# Patient Record
Sex: FEMALE | Race: WHITE | NOT HISPANIC OR LATINO | URBAN - METROPOLITAN AREA
[De-identification: names, ages, dates, MRNs, and addresses within clinical notes are randomized per-mention and may not be internally consistent; named-entity substitution may affect disease eponyms.]

---

## 2018-02-05 ENCOUNTER — EMERGENCY (EMERGENCY)
Facility: HOSPITAL | Age: 73
LOS: 1 days | Discharge: LEFT BEFORE DISCHARGE | End: 2018-02-05
Attending: EMERGENCY MEDICINE
Payer: MEDICARE

## 2018-02-05 VITALS
TEMPERATURE: 98 F | SYSTOLIC BLOOD PRESSURE: 195 MMHG | WEIGHT: 154.98 LBS | RESPIRATION RATE: 16 BRPM | HEART RATE: 100 BPM | OXYGEN SATURATION: 99 % | DIASTOLIC BLOOD PRESSURE: 92 MMHG | HEIGHT: 65 IN

## 2018-02-05 DIAGNOSIS — Z95.1 PRESENCE OF AORTOCORONARY BYPASS GRAFT: Chronic | ICD-10-CM

## 2018-02-05 LAB
ALBUMIN SERPL ELPH-MCNC: 3.3 G/DL — LOW (ref 3.5–5)
ALP SERPL-CCNC: 96 U/L — SIGNIFICANT CHANGE UP (ref 40–120)
ALT FLD-CCNC: 26 U/L DA — SIGNIFICANT CHANGE UP (ref 10–60)
ANION GAP SERPL CALC-SCNC: 14 MMOL/L — SIGNIFICANT CHANGE UP (ref 5–17)
APPEARANCE UR: CLEAR — SIGNIFICANT CHANGE UP
APTT BLD: 32.7 SEC — SIGNIFICANT CHANGE UP (ref 27.5–37.4)
AST SERPL-CCNC: 42 U/L — HIGH (ref 10–40)
BASOPHILS # BLD AUTO: 0.2 K/UL — SIGNIFICANT CHANGE UP (ref 0–0.2)
BASOPHILS NFR BLD AUTO: 1.8 % — SIGNIFICANT CHANGE UP (ref 0–2)
BILIRUB SERPL-MCNC: 0.3 MG/DL — SIGNIFICANT CHANGE UP (ref 0.2–1.2)
BILIRUB UR-MCNC: NEGATIVE — SIGNIFICANT CHANGE UP
BUN SERPL-MCNC: 18 MG/DL — SIGNIFICANT CHANGE UP (ref 7–18)
CALCIUM SERPL-MCNC: 8.2 MG/DL — LOW (ref 8.4–10.5)
CHLORIDE SERPL-SCNC: 102 MMOL/L — SIGNIFICANT CHANGE UP (ref 96–108)
CK MB BLD-MCNC: 1.2 % — SIGNIFICANT CHANGE UP (ref 0–3.5)
CK MB CFR SERPL CALC: 1.6 NG/ML — SIGNIFICANT CHANGE UP (ref 0–3.6)
CK SERPL-CCNC: 133 U/L — SIGNIFICANT CHANGE UP (ref 21–215)
CO2 SERPL-SCNC: 22 MMOL/L — SIGNIFICANT CHANGE UP (ref 22–31)
COLOR SPEC: YELLOW — SIGNIFICANT CHANGE UP
CREAT SERPL-MCNC: 0.99 MG/DL — SIGNIFICANT CHANGE UP (ref 0.5–1.3)
DIFF PNL FLD: NEGATIVE — SIGNIFICANT CHANGE UP
EOSINOPHIL # BLD AUTO: 0 K/UL — SIGNIFICANT CHANGE UP (ref 0–0.5)
EOSINOPHIL NFR BLD AUTO: 0.3 % — SIGNIFICANT CHANGE UP (ref 0–6)
EPI CELLS # UR: SIGNIFICANT CHANGE UP /HPF
GLUCOSE SERPL-MCNC: 154 MG/DL — HIGH (ref 70–99)
GLUCOSE UR QL: NEGATIVE — SIGNIFICANT CHANGE UP
HCT VFR BLD CALC: 52.4 % — HIGH (ref 34.5–45)
HGB BLD-MCNC: 16.7 G/DL — HIGH (ref 11.5–15.5)
INR BLD: 1 RATIO — SIGNIFICANT CHANGE UP (ref 0.88–1.16)
KETONES UR-MCNC: NEGATIVE — SIGNIFICANT CHANGE UP
LEUKOCYTE ESTERASE UR-ACNC: NEGATIVE — SIGNIFICANT CHANGE UP
LYMPHOCYTES # BLD AUTO: 2.1 K/UL — SIGNIFICANT CHANGE UP (ref 1–3.3)
LYMPHOCYTES # BLD AUTO: 21.4 % — SIGNIFICANT CHANGE UP (ref 13–44)
MCHC RBC-ENTMCNC: 31.8 GM/DL — LOW (ref 32–36)
MCHC RBC-ENTMCNC: 32.2 PG — SIGNIFICANT CHANGE UP (ref 27–34)
MCV RBC AUTO: 101.2 FL — HIGH (ref 80–100)
MONOCYTES # BLD AUTO: 0.5 K/UL — SIGNIFICANT CHANGE UP (ref 0–0.9)
MONOCYTES NFR BLD AUTO: 5.2 % — SIGNIFICANT CHANGE UP (ref 2–14)
NEUTROPHILS # BLD AUTO: 7 K/UL — SIGNIFICANT CHANGE UP (ref 1.8–7.4)
NEUTROPHILS NFR BLD AUTO: 71.3 % — SIGNIFICANT CHANGE UP (ref 43–77)
NITRITE UR-MCNC: NEGATIVE — SIGNIFICANT CHANGE UP
NT-PROBNP SERPL-SCNC: 43 PG/ML — SIGNIFICANT CHANGE UP (ref 0–125)
PH UR: 5 — SIGNIFICANT CHANGE UP (ref 5–8)
PLATELET # BLD AUTO: 175 K/UL — SIGNIFICANT CHANGE UP (ref 150–400)
POTASSIUM SERPL-MCNC: 3.8 MMOL/L — SIGNIFICANT CHANGE UP (ref 3.5–5.3)
POTASSIUM SERPL-SCNC: 3.8 MMOL/L — SIGNIFICANT CHANGE UP (ref 3.5–5.3)
PROT SERPL-MCNC: 7 G/DL — SIGNIFICANT CHANGE UP (ref 6–8.3)
PROT UR-MCNC: 15
PROTHROM AB SERPL-ACNC: 10.9 SEC — SIGNIFICANT CHANGE UP (ref 9.8–12.7)
RBC # BLD: 5.18 M/UL — SIGNIFICANT CHANGE UP (ref 3.8–5.2)
RBC # FLD: 11.8 % — SIGNIFICANT CHANGE UP (ref 10.3–14.5)
RBC CASTS # UR COMP ASSIST: SIGNIFICANT CHANGE UP /HPF (ref 0–2)
SODIUM SERPL-SCNC: 138 MMOL/L — SIGNIFICANT CHANGE UP (ref 135–145)
SP GR SPEC: 1.01 — SIGNIFICANT CHANGE UP (ref 1.01–1.02)
T4 AB SER-ACNC: 9.2 UG/DL — SIGNIFICANT CHANGE UP (ref 4.6–12)
TROPONIN I SERPL-MCNC: <0.015 NG/ML — SIGNIFICANT CHANGE UP (ref 0–0.04)
TSH SERPL-MCNC: 0.48 UU/ML — SIGNIFICANT CHANGE UP (ref 0.34–4.82)
UROBILINOGEN FLD QL: NEGATIVE — SIGNIFICANT CHANGE UP
WBC # BLD: 9.9 K/UL — SIGNIFICANT CHANGE UP (ref 3.8–10.5)
WBC # FLD AUTO: 9.9 K/UL — SIGNIFICANT CHANGE UP (ref 3.8–10.5)
WBC UR QL: SIGNIFICANT CHANGE UP /HPF (ref 0–5)

## 2018-02-05 PROCEDURE — 99285 EMERGENCY DEPT VISIT HI MDM: CPT

## 2018-02-05 PROCEDURE — 71045 X-RAY EXAM CHEST 1 VIEW: CPT | Mod: 26

## 2018-02-05 PROCEDURE — 70450 CT HEAD/BRAIN W/O DYE: CPT | Mod: 26

## 2018-02-05 RX ORDER — SODIUM CHLORIDE 9 MG/ML
1000 INJECTION INTRAMUSCULAR; INTRAVENOUS; SUBCUTANEOUS ONCE
Qty: 0 | Refills: 0 | Status: COMPLETED | OUTPATIENT
Start: 2018-02-05 | End: 2018-02-05

## 2018-02-05 NOTE — ED PROVIDER NOTE - MEDICAL DECISION MAKING DETAILS
74 y/o F pt presents s/p fall; unknown ideology. Possible syncope. Will get labs, CT head and admit.

## 2018-02-05 NOTE — ED ADULT TRIAGE NOTE - CHIEF COMPLAINT QUOTE
s/p fall, patient stated" I walk waking to bathroom and fell" denies any injury, patient doesn't remember when she fell

## 2018-02-05 NOTE — ED PROVIDER NOTE - OBJECTIVE STATEMENT
74 y/o F pt, active heavy smoker (x50 years), with a significant PMHx of MI (), HLD, COPD and a significant PSHx of CABG x1 presents to the ED s/p fall with positive LOC. Pt was apparently found unconscious and brought to the ED. Pt does not remember the mechanism of her fall and is unsure who called the ambulance. Pt states she has problems walking 2/2 losing her balance. Pt notes she does not take any medications because she does not have a pmd; pt lives alone with her cat and states all of her doctors . Pt relates she last saw a doctor in . Pt currently with no complaints. Pt denies neck pain, back pain or any other complaints. Allergies: Penicillin, Statins and sulfa drugs.

## 2018-02-05 NOTE — ED ADULT NURSE NOTE - OBJECTIVE STATEMENT
Pt. c/o fall Pt. c/o fall.  patient stated" I walk waking to bathroom and fell". Pt. denies any injury but doesn't remember when she fell. Pt. is confused at times

## 2018-02-05 NOTE — ED PROVIDER NOTE - PMH
Hypercholesterolemia    Myocardial infarct, old COPD (chronic obstructive pulmonary disease)    Hypercholesterolemia    Myocardial infarct, old

## 2018-02-06 VITALS
HEART RATE: 102 BPM | OXYGEN SATURATION: 94 % | TEMPERATURE: 99 F | DIASTOLIC BLOOD PRESSURE: 68 MMHG | SYSTOLIC BLOOD PRESSURE: 164 MMHG | RESPIRATION RATE: 16 BRPM

## 2018-02-06 LAB
CK MB BLD-MCNC: 1.2 % — SIGNIFICANT CHANGE UP (ref 0–3.5)
CK MB CFR SERPL CALC: 2 NG/ML — SIGNIFICANT CHANGE UP (ref 0–3.6)
CK SERPL-CCNC: 160 U/L — SIGNIFICANT CHANGE UP (ref 21–215)
T3 SERPL-MCNC: 80 NG/DL — SIGNIFICANT CHANGE UP (ref 80–200)
TROPONIN I SERPL-MCNC: <0.015 NG/ML — SIGNIFICANT CHANGE UP (ref 0–0.04)

## 2018-02-06 PROCEDURE — 71045 X-RAY EXAM CHEST 1 VIEW: CPT

## 2018-02-06 PROCEDURE — 82553 CREATINE MB FRACTION: CPT

## 2018-02-06 PROCEDURE — 84484 ASSAY OF TROPONIN QUANT: CPT

## 2018-02-06 PROCEDURE — 99284 EMERGENCY DEPT VISIT MOD MDM: CPT | Mod: 25

## 2018-02-06 PROCEDURE — 80053 COMPREHEN METABOLIC PANEL: CPT

## 2018-02-06 PROCEDURE — 85610 PROTHROMBIN TIME: CPT

## 2018-02-06 PROCEDURE — 85027 COMPLETE CBC AUTOMATED: CPT

## 2018-02-06 PROCEDURE — 81001 URINALYSIS AUTO W/SCOPE: CPT

## 2018-02-06 PROCEDURE — 84436 ASSAY OF TOTAL THYROXINE: CPT

## 2018-02-06 PROCEDURE — 85730 THROMBOPLASTIN TIME PARTIAL: CPT

## 2018-02-06 PROCEDURE — 93005 ELECTROCARDIOGRAM TRACING: CPT

## 2018-02-06 PROCEDURE — 84443 ASSAY THYROID STIM HORMONE: CPT

## 2018-02-06 PROCEDURE — 70450 CT HEAD/BRAIN W/O DYE: CPT

## 2018-02-06 PROCEDURE — 82550 ASSAY OF CK (CPK): CPT

## 2018-02-06 PROCEDURE — 83880 ASSAY OF NATRIURETIC PEPTIDE: CPT

## 2018-02-06 RX ADMIN — SODIUM CHLORIDE 1000 MILLILITER(S): 9 INJECTION INTRAMUSCULAR; INTRAVENOUS; SUBCUTANEOUS at 01:36

## 2018-02-06 NOTE — ED ADULT NURSE REASSESSMENT NOTE - NS ED NURSE REASSESS COMMENT FT1
Blood work done and fluids given. Pt. is eager to leave. Pt. education provided on the importance of being monitored. Pt. signed AMA forms.

## 2018-02-07 LAB
CULTURE RESULTS: SIGNIFICANT CHANGE UP
SPECIMEN SOURCE: SIGNIFICANT CHANGE UP

## 2018-08-23 NOTE — ED ADULT TRIAGE NOTE - NS ED NURSE AMBULANCES
[facility] Duration Of Freeze Thaw-Cycle (Seconds): 0 Render Post-Care Instructions In Note?: no Detail Level: Detailed Consent: The patient's consent was obtained including but not limited to risks of crusting, scabbing, blistering, scarring, darker or lighter pigmentary change, recurrence, incomplete removal and infection. Post-Care Instructions: I reviewed with the patient in detail post-care instructions. Patient is to wear sunprotection, and avoid picking at any of the treated lesions. Pt may apply ointment and a bandage to crusted or scabbing areas. Written wound care instructions were given to the patient.

## 2020-01-01 ENCOUNTER — APPOINTMENT (OUTPATIENT)
Dept: VASCULAR SURGERY | Facility: CLINIC | Age: 75
End: 2020-01-01
Payer: MEDICARE

## 2020-01-01 ENCOUNTER — OUTPATIENT (OUTPATIENT)
Dept: OUTPATIENT SERVICES | Facility: HOSPITAL | Age: 75
LOS: 1 days | End: 2020-01-01
Payer: MEDICARE

## 2020-01-01 ENCOUNTER — APPOINTMENT (OUTPATIENT)
Dept: CT IMAGING | Facility: IMAGING CENTER | Age: 75
End: 2020-01-01
Payer: MEDICARE

## 2020-01-01 ENCOUNTER — RESULT REVIEW (OUTPATIENT)
Age: 75
End: 2020-01-01

## 2020-01-01 VITALS
WEIGHT: 125 LBS | HEIGHT: 67 IN | HEART RATE: 71 BPM | DIASTOLIC BLOOD PRESSURE: 83 MMHG | BODY MASS INDEX: 19.62 KG/M2 | SYSTOLIC BLOOD PRESSURE: 193 MMHG | TEMPERATURE: 98 F

## 2020-01-01 VITALS — DIASTOLIC BLOOD PRESSURE: 85 MMHG | HEART RATE: 65 BPM | SYSTOLIC BLOOD PRESSURE: 176 MMHG

## 2020-01-01 DIAGNOSIS — Z00.8 ENCOUNTER FOR OTHER GENERAL EXAMINATION: ICD-10-CM

## 2020-01-01 DIAGNOSIS — Z95.1 PRESENCE OF AORTOCORONARY BYPASS GRAFT: Chronic | ICD-10-CM

## 2020-01-01 PROCEDURE — 93922 UPR/L XTREMITY ART 2 LEVELS: CPT

## 2020-01-01 PROCEDURE — 93926 LOWER EXTREMITY STUDY: CPT

## 2020-01-01 PROCEDURE — 73706 CT ANGIO LWR EXTR W/O&W/DYE: CPT | Mod: 26,LT

## 2020-01-01 PROCEDURE — 99212 OFFICE O/P EST SF 10 MIN: CPT

## 2020-01-01 PROCEDURE — 73706 CT ANGIO LWR EXTR W/O&W/DYE: CPT

## 2020-01-10 ENCOUNTER — INPATIENT (INPATIENT)
Facility: HOSPITAL | Age: 75
LOS: 31 days | Discharge: NURSING, REHAB OR LONG-TERM CARE | DRG: 270 | End: 2020-02-11
Attending: SURGERY | Admitting: SURGERY
Payer: MEDICARE

## 2020-01-10 VITALS
HEART RATE: 71 BPM | OXYGEN SATURATION: 99 % | RESPIRATION RATE: 16 BRPM | DIASTOLIC BLOOD PRESSURE: 78 MMHG | WEIGHT: 139.99 LBS | HEIGHT: 62 IN | TEMPERATURE: 99 F | SYSTOLIC BLOOD PRESSURE: 132 MMHG

## 2020-01-10 DIAGNOSIS — Z29.9 ENCOUNTER FOR PROPHYLACTIC MEASURES, UNSPECIFIED: ICD-10-CM

## 2020-01-10 DIAGNOSIS — I25.10 ATHEROSCLEROTIC HEART DISEASE OF NATIVE CORONARY ARTERY WITHOUT ANGINA PECTORIS: ICD-10-CM

## 2020-01-10 DIAGNOSIS — I96 GANGRENE, NOT ELSEWHERE CLASSIFIED: ICD-10-CM

## 2020-01-10 DIAGNOSIS — J44.9 CHRONIC OBSTRUCTIVE PULMONARY DISEASE, UNSPECIFIED: ICD-10-CM

## 2020-01-10 DIAGNOSIS — Z95.1 PRESENCE OF AORTOCORONARY BYPASS GRAFT: Chronic | ICD-10-CM

## 2020-01-10 PROBLEM — E78.00 PURE HYPERCHOLESTEROLEMIA, UNSPECIFIED: Chronic | Status: ACTIVE | Noted: 2018-02-05

## 2020-01-10 PROBLEM — I25.2 OLD MYOCARDIAL INFARCTION: Chronic | Status: ACTIVE | Noted: 2018-02-05

## 2020-01-10 LAB
ALBUMIN SERPL ELPH-MCNC: 4 G/DL — SIGNIFICANT CHANGE UP (ref 3.5–5)
ALP SERPL-CCNC: 94 U/L — SIGNIFICANT CHANGE UP (ref 40–120)
ALT FLD-CCNC: 14 U/L DA — SIGNIFICANT CHANGE UP (ref 10–60)
ANION GAP SERPL CALC-SCNC: 7 MMOL/L — SIGNIFICANT CHANGE UP (ref 5–17)
APTT BLD: 34.4 SEC — SIGNIFICANT CHANGE UP (ref 27.5–36.3)
AST SERPL-CCNC: 16 U/L — SIGNIFICANT CHANGE UP (ref 10–40)
BASOPHILS # BLD AUTO: 0.09 K/UL — SIGNIFICANT CHANGE UP (ref 0–0.2)
BASOPHILS NFR BLD AUTO: 0.8 % — SIGNIFICANT CHANGE UP (ref 0–2)
BILIRUB SERPL-MCNC: 0.5 MG/DL — SIGNIFICANT CHANGE UP (ref 0.2–1.2)
BUN SERPL-MCNC: 17 MG/DL — SIGNIFICANT CHANGE UP (ref 7–18)
CALCIUM SERPL-MCNC: 9.9 MG/DL — SIGNIFICANT CHANGE UP (ref 8.4–10.5)
CHLORIDE SERPL-SCNC: 104 MMOL/L — SIGNIFICANT CHANGE UP (ref 96–108)
CO2 SERPL-SCNC: 28 MMOL/L — SIGNIFICANT CHANGE UP (ref 22–31)
CREAT SERPL-MCNC: 0.96 MG/DL — SIGNIFICANT CHANGE UP (ref 0.5–1.3)
EOSINOPHIL # BLD AUTO: 0.11 K/UL — SIGNIFICANT CHANGE UP (ref 0–0.5)
EOSINOPHIL NFR BLD AUTO: 0.9 % — SIGNIFICANT CHANGE UP (ref 0–6)
GLUCOSE SERPL-MCNC: 80 MG/DL — SIGNIFICANT CHANGE UP (ref 70–99)
HCT VFR BLD CALC: 46.3 % — HIGH (ref 34.5–45)
HGB BLD-MCNC: 15.1 G/DL — SIGNIFICANT CHANGE UP (ref 11.5–15.5)
IMM GRANULOCYTES NFR BLD AUTO: 0.3 % — SIGNIFICANT CHANGE UP (ref 0–1.5)
INR BLD: 1.01 RATIO — SIGNIFICANT CHANGE UP (ref 0.88–1.16)
LACTATE SERPL-SCNC: 1.6 MMOL/L — SIGNIFICANT CHANGE UP (ref 0.7–2)
LYMPHOCYTES # BLD AUTO: 1.75 K/UL — SIGNIFICANT CHANGE UP (ref 1–3.3)
LYMPHOCYTES # BLD AUTO: 14.8 % — SIGNIFICANT CHANGE UP (ref 13–44)
MCHC RBC-ENTMCNC: 30.4 PG — SIGNIFICANT CHANGE UP (ref 27–34)
MCHC RBC-ENTMCNC: 32.6 GM/DL — SIGNIFICANT CHANGE UP (ref 32–36)
MCV RBC AUTO: 93.3 FL — SIGNIFICANT CHANGE UP (ref 80–100)
MONOCYTES # BLD AUTO: 0.97 K/UL — HIGH (ref 0–0.9)
MONOCYTES NFR BLD AUTO: 8.2 % — SIGNIFICANT CHANGE UP (ref 2–14)
NEUTROPHILS # BLD AUTO: 8.86 K/UL — HIGH (ref 1.8–7.4)
NEUTROPHILS NFR BLD AUTO: 75 % — SIGNIFICANT CHANGE UP (ref 43–77)
NRBC # BLD: 0 /100 WBCS — SIGNIFICANT CHANGE UP (ref 0–0)
PLATELET # BLD AUTO: 253 K/UL — SIGNIFICANT CHANGE UP (ref 150–400)
POTASSIUM SERPL-MCNC: 3.4 MMOL/L — LOW (ref 3.5–5.3)
POTASSIUM SERPL-SCNC: 3.4 MMOL/L — LOW (ref 3.5–5.3)
PROT SERPL-MCNC: 8.5 G/DL — HIGH (ref 6–8.3)
PROTHROM AB SERPL-ACNC: 11.2 SEC — SIGNIFICANT CHANGE UP (ref 10–12.9)
RBC # BLD: 4.96 M/UL — SIGNIFICANT CHANGE UP (ref 3.8–5.2)
RBC # FLD: 12.6 % — SIGNIFICANT CHANGE UP (ref 10.3–14.5)
SODIUM SERPL-SCNC: 139 MMOL/L — SIGNIFICANT CHANGE UP (ref 135–145)
WBC # BLD: 11.81 K/UL — HIGH (ref 3.8–10.5)
WBC # FLD AUTO: 11.81 K/UL — HIGH (ref 3.8–10.5)

## 2020-01-10 PROCEDURE — 73630 X-RAY EXAM OF FOOT: CPT | Mod: 26,RT

## 2020-01-10 PROCEDURE — 99223 1ST HOSP IP/OBS HIGH 75: CPT

## 2020-01-10 PROCEDURE — 99285 EMERGENCY DEPT VISIT HI MDM: CPT

## 2020-01-10 RX ORDER — CEFEPIME 1 G/1
INJECTION, POWDER, FOR SOLUTION INTRAMUSCULAR; INTRAVENOUS
Refills: 0 | Status: DISCONTINUED | OUTPATIENT
Start: 2020-01-11 | End: 2020-01-17

## 2020-01-10 RX ORDER — CEFEPIME 1 G/1
2000 INJECTION, POWDER, FOR SOLUTION INTRAMUSCULAR; INTRAVENOUS EVERY 12 HOURS
Refills: 0 | Status: DISCONTINUED | OUTPATIENT
Start: 2020-01-11 | End: 2020-01-17

## 2020-01-10 RX ORDER — ENOXAPARIN SODIUM 100 MG/ML
40 INJECTION SUBCUTANEOUS DAILY
Refills: 0 | Status: DISCONTINUED | OUTPATIENT
Start: 2020-01-10 | End: 2020-01-17

## 2020-01-10 RX ORDER — RISPERIDONE 4 MG/1
0.5 TABLET ORAL
Refills: 0 | Status: DISCONTINUED | OUTPATIENT
Start: 2020-01-10 | End: 2020-01-17

## 2020-01-10 RX ORDER — ASPIRIN/CALCIUM CARB/MAGNESIUM 324 MG
81 TABLET ORAL DAILY
Refills: 0 | Status: DISCONTINUED | OUTPATIENT
Start: 2020-01-10 | End: 2020-01-17

## 2020-01-10 RX ORDER — CEFEPIME 1 G/1
2000 INJECTION, POWDER, FOR SOLUTION INTRAMUSCULAR; INTRAVENOUS ONCE
Refills: 0 | Status: COMPLETED | OUTPATIENT
Start: 2020-01-10 | End: 2020-01-11

## 2020-01-10 RX ORDER — LISINOPRIL 2.5 MG/1
10 TABLET ORAL DAILY
Refills: 0 | Status: DISCONTINUED | OUTPATIENT
Start: 2020-01-10 | End: 2020-01-17

## 2020-01-10 RX ORDER — ACETAMINOPHEN 500 MG
650 TABLET ORAL EVERY 6 HOURS
Refills: 0 | Status: DISCONTINUED | OUTPATIENT
Start: 2020-01-10 | End: 2020-01-17

## 2020-01-10 RX ORDER — SODIUM CHLORIDE 9 MG/ML
1000 INJECTION INTRAMUSCULAR; INTRAVENOUS; SUBCUTANEOUS ONCE
Refills: 0 | Status: COMPLETED | OUTPATIENT
Start: 2020-01-10 | End: 2020-01-10

## 2020-01-10 RX ADMIN — Medication 650 MILLIGRAM(S): at 22:15

## 2020-01-10 RX ADMIN — Medication 650 MILLIGRAM(S): at 22:27

## 2020-01-10 RX ADMIN — SODIUM CHLORIDE 1000 MILLILITER(S): 9 INJECTION INTRAMUSCULAR; INTRAVENOUS; SUBCUTANEOUS at 14:05

## 2020-01-10 RX ADMIN — Medication 100 MILLIGRAM(S): at 14:04

## 2020-01-10 NOTE — H&P ADULT - ASSESSMENT
Patient is 74 year old female, live by herself, ambulates with cane  has medical hx significant for dementia, cad, pvd, hld, htn presents to the ED with complaint of discoloration for a toe on the right foot.

## 2020-01-10 NOTE — H&P ADULT - HISTORY OF PRESENT ILLNESS
Patient is 74 year old female, live by herself, ambulates with cane  has medical hx significant for cad, pvd, hld, htn presents to the ED with complaint of discoloration for a toe on the right foot.  Per pt she has black colored rt 2nd toe. pt states she notice discoloration gradually over last 3 weeks associated with swelling and erythema, pt states she bumped in to something and hurt her right big toe but dose not remember when. Today she was seen by her primary care physician a few days ago and told her to go to the emergency room. Patient states she can only ambulate 10 feet and gets a cramp in her right calf for which she needs to sit down.  She states she is very minimally ambulatory.  She denies pain at rest with her feet elevated.  Patient lives alone with a cat. Patient massaging leg throughout the visit.  Patient denies fever, cp, sob, cough, n/v/d. Pt has urinary incontinence for many years. Patient states her blood pressure is not usually low. Patient states she smokes at least a pack a day of cigarettes since she was 22 years old.    Allergy: Sulfa and penicillin    Code status: Full code Patient is 74 year old female, live by herself, ambulates with cane  has medical hx significant for dementia, cad, pvd, hld, htn presents to the ED with complaint of discoloration for a toe on the right foot.  Per pt she has black colored rt 2nd toe. pt states she notice discoloration gradually over last 3 weeks associated with swelling and erythema, pt states she bumped in to something and hurt her right big toe but dose not remember when. Today she was seen by her primary care physician a few days ago and told her to go to the emergency room. Patient states she can only ambulate 10 feet and gets a cramp in her right calf for which she needs to sit down.  She states she is very minimally ambulatory.  She denies pain at rest with her feet elevated.  Patient lives alone with a cat. Patient massaging leg throughout the visit.  Patient denies fever, cp, sob, cough, n/v/d. Pt has urinary incontinence for many years. Patient states her blood pressure is not usually low. Patient states she smokes at least a pack a day of cigarettes since she was 22 years old.    Allergy: Sulfa and penicillin    Code status: Full code

## 2020-01-10 NOTE — ED PROVIDER NOTE - PROGRESS NOTE DETAILS
Pan: s/o from Dr Winn to f/u vascular eval. spoke with vascular and will follow the NICKI but no acute surgical intervention.  rec to admit to med

## 2020-01-10 NOTE — ED PROVIDER NOTE - OBJECTIVE STATEMENT
Pt is a 74y F with significant PMHx of hld, copd and significant PSHx of CABG, was sent to the ED by pmd for Rt foot cellulitis/necrotic 2nd toe. As per pt, symptoms have progressed over the last week. But as per nephew at bedside, pt sleeps in a rocking chair with slippers on and he can't recall last time he saw the pt's foot thus has no idea how long symptoms have been developing. Pt denies any fever, chest pain, nausea or vomiting. Pt has multiple drug allergies and currently denies any additional complaints at this time.

## 2020-01-10 NOTE — ED ADULT NURSE NOTE - NSIMPLEMENTINTERV_GEN_ALL_ED
Implemented All Universal Safety Interventions:  [place] to call system. Call bell, personal items and telephone within reach. Instruct patient to call for assistance. Room bathroom lighting operational. Non-slip footwear when patient is off stretcher. Physically safe environment: no spills, clutter or unnecessary equipment. Stretcher in lowest position, wheels locked, appropriate side rails in place.

## 2020-01-10 NOTE — H&P ADULT - NSICDXPASTMEDICALHX_GEN_ALL_CORE_FT
PAST MEDICAL HISTORY:  COPD (chronic obstructive pulmonary disease)     Hypercholesterolemia     Myocardial infarct, old

## 2020-01-10 NOTE — H&P ADULT - NSHPPHYSICALEXAM_GEN_ALL_CORE
Vital Signs Last 24 Hrs  T(C): 36.9 (10 Angel 2020 21:44), Max: 36.9 (10 Angel 2020 21:44)  T(F): 98.4 (10 Angel 2020 21:44), Max: 98.4 (10 Angel 2020 21:44)  HR: 107 (10 Angel 2020 21:44) (100 - 107)  BP: 142/57 (10 Angel 2020 21:44) (80/51 - 142/57)  BP(mean): --  RR: 18 (10 Angel 2020 21:44) (18 - 20)  SpO2: 99% (10 Angel 2020 21:44) (98% - 99%)    PHYSICAL EXAM:  GENERAL: NAD, well-developed, cachetic   HEAD:  Atraumatic, Normocephalic  EYES: EOMI, PERRLA, conjunctiva and sclera clear  NECK: Supple, No JVD  CHEST/LUNG: Clear to auscultation bilaterally; No wheeze  HEART: Regular rate and rhythm; s1+ s2+  ABDOMEN: Soft, Nontender, Nondistended; Bowel sounds present  EXTREMITIES: Right 2nd toe: black necrotic to the level of the metatarsal head with exposed bone.  Distal aspect of the right 2nd toe is hard and dry, slight drainage to proximal aspect of toe. Right foot erythema to level of ankle joint  NEUROLOGY:AAOx3 non-focal  SKIN: No rashes or lesions

## 2020-01-10 NOTE — H&P ADULT - PROBLEM SELECTOR PLAN 1
Dry gangrene with surrounding cellulitis  s/p clinda in the ED  will start on cefexime   f/u blood culture   vascular and podiatry consult appreciated  f/u timothy and ct abdomen

## 2020-01-10 NOTE — CHART NOTE - NSCHARTNOTEFT_GEN_A_CORE
Spoke to Power of  Ms Ascencio [phone], about pt's medical condition and plan discussed, all questions answered. Ms Ascencio states her aunt Ms Mcgovern has dementia, Ms Ascencio would like to be involved in all medical decision making for Ms Sesay.   Code status discussed pt remains full code.

## 2020-01-10 NOTE — CONSULT NOTE ADULT - SUBJECTIVE AND OBJECTIVE BOX
Patient is a 74y old  Female who presents with a chief complaint of discoloration of toe of right foot.     HPI: This 74 year old non-diabetic female presents to the ED with complaint of discoloration for a toe on the right foot. She states that she was seen by her primary care physician a few days ago and told her to go to the emergency room.  Patient states she smokes at least a pack a day of cigarettes since she was 22 years old.  Patient states she can only ambulate 10 feet and gets a cramp in her right calf for which she needs to sit down.  She states she is very minimally ambulatory.   She denies pain at rest with her feet elevated.  Patient lives alone with a cat.  Patient denies n/v/d/sob/cp/f.  Patient states her blood pressure is not usually low.       PMH:COPD (chronic obstructive pulmonary disease)  Hypercholesterolemia  Myocardial infarct, old    Allergies: PC Pen VK (Rash)  penicillin (Other; Rash)  statins (Other)  sulfa drugs (Other)  sulfamethizole (Other)    Medications:   FH:  PSX: S/P CABG x 1    SH:     Vital Signs Last 24 Hrs  T(C): 36.2 (10 Angel 2020 12:41), Max: 36.2 (10 Angel 2020 12:41)  T(F): 97.2 (10 Angel 2020 12:41), Max: 97.2 (10 Angel 2020 12:41)  HR: 105 (10 Angel 2020 12:41) (105 - 105)  BP: 80/51 (10 Angel 2020 12:41) (80/51 - 80/51)  BP(mean): --  RR: 20 (10 Angel 2020 12:41) (20 - 20)  SpO2: 98% (10 Angel 2020 12:41) (98% - 98%)    LABS                        15.1   11.81 )-----------( 253      ( 10 Angel 2020 14:05 )             46.3               01-10    139  |  104  |  17  ----------------------------<  80  3.4<L>   |  28  |  x     Ca    9.9      10 Angel 2020 14:05    TPro  x   /  Alb  4.0  /  TBili  x   /  DBili  x   /  AST  x   /  ALT  x   /  AlkPhos  x   01-10      ROS  REVIEW OF SYSTEMS:    CONSTITUTIONAL: No fever, weight loss, or fatigue  EYES: No eye pain, visual disturbances, or discharge  ENMT:  No difficulty hearing, tinnitus, vertigo; No sinus or throat pain  NECK: No pain or stiffness  RESPIRATORY: No cough, wheezing, chills or hemoptysis; No shortness of breath  CARDIOVASCULAR: Bilateral leg edema  GASTROINTESTINAL: No abdominal or epigastric pain. No nausea, vomiting, or hematemesis; No diarrhea or constipation.  GENITOURINARY: No dysuria, frequency, hematuria, or incontinence  NEUROLOGICAL: Burnign sensation in lower extremities periodically  SKIN: Right 2nd toe gangrene, right 3rd and 4th digits dusky  MUSCULOSKELETAL: Right lower extremity pain        PHYSICAL EXAM  GEN: MERARY MEYER is a pleasant well-nourished, well developed 74y Female in no acute distress, alert awake, and oriented to person, place and time.   LE Focused:    Vasc:  DP and PT pulses non-palpable b/l.  Left DP faintly dopplerable.  Unable to doppler left PT, Unable to find right DP or PT with doppler.  No CFT to right 2nd toe,  Derm:   Right 2nd toe: black necrotic to the level of the metatarsal head.  Distal aspect of the right 2nd toe is hard and dry, slight drainage to proximal aspect of toe.  Right foot erythema to level of ankle joint  Neuro: Gross sensation intact ot toes  MSK: Tenderness to palpation distal foot.      Imaging:   x-ray pending    Cultures:     A: Dry Gangrene right 2nd toe  PVD  Right 3rd and 4th toes dusky    P:  Patient evaluated, chart reviewed  Xrays- pending  Will f/u vasc consult  Pending NICKI/PVR  Betadine DSD Patient is a 74y old  Female who presents with a chief complaint of discoloration of toe of right foot.     HPI: This 74 year old non-diabetic female presents to the ED with complaint of discoloration for a toe on the right foot. She states that she was seen by her primary care physician a few days ago and told her to go to the emergency room.  Patient states she smokes at least a pack a day of cigarettes since she was 22 years old.  Patient states she can only ambulate 10 feet and gets a cramp in her right calf for which she needs to sit down.  She states she is very minimally ambulatory.   She denies pain at rest with her feet elevated.  Patient lives alone with a cat.  Patient denies n/v/d/sob/cp/f.  Patient states her blood pressure is not usually low.       PMH:COPD (chronic obstructive pulmonary disease)  Hypercholesterolemia  Myocardial infarct, old    Allergies: PC Pen VK (Rash)  penicillin (Other; Rash)  statins (Other)  sulfa drugs (Other)  sulfamethizole (Other)    Medications:   FH:  PSX: S/P CABG x 1    SH:     Vital Signs Last 24 Hrs  T(C): 36.2 (10 Angel 2020 12:41), Max: 36.2 (10 Angel 2020 12:41)  T(F): 97.2 (10 Angel 2020 12:41), Max: 97.2 (10 Angel 2020 12:41)  HR: 105 (10 Angel 2020 12:41) (105 - 105)  BP: 80/51 (10 Angel 2020 12:41) (80/51 - 80/51)  BP(mean): --  RR: 20 (10 Angel 2020 12:41) (20 - 20)  SpO2: 98% (10 Angel 2020 12:41) (98% - 98%)    LABS                        15.1   11.81 )-----------( 253      ( 10 Angel 2020 14:05 )             46.3               01-10    139  |  104  |  17  ----------------------------<  80  3.4<L>   |  28  |  x     Ca    9.9      10 Angel 2020 14:05    TPro  x   /  Alb  4.0  /  TBili  x   /  DBili  x   /  AST  x   /  ALT  x   /  AlkPhos  x   01-10      ROS  REVIEW OF SYSTEMS:    CONSTITUTIONAL: No fever, weight loss, or fatigue  EYES: No eye pain, visual disturbances, or discharge  ENMT:  No difficulty hearing, tinnitus, vertigo; No sinus or throat pain  NECK: No pain or stiffness  RESPIRATORY: No cough, wheezing, chills or hemoptysis; No shortness of breath  CARDIOVASCULAR: Bilateral leg edema  GASTROINTESTINAL: No abdominal or epigastric pain. No nausea, vomiting, or hematemesis; No diarrhea or constipation.  GENITOURINARY: No dysuria, frequency, hematuria, or incontinence  NEUROLOGICAL: Burnign sensation in lower extremities periodically  SKIN: Right 2nd toe gangrene, right 3rd and 4th digits dusky  MUSCULOSKELETAL: Right lower extremity pain        PHYSICAL EXAM  GEN: MERARY MEYER is a pleasant well-nourished, well developed 74y Female in no acute distress, alert awake, and oriented to person, place and time.   LE Focused:    Vasc:  DP and PT pulses non-palpable b/l.  Left DP faintly dopplerable.  Unable to doppler left PT, Unable to find right DP or PT with doppler.  No CFT to right 2nd toe,  Derm:   Right 2nd toe: black necrotic to the level of the metatarsal head with exposed bone.  Distal aspect of the right 2nd toe is hard and dry, slight drainage to proximal aspect of toe.  Right foot erythema to level of ankle joint  Neuro: Sensation diminished to digits b/l.  MSK: Tenderness to palpation distal foot.      Imaging:   x-ray pending    Cultures:     A:  Gangrene right 2nd toe  PVD  Right 3rd and 4th toes dusky    P:  Patient evaluated, chart reviewed  Xrays- pending  Will f/u vasc consult  Pending NICKI/PVR  Betadine DSD to be applied after vascular sees patient  Recommend IV antibiotics  Plan for amputation Wednesday following vascular examination  Discussed the importance of daily foot examinations  Examined with Dr. Rdz Patient is a 74y old  Female who presents with a chief complaint of discoloration of toe of right foot.     HPI: This 74 year old non-diabetic female presents to the ED with complaint of discoloration for a toe on the right foot. She states that she was seen by her primary care physician a few days ago and told her to go to the emergency room.  Patient states she smokes at least a pack a day of cigarettes since she was 22 years old.  Patient states she can only ambulate 10 feet and gets a cramp in her right calf for which she needs to sit down.  She states she is very minimally ambulatory.   She denies pain at rest with her feet elevated.  Patient lives alone with a cat. Patient massaging leg throughout the visit.  Patient denies n/v/d/sob/cp/f.  Patient states her blood pressure is not usually low.       PMH:COPD (chronic obstructive pulmonary disease)  Hypercholesterolemia  Myocardial infarct, old    Allergies: PC Pen VK (Rash)  penicillin (Other; Rash)  statins (Other)  sulfa drugs (Other)  sulfamethizole (Other)    Medications:   FH:  PSX: S/P CABG x 1    SH:     Vital Signs Last 24 Hrs  T(C): 36.2 (10 Angel 2020 12:41), Max: 36.2 (10 Angel 2020 12:41)  T(F): 97.2 (10 Angel 2020 12:41), Max: 97.2 (10 Angel 2020 12:41)  HR: 105 (10 Angel 2020 12:41) (105 - 105)  BP: 80/51 (10 Angel 2020 12:41) (80/51 - 80/51)  BP(mean): --  RR: 20 (10 Angel 2020 12:41) (20 - 20)  SpO2: 98% (10 Angel 2020 12:41) (98% - 98%)    LABS                        15.1   11.81 )-----------( 253      ( 10 Angel 2020 14:05 )             46.3               01-10    139  |  104  |  17  ----------------------------<  80  3.4<L>   |  28  |  x     Ca    9.9      10 Angel 2020 14:05    TPro  x   /  Alb  4.0  /  TBili  x   /  DBili  x   /  AST  x   /  ALT  x   /  AlkPhos  x   01-10      ROS  REVIEW OF SYSTEMS:    CONSTITUTIONAL: No fever, weight loss, or fatigue  EYES: No eye pain, visual disturbances, or discharge  ENMT:  No difficulty hearing, tinnitus, vertigo; No sinus or throat pain  NECK: No pain or stiffness  RESPIRATORY: No cough, wheezing, chills or hemoptysis; No shortness of breath  CARDIOVASCULAR: Bilateral leg edema  GASTROINTESTINAL: No abdominal or epigastric pain. No nausea, vomiting, or hematemesis; No diarrhea or constipation.  GENITOURINARY: No dysuria, frequency, hematuria, or incontinence  NEUROLOGICAL: Burnign sensation in lower extremities periodically  SKIN: Right 2nd toe gangrene, right 3rd and 4th digits dusky  MUSCULOSKELETAL: Right lower extremity pain        PHYSICAL EXAM  GEN: MERARY MEYER is a pleasant well-nourished, well developed 74y Female in no acute distress, alert awake, and oriented to person, place and time.   LE Focused:    Vasc:  DP and PT pulses non-palpable b/l.  Left DP faintly dopplerable.  Unable to doppler left PT, Unable to find right DP or PT with doppler.  No CFT to right 2nd toe,  Derm:   Right 2nd toe: black necrotic to the level of the metatarsal head with exposed bone.  Distal aspect of the right 2nd toe is hard and dry, slight drainage to proximal aspect of toe.  Right foot erythema to level of ankle joint  Neuro: Sensation diminished to digits b/l.  MSK: Tenderness to palpation distal foot.      Imaging:   < from: Xray Foot AP + Lateral + Oblique, Right (01.10.20 @ 15:05) >    EXAM:  FOOT RIGHT (MINIMUM 3 VIEWS)                        PROCEDURE DATE:  01/10/2020    INTERPRETATION:  Right foot. 3 views. Patient has local pain.    The foot shows mild degeneration at the first MTP joint.    There is an old fracture deformity of the distal fibula.    No bone destruction or acute fracture is evident.    IMPRESSION: No acute finding.    JASKARAN MOSLEY M.D., ATTENDING RADIOLOGIST  This document has been electronically signed. Angel 10 2020  3:06PM  < end of copied text >      A:   Critical Limb Ischemia, right  Gangrene right 2nd toe  PVD  Right 3rd and 4th toes dusky    P:  Patient evaluated, chart reviewed  Xrays- pending  Will f/u vasc consult  Pending NICKI/PVR  Betadine DSD applied  Recommend IV antibiotics  Plan for amputation, likely Transmetatarsal amputation, Wednesday following vascular examination  Discussed the importance of daily foot examinations  Podiatry will follow  Examined with Dr. Rdz

## 2020-01-10 NOTE — ED ADULT NURSE NOTE - OBJECTIVE STATEMENT
Patient came to the ED a/o x 3 ambulates c/o keegan foot/ ankle swelling. + redness + swelling. Pt has necrosis in the right 2nd toe. Sent for abx therapy and admission. Patient came to the ED a/o x 3 ambulates c/o keegan foot/ ankle swelling. + redness + swelling. Pt has necrosis in the right 2nd toe. Sent for abx therapy and admission. .pt has small pimples and redness seen below sacrum area and rt buttocks

## 2020-01-10 NOTE — CONSULT NOTE ADULT - SUBJECTIVE AND OBJECTIVE BOX
Patient is a 74y old  Female who presents with a chief complaint of 2nd toe gangrene     HPI  Called to see and evaluate a 73 y/o female smoker with pmhx of MI, HLD, COPD, presents to ED sent from her PCP for gangrene of 2nd right toe with wet skin changes to 3rd and 4th toes with drainage. Pt states she walks with her cane and c/o of claudication pain in her calves; denies rest pain. Pt cannot remember when the discoloration started, but admits to some sharp stabbing pain to the right heel with numbness for the past few weeks; cannot remember when it started.     PAST MEDICAL & SURGICAL HISTORY:  COPD (chronic obstructive pulmonary disease)  Hypercholesterolemia  Myocardial infarct, old  S/P CABG x 1    Allergies:  PC Pen VK (Rash)  penicillin (Other; Rash)  statins (Other)  sulfa drugs (Other)  sulfamethizole (Other)    Vital Signs Last 24 Hrs  T(C): 36.4 (10 Angel 2020 15:45), Max: 36.4 (10 Angel 2020 15:45)  T(F): 97.6 (10 Angel 2020 15:45), Max: 97.6 (10 Angel 2020 15:45)  HR: 100 (10 Angel 2020 15:45) (100 - 105)  BP: 112/51 (10 Angel 2020 15:45) (80/51 - 112/51)  RR: 18 (10 Angel 2020 15:45) (18 - 20)  SpO2: 99% (10 Angel 2020 15:45) (98% - 99%)    Physical:  Gen: A&Ox3. NAD  Abd: Soft ND, NT  Extremities: right foot 1st - 5th toes with early gangrenous skin changes, purulent drainage noted between 3rd and 4th toes; 2nd toe dry necrosis up to base; nonpalpable, non-dopplerable DP or TP pulses b/l, 2+ femoral pulses      LABS:                        15.1   11.81 )-----------( 253      ( 10 Angel 2020 14:05 )             46.3              01-10    139  |  104  |  17  ----------------------------<  80  3.4<L>   |  28  |  0.96    Ca    9.9      10 Angel 2020 14:05    TPro  8.5<H>  /  Alb  4.0  /  TBili  0.5  /  DBili  x   /  AST  16  /  ALT  14  /  AlkPhos  94  01-10  PT/INR - ( 10 Angel 2020 14:05 )   PT: 11.2 sec;   INR: 1.01 ratio    PTT - ( 10 Angel 2020 14:05 )  PTT:34.4 sec    RADIOLOGY & ADDITIONAL STUDIES:  X-Rays pending

## 2020-01-10 NOTE — CONSULT NOTE ADULT - ASSESSMENT
75 y/o female with PAD, with necrotic 2nd toe    - follow-up NICKI/PVRs to eval arterial flow in LE  - follow-up XR findings  - wound care as per podiatry; planning amputation for 1/15  -case and plan discussed with Dr. Izaguirre, agrees 75 y/o female with PAD, with necrotic 2nd toe    - rec NICKI/PVRs to eval arterial flow in LE  - rec CTA aorta with runoff  - follow-up XR findings  - wound care as per podiatry; planning amputation for 1/15  -case and plan discussed with Dr. Izaguirre, agrees

## 2020-01-11 ENCOUNTER — TRANSCRIPTION ENCOUNTER (OUTPATIENT)
Age: 75
End: 2020-01-11

## 2020-01-11 LAB
24R-OH-CALCIDIOL SERPL-MCNC: 63.3 NG/ML — SIGNIFICANT CHANGE UP (ref 30–80)
ALBUMIN SERPL ELPH-MCNC: 3.5 G/DL — SIGNIFICANT CHANGE UP (ref 3.5–5)
ALP SERPL-CCNC: 84 U/L — SIGNIFICANT CHANGE UP (ref 40–120)
ALT FLD-CCNC: 13 U/L DA — SIGNIFICANT CHANGE UP (ref 10–60)
ANION GAP SERPL CALC-SCNC: 5 MMOL/L — SIGNIFICANT CHANGE UP (ref 5–17)
AST SERPL-CCNC: 20 U/L — SIGNIFICANT CHANGE UP (ref 10–40)
BASOPHILS # BLD AUTO: 0.08 K/UL — SIGNIFICANT CHANGE UP (ref 0–0.2)
BASOPHILS NFR BLD AUTO: 0.9 % — SIGNIFICANT CHANGE UP (ref 0–2)
BILIRUB SERPL-MCNC: 0.5 MG/DL — SIGNIFICANT CHANGE UP (ref 0.2–1.2)
BUN SERPL-MCNC: 15 MG/DL — SIGNIFICANT CHANGE UP (ref 7–18)
CALCIUM SERPL-MCNC: 9 MG/DL — SIGNIFICANT CHANGE UP (ref 8.4–10.5)
CHLORIDE SERPL-SCNC: 107 MMOL/L — SIGNIFICANT CHANGE UP (ref 96–108)
CO2 SERPL-SCNC: 29 MMOL/L — SIGNIFICANT CHANGE UP (ref 22–31)
CREAT SERPL-MCNC: 0.91 MG/DL — SIGNIFICANT CHANGE UP (ref 0.5–1.3)
EOSINOPHIL # BLD AUTO: 0.16 K/UL — SIGNIFICANT CHANGE UP (ref 0–0.5)
EOSINOPHIL NFR BLD AUTO: 1.8 % — SIGNIFICANT CHANGE UP (ref 0–6)
FERRITIN SERPL-MCNC: 562 NG/ML — HIGH (ref 15–150)
FOLATE SERPL-MCNC: >20 NG/ML — SIGNIFICANT CHANGE UP
GLUCOSE SERPL-MCNC: 91 MG/DL — SIGNIFICANT CHANGE UP (ref 70–99)
HBA1C BLD-MCNC: 5.6 % — SIGNIFICANT CHANGE UP (ref 4–5.6)
HCT VFR BLD CALC: 46.6 % — HIGH (ref 34.5–45)
HCV AB S/CO SERPL IA: 0.14 S/CO — SIGNIFICANT CHANGE UP (ref 0–0.99)
HCV AB SERPL-IMP: SIGNIFICANT CHANGE UP
HGB BLD-MCNC: 15.1 G/DL — SIGNIFICANT CHANGE UP (ref 11.5–15.5)
IMM GRANULOCYTES NFR BLD AUTO: 0.4 % — SIGNIFICANT CHANGE UP (ref 0–1.5)
IRON SATN MFR SERPL: 27 % — SIGNIFICANT CHANGE UP (ref 15–50)
IRON SATN MFR SERPL: 50 UG/DL — SIGNIFICANT CHANGE UP (ref 40–150)
LYMPHOCYTES # BLD AUTO: 1.6 K/UL — SIGNIFICANT CHANGE UP (ref 1–3.3)
LYMPHOCYTES # BLD AUTO: 17.6 % — SIGNIFICANT CHANGE UP (ref 13–44)
MAGNESIUM SERPL-MCNC: 1.9 MG/DL — SIGNIFICANT CHANGE UP (ref 1.6–2.6)
MCHC RBC-ENTMCNC: 30.3 PG — SIGNIFICANT CHANGE UP (ref 27–34)
MCHC RBC-ENTMCNC: 32.4 GM/DL — SIGNIFICANT CHANGE UP (ref 32–36)
MCV RBC AUTO: 93.6 FL — SIGNIFICANT CHANGE UP (ref 80–100)
MONOCYTES # BLD AUTO: 0.92 K/UL — HIGH (ref 0–0.9)
MONOCYTES NFR BLD AUTO: 10.1 % — SIGNIFICANT CHANGE UP (ref 2–14)
NEUTROPHILS # BLD AUTO: 6.28 K/UL — SIGNIFICANT CHANGE UP (ref 1.8–7.4)
NEUTROPHILS NFR BLD AUTO: 69.2 % — SIGNIFICANT CHANGE UP (ref 43–77)
NRBC # BLD: 0 /100 WBCS — SIGNIFICANT CHANGE UP (ref 0–0)
PHOSPHATE SERPL-MCNC: 2.7 MG/DL — SIGNIFICANT CHANGE UP (ref 2.5–4.5)
PLATELET # BLD AUTO: 240 K/UL — SIGNIFICANT CHANGE UP (ref 150–400)
POTASSIUM SERPL-MCNC: 4.4 MMOL/L — SIGNIFICANT CHANGE UP (ref 3.5–5.3)
POTASSIUM SERPL-SCNC: 4.4 MMOL/L — SIGNIFICANT CHANGE UP (ref 3.5–5.3)
PROT SERPL-MCNC: 7.7 G/DL — SIGNIFICANT CHANGE UP (ref 6–8.3)
RBC # BLD: 4.98 M/UL — SIGNIFICANT CHANGE UP (ref 3.8–5.2)
RBC # FLD: 12.6 % — SIGNIFICANT CHANGE UP (ref 10.3–14.5)
SODIUM SERPL-SCNC: 141 MMOL/L — SIGNIFICANT CHANGE UP (ref 135–145)
TIBC SERPL-MCNC: 186 UG/DL — LOW (ref 250–450)
TSH SERPL-MCNC: 1 UU/ML — SIGNIFICANT CHANGE UP (ref 0.34–4.82)
UIBC SERPL-MCNC: 136 UG/DL — SIGNIFICANT CHANGE UP (ref 110–370)
VIT B12 SERPL-MCNC: 775 PG/ML — SIGNIFICANT CHANGE UP (ref 232–1245)
WBC # BLD: 9.08 K/UL — SIGNIFICANT CHANGE UP (ref 3.8–10.5)
WBC # FLD AUTO: 9.08 K/UL — SIGNIFICANT CHANGE UP (ref 3.8–10.5)

## 2020-01-11 RX ORDER — TRAMADOL HYDROCHLORIDE 50 MG/1
25 TABLET ORAL EVERY 6 HOURS
Refills: 0 | Status: DISCONTINUED | OUTPATIENT
Start: 2020-01-11 | End: 2020-01-17

## 2020-01-11 RX ORDER — ACETAMINOPHEN 500 MG
1000 TABLET ORAL ONCE
Refills: 0 | Status: COMPLETED | OUTPATIENT
Start: 2020-01-11 | End: 2020-01-11

## 2020-01-11 RX ADMIN — RISPERIDONE 0.5 MILLIGRAM(S): 4 TABLET ORAL at 06:14

## 2020-01-11 RX ADMIN — Medication 650 MILLIGRAM(S): at 09:53

## 2020-01-11 RX ADMIN — Medication 81 MILLIGRAM(S): at 12:19

## 2020-01-11 RX ADMIN — CEFEPIME 50 MILLIGRAM(S): 1 INJECTION, POWDER, FOR SOLUTION INTRAMUSCULAR; INTRAVENOUS at 12:19

## 2020-01-11 RX ADMIN — Medication 650 MILLIGRAM(S): at 17:46

## 2020-01-11 RX ADMIN — Medication 650 MILLIGRAM(S): at 09:23

## 2020-01-11 RX ADMIN — TRAMADOL HYDROCHLORIDE 25 MILLIGRAM(S): 50 TABLET ORAL at 01:34

## 2020-01-11 RX ADMIN — TRAMADOL HYDROCHLORIDE 25 MILLIGRAM(S): 50 TABLET ORAL at 15:18

## 2020-01-11 RX ADMIN — Medication 400 MILLIGRAM(S): at 03:20

## 2020-01-11 RX ADMIN — TRAMADOL HYDROCHLORIDE 25 MILLIGRAM(S): 50 TABLET ORAL at 02:04

## 2020-01-11 RX ADMIN — Medication 1000 MILLIGRAM(S): at 03:50

## 2020-01-11 RX ADMIN — TRAMADOL HYDROCHLORIDE 25 MILLIGRAM(S): 50 TABLET ORAL at 15:50

## 2020-01-11 RX ADMIN — CEFEPIME 50 MILLIGRAM(S): 1 INJECTION, POWDER, FOR SOLUTION INTRAMUSCULAR; INTRAVENOUS at 00:14

## 2020-01-11 RX ADMIN — Medication 650 MILLIGRAM(S): at 18:24

## 2020-01-11 RX ADMIN — ENOXAPARIN SODIUM 40 MILLIGRAM(S): 100 INJECTION SUBCUTANEOUS at 12:19

## 2020-01-11 RX ADMIN — RISPERIDONE 0.5 MILLIGRAM(S): 4 TABLET ORAL at 17:45

## 2020-01-11 NOTE — PROGRESS NOTE ADULT - SUBJECTIVE AND OBJECTIVE BOX
Pt was seen by podiatry this AM, Pt stated that her foor is very sensitive to pain.  Patient is a 74y old  Female who presents with a chief complaint of discoloration of toe of right foot.     HPI: This 74 year old non-diabetic female presents to the ED with complaint of discoloration for a toe on the right foot. She states that she was seen by her primary care physician a few days ago and told her to go to the emergency room.  Patient states she smokes at least a pack a day of cigarettes since she was 22 years old.  Patient states she can only ambulate 10 feet and gets a cramp in her right calf for which she needs to sit down.  She states she is very minimally ambulatory.   She denies pain at rest with her feet elevated.  Patient lives alone with a cat. Patient massaging leg throughout the visit.  Patient denies n/v/d/sob/cp/f.  Patient states her blood pressure is not usually low.       PMH:COPD (chronic obstructive pulmonary disease)  Hypercholesterolemia  Myocardial infarct, old    Allergies: PC Pen VK (Rash)  penicillin (Other; Rash)  statins (Other)  sulfa drugs (Other)  sulfamethizole (Other)    Medications:   FH:  PSX: S/P CABG x 1    SH:                           15.1   9.08  )-----------( 240      ( 11 Jan 2020 07:14 )             46.6   01-11    141  |  107  |  15  ----------------------------<  91  4.4   |  29  |  0.91    Ca    9.0      11 Jan 2020 07:14  Phos  2.7     01-11  Mg     1.9     01-11    TPro  7.7  /  Alb  3.5  /  TBili  0.5  /  DBili  x   /  AST  20  /  ALT  13  /  AlkPhos  84  01-11  Vital Signs Last 24 Hrs  T(C): 36.5 (11 Jan 2020 06:00), Max: 37 (10 Angel 2020 23:19)  T(F): 97.7 (11 Jan 2020 06:00), Max: 98.6 (10 Angel 2020 23:19)  HR: 81 (11 Jan 2020 06:00) (81 - 107)  BP: 101/76 (11 Jan 2020 06:00) (80/51 - 148/79)  BP(mean): --  RR: 17 (11 Jan 2020 06:00) (17 - 20)  SpO2: 100% (11 Jan 2020 06:00) (98% - 100%)  ROS  REVIEW OF SYSTEMS:    CONSTITUTIONAL: No fever, weight loss, or fatigue  EYES: No eye pain, visual disturbances, or discharge  ENMT:  No difficulty hearing, tinnitus, vertigo; No sinus or throat pain  NECK: No pain or stiffness  RESPIRATORY: No cough, wheezing, chills or hemoptysis; No shortness of breath  CARDIOVASCULAR: Bilateral leg edema  GASTROINTESTINAL: No abdominal or epigastric pain. No nausea, vomiting, or hematemesis; No diarrhea or constipation.  GENITOURINARY: No dysuria, frequency, hematuria, or incontinence  NEUROLOGICAL: Burnign sensation in lower extremities periodically  SKIN: Right 2nd toe gangrene, right 3rd and 4th digits dusky  MUSCULOSKELETAL: Right lower extremity pain        PHYSICAL EXAM  GEN: MERARY MEYER is a pleasant well-nourished, well developed 74y Female in no acute distress, alert awake, and oriented to person, place and time.   LE Focused:    Vasc:  DP and PT pulses non-palpable b/l.  Left DP faintly dopplerable.  Unable to doppler left PT, Unable to find right DP or PT with doppler.  No CFT to right 2nd toe,  Derm:   Right 2nd toe: black necrotic to the level of the metatarsal head with exposed bone.  Distal aspect of the right 2nd toe is hard and dry, slight drainage to proximal aspect of toe.  Right foot erythema to level of ankle joint  Neuro: Sensation diminished to digits b/l.  MSK: Tenderness to palpation distal foot.      Imaging:   < from: Xray Foot AP + Lateral + Oblique, Right (01.10.20 @ 15:05) >    EXAM:  FOOT RIGHT (MINIMUM 3 VIEWS)                        PROCEDURE DATE:  01/10/2020    INTERPRETATION:  Right foot. 3 views. Patient has local pain.    The foot shows mild degeneration at the first MTP joint.    There is an old fracture deformity of the distal fibula.    No bone destruction or acute fracture is evident.    IMPRESSION: No acute finding.    JASKARAN MOSLEY M.D., ATTENDING RADIOLOGIST  This document has been electronically signed. Angel 10 2020  3:06PM  < end of copied text >      A:   Critical Limb Ischemia, right  Gangrene right 2nd toe  PVD  Right 3rd and 4th toes dusky    P:  Patient evaluated, chart reviewed  Xrays- reviewed  Will f/u vasc consult  Pending NICKI/PVR  Adaptic Betadine DSD applied  Recommend IV antibiotics  Plan for amputation, likely Transmetatarsal amputation, Wednesday following vascular examination  Discussed the importance of daily foot examinations  Podiatry will follow  Discussed with Dr. Rdz and Dr Welsh

## 2020-01-11 NOTE — CONSULT NOTE ADULT - ASSESSMENT
Gangrene of Right 2nd toe - and possibly other toes and possible underlying Osteomyelitis (but not seen on x-ray)    Plan - Cont Maxipime 2gms iv q12 hrs for now  add flagyl 500mgs po TID  will need amputation post vascular intervention.

## 2020-01-11 NOTE — PROGRESS NOTE ADULT - SUBJECTIVE AND OBJECTIVE BOX
SUBJECTIVE / OVERNIGHT EVENTS: pt c/o lower ext burning  sensation       MEDICATIONS  (STANDING):  aspirin enteric coated 81 milliGRAM(s) Oral daily  cefepime   IVPB 2000 milliGRAM(s) IV Intermittent every 12 hours  cefepime   IVPB      enoxaparin Injectable 40 milliGRAM(s) SubCutaneous daily  lisinopril 10 milliGRAM(s) Oral daily  risperiDONE   Tablet 0.5 milliGRAM(s) Oral two times a day    MEDICATIONS  (PRN):  acetaminophen   Tablet .. 650 milliGRAM(s) Oral every 6 hours PRN Mild Pain (1 - 3)  traMADol 25 milliGRAM(s) Oral every 6 hours PRN Moderate Pain (4 - 6)    Vital Signs Last 24 Hrs  T(C): 36.8 (11 Jan 2020 20:52), Max: 36.8 (11 Jan 2020 20:52)  T(F): 98.3 (11 Jan 2020 20:52), Max: 98.3 (11 Jan 2020 20:52)  HR: 101 (11 Jan 2020 20:52) (81 - 104)  BP: 121/59 (11 Jan 2020 20:52) (101/76 - 148/79)  BP(mean): --  RR: 17 (11 Jan 2020 20:52) (17 - 18)  SpO2: 100% (11 Jan 2020 20:52) (97% - 100%)    CAPILLARY BLOOD GLUCOSE        I&O's Summary      Constitutional: No fever, fatigue  Skin: No rash.  Eyes: No recent vision problems or eye pain.  ENT: No congestion, ear pain, or sore throat.  Cardiovascular: No chest pain or palpation.  Respiratory: No cough, shortness of breath, congestion, or wheezing.  Gastrointestinal: No abdominal pain, nausea, vomiting, or diarrhea.  Genitourinary: No dysuria.  Musculoskeletal: No joint swelling.lower ext burning  sensation   Neurologic: No headache.    PHYSICAL EXAM:  GENERAL: NAD  EYES: EOMI, PERRLA  NECK: Supple, No JVD  CHEST/LUNG: dec breath sounds at bases   HEART:  S1 , S2 +  ABDOMEN: soft  bs+  EXTREMITIES:  rt foot dressing +  NEUROLOGY:alert awake follows comamnds      LABS:                        15.1   9.08  )-----------( 240      ( 11 Jan 2020 07:14 )             46.6     01-11    141  |  107  |  15  ----------------------------<  91  4.4   |  29  |  0.91    Ca    9.0      11 Jan 2020 07:14  Phos  2.7     01-11  Mg     1.9     01-11    TPro  7.7  /  Alb  3.5  /  TBili  0.5  /  DBili  x   /  AST  20  /  ALT  13  /  AlkPhos  84  01-11    PT/INR - ( 10 Angel 2020 14:05 )   PT: 11.2 sec;   INR: 1.01 ratio         PTT - ( 10 Angel 2020 14:05 )  PTT:34.4 sec          RADIOLOGY & ADDITIONAL TESTS:    Imaging Personally Reviewed:    Consultant(s) Notes Reviewed:      Care Discussed with Consultants/Other Providers:

## 2020-01-11 NOTE — CONSULT NOTE ADULT - SKIN COMMENTS
right foot is bandaged and the patient does not allow examination of any of her feet , even for me to touch her left foot to check pulses as she states she has severe pain to touch

## 2020-01-11 NOTE — CONSULT NOTE ADULT - SUBJECTIVE AND OBJECTIVE BOX
HPI:  Patient is 74 year old female, live by herself, ambulates with cane  has medical hx significant for dementia, cad, pvd, hld, htn presents to the ED with complaint of discoloration for a toe on the right foot.  Per pt she has black colored rt 2nd toe. pt states she notice discoloration gradually over last 3 weeks associated with swelling and erythema, pt states she bumped in to something and hurt her right big toe but dose not remember when. Today she was seen by her primary care physician a few days ago and told her to go to the emergency room. Patient states she can only ambulate 10 feet and gets a cramp in her right calf for which she needs to sit down.  She states she is very minimally ambulatory.  She denies pain at rest with her feet elevated.  Patient lives alone with a cat. Patient massaging leg throughout the visit.  Patient denies fever, cp, sob, cough, n/v/d. Pt has urinary incontinence for many years. Patient states her blood pressure is not usually low. Patient states she smokes at least a pack a day of cigarettes since she was 22 years old.    Allergy: Sulfa and penicillin    Code status: Full code (10 Angel 2020 21:52)      PAST MEDICAL & SURGICAL HISTORY:  COPD (chronic obstructive pulmonary disease)  Hypercholesterolemia  Myocardial infarct, old  S/P CABG x 1      influenza virus vaccine, live, trivalent (Unknown)  PC Pen VK (Rash)  penicillin (Other; Rash)  statins (Other)  sulfa drugs (Other)  sulfamethizole (Other)      Meds:  acetaminophen   Tablet .. 650 milliGRAM(s) Oral every 6 hours PRN  aspirin enteric coated 81 milliGRAM(s) Oral daily  cefepime   IVPB 2000 milliGRAM(s) IV Intermittent every 12 hours  cefepime   IVPB      enoxaparin Injectable 40 milliGRAM(s) SubCutaneous daily  lisinopril 10 milliGRAM(s) Oral daily  risperiDONE   Tablet 0.5 milliGRAM(s) Oral two times a day  traMADol 25 milliGRAM(s) Oral every 6 hours PRN      SOCIAL HISTORY:  Smoker:  YES , 1 PPD x 50 years  ETOH use:      FAMILY HISTORY:      VITALS:  Vital Signs Last 24 Hrs  T(C): 36.6 (11 Jan 2020 14:19), Max: 37 (10 Angel 2020 23:19)  T(F): 97.9 (11 Jan 2020 14:19), Max: 98.6 (10 Angel 2020 23:19)  HR: 104 (11 Jan 2020 14:19) (81 - 107)  BP: 130/64 (11 Jan 2020 14:19) (101/76 - 148/79)  BP(mean): --  RR: 18 (11 Jan 2020 14:19) (17 - 18)  SpO2: 97% (11 Jan 2020 14:19) (97% - 100%)    LABS/DIAGNOSTIC TESTS:                          15.1   9.08  )-----------( 240      ( 11 Jan 2020 07:14 )             46.6     WBC Count: 9.08 K/uL (01-11 @ 07:14)  WBC Count: 11.81 K/uL (01-10 @ 14:05)      01-11    141  |  107  |  15  ----------------------------<  91  4.4   |  29  |  0.91    Ca    9.0      11 Jan 2020 07:14  Phos  2.7     01-11  Mg     1.9     01-11    TPro  7.7  /  Alb  3.5  /  TBili  0.5  /  DBili  x   /  AST  20  /  ALT  13  /  AlkPhos  84  01-11          LIVER FUNCTIONS - ( 11 Jan 2020 07:14 )  Alb: 3.5 g/dL / Pro: 7.7 g/dL / ALK PHOS: 84 U/L / ALT: 13 U/L DA / AST: 20 U/L / GGT: x             PT/INR - ( 10 Angel 2020 14:05 )   PT: 11.2 sec;   INR: 1.01 ratio         PTT - ( 10 Angel 2020 14:05 )  PTT:34.4 sec    LACTATE:    ABG -     CULTURES:       RADIOLOGY:< from: Xray Foot AP + Lateral + Oblique, Right (01.10.20 @ 15:05) >  EXAM:  FOOT RIGHT (MINIMUM 3 VIEWS)                            PROCEDURE DATE:  01/10/2020          INTERPRETATION:  Right foot. 3 views. Patient has local pain.    The foot shows mild degeneration at the first MTP joint.    There is an old fracture deformity of the distal fibula.    No bone destruction or acute fracture is evident.    IMPRESSION: No acute finding.                JASKARAN MOSLEY M.D., ATTENDING RADIOLOGIST  This document has been electronically signed. Angel 10 2020  3:06PM          < end of copied text >        ROS  [  ] UNABLE TO ELICIT HPI:  Patient is 74 year old female, live by herself, ambulates with cane  has medical hx significant for dementia, cad, pvd, hld, htn presents to the ED with complaint of discoloration for a toe on the right foot.  Per pt she has black colored rt 2nd toe. pt states she notice discoloration gradually over last 3 weeks associated with swelling and erythema, pt states she bumped in to something and hurt her right big toe but dose not remember when. Today she was seen by her primary care physician a few days ago and told her to go to the emergency room. Patient states she can only ambulate 10 feet and gets a cramp in her right calf for which she needs to sit down.  She states she is very minimally ambulatory.  She denies pain at rest with her feet elevated.  Patient lives alone with a cat. Patient massaging leg throughout the visit.  Patient denies fever, cp, sob, cough, n/v/d. Pt has urinary incontinence for many years. Patient states her blood pressure is not usually low. Patient states she smokes at least a pack a day of cigarettes since she was 22 years old.    History as above, pt who is an active smoker(heavy smoker) , has COPD and presented with gangrene of her right 2nd toe and discoloration of 2 of her other toes along with severe pain of both her feet R>L. She has no fevers or chills or other complaints, denies trauma to her feet. Sh has been seen by Vascular and Podiatry and is scheduled for an Angiogram on Wednesday and podiatry is planning for a right TMA after whatever vascular intervention she needs to have.  The pt has been explained all of this multiple times and finally understands the plan.            PAST MEDICAL & SURGICAL HISTORY:  COPD (chronic obstructive pulmonary disease)  Hypercholesterolemia  Myocardial infarct, old  S/P CABG x 1      influenza virus vaccine, live, trivalent (Unknown)  PC Pen VK (Rash)  penicillin (Other; Rash)  statins (Other)  sulfa drugs (Other)  sulfamethizole (Other)      Meds:  acetaminophen   Tablet .. 650 milliGRAM(s) Oral every 6 hours PRN  aspirin enteric coated 81 milliGRAM(s) Oral daily  cefepime   IVPB 2000 milliGRAM(s) IV Intermittent every 12 hours  cefepime   IVPB      enoxaparin Injectable 40 milliGRAM(s) SubCutaneous daily  lisinopril 10 milliGRAM(s) Oral daily  risperiDONE   Tablet 0.5 milliGRAM(s) Oral two times a day  traMADol 25 milliGRAM(s) Oral every 6 hours PRN      SOCIAL HISTORY:  Smoker:  YES , 1 PPD x 50 years  ETOH use:      FAMILY HISTORY: unknown      VITALS:  Vital Signs Last 24 Hrs  T(C): 36.6 (11 Jan 2020 14:19), Max: 37 (10 Angel 2020 23:19)  T(F): 97.9 (11 Jan 2020 14:19), Max: 98.6 (10 Angel 2020 23:19)  HR: 104 (11 Jan 2020 14:19) (81 - 107)  BP: 130/64 (11 Jan 2020 14:19) (101/76 - 148/79)  BP(mean): --  RR: 18 (11 Jan 2020 14:19) (17 - 18)  SpO2: 97% (11 Jan 2020 14:19) (97% - 100%)    LABS/DIAGNOSTIC TESTS:                          15.1   9.08  )-----------( 240      ( 11 Jan 2020 07:14 )             46.6     WBC Count: 9.08 K/uL (01-11 @ 07:14)  WBC Count: 11.81 K/uL (01-10 @ 14:05)      01-11    141  |  107  |  15  ----------------------------<  91  4.4   |  29  |  0.91    Ca    9.0      11 Jan 2020 07:14  Phos  2.7     01-11  Mg     1.9     01-11    TPro  7.7  /  Alb  3.5  /  TBili  0.5  /  DBili  x   /  AST  20  /  ALT  13  /  AlkPhos  84  01-11          LIVER FUNCTIONS - ( 11 Jan 2020 07:14 )  Alb: 3.5 g/dL / Pro: 7.7 g/dL / ALK PHOS: 84 U/L / ALT: 13 U/L DA / AST: 20 U/L / GGT: x             PT/INR - ( 10 Angel 2020 14:05 )   PT: 11.2 sec;   INR: 1.01 ratio         PTT - ( 10 Angel 2020 14:05 )  PTT:34.4 sec    LACTATE:    ABG -     CULTURES:       RADIOLOGY:< from: Xray Foot AP + Lateral + Oblique, Right (01.10.20 @ 15:05) >  EXAM:  FOOT RIGHT (MINIMUM 3 VIEWS)                            PROCEDURE DATE:  01/10/2020          INTERPRETATION:  Right foot. 3 views. Patient has local pain.    The foot shows mild degeneration at the first MTP joint.    There is an old fracture deformity of the distal fibula.    No bone destruction or acute fracture is evident.    IMPRESSION: No acute finding.                JASKARAN MOSLEY M.D., ATTENDING RADIOLOGIST  This document has been electronically signed. Angel 10 2020  3:06PM          < end of copied text >        ROS  [  ] UNABLE TO ELICIT

## 2020-01-12 LAB
ALBUMIN SERPL ELPH-MCNC: 3.2 G/DL — LOW (ref 3.5–5)
ALP SERPL-CCNC: 75 U/L — SIGNIFICANT CHANGE UP (ref 40–120)
ALT FLD-CCNC: 13 U/L DA — SIGNIFICANT CHANGE UP (ref 10–60)
ANION GAP SERPL CALC-SCNC: 4 MMOL/L — LOW (ref 5–17)
AST SERPL-CCNC: 30 U/L — SIGNIFICANT CHANGE UP (ref 10–40)
BASOPHILS # BLD AUTO: 0.08 K/UL — SIGNIFICANT CHANGE UP (ref 0–0.2)
BASOPHILS NFR BLD AUTO: 0.9 % — SIGNIFICANT CHANGE UP (ref 0–2)
BILIRUB SERPL-MCNC: 0.4 MG/DL — SIGNIFICANT CHANGE UP (ref 0.2–1.2)
BUN SERPL-MCNC: 20 MG/DL — HIGH (ref 7–18)
CALCIUM SERPL-MCNC: 9.4 MG/DL — SIGNIFICANT CHANGE UP (ref 8.4–10.5)
CHLORIDE SERPL-SCNC: 105 MMOL/L — SIGNIFICANT CHANGE UP (ref 96–108)
CO2 SERPL-SCNC: 31 MMOL/L — SIGNIFICANT CHANGE UP (ref 22–31)
CREAT SERPL-MCNC: 1.08 MG/DL — SIGNIFICANT CHANGE UP (ref 0.5–1.3)
EOSINOPHIL # BLD AUTO: 0.13 K/UL — SIGNIFICANT CHANGE UP (ref 0–0.5)
EOSINOPHIL NFR BLD AUTO: 1.5 % — SIGNIFICANT CHANGE UP (ref 0–6)
GLUCOSE SERPL-MCNC: 104 MG/DL — HIGH (ref 70–99)
HCT VFR BLD CALC: 44.9 % — SIGNIFICANT CHANGE UP (ref 34.5–45)
HGB BLD-MCNC: 14.6 G/DL — SIGNIFICANT CHANGE UP (ref 11.5–15.5)
IMM GRANULOCYTES NFR BLD AUTO: 0.2 % — SIGNIFICANT CHANGE UP (ref 0–1.5)
LYMPHOCYTES # BLD AUTO: 1.73 K/UL — SIGNIFICANT CHANGE UP (ref 1–3.3)
LYMPHOCYTES # BLD AUTO: 20.2 % — SIGNIFICANT CHANGE UP (ref 13–44)
MCHC RBC-ENTMCNC: 30.7 PG — SIGNIFICANT CHANGE UP (ref 27–34)
MCHC RBC-ENTMCNC: 32.5 GM/DL — SIGNIFICANT CHANGE UP (ref 32–36)
MCV RBC AUTO: 94.3 FL — SIGNIFICANT CHANGE UP (ref 80–100)
MONOCYTES # BLD AUTO: 0.91 K/UL — HIGH (ref 0–0.9)
MONOCYTES NFR BLD AUTO: 10.6 % — SIGNIFICANT CHANGE UP (ref 2–14)
NEUTROPHILS # BLD AUTO: 5.71 K/UL — SIGNIFICANT CHANGE UP (ref 1.8–7.4)
NEUTROPHILS NFR BLD AUTO: 66.6 % — SIGNIFICANT CHANGE UP (ref 43–77)
NRBC # BLD: 0 /100 WBCS — SIGNIFICANT CHANGE UP (ref 0–0)
PLATELET # BLD AUTO: 218 K/UL — SIGNIFICANT CHANGE UP (ref 150–400)
POTASSIUM SERPL-MCNC: 4.4 MMOL/L — SIGNIFICANT CHANGE UP (ref 3.5–5.3)
POTASSIUM SERPL-SCNC: 4.4 MMOL/L — SIGNIFICANT CHANGE UP (ref 3.5–5.3)
PROT SERPL-MCNC: 7.5 G/DL — SIGNIFICANT CHANGE UP (ref 6–8.3)
RBC # BLD: 4.76 M/UL — SIGNIFICANT CHANGE UP (ref 3.8–5.2)
RBC # FLD: 12.6 % — SIGNIFICANT CHANGE UP (ref 10.3–14.5)
SODIUM SERPL-SCNC: 140 MMOL/L — SIGNIFICANT CHANGE UP (ref 135–145)
WBC # BLD: 8.58 K/UL — SIGNIFICANT CHANGE UP (ref 3.8–10.5)
WBC # FLD AUTO: 8.58 K/UL — SIGNIFICANT CHANGE UP (ref 3.8–10.5)

## 2020-01-12 PROCEDURE — 75635 CT ANGIO ABDOMINAL ARTERIES: CPT | Mod: 26

## 2020-01-12 RX ORDER — SENNA PLUS 8.6 MG/1
2 TABLET ORAL AT BEDTIME
Refills: 0 | Status: DISCONTINUED | OUTPATIENT
Start: 2020-01-12 | End: 2020-01-17

## 2020-01-12 RX ORDER — ACETAMINOPHEN 500 MG
1000 TABLET ORAL ONCE
Refills: 0 | Status: COMPLETED | OUTPATIENT
Start: 2020-01-12 | End: 2020-01-12

## 2020-01-12 RX ORDER — METRONIDAZOLE 500 MG
TABLET ORAL
Refills: 0 | Status: DISCONTINUED | OUTPATIENT
Start: 2020-01-12 | End: 2020-01-17

## 2020-01-12 RX ORDER — METRONIDAZOLE 500 MG
500 TABLET ORAL EVERY 8 HOURS
Refills: 0 | Status: DISCONTINUED | OUTPATIENT
Start: 2020-01-12 | End: 2020-01-17

## 2020-01-12 RX ORDER — METRONIDAZOLE 500 MG
500 TABLET ORAL ONCE
Refills: 0 | Status: COMPLETED | OUTPATIENT
Start: 2020-01-12 | End: 2020-01-12

## 2020-01-12 RX ORDER — GABAPENTIN 400 MG/1
100 CAPSULE ORAL EVERY 12 HOURS
Refills: 0 | Status: DISCONTINUED | OUTPATIENT
Start: 2020-01-12 | End: 2020-01-17

## 2020-01-12 RX ADMIN — Medication 100 MILLIGRAM(S): at 15:50

## 2020-01-12 RX ADMIN — Medication 650 MILLIGRAM(S): at 01:03

## 2020-01-12 RX ADMIN — TRAMADOL HYDROCHLORIDE 25 MILLIGRAM(S): 50 TABLET ORAL at 14:00

## 2020-01-12 RX ADMIN — CEFEPIME 50 MILLIGRAM(S): 1 INJECTION, POWDER, FOR SOLUTION INTRAMUSCULAR; INTRAVENOUS at 00:36

## 2020-01-12 RX ADMIN — Medication 650 MILLIGRAM(S): at 09:47

## 2020-01-12 RX ADMIN — TRAMADOL HYDROCHLORIDE 25 MILLIGRAM(S): 50 TABLET ORAL at 02:22

## 2020-01-12 RX ADMIN — TRAMADOL HYDROCHLORIDE 25 MILLIGRAM(S): 50 TABLET ORAL at 03:14

## 2020-01-12 RX ADMIN — GABAPENTIN 100 MILLIGRAM(S): 400 CAPSULE ORAL at 17:41

## 2020-01-12 RX ADMIN — CEFEPIME 50 MILLIGRAM(S): 1 INJECTION, POWDER, FOR SOLUTION INTRAMUSCULAR; INTRAVENOUS at 12:02

## 2020-01-12 RX ADMIN — TRAMADOL HYDROCHLORIDE 25 MILLIGRAM(S): 50 TABLET ORAL at 13:58

## 2020-01-12 RX ADMIN — RISPERIDONE 0.5 MILLIGRAM(S): 4 TABLET ORAL at 05:43

## 2020-01-12 RX ADMIN — Medication 81 MILLIGRAM(S): at 12:01

## 2020-01-12 RX ADMIN — ENOXAPARIN SODIUM 40 MILLIGRAM(S): 100 INJECTION SUBCUTANEOUS at 12:02

## 2020-01-12 RX ADMIN — Medication 100 MILLIGRAM(S): at 21:47

## 2020-01-12 RX ADMIN — CEFEPIME 50 MILLIGRAM(S): 1 INJECTION, POWDER, FOR SOLUTION INTRAMUSCULAR; INTRAVENOUS at 23:28

## 2020-01-12 RX ADMIN — Medication 650 MILLIGRAM(S): at 00:35

## 2020-01-12 RX ADMIN — RISPERIDONE 0.5 MILLIGRAM(S): 4 TABLET ORAL at 17:41

## 2020-01-12 RX ADMIN — TRAMADOL HYDROCHLORIDE 25 MILLIGRAM(S): 50 TABLET ORAL at 21:47

## 2020-01-12 RX ADMIN — LISINOPRIL 10 MILLIGRAM(S): 2.5 TABLET ORAL at 05:43

## 2020-01-12 RX ADMIN — Medication 650 MILLIGRAM(S): at 23:19

## 2020-01-12 RX ADMIN — TRAMADOL HYDROCHLORIDE 25 MILLIGRAM(S): 50 TABLET ORAL at 22:17

## 2020-01-12 RX ADMIN — Medication 400 MILLIGRAM(S): at 16:26

## 2020-01-12 RX ADMIN — Medication 650 MILLIGRAM(S): at 09:15

## 2020-01-12 RX ADMIN — Medication 650 MILLIGRAM(S): at 22:49

## 2020-01-12 RX ADMIN — Medication 1000 MILLIGRAM(S): at 17:00

## 2020-01-12 NOTE — DISCHARGE NOTE PROVIDER - NSDCCPTREATMENT_GEN_ALL_CORE_FT
PRINCIPAL PROCEDURE  Procedure: Right above knee amputation  Findings and Treatment:       SECONDARY PROCEDURE  Procedure: Transmetatarsal amputation, right foot  Findings and Treatment:

## 2020-01-12 NOTE — DISCHARGE NOTE PROVIDER - NSDCCPCAREPLAN_GEN_ALL_CORE_FT
PRINCIPAL DISCHARGE DIAGNOSIS  Diagnosis: Dry gangrene  Assessment and Plan of Treatment:         SECONDARY DISCHARGE DIAGNOSES  Diagnosis: COPD (chronic obstructive pulmonary disease)  Assessment and Plan of Treatment: Call your Health Care provider upon arrival home to make a follow up appointment within one week.  Take all inhalers as prescribed by your Health Care Provider.  Take steroids as prescribed by your Health Care Provider.  If your cough increases infrequency and severity and/or you have shortness of breath or increased shortness of breath call your Health Care Provider.  If you develop fever, chills, night sweats, malaise, and/or change in mental status call your Health care Provider.  Nutrition is very important.  Eat small frequent meals.  Increase your activity as tolerated.  Do not stay in bed all day    Diagnosis: Occlusion of artery  Assessment and Plan of Treatment: Occlusion of artery    Diagnosis: Nicotine dependence  Assessment and Plan of Treatment: this is addiction to tobacco product  Nicotine produces physical and mood - altering effects in your brains that are temporarily pleasing.  Continue to use nicotine patch as prescribed   continue to see your primary physician to get medicaiton to help you to quit smoking  If you need more support you can have couseling, join to support groups  or  smoking cessaion program PRINCIPAL DISCHARGE DIAGNOSIS  Diagnosis: Dry gangrene  Assessment and Plan of Treatment: s/p angiogram 1/17  s/p open bypass iliac-pop RLE 1/23 with dr. 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    Continue to offload the left heel in heel offloading boot.  Please follow up with Dr. Izaguirre within 1 week   please continue lovenox bridge to coumadin   maintain theraputic INR 2-3      SECONDARY DISCHARGE DIAGNOSES  Diagnosis: Occlusion of artery  Assessment and Plan of Treatment: Occlusion of artery    Diagnosis: COPD (chronic obstructive pulmonary disease)  Assessment and Plan of Treatment: Call your Health Care provider upon arrival home to make a follow up appointment within one week.  Take all inhalers as prescribed by your Health Care Provider.  Take steroids as prescribed by your Health Care Provider.  If your cough increases infrequency and severity and/or you have shortness of breath or increased shortness of breath call your Health Care Provider.  If you develop fever, chills, night sweats, malaise, and/or change in mental status call your Health care Provider.  Nutrition is very important.  Eat small frequent meals.  Increase your activity as tolerated.  Do not stay in bed all day    Diagnosis: Nicotine dependence  Assessment and Plan of Treatment: this is addiction to tobacco product  Nicotine produces physical and mood - altering effects in your brains that are temporarily pleasing.  Continue to use nicotine patch as prescribed   continue to see your primary physician to get medicaiton to help you to quit smoking  If you need more support you can have couseling, join to support groups  or  smoking cessaion program PRINCIPAL DISCHARGE DIAGNOSIS  Diagnosis: Dry gangrene  Assessment and Plan of Treatment: s/p angiogram 1/17  s/p open bypass iliac-pop RLE 1/23 with dr. 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    Continue to offload the left heel in heel offloading boot.  Please follow up with Dr. Izaguirre within 1 week   please continue lovenox bridge to coumadin   maintain theraputic INR 2-3      SECONDARY DISCHARGE DIAGNOSES  Diagnosis: Occlusion of artery  Assessment and Plan of Treatment: Occlusion of artery    Diagnosis: COPD (chronic obstructive pulmonary disease)  Assessment and Plan of Treatment: Call your Health Care provider upon arrival home to make a follow up appointment within one week.  Take all inhalers as prescribed by your Health Care Provider.  Take steroids as prescribed by your Health Care Provider.  If your cough increases infrequency and severity and/or you have shortness of breath or increased shortness of breath call your Health Care Provider.  If you develop fever, chills, night sweats, malaise, and/or change in mental status call your Health care Provider.  Nutrition is very important.  Eat small frequent meals.  Increase your activity as tolerated.  Do not stay in bed all day    Diagnosis: Severe protein-calorie malnutrition  Assessment and Plan of Treatment: patient was given nutritional support with ensure and regular diet    Diagnosis: Nicotine dependence  Assessment and Plan of Treatment: this is addiction to tobacco product  Nicotine produces physical and mood - altering effects in your brains that are temporarily pleasing.  Continue to use nicotine patch as prescribed   continue to see your primary physician to get medicaiton to help you to quit smoking  If you need more support you can have couseling, join to support groups  or  smoking cessaion program

## 2020-01-12 NOTE — DISCHARGE NOTE PROVIDER - HOSPITAL COURSE
Patient is 74 year old female, live by herself, ambulates with cane  has medical hx significant for dementia, CAD, PVD, HLD, HTN.  Presented to the ED with complaint of discoloration for a toe on the right foot.    As per patient, she had black colored rt 2nd toe. She stated she noticed discoloration gradually over last 3 weeks associated with swelling and erythema. Patient stated she bumped into something and hurt her right big toe, but did not remember when. Patient was seen by her primary care physician, and she was told to go to the emergency room. Patient states she could only ambulate 10 feet and got a cramp in her right calf for which she needed to sit down.  She stated she was very minimally ambulatory.  She denied pain at rest with her feet elevated.  Patient lives alone with a cat. Patient was massaging her leg throughout the visit.  She denied fever, cp, sob, cough, n/v/d. Patient has been urinary incontinent for many years. She stated her blood pressure was not usually low. Patient stated she smoked at least a pack a day of cigarettes since she was 22 years old.    (incomplete). Patient is 74 year old female, live by herself, ambulates with cane  has medical hx significant for dementia, CAD, PVD, HLD, HTN.  Presented to the ED with complaint of discoloration for a toe on the right foot.    As per patient, she had black colored rt 2nd toe. She stated she noticed discoloration gradually over last 3 weeks associated with swelling and erythema. Patient stated she bumped into something and hurt her right big toe, but did not remember when. Patient was seen by her primary care physician, and she was told to go to the emergency room. Patient states she could only ambulate 10 feet and got a cramp in her right calf for which she needed to sit down.  She stated she was very minimally ambulatory.  She denied pain at rest with her feet elevated.  Patient lives alone with a cat. Patient was massaging her leg throughout the visit.  She denied fever, cp, sob, cough, n/v/d. Patient has been urinary incontinent for many years. She stated her blood pressure was not usually low. Patient stated she smoked at least a pack a day of cigarettes since she was 22 years old.        Admitted to medicine due to Rt foot dry gangrene.     Vascular and podiatry have been following on this case. CTA of aorta with runoff performed and Dr. Izaguirre reviewed and re-eval pt and plan is angiogram on Wed, 1/15. also, Podiatry offers amputation of Rt Transmetatarsal amputation after vascular intervention, likely will be on Friday, 1/17          incomplete Patient is 74 year old female, live by herself, ambulates with cane  has medical hx significant for dementia, CAD, PVD, HLD, HTN.  Presented to the ED with complaint of discoloration for a toe on the right foot.    As per patient, she had black colored rt 2nd toe. She stated she noticed discoloration gradually over last 3 weeks associated with swelling and erythema. Patient stated she bumped into something and hurt her right big toe, but did not remember when. Patient was seen by her primary care physician, and she was told to go to the emergency room. Patient states she could only ambulate 10 feet and got a cramp in her right calf for which she needed to sit down.  She stated she was very minimally ambulatory.  She denied pain at rest with her feet elevated.  Patient lives alone with a cat. Patient was massaging her leg throughout the visit.  She denied fever, cp, sob, cough, n/v/d. Patient has been urinary incontinent for many years. She stated her blood pressure was not usually low. Patient stated she smoked at least a pack a day of cigarettes since she was 22 years old.        Admitted to medicine due to Rt foot dry gangrene.     Vascular and podiatry have been following on this case. CTA of aorta with runoff performed and Dr. Izaguirre reviewed and re-eval pt and plan is angiogram on  Friday, 1/17. also, Podiatry offers amputation of Rt Transmetatarsal amputation after vascular intervention, likely will be on Wednesday , 1/22.           incomplete Patient is 74 year old female, live by herself, ambulates with cane  has medical hx significant for dementia, CAD, PVD, HLD, HTN.  Presented to the ED with complaint of discoloration for a toe on the right foot.    As per patient, she had black colored rt 2nd toe. She stated she noticed discoloration gradually over last 3 weeks associated with swelling and erythema. Patient stated she bumped into something and hurt her right big toe, but did not remember when. Patient was seen by her primary care physician, and she was told to go to the emergency room. Patient states she could only ambulate 10 feet and got a cramp in her right calf for which she needed to sit down.  She stated she was very minimally ambulatory.  She denied pain at rest with her feet elevated.  Patient lives alone with a cat. Patient was massaging her leg throughout the visit.  She denied fever, cp, sob, cough, n/v/d. Patient has been urinary incontinent for many years. She stated her blood pressure was not usually low. Patient stated she smoked at least a pack a day of cigarettes since she was 22 years old.        ICU Course (01/10 - 01/25)        Patient was taken to OR for Intraoperative Angiogram bilateral Lower extremity for peripheral arterial disease, intermittent claudication and right 1st and 2nd toe gangrene. She is found to have diffuse atherosclerotic disease with multilevel arterial occlusion. Marginal/poor candidate for endovascular intervention. Patient is transferred to ICU for frequent monitoring after the angiogram. Pt is POD#2 for femoral-popliteal artery bypass using expanded polytetrafluoroethylene (ePTFE) graft.        Inpatient Medical Floors Downgrade        On 01/23/2020 patient underwent a femoral-popliteal artery bypass graft on the right lower extremity. Following this procedure, on 01/29/2020 patient underwent a transmetatarsal amputation of the right lower extremity and was maintained on the heparin drip. Patient was provided antibiotics consisting of Cefepime and Flagyl for gangrene of the right toe. Patient was scheduled to undergo a left femoral-popliteal bypass graft on 02/04 with Vascular surgery and XXXXXXX        Patient was also noted to have a left wrist drop that improved significantly with  strength throughout the course of the admission. 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 No IV tPA was provided because a stroke was ruled out. Patient is 74 year old female, live by herself, ambulates with cane  has medical hx significant for dementia, CAD, PVD, HLD, HTN.  Presented to the ED with complaint of discoloration for a toe on the right foot.    As per patient, she had black colored rt 2nd toe. She stated she noticed discoloration gradually over last 3 weeks associated with swelling and erythema. Patient stated she bumped into something and hurt her right big toe, but did not remember when. Patient was seen by her primary care physician, and she was told to go to the emergency room. Patient states she could only ambulate 10 feet and got a cramp in her right calf for which she needed to sit down.  She stated she was very minimally ambulatory.  She denied pain at rest with her feet elevated.  Patient lives alone with a cat. Patient was massaging her leg throughout the visit.  She denied fever, cp, sob, cough, n/v/d. Patient has been urinary incontinent for many years. She stated her blood pressure was not usually low. Patient stated she smoked at least a pack a day of cigarettes since she was 22 years old.        ICU Course (01/10 - 01/25)        Patient was taken to OR for Intraoperative Angiogram bilateral Lower extremity for peripheral arterial disease, intermittent claudication and right 1st and 2nd toe gangrene. She is found to have diffuse atherosclerotic disease with multilevel arterial occlusion. Marginal/poor candidate for endovascular intervention. Patient is transferred to ICU for frequent monitoring after the angiogram. Pt is POD#2 for femoral-popliteal artery bypass using expanded polytetrafluoroethylene (ePTFE) graft.        Inpatient Medical Floors Downgrade        On 01/23/2020 patient underwent a femoral-popliteal artery bypass graft on the right lower extremity. Following this procedure, on 01/29/2020 patient underwent a transmetatarsal amputation of the right lower extremity and was maintained on the heparin drip. Patient was provided antibiotics consisting of Cefepime and Flagyl for gangrene of the right toe. Patient underwent a left femoral-popliteal bypass graft on 02/04 with Vascular surgery. Patients right foot did not heal well and patient underwent R AKA on 2/7. Patient tolerated procedure well for d/c to [facility]     Patient to be bridged with Lovenox to coumadin

## 2020-01-12 NOTE — PROGRESS NOTE ADULT - SUBJECTIVE AND OBJECTIVE BOX
SUBJECTIVE / OVERNIGHT EVENTS: pt c/o lower ext burning  sensation     MEDICATIONS  (STANDING):  aspirin enteric coated 81 milliGRAM(s) Oral daily  cefepime   IVPB 2000 milliGRAM(s) IV Intermittent every 12 hours  cefepime   IVPB      enoxaparin Injectable 40 milliGRAM(s) SubCutaneous daily  gabapentin 100 milliGRAM(s) Oral every 12 hours  lisinopril 10 milliGRAM(s) Oral daily  metroNIDAZOLE  IVPB 500 milliGRAM(s) IV Intermittent every 8 hours  metroNIDAZOLE  IVPB      risperiDONE   Tablet 0.5 milliGRAM(s) Oral two times a day  senna 2 Tablet(s) Oral at bedtime    MEDICATIONS  (PRN):  acetaminophen   Tablet .. 650 milliGRAM(s) Oral every 6 hours PRN Mild Pain (1 - 3)  traMADol 25 milliGRAM(s) Oral every 6 hours PRN Moderate Pain (4 - 6)    Vital Signs Last 24 Hrs  T(C): 36.7 (12 Jan 2020 21:38), Max: 36.8 (12 Jan 2020 05:35)  T(F): 98 (12 Jan 2020 21:38), Max: 98.2 (12 Jan 2020 05:35)  HR: 95 (12 Jan 2020 21:38) (90 - 95)  BP: 154/63 (12 Jan 2020 21:38) (104/57 - 154/63)  BP(mean): --  RR: 17 (12 Jan 2020 21:38) (17 - 18)  SpO2: 100% (12 Jan 2020 21:38) (99% - 100%)    Constitutional: No fever, fatigue  Skin: No rash.  Eyes: No recent vision problems or eye pain.  ENT: No congestion, ear pain, or sore throat.  Cardiovascular: No chest pain or palpation.  Respiratory: No cough, shortness of breath, congestion, or wheezing.  Gastrointestinal: No abdominal pain, nausea, vomiting, or diarrhea.  Genitourinary: No dysuria.  Musculoskeletal: No joint swelling.lower ext burning  sensation   Neurologic: No headache.    PHYSICAL EXAM:  GENERAL: NAD  EYES: EOMI, PERRLA  NECK: Supple, No JVD  CHEST/LUNG: dec breath sounds at bases   HEART:  S1 , S2 +  ABDOMEN: soft  bs+  EXTREMITIES:  rt foot dressing +  NEUROLOGY:alert awake follows comamnds    LABS:  01-12    140  |  105  |  20<H>  ----------------------------<  104<H>  4.4   |  31  |  1.08    Ca    9.4      12 Jan 2020 07:42  Phos  2.7     01-11  Mg     1.9     01-11    TPro  7.5  /  Alb  3.2<L>  /  TBili  0.4  /  DBili      /  AST  30  /  ALT  13  /  AlkPhos  75  01-12    Creatinine Trend: 1.08 <--, 0.91 <--, 0.96 <--                        14.6   8.58  )-----------( 218      ( 12 Jan 2020 07:42 )             44.9     Urine Studies:            LIVER FUNCTIONS - ( 12 Jan 2020 07:42 )  Alb: 3.2 g/dL / Pro: 7.5 g/dL / ALK PHOS: 75 U/L / ALT: 13 U/L DA / AST: 30 U/L / GGT: x               Consultant(s) Notes Reviewed:      Care Discussed with Consultants/Other Providers:

## 2020-01-12 NOTE — DISCHARGE NOTE PROVIDER - CARE PROVIDER_API CALL
Brayden Hurt (DPM)  Foot Surgery; Recon RearfootAnkle Surgery  [address] [place] [address]  Phone: [phone]  Fax: [phone]  Follow Up Time: 1 week    Augusto Izaguirre)  Vascular Surgery  [address] [facility], [address] [place] [address]  Phone: [phone]  Fax: [phone]  Follow Up Time: 1 week

## 2020-01-12 NOTE — CHART NOTE - NSCHARTNOTEFT_GEN_A_CORE
NP Note discussed with  Primary Attending    CC:  right foot pain    Patient is a 74y old  Female who presents with a chief complaint of dry gangrene (12 Jan 2020 02:00).  Pt with dry gangrene of toes, right.  + right foot tenderness.  + left foot tenderness, - no edema Dressing cdi.  Pt complaining of right foot spasms that occurs occasionally.  No nausea or vomiting.       INTERVAL HPI/OVERNIGHT EVENTS: no new complaints    MEDICATIONS  (STANDING):  aspirin enteric coated 81 milliGRAM(s) Oral daily  cefepime   IVPB 2000 milliGRAM(s) IV Intermittent every 12 hours  cefepime   IVPB      enoxaparin Injectable 40 milliGRAM(s) SubCutaneous daily  gabapentin 100 milliGRAM(s) Oral every 12 hours  lisinopril 10 milliGRAM(s) Oral daily  metroNIDAZOLE  IVPB 500 milliGRAM(s) IV Intermittent once  metroNIDAZOLE  IVPB 500 milliGRAM(s) IV Intermittent every 8 hours  metroNIDAZOLE  IVPB      risperiDONE   Tablet 0.5 milliGRAM(s) Oral two times a day  senna 2 Tablet(s) Oral at bedtime    MEDICATIONS  (PRN):  acetaminophen   Tablet .. 650 milliGRAM(s) Oral every 6 hours PRN Mild Pain (1 - 3)  traMADol 25 milliGRAM(s) Oral every 6 hours PRN Moderate Pain (4 - 6)      __________________________________________________  REVIEW OF SYSTEMS:    CONSTITUTIONAL: No fever , + history of dementia  RESPIRATORY: No cough; No shortness of breath  CARDIOVASCULAR: No chest pain, no palpitations  GASTROINTESTINAL: No pain. No nausea or vomiting; No diarrhea   NEUROLOGICAL: No headache or numbness, no tremors  MUSCULOSKELETAL: + right foot pain + left foot pain  GENITOURINARY: no dysuria, no frequency, no hesitancy        Vital Signs Last 24 Hrs  T(C): 36.8 (12 Jan 2020 05:35), Max: 36.8 (11 Jan 2020 20:52)  T(F): 98.2 (12 Jan 2020 05:35), Max: 98.3 (11 Jan 2020 20:52)  HR: 92 (12 Jan 2020 05:35) (92 - 104)  BP: 134/68 (12 Jan 2020 05:35) (121/59 - 134/68)  BP(mean): --  RR: 17 (12 Jan 2020 05:35) (17 - 18)  SpO2: 99% (12 Jan 2020 05:35) (97% - 100%)    ________________________________________________  PHYSICAL EXAM:  GENERAL: NAD  CHEST/LUNG: Clear to auscultation bilaterally with good air entry   HEART: S1 S2  regular; no murmurs, gallops or rubs  ABDOMEN: Soft, Nontender, Nondistended; Bowel sounds present  EXTREMITIES: no cyanosis; no edema; no calf tenderness  NERVOUS SYSTEM:  Awake and alert; Oriented  to place, person and time ; no new deficits  MUSCULOSKELETAL: + bilateral foot tenderness, + right foot cramps   _________________________________________________  LABS:                        14.6   8.58  )-----------( 218      ( 12 Jan 2020 07:42 )             44.9     01-12    140  |  105  |  20<H>  ----------------------------<  104<H>  4.4   |  31  |  1.08    Ca    9.4      12 Jan 2020 07:42  Phos  2.7     01-11  Mg     1.9     01-11    TPro  7.5  /  Alb  3.2<L>  /  TBili  0.4  /  DBili  x   /  AST  30  /  ALT  13  /  AlkPhos  75  01-12    PT/INR - ( 10 Angel 2020 14:05 )   PT: 11.2 sec;   INR: 1.01 ratio         PTT - ( 10 Angel 2020 14:05 )  PTT:34.4 sec    CAPILLARY BLOOD GLUCOSE            RADIOLOGY & ADDITIONAL TESTS:  < from: Xray Foot AP + Lateral + Oblique, Right (01.10.20 @ 15:05) >      EXAM:  FOOT RIGHT (MINIMUM 3 VIEWS)                            PROCEDURE DATE:  01/10/2020          INTERPRETATION:  Right foot. 3 views. Patient has local pain.    The foot shows mild degeneration at the first MTP joint.    There is an old fracture deformity of the distal fibula.    No bone destruction or acute fracture is evident.    IMPRESSION: No acute finding.    < end of copied text >        a/p  1.  right foot gangrene     - will start gabapentin 100mg po q 12 hours    - tramadol po prn    - vascular consult - pending ct abd angio, NICKI    - stool softeners    - for surgery wed  - possible metatarsal amputation

## 2020-01-12 NOTE — DISCHARGE NOTE PROVIDER - NSDCMRMEDTOKEN_GEN_ALL_CORE_FT
aspirin 81 mg oral tablet: 1 tab(s) orally once a day  lisinopril 10 mg oral tablet: 1 tab(s) orally once a day  Norvasc 10 mg oral tablet: 1 tab(s) orally once a day  risperiDONE 0.5 mg oral tablet: 1 tab(s) orally 2 times a day acetaminophen 325 mg oral tablet: 3 tab(s) orally every 6 hours, As needed, Moderate Pain (4 - 6)  aspirin 81 mg oral tablet: 1 tab(s) orally once a day  Ceftin 500 mg oral tablet: 1 tab(s) orally every 12 hours  enoxaparin: 60 milligram(s) subcutaneous 2 times a day  gabapentin 300 mg oral capsule: 1 cap(s) orally 2 times a day  metoprolol tartrate 25 mg oral tablet: 1 tab(s) orally 2 times a day  nicotine 7 mg/24 hr transdermal film, extended release: 1 patch transdermal once a day  oxyCODONE:   polyethylene glycol 3350 oral powder for reconstitution: 17 gram(s) orally once a day  risperiDONE 0.5 mg oral tablet: 1 tab(s) orally 2 times a day  senna oral tablet: 2 tab(s) orally once a day (at bedtime)  warfarin 5 mg oral tablet: 1 tab(s) orally once

## 2020-01-13 DIAGNOSIS — Z02.9 ENCOUNTER FOR ADMINISTRATIVE EXAMINATIONS, UNSPECIFIED: ICD-10-CM

## 2020-01-13 DIAGNOSIS — F17.200 NICOTINE DEPENDENCE, UNSPECIFIED, UNCOMPLICATED: ICD-10-CM

## 2020-01-13 DIAGNOSIS — I70.90 UNSPECIFIED ATHEROSCLEROSIS: ICD-10-CM

## 2020-01-13 DIAGNOSIS — K59.00 CONSTIPATION, UNSPECIFIED: ICD-10-CM

## 2020-01-13 LAB
ALBUMIN SERPL ELPH-MCNC: 3 G/DL — LOW (ref 3.5–5)
ALP SERPL-CCNC: 72 U/L — SIGNIFICANT CHANGE UP (ref 40–120)
ALT FLD-CCNC: 13 U/L DA — SIGNIFICANT CHANGE UP (ref 10–60)
ANION GAP SERPL CALC-SCNC: 7 MMOL/L — SIGNIFICANT CHANGE UP (ref 5–17)
AST SERPL-CCNC: 27 U/L — SIGNIFICANT CHANGE UP (ref 10–40)
BASOPHILS # BLD AUTO: 0.05 K/UL — SIGNIFICANT CHANGE UP (ref 0–0.2)
BASOPHILS NFR BLD AUTO: 0.6 % — SIGNIFICANT CHANGE UP (ref 0–2)
BILIRUB SERPL-MCNC: 0.4 MG/DL — SIGNIFICANT CHANGE UP (ref 0.2–1.2)
BUN SERPL-MCNC: 23 MG/DL — HIGH (ref 7–18)
CALCIUM SERPL-MCNC: 9.4 MG/DL — SIGNIFICANT CHANGE UP (ref 8.4–10.5)
CHLORIDE SERPL-SCNC: 106 MMOL/L — SIGNIFICANT CHANGE UP (ref 96–108)
CO2 SERPL-SCNC: 25 MMOL/L — SIGNIFICANT CHANGE UP (ref 22–31)
CREAT SERPL-MCNC: 0.91 MG/DL — SIGNIFICANT CHANGE UP (ref 0.5–1.3)
EOSINOPHIL # BLD AUTO: 0.17 K/UL — SIGNIFICANT CHANGE UP (ref 0–0.5)
EOSINOPHIL NFR BLD AUTO: 2 % — SIGNIFICANT CHANGE UP (ref 0–6)
GLUCOSE SERPL-MCNC: 102 MG/DL — HIGH (ref 70–99)
HCT VFR BLD CALC: 43.3 % — SIGNIFICANT CHANGE UP (ref 34.5–45)
HGB BLD-MCNC: 14.2 G/DL — SIGNIFICANT CHANGE UP (ref 11.5–15.5)
IMM GRANULOCYTES NFR BLD AUTO: 0.3 % — SIGNIFICANT CHANGE UP (ref 0–1.5)
LYMPHOCYTES # BLD AUTO: 1.12 K/UL — SIGNIFICANT CHANGE UP (ref 1–3.3)
LYMPHOCYTES # BLD AUTO: 13 % — SIGNIFICANT CHANGE UP (ref 13–44)
MCHC RBC-ENTMCNC: 30.5 PG — SIGNIFICANT CHANGE UP (ref 27–34)
MCHC RBC-ENTMCNC: 32.8 GM/DL — SIGNIFICANT CHANGE UP (ref 32–36)
MCV RBC AUTO: 93.1 FL — SIGNIFICANT CHANGE UP (ref 80–100)
MONOCYTES # BLD AUTO: 1.06 K/UL — HIGH (ref 0–0.9)
MONOCYTES NFR BLD AUTO: 12.3 % — SIGNIFICANT CHANGE UP (ref 2–14)
NEUTROPHILS # BLD AUTO: 6.18 K/UL — SIGNIFICANT CHANGE UP (ref 1.8–7.4)
NEUTROPHILS NFR BLD AUTO: 71.8 % — SIGNIFICANT CHANGE UP (ref 43–77)
NRBC # BLD: 0 /100 WBCS — SIGNIFICANT CHANGE UP (ref 0–0)
PLATELET # BLD AUTO: 218 K/UL — SIGNIFICANT CHANGE UP (ref 150–400)
POTASSIUM SERPL-MCNC: 4 MMOL/L — SIGNIFICANT CHANGE UP (ref 3.5–5.3)
POTASSIUM SERPL-SCNC: 4 MMOL/L — SIGNIFICANT CHANGE UP (ref 3.5–5.3)
PROT SERPL-MCNC: 7.3 G/DL — SIGNIFICANT CHANGE UP (ref 6–8.3)
RBC # BLD: 4.65 M/UL — SIGNIFICANT CHANGE UP (ref 3.8–5.2)
RBC # FLD: 12.7 % — SIGNIFICANT CHANGE UP (ref 10.3–14.5)
SODIUM SERPL-SCNC: 138 MMOL/L — SIGNIFICANT CHANGE UP (ref 135–145)
WBC # BLD: 8.61 K/UL — SIGNIFICANT CHANGE UP (ref 3.8–10.5)
WBC # FLD AUTO: 8.61 K/UL — SIGNIFICANT CHANGE UP (ref 3.8–10.5)

## 2020-01-13 PROCEDURE — 93923 UPR/LXTR ART STDY 3+ LVLS: CPT | Mod: 26

## 2020-01-13 RX ORDER — POLYETHYLENE GLYCOL 3350 17 G/17G
17 POWDER, FOR SOLUTION ORAL DAILY
Refills: 0 | Status: DISCONTINUED | OUTPATIENT
Start: 2020-01-13 | End: 2020-01-15

## 2020-01-13 RX ORDER — OXYCODONE HYDROCHLORIDE 5 MG/1
10 TABLET ORAL ONCE
Refills: 0 | Status: DISCONTINUED | OUTPATIENT
Start: 2020-01-13 | End: 2020-01-13

## 2020-01-13 RX ORDER — TRAMADOL HYDROCHLORIDE 50 MG/1
50 TABLET ORAL ONCE
Refills: 0 | Status: DISCONTINUED | OUTPATIENT
Start: 2020-01-13 | End: 2020-01-13

## 2020-01-13 RX ORDER — LACTULOSE 10 G/15ML
10 SOLUTION ORAL DAILY
Refills: 0 | Status: DISCONTINUED | OUTPATIENT
Start: 2020-01-13 | End: 2020-01-15

## 2020-01-13 RX ORDER — ACETAMINOPHEN 500 MG
1000 TABLET ORAL ONCE
Refills: 0 | Status: DISCONTINUED | OUTPATIENT
Start: 2020-01-13 | End: 2020-01-13

## 2020-01-13 RX ADMIN — Medication 100 MILLIGRAM(S): at 14:37

## 2020-01-13 RX ADMIN — Medication 100 MILLIGRAM(S): at 22:11

## 2020-01-13 RX ADMIN — POLYETHYLENE GLYCOL 3350 17 GRAM(S): 17 POWDER, FOR SOLUTION ORAL at 11:36

## 2020-01-13 RX ADMIN — Medication 650 MILLIGRAM(S): at 22:51

## 2020-01-13 RX ADMIN — RISPERIDONE 0.5 MILLIGRAM(S): 4 TABLET ORAL at 05:06

## 2020-01-13 RX ADMIN — TRAMADOL HYDROCHLORIDE 50 MILLIGRAM(S): 50 TABLET ORAL at 03:58

## 2020-01-13 RX ADMIN — RISPERIDONE 0.5 MILLIGRAM(S): 4 TABLET ORAL at 18:07

## 2020-01-13 RX ADMIN — CEFEPIME 50 MILLIGRAM(S): 1 INJECTION, POWDER, FOR SOLUTION INTRAMUSCULAR; INTRAVENOUS at 23:42

## 2020-01-13 RX ADMIN — Medication 650 MILLIGRAM(S): at 22:16

## 2020-01-13 RX ADMIN — LISINOPRIL 10 MILLIGRAM(S): 2.5 TABLET ORAL at 05:05

## 2020-01-13 RX ADMIN — CEFEPIME 50 MILLIGRAM(S): 1 INJECTION, POWDER, FOR SOLUTION INTRAMUSCULAR; INTRAVENOUS at 11:35

## 2020-01-13 RX ADMIN — TRAMADOL HYDROCHLORIDE 25 MILLIGRAM(S): 50 TABLET ORAL at 11:36

## 2020-01-13 RX ADMIN — TRAMADOL HYDROCHLORIDE 50 MILLIGRAM(S): 50 TABLET ORAL at 03:28

## 2020-01-13 RX ADMIN — Medication 100 MILLIGRAM(S): at 05:06

## 2020-01-13 RX ADMIN — GABAPENTIN 100 MILLIGRAM(S): 400 CAPSULE ORAL at 05:06

## 2020-01-13 RX ADMIN — GABAPENTIN 100 MILLIGRAM(S): 400 CAPSULE ORAL at 18:07

## 2020-01-13 RX ADMIN — TRAMADOL HYDROCHLORIDE 25 MILLIGRAM(S): 50 TABLET ORAL at 12:45

## 2020-01-13 RX ADMIN — ENOXAPARIN SODIUM 40 MILLIGRAM(S): 100 INJECTION SUBCUTANEOUS at 11:36

## 2020-01-13 RX ADMIN — Medication 81 MILLIGRAM(S): at 11:36

## 2020-01-13 RX ADMIN — LACTULOSE 10 GRAM(S): 10 SOLUTION ORAL at 11:36

## 2020-01-13 NOTE — PROGRESS NOTE ADULT - ASSESSMENT
Gangrene of Right toe. CTA results reviewed  1. Will d/w Dr. Izaguirre management RE: angiogram  2. Will follow

## 2020-01-13 NOTE — PROGRESS NOTE ADULT - SUBJECTIVE AND OBJECTIVE BOX
NP Note discussed with  Primary Attending    Patient is a 74y old  Female who presents with a chief complaint of dry gangrene (13 Jan 2020 09:05)    Patient is 74 year old female, live by herself, ambulates with cane  has medical hx significant for dementia, cad, pvd, hld, htn presents to the ED with complaint of discoloration for a toe on the right foot.  Per pt she has black colored rt 2nd toe. pt states she notice discoloration gradually over last 3 weeks associated with swelling and erythema, pt states she bumped in to something and hurt her right big toe but dose not remember when. Today she was seen by her primary care physician a few days ago and told her to go to the emergency room. Patient states she can only ambulate 10 feet and gets a cramp in her right calf for which she needs to sit down.  She states she is very minimally ambulatory.  She denies pain at rest with her feet elevated.  Patient lives alone with a cat. Patient massaging leg throughout the visit.  Patient denies fever, cp, sob, cough, n/v/d. Pt has urinary incontinence for many years. Patient states her blood pressure is not usually low. Patient states she smokes at least a pack a day of cigarettes since she was 22 years old. Admitted to medicine due to Rt foot dry gangrene.   Vascular and podiatry have been following on this case. CTA of aorta with runoff performed and Dr. Izaguirre reviewed and re-eval pt and plan is angiogram on Wed. also, Podiatry offers amputation of Rt Transmetatarsal amputation after vascular intervention. VSs, in NAD. NICKI/PVRs pending.     GOC : full code   INTERVAL HPI/OVERNIGHT EVENTS: no new complaints    MEDICATIONS  (STANDING):  aspirin enteric coated 81 milliGRAM(s) Oral daily  cefepime   IVPB 2000 milliGRAM(s) IV Intermittent every 12 hours  cefepime   IVPB      enoxaparin Injectable 40 milliGRAM(s) SubCutaneous daily  gabapentin 100 milliGRAM(s) Oral every 12 hours  lactulose Syrup 10 Gram(s) Oral daily  lisinopril 10 milliGRAM(s) Oral daily  metroNIDAZOLE  IVPB 500 milliGRAM(s) IV Intermittent every 8 hours  metroNIDAZOLE  IVPB      polyethylene glycol 3350 17 Gram(s) Oral daily  risperiDONE   Tablet 0.5 milliGRAM(s) Oral two times a day  senna 2 Tablet(s) Oral at bedtime    MEDICATIONS  (PRN):  acetaminophen   Tablet .. 650 milliGRAM(s) Oral every 6 hours PRN Mild Pain (1 - 3)  traMADol 25 milliGRAM(s) Oral every 6 hours PRN Moderate Pain (4 - 6)      __________________________________________________  REVIEW OF SYSTEMS:    CONSTITUTIONAL: No fever,   EYES: no acute visual disturbances  NECK: No pain or stiffness  RESPIRATORY: No cough; No shortness of breath  CARDIOVASCULAR: No chest pain, no palpitations  GASTROINTESTINAL: No pain. No nausea or vomiting; No diarrhea   NEUROLOGICAL: No headache or numbness, no tremors  MUSCULOSKELETAL: No joint pain, no muscle pain  GENITOURINARY: no dysuria, no frequency, no hesitancy  PSYCHIATRY: no depression , no anxiety  ALL OTHER  ROS negative        Vital Signs Last 24 Hrs  T(C): 36.4 (13 Jan 2020 05:24), Max: 36.7 (12 Jan 2020 21:38)  T(F): 97.6 (13 Jan 2020 05:24), Max: 98 (12 Jan 2020 21:38)  HR: 98 (13 Jan 2020 11:38) (90 - 98)  BP: 114/70 (13 Jan 2020 11:38) (104/57 - 154/63)  BP(mean): --  RR: 17 (13 Jan 2020 05:24) (17 - 18)  SpO2: 97% (13 Jan 2020 05:24) (97% - 100%)    ________________________________________________  PHYSICAL EXAM:  GENERAL: NAD  HEENT: Normocephalic;  conjunctivae and sclerae clear; moist mucous membranes;   NECK : supple  CHEST/LUNG: Clear to auscultation bilaterally with good air entry   HEART: S1 S2  regular; no murmurs, gallops or rubs  ABDOMEN: Soft, Nontender, Nondistended; Bowel sounds present  EXTREMITIES: no cyanosis; no edema; no calf tenderness Rt foot covers with DPD   SKIN: warm and dry; no rash  NERVOUS SYSTEM:  Awake and alert; Oriented  to place, person and time ; no new deficits    _________________________________________________  LABS:                        14.2   8.61  )-----------( 218      ( 13 Jan 2020 06:13 )             43.3     01-13    138  |  106  |  23<H>  ----------------------------<  102<H>  4.0   |  25  |  0.91    Ca    9.4      13 Jan 2020 06:13    TPro  7.3  /  Alb  3.0<L>  /  TBili  0.4  /  DBili  x   /  AST  27  /  ALT  13  /  AlkPhos  72  01-13        CAPILLARY BLOOD GLUCOSE            RADIOLOGY & ADDITIONAL TESTS:  ******PRELIMINARY REPORT******    ******PRELIMINARY REPORT******          EXAM:  CT ANGIO ABD AOR W RUN(W)AW IC                            PROCEDURE DATE:  01/12/2020    ******PRELIMINARY REPORT******    ******PRELIMINARY REPORT******              INTERPRETATION:  [facility] RADIOLOGIST PRELIMINARY REPORT    PROCEDURE INFORMATION:   Exam: CTA Angiogram of the Abdominal Aorta and Bilateral Lower Extremities   (Run-off) With IV Contrast   Exam date and time: 1/12/2020 6:55 PM   Age: 74 years old   Clinical indication: Other: Pad, right 2nd toe gangrene, 3rd and 4th toes wet   early gang     TECHNIQUE:   Imaging protocol: CT angiogram of the abdominal aorta, pelvis and bilateral   lower extremities with IV iodinated contrast.   3D rendering: MIP and/or 3D reconstructed images were created by the   technologist.     COMPARISON:   CR XR FOOT 3 VIEWS RIGHT 1/10/2020 2:46 PM     FINDINGS:   Aorta: Moderate calcified and noncalcified atherosclerotic plaque. Aorta is   otherwise fully patent.   Celiac trunk and mesenteric arteries: No occlusion or significant stenosis.    Renal arteries: No occlusion or significant stenosis.    Right iliac arteries: Marked stenosis of the right external iliac artery.   Right femoral/popliteal arteries: Occlusion of the mid right superficial   femoral artery with reconstitution of the stenotic distal superficial femoral   artery. Stenotic right popliteal artery.   Right infrapopliteal arteries: Single-vessel runoff via the right peroneal   artery which feeds the plantar artery. The right anterior and posterior tibial   arteries are occluded. Reconstitution of the right pedis dorsalis artery at the   level of the foot.   Left iliac arteries: Moderate calcification and stenosis of the left iliac   arteries.   Left femoral/popliteal arteries: Occlusion of the left superficial femoral   artery Reconstitution of markedly stenotic left popliteal artery   Left infrapopliteal arteries: Occlusion of the left anterior posterior tibial   arteries at the level of the ankle. Single-vessel left peroneal artery which   feeds the plantar artery. Left pedis dorsalis is occluded.    Liver: No mass.   Gallbladder and bile ducts: Unremarkable. No calcified stones. No ductal   dilation.    Pancreas: Unremarkable. No mass. No ductal dilation.    Spleen: Normal. No splenomegaly.    Adrenals: Normal. No mass.    Kidneys and ureters: Normal. No mass.    Stomach and bowel: . No obstruction. No mucosal thickening. Moderate stool   throughout the colon.    Appendix: No evidence of appendicitis.    Bladder: Unremarkable. No mass.    Reproductive: Unremarkable as visualized.    Intraperitoneal space: Unremarkable. No free air. No significant fluid   collection.    Lymph nodes: No lymphadenopathy.   Bones/joints: No acute fracture. No dislocation.    Soft tissues: Soft tissue irregularity of the second right toe presumably   related to underlying gangrene.       IMPRESSION:   1. Right lower extremity demonstrates marked diffuse stenosis of the lower   extremity with occlusion of the mid right SFA which reconstitutes distally and   with single-vessel runoff to the right foot via the right peroneal artery which   feeds the plantar artery. The right pedis dorsalis artery reconstitutes at the   level of the ankle.   2. The left lower extremity demonstrates diffuse marked stenosis with occlusion   of the left SFA with reconstitution of stenotic left popliteal artery. There is   single-vessel runoff via the left peroneal artery is the which then feeds the   left plantar artery. The pedis dorsalis artery appears occluded.   3. Moderate stool throughout the colon which can be seen with constipation.  4. Slight irregularity of the soft tissues of the right second toe which may be   related to underlying gangrene.          ******PRELIMINARY REPORT******    ******PRELIMINARY REPORT******              CONSTANCE GUZMAN M.D.;[facility] RADIOLOGIST    EXAM:  FOOT RIGHT (MINIMUM 3 VIEWS)                            PROCEDURE DATE:  01/10/2020          INTERPRETATION:  Right foot. 3 views. Patient has local pain.    The foot shows mild degeneration at the first MTP joint.    There is an old fracture deformity of the distal fibula.    No bone destruction or acute fracture is evident.    IMPRESSION: No acute finding.          JASKARAN MOSLEY M.D., ATTENDING RADIOLOGIST  This document has been electronically signed. Angel 10 2020  3:06PM              Imaging Personally Reviewed:  YES    Consultant(s) Notes Reviewed:   YES    Care Discussed with Consultants : vascular/ podiatry/ ID     Plan of care was discussed with patient and /or primary care giver; all questions and concerns were addressed and care was aligned with patient's wishes.

## 2020-01-13 NOTE — PROGRESS NOTE ADULT - PROBLEM SELECTOR PLAN 2
CTA of aorta as above  vascular Dr. shepard - angiogram on wed CTA of aorta as above  vascular Dr. shepard - angiogram on wed -- EKG/ Echo ordered for medical clearance

## 2020-01-13 NOTE — CHART NOTE - NSCHARTNOTEFT_GEN_A_CORE
EVENT:   - Brief HPI: Patient is a 74y old  Female who presents with a chief complaint of dry gangrene (12 Jan 2020 02:00).  Pt with dry gangrene of right  &  + right foot tenderness & pain.    Pt is c/o of R foot pain which she rated as a 9/10.       OBJECTIVE:  Vital Signs Last 24 Hrs  T(C): 36.7 (12 Jan 2020 21:38), Max: 36.8 (12 Jan 2020 05:35)  T(F): 98 (12 Jan 2020 21:38), Max: 98.2 (12 Jan 2020 05:35)  HR: 95 (12 Jan 2020 21:38) (90 - 95)  BP: 154/63 (12 Jan 2020 21:38) (104/57 - 154/63)  BP(mean): --  RR: 17 (12 Jan 2020 21:38) (17 - 18)  SpO2: 100% (12 Jan 2020 21:38) (99% - 100%)    FOCUSED PHYSICAL EXAM:  Neuro: awake, alert, oriented x 3. No neuro deficit  Cardiovascular: Pulses +2 B/L in lower and upper extremities, HR regular, BP stable, No edema.  Respiratory: Respirations regular, unlabored, breath sounds clear B/L.   GI: Abdomen soft, non-tender, positive bowel sounds.  : no bladder distention noted. No complaints at this time.  Skin: Dry, intact, no bruising, no diaphoresis.    LABS:                        14.6   8.58  )-----------( 218      ( 12 Jan 2020 07:42 )             44.9     01-12    140  |  105  |  20<H>  ----------------------------<  104<H>  4.4   |  31  |  1.08    Ca    9.4      12 Jan 2020 07:42  Phos  2.7     01-11  Mg     1.9     01-11    TPro  7.5  /  Alb  3.2<L>  /  TBili  0.4  /  DBili  x   /  AST  30  /  ALT  13  /  AlkPhos  75  01-12      Assessment/problem: Right Foot pain    PLAN:   1. Tylenol IV 1000mg x 1 dose ordered  2. Cont Tramadol, Tylenol & Gabapentin  3. F/u with response to IV Tylenol EVENT:   - Brief HPI: Patient is a 74y old  Female who presents with a chief complaint of dry gangrene (12 Jan 2020 02:00).  Pt with dry gangrene of right  &  + right foot tenderness & pain.    Pt is c/o of R foot pain which she rated as a 9/10.       OBJECTIVE:  Vital Signs Last 24 Hrs  T(C): 36.7 (12 Jan 2020 21:38), Max: 36.8 (12 Jan 2020 05:35)  T(F): 98 (12 Jan 2020 21:38), Max: 98.2 (12 Jan 2020 05:35)  HR: 95 (12 Jan 2020 21:38) (90 - 95)  BP: 154/63 (12 Jan 2020 21:38) (104/57 - 154/63)  BP(mean): --  RR: 17 (12 Jan 2020 21:38) (17 - 18)  SpO2: 100% (12 Jan 2020 21:38) (99% - 100%)    FOCUSED PHYSICAL EXAM:  Neuro: awake, alert, oriented x 3. No neuro deficit  Cardiovascular: Pulses +2 B/L in lower and upper extremities, HR regular, BP stable, No edema.  Respiratory: Respirations regular, unlabored, breath sounds clear B/L.   GI: Abdomen soft, non-tender, positive bowel sounds.  : no bladder distention noted. No complaints at this time.  Skin: Dry, intact, no bruising, no diaphoresis.    LABS:                        14.6   8.58  )-----------( 218      ( 12 Jan 2020 07:42 )             44.9     01-12    140  |  105  |  20<H>  ----------------------------<  104<H>  4.4   |  31  |  1.08    Ca    9.4      12 Jan 2020 07:42  Phos  2.7     01-11  Mg     1.9     01-11    TPro  7.5  /  Alb  3.2<L>  /  TBili  0.4  /  DBili  x   /  AST  30  /  ALT  13  /  AlkPhos  75  01-12      Assessment/problem: Right Foot pain    PLAN:   1. Tramadol 50mg, PO x 1 dose ordered  2. Cont Tramadol, Tylenol & Gabapentin  3. F/u with response to tramadol 50mg

## 2020-01-13 NOTE — PROGRESS NOTE ADULT - SUBJECTIVE AND OBJECTIVE BOX
Pt seen at bedside  Patient is a 74y old  Female who presents with a chief complaint of dry gangrene (12 Jan 2020 11:23)      INTERVAL HPI/OVERNIGHT EVENTS:  Pt states foot pain unchanged.  No acute complaints    Vital Signs Last 24 Hrs  T(C): 36.4 (13 Jan 2020 05:24), Max: 36.7 (12 Jan 2020 21:38)  T(F): 97.6 (13 Jan 2020 05:24), Max: 98 (12 Jan 2020 21:38)  HR: 95 (13 Jan 2020 05:24) (90 - 95)  BP: 133/65 (13 Jan 2020 05:24) (104/57 - 154/63)  BP(mean): --  RR: 17 (13 Jan 2020 05:24) (17 - 18)  SpO2: 97% (13 Jan 2020 05:24) (97% - 100%)    Physical Exam:    Gen: awake, alert oriented NAD  HEENT: anicteric  Abd: soft NT ND  Ext:  Right foot wound dressed    MEDICATIONS  (STANDING):  aspirin enteric coated 81 milliGRAM(s) Oral daily  cefepime   IVPB 2000 milliGRAM(s) IV Intermittent every 12 hours  cefepime   IVPB      enoxaparin Injectable 40 milliGRAM(s) SubCutaneous daily  gabapentin 100 milliGRAM(s) Oral every 12 hours  lactulose Syrup 10 Gram(s) Oral daily  lisinopril 10 milliGRAM(s) Oral daily  metroNIDAZOLE  IVPB 500 milliGRAM(s) IV Intermittent every 8 hours  metroNIDAZOLE  IVPB      polyethylene glycol 3350 17 Gram(s) Oral daily  risperiDONE   Tablet 0.5 milliGRAM(s) Oral two times a day  senna 2 Tablet(s) Oral at bedtime    MEDICATIONS  (PRN):  acetaminophen   Tablet .. 650 milliGRAM(s) Oral every 6 hours PRN Mild Pain (1 - 3)  traMADol 25 milliGRAM(s) Oral every 6 hours PRN Moderate Pain (4 - 6)                            14.2   8.61  )-----------( 218      ( 13 Jan 2020 06:13 )             43.3     01-13    138  |  106  |  23<H>  ----------------------------<  102<H>  4.0   |  25  |  0.91    Ca    9.4      13 Jan 2020 06:13    TPro  7.3  /  Alb  3.0<L>  /  TBili  0.4  /  DBili  x   /  AST  27  /  ALT  13  /  AlkPhos  72  01-13      Radiology:  < from: CT Angio Abd Aorta w/run-off w/ IV Cont (01.12.20 @ 19:40) >  ***PRELIMINARY REPORT*****  IMPRESSION:   1. Right lower extremity demonstrates marked diffuse stenosis of the lower   extremity with occlusion of the mid right SFA which reconstitutes distally and   with single-vessel runoff to the right foot via the right peroneal artery which   feeds the plantar artery. The right pedis dorsalis artery reconstitutes at the   level of the ankle.   2. The left lower extremity demonstrates diffuse marked stenosis with occlusion   of the left SFA with reconstitution of stenotic left popliteal artery. There is   single-vessel runoff via the left peroneal artery is the which then feeds the   left plantar artery. The pedis dorsalis artery appears occluded.   3. Moderate stool throughout the colon which can be seen with constipation.  4. Slight irregularity of the soft tissues of the right second toe which may be   related to underlying gangrene.    < end of copied text >

## 2020-01-13 NOTE — PROGRESS NOTE ADULT - PROBLEM SELECTOR PLAN 1
c/w jenny as ID Dr. Jameson recommended  Vascular and podiatry are following  c/w pain mgmt c/w jenny as ID Dr. Jameson recommended  Vascular and podiatry are following -- poss amputation on Friday   c/w pain mgmt

## 2020-01-13 NOTE — PROGRESS NOTE ADULT - SUBJECTIVE AND OBJECTIVE BOX
Patient is a 74y old  Female who presents with a chief complaint of discoloration of toe of right foot.     HPI: This 74 year old non-diabetic female presents to the ED with complaint of discoloration for a toe on the right foot. She states that she was seen by her primary care physician a few days ago and told her to go to the emergency room.  Patient states she smokes at least a pack a day of cigarettes since she was 22 years old.  Patient states she can only ambulate 10 feet and gets a cramp in her right calf for which she needs to sit down.  She states she is very minimally ambulatory.   She denies pain at rest with her feet elevated.  Patient lives alone with a cat. Patient massaging leg throughout the visit.  Patient denies n/v/d/sob/cp/f.  Patient states her blood pressure is not usually low.     Podiatry Interval HPI: Patient seen bedside this AM.  Patient states that she is having less pain today.  She states she has been evaluated by vascular surgery and podiatry and understands that there is a need for multiple procedures.  Discussed with patient that we are waiting for vascular surgery intervention to try to improved blood flow to the foot prior to performed.  Patient denies n/v/d/sob/cp.      PMH:COPD (chronic obstructive pulmonary disease)  Hypercholesterolemia  Myocardial infarct, old    Allergies: PC Pen VK (Rash)  penicillin (Other; Rash)  statins (Other)  sulfa drugs (Other)  sulfamethizole (Other)    Medications:   FH:  PSX: S/P CABG x 1    SH:     Labs:                        14.2   8.61  )-----------( 218      ( 13 Jan 2020 06:13 )             43.3       01-13    138  |  106  |  23<H>  ----------------------------<  102<H>  4.0   |  25  |  0.91    Ca    9.4      13 Jan 2020 06:13    TPro  7.3  /  Alb  3.0<L>  /  TBili  0.4  /  DBili  x   /  AST  27  /  ALT  13  /  AlkPhos  72  01-13      Vital Signs Last 24 Hrs  T(C): 36.4 (13 Jan 2020 05:24), Max: 36.7 (12 Jan 2020 21:38)  T(F): 97.6 (13 Jan 2020 05:24), Max: 98 (12 Jan 2020 21:38)  HR: 98 (13 Jan 2020 11:38) (90 - 98)  BP: 114/70 (13 Jan 2020 11:38) (104/57 - 154/63)  BP(mean): --  RR: 17 (13 Jan 2020 05:24) (17 - 18)  SpO2: 97% (13 Jan 2020 05:24) (97% - 100%)    Hemoglobin A1C, Whole Blood: 5.6 % (01-11-20 @ 09:30)    WBC Count: 8.61 K/uL (01-13 @ 06:13)  WBC Count: 8.58 K/uL (01-12 @ 07:42)  WBC Count: 9.08 K/uL (01-11 @ 07:14)  WBC Count: 11.81 K/uL (01-10 @ 14:05)      PHYSICAL EXAM  GEN: MERARY MEYER is a pleasant well-nourished, well developed 74y Female in no acute distress, alert awake, and oriented to person, place and time.   LE Focused:    Vasc:  DP and PT pulses non-palpable b/l.  Left DP faintly dopplerable.  Unable to doppler left PT, Unable to find right DP or PT with doppler.  No CFT to right 2nd toe and right 1st toe.  Derm:   Right 2nd toe: black necrotic to the level of the metatarsal head with exposed bone.  Distal aspect of the right 2nd toe is hard and dry, slight drainage to proximal aspect of toe.  Right foot erythema to level of ankle joint. Right 1st, 3rd, and 4th toes are dusky.   Neuro: Sensation diminished to digits b/l.  MSK: Tenderness to palpation distal foot.      Imaging:   < from: Xray Foot AP + Lateral + Oblique, Right (01.10.20 @ 15:05) >    EXAM:  FOOT RIGHT (MINIMUM 3 VIEWS)                        PROCEDURE DATE:  01/10/2020    INTERPRETATION:  Right foot. 3 views. Patient has local pain.    The foot shows mild degeneration at the first MTP joint.    There is an old fracture deformity of the distal fibula.    No bone destruction or acute fracture is evident.    IMPRESSION: No acute finding.    JASKARAN MOSLEY M.D., ATTENDING RADIOLOGIST  This document has been electronically signed. Angel 10 2020  3:06PM  < end of copied text >      A:   Critical Limb Ischemia, right  Gangrene right 2nd toe  PVD  Right 1st, 3rd and 4th toes dusky    P:  Patient evaluated, chart reviewed  Xrays- reviewed  Will f/u vasc consult  Pending NICKI/PVR  4x4 gaprince overton applied to right foot  Recommend IV antibiotics  Plan for amputation, likely Transmetatarsal amputation on Friday at 1:30PM pending vascular surgery findings on wednesday  Please consider for medical clearance  Discussed the importance of daily foot examinations  Podiatry will continue to monitor  Discussed with attending Dr Welsh

## 2020-01-14 DIAGNOSIS — Z71.89 OTHER SPECIFIED COUNSELING: ICD-10-CM

## 2020-01-14 LAB
ALBUMIN SERPL ELPH-MCNC: 3.5 G/DL — SIGNIFICANT CHANGE UP (ref 3.5–5)
ALP SERPL-CCNC: 79 U/L — SIGNIFICANT CHANGE UP (ref 40–120)
ALT FLD-CCNC: 16 U/L DA — SIGNIFICANT CHANGE UP (ref 10–60)
ANION GAP SERPL CALC-SCNC: 4 MMOL/L — LOW (ref 5–17)
AST SERPL-CCNC: 28 U/L — SIGNIFICANT CHANGE UP (ref 10–40)
BASOPHILS # BLD AUTO: 0.06 K/UL — SIGNIFICANT CHANGE UP (ref 0–0.2)
BASOPHILS NFR BLD AUTO: 0.6 % — SIGNIFICANT CHANGE UP (ref 0–2)
BILIRUB SERPL-MCNC: 0.4 MG/DL — SIGNIFICANT CHANGE UP (ref 0.2–1.2)
BUN SERPL-MCNC: 29 MG/DL — HIGH (ref 7–18)
CALCIUM SERPL-MCNC: 9.9 MG/DL — SIGNIFICANT CHANGE UP (ref 8.4–10.5)
CHLORIDE SERPL-SCNC: 105 MMOL/L — SIGNIFICANT CHANGE UP (ref 96–108)
CO2 SERPL-SCNC: 30 MMOL/L — SIGNIFICANT CHANGE UP (ref 22–31)
CREAT SERPL-MCNC: 1.13 MG/DL — SIGNIFICANT CHANGE UP (ref 0.5–1.3)
EOSINOPHIL # BLD AUTO: 0.06 K/UL — SIGNIFICANT CHANGE UP (ref 0–0.5)
EOSINOPHIL NFR BLD AUTO: 0.6 % — SIGNIFICANT CHANGE UP (ref 0–6)
GLUCOSE SERPL-MCNC: 121 MG/DL — HIGH (ref 70–99)
HCT VFR BLD CALC: 46.8 % — HIGH (ref 34.5–45)
HGB BLD-MCNC: 15.5 G/DL — SIGNIFICANT CHANGE UP (ref 11.5–15.5)
IMM GRANULOCYTES NFR BLD AUTO: 0.4 % — SIGNIFICANT CHANGE UP (ref 0–1.5)
LYMPHOCYTES # BLD AUTO: 1.32 K/UL — SIGNIFICANT CHANGE UP (ref 1–3.3)
LYMPHOCYTES # BLD AUTO: 12.4 % — LOW (ref 13–44)
MCHC RBC-ENTMCNC: 31.1 PG — SIGNIFICANT CHANGE UP (ref 27–34)
MCHC RBC-ENTMCNC: 33.1 GM/DL — SIGNIFICANT CHANGE UP (ref 32–36)
MCV RBC AUTO: 93.8 FL — SIGNIFICANT CHANGE UP (ref 80–100)
MONOCYTES # BLD AUTO: 1.16 K/UL — HIGH (ref 0–0.9)
MONOCYTES NFR BLD AUTO: 10.9 % — SIGNIFICANT CHANGE UP (ref 2–14)
NEUTROPHILS # BLD AUTO: 7.97 K/UL — HIGH (ref 1.8–7.4)
NEUTROPHILS NFR BLD AUTO: 75.1 % — SIGNIFICANT CHANGE UP (ref 43–77)
NRBC # BLD: 0 /100 WBCS — SIGNIFICANT CHANGE UP (ref 0–0)
PLATELET # BLD AUTO: 249 K/UL — SIGNIFICANT CHANGE UP (ref 150–400)
POTASSIUM SERPL-MCNC: 4.6 MMOL/L — SIGNIFICANT CHANGE UP (ref 3.5–5.3)
POTASSIUM SERPL-SCNC: 4.6 MMOL/L — SIGNIFICANT CHANGE UP (ref 3.5–5.3)
PROT SERPL-MCNC: 8.3 G/DL — SIGNIFICANT CHANGE UP (ref 6–8.3)
RBC # BLD: 4.99 M/UL — SIGNIFICANT CHANGE UP (ref 3.8–5.2)
RBC # FLD: 12.6 % — SIGNIFICANT CHANGE UP (ref 10.3–14.5)
SODIUM SERPL-SCNC: 139 MMOL/L — SIGNIFICANT CHANGE UP (ref 135–145)
WBC # BLD: 10.61 K/UL — HIGH (ref 3.8–10.5)
WBC # FLD AUTO: 10.61 K/UL — HIGH (ref 3.8–10.5)

## 2020-01-14 PROCEDURE — 93010 ELECTROCARDIOGRAM REPORT: CPT

## 2020-01-14 PROCEDURE — 99231 SBSQ HOSP IP/OBS SF/LOW 25: CPT

## 2020-01-14 RX ADMIN — Medication 100 MILLIGRAM(S): at 13:16

## 2020-01-14 RX ADMIN — Medication 650 MILLIGRAM(S): at 23:35

## 2020-01-14 RX ADMIN — ENOXAPARIN SODIUM 40 MILLIGRAM(S): 100 INJECTION SUBCUTANEOUS at 13:16

## 2020-01-14 RX ADMIN — SENNA PLUS 2 TABLET(S): 8.6 TABLET ORAL at 21:29

## 2020-01-14 RX ADMIN — Medication 81 MILLIGRAM(S): at 13:16

## 2020-01-14 RX ADMIN — TRAMADOL HYDROCHLORIDE 25 MILLIGRAM(S): 50 TABLET ORAL at 05:58

## 2020-01-14 RX ADMIN — TRAMADOL HYDROCHLORIDE 25 MILLIGRAM(S): 50 TABLET ORAL at 21:28

## 2020-01-14 RX ADMIN — LISINOPRIL 10 MILLIGRAM(S): 2.5 TABLET ORAL at 05:27

## 2020-01-14 RX ADMIN — CEFEPIME 50 MILLIGRAM(S): 1 INJECTION, POWDER, FOR SOLUTION INTRAMUSCULAR; INTRAVENOUS at 13:15

## 2020-01-14 RX ADMIN — Medication 100 MILLIGRAM(S): at 05:28

## 2020-01-14 RX ADMIN — GABAPENTIN 100 MILLIGRAM(S): 400 CAPSULE ORAL at 05:27

## 2020-01-14 RX ADMIN — CEFEPIME 50 MILLIGRAM(S): 1 INJECTION, POWDER, FOR SOLUTION INTRAMUSCULAR; INTRAVENOUS at 23:30

## 2020-01-14 RX ADMIN — TRAMADOL HYDROCHLORIDE 25 MILLIGRAM(S): 50 TABLET ORAL at 22:00

## 2020-01-14 RX ADMIN — RISPERIDONE 0.5 MILLIGRAM(S): 4 TABLET ORAL at 05:27

## 2020-01-14 RX ADMIN — POLYETHYLENE GLYCOL 3350 17 GRAM(S): 17 POWDER, FOR SOLUTION ORAL at 13:16

## 2020-01-14 RX ADMIN — LACTULOSE 10 GRAM(S): 10 SOLUTION ORAL at 13:16

## 2020-01-14 RX ADMIN — Medication 100 MILLIGRAM(S): at 21:28

## 2020-01-14 RX ADMIN — TRAMADOL HYDROCHLORIDE 25 MILLIGRAM(S): 50 TABLET ORAL at 05:28

## 2020-01-14 RX ADMIN — GABAPENTIN 100 MILLIGRAM(S): 400 CAPSULE ORAL at 17:16

## 2020-01-14 NOTE — PROGRESS NOTE ADULT - SUBJECTIVE AND OBJECTIVE BOX
Patient is a 74y old  Female who presents with a chief complaint of discoloration of toe of right foot.     HPI: This 74 year old non-diabetic female presents to the ED with complaint of discoloration for a toe on the right foot. She states that she was seen by her primary care physician a few days ago and told her to go to the emergency room.  Patient states she smokes at least a pack a day of cigarettes since she was 22 years old.  Patient states she can only ambulate 10 feet and gets a cramp in her right calf for which she needs to sit down.  She states she is very minimally ambulatory.   She denies pain at rest with her feet elevated.  Patient lives alone with a cat. Patient massaging leg throughout the visit.  Patient denies n/v/d/sob/cp/f.  Patient states her blood pressure is not usually low.     Podiatry Interval HPI: Patient seen bedside this AM.  Patient states she does have continued pain in her foot. She understands she is having a vascular intervention wednesday and likely amputation friday.  Patient denies n/v/d/sob/cp.      PMH:COPD (chronic obstructive pulmonary disease)  Hypercholesterolemia  Myocardial infarct, old    Allergies: PC Pen VK (Rash)  penicillin (Other; Rash)  statins (Other)  sulfa drugs (Other)  sulfamethizole (Other)    Medications:   FH:  PSX: S/P CABG x 1    SH:     Labs:  Vital Signs Last 24 Hrs  T(C): 36.7 (14 Jan 2020 05:08), Max: 37 (13 Jan 2020 20:38)  T(F): 98 (14 Jan 2020 05:08), Max: 98.6 (13 Jan 2020 20:38)  HR: 111 (14 Jan 2020 08:22) (96 - 117)  BP: 142/68 (14 Jan 2020 06:45) (104/68 - 156/72)  BP(mean): --  RR: 18 (14 Jan 2020 08:22) (16 - 18)  SpO2: 100% (14 Jan 2020 08:22) (96% - 100%)                          15.5   10.61 )-----------( 249      ( 14 Jan 2020 05:56 )             46.8       01-14    139  |  105  |  29<H>  ----------------------------<  121<H>  4.6   |  30  |  1.13    Ca    9.9      14 Jan 2020 05:56    TPro  8.3  /  Alb  3.5  /  TBili  0.4  /  DBili  x   /  AST  28  /  ALT  16  /  AlkPhos  79  01-14    Hemoglobin A1C, Whole Blood: 5.6 % (01-11-20 @ 09:30)            PHYSICAL EXAM  GEN: MERARY MEYER is a pleasant well-nourished, well developed 74y Female in no acute distress, alert awake, and oriented to person, place and time.   LE Focused:    Vasc:  DP and PT pulses non-palpable b/l.  Left DP faintly dopplerable.  Unable to doppler left PT, Unable to find right DP or PT with doppler.  No CFT to right 2nd toe and right 1st toe.  Derm:   Right 2nd toe: black necrotic to the level of the metatarsal head with exposed bone.  Distal aspect of the right 2nd toe is hard and dry, slight drainage to proximal aspect of toe.  Right foot erythema to level of ankle joint. Right 1st, 3rd, and 4th toes are dusky.   Neuro: Sensation diminished to digits b/l.  MSK: Tenderness to palpation distal foot.      Imaging:   < from: Xray Foot AP + Lateral + Oblique, Right (01.10.20 @ 15:05) >    EXAM:  FOOT RIGHT (MINIMUM 3 VIEWS)                        PROCEDURE DATE:  01/10/2020    INTERPRETATION:  Right foot. 3 views. Patient has local pain.    The foot shows mild degeneration at the first MTP joint.    There is an old fracture deformity of the distal fibula.    No bone destruction or acute fracture is evident.    IMPRESSION: No acute finding.    JASKARAN MOSLEY M.D., ATTENDING RADIOLOGIST  This document has been electronically signed. Angel 10 2020  3:06PM  < end of copied text >      A:   Critical Limb Ischemia, right  Gangrene right 2nd toe  PVD  Right 1st, 3rd and 4th toes dusky    P:  Patient evaluated, chart reviewed  Xrays- reviewed  Will f/u vasc consult  Pending NICKI/PVR  4x4 gauze, prince applied to right foot  Recommend IV antibiotics  Plan for amputation, likely Transmetatarsal amputation on Friday at 1:30PM pending vascular surgery findings on wednesday  Please consider for medical clearance  Discussed the importance of daily foot examinations  Podiatry will continue to monitor  Examined with Dr. Rdz

## 2020-01-14 NOTE — PROGRESS NOTE ADULT - ATTENDING COMMENTS
Seen ex'ed dw'ed PA  PAD/R toes/foot gangrene  Also noted tender/purple discolored skin patches on L foot dorsum without breakdown    Multi level PAD  R LE threatened  L LE ?onset of isch changes    Plan Angio +/- intervention tomorrow  Cond, options, procedure risks/benefits/implications dw;ed pt.  Pt requests to sign consent in AM  DW'ed Rn and NP- pts contact/?HCP [facility] has not returned calls yet.

## 2020-01-14 NOTE — PROGRESS NOTE ADULT - PROBLEM SELECTOR PLAN 1
c/w gamal and joann as ID Dr. Jameson recommended  Vascular and podiatry are following -- poss amputation on Friday   c/w pain mgmt.

## 2020-01-14 NOTE — PROGRESS NOTE ADULT - SUBJECTIVE AND OBJECTIVE BOX
NP Note discussed with  Primary Attending    Patient is a 74y old  Female who presents with a chief complaint of dry gangrene (14 Jan 2020 11:19)    HPI - Patient is 74 year old female, live by herself, ambulates with cane  has medical hx significant for dementia, cad, pvd, hld, htn presents to the ED with complaint of discoloration for a toe on the right foot.  Per pt she has black colored rt 2nd toe. pt states she notice discoloration gradually over last 3 weeks associated with swelling and erythema, pt states she bumped in to something and hurt her right big toe but dose not remember when. Today she was seen by her primary care physician a few days ago and told her to go to the emergency room. Patient states she can only ambulate 10 feet and gets a cramp in her right calf for which she needs to sit down.  She states she is very minimally ambulatory.  She denies pain at rest with her feet elevated.  Patient lives alone with a cat. Patient massaging leg throughout the visit.  Patient denies fever, cp, sob, cough, n/v/d. Pt has urinary incontinence for many years. Patient states her blood pressure is not usually low. Patient states she smokes at least a pack a day of cigarettes since she was 22 years old. Admitted to medicine due to Rt foot dry gangrene.   Vascular and podiatry have been following on this case. CTA of aorta with runoff performed and Dr. Izaguirre reviewed and re-eval pt and plan is angiogram on Wed. also, Podiatry offers amputation of Rt Transmetatarsal amputation after vascular intervention, likely will be on Friday.  VSs, in NAD. GOC : full code       INTERVAL HPI/OVERNIGHT EVENTS: no new complaints    MEDICATIONS  (STANDING):  aspirin enteric coated 81 milliGRAM(s) Oral daily  cefepime   IVPB 2000 milliGRAM(s) IV Intermittent every 12 hours  cefepime   IVPB      enoxaparin Injectable 40 milliGRAM(s) SubCutaneous daily  gabapentin 100 milliGRAM(s) Oral every 12 hours  lactulose Syrup 10 Gram(s) Oral daily  lisinopril 10 milliGRAM(s) Oral daily  metroNIDAZOLE  IVPB 500 milliGRAM(s) IV Intermittent every 8 hours  metroNIDAZOLE  IVPB      polyethylene glycol 3350 17 Gram(s) Oral daily  risperiDONE   Tablet 0.5 milliGRAM(s) Oral two times a day  senna 2 Tablet(s) Oral at bedtime    MEDICATIONS  (PRN):  acetaminophen   Tablet .. 650 milliGRAM(s) Oral every 6 hours PRN Mild Pain (1 - 3)  traMADol 25 milliGRAM(s) Oral every 6 hours PRN Moderate Pain (4 - 6)      __________________________________________________  REVIEW OF SYSTEMS:    CONSTITUTIONAL: No fever,   EYES: no acute visual disturbances  NECK: No pain or stiffness  RESPIRATORY: No cough; No shortness of breath  CARDIOVASCULAR: No chest pain, no palpitations  GASTROINTESTINAL: No pain. No nausea or vomiting; No diarrhea   NEUROLOGICAL: No headache or numbness, no tremors  MUSCULOSKELETAL: No joint pain, no muscle pain  GENITOURINARY: no dysuria, no frequency, no hesitancy  PSYCHIATRY: no depression , no anxiety  ALL OTHER  ROS negative        Vital Signs Last 24 Hrs  T(C): 36.7 (14 Jan 2020 05:08), Max: 37 (13 Jan 2020 20:38)  T(F): 98 (14 Jan 2020 05:08), Max: 98.6 (13 Jan 2020 20:38)  HR: 111 (14 Jan 2020 08:22) (96 - 117)  BP: 142/68 (14 Jan 2020 06:45) (104/68 - 156/72)  BP(mean): --  RR: 18 (14 Jan 2020 08:22) (16 - 18)  SpO2: 100% (14 Jan 2020 08:22) (96% - 100%)    ________________________________________________  PHYSICAL EXAM:  GENERAL: NAD  HEENT: Normocephalic;  conjunctivae and sclerae clear; moist mucous membranes;   NECK : supple  CHEST/LUNG: Clear to auscultation bilaterally with good air entry   HEART: S1 S2  regular; no murmurs, gallops or rubs  ABDOMEN: Soft, Nontender, Nondistended; Bowel sounds present  EXTREMITIES: no cyanosis; no edema; no calf tenderness Rt foot - cover with DPD   SKIN: warm and dry; no rash  NERVOUS SYSTEM:  Awake and alert; Oriented  to place, person and time ; no new deficits    _________________________________________________  LABS:                        15.5   10.61 )-----------( 249      ( 14 Jan 2020 05:56 )             46.8     01-14    139  |  105  |  29<H>  ----------------------------<  121<H>  4.6   |  30  |  1.13    Ca    9.9      14 Jan 2020 05:56    TPro  8.3  /  Alb  3.5  /  TBili  0.4  /  DBili  x   /  AST  28  /  ALT  16  /  AlkPhos  79  01-14        CAPILLARY BLOOD GLUCOSE            RADIOLOGY & ADDITIONAL TESTS:    EXAM:  US PHYSIOL LWR EXT 3+ LEV BI                            PROCEDURE DATE:  01/13/2020          INTERPRETATION:  Clinical indication: Right toe gangrene    Comparison: None    FINDINGS AND   IMPRESSION: Segmental pressures could not be obtained due to patient discomfort. Therefore, ABIs could not be calculated.    There are biphasic waveforms in the lower left thigh. Abnormally, monophasic flow within the remainder of the lower extremities.        RACQUEL CHA M.D., ATTENDING RADIOLOGIST  This document has been electronically signed. Jan 13 2020  2:20PM      EXAM:  CT ANGIO ABD AOR W RUN(W)AW IC                            PROCEDURE DATE:  01/12/2020          INTERPRETATION:  CT ANGIO ABDOMINAL AORTA RUNOFF WITHOUT AND OR WITH IV CONTRAST    CLINICAL INFORMATION: Atherosclerosis of the native arteries of the right and left lower extremity with right foot gangrene    PROCEDURE INFORMATION:   Exam: CTA Angiogram of the Abdominal Aorta and Bilateral Lower Extremities   (Run-off) With IV Contrast   Exam date and time: 1/12/2020 6:55 PM   Age: 74 years old   Clinical indication: Other: Pad, right 2nd toe gangrene, 3rd and 4th toes wet   early gang     TECHNIQUE:   Imaging protocol: CT angiogram of the abdominal aorta, pelvis and bilateral   lower extremities with IV iodinated contrast.   Intravenous contrast: 90 cc Omnipaque 350  3D rendering: MIP and/or 3D reconstructed images were created by the   technologist.     COMPARISON:   CR XR FOOT 3 VIEWS RIGHT 1/10/2020 2:46 PM     FINDINGS:   Aorta: Severe calcified and noncalcified atherosclerotic plaque. Aorta is   otherwise fully patent.   Celiac trunk and mesenteric arteries: No occlusion or significant stenosis.    Renal arteries: No occlusion or significant stenosis.      Right iliac arteries: Marked stenosis of the right external iliac artery distally.   Right femoral/popliteal arteries: Distal CFA stenosis. Severely and diffusely attenuated SFA. Occlusion of the mid-distal right superficial   femoral artery with reconstitution of the suprageniculate popliteal  artery. Stenotic right popliteal artery.   Right infrapopliteal arteries: Single-vessel runoff via the right peroneal   artery, diffusely attenuated, which feeds the plantar artery. The right anterior and posterior tibial   arteries are occluded. Reconstitution of the right pedis dorsalis artery at the   level of the foot, however severely attenuated.     Left iliac arteries: Moderate calcification and stenosis of the left iliac arteries. Occluded left internal iliac artery.  Left femoral/popliteal arteries: Long segment Occlusion of the entire left superficial femoral   artery and Reconstitution of left popliteal artery, which shows multifocal disease without occlusion.  Left infrapopliteal arteries: Occlusion of the left anterior posterior tibial   arteries at the level of the ankle. Single-vessel left peroneal artery which   feeds the plantar artery. Left pedis dorsalis is attenuated but patent.    Liver: No mass.   Gallbladder and bile ducts: Unremarkable. No calcified stones. No ductal   dilation.    Pancreas: Unremarkable. No mass. No ductal dilation.    Spleen: Normal. No splenomegaly.    Adrenals: Normal. No mass.    Kidneys and ureters: Normal. No mass.      Stomach and bowel: . No obstruction. No mucosal thickening. Moderate stool   throughout the colon.    Appendix: No evidence of appendicitis.      Bladder: Unremarkable. No mass.    Reproductive: Unremarkable as visualized.      Intraperitoneal space: Unremarkable. No free air. No significant fluid   collection.    Lymph nodes: No lymphadenopathy.     Bones/joints: No acute fracture. No dislocation.    Soft tissues: Soft tissue irregularity of the second right toe presumably   related to underlying gangrene.       IMPRESSION:   1. Right lower extremity demonstrates marked diffuse stenosis of the lower   extremity with occlusion of the mid-distal right SFA which reconstitutes distally and   with single-vessel runoff to the right foot via the right peroneal artery which   feeds the plantar artery. The right pedis dorsalis artery reconstitutes at the   level of the ankle.   2. The left lower extremity demonstrates diffuse marked stenosis with total occlusion   of the left SFA with reconstitution of attenuated left popliteal artery. There is   single-vessel runoff via the left peroneal artery is the which then feeds the   left plantar artery. The pedis dorsalis artery is patent.   3. evidence of inflow disease at the level of the right external iliac artery.  4. Slight irregularity of the soft tissues of the right second toe which may be   related to underlying gangrene.          GALILEA KING M.D., ATTENDING RADIOLOGIST  This document has been electronically signed. Jan 13 2020  4:12PM              Imaging Personally Reviewed:  YES    Consultant(s) Notes Reviewed:   YES  Care Discussed with Consultants : podiatry/ vascular     Plan of care was discussed with patient and /or primary care giver; all questions and concerns were addressed and care was aligned with patient's wishes.

## 2020-01-14 NOTE — PROGRESS NOTE ADULT - ASSESSMENT
75 yo F with severe PAD and R toe ulcers, non healing  1. Plan for OR in AM  2. NPO after midnight  3. IV fluids while NPO  4. AM labs  5. Awaiting medical clearance - EKG/echo pending

## 2020-01-14 NOTE — PROGRESS NOTE ADULT - SUBJECTIVE AND OBJECTIVE BOX
Surgery Pre-op Note    Preoperative Diagnosis: R LE toe necrosis, PAD    Planned Procedure: Angiogram +/- plasty                          15.5   10.61 )-----------( 249      ( 14 Jan 2020 05:56 )             46.8       01-14    139  |  105  |  29<H>  ----------------------------<  121<H>  4.6   |  30  |  1.13    Ca    9.9      14 Jan 2020 05:56    TPro  8.3  /  Alb  3.5  /  TBili  0.4  /  DBili  x   /  AST  28  /  ALT  16  /  AlkPhos  79  01-14      LIVER FUNCTIONS - ( 14 Jan 2020 05:56 )  Alb: 3.5 g/dL / Pro: 8.3 g/dL / ALK PHOS: 79 U/L / ALT: 16 U/L DA / AST: 28 U/L / GGT: x                 Type & Screen: pending    EKG: pendinig    Echo: pending    Nuclear stress Test:

## 2020-01-14 NOTE — PROGRESS NOTE ADULT - PROBLEM SELECTOR PLAN 2
CTA of aorta as above  vascular Dr. shepard - angiogram on wed -- EKG/ Echo ordered for medical clearance.

## 2020-01-14 NOTE — CHART NOTE - NSCHARTNOTEFT_GEN_A_CORE
EVENT: NGT inadvertently out in CT scan pt back on unit now and refusing to have NGT placed    OBJECTIVE:  Vital Signs Last 24 Hrs  T(C): 36.6 (14 Jan 2020 14:29), Max: 37 (13 Jan 2020 20:38)  T(F): 97.9 (14 Jan 2020 14:29), Max: 98.6 (13 Jan 2020 20:38)  HR: 102 (14 Jan 2020 14:29) (96 - 117)  BP: 110/62 (14 Jan 2020 14:29) (110/62 - 156/72)  BP(mean): --  RR: 18 (14 Jan 2020 14:29) (16 - 18)  SpO2: 98% (14 Jan 2020 14:29) (96% - 100%)    FOCUSED PHYSICAL EXAM:  GENERAL: NAD  HEAD:  Atraumatic, Normocephalic  EYES: EOMI, PERRLA, conjunctiva and sclera clear  NECK: Supple, No JVD  CHEST/LUNG: Clear to auscultation bilaterally; No wheezes or rales  HEART: Regular rate and rhythm; No murmurs, rubs, or gallops  ABDOMEN: Softly enlarged, Nontender, Distended, Normal bowel sounds  EXTREMITIES:  2+ Peripheral Pulses, No clubbing, cyanosis, or edema  Neurological: AOx3  Skin: Warm and dry  Psychiatric: Normal affect  LABS:                        15.5   10.61 )-----------( 249      ( 14 Jan 2020 05:56 )             46.8     01-14    139  |  105  |  29<H>  ----------------------------<  121<H>  4.6   |  30  |  1.13    Ca    9.9      14 Jan 2020 05:56    TPro  8.3  /  Alb  3.5  /  TBili  0.4  /  DBili  x   /  AST  28  /  ALT  16  /  AlkPhos  79  01-14    EKG:     IMAGING:  PROCEDURE DATE:  01/12/2020    INTERPRETATION:  CT ANGIO ABDOMINAL AORTA RUNOFF WITHOUT AND OR WITH IV CONTRAST  CLINICAL INFORMATION: Atherosclerosis of the native arteries of the right and left lower extremity with right foot gangrene  PROCEDURE INFORMATION:   Exam: CTA Angiogram of the Abdominal Aorta and Bilateral Lower Extremities   (Run-off) With IV Contrast   Exam date and time: 1/12/2020 6:55 PM   Age: 74 years old   Clinical indication: Other: Pad, right 2nd toe gangrene, 3rd and 4th toes wet   early gang   TECHNIQUE:   Imaging protocol: CT angiogram of the abdominal aorta, pelvis and bilateral   lower extremities with IV iodinated contrast.   Intravenous contrast: 90 cc Omnipaque 350  3D rendering: MIP and/or 3D reconstructed images were created by the   technologist.   COMPARISON:   CR XR FOOT 3 VIEWS RIGHT 1/10/2020 2:46 PM   FINDINGS:   Aorta: Severe calcified and noncalcified atherosclerotic plaque. Aorta is   otherwise fully patent.   Celiac trunk and mesenteric arteries: No occlusion or significant stenosis.    Renal arteries: No occlusion or significant stenosis.    Right iliac arteries: Marked stenosis of the right external iliac artery distally.   Right femoral/popliteal arteries: Distal CFA stenosis. Severely and diffusely attenuated SFA. Occlusion of the mid-distal right superficial   femoral artery with reconstitution of the suprageniculate popliteal  artery. Stenotic right popliteal artery.   Right infrapopliteal arteries: Single-vessel runoff via the right peroneal   artery, diffusely attenuated, which feeds the plantar artery. The right anterior and posterior tibial   arteries are occluded. Reconstitution of the right pedis dorsalis artery at the   level of the foot, however severely attenuated.   Left iliac arteries: Moderate calcification and stenosis of the left iliac arteries. Occluded left internal iliac artery.  Left femoral/popliteal arteries: Long segmentOcclusion of the entire left superficial femoral   artery and Reconstitution of left popliteal artery, which shows multifocal disease without occlusion.  Left infrapopliteal arteries: Occlusion of the left anterior posterior tibial   arteries at the level of the ankle. Single-vessel left peroneal artery which   feeds the plantar artery. Left pedis dorsalis is attenuated but patent  ASSESSMENT:  HPI:  Patient is 74 year old female, live by herself, ambulates with cane  has medical hx significant for dementia, cad, pvd, hld, htn presents to the ED with complaint of discoloration for a toe on the right foot.  Per pt she has black colored rt 2nd toe. pt states she notice discoloration gradually over last 3 weeks associated with swelling and erythema, pt states she bumped in to something and hurt her right big toe but dose not remember when. Today she was seen by her primary care physician a few days ago and told her to go to the emergency room. Patient states she can only ambulate 10 feet and gets a cramp in her right calf for which she needs to sit down.  She states she is very minimally ambulatory.  She denies pain at rest with her feet elevated.  Patient lives alone with a cat. Patient massaging leg throughout the visit.  Patient denies fever, cp, sob, cough, n/v/d. Pt has urinary incontinence for many years. Patient states her blood pressure is not usually low. Patient states she smokes at least a pack a day of cigarettes since she was 22 years old.  Liver: No mass.   Gallbladder and bile ducts: Unremarkable. No calcified stones. No ductal   dilation.    Pancreas: Unremarkable. No mass. No ductal dilation.    Spleen: Normal. No splenomegaly.    Adrenals: Normal. No mass.    Kidneys and ureters: Normal. No mass.    Stomach and bowel: . No obstruction. No mucosal thickening. Moderate stool   throughout the colon.    Appendix: No evidence of appendicitis.    Bladder: Unremarkable. No mass.    Reproductive: Unremarkable as visualized.    Intraperitoneal space: Unremarkable. No free air. No significant fluid   collection.    Lymph nodes: No lymphadenopathy.   Bones/joints: No acute fracture. No dislocation.    Soft tissues: Soft tissue irregularity of the second right toe presumably   related to underlying gangrene.   IMPRESSION:   1. Right lower extremity demonstrates marked diffuse stenosis of the lower   extremity with occlusion of the mid-distal right SFA which reconstitutes distally and   with single-vessel runoff to the right foot via the right peroneal artery which   feeds the plantar artery. The right pedis dorsalis artery reconstitutes at the   level of the ankle.   2. The left lower extremity demonstrates diffuse marked stenosis with total occlusion   of the left SFA with reconstitution of attenuated left popliteal artery. There is   single-vessel runoff via the left peronealartery is the which then feeds the   left plantar artery. The pedis dorsalis artery is patent.   3. evidence of inflow disease at the level of the right external iliac artery.  Allergy: Sulfa and penicillin  4. Slight irregularity of the soft tissues of the right second toe which may be   related to underlying gangrene.      PLAN:     FOLLOW UP / RESULT:

## 2020-01-14 NOTE — PROGRESS NOTE ADULT - PROBLEM SELECTOR PLAN 8
full code  pt stated that Ms. parisi is her POA, butn o legal paper is available  I called Ms. Parisi yesterday and today at [phone] and left VM

## 2020-01-14 NOTE — CHART NOTE - NSCHARTNOTEFT_GEN_A_CORE
EVENT: Niece Asya Marquez ( ) contacted as requested by nurse Bates, verbally upset that  is also listed as healthcare proxy. States she is the trusty and makes all decisions about patient. Further states she would be in the hospital in the morning to sign all consents needed and to update healthcare proxy information.

## 2020-01-15 LAB
ANION GAP SERPL CALC-SCNC: 6 MMOL/L — SIGNIFICANT CHANGE UP (ref 5–17)
APTT BLD: 35.4 SEC — SIGNIFICANT CHANGE UP (ref 27.5–36.3)
BLD GP AB SCN SERPL QL: SIGNIFICANT CHANGE UP
BUN SERPL-MCNC: 30 MG/DL — HIGH (ref 7–18)
CALCIUM SERPL-MCNC: 9.4 MG/DL — SIGNIFICANT CHANGE UP (ref 8.4–10.5)
CHLORIDE SERPL-SCNC: 107 MMOL/L — SIGNIFICANT CHANGE UP (ref 96–108)
CO2 SERPL-SCNC: 28 MMOL/L — SIGNIFICANT CHANGE UP (ref 22–31)
CREAT SERPL-MCNC: 1.01 MG/DL — SIGNIFICANT CHANGE UP (ref 0.5–1.3)
GLUCOSE SERPL-MCNC: 113 MG/DL — HIGH (ref 70–99)
HCT VFR BLD CALC: 44.2 % — SIGNIFICANT CHANGE UP (ref 34.5–45)
HGB BLD-MCNC: 14.3 G/DL — SIGNIFICANT CHANGE UP (ref 11.5–15.5)
INR BLD: 1.1 RATIO — SIGNIFICANT CHANGE UP (ref 0.88–1.16)
MCHC RBC-ENTMCNC: 30.4 PG — SIGNIFICANT CHANGE UP (ref 27–34)
MCHC RBC-ENTMCNC: 32.4 GM/DL — SIGNIFICANT CHANGE UP (ref 32–36)
MCV RBC AUTO: 94 FL — SIGNIFICANT CHANGE UP (ref 80–100)
NRBC # BLD: 0 /100 WBCS — SIGNIFICANT CHANGE UP (ref 0–0)
PLATELET # BLD AUTO: 235 K/UL — SIGNIFICANT CHANGE UP (ref 150–400)
POTASSIUM SERPL-MCNC: 4.5 MMOL/L — SIGNIFICANT CHANGE UP (ref 3.5–5.3)
POTASSIUM SERPL-SCNC: 4.5 MMOL/L — SIGNIFICANT CHANGE UP (ref 3.5–5.3)
PROTHROM AB SERPL-ACNC: 12.2 SEC — SIGNIFICANT CHANGE UP (ref 10–12.9)
RBC # BLD: 4.7 M/UL — SIGNIFICANT CHANGE UP (ref 3.8–5.2)
RBC # FLD: 12.7 % — SIGNIFICANT CHANGE UP (ref 10.3–14.5)
SODIUM SERPL-SCNC: 141 MMOL/L — SIGNIFICANT CHANGE UP (ref 135–145)
WBC # BLD: 10.83 K/UL — HIGH (ref 3.8–10.5)
WBC # FLD AUTO: 10.83 K/UL — HIGH (ref 3.8–10.5)

## 2020-01-15 PROCEDURE — 93010 ELECTROCARDIOGRAM REPORT: CPT

## 2020-01-15 PROCEDURE — 99232 SBSQ HOSP IP/OBS MODERATE 35: CPT

## 2020-01-15 PROCEDURE — 99231 SBSQ HOSP IP/OBS SF/LOW 25: CPT

## 2020-01-15 RX ORDER — CHLORHEXIDINE GLUCONATE 213 G/1000ML
1 SOLUTION TOPICAL EVERY 12 HOURS
Refills: 0 | Status: COMPLETED | OUTPATIENT
Start: 2020-01-15 | End: 2020-01-16

## 2020-01-15 RX ORDER — POLYETHYLENE GLYCOL 3350 17 G/17G
17 POWDER, FOR SOLUTION ORAL DAILY
Refills: 0 | Status: DISCONTINUED | OUTPATIENT
Start: 2020-01-15 | End: 2020-01-17

## 2020-01-15 RX ORDER — METOPROLOL TARTRATE 50 MG
25 TABLET ORAL
Refills: 0 | Status: DISCONTINUED | OUTPATIENT
Start: 2020-01-15 | End: 2020-01-17

## 2020-01-15 RX ADMIN — LISINOPRIL 10 MILLIGRAM(S): 2.5 TABLET ORAL at 05:22

## 2020-01-15 RX ADMIN — Medication 100 MILLIGRAM(S): at 22:27

## 2020-01-15 RX ADMIN — GABAPENTIN 100 MILLIGRAM(S): 400 CAPSULE ORAL at 05:22

## 2020-01-15 RX ADMIN — Medication 650 MILLIGRAM(S): at 00:07

## 2020-01-15 RX ADMIN — Medication 25 MILLIGRAM(S): at 08:54

## 2020-01-15 RX ADMIN — TRAMADOL HYDROCHLORIDE 25 MILLIGRAM(S): 50 TABLET ORAL at 19:11

## 2020-01-15 RX ADMIN — TRAMADOL HYDROCHLORIDE 25 MILLIGRAM(S): 50 TABLET ORAL at 05:22

## 2020-01-15 RX ADMIN — RISPERIDONE 0.5 MILLIGRAM(S): 4 TABLET ORAL at 05:22

## 2020-01-15 RX ADMIN — Medication 100 MILLIGRAM(S): at 13:40

## 2020-01-15 RX ADMIN — RISPERIDONE 0.5 MILLIGRAM(S): 4 TABLET ORAL at 18:11

## 2020-01-15 RX ADMIN — CEFEPIME 50 MILLIGRAM(S): 1 INJECTION, POWDER, FOR SOLUTION INTRAMUSCULAR; INTRAVENOUS at 13:39

## 2020-01-15 RX ADMIN — GABAPENTIN 100 MILLIGRAM(S): 400 CAPSULE ORAL at 18:11

## 2020-01-15 RX ADMIN — TRAMADOL HYDROCHLORIDE 25 MILLIGRAM(S): 50 TABLET ORAL at 18:11

## 2020-01-15 RX ADMIN — TRAMADOL HYDROCHLORIDE 25 MILLIGRAM(S): 50 TABLET ORAL at 06:07

## 2020-01-15 RX ADMIN — Medication 100 MILLIGRAM(S): at 05:20

## 2020-01-15 NOTE — PROGRESS NOTE ADULT - SUBJECTIVE AND OBJECTIVE BOX
Patient is a 74y old  Female who presents with a chief complaint of discoloration of toe of right foot.     HPI: This 74 year old non-diabetic female presents to the ED with complaint of discoloration for a toe on the right foot. She states that she was seen by her primary care physician a few days ago and told her to go to the emergency room.  Patient states she smokes at least a pack a day of cigarettes since she was 22 years old.  Patient states she can only ambulate 10 feet and gets a cramp in her right calf for which she needs to sit down.  She states she is very minimally ambulatory.   She denies pain at rest with her feet elevated.  Patient lives alone with a cat. Patient massaging leg throughout the visit.  Patient denies n/v/d/sob/cp/f.  Patient states her blood pressure is not usually low.     Podiatry Interval HPI:  Patient seen bedside this AM. Patient understands she is scheduled for a vascular intervention. Discussed with patient that she is still planned for amputation on Friday pending vascular intervention.  Patient denies n/v/d/sob/cp.      PMH:COPD (chronic obstructive pulmonary disease)  Hypercholesterolemia  Myocardial infarct, old    Allergies: PC Pen VK (Rash)  penicillin (Other; Rash)  statins (Other)  sulfa drugs (Other)  sulfamethizole (Other)    Medications:   FH:  PSX: S/P CABG x 1    SH:       Labs:                      14.3   10.83 )-----------( 235      ( 15 Angel 2020 07:21 )             44.2       01-15    141  |  107  |  30<H>  ----------------------------<  113<H>  4.5   |  28  |  1.01    Ca    9.4      15 Agnel 2020 07:21    TPro  8.3  /  Alb  3.5  /  TBili  0.4  /  DBili  x   /  AST  28  /  ALT  16  /  AlkPhos  79  01-14      Vital Signs Last 24 Hrs  T(C): 37 (15 Angel 2020 05:02), Max: 37 (15 Angel 2020 05:02)  T(F): 98.6 (15 Angel 2020 05:02), Max: 98.6 (15 Angel 2020 05:02)  HR: 102 (15 Angel 2020 05:02) (89 - 105)  BP: 152/66 (15 Angel 2020 05:02) (110/62 - 161/75)  BP(mean): --  RR: 20 (15 Angel 2020 10:39) (17 - 20)  SpO2: 81% (15 Angel 2020 10:39) (81% - 99%)    Hemoglobin A1C, Whole Blood: 5.6 % (01-11-20 @ 09:30)    WBC Count: 10.83 K/uL (01-15 @ 07:21)  WBC Count: 10.61 K/uL (01-14 @ 05:56)  WBC Count: 8.61 K/uL (01-13 @ 06:13)  WBC Count: 8.58 K/uL (01-12 @ 07:42)  WBC Count: 9.08 K/uL (01-11 @ 07:14)      PHYSICAL EXAM  GEN: MERARY MEYER is a pleasant well-nourished, well developed 74y Female in no acute distress, alert awake, and oriented to person, place and time.   LE Focused:    Vasc:  DP and PT pulses non-palpable b/l.  Left DP faintly dopplerable.  Unable to doppler left PT, Unable to find right DP or PT with doppler.  No CFT to right 2nd toe and right 1st toe.  Derm:   Right 2nd toe: black necrotic to the level of the metatarsal head with exposed bone.  Distal aspect of the right 2nd toe is hard and dry, slight drainage to proximal aspect of toe.  Right foot erythema to level of ankle joint. Right 1st, 3rd, and 4th toes are dusky.   Neuro: Sensation diminished to digits b/l.  MSK: Tenderness to palpation distal foot.      Imaging:   < from: Xray Foot AP + Lateral + Oblique, Right (01.10.20 @ 15:05) >    EXAM:  FOOT RIGHT (MINIMUM 3 VIEWS)                        PROCEDURE DATE:  01/10/2020    INTERPRETATION:  Right foot. 3 views. Patient has local pain.    The foot shows mild degeneration at the first MTP joint.    There is an old fracture deformity of the distal fibula.    No bone destruction or acute fracture is evident.    IMPRESSION: No acute finding.    JASKARAN MOSLEY M.D., ATTENDING RADIOLOGIST  This document has been electronically signed. Angel 10 2020  3:06PM  < end of copied text >    < from: [facility] Physiol Extremity Lower 3+ Level, BI (01.13.20 @ 13:49) >    EXAM:  US PHYSIOL LWR EXT 3+ LEV BI                        PROCEDURE DATE:  01/13/2020    INTERPRETATION:  Clinical indication: Right toe gangrene    Comparison: None    FINDINGS AND   IMPRESSION: Segmental pressures could not be obtaineddue to patient discomfort. Therefore, ABIs could not be calculated.    There are biphasic waveforms in the lower left thigh. Abnormally, monophasic flow within the remainder of the lower extremities.    RACQUEL CHA M.D., ATTENDING RADIOLOGIST  This document has been electronically signed. Jan 13 2020  2:20PM  < end of copied text >      A:   Critical Limb Ischemia, right  Gangrene right 2nd toe  PVD  Right 1st, 3rd and 4th toes dusky    P:  Patient evaluated, chart reviewed  Xrays- reviewed  Will follow vascular interventions  NICKI/PVR-reviewed  4x4 gauze, prince applied to right foot  Recommend IV antibiotics  Plan for amputation, likely Transmetatarsal amputation on Friday at 1:30PM pending vascular surgery findings on wednesday  Please consider for medical clearance  Discussed the importance of daily foot examinations  Podiatry will continue to monitor  Examined with Dr. Welsh

## 2020-01-15 NOTE — PROGRESS NOTE ADULT - ASSESSMENT
1.	Gangrene of Right 2nd toe possible underlying Osteomyelitis  ·	going for angio today  ·	cont Maxipime 2gms iv q12  D5  ·	cont flagyl 500mgs po TID  D4

## 2020-01-15 NOTE — DIETITIAN INITIAL EVALUATION ADULT. - FACTORS AFF FOOD INTAKE
other (specify)/pain/persistent lack of appetite/weakness, dry gangrene, COPD, Hypercholesterolemia, Myocardial infarct/old, nicotine dependence, discharged planning issues, s/p CABG x1/persistent constipation

## 2020-01-15 NOTE — PROGRESS NOTE ADULT - SUBJECTIVE AND OBJECTIVE BOX
Informed consent obtained from [facility] ~11:00,  Have been waiting for OR time for angio.  Notified ~16:00 that pt ate- OR cancelled today.  Will try to reschedule for Fri is time available,

## 2020-01-15 NOTE — PROGRESS NOTE ADULT - SUBJECTIVE AND OBJECTIVE BOX
HPI:  74 year old female, live by herself, ambulates with cane  has medical hx significant for dementia, cad, pvd, hld, htn presents to the ED with complaint of discoloration for a toe on the right foot.  Per pt she has black colored rt 2nd toe. pt states she notice discoloration gradually over last 3 weeks associated with swelling and erythema, pt states she bumped in to something and hurt her right big toe but dose not remember when. Today she was seen by her primary care physician a few days ago and told her to go to the emergency room. Patient states she can only ambulate 10 feet and gets a cramp in her right calf for which she needs to sit down.  Patient cleared by medicine this morning  Pt awaiting OR time secondary to unable to reach HCP for consent this AM  Patient examined at bedside, resting comfortably, denies pain or SOB, afebrile, VSS, NAD    OVERNIGHT EVENTS:  No new overnight events     REVIEW OF SYSTEMS:    CONSTITUTIONAL: No fever,   EYES: no acute visual disturbances  NECK: No pain or stiffness  RESPIRATORY: No cough; No shortness of breath  CARDIOVASCULAR: No chest pain, no palpitations  GASTROINTESTINAL: No pain. No nausea, vomiting or diarrhea   NEUROLOGICAL: No headache or numbness, no tremors  MUSCULOSKELETAL: No joint pain, no muscle pain  GENITOURINARY: no dysuria, no frequency, no hesitancy  PSYCHIATRY: no depression , no anxiety  ALL OTHER  ROS negative        Vital Signs Last 24 Hrs  T(C): 37 (15 Angel 2020 05:02), Max: 37 (15 Angel 2020 05:02)  T(F): 98.6 (15 Angel 2020 05:02), Max: 98.6 (15 Angel 2020 05:02)  HR: 102 (15 Angel 2020 05:02) (89 - 105)  BP: 152/66 (15 Angel 2020 05:02) (110/62 - 161/75)  BP(mean): --  RR: 20 (15 Angel 2020 10:39) (17 - 20)  SpO2: 81% (15 Angel 2020 10:39) (81% - 99%)    ________________________________________________  PHYSICAL EXAM:    GENERAL: NAD  HEENT: Normocephalic; conjunctivae and sclerae clear; moist mucous membranes;   NECK : supple, no JVD  CHEST/LUNG: Clear to auscultation bilaterally   HEART: S1 S2  regular  ABDOMEN: Soft, Nontender, Nondistended; Bowel sounds present  EXTREMITIES: no cyanosis; no LE edema; no calf tenderness  SKIN: warm and dry; no rash  NERVOUS SYSTEM:  Alert & Oriented x3; no new deficits    _________________________________________________  CURRENT MEDICATIONS:    MEDICATIONS  (STANDING):  aspirin enteric coated 81 milliGRAM(s) Oral daily  cefepime   IVPB 2000 milliGRAM(s) IV Intermittent every 12 hours  cefepime   IVPB      chlorhexidine 4% Liquid 1 Application(s) Topical every 12 hours  enoxaparin Injectable 40 milliGRAM(s) SubCutaneous daily  gabapentin 100 milliGRAM(s) Oral every 12 hours  lisinopril 10 milliGRAM(s) Oral daily  metoprolol tartrate 25 milliGRAM(s) Oral two times a day  metroNIDAZOLE  IVPB 500 milliGRAM(s) IV Intermittent every 8 hours  metroNIDAZOLE  IVPB      risperiDONE   Tablet 0.5 milliGRAM(s) Oral two times a day  senna 2 Tablet(s) Oral at bedtime    MEDICATIONS  (PRN):  acetaminophen   Tablet .. 650 milliGRAM(s) Oral every 6 hours PRN Mild Pain (1 - 3)  polyethylene glycol 3350 17 Gram(s) Oral daily PRN Constipation  traMADol 25 milliGRAM(s) Oral every 6 hours PRN Moderate Pain (4 - 6)      __________________________________________________  LABS:                          14.3   10.83 )-----------( 235      ( 15 Angel 2020 07:21 )             44.2     01-15    141  |  107  |  30<H>  ----------------------------<  113<H>  4.5   |  28  |  1.01    Ca    9.4      15 Angel 2020 07:21    TPro  8.3  /  Alb  3.5  /  TBili  0.4  /  DBili  x   /  AST  28  /  ALT  16  /  AlkPhos  79  01-14    PT/INR - ( 15 Angel 2020 07:21 )   PT: 12.2 sec;   INR: 1.10 ratio         PTT - ( 15 Angel 2020 07:21 )  PTT:35.4 sec    CAPILLARY BLOOD GLUCOSE          __________________________________________________  RADIOLOGY & ADDITIONAL TESTS:    Imaging Personally Reviewed:  YES    < from: CT Angio Abd Aorta w/run-off w/ IV Cont (01.12.20 @ 19:40) >  1. Right lower extremity demonstrates marked diffuse stenosis of the lower   extremity with occlusion of the mid-distal right SFA which reconstitutes distally and   with single-vessel runoff to the right foot via the right peroneal artery which   feeds the plantar artery. The right pedis dorsalis artery reconstitutes at the   level of the ankle.   2. The left lower extremity demonstrates diffuse marked stenosis with total occlusion   of the left SFA with reconstitution of attenuated left popliteal artery. There is   single-vessel runoff via the left peronealartery is the which then feeds the   left plantar artery. The pedis dorsalis artery is patent.   3. evidence of inflow disease at the level of the right external iliac artery.  4. Slight irregularity of the soft tissues of the right second toe which may be   related to underlying gangrene.    < end of copied text >    Consultant(s) Notes Reviewed:   YES     Plan of care was discussed with patient and /or primary care giver; all questions and concerns were addressed and care was aligned with patient's wishes.

## 2020-01-15 NOTE — PROGRESS NOTE ADULT - SUBJECTIVE AND OBJECTIVE BOX
74y Female is under our care for gangrene of right 2nd toe with possible underlying osteomyelitis. Patient is stable with no overnight events. Patient has no new complaints, but still refusing exam of her legs. Awaiting to go for bilateral lower leg angiogram today with possible intervention.     REVIEW OF SYSTEMS:  [  ] Not able to illicit  General: no fevers no malaise  Chest: no cough no sob  GI: no nvd  : no urinary sxs   Skin: no rashes  Musculoskeletal: no trauma no LBP +leg pain  Neuro: no ha's no dizziness     MEDS:  cefepime   IVPB 2000 milliGRAM(s) IV Intermittent every 12 hours  metroNIDAZOLE  IVPB 500 milliGRAM(s) IV Intermittent every 8 hours    ALLERGIES: Allergies    influenza virus vaccine, live, trivalent (Unknown)  PC Pen VK (Rash)  penicillin (Other; Rash)  statins (Other)  sulfa drugs (Other)  sulfamethizole (Other)      VITALS:  Vital Signs Last 24 Hrs  T(C): 37 (15 Angel 2020 05:02), Max: 37 (15 Angel 2020 05:02)  T(F): 98.6 (15 Angel 2020 05:02), Max: 98.6 (15 Angel 2020 05:02)  HR: 102 (15 Angel 2020 05:02) (89 - 105)  BP: 152/66 (15 Angel 2020 05:02) (110/62 - 161/75)  BP(mean): --  RR: 20 (15 Angel 2020 10:39) (17 - 20)  SpO2: 81% (15 Angel 2020 10:39) (81% - 99%)      PHYSICAL EXAM:  HEENT: n/a  Neck: supple no LN's   Respiratory: lungs clear no rales  Cardiovascular: S1 S2 reg no murmurs  Gastrointestinal: +BS with soft, nondistended abdomen; nontender  Extremities: refusing examination of legs  Skin: not able to assess since patient not allow exam of legs  Ortho: n/a  Neuro: awake and alert      LABS/DIAGNOSTIC TESTS:                        14.3   10.83 )-----------( 235      ( 15 Angel 2020 07:21 )             44.2     WBC Count: 10.83 K/uL (01-15 @ 07:21)  WBC Count: 10.61 K/uL (01-14 @ 05:56)  WBC Count: 8.61 K/uL (01-13 @ 06:13)  WBC Count: 8.58 K/uL (01-12 @ 07:42)  WBC Count: 9.08 K/uL (01-11 @ 07:14)    01-15    141  |  107  |  30<H>  ----------------------------<  113<H>  4.5   |  28  |  1.01    Ca    9.4      15 Angel 2020 07:21    TPro  8.3  /  Alb  3.5  /  TBili  0.4  /  DBili  x   /  AST  28  /  ALT  16  /  AlkPhos  79  01-14      CULTURES:   .Blood  01-11 @ 01:37   No growth to date.  --  --        RADIOLOGY:  no new studies

## 2020-01-15 NOTE — DIETITIAN INITIAL EVALUATION ADULT. - PROBLEM SELECTOR PLAN 2
pt states she takes asa  Power of  is going to call me the medication   if Admission medrec is not complete primary team to call Ms Ascencio to confirm home medication

## 2020-01-15 NOTE — PROGRESS NOTE ADULT - SUBJECTIVE AND OBJECTIVE BOX
Pt clinically stable, comfortable  AAOx3  PAD/R gangrene/L foot preischemic skin patches  Planned for Angio +/- intervention: OR getting ready  As per pt's request last pm consent to be signed today  Cond, options, procedure risks/benefits/implications again explained.  Pt signed consent.  HCP Silva: tried to call again x 3 (messages left)  Pt AAO x 3 now but reportedly has baseline dementia and gets forgetful.    DW'ed PMD: Pt nont competent to sign consent at this time.  Must await HCP consent.  OR cancelled pending HCP call back/consent. Pt clinically stable, comfortable  AAOx3  PAD/R gangrene/L foot preischemic skin patches  Planned for Angio +/- intervention: OR getting ready  As per pt's request last pm consent to be signed today  Cond, options, procedure risks/benefits/implications again explained.  Pt signed consent.  HCP Silva: tried to call again x 3 (messages left at [phone]).  Message also left for Santo (also HCP) at [phone].  Pt AAO x 3 now but reportedly has baseline dementia and gets forgetful.    DW'ed PMD: Pt nont competent to sign consent at this time.  Must await HCP consent.  OR cancelled pending HCP call back/consent.

## 2020-01-15 NOTE — DIETITIAN INITIAL EVALUATION ADULT. - OTHER INFO
Patient from home lives alone. Visited pt. alert but agitated, reports fair to poor appetite for >6 months depending if meals are available for her on certain days? consuming 2 meals & occasional light lunch meal? denies nausea/vomiting or diarrhea PTA, stated  Lbs & gradually losing weight with current wt. 112 Lbs possible >2 years? Presently pt. consuming 40-50% of meals & tolerating, NPO today for pending vascular surgery finding & planned transmetatarsal amputation on 01/17/20 noted, right foot wound care & followed by podiatry, discussed with RN>

## 2020-01-15 NOTE — DIETITIAN INITIAL EVALUATION ADULT. - PERTINENT MEDS FT
MEDICATIONS  (STANDING):  aspirin enteric coated 81 milliGRAM(s) Oral daily  cefepime   IVPB 2000 milliGRAM(s) IV Intermittent every 12 hours  cefepime   IVPB      chlorhexidine 4% Liquid 1 Application(s) Topical every 12 hours  enoxaparin Injectable 40 milliGRAM(s) SubCutaneous daily  gabapentin 100 milliGRAM(s) Oral every 12 hours  lisinopril 10 milliGRAM(s) Oral daily  metoprolol tartrate 25 milliGRAM(s) Oral two times a day  metroNIDAZOLE  IVPB 500 milliGRAM(s) IV Intermittent every 8 hours  metroNIDAZOLE  IVPB      risperiDONE   Tablet 0.5 milliGRAM(s) Oral two times a day  senna 2 Tablet(s) Oral at bedtime    MEDICATIONS  (PRN):  acetaminophen   Tablet .. 650 milliGRAM(s) Oral every 6 hours PRN Mild Pain (1 - 3)  polyethylene glycol 3350 17 Gram(s) Oral daily PRN Constipation  traMADol 25 milliGRAM(s) Oral every 6 hours PRN Moderate Pain (4 - 6)

## 2020-01-15 NOTE — DIETITIAN INITIAL EVALUATION ADULT. - ADD RECOMMEND
Add Ensure Enlive 1 can TID (1050 Kcal, Protein 60 grams) & MVI/minerals/Vit C. 500 mg BID for wound healing & ZnSO 220mg once daily as medically feasible

## 2020-01-15 NOTE — PHARMACOTHERAPY INTERVENTION NOTE - COMMENTS
Recommended to review need for multiple laxatives/stool softeners.  Pt was on Lactulose, Miralax and Senna.

## 2020-01-15 NOTE — PROGRESS NOTE ADULT - PROBLEM SELECTOR PLAN 1
Continue with Maxipime (day 5) and Flagyl (Day 4)   Angiogram today  Possible amputation on Friday  Continue with pain mgmt  Podiatry following  Dr. Izaguirre (Vascular) following  Dr. Jameson (ID) following

## 2020-01-15 NOTE — DIETITIAN INITIAL EVALUATION ADULT. - PERTINENT LABORATORY DATA
01-15 Na141 mmol/L Glu 113 mg/dL<H> K+ 4.5 mmol/L Cr  1.01 mg/dL BUN 30 mg/dL<H> 01-11 Phos 2.7 mg/dL 01-14 Alb 3.5 g/dL 01-11 [id number] 5.6 %

## 2020-01-15 NOTE — CONSULT NOTE ADULT - SUBJECTIVE AND OBJECTIVE BOX
CHIEF COMPLAINT:RLE ulcer    HPI:-Patient is 74 year old female, live by herself, ambulates with cane  has medical hx significant for dementia, CAD s/p CABG,HTN,HLD,PAD presents to the ED with complaint of discoloration for a toe on the right foot.  Per pt she has black colored Right 2nd toe. pt states she notice discoloration gradually over last 3 weeks associated with swelling and erythema, pt states she bumped in to something and hurt her right big toe but dose not remember when.  Patient states she can only ambulate 10 feet and gets a cramp in her right calf for which she needs to sit down.  She states she is very minimally ambulatory.  She denies pain at rest with her feet elevated.  Patient lives alone with a cat.  Patient denies fever, cp, sob, cough, n/v/d. Pt has urinary incontinence for many years.  Patient states she smokes at least a pack a day of cigarettes since she was 22 years old.    Had CTA Aorta/LE-Right PAD scheduled for angiogram possible intervention.No recent cardiac events reported    Allergy: Sulfa and penicillin    Code status: Full code (10 Angel 2020 21:52)      PAST MEDICAL & SURGICAL HISTORY:  COPD (chronic obstructive pulmonary disease)  Hypercholesterolemia  Myocardial infarct, old  S/P CABG x 1      MEDICATIONS  (STANDING):  aspirin enteric coated 81 milliGRAM(s) Oral daily  cefepime   IVPB 2000 milliGRAM(s) IV Intermittent every 12 hours  cefepime   IVPB      enoxaparin Injectable 40 milliGRAM(s) SubCutaneous daily  gabapentin 100 milliGRAM(s) Oral every 12 hours  lactulose Syrup 10 Gram(s) Oral daily  lisinopril 10 milliGRAM(s) Oral daily  metoprolol tartrate 25 milliGRAM(s) Oral two times a day  metroNIDAZOLE  IVPB 500 milliGRAM(s) IV Intermittent every 8 hours  metroNIDAZOLE  IVPB      polyethylene glycol 3350 17 Gram(s) Oral daily  risperiDONE   Tablet 0.5 milliGRAM(s) Oral two times a day  senna 2 Tablet(s) Oral at bedtime    MEDICATIONS  (PRN):  acetaminophen   Tablet .. 650 milliGRAM(s) Oral every 6 hours PRN Mild Pain (1 - 3)  traMADol 25 milliGRAM(s) Oral every 6 hours PRN Moderate Pain (4 - 6)      FAMILY HISTORY:    No family history of premature coronary artery disease or sudden cardiac death    SOCIAL HISTORY:  Smoking-Active Smoker  Alcohol-Denies  Ilicit Drug use-Denies    REVIEW OF SYSTEMS:  Constitutional: [ ] fever, [ ]weight loss, [x ]fatigue Activity [ ] Bedbound,[x ] Ambulates [ ] Unassisted[ ] Cane/Walker [x ] Assistence.  Eyes: [ ] visual changes  Respiratory: [ ]shortness of breath;  [ ] cough, [ ]wheezing, [ ]chills, [ ]hemoptysis  Cardiovascular: [ ] chest pain, [ ]palpitations, [ ]dizziness,  [x ]leg swelling[ ]orthopnea [ ]PND  Gastrointestinal: [ ] abdominal pain, [ ]nausea, [ ]vomiting,  [ ]diarrhea,[ ]constipation  Genitourinary: [ ] dysuria, [ ] hematuria  Neurologic: [ ] headaches [ ] tremors[ ] weakness  Skin: [ ] itching, [ ]burning, [ ] rashes  Endocrine: [ ] heat or cold intolerance  Musculoskeletal: [x ] joint pain or swelling; [ ] muscle, back, or extremity pain  Psychiatric: [ ] depression, [ ]anxiety, [ ]mood swings, or [ ]difficulty sleeping  Hematologic: [ ] easy bruising, [ ] bleeding gums       [ x] All others negative	  [ ] Unable to obtain    Vital Signs Last 24 Hrs  T(C): 37 (15 Angel 2020 05:02), Max: 37 (15 Angel 2020 05:02)  T(F): 98.6 (15 Angel 2020 05:02), Max: 98.6 (15 Angel 2020 05:02)  HR: 102 (15 Angel 2020 05:02) (89 - 105)  BP: 152/66 (15 Angel 2020 05:02) (110/62 - 161/75)  RR: 17 (15 Angel 2020 05:02) (17 - 18)  SpO2: 98% (15 Angel 2020 05:02) (98% - 99%)  I&O's Summary      PHYSICAL EXAM:  General: No acute distress BMI-17.5  HEENT: EOMI, PERRL[ ] Icteric  Neck: Supple, No JVD  Lungs: Equal air entry bilaterally; [ ] Rales [ ] Rhonchi [ ] Wheezing  Heart: Regular rate and rhythm;[x ] Murmurs-  2 /6 [x ] Systolic [ ] Diastolic [ ] Radiation,No rubs, or gallops  Abdomen: Nontender, bowel sounds present  Extremities: No clubbing, cyanosis, or edema[ ] Calf tenderness Right toe  gangene  Nervous system:  Alert & Oriented X3, no focal deficits  Psychiatric: Normal affect  Skin: No rashes or lesions      LABS:  01-15    141  |  107  |  30<H>  ----------------------------<  113<H>  4.5   |  28  |  1.01    Ca    9.4      15 Angel 2020 07:21    TPro  8.3  /  Alb  3.5  /  TBili  0.4  /  DBili  x   /  AST  28  /  ALT  16  /  AlkPhos  79  01-14    Creatinine Trend: 1.01<--, 1.13<--, 0.91<--, 1.08<--, 0.91<--, 0.96<--                        14.3   10.83 )-----------( 235      ( 15 Angel 2020 07:21 )             44.2     PT/INR - ( 15 Angel 2020 07:21 )   PT: 12.2 sec;   INR: 1.10 ratio    PTT - ( 15 Angel 2020 07:21 )  PTT:35.4 sec    01-11 [id number] 5.6      RADIOLOGY:  XR CHEST AP OR PA 1V     IMPRESSION:  Nonspecific wire overlies chest.   Biapical pleural thickening.   No  consolidation or pleural effusion.  Heart size within normal limits.      ECG [my interpretation]:Sinis rhythm at 100BPM RBBB no acute ST T wave abnormalities    ECHO: CHIEF COMPLAINT:RLE ulcer    HPI:-Patient is 74 year old female, live by herself, ambulates with cane  has medical hx significant for dementia, CAD s/p CABG,HTN,HLD,PAD presents to the ED with complaint of discoloration for a toe on the right foot.  Per pt she has black colored Right 2nd toe. pt states she notice discoloration gradually over last 3 weeks associated with swelling and erythema, pt states she bumped in to something and hurt her right big toe but dose not remember when.  Patient states she can only ambulate 10 feet and gets a cramp in her right calf for which she needs to sit down.  She states she is very minimally ambulatory.  She denies pain at rest with her feet elevated.  Patient lives alone with a cat.  Patient denies fever, cp, sob, cough, n/v/d. Pt has urinary incontinence for many years.  Patient states she smokes at least a pack a day of cigarettes since she was 22 years old.    Had CTA Aorta/LE-Right PAD scheduled for angiogram possible intervention.No recent cardiac events reported    Allergy: Sulfa and penicillin    Code status: Full code (10 Angel 2020 21:52)      PAST MEDICAL & SURGICAL HISTORY:  COPD (chronic obstructive pulmonary disease)  Hypercholesterolemia  Myocardial infarct, old  S/P CABG x 1      MEDICATIONS  (STANDING):  aspirin enteric coated 81 milliGRAM(s) Oral daily  cefepime   IVPB 2000 milliGRAM(s) IV Intermittent every 12 hours  cefepime   IVPB      enoxaparin Injectable 40 milliGRAM(s) SubCutaneous daily  gabapentin 100 milliGRAM(s) Oral every 12 hours  lactulose Syrup 10 Gram(s) Oral daily  lisinopril 10 milliGRAM(s) Oral daily  metoprolol tartrate 25 milliGRAM(s) Oral two times a day  metroNIDAZOLE  IVPB 500 milliGRAM(s) IV Intermittent every 8 hours  metroNIDAZOLE  IVPB      polyethylene glycol 3350 17 Gram(s) Oral daily  risperiDONE   Tablet 0.5 milliGRAM(s) Oral two times a day  senna 2 Tablet(s) Oral at bedtime    MEDICATIONS  (PRN):  acetaminophen   Tablet .. 650 milliGRAM(s) Oral every 6 hours PRN Mild Pain (1 - 3)  traMADol 25 milliGRAM(s) Oral every 6 hours PRN Moderate Pain (4 - 6)      FAMILY HISTORY:    No family history of premature coronary artery disease or sudden cardiac death    SOCIAL HISTORY:  Smoking-Active Smoker  Alcohol-Denies  Ilicit Drug use-Denies    REVIEW OF SYSTEMS:  Constitutional: [ ] fever, [ ]weight loss, [x ]fatigue Activity [ ] Bedbound,[x ] Ambulates [ ] Unassisted[ ] Cane/Walker [x ] Assistence.  Eyes: [ ] visual changes  Respiratory: [ ]shortness of breath;  [ ] cough, [ ]wheezing, [ ]chills, [ ]hemoptysis  Cardiovascular: [ ] chest pain, [ ]palpitations, [ ]dizziness,  [x ]leg swelling[ ]orthopnea [ ]PND  Gastrointestinal: [ ] abdominal pain, [ ]nausea, [ ]vomiting,  [ ]diarrhea,[ ]constipation  Genitourinary: [ ] dysuria, [ ] hematuria  Neurologic: [ ] headaches [ ] tremors[ ] weakness  Skin: [ ] itching, [ ]burning, [ ] rashes  Endocrine: [ ] heat or cold intolerance  Musculoskeletal: [x ] joint pain or swelling; [ ] muscle, back, or extremity pain  Psychiatric: [ ] depression, [ ]anxiety, [ ]mood swings, or [ ]difficulty sleeping  Hematologic: [ ] easy bruising, [ ] bleeding gums       [ x] All others negative	  [ ] Unable to obtain    Vital Signs Last 24 Hrs  T(C): 37 (15 Angel 2020 05:02), Max: 37 (15 Angel 2020 05:02)  T(F): 98.6 (15 Angel 2020 05:02), Max: 98.6 (15 Angel 2020 05:02)  HR: 102 (15 Angel 2020 05:02) (89 - 105)  BP: 152/66 (15 Angel 2020 05:02) (110/62 - 161/75)  RR: 17 (15 Angel 2020 05:02) (17 - 18)  SpO2: 98% (15 Angel 2020 05:02) (98% - 99%)  I&O's Summary      PHYSICAL EXAM:  General: No acute distress BMI-17.5  HEENT: EOMI, PERRL[ ] Icteric  Neck: Supple, No JVD  Lungs: Equal air entry bilaterally; [ ] Rales [ ] Rhonchi [ ] Wheezing  Heart: Regular rate and rhythm;[x ] Murmurs-  2 /6 [x ] Systolic [ ] Diastolic [ ] Radiation,No rubs, or gallops  Abdomen: Nontender, bowel sounds present  Extremities: No clubbing, cyanosis, or edema[ ] Calf tenderness Right toe  gangene  Nervous system:  Alert & Oriented X3, no focal deficits  Psychiatric: Normal affect  Skin: No rashes or lesions      LABS:  01-15    141  |  107  |  30<H>  ----------------------------<  113<H>  4.5   |  28  |  1.01    Ca    9.4      15 Angel 2020 07:21    TPro  8.3  /  Alb  3.5  /  TBili  0.4  /  DBili  x   /  AST  28  /  ALT  16  /  AlkPhos  79  01-14    Creatinine Trend: 1.01<--, 1.13<--, 0.91<--, 1.08<--, 0.91<--, 0.96<--                        14.3   10.83 )-----------( 235      ( 15 Angel 2020 07:21 )             44.2     PT/INR - ( 15 Angel 2020 07:21 )   PT: 12.2 sec;   INR: 1.10 ratio    PTT - ( 15 Angel 2020 07:21 )  PTT:35.4 sec    01-11 AtefnonxymO7F 5.6      RADIOLOGY:  XR CHEST AP OR PA 1V     IMPRESSION:  Nonspecific wire overlies chest.   Biapical pleural thickening.   No  consolidation or pleural effusion.  Heart size within normal limits.      ECG [my interpretation]:Sinis rhythm at 100BPM RBBB no acute ST T wave abnormalities    ECHO:  Study Date: 1/14/2020Grossly normal left ventricular systolic function EF >55 %  Normal left ventricular internal dimensions and wall thicknesses.  RV systolic pressure is 35 mm Hg.  Normal left atrium.

## 2020-01-15 NOTE — CHART NOTE - NSCHARTNOTEFT_GEN_A_CORE
Upon Nutritional Assessment by the Registered Dietitian your patient was determined to meet criteria / has evidence of the following diagnosis/diagnoses:          [ ]  Mild Protein Calorie Malnutrition        [ ]  Moderate Protein Calorie Malnutrition        [x ] Severe Protein Calorie Malnutrition        [ ] Unspecified Protein Calorie Malnutrition        [x ] Underweight / BMI <19        [ ] Morbid Obesity / BMI > 40      Findings as based on:  •  Comprehensive nutrition assessment and consultation  •  Calorie counts (nutrient intake analysis)  •  Food acceptance and intake status from observations by staff  •  Follow up  •  Patient education  •  Intervention secondary to interdisciplinary rounds  •   concerns      Treatment:    The following diet has been recommended:      PROVIDER Section:     By signing this assessment you are acknowledging and agree with the diagnosis/diagnoses assigned by the Registered Dietitian    Comments: Add Ensure Enlive 1 can TID (1050 Kcal, Protein 60 grams) & MVI/minerals/Vit C 500mg BID wound care & ZnSO 220mg once daily as medically feasible

## 2020-01-15 NOTE — DIETITIAN INITIAL EVALUATION ADULT. - PHYSICAL APPEARANCE
debilitated/emaciated/BMI 17.5/other (specify) Nutrition focused physical exam conducted:  Subcutaneous fat loss: [Severe] Orbital fat pads region, [ ]Buccal fat region, [Moderate ]Triceps region,  [Severe ]Ribs region.  Muscle wasting: [Severe ]Temples region, [Moderate] Clavicle region, [ ]Shoulder region, [ ]Scapula region, [Severe ]Interosseous region,  [ ]thigh region, [Severe] Calf region

## 2020-01-15 NOTE — CONSULT NOTE ADULT - ASSESSMENT
Problem/Plan - 1:  ·  Problem: Dry gangrene.  Plan: Dry gangrene with surrounding cellulitis    Problem/Plan - 2:  ·  Problem: CAD (coronary artery disease).  Plan:     Problem/Plan - 3:  ·  Problem: COPD (chronic obstructive pulmonary disease).  Plan: currently pt in not in exacerbation.     Problem/Plan - 4:  ·  Problem: Prophylactic measure.  Plan: VTE score 1   started lovenox for dvt ppx. Problem/Plan - 1:  ·  Problem: Dry gangrene.  Plan: Dry gangrene with surrounding cellulitis  Scheduled for Angiogram possible intervention.  She is in optimal medical condition at Intermediate cardiac risk with no cardiac contraindications for procedure,    Problem/Plan - 2:  ·  Problem: CAD (coronary artery disease).  Plan: Chronic Stable Ischemic heart disease  Continue Aspirin/Metoprolol    Problem/Plan - 3:  ·  Problem: COPD (chronic obstructive pulmonary disease).  Plan: currently pt in not in exacerbation.   Smoking cessation    Problem/Plan - 4:  ·  Problem: Prophylactic measure.  Plan: VTE score 1   started lovenox for dvt ppx.

## 2020-01-16 ENCOUNTER — TRANSCRIPTION ENCOUNTER (OUTPATIENT)
Age: 75
End: 2020-01-16

## 2020-01-16 LAB
ALBUMIN SERPL ELPH-MCNC: 3.3 G/DL — LOW (ref 3.5–5)
ALP SERPL-CCNC: 69 U/L — SIGNIFICANT CHANGE UP (ref 40–120)
ALT FLD-CCNC: 24 U/L DA — SIGNIFICANT CHANGE UP (ref 10–60)
ANION GAP SERPL CALC-SCNC: 7 MMOL/L — SIGNIFICANT CHANGE UP (ref 5–17)
AST SERPL-CCNC: 34 U/L — SIGNIFICANT CHANGE UP (ref 10–40)
BILIRUB SERPL-MCNC: 0.4 MG/DL — SIGNIFICANT CHANGE UP (ref 0.2–1.2)
BUN SERPL-MCNC: 37 MG/DL — HIGH (ref 7–18)
CALCIUM SERPL-MCNC: 9.8 MG/DL — SIGNIFICANT CHANGE UP (ref 8.4–10.5)
CHLORIDE SERPL-SCNC: 105 MMOL/L — SIGNIFICANT CHANGE UP (ref 96–108)
CO2 SERPL-SCNC: 26 MMOL/L — SIGNIFICANT CHANGE UP (ref 22–31)
CREAT SERPL-MCNC: 1.06 MG/DL — SIGNIFICANT CHANGE UP (ref 0.5–1.3)
CULTURE RESULTS: SIGNIFICANT CHANGE UP
CULTURE RESULTS: SIGNIFICANT CHANGE UP
GLUCOSE BLDC GLUCOMTR-MCNC: 117 MG/DL — HIGH (ref 70–99)
GLUCOSE SERPL-MCNC: 117 MG/DL — HIGH (ref 70–99)
HCT VFR BLD CALC: 44 % — SIGNIFICANT CHANGE UP (ref 34.5–45)
HGB BLD-MCNC: 14.4 G/DL — SIGNIFICANT CHANGE UP (ref 11.5–15.5)
MCHC RBC-ENTMCNC: 31.3 PG — SIGNIFICANT CHANGE UP (ref 27–34)
MCHC RBC-ENTMCNC: 32.7 GM/DL — SIGNIFICANT CHANGE UP (ref 32–36)
MCV RBC AUTO: 95.7 FL — SIGNIFICANT CHANGE UP (ref 80–100)
NRBC # BLD: 0 /100 WBCS — SIGNIFICANT CHANGE UP (ref 0–0)
PLATELET # BLD AUTO: 198 K/UL — SIGNIFICANT CHANGE UP (ref 150–400)
POTASSIUM SERPL-MCNC: 4.9 MMOL/L — SIGNIFICANT CHANGE UP (ref 3.5–5.3)
POTASSIUM SERPL-SCNC: 4.9 MMOL/L — SIGNIFICANT CHANGE UP (ref 3.5–5.3)
PROT SERPL-MCNC: 7.7 G/DL — SIGNIFICANT CHANGE UP (ref 6–8.3)
RBC # BLD: 4.6 M/UL — SIGNIFICANT CHANGE UP (ref 3.8–5.2)
RBC # FLD: 12.7 % — SIGNIFICANT CHANGE UP (ref 10.3–14.5)
SODIUM SERPL-SCNC: 138 MMOL/L — SIGNIFICANT CHANGE UP (ref 135–145)
SPECIMEN SOURCE: SIGNIFICANT CHANGE UP
SPECIMEN SOURCE: SIGNIFICANT CHANGE UP
WBC # BLD: 9.72 K/UL — SIGNIFICANT CHANGE UP (ref 3.8–10.5)
WBC # FLD AUTO: 9.72 K/UL — SIGNIFICANT CHANGE UP (ref 3.8–10.5)

## 2020-01-16 RX ADMIN — Medication 650 MILLIGRAM(S): at 18:00

## 2020-01-16 RX ADMIN — TRAMADOL HYDROCHLORIDE 25 MILLIGRAM(S): 50 TABLET ORAL at 13:10

## 2020-01-16 RX ADMIN — Medication 650 MILLIGRAM(S): at 17:20

## 2020-01-16 RX ADMIN — ENOXAPARIN SODIUM 40 MILLIGRAM(S): 100 INJECTION SUBCUTANEOUS at 13:09

## 2020-01-16 RX ADMIN — CHLORHEXIDINE GLUCONATE 1 APPLICATION(S): 213 SOLUTION TOPICAL at 05:07

## 2020-01-16 RX ADMIN — Medication 100 MILLIGRAM(S): at 22:00

## 2020-01-16 RX ADMIN — Medication 100 MILLIGRAM(S): at 13:10

## 2020-01-16 RX ADMIN — TRAMADOL HYDROCHLORIDE 25 MILLIGRAM(S): 50 TABLET ORAL at 05:06

## 2020-01-16 RX ADMIN — CEFEPIME 50 MILLIGRAM(S): 1 INJECTION, POWDER, FOR SOLUTION INTRAMUSCULAR; INTRAVENOUS at 13:27

## 2020-01-16 RX ADMIN — Medication 100 MILLIGRAM(S): at 05:07

## 2020-01-16 RX ADMIN — TRAMADOL HYDROCHLORIDE 25 MILLIGRAM(S): 50 TABLET ORAL at 06:38

## 2020-01-16 RX ADMIN — GABAPENTIN 100 MILLIGRAM(S): 400 CAPSULE ORAL at 17:20

## 2020-01-16 RX ADMIN — TRAMADOL HYDROCHLORIDE 25 MILLIGRAM(S): 50 TABLET ORAL at 21:00

## 2020-01-16 RX ADMIN — Medication 25 MILLIGRAM(S): at 17:20

## 2020-01-16 RX ADMIN — RISPERIDONE 0.5 MILLIGRAM(S): 4 TABLET ORAL at 05:07

## 2020-01-16 RX ADMIN — CEFEPIME 50 MILLIGRAM(S): 1 INJECTION, POWDER, FOR SOLUTION INTRAMUSCULAR; INTRAVENOUS at 00:38

## 2020-01-16 RX ADMIN — RISPERIDONE 0.5 MILLIGRAM(S): 4 TABLET ORAL at 17:20

## 2020-01-16 RX ADMIN — TRAMADOL HYDROCHLORIDE 25 MILLIGRAM(S): 50 TABLET ORAL at 14:10

## 2020-01-16 RX ADMIN — TRAMADOL HYDROCHLORIDE 25 MILLIGRAM(S): 50 TABLET ORAL at 20:22

## 2020-01-16 RX ADMIN — GABAPENTIN 100 MILLIGRAM(S): 400 CAPSULE ORAL at 05:06

## 2020-01-16 RX ADMIN — Medication 81 MILLIGRAM(S): at 13:09

## 2020-01-16 NOTE — PROGRESS NOTE ADULT - SUBJECTIVE AND OBJECTIVE BOX
Patient is a 74y old  Female who presents with a chief complaint of discoloration of toe of right foot.     HPI: This 74 year old non-diabetic female presents to the ED with complaint of discoloration for a toe on the right foot. She states that she was seen by her primary care physician a few days ago and told her to go to the emergency room.  Patient states she smokes at least a pack a day of cigarettes since she was 22 years old.  Patient states she can only ambulate 10 feet and gets a cramp in her right calf for which she needs to sit down.  She states she is very minimally ambulatory.   She denies pain at rest with her feet elevated.  Patient lives alone with a cat. Patient massaging leg throughout the visit.  Patient denies n/v/d/sob/cp/f.  Patient states her blood pressure is not usually low.     Podiatry Interval HPI:  Patient seen bedside this AM.  Patient was asleep and started when awoken.  Patient sitting with right knee flexed. Vascular surgery was cancelled yesterday due to patient eating and rescheduled for friday.  Podiatric intervention will be rescheduled. Patient denies n/v/d/sob/cp.      PMH:COPD (chronic obstructive pulmonary disease)  Hypercholesterolemia  Myocardial infarct, old    Allergies: PC Pen VK (Rash)  penicillin (Other; Rash)  statins (Other)  sulfa drugs (Other)  sulfamethizole (Other)    Medications:   FH:  PSX: S/P CABG x 1    SH:       Labs:                                    14.4   9.72  )-----------( 198      ( 16 Jan 2020 07:36 )             44.0       01-15    141  |  107  |  30<H>  ----------------------------<  113<H>  4.5   |  28  |  1.01    Ca    9.4      15 Angel 2020 07:21        Vital Signs Last 24 Hrs  T(C): 36.4 (16 Jan 2020 05:08), Max: 36.5 (15 Angel 2020 14:45)  T(F): 97.6 (16 Jan 2020 05:08), Max: 97.7 (15 Angel 2020 14:45)  HR: 97 (16 Jan 2020 06:09) (73 - 113)  BP: 102/65 (16 Jan 2020 06:09) (94/63 - 129/58)  BP(mean): --  RR: 16 (16 Jan 2020 06:09) (16 - 20)  SpO2: 97% (16 Jan 2020 06:09) (81% - 100%)    Hemoglobin A1C, Whole Blood: 5.6 % (01-11-20 @ 09:30)      PHYSICAL EXAM  GEN: MERARY MEYER is a pleasant well-nourished, well developed 74y Female in no acute distress.  LE Focused:    Vasc:  DP and PT pulses non-palpable b/l.  Left DP faintly dopplerable.  Unable to doppler left PT, Unable to find right DP or PT with doppler.  No CFT to right 2nd toe and right 1st toe. CFT >3sec to plantar aspect of right 3rd and 4th toe.   Derm:   Right 2nd toe: black necrotic to the level of the proximal with exposed bone.  Distal aspect of the right 2nd toe is hard and dry, no drainage to the right 2nd toe.   Right foot erythema to level just distal to ankle joint. Right 1st, 3rd, and 4th toes are dusky. Hyperkeratotic tissue to dorsal 3rd and 4th toes on the right.      Neuro: Sensation diminished to digits b/l.  MSK: Tenderness to palpation distal foot.      Imaging:   < from: Xray Foot AP + Lateral + Oblique, Right (01.10.20 @ 15:05) >    EXAM:  FOOT RIGHT (MINIMUM 3 VIEWS)                        PROCEDURE DATE:  01/10/2020    INTERPRETATION:  Right foot. 3 views. Patient has local pain.    The foot shows mild degeneration at the first MTP joint.    There is an old fracture deformity of the distal fibula.    No bone destruction or acute fracture is evident.    IMPRESSION: No acute finding.    JASKARAN MOSLEY M.D., ATTENDING RADIOLOGIST  This document has been electronically signed. Angel 10 2020  3:06PM  < end of copied text >    < from: [facility] Physiol Extremity Lower 3+ Level, BI (01.13.20 @ 13:49) >    EXAM:  US PHYSIOL LWR EXT 3+ LEV BI                        PROCEDURE DATE:  01/13/2020    INTERPRETATION:  Clinical indication: Right toe gangrene    Comparison: None    FINDINGS AND   IMPRESSION: Segmental pressures could not be obtaineddue to patient discomfort. Therefore, ABIs could not be calculated.    There are biphasic waveforms in the lower left thigh. Abnormally, monophasic flow within the remainder of the lower extremities.    RACQUEL CHA M.D., ATTENDING RADIOLOGIST  This document has been electronically signed. Jan 13 2020  2:20PM  < end of copied text >    < from: CT Angio Abd Aorta w/run-off w/ IV Cont (01.12.20 @ 19:40) >    EXAM:  CT ANGIO ABD AOR W RUN(W)AW IC                        PROCEDURE DATE:  01/12/2020    INTERPRETATION:  CT ANGIO ABDOMINAL AORTA RUNOFF WITHOUT AND OR WITH IV CONTRAST    CLINICAL INFORMATION: Atherosclerosis of the native arteries of the right and left lower extremity with right foot gangrene    PROCEDURE INFORMATION:   Exam: CTA Angiogram of the Abdominal Aorta and Bilateral Lower Extremities   (Run-off) With IV Contrast   Exam date and time: 1/12/2020 6:55 PM   Age: 74 years old   Clinical indication: Other: Pad, right 2nd toe gangrene, 3rd and 4th toes wet   early gang     TECHNIQUE:   Imaging protocol: CT angiogram of the abdominal aorta, pelvis and bilateral   lower extremities with IV iodinated contrast.   Intravenous contrast: 90 cc Omnipaque 350  3D rendering: MIP and/or 3D reconstructed images were created by the   technologist.     COMPARISON:   CR XR FOOT 3 VIEWS RIGHT 1/10/2020 2:46 PM     FINDINGS:   Aorta: Severe calcified and noncalcified atherosclerotic plaque. Aorta is   otherwise fully patent.   Celiac trunk and mesenteric arteries: No occlusion or significant stenosis.    Renal arteries: No occlusion or significant stenosis.      Right iliac arteries: Marked stenosis of the right external iliac artery distally.   Right femoral/popliteal arteries: Distal CFA stenosis. Severely and diffusely attenuated SFA. Occlusion of the mid-distal right superficial   femoral artery with reconstitution of the suprageniculate popliteal  artery. Stenotic right popliteal artery.   Right infrapopliteal arteries: Single-vessel runoff via the right peroneal   artery, diffusely attenuated, which feeds the plantar artery. The right anterior and posterior tibial   arteries are occluded. Reconstitution of the right pedis dorsalis artery at the   level of the foot, however severely attenuated.     Left iliac arteries: Moderate calcification and stenosis of the left iliac arteries. Occluded left internal iliac artery.  Left femoral/popliteal arteries: Long segmentOcclusion of the entire left superficial femoral   artery and Reconstitution of left popliteal artery, which shows multifocal disease without occlusion.  Left infrapopliteal arteries: Occlusion of the left anterior posterior tibial   arteries at the level of the ankle. Single-vessel left peroneal artery which   feeds the plantar artery. Left pedis dorsalis is attenuated but patent.    Liver: No mass.   Gallbladder and bile ducts: Unremarkable. No calcified stones. No ductal   dilation.    Pancreas: Unremarkable. No mass. No ductal dilation.    Spleen: Normal. No splenomegaly.    Adrenals: Normal. No mass.    Kidneys and ureters: Normal. No mass.      Stomach and bowel: . No obstruction. No mucosal thickening. Moderate stool   throughout the colon.    Appendix: No evidence of appendicitis.      Bladder: Unremarkable. No mass.    Reproductive: Unremarkable as visualized.      Intraperitoneal space: Unremarkable. No free air. No significant fluid   collection.    Lymph nodes: No lymphadenopathy.     Bones/joints: No acute fracture. No dislocation.    Soft tissues: Soft tissue irregularity of the second right toe presumably   related to underlying gangrene.       IMPRESSION:   1. Right lower extremity demonstrates marked diffuse stenosis of the lower   extremity with occlusion of the mid-distal right SFA which reconstitutes distally and   with single-vessel runoff to the right foot via the right peroneal artery which   feeds the plantar artery. The right pedis dorsalis artery reconstitutes at the   level of the ankle.   2. The left lower extremity demonstrates diffuse marked stenosis with total occlusion   of the left SFA with reconstitution of attenuated left popliteal artery. There is   single-vessel runoff via the left peronealartery is the which then feeds the   left plantar artery. The pedis dorsalis artery is patent.   3. evidence of inflow disease at the level of the right external iliac artery.  4. Slight irregularity of the soft tissues of the right second toe which may be   related to underlying gangrene.    GALILEA KING M.D., ATTENDING RADIOLOGIST  This document has been electronically signed. Jan 13 2020  4:12PM  < end of copied text >      A:   Critical Limb Ischemia, right  Gangrene right 2nd toe  PVD  Right 1st, 3rd and 4th toes dusky    P:  Patient evaluated, chart reviewed  Xrays- reviewed  CTA-reviewed- single vessel runoff to right foot.  Vascular notes appreciated- Will follow vascular interventions  NICKI/PVR-reviewed  Betadine, 4x4 gauze, prince applied to right foot  Plan for amputation pending vascular intervention.  Will follow vascular notes  Podiatry will continue to monitor  Examined with Dr. Yuen

## 2020-01-16 NOTE — PROGRESS NOTE ADULT - ASSESSMENT
1.	Gangrene of Right 2nd toe possible underlying osteomyelitis (other toes probably now involved but patient not allowing exam)  ·	BLE angiogram now rescheduled for Friday  ·	cont maxipime 2gms iv q12  D6  ·	cont flagyl 500mgs po TID  D5

## 2020-01-16 NOTE — PROGRESS NOTE ADULT - SUBJECTIVE AND OBJECTIVE BOX
Pre Op Note    Diagnosis: R LE toe necrosis, PAD  Procedure: Angiogram +/- intervention   Surgeon:  Dr Izaguirre                           14.4   9.72  )-----------( 198      ( 16 Jan 2020 07:36 )             44.0     01-16    138  |  105  |  37<H>  ----------------------------<  117<H>  4.9   |  26  |  1.06    Ca    9.8      16 Jan 2020 07:36    TPro  7.7  /  Alb  3.3<L>  /  TBili  0.4  /  DBili  x   /  AST  34  /  ALT  24  /  AlkPhos  69  01-16    PT/INR - ( 15 Angel 2020 07:21 )   PT: 12.2 sec;   INR: 1.10 ratio         PTT - ( 15 Angel 2020 07:21 )  PTT:35.4 sec    RADIOLOGY & ADDITIONAL STUDIES:    < from: Transthoracic Echocardiogram (01.14.20 @ 07:53) >  CONCLUSIONS:  1. Dense mitral annular calcification. Probably moderate,  eccentric, anteriorly directed mitral regurgitation.  Consider SHEY for better evaluation if clinically  appropriate.  2. Normal left ventricular internal dimensions and wall  thicknesses.  3. Endocardium not well visualized; grossly normal left  ventricular systolic function.  4. Normal right ventricular size and function.    < end of copied text >      < from: CT Angio Abd Aorta w/run-off w/ IV Cont (01.12.20 @ 19:40) >  IMPRESSION:   1. Right lower extremity demonstrates marked diffuse stenosis of the lower   extremity with occlusion of the mid-distal right SFA which reconstitutes distally and   with single-vessel runoff to the right foot via the right peroneal artery which   feeds the plantar artery. The right pedis dorsalis artery reconstitutes at the   level of the ankle.   2. The left lower extremity demonstrates diffuse marked stenosis with total occlusion   of the left SFA with reconstitution of attenuated left popliteal artery. There is   single-vessel runoff via the left peronealartery is the which then feeds the   left plantar artery. The pedis dorsalis artery is patent.   3. evidence of inflow disease at the level of the right external iliac artery.  4. Slight irregularity of the soft tissues of the right second toe which may be   related to underlying gangrene.    < end of copied text >    < from: [facility] Physiol Extremity Lower 3+ Level, BI (01.13.20 @ 13:49) >  FINDINGS AND   IMPRESSION: Segmental pressures could not be obtaineddue to patient discomfort. Therefore, ABIs could not be calculated.    There are biphasic waveforms in the lower left thigh. Abnormally, monophasic flow within the remainder of the lower extremities.    < end of copied text >

## 2020-01-16 NOTE — PROGRESS NOTE ADULT - ATTENDING COMMENTS
Patient was seen and examined ,interim events noted,Laboratory and Imaging studies were reviewed.  Thank you for the courtesy of the consultation,I would be available for any further discussion if needed.  Tamir Flanagan MD,FACC.  [address] [place] [address] [phone]

## 2020-01-16 NOTE — PROGRESS NOTE ADULT - PROBLEM SELECTOR PLAN 1
This office note has been dictated.     Continue with Maxipime (day 6) and Flagyl (Day 5)   Rt foot Angiogram tomorrow -- MNNPO   Possible amputation after vascular intervention, no date yet   Continue with pain mgmt  Podiatry following  Dr. Izaguirre (Vascular) following  Dr. Jameson (ID) following.

## 2020-01-16 NOTE — PROGRESS NOTE ADULT - SUBJECTIVE AND OBJECTIVE BOX
PRESENTING CC:    SUBJ:  74 year old female, live by herself, ambulates with cane  has medical hx significant for dementia, CAD s/p CABG,HTN,HLD,PAD presents to the ED with complaint of discoloration for a toe on the right foot. Scheduled for Rt foot angiogram tomorrow, 1/17,no dyspnea chest pain remains afebrile      PMH -reviewed admission note, no change since admission  Heart failure: acute [ ] chronic [ ] acute or chronic [ ] diastolic [ ] systolic [ ] combined systolic and diastolic[ ]  VANESSA: ATN[ ] renal medullary necrosis [ ] CKD I [ ]CKDII [ ]CKD III [ ]CKD IV [ ]CKD V [ ]Other pathological lesions [ ]    MEDICATIONS  (STANDING):  aspirin enteric coated 81 milliGRAM(s) Oral daily  cefepime   IVPB 2000 milliGRAM(s) IV Intermittent every 12 hours  cefepime   IVPB      enoxaparin Injectable 40 milliGRAM(s) SubCutaneous daily  gabapentin 100 milliGRAM(s) Oral every 12 hours  lisinopril 10 milliGRAM(s) Oral daily  metoprolol tartrate 25 milliGRAM(s) Oral two times a day  metroNIDAZOLE  IVPB      metroNIDAZOLE  IVPB 500 milliGRAM(s) IV Intermittent every 8 hours  risperiDONE   Tablet 0.5 milliGRAM(s) Oral two times a day  senna 2 Tablet(s) Oral at bedtime    MEDICATIONS  (PRN):  acetaminophen   Tablet .. 650 milliGRAM(s) Oral every 6 hours PRN Mild Pain (1 - 3)  polyethylene glycol 3350 17 Gram(s) Oral daily PRN Constipation  traMADol 25 milliGRAM(s) Oral every 6 hours PRN Moderate Pain (4 - 6)              REVIEW OF SYSTEMS:  Constitutional: [ ] fever, [ ]weight loss,  [x ]fatigue  Eyes: [ ] visual changes  Respiratory: [ ]shortness of breath;  [ ] cough, [ ]wheezing, [ ]chills, [ ]hemoptysis  Cardiovascular: [ ] chest pain, [ ]palpitations, [ ]dizziness,  [ ]leg swelling[ ]orthopnea[ ]PND  Gastrointestinal: [ ] abdominal pain, [ ]nausea, [ ]vomiting,  [ ]diarrhea   Genitourinary: [ ] dysuria, [ ] hematuria  Neurologic: [ ] headaches [ ] tremors[ ]weakness  Skin: [ ] itching, [ ]burning, [ ] rashes  Endocrine: [ ] heat or cold intolerance  Musculoskeletal: [x ] joint pain or swelling; [ ] muscle, back, or extremity pain  Psychiatric: [ ] depression, [ ]anxiety, [ ]mood swings, or [ ]difficulty sleeping  Hematologic: [ ] easy bruising, [ ] bleeding gums    [x] All remaining systems negative except as per above.   [ ]Unable to obtain.    Vital Signs Last 24 Hrs  T(C): 36.4 (16 Jan 2020 05:08), Max: 36.5 (15 Angel 2020 14:45)  T(F): 97.6 (16 Jan 2020 05:08), Max: 97.7 (15 Angel 2020 14:45)  HR: 97 (16 Jan 2020 06:09) (73 - 113)  BP: 102/65 (16 Jan 2020 06:09) (94/63 - 129/58)  RR: 16 (16 Jan 2020 06:09) (16 - 17)  SpO2: 97% (16 Jan 2020 06:09) (97% - 100%)    PHYSICAL EXAM:  General: No acute distress BMI-21  HEENT: EOMI, PERRL  Neck: Supple, [ ] JVD  Lungs: Equal air entry bilaterally; [ ] rales [ ] wheezing [ ] rhonchi  Heart: Regular rate and rhythm; [x ] murmur   2/6 [x ] systolic [ ] diastolic [ ] radiation[ ] rubs [ ]  gallops  Abdomen: Nontender, bowel sounds present  Extremities: No clubbing, cyanosis, [ ] edema RLE - cover with DPD for Rt foot. Lt foot - mild discoloration with tenderness    Nervous system:  Alert & Oriented X3, no focal deficits  Psychiatric: Normal affect  Skin: No rashes or lesions    LABS:  01-16    138  |  105  |  37<H>  ----------------------------<  117<H>  4.9   |  26  |  1.06    Ca    9.8      16 Jan 2020 07:36    TPro  7.7  /  Alb  3.3<L>  /  TBili  0.4  /  DBili  x   /  AST  34  /  ALT  24  /  AlkPhos  69  01-16    Creatinine Trend: 1.06<--, 1.01<--, 1.13<--, 0.91<--, 1.08<--, 0.91<--                        14.4   9.72  )-----------( 198      ( 16 Jan 2020 07:36 )             44.0     PT/INR - ( 15 Angel 2020 07:21 )   PT: 12.2 sec;   INR: 1.10 ratio    PTT - ( 15 Angel 2020 07:21 )  PTT:35.4 sec    RADIOLOGY:XR CHEST AP OR PA 1V       IMPRESSION:  Nonspecific wire overlies chest.   Biapical pleural thickening.   No  consolidation or pleural effusion.  Heart size within normal limits.    ECG [my interpretation]:Sinus rhythm at 100BPM RBBB no acute ST T wave abnormalities    ECHO:Study Date: 1/14/2020Grossly normal left ventricular systolic function EF >55 %  Normal left ventricular internal dimensions and wall thicknesses.  RV systolic pressure is 35 mm Hg.  Normal left atrium.     IMPRESSION AND PLAN:      Problem/Plan - 1:  ·  Problem: Dry gangrene.  Plan: Dry gangrene with surrounding cellulitis  Scheduled for Angiogram possible intervention.  She is in optimal medical condition at Intermediate cardiac risk with no cardiac contraindications for procedure,    Problem/Plan - 2:  ·  Problem: CAD (coronary artery disease).  Plan: Chronic Stable Ischemic heart disease  Continue Aspirin/Metoprolol    Problem/Plan - 3:  ·  Problem: COPD (chronic obstructive pulmonary disease).  Plan: currently pt in not in exacerbation.   Smoking cessation adviced    Problem/Plan - 4:  ·  Problem: Prophylactic measure.  Plan: VTE score 1   started lovenox for dvt ppx.

## 2020-01-16 NOTE — PROGRESS NOTE ADULT - SUBJECTIVE AND OBJECTIVE BOX
74y Female is under our care for gangrene of right 2nd toe with possible underlying osteomyelitis. Patient ended up eating last night so angio was cancelled. Being rescheduled for Friday now. Patient has no new complaints. Remains afebrile with normal wbc count.      REVIEW OF SYSTEMS:  [  ] Not able to illicit  General: no fevers no malaise  Chest: no cough no sob  GI: no nvd  Skin: no rashes  Musculoskeletal: no trauma no LBP +feet pressure  Neuro: no ha's no dizziness     MEDS:  cefepime   IVPB 2000 milliGRAM(s) IV Intermittent every 12 hours  metroNIDAZOLE  IVPB 500 milliGRAM(s) IV Intermittent every 8 hours    ALLERGIES: Allergies    influenza virus vaccine, live, trivalent (Unknown)  PC Pen VK (Rash)  penicillin (Other; Rash)  statins (Other)  sulfa drugs (Other)  sulfamethizole (Other)      VITALS:  Vital Signs Last 24 Hrs  T(C): 37 (15 Angel 2020 05:02), Max: 37 (15 Angel 2020 05:02)  T(F): 98.6 (15 Angel 2020 05:02), Max: 98.6 (15 Angel 2020 05:02)  HR: 102 (15 Angel 2020 05:02) (89 - 105)  BP: 152/66 (15 Angel 2020 05:02) (110/62 - 161/75)  BP(mean): --  RR: 20 (15 Angel 2020 10:39) (17 - 20)  SpO2: 81% (15 Angel 2020 10:39) (81% - 99%)      PHYSICAL EXAM:  HEENT: n/a  Neck: supple no LN's   Respiratory: lungs clear no rales  Cardiovascular: S1 S2 reg no murmurs  Gastrointestinal: +BS with soft, nondistended abdomen; nontender  Extremities: refusing examination of legs  Skin: from afar able to note gangrenous toes of right feet +malodor  Ortho: n/a  Neuro: awake and alert      LABS/DIAGNOSTIC TESTS:                         14.4   9.72  )-----------( 198      ( 16 Jan 2020 07:36 )             44.0   WBC Count: 9.72 K/uL (01-16 @ 07:36)  WBC Count: 10.83 K/uL (01-15 @ 07:21)  WBC Count: 10.61 K/uL (01-14 @ 05:56)  WBC Count: 8.61 K/uL (01-13 @ 06:13)  WBC Count: 8.58 K/uL (01-12 @ 07:42)    01-16    138  |  105  |  37<H>  ----------------------------<  117<H>  4.9   |  26  |  1.06    Ca    9.8      16 Jan 2020 07:36    TPro  7.7  /  Alb  3.3<L>  /  TBili  0.4  /  DBili  x   /  AST  34  /  ALT  24  /  AlkPhos  69  01-16        CULTURES:   .Blood  01-11 @ 01:37   No growth to date.  --  --        RADIOLOGY:  no new studies

## 2020-01-16 NOTE — PROGRESS NOTE ADULT - SUBJECTIVE AND OBJECTIVE BOX
NP Note discussed with  Primary Attending    Patient is a 74y old  Female who presents with a chief complaint of dry gangrene (16 Jan 2020 12:15)    HPI:  74 year old female, live by herself, ambulates with cane  has medical hx significant for dementia, cad, pvd, hld, htn presents to the ED with complaint of discoloration for a toe on the right foot.  Per pt she has black colored rt 2nd toe. pt states she notice discoloration gradually over last 3 weeks associated with swelling and erythema, pt states she bumped in to something and hurt her right big toe but dose not remember when. Today she was seen by her primary care physician a few days ago and told her to go to the emergency room. Patient states she can only ambulate 10 feet and gets a cramp in her right calf for which she needs to sit down. Admitted to medicine for dry gangrene and form CTA of aorta b/l lower extremities occlusion was found. Vascular Dr. Izaguirre will do Rt foot angiogram tomorrow, 1/17 and then will to Lt foot another day. Podiatry amputation will be done after vascular intervention.   GOC : Full code, HCP (+) Alma Samuel ()   Patient examined at bedside, resting comfortably, denies pain or SOB, afebrile, VSS, NAD    INTERVAL HPI/OVERNIGHT EVENTS: no new complaints    MEDICATIONS  (STANDING):  aspirin enteric coated 81 milliGRAM(s) Oral daily  cefepime   IVPB 2000 milliGRAM(s) IV Intermittent every 12 hours  cefepime   IVPB      enoxaparin Injectable 40 milliGRAM(s) SubCutaneous daily  gabapentin 100 milliGRAM(s) Oral every 12 hours  lisinopril 10 milliGRAM(s) Oral daily  metoprolol tartrate 25 milliGRAM(s) Oral two times a day  metroNIDAZOLE  IVPB 500 milliGRAM(s) IV Intermittent every 8 hours  metroNIDAZOLE  IVPB      risperiDONE   Tablet 0.5 milliGRAM(s) Oral two times a day  senna 2 Tablet(s) Oral at bedtime    MEDICATIONS  (PRN):  acetaminophen   Tablet .. 650 milliGRAM(s) Oral every 6 hours PRN Mild Pain (1 - 3)  polyethylene glycol 3350 17 Gram(s) Oral daily PRN Constipation  traMADol 25 milliGRAM(s) Oral every 6 hours PRN Moderate Pain (4 - 6)      __________________________________________________  REVIEW OF SYSTEMS:    CONSTITUTIONAL: No fever,   EYES: no acute visual disturbances  NECK: No pain or stiffness  RESPIRATORY: No cough; No shortness of breath  CARDIOVASCULAR: No chest pain, no palpitations  GASTROINTESTINAL: No pain. No nausea or vomiting; No diarrhea   NEUROLOGICAL: No headache or numbness, no tremors  MUSCULOSKELETAL: No joint pain, no muscle pain  GENITOURINARY: no dysuria, no frequency, no hesitancy  PSYCHIATRY: no depression , no anxiety  ALL OTHER  ROS negative        Vital Signs Last 24 Hrs  T(C): 36.4 (16 Jan 2020 05:08), Max: 36.5 (15 Angel 2020 14:45)  T(F): 97.6 (16 Jan 2020 05:08), Max: 97.7 (15 Angel 2020 14:45)  HR: 97 (16 Jan 2020 06:09) (73 - 113)  BP: 102/65 (16 Jan 2020 06:09) (94/63 - 129/58)  BP(mean): --  RR: 16 (16 Jan 2020 06:09) (16 - 17)  SpO2: 97% (16 Jan 2020 06:09) (97% - 100%)    ________________________________________________  PHYSICAL EXAM:  GENERAL: NAD  HEENT: Normocephalic;  conjunctivae and sclerae clear; moist mucous membranes;   NECK : supple  CHEST/LUNG: Clear to auscultation bilaterally with good air entry   HEART: S1 S2  regular; no murmurs, gallops or rubs  ABDOMEN: Soft, Nontender, Nondistended; Bowel sounds present  EXTREMITIES: RLE - cover with DPD for Rt foot. Lt foot - mild discoloration with tenderness    SKIN: warm and dry; no rash  NERVOUS SYSTEM:  Awake and alert; Oriented  to place, person and time ; no new deficits    _________________________________________________  LABS:                        14.4   9.72  )-----------( 198      ( 16 Jan 2020 07:36 )             44.0     01-16    138  |  105  |  37<H>  ----------------------------<  117<H>  4.9   |  26  |  1.06    Ca    9.8      16 Jan 2020 07:36    TPro  7.7  /  Alb  3.3<L>  /  TBili  0.4  /  DBili  x   /  AST  34  /  ALT  24  /  AlkPhos  69  01-16    PT/INR - ( 15 Angel 2020 07:21 )   PT: 12.2 sec;   INR: 1.10 ratio         PTT - ( 15 Angel 2020 07:21 )  PTT:35.4 sec    CAPILLARY BLOOD GLUCOSE      POCT Blood Glucose.: 117 mg/dL (16 Jan 2020 08:02)        RADIOLOGY & ADDITIONAL TESTS:  < from: CT Angio Abd Aorta w/run-off w/ IV Cont (01.12.20 @ 19:40) >  1. Right lower extremity demonstrates marked diffuse stenosis of the lower   extremity with occlusion of the mid-distal right SFA which reconstitutes distally and   with single-vessel runoff to the right foot via the right peroneal artery which   feeds the plantar artery. The right pedis dorsalis artery reconstitutes at the   level of the ankle.   2. The left lower extremity demonstrates diffuse marked stenosis with total occlusion   of the left SFA with reconstitution of attenuated left popliteal artery. There is   single-vessel runoff via the left peronealartery is the which then feeds the   left plantar artery. The pedis dorsalis artery is patent.   3. evidence of inflow disease at the level of the right external iliac artery.  4. Slight irregularity of the soft tissues of the right second toe which may be   related to underlying gangrene.    < end of copied text >    Imaging Personally Reviewed:  YES    Consultant(s) Notes Reviewed:   YES    Care Discussed with Consultants : ID/ podiatry/ vascular     Plan of care was discussed with patient and /or primary care giver; all questions and concerns were addressed and care was aligned with patient's wishes.

## 2020-01-16 NOTE — PROGRESS NOTE ADULT - ASSESSMENT
74yFemale with R LE toe necrosis, PAD to go for Angiogram +/- intervention       -NPO after midnight   -IVF while NPO  -Pre Op Labs in AM   -Type and Screen in AM   -PT/PTT/INR in AM   -F/u medical clearance, please note in chart   -Consent to be obtained

## 2020-01-17 DIAGNOSIS — I10 ESSENTIAL (PRIMARY) HYPERTENSION: ICD-10-CM

## 2020-01-17 DIAGNOSIS — E87.5 HYPERKALEMIA: ICD-10-CM

## 2020-01-17 LAB
ANION GAP SERPL CALC-SCNC: 4 MMOL/L — LOW (ref 5–17)
ANION GAP SERPL CALC-SCNC: 6 MMOL/L — SIGNIFICANT CHANGE UP (ref 5–17)
APTT BLD: 37.7 SEC — HIGH (ref 27.5–36.3)
BLD GP AB SCN SERPL QL: SIGNIFICANT CHANGE UP
BUN SERPL-MCNC: 36 MG/DL — HIGH (ref 7–18)
BUN SERPL-MCNC: 40 MG/DL — HIGH (ref 7–18)
CALCIUM SERPL-MCNC: 9.3 MG/DL — SIGNIFICANT CHANGE UP (ref 8.4–10.5)
CALCIUM SERPL-MCNC: 9.5 MG/DL — SIGNIFICANT CHANGE UP (ref 8.4–10.5)
CHLORIDE SERPL-SCNC: 104 MMOL/L — SIGNIFICANT CHANGE UP (ref 96–108)
CHLORIDE SERPL-SCNC: 106 MMOL/L — SIGNIFICANT CHANGE UP (ref 96–108)
CO2 SERPL-SCNC: 30 MMOL/L — SIGNIFICANT CHANGE UP (ref 22–31)
CO2 SERPL-SCNC: 31 MMOL/L — SIGNIFICANT CHANGE UP (ref 22–31)
CREAT SERPL-MCNC: 0.93 MG/DL — SIGNIFICANT CHANGE UP (ref 0.5–1.3)
CREAT SERPL-MCNC: 1.05 MG/DL — SIGNIFICANT CHANGE UP (ref 0.5–1.3)
GLUCOSE SERPL-MCNC: 103 MG/DL — HIGH (ref 70–99)
GLUCOSE SERPL-MCNC: 107 MG/DL — HIGH (ref 70–99)
HCT VFR BLD CALC: 47 % — HIGH (ref 34.5–45)
HGB BLD-MCNC: 15 G/DL — SIGNIFICANT CHANGE UP (ref 11.5–15.5)
INR BLD: 1.14 RATIO — SIGNIFICANT CHANGE UP (ref 0.88–1.16)
MCHC RBC-ENTMCNC: 30.4 PG — SIGNIFICANT CHANGE UP (ref 27–34)
MCHC RBC-ENTMCNC: 31.9 GM/DL — LOW (ref 32–36)
MCV RBC AUTO: 95.1 FL — SIGNIFICANT CHANGE UP (ref 80–100)
NRBC # BLD: 0 /100 WBCS — SIGNIFICANT CHANGE UP (ref 0–0)
PLATELET # BLD AUTO: 234 K/UL — SIGNIFICANT CHANGE UP (ref 150–400)
POTASSIUM SERPL-MCNC: 4.1 MMOL/L — SIGNIFICANT CHANGE UP (ref 3.5–5.3)
POTASSIUM SERPL-MCNC: 5.4 MMOL/L — HIGH (ref 3.5–5.3)
POTASSIUM SERPL-SCNC: 4.1 MMOL/L — SIGNIFICANT CHANGE UP (ref 3.5–5.3)
POTASSIUM SERPL-SCNC: 5.4 MMOL/L — HIGH (ref 3.5–5.3)
PROTHROM AB SERPL-ACNC: 12.7 SEC — SIGNIFICANT CHANGE UP (ref 10–12.9)
RBC # BLD: 4.94 M/UL — SIGNIFICANT CHANGE UP (ref 3.8–5.2)
RBC # FLD: 12.7 % — SIGNIFICANT CHANGE UP (ref 10.3–14.5)
SODIUM SERPL-SCNC: 139 MMOL/L — SIGNIFICANT CHANGE UP (ref 135–145)
SODIUM SERPL-SCNC: 142 MMOL/L — SIGNIFICANT CHANGE UP (ref 135–145)
WBC # BLD: 8.21 K/UL — SIGNIFICANT CHANGE UP (ref 3.8–10.5)
WBC # FLD AUTO: 8.21 K/UL — SIGNIFICANT CHANGE UP (ref 3.8–10.5)

## 2020-01-17 PROCEDURE — 36246 INS CATH ABD/L-EXT ART 2ND: CPT

## 2020-01-17 PROCEDURE — 75716 ARTERY X-RAYS ARMS/LEGS: CPT | Mod: 26

## 2020-01-17 PROCEDURE — 99221 1ST HOSP IP/OBS SF/LOW 40: CPT | Mod: GC

## 2020-01-17 RX ORDER — ASPIRIN/CALCIUM CARB/MAGNESIUM 324 MG
81 TABLET ORAL DAILY
Refills: 0 | Status: DISCONTINUED | OUTPATIENT
Start: 2020-01-17 | End: 2020-01-23

## 2020-01-17 RX ORDER — SODIUM CHLORIDE 9 MG/ML
1000 INJECTION, SOLUTION INTRAVENOUS
Refills: 0 | Status: DISCONTINUED | OUTPATIENT
Start: 2020-01-17 | End: 2020-01-17

## 2020-01-17 RX ORDER — HYDROMORPHONE HYDROCHLORIDE 2 MG/ML
0.5 INJECTION INTRAMUSCULAR; INTRAVENOUS; SUBCUTANEOUS
Refills: 0 | Status: DISCONTINUED | OUTPATIENT
Start: 2020-01-17 | End: 2020-01-18

## 2020-01-17 RX ORDER — METRONIDAZOLE 500 MG
500 TABLET ORAL ONCE
Refills: 0 | Status: COMPLETED | OUTPATIENT
Start: 2020-01-17 | End: 2020-01-18

## 2020-01-17 RX ORDER — GABAPENTIN 400 MG/1
100 CAPSULE ORAL EVERY 12 HOURS
Refills: 0 | Status: DISCONTINUED | OUTPATIENT
Start: 2020-01-17 | End: 2020-01-23

## 2020-01-17 RX ORDER — SENNA PLUS 8.6 MG/1
2 TABLET ORAL AT BEDTIME
Refills: 0 | Status: DISCONTINUED | OUTPATIENT
Start: 2020-01-17 | End: 2020-01-23

## 2020-01-17 RX ORDER — TRAMADOL HYDROCHLORIDE 50 MG/1
25 TABLET ORAL EVERY 6 HOURS
Refills: 0 | Status: DISCONTINUED | OUTPATIENT
Start: 2020-01-17 | End: 2020-01-23

## 2020-01-17 RX ORDER — CEFEPIME 1 G/1
2000 INJECTION, POWDER, FOR SOLUTION INTRAMUSCULAR; INTRAVENOUS EVERY 12 HOURS
Refills: 0 | Status: DISCONTINUED | OUTPATIENT
Start: 2020-01-18 | End: 2020-01-23

## 2020-01-17 RX ORDER — ENOXAPARIN SODIUM 100 MG/ML
40 INJECTION SUBCUTANEOUS DAILY
Refills: 0 | Status: DISCONTINUED | OUTPATIENT
Start: 2020-01-17 | End: 2020-01-22

## 2020-01-17 RX ORDER — POLYETHYLENE GLYCOL 3350 17 G/17G
17 POWDER, FOR SOLUTION ORAL DAILY
Refills: 0 | Status: DISCONTINUED | OUTPATIENT
Start: 2020-01-17 | End: 2020-01-23

## 2020-01-17 RX ORDER — KETOROLAC TROMETHAMINE 30 MG/ML
15 SYRINGE (ML) INJECTION ONCE
Refills: 0 | Status: DISCONTINUED | OUTPATIENT
Start: 2020-01-17 | End: 2020-01-17

## 2020-01-17 RX ORDER — METOPROLOL TARTRATE 50 MG
5 TABLET ORAL EVERY 6 HOURS
Refills: 0 | Status: DISCONTINUED | OUTPATIENT
Start: 2020-01-17 | End: 2020-01-17

## 2020-01-17 RX ORDER — METOPROLOL TARTRATE 50 MG
5 TABLET ORAL EVERY 6 HOURS
Refills: 0 | Status: DISCONTINUED | OUTPATIENT
Start: 2020-01-17 | End: 2020-01-18

## 2020-01-17 RX ORDER — ONDANSETRON 8 MG/1
4 TABLET, FILM COATED ORAL ONCE
Refills: 0 | Status: DISCONTINUED | OUTPATIENT
Start: 2020-01-17 | End: 2020-01-18

## 2020-01-17 RX ORDER — CEFEPIME 1 G/1
INJECTION, POWDER, FOR SOLUTION INTRAMUSCULAR; INTRAVENOUS
Refills: 0 | Status: DISCONTINUED | OUTPATIENT
Start: 2020-01-18 | End: 2020-01-23

## 2020-01-17 RX ORDER — CEFEPIME 1 G/1
2000 INJECTION, POWDER, FOR SOLUTION INTRAMUSCULAR; INTRAVENOUS ONCE
Refills: 0 | Status: COMPLETED | OUTPATIENT
Start: 2020-01-17 | End: 2020-01-18

## 2020-01-17 RX ORDER — CHLORHEXIDINE GLUCONATE 213 G/1000ML
1 SOLUTION TOPICAL ONCE
Refills: 0 | Status: COMPLETED | OUTPATIENT
Start: 2020-01-17 | End: 2020-01-17

## 2020-01-17 RX ORDER — LISINOPRIL 2.5 MG/1
10 TABLET ORAL DAILY
Refills: 0 | Status: DISCONTINUED | OUTPATIENT
Start: 2020-01-17 | End: 2020-01-22

## 2020-01-17 RX ORDER — METOPROLOL TARTRATE 50 MG
25 TABLET ORAL
Refills: 0 | Status: DISCONTINUED | OUTPATIENT
Start: 2020-01-17 | End: 2020-01-23

## 2020-01-17 RX ORDER — ACETAMINOPHEN 500 MG
650 TABLET ORAL EVERY 6 HOURS
Refills: 0 | Status: DISCONTINUED | OUTPATIENT
Start: 2020-01-17 | End: 2020-01-23

## 2020-01-17 RX ORDER — METRONIDAZOLE 500 MG
TABLET ORAL
Refills: 0 | Status: DISCONTINUED | OUTPATIENT
Start: 2020-01-18 | End: 2020-01-23

## 2020-01-17 RX ORDER — ALBUTEROL 90 UG/1
2.5 AEROSOL, METERED ORAL ONCE
Refills: 0 | Status: COMPLETED | OUTPATIENT
Start: 2020-01-17 | End: 2020-01-17

## 2020-01-17 RX ORDER — SODIUM CHLORIDE 9 MG/ML
1000 INJECTION, SOLUTION INTRAVENOUS
Refills: 0 | Status: COMPLETED | OUTPATIENT
Start: 2020-01-17 | End: 2020-01-17

## 2020-01-17 RX ORDER — RISPERIDONE 4 MG/1
0.5 TABLET ORAL
Refills: 0 | Status: DISCONTINUED | OUTPATIENT
Start: 2020-01-17 | End: 2020-01-23

## 2020-01-17 RX ORDER — METRONIDAZOLE 500 MG
500 TABLET ORAL EVERY 8 HOURS
Refills: 0 | Status: DISCONTINUED | OUTPATIENT
Start: 2020-01-18 | End: 2020-01-23

## 2020-01-17 RX ADMIN — CHLORHEXIDINE GLUCONATE 1 APPLICATION(S): 213 SOLUTION TOPICAL at 06:05

## 2020-01-17 RX ADMIN — CEFEPIME 50 MILLIGRAM(S): 1 INJECTION, POWDER, FOR SOLUTION INTRAMUSCULAR; INTRAVENOUS at 12:25

## 2020-01-17 RX ADMIN — Medication 15 MILLIGRAM(S): at 09:43

## 2020-01-17 RX ADMIN — CEFEPIME 50 MILLIGRAM(S): 1 INJECTION, POWDER, FOR SOLUTION INTRAMUSCULAR; INTRAVENOUS at 00:32

## 2020-01-17 RX ADMIN — Medication 5 MILLIGRAM(S): at 09:10

## 2020-01-17 RX ADMIN — Medication 15 MILLIGRAM(S): at 16:02

## 2020-01-17 RX ADMIN — Medication 100 MILLIGRAM(S): at 06:03

## 2020-01-17 RX ADMIN — ALBUTEROL 2.5 MILLIGRAM(S): 90 AEROSOL, METERED ORAL at 09:07

## 2020-01-17 RX ADMIN — Medication 15 MILLIGRAM(S): at 16:25

## 2020-01-17 RX ADMIN — Medication 15 MILLIGRAM(S): at 10:02

## 2020-01-17 RX ADMIN — Medication 100 MILLIGRAM(S): at 14:38

## 2020-01-17 NOTE — PROGRESS NOTE ADULT - SUBJECTIVE AND OBJECTIVE BOX
NP Note discussed with  Primary Attending    Patient is a 74y old  Female who presents with a chief complaint of dry gangrene (16 Jan 2020 17:56)    HPI:  74 year old female, live by herself, ambulates with cane  has medical hx significant for dementia, cad, pvd, hld, htn presents to the ED with complaint of discoloration for a toe on the right foot.  Per pt she has black colored rt 2nd toe. pt states she notice discoloration gradually over last 3 weeks associated with swelling and erythema, pt states she bumped in to something and hurt her right big toe but dose not remember when. Today she was seen by her primary care physician a few days ago and told her to go to the emergency room. Patient states she can only ambulate 10 feet and gets a cramp in her right calf for which she needs to sit down. Admitted to medicine for dry gangrene and form CTA of aorta b/l lower extremities occlusion was found. Vascular Dr. Izaguirre will do Rt foot angiogram today.  Then will to Lt foot another day. Podiatry amputation will be done after vascular intervention.   GOC : Full code, HCP (+) Alma Samuel ()   Patient examined at bedside, resting comfortably, denies pain or SOB, afebrile, VSS, NAD    INTERVAL HPI/OVERNIGHT EVENTS: no new complaints    MEDICATIONS  (STANDING):  ALBUTerol    0.083%. 2.5 milliGRAM(s) Nebulizer once  aspirin enteric coated 81 milliGRAM(s) Oral daily  cefepime   IVPB 2000 milliGRAM(s) IV Intermittent every 12 hours  cefepime   IVPB      dextrose 5% + sodium chloride 0.9%. 1000 milliLiter(s) (60 mL/Hr) IV Continuous <Continuous>  enoxaparin Injectable 40 milliGRAM(s) SubCutaneous daily  gabapentin 100 milliGRAM(s) Oral every 12 hours  lisinopril 10 milliGRAM(s) Oral daily  metoprolol tartrate 25 milliGRAM(s) Oral two times a day  metoprolol tartrate Injectable 5 milliGRAM(s) IV Push every 6 hours  metroNIDAZOLE  IVPB      metroNIDAZOLE  IVPB 500 milliGRAM(s) IV Intermittent every 8 hours  risperiDONE   Tablet 0.5 milliGRAM(s) Oral two times a day  senna 2 Tablet(s) Oral at bedtime    MEDICATIONS  (PRN):  acetaminophen   Tablet .. 650 milliGRAM(s) Oral every 6 hours PRN Mild Pain (1 - 3)  polyethylene glycol 3350 17 Gram(s) Oral daily PRN Constipation  traMADol 25 milliGRAM(s) Oral every 6 hours PRN Moderate Pain (4 - 6)      __________________________________________________  REVIEW OF SYSTEMS:    CONSTITUTIONAL: No fever,   EYES: no acute visual disturbances  NECK: No pain or stiffness  RESPIRATORY: No cough; No shortness of breath  CARDIOVASCULAR: No chest pain, no palpitations  GASTROINTESTINAL: No pain. No nausea or vomiting; No diarrhea   NEUROLOGICAL: No headache or numbness, no tremors  MUSCULOSKELETAL: No joint pain, no muscle pain  GENITOURINARY: no dysuria, no frequency, no hesitancy  PSYCHIATRY: no depression , no anxiety  ALL OTHER  ROS negative        Vital Signs Last 24 Hrs  T(C): 36.8 (17 Jan 2020 04:52), Max: 36.8 (17 Jan 2020 04:52)  T(F): 98.2 (17 Jan 2020 04:52), Max: 98.2 (17 Jan 2020 04:52)  HR: 88 (17 Jan 2020 04:52) (80 - 111)  BP: 130/65 (17 Jan 2020 04:52) (90/38 - 142/72)  BP(mean): --  RR: 16 (17 Jan 2020 04:52) (16 - 18)  SpO2: 99% (17 Jan 2020 04:52) (99% - 100%)    ________________________________________________  PHYSICAL EXAM:  GENERAL: NAD  HEENT: Normocephalic;  conjunctivae and sclerae clear; moist mucous membranes;   NECK : supple  CHEST/LUNG: Clear to auscultation bilaterally with good air entry   HEART: S1 S2  regular; no murmurs, gallops or rubs  ABDOMEN: Soft, Nontender, Nondistended; Bowel sounds present  EXTREMITIES: Rt foot - cover with DPD/ Lt - slight discoloration. refused to touch due to tenderness   SKIN: warm and dry; no rash  NERVOUS SYSTEM:  Awake and alert; Oriented  to place, person and time ; no new deficits    _________________________________________________  LABS:                        15.0   8.21  )-----------( 234      ( 17 Jan 2020 06:50 )             47.0     01-17    142  |  106  |  40<H>  ----------------------------<  103<H>  5.4<H>   |  30  |  1.05    Ca    9.5      17 Jan 2020 06:50    TPro  7.7  /  Alb  3.3<L>  /  TBili  0.4  /  DBili  x   /  AST  34  /  ALT  24  /  AlkPhos  69  01-16    PT/INR - ( 17 Jan 2020 06:50 )   PT: 12.7 sec;   INR: 1.14 ratio         PTT - ( 17 Jan 2020 06:50 )  PTT:37.7 sec    CAPILLARY BLOOD GLUCOSE            RADIOLOGY & ADDITIONAL TESTS:  < from: CT Angio Abd Aorta w/run-off w/ IV Cont (01.12.20 @ 19:40) >  1. Right lower extremity demonstrates marked diffuse stenosis of the lower   extremity with occlusion of the mid-distal right SFA which reconstitutes distally and   with single-vessel runoff to the right foot via the right peroneal artery which   feeds the plantar artery. The right pedis dorsalis artery reconstitutes at the   level of the ankle.   2. The left lower extremity demonstrates diffuse marked stenosis with total occlusion   of the left SFA with reconstitution of attenuated left popliteal artery. There is   single-vessel runoff via the left peronealartery is the which then feeds the   left plantar artery. The pedis dorsalis artery is patent.   3. evidence of inflow disease at the level of the right external iliac artery.  4. Slight irregularity of the soft tissues of the right second toe which may be   related to underlying gangrene.    < end of copied text >      Imaging Personally Reviewed:  YES    Consultant(s) Notes Reviewed:   YES    Care Discussed with Consultants : ID/ vascular/ podiatry / cardiology     Plan of care was discussed with patient and /or primary care giver; all questions and concerns were addressed and care was aligned with patient's wishes.

## 2020-01-17 NOTE — PROGRESS NOTE ADULT - PROBLEM SELECTOR PLAN 9
Pt admitted from home  Follow up for PT eval s/p amputation  CM following for discharge planning
consider PT once all surgical interventions complete   needs ID rec for length of ABT course.
Continue with SQ Lovenox.

## 2020-01-17 NOTE — PROGRESS NOTE ADULT - PROBLEM SELECTOR PLAN 1
Continue with Maxipime (day 7) and Flagyl (Day 6)   Rt foot Angiogram today -- MNNPO   Possible amputation after vascular intervention, no date yet   Continue with pain mgmt  Podiatry following  Dr. Izaguirre (Vascular) following  Dr. Jameson (ID) following. Continue with Maxipime (day 7) and Flagyl (Day 6)   Rt foot Angiogram today -- MNNPO   Possible amputation after vascular intervention, no confirmed date yet but likely next Wed 1.22.2020  Continue with pain mgmt  Podiatry following  Dr. Izaguirre (Vascular) following  Dr. Jameson (ID) following.

## 2020-01-17 NOTE — PROGRESS NOTE ADULT - SUBJECTIVE AND OBJECTIVE BOX
PAD/R gangrene/b/l rest pain    Angio done.  B/L LEs. Braulio well:  ICu post op for groin checks.  (Floor/PACU not avail for frequent groin checks)    Diffuse athero.  Multi level arterial occ dz.  Marginal/poor options for endovasc intervention  Will need open revasc (bypass) b/l.  R LE 1st, L 2nd.  Med/cardio clearance please.  Vein mapping.

## 2020-01-17 NOTE — PROGRESS NOTE ADULT - SUBJECTIVE AND OBJECTIVE BOX
Patient is a 74y old  Female who presents with a chief complaint of discoloration of toe of right foot.     HPI: This 74 year old non-diabetic female presents to the ED with complaint of discoloration for a toe on the right foot. She states that she was seen by her primary care physician a few days ago and told her to go to the emergency room.  Patient states she smokes at least a pack a day of cigarettes since she was 22 years old.  Patient states she can only ambulate 10 feet and gets a cramp in her right calf for which she needs to sit down.  She states she is very minimally ambulatory.   She denies pain at rest with her feet elevated.  Patient lives alone with a cat. Patient massaging leg throughout the visit.  Patient denies n/v/d/sob/cp/f.  Patient states her blood pressure is not usually low.     Podiatry Interval HPI:  Patient seen bedside this AM.  Patient was asleep and started when awoken.  Patient sitting again with right knee flexed. Vascular surgery was cancelled wednesday due to patient eating and rescheduled for today.  Podiatric intervention will be rescheduled to next wednesday to give adequate time for reperfusion following vascular intervention. Patient denies n/v/d/sob/cp.      PMH:COPD (chronic obstructive pulmonary disease)  Hypercholesterolemia  Myocardial infarct, old    Allergies: PC Pen VK (Rash)  penicillin (Other; Rash)  statins (Other)  sulfa drugs (Other)  sulfamethizole (Other)    Medications:   FH:  PSX: S/P CABG x 1    SH:       Labs:                                 15.0   8.21  )-----------( 234      ( 17 Jan 2020 06:50 )             47.0       01-17    142  |  106  |  40<H>  ----------------------------<  103<H>  5.4<H>   |  30  |  1.05    Ca    9.5      17 Jan 2020 06:50    TPro  7.7  /  Alb  3.3<L>  /  TBili  0.4  /  DBili  x   /  AST  34  /  ALT  24  /  AlkPhos  69  01-16      Vital Signs Last 24 Hrs  T(C): 36.8 (17 Jan 2020 04:52), Max: 36.8 (17 Jan 2020 04:52)  T(F): 98.2 (17 Jan 2020 04:52), Max: 98.2 (17 Jan 2020 04:52)  HR: 88 (17 Jan 2020 04:52) (80 - 111)  BP: 130/65 (17 Jan 2020 04:52) (90/38 - 142/72)  BP(mean): --  RR: 16 (17 Jan 2020 04:52) (16 - 18)  SpO2: 99% (17 Jan 2020 04:52) (99% - 100%)    Hemoglobin A1C, Whole Blood: 5.6 % (01-11-20 @ 09:30)  WBC Count: 8.21 K/uL (01-17 @ 06:50)  WBC Count: 9.72 K/uL (01-16 @ 07:36)  WBC Count: 10.83 K/uL (01-15 @ 07:21)  WBC Count: 10.61 K/uL (01-14 @ 05:56)  WBC Count: 8.61 K/uL (01-13 @ 06:13)                  PHYSICAL EXAM  GEN: MERARY MEYER is a pleasant well-nourished, well developed 74y Female in no acute distress.  LE Focused:    Vasc:  DP and PT pulses non-palpable b/l.  Left DP faintly dopplerable.  Unable to doppler left PT, Unable to find right DP or PT with doppler.  No CFT to right 2nd toe and right 1st toe. CFT >3sec to plantar aspect of right 3rd and 4th toe.   Derm:   Right 2nd toe: black necrotic to the level of the proximal with exposed bone.  Distal aspect of the right 2nd toe is hard and dry, no drainage to the right 2nd toe.   Right foot erythema to level just distal to ankle joint. Right 1st, 3rd, and 4th toes are dusky. Hyperkeratotic tissue to dorsal 3rd and 4th toes on the right.      Neuro: Sensation diminished to digits b/l.  MSK: Tenderness to palpation distal foot.      Imaging:   < from: Xray Foot AP + Lateral + Oblique, Right (01.10.20 @ 15:05) >    EXAM:  FOOT RIGHT (MINIMUM 3 VIEWS)                        PROCEDURE DATE:  01/10/2020    INTERPRETATION:  Right foot. 3 views. Patient has local pain.    The foot shows mild degeneration at the first MTP joint.    There is an old fracture deformity of the distal fibula.    No bone destruction or acute fracture is evident.    IMPRESSION: No acute finding.    JASKARAN MOSLEY M.D., ATTENDING RADIOLOGIST  This document has been electronically signed. Angel 10 2020  3:06PM  < end of copied text >    < from: [facility] Physiol Extremity Lower 3+ Level, BI (01.13.20 @ 13:49) >    EXAM:  US PHYSIOL LWR EXT 3+ LEV BI                        PROCEDURE DATE:  01/13/2020    INTERPRETATION:  Clinical indication: Right toe gangrene    Comparison: None    FINDINGS AND   IMPRESSION: Segmental pressures could not be obtaineddue to patient discomfort. Therefore, ABIs could not be calculated.    There are biphasic waveforms in the lower left thigh. Abnormally, monophasic flow within the remainder of the lower extremities.    RACQUEL CHA M.D., ATTENDING RADIOLOGIST  This document has been electronically signed. Jan 13 2020  2:20PM  < end of copied text >    < from: CT Angio Abd Aorta w/run-off w/ IV Cont (01.12.20 @ 19:40) >    EXAM:  CT ANGIO ABD AOR W RUN(W)AW IC                        PROCEDURE DATE:  01/12/2020    INTERPRETATION:  CT ANGIO ABDOMINAL AORTA RUNOFF WITHOUT AND OR WITH IV CONTRAST    CLINICAL INFORMATION: Atherosclerosis of the native arteries of the right and left lower extremity with right foot gangrene    PROCEDURE INFORMATION:   Exam: CTA Angiogram of the Abdominal Aorta and Bilateral Lower Extremities   (Run-off) With IV Contrast   Exam date and time: 1/12/2020 6:55 PM   Age: 74 years old   Clinical indication: Other: Pad, right 2nd toe gangrene, 3rd and 4th toes wet   early gang     TECHNIQUE:   Imaging protocol: CT angiogram of the abdominal aorta, pelvis and bilateral   lower extremities with IV iodinated contrast.   Intravenous contrast: 90 cc Omnipaque 350  3D rendering: MIP and/or 3D reconstructed images were created by the   technologist.     COMPARISON:   CR XR FOOT 3 VIEWS RIGHT 1/10/2020 2:46 PM     FINDINGS:   Aorta: Severe calcified and noncalcified atherosclerotic plaque. Aorta is   otherwise fully patent.   Celiac trunk and mesenteric arteries: No occlusion or significant stenosis.    Renal arteries: No occlusion or significant stenosis.      Right iliac arteries: Marked stenosis of the right external iliac artery distally.   Right femoral/popliteal arteries: Distal CFA stenosis. Severely and diffusely attenuated SFA. Occlusion of the mid-distal right superficial   femoral artery with reconstitution of the suprageniculate popliteal  artery. Stenotic right popliteal artery.   Right infrapopliteal arteries: Single-vessel runoff via the right peroneal   artery, diffusely attenuated, which feeds the plantar artery. The right anterior and posterior tibial   arteries are occluded. Reconstitution of the right pedis dorsalis artery at the   level of the foot, however severely attenuated.     Left iliac arteries: Moderate calcification and stenosis of the left iliac arteries. Occluded left internal iliac artery.  Left femoral/popliteal arteries: Long segmentOcclusion of the entire left superficial femoral   artery and Reconstitution of left popliteal artery, which shows multifocal disease without occlusion.  Left infrapopliteal arteries: Occlusion of the left anterior posterior tibial   arteries at the level of the ankle. Single-vessel left peroneal artery which   feeds the plantar artery. Left pedis dorsalis is attenuated but patent.    Liver: No mass.   Gallbladder and bile ducts: Unremarkable. No calcified stones. No ductal   dilation.    Pancreas: Unremarkable. No mass. No ductal dilation.    Spleen: Normal. No splenomegaly.    Adrenals: Normal. No mass.    Kidneys and ureters: Normal. No mass.      Stomach and bowel: . No obstruction. No mucosal thickening. Moderate stool   throughout the colon.    Appendix: No evidence of appendicitis.      Bladder: Unremarkable. No mass.    Reproductive: Unremarkable as visualized.      Intraperitoneal space: Unremarkable. No free air. No significant fluid   collection.    Lymph nodes: No lymphadenopathy.     Bones/joints: No acute fracture. No dislocation.    Soft tissues: Soft tissue irregularity of the second right toe presumably   related to underlying gangrene.       IMPRESSION:   1. Right lower extremity demonstrates marked diffuse stenosis of the lower   extremity with occlusion of the mid-distal right SFA which reconstitutes distally and   with single-vessel runoff to the right foot via the right peroneal artery which   feeds the plantar artery. The right pedis dorsalis artery reconstitutes at the   level of the ankle.   2. The left lower extremity demonstrates diffuse marked stenosis with total occlusion   of the left SFA with reconstitution of attenuated left popliteal artery. There is   single-vessel runoff via the left peronealartery is the which then feeds the   left plantar artery. The pedis dorsalis artery is patent.   3. evidence of inflow disease at the level of the right external iliac artery.  4. Slight irregularity of the soft tissues of the right second toe which may be   related to underlying gangrene.    GALILEA KING M.D., ATTENDING RADIOLOGIST  This document has been electronically signed. Jan 13 2020  4:12PM  < end of copied text >      A:   Critical Limb Ischemia, right  Gangrene right 2nd toe  PVD  Right 1st, 3rd and 4th toes dusky    P:  Patient evaluated, chart reviewed  Xrays- reviewed  CTA-reviewed- single vessel runoff to right foot.  Vascular notes appreciated- Will follow vascular interventions  NICKI/PVR-reviewed  Betadine, 4x4 gauze, prince applied to right foot  Plan for amputation , likely wednesday 1/22,  pending vascular intervention.  Will follow vascular notes  Podiatry will continue to monitor  Examined with Dr. Slaughter

## 2020-01-17 NOTE — PROGRESS NOTE ADULT - SUBJECTIVE AND OBJECTIVE BOX
74y Female is under our care for gangrene of right 2nd toe with possible underlying osteomyelitis. Patient has no new complaints. Awaiting to go for RLE angiogram today. Remains afebrile with normal wbc count.      REVIEW OF SYSTEMS:  [  ] Not able to illicit  General: no fevers no malaise  Chest: no cough no sob  GI: no nvd  Skin: no rashes  Musculoskeletal: no trauma no LBP +feet discomfort  Neuro: no ha's no dizziness     MEDS:  cefepime   IVPB 2000 milliGRAM(s) IV Intermittent every 12 hours  metroNIDAZOLE  IVPB 500 milliGRAM(s) IV Intermittent every 8 hours    ALLERGIES: Allergies    influenza virus vaccine, live, trivalent (Unknown)  PC Pen VK (Rash)  penicillin (Other; Rash)  statins (Other)  sulfa drugs (Other)  sulfamethizole (Other)      VITALS:  Vital Signs Last 24 Hrs  T(C): 36.8 (17 Jan 2020 04:52), Max: 36.8 (17 Jan 2020 04:52)  T(F): 98.2 (17 Jan 2020 04:52), Max: 98.2 (17 Jan 2020 04:52)  HR: 88 (17 Jan 2020 04:52) (80 - 111)  BP: 130/65 (17 Jan 2020 04:52) (90/38 - 142/72)  BP(mean): --  RR: 16 (17 Jan 2020 04:52) (16 - 18)  SpO2: 99% (17 Jan 2020 04:52) (99% - 100%)      PHYSICAL EXAM:  HEENT: n/a  Neck: supple no LN's   Respiratory: lungs clear no rales  Cardiovascular: S1 S2 reg no murmurs  Gastrointestinal: +BS with soft, nondistended abdomen; nontender  Extremities: no edema  Skin: right foot is wrapped  Ortho: n/a  Neuro: awake and alert      LABS/DIAGNOSTIC TESTS:                        15.0   8.21  )-----------( 234      ( 17 Jan 2020 06:50 )             47.0   WBC Count: 8.21 K/uL (01-17 @ 06:50)  WBC Count: 9.72 K/uL (01-16 @ 07:36)  WBC Count: 10.83 K/uL (01-15 @ 07:21)  WBC Count: 10.61 K/uL (01-14 @ 05:56)  WBC Count: 8.61 K/uL (01-13 @ 06:13)    01-17    139  |  104  |  36<H>  ----------------------------<  107<H>  4.1   |  31  |  0.93    Ca    9.3      17 Jan 2020 10:10    TPro  7.7  /  Alb  3.3<L>  /  TBili  0.4  /  DBili  x   /  AST  34  /  ALT  24  /  AlkPhos  69  01-16        CULTURES:   .Blood  01-11 @ 01:37   No growth to date.  --  --        RADIOLOGY:  no new studies

## 2020-01-17 NOTE — CHART NOTE - NSCHARTNOTEFT_GEN_A_CORE
Reassessment:   74yFemalePatient is a 74y old  Female who presents with a chief complaint of dry gangrene (2020 15:56)      Factors impacting intake: [ ] none [ ] nausea  [ ] vomiting [ ] diarrhea [ ] constipation  [ ]chewing problems [ ] swallowing issues  [X ] other: dry gangrene, diabetes    Diet Presciption: Diet, DASH/TLC:   Sodium & Cholesterol Restricted (01-10-20 @ 22:17)  Diet, NPO after Midnight:      NPO Start Date: 2020,   NPO Start Time: 23:59 (20 @ 14:20)    Intake: Visited pt. presently NPO with IV fluids & awaiting Rt foot Angiogram today, possible amputation after vascular intervention, possible on 20, rec. advance diet with nutrition supplement, or may consider alternate nutrition support, as medically feasible & add MVI/minerals/Vit.C/ZnSO for wound healing as medically feasible, RD available.       Daily Weight in k.7 (15 Angel 2020 14:15)    % Weight Change: stable     Pertinent Medications: MEDICATIONS  (STANDING):  aspirin enteric coated 81 milliGRAM(s) Oral daily  cefepime   IVPB 2000 milliGRAM(s) IV Intermittent every 12 hours  cefepime   IVPB      dextrose 5% + sodium chloride 0.9%. 1000 milliLiter(s) (60 mL/Hr) IV Continuous <Continuous>  enoxaparin Injectable 40 milliGRAM(s) SubCutaneous daily  gabapentin 100 milliGRAM(s) Oral every 12 hours  lisinopril 10 milliGRAM(s) Oral daily  metoprolol tartrate 25 milliGRAM(s) Oral two times a day  metoprolol tartrate Injectable 5 milliGRAM(s) IV Push every 6 hours  metroNIDAZOLE  IVPB 500 milliGRAM(s) IV Intermittent every 8 hours  metroNIDAZOLE  IVPB      risperiDONE   Tablet 0.5 milliGRAM(s) Oral two times a day  senna 2 Tablet(s) Oral at bedtime    MEDICATIONS  (PRN):  acetaminophen   Tablet .. 650 milliGRAM(s) Oral every 6 hours PRN Mild Pain (1 - 3)  polyethylene glycol 3350 17 Gram(s) Oral daily PRN Constipation  traMADol 25 milliGRAM(s) Oral every 6 hours PRN Moderate Pain (4 - 6)    Pertinent Labs:  Na139 mmol/L Glu 107 mg/dL<H> K+ 4.1 mmol/L Cr  0.93 mg/dL BUN 36 mg/dL<H>  Phos 2.7 mg/dL  Alb 3.3 g/dL<L>  [id number] 5.6 %     CAPILLARY BLOOD GLUCOSE      Skin: foot ulcer with wound care     Estimated Needs:   [X ] no change since previous assessment  [ ] recalculated:     Previous Nutrition Diagnosis:   [ ] Inadequate Energy Intake [ ]Inadequate Oral Intake [ ] Excessive Energy Intake   [ ] Underweight [ ] Increased Nutrient Needs [ ] Overweight/Obesity   [ ] Altered GI Function [ ] Unintended Weight Loss [ ] Food & Nutrition Related Knowledge Deficit [Severe] Malnutrition     Nutrition Diagnosis is [X ] ongoing  [ ] resolved [ ] not applicable     New Nutrition Diagnosis: [ ] not applicable     Interventions: To meet nutrition needs   Recommend  [ ] Change Diet To:  [ ] Nutrition Supplement  [ ] Nutrition Support  [ ] Other:     Monitoring and Evaluation:   [X ] PO intake [ x ] Tolerance to diet prescription [ x ] weights [ x ] labs[ x ] follow up per protocol  [ ] other:

## 2020-01-17 NOTE — PROGRESS NOTE ADULT - ASSESSMENT
1.	Gangrene of Right 2nd toe possible underlying osteomyelitis   ·	awaiting RLE angiogram today  ·	cont maxipime 2gms iv q12  D7  ·	cont flagyl 500mgs po TID  D6

## 2020-01-17 NOTE — PROGRESS NOTE ADULT - PROBLEM SELECTOR PLAN 8
Pt admitted from home  Follow up for PT eval s/p amputation  CM following for discharge planning. K : 5.4 this morning  added albuterol nebulizer   monitor BMP in AM

## 2020-01-17 NOTE — PROGRESS NOTE ADULT - PROBLEM SELECTOR PLAN 2
: CTA noted above  Vascular to perform angiogram   Dr. Izaguirre (Vascular) following- right foot angiogram today, then will have another day for Lt foot

## 2020-01-17 NOTE — PROGRESS NOTE ADULT - PROBLEM SELECTOR PLAN 7
Continue with SQ Lovenox. was on norvasc and lisinopril at home  c/w lisinopril and added metoprolol 25mg BID due to tachycardia  [place] controlled well on metoprolol -- hold meds for NPO today -> PRN IVP metoprolol 5mg q 6hours added   monitor v/s

## 2020-01-17 NOTE — PROGRESS NOTE ADULT - SUBJECTIVE AND OBJECTIVE BOX
Clinically stable  R toes/foot gang  L foot cyanosis  PAD/Limbs threatend  Mult level PAD    Plan angio +/- intervention  Cont local care/podiatry fu  DW'ed pt and HCP Silva  Informed consent in chart

## 2020-01-17 NOTE — CONSULT NOTE ADULT - SUBJECTIVE AND OBJECTIVE BOX
PULMONARY/CRITICAL CARE CONSULTATION  MERARY MEYER 74y FemalePatient is a 74y old  Female who presents with a chief complaint of dry gangrene (17 Jan 2020 22:43)      HPI:  Patient is 74 year old female, live by herself, ambulates with cane  has medical hx significant for dementia, cad, pvd, hld, htn presents to the ED with complaint of discoloration for a toe on the right foot.  Per pt she has black colored rt 2nd toe. pt states she notice discoloration gradually over last 3 weeks associated with swelling and erythema, pt states she bumped in to something and hurt her right big toe but dose not remember when. Today she was seen by her primary care physician a few days ago and told her to go to the emergency room. Patient states she can only ambulate 10 feet and gets a cramp in her right calf for which she needs to sit down.  She states she is very minimally ambulatory.  She denies pain at rest with her feet elevated.  Patient lives alone with a cat. Patient massaging leg throughout the visit.  Patient denies fever, cp, sob, cough, n/v/d. Pt has urinary incontinence for many years. Patient states her blood pressure is not usually low. Patient states she smokes at least a pack a day of cigarettes since she was 22 years old.    Allergy: Sulfa and penicillin    Code status: Full code (10 Angel 2020 21:52)    ICU consult: Patient was taken to OR for Intraoperative Angiogram Bilateral Lower extremity for peripheral arterial disease, intermittent claudication and right 1st and 2nd toe gangrene. She is found to have diffuse atherosclerotic disease with multilevel arterial occlusion. Marginal/poor candidate for endovascular intervention. Patient is transferred to ICU for frequent monitoring after the angiogram. Patient is seen and examined at bed side. Currently denies any acute complaints.       PAST MEDICAL & SURGICAL HISTORY:  COPD (chronic obstructive pulmonary disease)  Hypercholesterolemia  Myocardial infarct, old  S/P CABG x 1    Allergies    influenza virus vaccine, live, trivalent (Unknown)  PC Pen VK (Rash)  penicillin (Other; Rash)  statins (Other)  sulfa drugs (Other)  sulfamethizole (Other)    Intolerances      SOCIAL HISTORY/FAMILY HISTORY reviewed:   Medications:  acetaminophen   Tablet .. 650 milliGRAM(s) Oral every 6 hours PRN  aspirin enteric coated 81 milliGRAM(s) Oral daily  cefepime   IVPB      cefepime   IVPB 2000 milliGRAM(s) IV Intermittent once  dextrose 5% + sodium chloride 0.9%. 1000 milliLiter(s) IV Continuous <Continuous>  enoxaparin Injectable 40 milliGRAM(s) SubCutaneous daily  gabapentin 100 milliGRAM(s) Oral every 12 hours  HYDROmorphone  Injectable 0.5 milliGRAM(s) IV Push every 10 minutes PRN  lisinopril 10 milliGRAM(s) Oral daily  metoprolol tartrate 25 milliGRAM(s) Oral two times a day  metoprolol tartrate Injectable 5 milliGRAM(s) IV Push every 6 hours  metroNIDAZOLE  IVPB 500 milliGRAM(s) IV Intermittent once  metroNIDAZOLE  IVPB      ondansetron Injectable 4 milliGRAM(s) IV Push once PRN  polyethylene glycol 3350 17 Gram(s) Oral daily PRN  risperiDONE   Tablet 0.5 milliGRAM(s) Oral two times a day  senna 2 Tablet(s) Oral at bedtime  traMADol 25 milliGRAM(s) Oral every 6 hours PRN      Review of Systems:  12 point ROS performed, pertinent positives and negatives below  CONSTITUTIONAL: No fever, chills, or fatigue;  EYES: No eye pain, visual disturbances, or discharge;  ENMT:  No difficulty hearing, tinnitus, vertigo; No sinus or throat pain;  NECK: No pain or stiffness;  RESPIRATORY: No cough, wheezing, chills or hemoptysis; No shortness of breath;  CARDIOVASCULAR: No chest pain, palpitations, dizziness, or leg swelling;  GASTROINTESTINAL: No abdominal or epigastric pain. No nausea, vomiting, or hematemesis; No diarrhea or constipation. No melena or hematochezia;  GENITOURINARY: No dysuria, frequency, hematuria, or incontinence;  NEUROLOGICAL: No headaches, memory loss, loss of strength, numbness, or tremors;  SKIN: No itching, burning, rashes, or lesions;  MUSCULOSKELETAL: No joint pain or swelling; No muscle, back, or extremity pain;  PSYCHIATRIC: No depression, anxiety, mood swings, or difficulty sleeping;    vent settings if applicable:      T(F): 98.1 (01-17-20 @ 22:40), Max: 98.2 (01-17-20 @ 04:52)  HR: 92 (01-17-20 @ 23:30)  BP: 155/84 (01-17-20 @ 23:30)  BP(mean): 100 (01-17-20 @ 23:30)  ABP: --  RR: 18 (01-17-20 @ 23:30)  SpO2: 100% (01-17-20 @ 23:30)              PHYSICAL EXAM:  GENERAL: NAD  HEENT: Normocephalic;  conjunctivae and sclerae clear; moist mucous membranes;   NECK : supple  CHEST/LUNG: Clear to auscultation bilaterally with good air entry   HEART: S1 S2  regular; no murmurs, gallops or rubs  ABDOMEN: Soft, Nontender, Nondistended; Bowel sounds present  EXTREMITIES:  DP and PT pulses non-palpable b/l.  Left DP faintly dopplerable.  Unable to doppler left PT, Unable to find right DP or PT with doppler.  No CFT to right 2nd toe and right 1st toe. CFT >3sec to plantar aspect of right 3rd and 4th toe. Derm: Right 2nd toe: black necrotic to the level of the proximal with exposed bone.  Distal aspect of the right 2nd toe is hard and dry, no drainage to the right 2nd toe.   Right foot erythema to level just distal to ankle joint. Right 1st, 3rd, and 4th toes are dusky. Hyperkeratotic tissue to dorsal 3rd and 4th toes on the right.      SKIN: warm and dry; no rash  NERVOUS SYSTEM:  Awake and alert;     LABS:                        15.0   8.21  )-----------( 234      ( 17 Jan 2020 06:50 )             47.0     01-17    139  |  104  |  36<H>  ----------------------------<  107<H>  4.1   |  31  |  0.93    Ca    9.3      17 Jan 2020 10:10    TPro  7.7  /  Alb  3.3<L>  /  TBili  0.4  /  DBili  x   /  AST  34  /  ALT  24  /  AlkPhos  69  01-16          CAPILLARY BLOOD GLUCOSE      POCT Blood Glucose.: 117 mg/dL (16 Jan 2020 08:02)    PT/INR - ( 17 Jan 2020 06:50 )   PT: 12.7 sec;   INR: 1.14 ratio         PTT - ( 17 Jan 2020 06:50 )  PTT:37.7 sec    CULTURES:  Culture Results:   No growth at 5 days. (01-11 @ 01:37)  Culture Results:   No growth at 5 days. (01-11 @ 01:37)            RADIOLOGY REVIEWED:

## 2020-01-17 NOTE — PROGRESS NOTE ADULT - SUBJECTIVE AND OBJECTIVE BOX
PRESENTING CC:    SUBJ:       PMH -reviewed admission note, no change since admission  Heart failure: acute [ ] chronic [ ] acute or chronic [ ] diastolic [ ] systolic [ ] combined systolic and diastolic[ ]  VANESSA: ATN[ ] renal medullary necrosis [ ] CKD I [ ]CKDII [ ]CKD III [ ]CKD IV [ ]CKD V [ ]Other pathological lesions [ ]    MEDICATIONS  (STANDING):  aspirin enteric coated 81 milliGRAM(s) Oral daily  cefepime   IVPB 2000 milliGRAM(s) IV Intermittent every 12 hours  cefepime   IVPB      dextrose 5% + sodium chloride 0.9%. 1000 milliLiter(s) (60 mL/Hr) IV Continuous <Continuous>  enoxaparin Injectable 40 milliGRAM(s) SubCutaneous daily  gabapentin 100 milliGRAM(s) Oral every 12 hours  lisinopril 10 milliGRAM(s) Oral daily  metoprolol tartrate 25 milliGRAM(s) Oral two times a day  metoprolol tartrate Injectable 5 milliGRAM(s) IV Push every 6 hours  metroNIDAZOLE  IVPB 500 milliGRAM(s) IV Intermittent every 8 hours  metroNIDAZOLE  IVPB      risperiDONE   Tablet 0.5 milliGRAM(s) Oral two times a day  senna 2 Tablet(s) Oral at bedtime    MEDICATIONS  (PRN):  acetaminophen   Tablet .. 650 milliGRAM(s) Oral every 6 hours PRN Mild Pain (1 - 3)  polyethylene glycol 3350 17 Gram(s) Oral daily PRN Constipation  traMADol 25 milliGRAM(s) Oral every 6 hours PRN Moderate Pain (4 - 6)              REVIEW OF SYSTEMS:  Constitutional: [ ] fever, [ ]weight loss,  [ ]fatigue  Eyes: [ ] visual changes  Respiratory: [ ]shortness of breath;  [ ] cough, [ ]wheezing, [ ]chills, [ ]hemoptysis  Cardiovascular: [ ] chest pain, [ ]palpitations, [ ]dizziness,  [ ]leg swelling[ ]orthopnea[ ]PND  Gastrointestinal: [ ] abdominal pain, [ ]nausea, [ ]vomiting,  [ ]diarrhea   Genitourinary: [ ] dysuria, [ ] hematuria  Neurologic: [ ] headaches [ ] tremors[ ]weakness  Skin: [ ] itching, [ ]burning, [ ] rashes  Endocrine: [ ] heat or cold intolerance  Musculoskeletal: [ ] joint pain or swelling; [ ] muscle, back, or extremity pain  Psychiatric: [ ] depression, [ ]anxiety, [ ]mood swings, or [ ]difficulty sleeping  Hematologic: [ ] easy bruising, [ ] bleeding gums    [x] All remaining systems negative except as per above.   [ ]Unable to obtain.    Vital Signs Last 24 Hrs  T(C): 36.6 (17 Jan 2020 14:48), Max: 36.8 (17 Jan 2020 04:52)  T(F): 97.8 (17 Jan 2020 14:48), Max: 98.2 (17 Jan 2020 04:52)  HR: 85 (17 Jan 2020 14:48) (81 - 88)  BP: 129/74 (17 Jan 2020 14:48) (90/38 - 130/65)  BP(mean): --  RR: 16 (17 Jan 2020 14:48) (16 - 17)  SpO2: 98% (17 Jan 2020 14:48) (98% - 100%)  I&O's Summary      PHYSICAL EXAM:  General: No acute distress BMI-  HEENT: EOMI, PERRL  Neck: Supple, [ ] JVD  Lungs: Equal air entry bilaterally; [ ] rales [ ] wheezing [ ] rhonchi  Heart: Regular rate and rhythm; [ ] murmur   /6 [ ] systolic [ ] diastolic [ ] radiation[ ] rubs [ ]  gallops  Abdomen: Nontender, bowel sounds present  Extremities: No clubbing, cyanosis, [ ] edema  Nervous system:  Alert & Oriented X3, no focal deficits  Psychiatric: Normal affect  Skin: No rashes or lesions    LABS:  01-17    139  |  104  |  36<H>  ----------------------------<  107<H>  4.1   |  31  |  0.93    Ca    9.3      17 Jan 2020 10:10    TPro  7.7  /  Alb  3.3<L>  /  TBili  0.4  /  DBili  x   /  AST  34  /  ALT  24  /  AlkPhos  69  01-16    Creatinine Trend: 0.93<--, 1.05<--, 1.06<--, 1.01<--, 1.13<--, 0.91<--                        15.0   8.21  )-----------( 234      ( 17 Jan 2020 06:50 )             47.0     PT/INR - ( 17 Jan 2020 06:50 )   PT: 12.7 sec;   INR: 1.14 ratio         PTT - ( 17 Jan 2020 06:50 )  PTT:37.7 sec  Lipid Panel:   Cardiac Enzymes:           RADIOLOGY:    ECG [my interpretation]:    TELEMETRY:    ECHO:    STRESS TEST:    CATHETERIZATION:      IMPRESSION AND PLAN:

## 2020-01-17 NOTE — CONSULT NOTE ADULT - ASSESSMENT
Patient is 74 year old female, live by herself, ambulates with cane  has medical hx significant for dementia, cad, pvd, hld, htn presents to the ED with complaint of discoloration for a toe on the right foot.  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 She is found to have diffuse atherosclerotic disease with multilevel arterial occlusion. Marginal/poor candidate for endovascular intervention. Patient is transferred to ICU for frequent monitoring after the angiogram.     Plan:    Neuro:   History of dementia  c/w risperidone   Pain control with tramadol    CVS:  CAD  c/w asa  History of HTN  c/w Lisinopril and metoprolol  History of PVD:  s/p Angiogram. No intervention performed due to poor candidate for endovascular reintervention  Frequent Hourly groin check x Q 24 hours  Vascular surgery follow up     Pulmonary:  No issues    GI:  Bowel regime due to being on pain control meds such as opioids   No issues  DASH diet    Nephrology  No issues    ID:  c/w cefepime (day 6) and metronidazole (day 7) for possible osteomyelitis of  right second toe gangrene possible underlying osteomyelitis  afebrile, No leucocytosis  cultures blood are negative so far  Podiatry follow up     Heme-onc  No issues    Prophylaxis  DVT prophylaxis with Lovenox

## 2020-01-18 LAB
ALBUMIN SERPL ELPH-MCNC: 2.8 G/DL — LOW (ref 3.5–5)
ALP SERPL-CCNC: 65 U/L — SIGNIFICANT CHANGE UP (ref 40–120)
ALT FLD-CCNC: 21 U/L DA — SIGNIFICANT CHANGE UP (ref 10–60)
ANION GAP SERPL CALC-SCNC: 5 MMOL/L — SIGNIFICANT CHANGE UP (ref 5–17)
AST SERPL-CCNC: 27 U/L — SIGNIFICANT CHANGE UP (ref 10–40)
BILIRUB SERPL-MCNC: 0.3 MG/DL — SIGNIFICANT CHANGE UP (ref 0.2–1.2)
BUN SERPL-MCNC: 28 MG/DL — HIGH (ref 7–18)
CALCIUM SERPL-MCNC: 8.5 MG/DL — SIGNIFICANT CHANGE UP (ref 8.4–10.5)
CHLORIDE SERPL-SCNC: 107 MMOL/L — SIGNIFICANT CHANGE UP (ref 96–108)
CO2 SERPL-SCNC: 29 MMOL/L — SIGNIFICANT CHANGE UP (ref 22–31)
CREAT SERPL-MCNC: 0.91 MG/DL — SIGNIFICANT CHANGE UP (ref 0.5–1.3)
GLUCOSE SERPL-MCNC: 101 MG/DL — HIGH (ref 70–99)
HCT VFR BLD CALC: 43.2 % — SIGNIFICANT CHANGE UP (ref 34.5–45)
HGB BLD-MCNC: 13.7 G/DL — SIGNIFICANT CHANGE UP (ref 11.5–15.5)
LACTATE SERPL-SCNC: 1.3 MMOL/L — SIGNIFICANT CHANGE UP (ref 0.7–2)
MAGNESIUM SERPL-MCNC: 1.9 MG/DL — SIGNIFICANT CHANGE UP (ref 1.6–2.6)
MCHC RBC-ENTMCNC: 30.4 PG — SIGNIFICANT CHANGE UP (ref 27–34)
MCHC RBC-ENTMCNC: 31.7 GM/DL — LOW (ref 32–36)
MCV RBC AUTO: 95.8 FL — SIGNIFICANT CHANGE UP (ref 80–100)
NRBC # BLD: 0 /100 WBCS — SIGNIFICANT CHANGE UP (ref 0–0)
PLATELET # BLD AUTO: 206 K/UL — SIGNIFICANT CHANGE UP (ref 150–400)
POTASSIUM SERPL-MCNC: 4.2 MMOL/L — SIGNIFICANT CHANGE UP (ref 3.5–5.3)
POTASSIUM SERPL-SCNC: 4.2 MMOL/L — SIGNIFICANT CHANGE UP (ref 3.5–5.3)
PROT SERPL-MCNC: 7.1 G/DL — SIGNIFICANT CHANGE UP (ref 6–8.3)
RBC # BLD: 4.51 M/UL — SIGNIFICANT CHANGE UP (ref 3.8–5.2)
RBC # FLD: 12.6 % — SIGNIFICANT CHANGE UP (ref 10.3–14.5)
SODIUM SERPL-SCNC: 141 MMOL/L — SIGNIFICANT CHANGE UP (ref 135–145)
WBC # BLD: 5.97 K/UL — SIGNIFICANT CHANGE UP (ref 3.8–10.5)
WBC # FLD AUTO: 5.97 K/UL — SIGNIFICANT CHANGE UP (ref 3.8–10.5)

## 2020-01-18 RX ORDER — MORPHINE SULFATE 50 MG/1
0.5 CAPSULE, EXTENDED RELEASE ORAL ONCE
Refills: 0 | Status: DISCONTINUED | OUTPATIENT
Start: 2020-01-18 | End: 2020-01-18

## 2020-01-18 RX ORDER — OXYCODONE AND ACETAMINOPHEN 5; 325 MG/1; MG/1
1 TABLET ORAL EVERY 6 HOURS
Refills: 0 | Status: DISCONTINUED | OUTPATIENT
Start: 2020-01-18 | End: 2020-01-23

## 2020-01-18 RX ORDER — PHENYLEPHRINE HYDROCHLORIDE 10 MG/ML
0.1 INJECTION INTRAVENOUS
Qty: 160 | Refills: 0 | Status: DISCONTINUED | OUTPATIENT
Start: 2020-01-18 | End: 2020-01-18

## 2020-01-18 RX ORDER — KETOROLAC TROMETHAMINE 30 MG/ML
15 SYRINGE (ML) INJECTION ONCE
Refills: 0 | Status: DISCONTINUED | OUTPATIENT
Start: 2020-01-18 | End: 2020-01-18

## 2020-01-18 RX ADMIN — GABAPENTIN 100 MILLIGRAM(S): 400 CAPSULE ORAL at 05:43

## 2020-01-18 RX ADMIN — CEFEPIME 50 MILLIGRAM(S): 1 INJECTION, POWDER, FOR SOLUTION INTRAMUSCULAR; INTRAVENOUS at 17:03

## 2020-01-18 RX ADMIN — RISPERIDONE 0.5 MILLIGRAM(S): 4 TABLET ORAL at 05:43

## 2020-01-18 RX ADMIN — Medication 25 MILLIGRAM(S): at 17:03

## 2020-01-18 RX ADMIN — TRAMADOL HYDROCHLORIDE 25 MILLIGRAM(S): 50 TABLET ORAL at 03:00

## 2020-01-18 RX ADMIN — CEFEPIME 50 MILLIGRAM(S): 1 INJECTION, POWDER, FOR SOLUTION INTRAMUSCULAR; INTRAVENOUS at 01:04

## 2020-01-18 RX ADMIN — TRAMADOL HYDROCHLORIDE 25 MILLIGRAM(S): 50 TABLET ORAL at 12:02

## 2020-01-18 RX ADMIN — TRAMADOL HYDROCHLORIDE 25 MILLIGRAM(S): 50 TABLET ORAL at 02:12

## 2020-01-18 RX ADMIN — Medication 100 MILLIGRAM(S): at 13:20

## 2020-01-18 RX ADMIN — LISINOPRIL 10 MILLIGRAM(S): 2.5 TABLET ORAL at 05:43

## 2020-01-18 RX ADMIN — Medication 100 MILLIGRAM(S): at 05:44

## 2020-01-18 RX ADMIN — SODIUM CHLORIDE 60 MILLILITER(S): 9 INJECTION, SOLUTION INTRAVENOUS at 00:46

## 2020-01-18 RX ADMIN — Medication 5 MILLIGRAM(S): at 00:44

## 2020-01-18 RX ADMIN — Medication 81 MILLIGRAM(S): at 11:15

## 2020-01-18 RX ADMIN — SENNA PLUS 2 TABLET(S): 8.6 TABLET ORAL at 22:37

## 2020-01-18 RX ADMIN — Medication 15 MILLIGRAM(S): at 13:20

## 2020-01-18 RX ADMIN — Medication 15 MILLIGRAM(S): at 13:50

## 2020-01-18 RX ADMIN — RISPERIDONE 0.5 MILLIGRAM(S): 4 TABLET ORAL at 17:03

## 2020-01-18 RX ADMIN — TRAMADOL HYDROCHLORIDE 25 MILLIGRAM(S): 50 TABLET ORAL at 23:30

## 2020-01-18 RX ADMIN — ENOXAPARIN SODIUM 40 MILLIGRAM(S): 100 INJECTION SUBCUTANEOUS at 11:15

## 2020-01-18 RX ADMIN — TRAMADOL HYDROCHLORIDE 25 MILLIGRAM(S): 50 TABLET ORAL at 22:37

## 2020-01-18 RX ADMIN — TRAMADOL HYDROCHLORIDE 25 MILLIGRAM(S): 50 TABLET ORAL at 13:00

## 2020-01-18 RX ADMIN — Medication 100 MILLIGRAM(S): at 22:39

## 2020-01-18 RX ADMIN — GABAPENTIN 100 MILLIGRAM(S): 400 CAPSULE ORAL at 17:03

## 2020-01-18 RX ADMIN — CEFEPIME 50 MILLIGRAM(S): 1 INJECTION, POWDER, FOR SOLUTION INTRAMUSCULAR; INTRAVENOUS at 05:43

## 2020-01-18 RX ADMIN — Medication 25 MILLIGRAM(S): at 05:43

## 2020-01-18 RX ADMIN — Medication 100 MILLIGRAM(S): at 00:43

## 2020-01-18 NOTE — CHART NOTE - NSCHARTNOTEFT_GEN_A_CORE
Pt is examined at the bedside. Bilateral lower extremity pulses are not appreciated with Doppler. House officer Dr Sierra is notified. Unable to reach Dr Izaguirre, vascular at this time and left a message.

## 2020-01-18 NOTE — PROGRESS NOTE ADULT - ATTENDING COMMENTS
Patient was seen and examined ,interim events noted,Laboratory and Imaging studies were reviewed.  Thank you for the courtesy of the consultation,I would be available for any further discussion if needed.  Tamir Flanagan MD,FACC.  [address] [place] [address] [phone]

## 2020-01-18 NOTE — CHART NOTE - NSCHARTNOTEFT_GEN_A_CORE
Pt POD 0 s/p angiography LEs  recovering in ICU  awake, alert  appears comfortable    ICU Vital Signs Last 24 Hrs  T(C): 36.7 (17 Jan 2020 22:40), Max: 36.8 (17 Jan 2020 04:52)  T(F): 98.1 (17 Jan 2020 22:40), Max: 98.2 (17 Jan 2020 04:52)  HR: 92 (17 Jan 2020 23:30) (85 - 95)  BP: 155/84 (17 Jan 2020 23:30) (129/74 - 173/89)  BP(mean): 100 (17 Jan 2020 23:30) (92 - 108)  ABP: --  ABP(mean): --  RR: 18 (17 Jan 2020 23:30) (12 - 20)  SpO2: 100% (17 Jan 2020 23:30) (96% - 100%)    L groin soft, dsg CDI, no hematoma  doppler signal L DP    stable post-op

## 2020-01-18 NOTE — PROGRESS NOTE ADULT - SUBJECTIVE AND OBJECTIVE BOX
Patient is a 74y old  Female who presents with a chief complaint of discoloration of toe of right foot.     HPI: This 74 year old non-diabetic female presents to the ED with complaint of discoloration for a toe on the right foot. She states that she was seen by her primary care physician a few days ago and told her to go to the emergency room.  Patient states she smokes at least a pack a day of cigarettes since she was 22 years old.  Patient states she can only ambulate 10 feet and gets a cramp in her right calf for which she needs to sit down.  She states she is very minimally ambulatory.   She denies pain at rest with her feet elevated.  Patient lives alone with a cat. Patient massaging leg throughout the visit.  Patient denies n/v/d/sob/cp/f.  Patient states her blood pressure is not usually low.     Podiatry Interval HPI:  Patient seen bedside this AM with attending at bedside s/p vascular procedure. Patient awake and alert, requested podiatry to return later for examination.  Patient denies n/v/d/sob/cp.      PMH:COPD (chronic obstructive pulmonary disease)  Hypercholesterolemia  Myocardial infarct, old    Allergies: PC Pen VK (Rash)  penicillin (Other; Rash)  statins (Other)  sulfa drugs (Other)  sulfamethizole (Other)    Medications:   FH:  PSX: S/P CABG x 1    SH:       Labs:                              13.7   5.97  )-----------( 206      ( 18 Jan 2020 02:03 )             43.2       01-18    141  |  107  |  28<H>  ----------------------------<  101<H>  4.2   |  29  |  0.91    Ca    8.5      18 Jan 2020 02:03  Mg     1.9     01-18    TPro  7.1  /  Alb  2.8<L>  /  TBili  0.3  /  DBili  x   /  AST  27  /  ALT  21  /  AlkPhos  65  01-18      Vital Signs Last 24 Hrs  T(C): 35.8 (18 Jan 2020 07:34), Max: 36.7 (17 Jan 2020 22:40)  T(F): 96.5 (18 Jan 2020 07:34), Max: 98.1 (17 Jan 2020 22:40)  HR: 74 (18 Jan 2020 09:00) (74 - 107)  BP: 110/41 (18 Jan 2020 09:00) (74/59 - 173/89)  BP(mean): 59 (18 Jan 2020 09:00) (58 - 108)  RR: 11 (18 Jan 2020 09:00) (11 - 25)  SpO2: 98% (18 Jan 2020 09:00) (77% - 100%)    Hemoglobin A1C, Whole Blood: 5.6 % (01-11-20 @ 09:30)      	       WBC Count: 5.97 K/uL (01-18 @ 02:03)  WBC Count: 8.21 K/uL (01-17 @ 06:50)  WBC Count: 9.72 K/uL (01-16 @ 07:36)  WBC Count: 10.83 K/uL (01-15 @ 07:21)  WBC Count: 10.61 K/uL (01-14 @ 05:56)       PHYSICAL EXAM  GEN: MERARY MEYER is a pleasant well-nourished, well developed 74y Female in no acute distress. Refused exam this AM, will attempt at later time  Per previous examination:   LE Focused:    Vasc:  DP and PT pulses non-palpable b/l.  Left DP faintly dopplerable.  Unable to doppler left PT, Unable to find right DP or PT with doppler.  No CFT to right 2nd toe and right 1st toe. CFT >3sec to plantar aspect of right 3rd and 4th toe.   Derm:   Right 2nd toe: black necrotic to the level of the proximal with exposed bone.  Distal aspect of the right 2nd toe is hard and dry, no drainage to the right 2nd toe.   Right foot erythema to level just distal to ankle joint. Right 1st, 3rd, and 4th toes are dusky. Hyperkeratotic tissue to dorsal 3rd and 4th toes on the right.      Neuro: Sensation diminished to digits b/l.  MSK: Tenderness to palpation distal foot.      Imaging:   < from: Xray Foot AP + Lateral + Oblique, Right (01.10.20 @ 15:05) >    EXAM:  FOOT RIGHT (MINIMUM 3 VIEWS)                        PROCEDURE DATE:  01/10/2020    INTERPRETATION:  Right foot. 3 views. Patient has local pain.    The foot shows mild degeneration at the first MTP joint.    There is an old fracture deformity of the distal fibula.    No bone destruction or acute fracture is evident.    IMPRESSION: No acute finding.    JASKARAN MOSLEY M.D., ATTENDING RADIOLOGIST  This document has been electronically signed. Angel 10 2020  3:06PM  < end of copied text >    < from: [facility] Physiol Extremity Lower 3+ Level, BI (01.13.20 @ 13:49) >    EXAM:  US PHYSIOL LWR EXT 3+ LEV BI                        PROCEDURE DATE:  01/13/2020    INTERPRETATION:  Clinical indication: Right toe gangrene    Comparison: None    FINDINGS AND   IMPRESSION: Segmental pressures could not be obtaineddue to patient discomfort. Therefore, ABIs could not be calculated.    There are biphasic waveforms in the lower left thigh. Abnormally, monophasic flow within the remainder of the lower extremities.    RACQUEL CHA M.D., ATTENDING RADIOLOGIST  This document has been electronically signed. Jan 13 2020  2:20PM  < end of copied text >    < from: CT Angio Abd Aorta w/run-off w/ IV Cont (01.12.20 @ 19:40) >    EXAM:  CT ANGIO ABD AOR W RUN(W)AW IC                        PROCEDURE DATE:  01/12/2020    INTERPRETATION:  CT ANGIO ABDOMINAL AORTA RUNOFF WITHOUT AND OR WITH IV CONTRAST    CLINICAL INFORMATION: Atherosclerosis of the native arteries of the right and left lower extremity with right foot gangrene    PROCEDURE INFORMATION:   Exam: CTA Angiogram of the Abdominal Aorta and Bilateral Lower Extremities   (Run-off) With IV Contrast   Exam date and time: 1/12/2020 6:55 PM   Age: 74 years old   Clinical indication: Other: Pad, right 2nd toe gangrene, 3rd and 4th toes wet   early gang     TECHNIQUE:   Imaging protocol: CT angiogram of the abdominal aorta, pelvis and bilateral   lower extremities with IV iodinated contrast.   Intravenous contrast: 90 cc Omnipaque 350  3D rendering: MIP and/or 3D reconstructed images were created by the   technologist.     COMPARISON:   CR XR FOOT 3 VIEWS RIGHT 1/10/2020 2:46 PM     FINDINGS:   Aorta: Severe calcified and noncalcified atherosclerotic plaque. Aorta is   otherwise fully patent.   Celiac trunk and mesenteric arteries: No occlusion or significant stenosis.    Renal arteries: No occlusion or significant stenosis.      Right iliac arteries: Marked stenosis of the right external iliac artery distally.   Right femoral/popliteal arteries: Distal CFA stenosis. Severely and diffusely attenuated SFA. Occlusion of the mid-distal right superficial   femoral artery with reconstitution of the suprageniculate popliteal  artery. Stenotic right popliteal artery.   Right infrapopliteal arteries: Single-vessel runoff via the right peroneal   artery, diffusely attenuated, which feeds the plantar artery. The right anterior and posterior tibial   arteries are occluded. Reconstitution of the right pedis dorsalis artery at the   level of the foot, however severely attenuated.     Left iliac arteries: Moderate calcification and stenosis of the left iliac arteries. Occluded left internal iliac artery.  Left femoral/popliteal arteries: Long segmentOcclusion of the entire left superficial femoral   artery and Reconstitution of left popliteal artery, which shows multifocal disease without occlusion.  Left infrapopliteal arteries: Occlusion of the left anterior posterior tibial   arteries at the level of the ankle. Single-vessel left peroneal artery which   feeds the plantar artery. Left pedis dorsalis is attenuated but patent.    Liver: No mass.   Gallbladder and bile ducts: Unremarkable. No calcified stones. No ductal   dilation.    Pancreas: Unremarkable. No mass. No ductal dilation.    Spleen: Normal. No splenomegaly.    Adrenals: Normal. No mass.    Kidneys and ureters: Normal. No mass.      Stomach and bowel: . No obstruction. No mucosal thickening. Moderate stool   throughout the colon.    Appendix: No evidence of appendicitis.      Bladder: Unremarkable. No mass.    Reproductive: Unremarkable as visualized.      Intraperitoneal space: Unremarkable. No free air. No significant fluid   collection.    Lymph nodes: No lymphadenopathy.     Bones/joints: No acute fracture. No dislocation.    Soft tissues: Soft tissue irregularity of the second right toe presumably   related to underlying gangrene.       IMPRESSION:   1. Right lower extremity demonstrates marked diffuse stenosis of the lower   extremity with occlusion of the mid-distal right SFA which reconstitutes distally and   with single-vessel runoff to the right foot via the right peroneal artery which   feeds the plantar artery. The right pedis dorsalis artery reconstitutes at the   level of the ankle.   2. The left lower extremity demonstrates diffuse marked stenosis with total occlusion   of the left SFA with reconstitution of attenuated left popliteal artery. There is   single-vessel runoff via the left peronealartery is the which then feeds the   left plantar artery. The pedis dorsalis artery is patent.   3. evidence of inflow disease at the level of the right external iliac artery.  4. Slight irregularity of the soft tissues of the right second toe which may be   related to underlying gangrene.    GALILEA KING M.D., ATTENDING RADIOLOGIST  This document has been electronically signed. Jan 13 2020  4:12PM  < end of copied text >      A:   Critical Limb Ischemia, right  Gangrene right 2nd toe  PVD  Right 1st, 3rd and 4th toes dusky  s/p angiogram 1/17    P:  Patient evaluated, chart reviewed  Xrays- reviewed  NICKI/PVR-reviewed  CTA-reviewed- single vessel runoff to right foot.  s/p angiogram 1/17 by vascular Dr. Izaguirre, per note poor candidate for endovascular intervention  vascular rec. multilevel arterial occlusion plan for open bypass  Betadine, 4x4 gauze, prince applied to right foot  amputation planned for Wed 1/22 likely postponed since pt is in need of bypass  will touch base with Dr. Felix and update primary team  Podiatry will continue to monitor  Seen and discussed with Dr. Slaughter Patient is a 74y old  Female who presents with a chief complaint of discoloration of toe of right foot.     HPI: This 74 year old non-diabetic female presents to the ED with complaint of discoloration for a toe on the right foot. She states that she was seen by her primary care physician a few days ago and told her to go to the emergency room.  Patient states she smokes at least a pack a day of cigarettes since she was 22 years old.  Patient states she can only ambulate 10 feet and gets a cramp in her right calf for which she needs to sit down.  She states she is very minimally ambulatory.   She denies pain at rest with her feet elevated.  Patient lives alone with a cat. Patient massaging leg throughout the visit.  Patient denies n/v/d/sob/cp/f.  Patient states her blood pressure is not usually low.     Podiatry Interval HPI:  Patient seen bedside this AM with attending at bedside s/p vascular procedure 1/17. Patient awake and alert, no acute event overnight. She relates doing well.  Patient denies n/v/d/sob/cp.       PMH:COPD (chronic obstructive pulmonary disease)  Hypercholesterolemia  Myocardial infarct, old    Allergies: PC Pen VK (Rash)  penicillin (Other; Rash)  statins (Other)  sulfa drugs (Other)  sulfamethizole (Other)    Medications:   FH:  PSX: S/P CABG x 1    SH:       Labs:                              13.7   5.97  )-----------( 206      ( 18 Jan 2020 02:03 )             43.2       01-18    141  |  107  |  28<H>  ----------------------------<  101<H>  4.2   |  29  |  0.91    Ca    8.5      18 Jan 2020 02:03  Mg     1.9     01-18    TPro  7.1  /  Alb  2.8<L>  /  TBili  0.3  /  DBili  x   /  AST  27  /  ALT  21  /  AlkPhos  65  01-18      Vital Signs Last 24 Hrs  T(C): 35.8 (18 Jan 2020 07:34), Max: 36.7 (17 Jan 2020 22:40)  T(F): 96.5 (18 Jan 2020 07:34), Max: 98.1 (17 Jan 2020 22:40)  HR: 74 (18 Jan 2020 09:00) (74 - 107)  BP: 110/41 (18 Jan 2020 09:00) (74/59 - 173/89)  BP(mean): 59 (18 Jan 2020 09:00) (58 - 108)  RR: 11 (18 Jan 2020 09:00) (11 - 25)  SpO2: 98% (18 Jan 2020 09:00) (77% - 100%)    Hemoglobin A1C, Whole Blood: 5.6 % (01-11-20 @ 09:30)      	       WBC Count: 5.97 K/uL (01-18 @ 02:03)  WBC Count: 8.21 K/uL (01-17 @ 06:50)  WBC Count: 9.72 K/uL (01-16 @ 07:36)  WBC Count: 10.83 K/uL (01-15 @ 07:21)  WBC Count: 10.61 K/uL (01-14 @ 05:56)       PHYSICAL EXAM  GEN: MERARY MEYER is a pleasant well-nourished, well developed 74y Female in no acute distress. Refused exam this AM, will attempt at later time  Per previous examination:   LE Focused:    Vasc:  DP and PT pulses non-palpable b/l. Left DP faintly dopplerable.  Unable to doppler left PT, Unable to find right DP or PT with doppler.  No CFT to right 2nd toe and right 1st toe. CFT >3sec to plantar aspect of right 3rd and 4th toe. Shiny taught skin b/l dorsal feet.  Derm:   Right 2nd toe: black necrotic to the level of the proximal with exposed bone.  Distal aspect of the right 2nd toe is hard and dry, no drainage to the right 2nd toe.   Right foot erythema to level just distal to ankle joint. Right 1st, 3rd, and 4th toes are dusky. Hyperkeratotic tissue to dorsal 3rd and 4th toes on the right.      Neuro: Sensation diminished to digits b/l.  MSK: Tenderness to palpation distal foot.  Pain to touch dorsal right foot.     Imaging:   < from: Xray Foot AP + Lateral + Oblique, Right (01.10.20 @ 15:05) >    EXAM:  FOOT RIGHT (MINIMUM 3 VIEWS)                        PROCEDURE DATE:  01/10/2020    INTERPRETATION:  Right foot. 3 views. Patient has local pain.    The foot shows mild degeneration at the first MTP joint.    There is an old fracture deformity of the distal fibula.    No bone destruction or acute fracture is evident.    IMPRESSION: No acute finding.    JASKARAN MOSLEY M.D., ATTENDING RADIOLOGIST  This document has been electronically signed. Angel 10 2020  3:06PM  < end of copied text >    < from: [facility] Physiol Extremity Lower 3+ Level, BI (01.13.20 @ 13:49) >    EXAM:  US PHYSIOL LWR EXT 3+ LEV BI                        PROCEDURE DATE:  01/13/2020    INTERPRETATION:  Clinical indication: Right toe gangrene    Comparison: None    FINDINGS AND   IMPRESSION: Segmental pressures could not be obtaineddue to patient discomfort. Therefore, ABIs could not be calculated.    There are biphasic waveforms in the lower left thigh. Abnormally, monophasic flow within the remainder of the lower extremities.    RACQUEL CHA M.D., ATTENDING RADIOLOGIST  This document has been electronically signed. Jan 13 2020  2:20PM  < end of copied text >    < from: CT Angio Abd Aorta w/run-off w/ IV Cont (01.12.20 @ 19:40) >    EXAM:  CT ANGIO ABD AOR W RUN(W)AW IC                        PROCEDURE DATE:  01/12/2020    INTERPRETATION:  CT ANGIO ABDOMINAL AORTA RUNOFF WITHOUT AND OR WITH IV CONTRAST    CLINICAL INFORMATION: Atherosclerosis of the native arteries of the right and left lower extremity with right foot gangrene    PROCEDURE INFORMATION:   Exam: CTA Angiogram of the Abdominal Aorta and Bilateral Lower Extremities   (Run-off) With IV Contrast   Exam date and time: 1/12/2020 6:55 PM   Age: 74 years old   Clinical indication: Other: Pad, right 2nd toe gangrene, 3rd and 4th toes wet   early gang     TECHNIQUE:   Imaging protocol: CT angiogram of the abdominal aorta, pelvis and bilateral   lower extremities with IV iodinated contrast.   Intravenous contrast: 90 cc Omnipaque 350  3D rendering: MIP and/or 3D reconstructed images were created by the   technologist.     COMPARISON:   CR XR FOOT 3 VIEWS RIGHT 1/10/2020 2:46 PM     FINDINGS:   Aorta: Severe calcified and noncalcified atherosclerotic plaque. Aorta is   otherwise fully patent.   Celiac trunk and mesenteric arteries: No occlusion or significant stenosis.    Renal arteries: No occlusion or significant stenosis.      Right iliac arteries: Marked stenosis of the right external iliac artery distally.   Right femoral/popliteal arteries: Distal CFA stenosis. Severely and diffusely attenuated SFA. Occlusion of the mid-distal right superficial   femoral artery with reconstitution of the suprageniculate popliteal  artery. Stenotic right popliteal artery.   Right infrapopliteal arteries: Single-vessel runoff via the right peroneal   artery, diffusely attenuated, which feeds the plantar artery. The right anterior and posterior tibial   arteries are occluded. Reconstitution of the right pedis dorsalis artery at the   level of the foot, however severely attenuated.     Left iliac arteries: Moderate calcification and stenosis of the left iliac arteries. Occluded left internal iliac artery.  Left femoral/popliteal arteries: Long segmentOcclusion of the entire left superficial femoral   artery and Reconstitution of left popliteal artery, which shows multifocal disease without occlusion.  Left infrapopliteal arteries: Occlusion of the left anterior posterior tibial   arteries at the level of the ankle. Single-vessel left peroneal artery which   feeds the plantar artery. Left pedis dorsalis is attenuated but patent.    Liver: No mass.   Gallbladder and bile ducts: Unremarkable. No calcified stones. No ductal   dilation.    Pancreas: Unremarkable. No mass. No ductal dilation.    Spleen: Normal. No splenomegaly.    Adrenals: Normal. No mass.    Kidneys and ureters: Normal. No mass.      Stomach and bowel: . No obstruction. No mucosal thickening. Moderate stool   throughout the colon.    Appendix: No evidence of appendicitis.      Bladder: Unremarkable. No mass.    Reproductive: Unremarkable as visualized.      Intraperitoneal space: Unremarkable. No free air. No significant fluid   collection.    Lymph nodes: No lymphadenopathy.     Bones/joints: No acute fracture. No dislocation.    Soft tissues: Soft tissue irregularity of the second right toe presumably   related to underlying gangrene.       IMPRESSION:   1. Right lower extremity demonstrates marked diffuse stenosis of the lower   extremity with occlusion of the mid-distal right SFA which reconstitutes distally and   with single-vessel runoff to the right foot via the right peroneal artery which   feeds the plantar artery. The right pedis dorsalis artery reconstitutes at the   level of the ankle.   2. The left lower extremity demonstrates diffuse marked stenosis with total occlusion   of the left SFA with reconstitution of attenuated left popliteal artery. There is   single-vessel runoff via the left peronealartery is the which then feeds the   left plantar artery. The pedis dorsalis artery is patent.   3. evidence of inflow disease at the level of the right external iliac artery.  4. Slight irregularity of the soft tissues of the right second toe which may be   related to underlying gangrene.    GALILEA KING M.D., ATTENDING RADIOLOGIST  This document has been electronically signed. Jan 13 2020  4:12PM  < end of copied text >      A:   Critical Limb Ischemia, right  Gangrene right 2nd toe  PVD  Right 1st, 3rd and 4th toes dusky  s/p angiogram 1/17    P:  Patient evaluated, chart reviewed  Xrays- reviewed  NICKI/PVR-reviewed  CTA-reviewed- single vessel runoff to right foot.  s/p angiogram 1/17 by vascular Dr. Izaguirre, per note poor candidate for endovascular intervention  vascular rec. multilevel arterial occlusion plan for open bypass  Betadine soaked gauze to gangrenous toes, prince only to toes to allow frequent pulse checks    amputation planned for Wed 1/22 likely postponed since pt is in need of open bypass  will touch base with Dr. Felix and update primary team  Podiatry will continue to monitor  Seen and discussed with Dr. Slaughter

## 2020-01-18 NOTE — PROGRESS NOTE ADULT - ASSESSMENT
s/p OR angio 1/18. NO angioplasty as pt with severe bilateral multilevel arterial occlusive disease  1. Pt will need open bypass surgery bilaterally  2. Please provide cardiology clearance  3. Will follow

## 2020-01-18 NOTE — PROGRESS NOTE ADULT - ASSESSMENT
Patient is 74 year old female, live by herself, ambulates with cane  has medical hx significant for dementia, cad, pvd, hld, htn presents to the ED with complaint of discoloration for a toe on the right foot.  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 She is found to have diffuse atherosclerotic disease with multilevel arterial occlusion. Marginal/poor candidate for endovascular intervention. Patient is transferred to ICU for frequent monitoring after the angiogram.     Plan:    Neuro:   History of dementia  c/w risperidone   Pain control with tramadol    CVS:  CAD  c/w asa  History of HTN  c/w Lisinopril and metoprolol  History of PVD:  s/p Angiogram. No intervention performed due to poor candidate for endovascular reintervention  Frequent Hourly groin check x Q 24 hours  Vascular surgery follow up     Pulmonary:  No issues    GI:  Bowel regime due to being on pain control meds such as opioids   No issues  DASH diet    Nephrology  No issues    ID:  c/w cefepime (day 6) and metronidazole (day 7) for possible osteomyelitis of  right second toe gangrene possible underlying osteomyelitis  afebrile, No leucocytosis  cultures blood are negative so far  Podiatry follow up     Heme-onc  No issues    Prophylaxis  DVT prophylaxis with Lovenox Patient is 74 year old female, live by herself, ambulates with cane  has medical hx significant for dementia, cad, pvd, hld, htn presents to the ED with complaint of discoloration for a toe on the right foot.  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 She is found to have diffuse atherosclerotic disease with multilevel arterial occlusion. Marginal/poor candidate for endovascular intervention. Patient is transferred to ICU for frequent monitoring after the angiogram.     Plan:    Neuro:   History of dementia  c/w risperidone   Pain not well controlled with tramadol. started morphine    CVS:  CAD  c/w asa  History of HTN  c/w Lisinopril and metoprolol  History of PVD:  s/p Angiogram. No intervention performed due to poor candidate for endovascular reintervention  Frequent Hourly groin check x Q 24 hours  Vascular surgery follow up     Pulmonary:  No issues    GI:  Bowel regime due to being on pain control meds such as opioids   No issues  DASH diet    Nephrology  No issues    ID:  c/w cefepime (day 6) and metronidazole (day 7) for possible osteomyelitis of  right second toe gangrene possible underlying osteomyelitis  afebrile, No leucocytosis  cultures blood are negative so far  Podiatry follow up     Heme-onc  No issues    Prophylaxis  DVT prophylaxis with Lovenox

## 2020-01-18 NOTE — PROGRESS NOTE ADULT - SUBJECTIVE AND OBJECTIVE BOX
INTERVAL HPI/OVERNIGHT EVENTS:       Antimicrobial:  cefepime   IVPB      cefepime   IVPB 2000 milliGRAM(s) IV Intermittent every 12 hours  metroNIDAZOLE  IVPB 500 milliGRAM(s) IV Intermittent every 8 hours  metroNIDAZOLE  IVPB        Cardiovascular:  lisinopril 10 milliGRAM(s) Oral daily  metoprolol tartrate 25 milliGRAM(s) Oral two times a day    Pulmonary:    Hematalogic:  aspirin enteric coated 81 milliGRAM(s) Oral daily  enoxaparin Injectable 40 milliGRAM(s) SubCutaneous daily    Other:  acetaminophen   Tablet .. 650 milliGRAM(s) Oral every 6 hours PRN  gabapentin 100 milliGRAM(s) Oral every 12 hours  oxycodone    5 mG/acetaminophen 325 mG 1 Tablet(s) Oral every 6 hours PRN  polyethylene glycol 3350 17 Gram(s) Oral daily PRN  risperiDONE   Tablet 0.5 milliGRAM(s) Oral two times a day  senna 2 Tablet(s) Oral at bedtime  traMADol 25 milliGRAM(s) Oral every 6 hours PRN      Drug Dosing Weight  Height (cm): 170.2 (18 Jan 2020 00:00)  Weight (kg): 54.3 (18 Jan 2020 00:00)  BMI (kg/m2): 18.7 (18 Jan 2020 00:00)  BSA (m2): 1.63 (18 Jan 2020 00:00)    CENTRAL LINE: [ ] YES [ ] NO  LOCATION:   DATE INSERTED:    BIANCHI: [ ] YES [ ] NO    DATE INSERTED:    A-LINE:  [ ] YES [ ] NO  LOCATION:   DATE INSERTED:    PMH/Social Hx/Fam Hx -reviewed admission note, no change since admission  PAST MEDICAL & SURGICAL HISTORY:  COPD (chronic obstructive pulmonary disease)  Hypercholesterolemia  Myocardial infarct, old  S/P CABG x 1      T(C): 35.8 (01-18-20 @ 07:34), Max: 36.7 (01-17-20 @ 22:40)  HR: 86 (01-18-20 @ 12:00)  BP: 144/66 (01-18-20 @ 12:00)  BP(mean): 86 (01-18-20 @ 12:00)  ABP: --  ABP(mean): --  RR: 14 (01-18-20 @ 12:00)  SpO2: 100% (01-18-20 @ 12:00)  Wt(kg): --          01-17 @ 07:01 - 01-18 @ 07:00  --------------------------------------------------------  IN: 360 mL / OUT: 500 mL / NET: -140 mL            PHYSICAL EXAM:    GENERAL: No signs of distress, comfortable  HEAD: Atraumatic, Normocephalic  EYES: EOMI, PERRLA  ENMT: No erythema, exudates, or enlargement, Moist mucous membranes  NECK: Supple, normal appearance, No JVD; [  ] central line (if applicable)  CHEST/LUNG: No chest deformity, fair bilateral air entry; No rales, rhonchi, wheezing; crackles  HEART: Regular rate and rhythm; No murmurs, rubs, or gallops;   ABDOMEN: Soft, Nontender, Nondistended; Bowel sounds present  EXTREMITIES:  + right foot dry gangrene and no pulse  NERVOUS SYSTEM: awake and alert   LYMPH: No lymphadenopathy noted  SKIN: No rashes or lesions; good turgor, warm, dry      LABS:  CBC Full  -  ( 18 Jan 2020 02:03 )  WBC Count : 5.97 K/uL  RBC Count : 4.51 M/uL  Hemoglobin : 13.7 g/dL  Hematocrit : 43.2 %  Platelet Count - Automated : 206 K/uL  Mean Cell Volume : 95.8 fl  Mean Cell Hemoglobin : 30.4 pg  Mean Cell Hemoglobin Concentration : 31.7 gm/dL  Auto Neutrophil # : x  Auto Lymphocyte # : x  Auto Monocyte # : x  Auto Eosinophil # : x  Auto Basophil # : x  Auto Neutrophil % : x  Auto Lymphocyte % : x  Auto Monocyte % : x  Auto Eosinophil % : x  Auto Basophil % : x    01-18    141  |  107  |  28<H>  ----------------------------<  101<H>  4.2   |  29  |  0.91    Ca    8.5      18 Jan 2020 02:03  Mg     1.9     01-18    TPro  7.1  /  Alb  2.8<L>  /  TBili  0.3  /  DBili  x   /  AST  27  /  ALT  21  /  AlkPhos  65  01-18    PT/INR - ( 17 Jan 2020 06:50 )   PT: 12.7 sec;   INR: 1.14 ratio         PTT - ( 17 Jan 2020 06:50 )  PTT:37.7 sec        RADIOLOGY & ADDITIONAL STUDIES REVIEWED         IMPRESSION:  PAST MEDICAL & SURGICAL HISTORY:  COPD (chronic obstructive pulmonary disease)  Hypercholesterolemia  Myocardial infarct, old  S/P CABG x 1   p/w         IMP: This is a 74 year old woman , live by herself, ambulates with cane  has medical hx significant for dementia, cad, pvd, hld, htn presents to the ED with complaint of discoloration for a toe on the right foot.  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 She is found to have diffuse atherosclerotic disease with multilevel arterial occlusion. Marginal/poor candidate for endovascular intervention. Patient is transferred to ICU for frequent monitoring after the angiogram.  Vascular surgery f/u.. will need b/l  open bypass.        Plan:    Neuro:   History of dementia  c/w risperidone   Pain not well controlled with tramadol. started morphine    CVS:  CAD  c/w asa  History of HTN  c/w Lisinopril and metoprolol  History of PVD:  s/p Angiogram. No intervention performed due to poor candidate for endovascular reintervention  Frequent Hourly groin check x Q 24 hours  Vascular surgery follow up     Pulmonary:  No issues    GI:  Bowel regime due to being on pain control meds such as opioids   No issues  DASH diet    Nephrology  No issues    ID:  c/w cefepime (day 6) and metronidazole (day 7) for possible osteomyelitis of  right second toe gangrene possible underlying osteomyelitis  afebrile, No leucocytosis  cultures blood are negative so far  Podiatry follow up     Heme-onc  No issues    Prophylaxis  DVT prophylaxis with Lovenox      GOALS OF CARE DISCUSSION WITH PATIENT/FAMILY/PROXY/ icu team on rounds

## 2020-01-18 NOTE — PROGRESS NOTE ADULT - SUBJECTIVE AND OBJECTIVE BOX
INTERVAL HPI/OVERNIGHT EVENTS:     PRESSORS: [ ] YES [x ] NO  WHICH:    ANTIBIOTICS:                  DATE STARTED:  ANTIBIOTICS:                  DATE STARTED:  ANTIBIOTICS:                  DATE STARTED:    Antimicrobial:  cefepime   IVPB      cefepime   IVPB 2000 milliGRAM(s) IV Intermittent every 12 hours  metroNIDAZOLE  IVPB 500 milliGRAM(s) IV Intermittent every 8 hours  metroNIDAZOLE  IVPB        Cardiovascular:  lisinopril 10 milliGRAM(s) Oral daily  metoprolol tartrate 25 milliGRAM(s) Oral two times a day  metoprolol tartrate Injectable 5 milliGRAM(s) IV Push every 6 hours    Pulmonary:    Hematalogic:  aspirin enteric coated 81 milliGRAM(s) Oral daily  enoxaparin Injectable 40 milliGRAM(s) SubCutaneous daily    Other:  acetaminophen   Tablet .. 650 milliGRAM(s) Oral every 6 hours PRN  gabapentin 100 milliGRAM(s) Oral every 12 hours  polyethylene glycol 3350 17 Gram(s) Oral daily PRN  risperiDONE   Tablet 0.5 milliGRAM(s) Oral two times a day  senna 2 Tablet(s) Oral at bedtime  traMADol 25 milliGRAM(s) Oral every 6 hours PRN    acetaminophen   Tablet .. 650 milliGRAM(s) Oral every 6 hours PRN  aspirin enteric coated 81 milliGRAM(s) Oral daily  cefepime   IVPB      cefepime   IVPB 2000 milliGRAM(s) IV Intermittent every 12 hours  enoxaparin Injectable 40 milliGRAM(s) SubCutaneous daily  gabapentin 100 milliGRAM(s) Oral every 12 hours  lisinopril 10 milliGRAM(s) Oral daily  metoprolol tartrate 25 milliGRAM(s) Oral two times a day  metoprolol tartrate Injectable 5 milliGRAM(s) IV Push every 6 hours  metroNIDAZOLE  IVPB 500 milliGRAM(s) IV Intermittent every 8 hours  metroNIDAZOLE  IVPB      polyethylene glycol 3350 17 Gram(s) Oral daily PRN  risperiDONE   Tablet 0.5 milliGRAM(s) Oral two times a day  senna 2 Tablet(s) Oral at bedtime  traMADol 25 milliGRAM(s) Oral every 6 hours PRN    Drug Dosing Weight  Height (cm): 170.2 (18 Jan 2020 00:00)  Weight (kg): 54.3 (18 Jan 2020 00:00)  BMI (kg/m2): 18.7 (18 Jan 2020 00:00)  BSA (m2): 1.63 (18 Jan 2020 00:00)    CENTRAL LINE: [ ] YES [x ] NO  LOCATION:   DATE INSERTED:  REMOVE: [ ] YES [ ] NO  EXPLAIN:    BIANCHI: [ ] YES [x ] NO    DATE INSERTED:  REMOVE:  [ ] YES [ ] NO  EXPLAIN:    A-LINE:  [ ] YES [x ] NO  LOCATION:   DATE INSERTED:  REMOVE:  [ ] YES [ ] NO  EXPLAIN:        ICU Vital Signs Last 24 Hrs  T(C): 36.1 (18 Jan 2020 00:00), Max: 36.8 (17 Jan 2020 04:52)  T(F): 96.9 (18 Jan 2020 00:00), Max: 98.2 (17 Jan 2020 04:52)  HR: 77 (18 Jan 2020 02:00) (77 - 107)  BP: 135/60 (18 Jan 2020 02:00) (98/59 - 173/89)  BP(mean): 77 (18 Jan 2020 02:00) (67 - 108)  ABP: --  ABP(mean): --  RR: 20 (18 Jan 2020 02:00) (12 - 25)  SpO2: 100% (18 Jan 2020 02:00) (96% - 100%)                    PHYSICAL EXAM:    GENERAL: [ ]NAD, [ ]well-groomed, [ ]well-developed  HEAD:  [ ]Atraumatic, [ ]Normocephalic  EYES: [ ]EOMI, [ ]PERRLA, [ ]conjunctiva and sclera clear  ENMT: [ ]No tonsillar erythema, exudates, or enlargement; [ ]Moist mucous membranes, [ ]Good dentition, [ ]No lesions  NECK: [ ]Supple, normal appearance, [ ]No JVD; [ ]Normal thyroid; [ ]Trachea midline  NERVOUS SYSTEM:  [ ]Alert & Oriented X3, [ ]Good concentration; [ ]Motor Strength 5/5 B/L upper and lower extremities; [ ]DTRs 2+ intact and symmetric  CHEST/LUNG: [ ]No chest deformity; [ ]Normal percussion bilaterally; [ ]No rales, rhonchi, wheezing   HEART: [ ]Regular rate and rhythm; [ ]No murmurs, rubs, or gallops  ABDOMEN: [ ]Soft, Nontender, Nondistended; [ ]Bowel sounds present  EXTREMITIES:  [ ]2+ Peripheral Pulses, [ ]No clubbing, cyanosis, or edema  LYMPH: [ ]No lymphadenopathy noted  SKIN: [ ]No rashes or lesions; [ ]Good capillary refill      LABS:  CBC Full  -  ( 18 Jan 2020 02:03 )  WBC Count : 5.97 K/uL  RBC Count : 4.51 M/uL  Hemoglobin : 13.7 g/dL  Hematocrit : 43.2 %  Platelet Count - Automated : 206 K/uL  Mean Cell Volume : 95.8 fl  Mean Cell Hemoglobin : 30.4 pg  Mean Cell Hemoglobin Concentration : 31.7 gm/dL  Auto Neutrophil # : x  Auto Lymphocyte # : x  Auto Monocyte # : x  Auto Eosinophil # : x  Auto Basophil # : x  Auto Neutrophil % : x  Auto Lymphocyte % : x  Auto Monocyte % : x  Auto Eosinophil % : x  Auto Basophil % : x    01-18    141  |  107  |  28<H>  ----------------------------<  101<H>  4.2   |  29  |  0.91    Ca    8.5      18 Jan 2020 02:03  Mg     1.9     01-18    TPro  7.1  /  Alb  2.8<L>  /  TBili  0.3  /  DBili  x   /  AST  27  /  ALT  21  /  AlkPhos  65  01-18    PT/INR - ( 17 Jan 2020 06:50 )   PT: 12.7 sec;   INR: 1.14 ratio         PTT - ( 17 Jan 2020 06:50 )  PTT:37.7 sec        RADIOLOGY & ADDITIONAL STUDIES REVIEWED:  ***    [ ]GOALS OF CARE DISCUSSION WITH PATIENT/FAMILY/PROXY:    CRITICAL CARE TIME SPENT: 35 minutes INTERVAL HPI/OVERNIGHT EVENTS:     PRESSORS: [ ] YES [x ] NO  WHICH:    ANTIBIOTICS:                  DATE STARTED:  ANTIBIOTICS:                  DATE STARTED:  ANTIBIOTICS:                  DATE STARTED:    Antimicrobial:  cefepime   IVPB      cefepime   IVPB 2000 milliGRAM(s) IV Intermittent every 12 hours  metroNIDAZOLE  IVPB 500 milliGRAM(s) IV Intermittent every 8 hours  metroNIDAZOLE  IVPB        Cardiovascular:  lisinopril 10 milliGRAM(s) Oral daily  metoprolol tartrate 25 milliGRAM(s) Oral two times a day  metoprolol tartrate Injectable 5 milliGRAM(s) IV Push every 6 hours    Pulmonary:    Hematalogic:  aspirin enteric coated 81 milliGRAM(s) Oral daily  enoxaparin Injectable 40 milliGRAM(s) SubCutaneous daily    Other:  acetaminophen   Tablet .. 650 milliGRAM(s) Oral every 6 hours PRN  gabapentin 100 milliGRAM(s) Oral every 12 hours  polyethylene glycol 3350 17 Gram(s) Oral daily PRN  risperiDONE   Tablet 0.5 milliGRAM(s) Oral two times a day  senna 2 Tablet(s) Oral at bedtime  traMADol 25 milliGRAM(s) Oral every 6 hours PRN    acetaminophen   Tablet .. 650 milliGRAM(s) Oral every 6 hours PRN  aspirin enteric coated 81 milliGRAM(s) Oral daily  cefepime   IVPB      cefepime   IVPB 2000 milliGRAM(s) IV Intermittent every 12 hours  enoxaparin Injectable 40 milliGRAM(s) SubCutaneous daily  gabapentin 100 milliGRAM(s) Oral every 12 hours  lisinopril 10 milliGRAM(s) Oral daily  metoprolol tartrate 25 milliGRAM(s) Oral two times a day  metoprolol tartrate Injectable 5 milliGRAM(s) IV Push every 6 hours  metroNIDAZOLE  IVPB 500 milliGRAM(s) IV Intermittent every 8 hours  metroNIDAZOLE  IVPB      polyethylene glycol 3350 17 Gram(s) Oral daily PRN  risperiDONE   Tablet 0.5 milliGRAM(s) Oral two times a day  senna 2 Tablet(s) Oral at bedtime  traMADol 25 milliGRAM(s) Oral every 6 hours PRN    Drug Dosing Weight  Height (cm): 170.2 (18 Jan 2020 00:00)  Weight (kg): 54.3 (18 Jan 2020 00:00)  BMI (kg/m2): 18.7 (18 Jan 2020 00:00)  BSA (m2): 1.63 (18 Jan 2020 00:00)    CENTRAL LINE: [ ] YES [x ] NO  LOCATION:   DATE INSERTED:  REMOVE: [ ] YES [ ] NO  EXPLAIN:    BIANCHI: [ ] YES [x ] NO    DATE INSERTED:  REMOVE:  [ ] YES [ ] NO  EXPLAIN:    A-LINE:  [ ] YES [x ] NO  LOCATION:   DATE INSERTED:  REMOVE:  [ ] YES [ ] NO  EXPLAIN:        ICU Vital Signs Last 24 Hrs  T(C): 36.1 (18 Jan 2020 00:00), Max: 36.8 (17 Jan 2020 04:52)  T(F): 96.9 (18 Jan 2020 00:00), Max: 98.2 (17 Jan 2020 04:52)  HR: 77 (18 Jan 2020 02:00) (77 - 107)  BP: 135/60 (18 Jan 2020 02:00) (98/59 - 173/89)  BP(mean): 77 (18 Jan 2020 02:00) (67 - 108)  ABP: --  ABP(mean): --  RR: 20 (18 Jan 2020 02:00) (12 - 25)  SpO2: 100% (18 Jan 2020 02:00) (96% - 100%)                  PHYSICAL EXAM:  GENERAL: NAD  HEENT: Normocephalic;  conjunctivae and sclerae clear; moist mucous membranes;   NECK : supple  CHEST/LUNG: Clear to auscultation bilaterally with good air entry   HEART: S1 S2  regular; no murmurs, gallops or rubs  ABDOMEN: Soft, Nontender, Nondistended; Bowel sounds present  EXTREMITIES:  DP and PT pulses non-palpable b/l.  Left DP faintly dopplerable.  Unable to doppler left PT, Unable to find right DP or PT with doppler.  No CFT to right 2nd toe and right 1st toe. CFT >3sec to plantar aspect of right 3rd and 4th toe. Derm: Right 2nd toe: black necrotic to the level of the proximal with exposed bone.  Distal aspect of the right 2nd toe is hard and dry, no drainage to the right 2nd toe.   Right foot erythema to level just distal to ankle joint. Right 1st, 3rd, and 4th toes are dusky. Hyperkeratotic tissue to dorsal 3rd and 4th toes on the right.      SKIN: warm and dry; no rash  NERVOUS SYSTEM:  Awake and alert;      LABS:  CBC Full  -  ( 18 Jan 2020 02:03 )  WBC Count : 5.97 K/uL  RBC Count : 4.51 M/uL  Hemoglobin : 13.7 g/dL  Hematocrit : 43.2 %  Platelet Count - Automated : 206 K/uL  Mean Cell Volume : 95.8 fl  Mean Cell Hemoglobin : 30.4 pg  Mean Cell Hemoglobin Concentration : 31.7 gm/dL  Auto Neutrophil # : x  Auto Lymphocyte # : x  Auto Monocyte # : x  Auto Eosinophil # : x  Auto Basophil # : x  Auto Neutrophil % : x  Auto Lymphocyte % : x  Auto Monocyte % : x  Auto Eosinophil % : x  Auto Basophil % : x    01-18    141  |  107  |  28<H>  ----------------------------<  101<H>  4.2   |  29  |  0.91    Ca    8.5      18 Jan 2020 02:03  Mg     1.9     01-18    TPro  7.1  /  Alb  2.8<L>  /  TBili  0.3  /  DBili  x   /  AST  27  /  ALT  21  /  AlkPhos  65  01-18    PT/INR - ( 17 Jan 2020 06:50 )   PT: 12.7 sec;   INR: 1.14 ratio         PTT - ( 17 Jan 2020 06:50 )  PTT:37.7 sec        RADIOLOGY & ADDITIONAL STUDIES REVIEWED:  ***    [ ]GOALS OF CARE DISCUSSION WITH PATIENT/FAMILY/PROXY:    CRITICAL CARE TIME SPENT: 35 minutes INTERVAL HPI/OVERNIGHT EVENTS: Pt is c/o severe pain in the bilateral legs. Offered morphine but refused. Pt has dementia and forgets things. AAO x2, Tramadol was given for pain control. Pt is refusing pulse checks due to severe pain. Spoke to her and reassurance is provided. She gave consent for pulse checks.    PRESSORS: [ ] YES [x ] NO  WHICH:    ANTIBIOTICS:  cefepime                 DATE STARTED: 1/17  ANTIBIOTICS:   metroNIDAZOLE               DATE STARTED: 1/17  ANTIBIOTICS:                  DATE STARTED:    Antimicrobial:  cefepime   IVPB      cefepime   IVPB 2000 milliGRAM(s) IV Intermittent every 12 hours  metroNIDAZOLE  IVPB 500 milliGRAM(s) IV Intermittent every 8 hours  metroNIDAZOLE  IVPB        Cardiovascular:  lisinopril 10 milliGRAM(s) Oral daily  metoprolol tartrate 25 milliGRAM(s) Oral two times a day  metoprolol tartrate Injectable 5 milliGRAM(s) IV Push every 6 hours    Pulmonary:    Hematalogic:  aspirin enteric coated 81 milliGRAM(s) Oral daily  enoxaparin Injectable 40 milliGRAM(s) SubCutaneous daily    Other:  acetaminophen   Tablet .. 650 milliGRAM(s) Oral every 6 hours PRN  gabapentin 100 milliGRAM(s) Oral every 12 hours  polyethylene glycol 3350 17 Gram(s) Oral daily PRN  risperiDONE   Tablet 0.5 milliGRAM(s) Oral two times a day  senna 2 Tablet(s) Oral at bedtime  traMADol 25 milliGRAM(s) Oral every 6 hours PRN    acetaminophen   Tablet .. 650 milliGRAM(s) Oral every 6 hours PRN  aspirin enteric coated 81 milliGRAM(s) Oral daily  cefepime   IVPB      cefepime   IVPB 2000 milliGRAM(s) IV Intermittent every 12 hours  enoxaparin Injectable 40 milliGRAM(s) SubCutaneous daily  gabapentin 100 milliGRAM(s) Oral every 12 hours  lisinopril 10 milliGRAM(s) Oral daily  metoprolol tartrate 25 milliGRAM(s) Oral two times a day  metoprolol tartrate Injectable 5 milliGRAM(s) IV Push every 6 hours  metroNIDAZOLE  IVPB 500 milliGRAM(s) IV Intermittent every 8 hours  metroNIDAZOLE  IVPB      polyethylene glycol 3350 17 Gram(s) Oral daily PRN  risperiDONE   Tablet 0.5 milliGRAM(s) Oral two times a day  senna 2 Tablet(s) Oral at bedtime  traMADol 25 milliGRAM(s) Oral every 6 hours PRN    Drug Dosing Weight  Height (cm): 170.2 (18 Jan 2020 00:00)  Weight (kg): 54.3 (18 Jan 2020 00:00)  BMI (kg/m2): 18.7 (18 Jan 2020 00:00)  BSA (m2): 1.63 (18 Jan 2020 00:00)    CENTRAL LINE: [ ] YES [x ] NO  LOCATION:   DATE INSERTED:  REMOVE: [ ] YES [ ] NO  EXPLAIN:    BIANCHI: [ ] YES [x ] NO    DATE INSERTED:  REMOVE:  [ ] YES [ ] NO  EXPLAIN:    A-LINE:  [ ] YES [x ] NO  LOCATION:   DATE INSERTED:  REMOVE:  [ ] YES [ ] NO  EXPLAIN:        ICU Vital Signs Last 24 Hrs  T(C): 36.1 (18 Jan 2020 00:00), Max: 36.8 (17 Jan 2020 04:52)  T(F): 96.9 (18 Jan 2020 00:00), Max: 98.2 (17 Jan 2020 04:52)  HR: 77 (18 Jan 2020 02:00) (77 - 107)  BP: 135/60 (18 Jan 2020 02:00) (98/59 - 173/89)  BP(mean): 77 (18 Jan 2020 02:00) (67 - 108)  ABP: --  ABP(mean): --  RR: 20 (18 Jan 2020 02:00) (12 - 25)  SpO2: 100% (18 Jan 2020 02:00) (96% - 100%)    PHYSICAL EXAM:  GENERAL: NAD  HEENT: Normocephalic;  conjunctivae and sclerae clear; moist mucous membranes;   NECK : supple  CHEST/LUNG: Clear to auscultation bilaterally with good air entry   HEART: S1 S2  regular; no murmurs, gallops or rubs  ABDOMEN: Soft, Nontender, Nondistended; Bowel sounds present  EXTREMITIES:  DP and PT pulses non-palpable b/l.  Left DP faintly dopplerable.  Unable to doppler left PT, Unable to find right DP or PT with doppler.  No CFT to right 2nd toe and right 1st toe. CFT >3sec to plantar aspect of right 3rd and 4th toe. Derm: Right 2nd toe: black necrotic to the level of the proximal with exposed bone.  Distal aspect of the right 2nd toe is hard and dry, no drainage to the right 2nd toe.   Right foot erythema to level just distal to ankle joint. Right 1st, 3rd, and 4th toes are dusky. Hyperkeratotic tissue to dorsal 3rd and 4th toes on the right.      SKIN: warm and dry; no rash  NERVOUS SYSTEM:  Awake and alert;      LABS:  CBC Full  -  ( 18 Jan 2020 02:03 )  WBC Count : 5.97 K/uL  RBC Count : 4.51 M/uL  Hemoglobin : 13.7 g/dL  Hematocrit : 43.2 %  Platelet Count - Automated : 206 K/uL  Mean Cell Volume : 95.8 fl  Mean Cell Hemoglobin : 30.4 pg  Mean Cell Hemoglobin Concentration : 31.7 gm/dL  Auto Neutrophil # : x  Auto Lymphocyte # : x  Auto Monocyte # : x  Auto Eosinophil # : x  Auto Basophil # : x  Auto Neutrophil % : x  Auto Lymphocyte % : x  Auto Monocyte % : x  Auto Eosinophil % : x  Auto Basophil % : x    01-18    141  |  107  |  28<H>  ----------------------------<  101<H>  4.2   |  29  |  0.91    Ca    8.5      18 Jan 2020 02:03  Mg     1.9     01-18    TPro  7.1  /  Alb  2.8<L>  /  TBili  0.3  /  DBili  x   /  AST  27  /  ALT  21  /  AlkPhos  65  01-18    PT/INR - ( 17 Jan 2020 06:50 )   PT: 12.7 sec;   INR: 1.14 ratio         PTT - ( 17 Jan 2020 06:50 )  PTT:37.7 sec        RADIOLOGY & ADDITIONAL STUDIES REVIEWED:  ***    [ ]GOALS OF CARE DISCUSSION WITH PATIENT/FAMILY/PROXY:    CRITICAL CARE TIME SPENT: 35 minutes

## 2020-01-18 NOTE — PROGRESS NOTE ADULT - SUBJECTIVE AND OBJECTIVE BOX
DATE OF SERVICE 01/18/2020    PRESENTING CC:Gangrene    SUBJ: 74 year old female, live by herself, ambulates with cane  has medical hx significant for dementia, CAD s/p CABG,HTN,HLD,PAD presents to the ED with complaint of discoloration for a toe on the right foot.  Underwent Intraoperative Angiogram bilateral Lower extremity for peripheral arterial disease, intermittent claudication and right 1st and 2nd toe gangrene. She is found to have diffuse atherosclerotic disease with multilevel arterial occlusion.    PMH -reviewed admission note, no change since admission  Heart failure: acute [ ] chronic [ ] acute or chronic [ ] diastolic [ ] systolic [ ] combined systolic and diastolic[ ]  VANESSA: ATN[ ] renal medullary necrosis [ ] CKD I [ ]CKDII [ ]CKD III [ ]CKD IV [ ]CKD V [ ]Other pathological lesions [ ]    MEDICATIONS  (STANDING):  cefepime   IVPB 2000 milliGRAM(s) IV Intermittent every 12 hours  enoxaparin Injectable 40 milliGRAM(s) SubCutaneous daily  gabapentin 100 milliGRAM(s) Oral every 12 hours  lisinopril 10 milliGRAM(s) Oral daily  metoprolol tartrate 25 milliGRAM(s) Oral two times a day  metoprolol tartrate Injectable 5 milliGRAM(s) IV Push every 6 hours  metroNIDAZOLE  IVPB 500 milliGRAM(s) IV Intermittent every 8 hours  metroNIDAZOLE  IVPB      polyethylene glycol 3350 17 Gram(s) Oral daily PRN  risperiDONE   Tablet 0.5 milliGRAM(s) Oral two times a day  senna 2 Tablet(s) Oral at bedtime  traMADol 25 milliGRAM(s) Oral every 6 hours PRN      REVIEW OF SYSTEMS:  Constitutional: [ ] fever, [ ]weight loss,  [x]fatigue  Eyes: [ ] visual changes  Respiratory: [ ]shortness of breath;  [ ] cough, [ ]wheezing, [ ]chills, [ ]hemoptysis  Cardiovascular: [ ] chest pain, [ ]palpitations, [ ]dizziness,  [ ]leg swelling[ ]orthopnea[ ]PND  Gastrointestinal: [ ] abdominal pain, [ ]nausea, [ ]vomiting,  [ ]diarrhea   Genitourinary: [ ] dysuria, [ ] hematuria  Neurologic: [ ] headaches [ ] tremors[ ]weakness  Skin: [ ] itching, [ ]burning, [ ] rashes  Endocrine: [ ] heat or cold intolerance  Musculoskeletal: [ ] joint pain or swelling; [ ] muscle, back, or extremity pain  Psychiatric: [ ] depression, [ ]anxiety, [x ]mood swings, or [ ]difficulty sleeping  Hematologic: [ ] easy bruising, [ ] bleeding gums    [x] All remaining systems negative except as per above.   [ ]Unable to obtain.    Vital Signs Last 24 Hrs  T(C): 36.1 (18 Jan 2020 00:00), Max: 36.8 (17 Jan 2020 04:52)  T(F): 96.9 (18 Jan 2020 00:00), Max: 98.2 (17 Jan 2020 04:52)  HR: 77 (18 Jan 2020 02:00) (77 - 107)  BP: 135/60 (18 Jan 2020 02:00) (98/59 - 173/89)  BP(mean): 77 (18 Jan 2020 02:00) (67 - 108)  RR: 20 (18 Jan 2020 02:00) (12 - 25)  SpO2: 100% (18 Jan 2020 02:00) (96% - 100%)      PHYSICAL EXAM:  GENERAL: NAD  HEENT: Normocephalic;  conjunctivae and sclerae clear; moist mucous membranes;   NECK : supple  CHEST/LUNG: Clear to auscultation bilaterally with good air entry   HEART: S1 S2  regular; no murmurs, gallops or rubs  ABDOMEN: Soft, Nontender, Nondistended; Bowel sounds present  EXTREMITIES:  DP and PT pulses non-palpable b/l.  Left DP faintly dopplerable.  Unable to doppler left PT, Unable to find right DP or PT with doppler.  No CFT to right 2nd toe and right 1st toe. CFT >3sec to plantar aspect of right 3rd and 4th toe. Derm: Right 2nd toe: black necrotic to the level of the proximal with exposed bone.  Distal aspect of the right 2nd toe is hard and dry, no drainage to the right 2nd toe.   Right foot erythema to level just distal to ankle joint. Right 1st, 3rd, and 4th toes are dusky. Hyperkeratotic tissue to dorsal 3rd and 4th toes on the right.      SKIN: warm and dry; no rash  NERVOUS SYSTEM:  Awake and alert;      LABS:  01-18    141  |  107  |  28<H>  ----------------------------<  101<H>  4.2   |  29  |  0.91    Ca    8.5      18 Jan 2020 02:03  Mg     1.9     01-18    TPro  7.1  /  Alb  2.8<L>  /  TBili  0.3  /  DBili  x   /  AST  27  /  ALT  21  /  AlkPhos  65  01-18    PT/INR - ( 17 Jan 2020 06:50 )   PT: 12.7 sec;   INR: 1.14 ratio         PTT - ( 17 Jan 2020 06:50 )  PTT:37.7 sec      WBC Count : 5.97 K/uL  RBC Count : 4.51 M/uL  Hemoglobin : 13.7 g/dL  Hematocrit : 43.2 %  Platelet Count - Automated : 206 K/uL      ECG [my interpretation]:Sinus rhythm at 100BPM RBBB no acute ST T wave abnormalities    TELEMETRY:Sinus rhythm    ECHO:1/14/2020Grossly normal left ventricular systolic function EF >55 %  Normal left ventricular internal dimensions and wall thicknesses.  RV systolic pressure is 35 mm Hg.  Normal left atrium.       IMPRESSION AND PLAN:    Problem/Plan - 1:  ·  Problem: Dry gangrene.  Plan: Dry gangrene with surrounding cellulitis  Underwent Intraoperative Angiogram bilateral Lower extremity for peripheral arterial disease, intermittent claudication and right 1st and 2nd toe gangrene. She is found to have diffuse atherosclerotic disease with multilevel arterial occlusion.        Problem/Plan - 2:  ·  Problem: CAD (coronary artery disease).  Plan: Chronic Stable Ischemic heart disease  Continue Aspirin/Metoprolol    Problem/Plan - 3:  ·  Problem: COPD (chronic obstructive pulmonary disease).  Plan: currently pt in not in exacerbation.   Smoking cessation adviced    Problem/Plan - 4:  ·  Problem: Prophylactic measure.  Plan: VTE score 1   started lovenox for dvt ppx.

## 2020-01-18 NOTE — PROGRESS NOTE ADULT - SUBJECTIVE AND OBJECTIVE BOX
Post Operative Day #: 1    SUBJECTIVE: 74y Female s/p OR angiogram 1/18    INTERVAL HPI/OVERNIGHT EVENTS:    Pt confused this morning.  C/o pain in R foot with touch, unchanged  Pt anxious as per nursing staff      MEDICATIONS  (STANDING):  aspirin enteric coated 81 milliGRAM(s) Oral daily  cefepime   IVPB      cefepime   IVPB 2000 milliGRAM(s) IV Intermittent every 12 hours  enoxaparin Injectable 40 milliGRAM(s) SubCutaneous daily  gabapentin 100 milliGRAM(s) Oral every 12 hours  lisinopril 10 milliGRAM(s) Oral daily  metoprolol tartrate 25 milliGRAM(s) Oral two times a day  metoprolol tartrate Injectable 5 milliGRAM(s) IV Push every 6 hours  metroNIDAZOLE  IVPB 500 milliGRAM(s) IV Intermittent every 8 hours  metroNIDAZOLE  IVPB      risperiDONE   Tablet 0.5 milliGRAM(s) Oral two times a day  senna 2 Tablet(s) Oral at bedtime    MEDICATIONS  (PRN):  acetaminophen   Tablet .. 650 milliGRAM(s) Oral every 6 hours PRN Mild Pain (1 - 3)  polyethylene glycol 3350 17 Gram(s) Oral daily PRN Constipation  traMADol 25 milliGRAM(s) Oral every 6 hours PRN Moderate Pain (4 - 6)      OBJECTIVE:  Vital Signs Last 24 Hrs  T(C): 35.8 (18 Jan 2020 07:34), Max: 36.7 (17 Jan 2020 22:40)  T(F): 96.5 (18 Jan 2020 07:34), Max: 98.1 (17 Jan 2020 22:40)  HR: 74 (18 Jan 2020 09:00) (74 - 107)  BP: 110/41 (18 Jan 2020 09:00) (74/59 - 173/89)  BP(mean): 59 (18 Jan 2020 09:00) (58 - 108)  RR: 11 (18 Jan 2020 09:00) (11 - 25)  SpO2: 98% (18 Jan 2020 09:00) (77% - 100%)    Physical Exam:    Gen: awake, alert not oriented, mild distress secondary to pain  Abdomen: soft NT ND  Pelvic: L groin angiogram access dressing in place c/d/i  Extremities: R LE with gangrene of toes  Vasc: no dopplerable R DP/PT pulses, L DP pulse            I&O's Detail    17 Jan 2020 07:01  -  18 Jan 2020 07:00  --------------------------------------------------------  IN:    dextrose 5% + sodium chloride 0.9%: 60 mL    Solution: 100 mL    Solution: 200 mL  Total IN: 360 mL    OUT:    Voided: 500 mL  Total OUT: 500 mL    Total NET: -140 mL                                13.7   5.97  )-----------( 206      ( 18 Jan 2020 02:03 )             43.2     18 Jan 2020 02:03    141    |  107    |  28     ----------------------------<  101    4.2     |  29     |  0.91   17 Jan 2020 10:10    139    |  104    |  36     ----------------------------<  107    4.1     |  31     |  0.93   17 Jan 2020 06:50    142    |  106    |  40     ----------------------------<  103    5.4     |  30     |  1.05     Ca    8.5        18 Jan 2020 02:03  Ca    9.3        17 Jan 2020 10:10  Ca    9.5        17 Jan 2020 06:50  Mg     1.9       18 Jan 2020 02:03    TPro  7.1    /  Alb  2.8    /  TBili  0.3    /  DBili  x      /  AST  27     /  ALT  21     /  AlkPhos  65     18 Jan 2020 02:03  TPro  7.7    /  Alb  3.3    /  TBili  0.4    /  DBili  x      /  AST  34     /  ALT  24     /  AlkPhos  69     16 Jan 2020 07:36    PT/INR - ( 17 Jan 2020 06:50 )   PT: 12.7 sec;   INR: 1.14 ratio         PTT - ( 17 Jan 2020 06:50 )  PTT:37.7 sec

## 2020-01-19 LAB
ALBUMIN SERPL ELPH-MCNC: 2 G/DL — LOW (ref 3.5–5)
ALP SERPL-CCNC: 46 U/L — SIGNIFICANT CHANGE UP (ref 40–120)
ALT FLD-CCNC: 10 U/L DA — SIGNIFICANT CHANGE UP (ref 10–60)
ANION GAP SERPL CALC-SCNC: 9 MMOL/L — SIGNIFICANT CHANGE UP (ref 5–17)
AST SERPL-CCNC: 21 U/L — SIGNIFICANT CHANGE UP (ref 10–40)
BILIRUB SERPL-MCNC: 0.2 MG/DL — SIGNIFICANT CHANGE UP (ref 0.2–1.2)
BUN SERPL-MCNC: 24 MG/DL — HIGH (ref 7–18)
CALCIUM SERPL-MCNC: 6.8 MG/DL — LOW (ref 8.4–10.5)
CHLORIDE SERPL-SCNC: 112 MMOL/L — HIGH (ref 96–108)
CO2 SERPL-SCNC: 22 MMOL/L — SIGNIFICANT CHANGE UP (ref 22–31)
CREAT SERPL-MCNC: 0.81 MG/DL — SIGNIFICANT CHANGE UP (ref 0.5–1.3)
GLUCOSE SERPL-MCNC: 186 MG/DL — HIGH (ref 70–99)
HCT VFR BLD CALC: 27 % — LOW (ref 34.5–45)
HCT VFR BLD CALC: 44.1 % — SIGNIFICANT CHANGE UP (ref 34.5–45)
HGB BLD-MCNC: 14.1 G/DL — SIGNIFICANT CHANGE UP (ref 11.5–15.5)
HGB BLD-MCNC: 8.3 G/DL — LOW (ref 11.5–15.5)
MAGNESIUM SERPL-MCNC: 1.4 MG/DL — LOW (ref 1.6–2.6)
MCHC RBC-ENTMCNC: 30.3 PG — SIGNIFICANT CHANGE UP (ref 27–34)
MCHC RBC-ENTMCNC: 30.7 GM/DL — LOW (ref 32–36)
MCHC RBC-ENTMCNC: 30.7 PG — SIGNIFICANT CHANGE UP (ref 27–34)
MCHC RBC-ENTMCNC: 32 GM/DL — SIGNIFICANT CHANGE UP (ref 32–36)
MCV RBC AUTO: 96.1 FL — SIGNIFICANT CHANGE UP (ref 80–100)
MCV RBC AUTO: 98.5 FL — SIGNIFICANT CHANGE UP (ref 80–100)
NRBC # BLD: 0 /100 WBCS — SIGNIFICANT CHANGE UP (ref 0–0)
NRBC # BLD: 0 /100 WBCS — SIGNIFICANT CHANGE UP (ref 0–0)
PHOSPHATE SERPL-MCNC: <2.5 MG/DL — SIGNIFICANT CHANGE UP (ref 2.5–4.5)
PLATELET # BLD AUTO: 126 K/UL — LOW (ref 150–400)
PLATELET # BLD AUTO: 183 K/UL — SIGNIFICANT CHANGE UP (ref 150–400)
POTASSIUM SERPL-MCNC: 3.4 MMOL/L — LOW (ref 3.5–5.3)
POTASSIUM SERPL-SCNC: 3.4 MMOL/L — LOW (ref 3.5–5.3)
PROT SERPL-MCNC: 5 G/DL — LOW (ref 6–8.3)
RBC # BLD: 2.74 M/UL — LOW (ref 3.8–5.2)
RBC # BLD: 4.59 M/UL — SIGNIFICANT CHANGE UP (ref 3.8–5.2)
RBC # FLD: 12.5 % — SIGNIFICANT CHANGE UP (ref 10.3–14.5)
RBC # FLD: 12.6 % — SIGNIFICANT CHANGE UP (ref 10.3–14.5)
SODIUM SERPL-SCNC: 143 MMOL/L — SIGNIFICANT CHANGE UP (ref 135–145)
WBC # BLD: 5.15 K/UL — SIGNIFICANT CHANGE UP (ref 3.8–10.5)
WBC # BLD: 7.25 K/UL — SIGNIFICANT CHANGE UP (ref 3.8–10.5)
WBC # FLD AUTO: 5.15 K/UL — SIGNIFICANT CHANGE UP (ref 3.8–10.5)
WBC # FLD AUTO: 7.25 K/UL — SIGNIFICANT CHANGE UP (ref 3.8–10.5)

## 2020-01-19 RX ORDER — CHLORHEXIDINE GLUCONATE 213 G/1000ML
1 SOLUTION TOPICAL EVERY 12 HOURS
Refills: 0 | Status: COMPLETED | OUTPATIENT
Start: 2020-01-19 | End: 2020-01-20

## 2020-01-19 RX ADMIN — RISPERIDONE 0.5 MILLIGRAM(S): 4 TABLET ORAL at 18:12

## 2020-01-19 RX ADMIN — CEFEPIME 50 MILLIGRAM(S): 1 INJECTION, POWDER, FOR SOLUTION INTRAMUSCULAR; INTRAVENOUS at 18:12

## 2020-01-19 RX ADMIN — GABAPENTIN 100 MILLIGRAM(S): 400 CAPSULE ORAL at 18:12

## 2020-01-19 RX ADMIN — GABAPENTIN 100 MILLIGRAM(S): 400 CAPSULE ORAL at 05:48

## 2020-01-19 RX ADMIN — Medication 25 MILLIGRAM(S): at 18:12

## 2020-01-19 RX ADMIN — Medication 100 MILLIGRAM(S): at 21:38

## 2020-01-19 RX ADMIN — Medication 81 MILLIGRAM(S): at 11:23

## 2020-01-19 RX ADMIN — CHLORHEXIDINE GLUCONATE 1 APPLICATION(S): 213 SOLUTION TOPICAL at 18:13

## 2020-01-19 RX ADMIN — OXYCODONE AND ACETAMINOPHEN 1 TABLET(S): 5; 325 TABLET ORAL at 12:53

## 2020-01-19 RX ADMIN — RISPERIDONE 0.5 MILLIGRAM(S): 4 TABLET ORAL at 05:48

## 2020-01-19 RX ADMIN — LISINOPRIL 10 MILLIGRAM(S): 2.5 TABLET ORAL at 05:48

## 2020-01-19 RX ADMIN — SENNA PLUS 2 TABLET(S): 8.6 TABLET ORAL at 21:38

## 2020-01-19 RX ADMIN — OXYCODONE AND ACETAMINOPHEN 1 TABLET(S): 5; 325 TABLET ORAL at 13:23

## 2020-01-19 RX ADMIN — CEFEPIME 50 MILLIGRAM(S): 1 INJECTION, POWDER, FOR SOLUTION INTRAMUSCULAR; INTRAVENOUS at 05:49

## 2020-01-19 RX ADMIN — Medication 100 MILLIGRAM(S): at 13:51

## 2020-01-19 RX ADMIN — Medication 100 MILLIGRAM(S): at 05:49

## 2020-01-19 RX ADMIN — ENOXAPARIN SODIUM 40 MILLIGRAM(S): 100 INJECTION SUBCUTANEOUS at 11:23

## 2020-01-19 RX ADMIN — Medication 25 MILLIGRAM(S): at 05:49

## 2020-01-19 NOTE — PROGRESS NOTE ADULT - ASSESSMENT
s/p OR angio 1/17. NO angioplasty as pt with severe bilateral multilevel arterial occlusive disease  1. Pt will need open bypass surgery bilaterally  2. Please provide cardiology clearance  3. Will follow

## 2020-01-19 NOTE — PROGRESS NOTE ADULT - ASSESSMENT
Patient is 74 year old female, live by herself, ambulates with cane  has medical hx significant for dementia, cad, pvd, hld, htn presents to the ED with complaint of discoloration for a toe on the right foot.  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 She is found to have diffuse atherosclerotic disease with multilevel arterial occlusion. Marginal/poor candidate for endovascular intervention. Patient is transferred to ICU for frequent monitoring after the angiogram.     Plan:    Neuro:   History of dementia  c/w risperidone   Pain not well controlled with tramadol. started morphine    CVS:  CAD  c/w asa  History of HTN  c/w Lisinopril and metoprolol  History of PVD:  s/p Angiogram. No intervention performed due to poor candidate for endovascular reintervention  Frequent Hourly groin check x Q 24 hours  Vascular surgery follow up     Pulmonary:  No issues    GI:  Bowel regime due to being on pain control meds such as opioids   No issues  DASH diet    Nephrology  No issues    ID:  c/w cefepime (day 6) and metronidazole (day 7) for possible osteomyelitis of  right second toe gangrene possible underlying osteomyelitis  afebrile, No leucocytosis  cultures blood are negative so far  Podiatry follow up     Heme-onc  No issues    Prophylaxis  DVT prophylaxis with Lovenox Patient is 74 year old female, live by herself, ambulates with cane  has medical hx significant for dementia, cad, pvd, hld, htn presents to the ED with complaint of discoloration for a toe on the right foot.  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 She is found to have diffuse atherosclerotic disease with multilevel arterial occlusion. Marginal/poor candidate for endovascular intervention. Patient is transferred to ICU for frequent monitoring after the angiogram.     Plan:    Neuro:   History of dementia  c/w risperidone   Pain not well controlled with tramadol. started morphine    CVS:  CAD  c/w asa  History of HTN  c/w Lisinopril and metoprolol  History of PVD:  s/p Angiogram. No intervention performed due to poor candidate for endovascular reintervention  Frequent Hourly groin check x Q 24 hours  Plan for Vascular surgery after cardiac clearance.    Pulmonary:  No issues    GI:  Bowel regime due to being on pain control meds such as opioids   No issues  DASH diet    Nephrology  No issues    ID:  c/w cefepime (day 6) and metronidazole (day 7) for possible osteomyelitis of  right second toe gangrene possible underlying osteomyelitis  afebrile, No leucocytosis  cultures blood are negative so far  Podiatry follow up     Heme-onc  No issues    Prophylaxis  DVT prophylaxis with Lovenox

## 2020-01-19 NOTE — PROGRESS NOTE ADULT - SUBJECTIVE AND OBJECTIVE BOX
DATE OF SERVICE 01/19/2020    INTERVAL HPI/OVERNIGHT EVENTS:   No acute events reported overnight.    Pt is seen at the bedside. Pt is found resting comfortably in bed in no acute distress, reports feeling well and denies any new complaints today. Patient denies nausea, vomiting, diarrhea, chest pain, SOB, headache, dizziness. Plan for Bypass surgery    MEDS:, acetaminophen   Tablet .. 650 milliGRAM(s) Oral every 6 hours PRN  gabapentin 100 milliGRAM(s) Oral every 12 hours  oxycodone    5 mG/acetaminophen 325 mG 1 Tablet(s) Oral every 6 hours PRN  polyethylene glycol 3350 17 Gram(s) Oral daily PRN  risperiDONE   Tablet 0.5 milliGRAM(s) Oral two times a day  senna 2 Tablet(s) Oral at bedtime  traMADol 25 milliGRAM(s) Oral every 6 hours PRN  aspirin enteric coated 81 milliGRAM(s) Oral daily  cefepime   IVPB      cefepime   IVPB 2000 milliGRAM(s) IV Intermittent every 12 hours  enoxaparin Injectable 40 milliGRAM(s) SubCutaneous daily  gabapentin 100 milliGRAM(s) Oral every 12 hours  lisinopril 10 milliGRAM(s) Oral daily  metoprolol tartrate 25 milliGRAM(s) Oral two times a day  metroNIDAZOLE  IVPB 500 milliGRAM(s) IV Intermittent every 8 hours  metroNIDAZOLE  IVPB        VITALS:  T(C): 36.5 (18 Jan 2020 23:50), Max: 36.5 (18 Jan 2020 23:50)  T(F): 97.7 (18 Jan 2020 23:50), Max: 97.7 (18 Jan 2020 23:50)  HR: 72 (19 Jan 2020 00:00) (68 - 92)  BP: 98/47 (19 Jan 2020 00:00) (68/39 - 162/75)  BP(mean): 60 (19 Jan 2020 00:00) (46 - 99)  RR: 15 (19 Jan 2020 00:00) (11 - 22)  SpO2: 96% (19 Jan 2020 00:00) (77% - 100%)PHYSICAL EXAM:    GENERAL: NAD  HEENT: Normocephalic;  conjunctivae and sclerae clear; moist mucous membranes;   NECK : supple  CHEST/LUNG: Clear to auscultation bilaterally with good air entry   HEART: S1 S2  regular; no murmurs, gallops or rubs  ABDOMEN: Soft, Nontender, Nondistended; Bowel sounds present  EXTREMITIES:  DP and PT pulses non-palpable b/l.  Left DP faintly dopplerable.  Unable to doppler left PT, Unable to find right DP or PT with doppler.  No CFT to right 2nd toe and right 1st toe. CFT >3sec to plantar aspect of right 3rd and 4th toe. Derm: Right 2nd toe: black necrotic to the level of the proximal with exposed bone.  Distal aspect of the right 2nd toe is hard and dry, no drainage to the right 2nd toe.   Right foot erythema to level just distal to ankle joint. Right 1st, 3rd, and 4th toes are dusky. Hyperkeratotic tissue to dorsal 3rd and 4th toes on the right.      SKIN: warm and dry; no rash  NERVOUS SYSTEM:  Awake and alert;periods disorientation            Assessment and Plan:   · Assessment		  Patient is 74 year old female, live by herself, ambulates with cane  has medical hx significant for dementia, cad, pvd, hld, htn presents to the ED with complaint of discoloration for a toe on the right foot.  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 She is found to have diffuse atherosclerotic disease with multilevel arterial occlusion. Marginal/poor candidate for endovascular intervention        Plan:  CVS:  CAD  c/w asa  History of HTN  c/w Lisinopril and metoprolol  Needs peripheral revascularization          History of PVD:  s/p Angiogram. No intervention performed due to poor candidate for endovascular reintervention  Frequent Hourly groin check x Q 24 hours  Plan for Vascular surgery     GI:  Bowel regime due to being on pain control meds such as opioids   No issues  DASH diet    Nephrology  No issues    ID:  c/w cefepime (day 6) and metronidazole (day 7) for possible osteomyelitis of  right second toe gangrene possible underlying osteomyelitis  afebrile, No leucocytosis  cultures blood are negative so far

## 2020-01-19 NOTE — CHART NOTE - NSCHARTNOTEFT_GEN_A_CORE
Assessment:   Patient is a 74y old  Female who presents with a chief complaint of dry gangrene (2020 13:04)Pt transferred to ICU  from OR/ PACU. Pt seen, asleep. Discussed with PGY 1 (pt severely malnourished), Enlive TID added, as per recommendation.      Factors impacting intake: [ ] none [ ] nausea  [ ] vomiting [ ] diarrhea [ ] constipation  [ ]chewing problems [ ] swallowing issues  [ ] other:     Diet Prescription: Diet, DASH/TLC:   Sodium & Cholesterol Restricted  Supplement Feeding Modality:  Oral  Ensure Enlive Cans or Servings Per Day:  1       Frequency:  Three Times a day (20 @ 15:06)        Daily Height in cm: 170.18 (2020 00:00)  Height in cm: 170.18 (15 Angel 2020 04:08)  Height in cm: 170.18 (10 Angel 2020 12:41)      Daily Weight in k (2020 07:00)  Weight in k.7 (15 Angel 2020 14:15)        Pertinent Medications: MEDICATIONS  (STANDING):  aspirin enteric coated 81 milliGRAM(s) Oral daily  cefepime   IVPB      cefepime   IVPB 2000 milliGRAM(s) IV Intermittent every 12 hours  chlorhexidine 2% Cloths 1 Application(s) Topical every 12 hours  chlorhexidine 2% Cloths 1 Application(s) Topical every 12 hours  enoxaparin Injectable 40 milliGRAM(s) SubCutaneous daily  gabapentin 100 milliGRAM(s) Oral every 12 hours  lisinopril 10 milliGRAM(s) Oral daily  metoprolol tartrate 25 milliGRAM(s) Oral two times a day  metroNIDAZOLE  IVPB 500 milliGRAM(s) IV Intermittent every 8 hours  metroNIDAZOLE  IVPB      risperiDONE   Tablet 0.5 milliGRAM(s) Oral two times a day  senna 2 Tablet(s) Oral at bedtime    MEDICATIONS  (PRN):  acetaminophen   Tablet .. 650 milliGRAM(s) Oral every 6 hours PRN Mild Pain (1 - 3)  oxycodone    5 mG/acetaminophen 325 mG 1 Tablet(s) Oral every 6 hours PRN Moderate Pain (4 - 6)  polyethylene glycol 3350 17 Gram(s) Oral daily PRN Constipation  traMADol 25 milliGRAM(s) Oral every 6 hours PRN Severe Pain (7 - 10)    Pertinent Labs:  Na143 mmol/L Glu 186 mg/dL<H> K+ 3.4 mmol/L<L> Cr  0.81 mg/dL BUN 24 mg/dL<H>  Phos <2.5 mg/dL  Alb 2.0 g/dL<L>  [id number] 5.6 %     CAPILLARY BLOOD GLUCOSE        Skin:   Estimated Needs:   [x ] no change since previous assessment  [ ] recalculated:       Previous Nutrition Diagnosis:   [ ] Altered GI function  [ ]Inadequate Oral Intake [ ] Swallowing Difficulty   [ ] Altered nutrition related labs [ ] Increased Nutrient Needs [ ] Overweight/Obesity   [ ] Unintended Weight Loss [ ] Food & Nutrition Related Knowledge Deficit [x ] Malnutrition (severe PCM)  [ ] Other:     Nutrition Diagnosis is [x ] ongoing  [ ] resolved [ ] not applicable     Interventions:   Recommend  [ ] Change Diet To:  [x ] Nutrition Supplement: Enlive TID added.   [ ] Nutrition Support  [x ] Other: MD to monitor. RD available.     Monitoring and Evaluation:   [x ] PO intake [ x ] Tolerance to diet prescription [ x ] weights [ x ] labs[ x ] follow up per protocol  [ ] other:

## 2020-01-19 NOTE — DISCHARGE NOTE PROVIDER - CARE PROVIDERS DIRECT ADDRESSES
[email or web address]

## 2020-01-19 NOTE — PROGRESS NOTE ADULT - SUBJECTIVE AND OBJECTIVE BOX
INTERVAL HPI/OVERNIGHT EVENTS: ***    PRESSORS: [ ] YES [ ] NO  WHICH:    ANTIBIOTICS:                  DATE STARTED:  ANTIBIOTICS:                  DATE STARTED:  ANTIBIOTICS:                  DATE STARTED:    Antimicrobial:  cefepime   IVPB      cefepime   IVPB 2000 milliGRAM(s) IV Intermittent every 12 hours  metroNIDAZOLE  IVPB 500 milliGRAM(s) IV Intermittent every 8 hours  metroNIDAZOLE  IVPB        Cardiovascular:  lisinopril 10 milliGRAM(s) Oral daily  metoprolol tartrate 25 milliGRAM(s) Oral two times a day    Pulmonary:    Hematalogic:  aspirin enteric coated 81 milliGRAM(s) Oral daily  enoxaparin Injectable 40 milliGRAM(s) SubCutaneous daily    Other:  acetaminophen   Tablet .. 650 milliGRAM(s) Oral every 6 hours PRN  gabapentin 100 milliGRAM(s) Oral every 12 hours  oxycodone    5 mG/acetaminophen 325 mG 1 Tablet(s) Oral every 6 hours PRN  polyethylene glycol 3350 17 Gram(s) Oral daily PRN  risperiDONE   Tablet 0.5 milliGRAM(s) Oral two times a day  senna 2 Tablet(s) Oral at bedtime  traMADol 25 milliGRAM(s) Oral every 6 hours PRN    acetaminophen   Tablet .. 650 milliGRAM(s) Oral every 6 hours PRN  aspirin enteric coated 81 milliGRAM(s) Oral daily  cefepime   IVPB      cefepime   IVPB 2000 milliGRAM(s) IV Intermittent every 12 hours  enoxaparin Injectable 40 milliGRAM(s) SubCutaneous daily  gabapentin 100 milliGRAM(s) Oral every 12 hours  lisinopril 10 milliGRAM(s) Oral daily  metoprolol tartrate 25 milliGRAM(s) Oral two times a day  metroNIDAZOLE  IVPB 500 milliGRAM(s) IV Intermittent every 8 hours  metroNIDAZOLE  IVPB      oxycodone    5 mG/acetaminophen 325 mG 1 Tablet(s) Oral every 6 hours PRN  polyethylene glycol 3350 17 Gram(s) Oral daily PRN  risperiDONE   Tablet 0.5 milliGRAM(s) Oral two times a day  senna 2 Tablet(s) Oral at bedtime  traMADol 25 milliGRAM(s) Oral every 6 hours PRN    Drug Dosing Weight  Height (cm): 170.2 (18 Jan 2020 00:00)  Weight (kg): 54.3 (18 Jan 2020 00:00)  BMI (kg/m2): 18.7 (18 Jan 2020 00:00)  BSA (m2): 1.63 (18 Jan 2020 00:00)    CENTRAL LINE: [ ] YES [ ] NO  LOCATION:   DATE INSERTED:  REMOVE: [ ] YES [ ] NO  EXPLAIN:    BIANCHI: [ ] YES [ ] NO    DATE INSERTED:  REMOVE:  [ ] YES [ ] NO  EXPLAIN:    A-LINE:  [ ] YES [ ] NO  LOCATION:   DATE INSERTED:  REMOVE:  [ ] YES [ ] NO  EXPLAIN:    PMH -reviewed admission note, no change since admission  PAST MEDICAL & SURGICAL HISTORY:  COPD (chronic obstructive pulmonary disease)  Hypercholesterolemia  Myocardial infarct, old  S/P CABG x 1      ICU Vital Signs Last 24 Hrs  T(C): 36.5 (18 Jan 2020 23:50), Max: 36.5 (18 Jan 2020 23:50)  T(F): 97.7 (18 Jan 2020 23:50), Max: 97.7 (18 Jan 2020 23:50)  HR: 72 (19 Jan 2020 00:00) (68 - 92)  BP: 98/47 (19 Jan 2020 00:00) (68/39 - 162/75)  BP(mean): 60 (19 Jan 2020 00:00) (46 - 99)  ABP: --  ABP(mean): --  RR: 15 (19 Jan 2020 00:00) (11 - 22)  SpO2: 96% (19 Jan 2020 00:00) (77% - 100%)            01-17 @ 07:01  -  01-18 @ 07:00  --------------------------------------------------------  IN: 360 mL / OUT: 500 mL / NET: -140 mL            PHYSICAL EXAM:    GENERAL: [ ]NAD, [ ]well-groomed, [ ]well-developed  HEAD:  [ ]Atraumatic, [ ]Normocephalic  EYES: [ ]EOMI, [ ]PERRLA, [ ]conjunctiva and sclera clear  ENMT: [ ]No tonsillar erythema, exudates, or enlargement; [ ]Moist mucous membranes, [ ]Good dentition, [ ]No lesions  NECK: [ ]Supple, normal appearance, [ ]No JVD; [ ]Normal thyroid; [ ]Trachea midline  NERVOUS SYSTEM:  [ ]Alert & Oriented X3, [ ]Good concentration; [ ]Motor Strength 5/5 B/L upper and lower extremities; [ ]DTRs 2+ intact and symmetric  CHEST/LUNG: [ ]No chest deformity; [ ]Normal percussion bilaterally; [ ]No rales, rhonchi, wheezing   HEART: [ ]Regular rate and rhythm; [ ]No murmurs, rubs, or gallops  ABDOMEN: [ ]Soft, Nontender, Nondistended; [ ]Bowel sounds present  EXTREMITIES:  [ ]2+ Peripheral Pulses, [ ]No clubbing, cyanosis, or edema  LYMPH: [ ]No lymphadenopathy noted  SKIN: [ ]No rashes or lesions; [ ]Good capillary refill      LABS:  CBC Full  -  ( 18 Jan 2020 02:03 )  WBC Count : 5.97 K/uL  RBC Count : 4.51 M/uL  Hemoglobin : 13.7 g/dL  Hematocrit : 43.2 %  Platelet Count - Automated : 206 K/uL  Mean Cell Volume : 95.8 fl  Mean Cell Hemoglobin : 30.4 pg  Mean Cell Hemoglobin Concentration : 31.7 gm/dL  Auto Neutrophil # : x  Auto Lymphocyte # : x  Auto Monocyte # : x  Auto Eosinophil # : x  Auto Basophil # : x  Auto Neutrophil % : x  Auto Lymphocyte % : x  Auto Monocyte % : x  Auto Eosinophil % : x  Auto Basophil % : x    01-18    141  |  107  |  28<H>  ----------------------------<  101<H>  4.2   |  29  |  0.91    Ca    8.5      18 Jan 2020 02:03  Mg     1.9     01-18    TPro  7.1  /  Alb  2.8<L>  /  TBili  0.3  /  DBili  x   /  AST  27  /  ALT  21  /  AlkPhos  65  01-18    PT/INR - ( 17 Jan 2020 06:50 )   PT: 12.7 sec;   INR: 1.14 ratio         PTT - ( 17 Jan 2020 06:50 )  PTT:37.7 sec        RADIOLOGY & ADDITIONAL STUDIES REVIEWED:  ***    [ ]GOALS OF CARE DISCUSSION WITH PATIENT/FAMILY/PROXY:    CRITICAL CARE TIME SPENT: 35 minutes INTERVAL HPI/OVERNIGHT EVENTS:   No acute events reported overnight.    Pt is seen at the bedside. Pt is found resting comfortably in bed in no acute distress, reports feeling well and denies any new complaints today. Patient denies nausea, vomiting, diarrhea, chest pain, SOB, headache, dizziness.     PRESSORS: [ ] YES [X] NO  WHICH:    ANTIBIOTICS:                  DATE STARTED:  ANTIBIOTICS:                  DATE STARTED:  ANTIBIOTICS:                  DATE STARTED:    Antimicrobial:  cefepime   IVPB      cefepime   IVPB 2000 milliGRAM(s) IV Intermittent every 12 hours  metroNIDAZOLE  IVPB 500 milliGRAM(s) IV Intermittent every 8 hours  metroNIDAZOLE  IVPB        Cardiovascular:  lisinopril 10 milliGRAM(s) Oral daily  metoprolol tartrate 25 milliGRAM(s) Oral two times a day    Pulmonary:    Hematalogic:  aspirin enteric coated 81 milliGRAM(s) Oral daily  enoxaparin Injectable 40 milliGRAM(s) SubCutaneous daily    Other:  acetaminophen   Tablet .. 650 milliGRAM(s) Oral every 6 hours PRN  gabapentin 100 milliGRAM(s) Oral every 12 hours  oxycodone    5 mG/acetaminophen 325 mG 1 Tablet(s) Oral every 6 hours PRN  polyethylene glycol 3350 17 Gram(s) Oral daily PRN  risperiDONE   Tablet 0.5 milliGRAM(s) Oral two times a day  senna 2 Tablet(s) Oral at bedtime  traMADol 25 milliGRAM(s) Oral every 6 hours PRN    acetaminophen   Tablet .. 650 milliGRAM(s) Oral every 6 hours PRN  aspirin enteric coated 81 milliGRAM(s) Oral daily  cefepime   IVPB      cefepime   IVPB 2000 milliGRAM(s) IV Intermittent every 12 hours  enoxaparin Injectable 40 milliGRAM(s) SubCutaneous daily  gabapentin 100 milliGRAM(s) Oral every 12 hours  lisinopril 10 milliGRAM(s) Oral daily  metoprolol tartrate 25 milliGRAM(s) Oral two times a day  metroNIDAZOLE  IVPB 500 milliGRAM(s) IV Intermittent every 8 hours  metroNIDAZOLE  IVPB      oxycodone    5 mG/acetaminophen 325 mG 1 Tablet(s) Oral every 6 hours PRN  polyethylene glycol 3350 17 Gram(s) Oral daily PRN  risperiDONE   Tablet 0.5 milliGRAM(s) Oral two times a day  senna 2 Tablet(s) Oral at bedtime  traMADol 25 milliGRAM(s) Oral every 6 hours PRN    Drug Dosing Weight  Height (cm): 170.2 (18 Jan 2020 00:00)  Weight (kg): 54.3 (18 Jan 2020 00:00)  BMI (kg/m2): 18.7 (18 Jan 2020 00:00)  BSA (m2): 1.63 (18 Jan 2020 00:00)    CENTRAL LINE: [ ] YES [X] NO  LOCATION:   DATE INSERTED:  REMOVE: [ ] YES [ ] NO  EXPLAIN:    BIANCHI: [ ] YES [X] NO    DATE INSERTED:  REMOVE:  [ ] YES [ ] NO  EXPLAIN:    A-LINE:  [ ] YES [X] NO  LOCATION:   DATE INSERTED:  REMOVE:  [ ] YES [ ] NO  EXPLAIN:    PMH -reviewed admission note, no change since admission  PAST MEDICAL & SURGICAL HISTORY:  COPD (chronic obstructive pulmonary disease)  Hypercholesterolemia  Myocardial infarct, old  S/P CABG x 1      ICU Vital Signs Last 24 Hrs  T(C): 36.5 (18 Jan 2020 23:50), Max: 36.5 (18 Jan 2020 23:50)  T(F): 97.7 (18 Jan 2020 23:50), Max: 97.7 (18 Jan 2020 23:50)  HR: 72 (19 Jan 2020 00:00) (68 - 92)  BP: 98/47 (19 Jan 2020 00:00) (68/39 - 162/75)  BP(mean): 60 (19 Jan 2020 00:00) (46 - 99)  ABP: --  ABP(mean): --  RR: 15 (19 Jan 2020 00:00) (11 - 22)  SpO2: 96% (19 Jan 2020 00:00) (77% - 100%)            01-17 @ 07:01  -  01-18 @ 07:00  --------------------------------------------------------  IN: 360 mL / OUT: 500 mL / NET: -140 mL            PHYSICAL EXAM:    GENERAL: NAD  HEENT: Normocephalic;  conjunctivae and sclerae clear; moist mucous membranes;   NECK : supple  CHEST/LUNG: Clear to auscultation bilaterally with good air entry   HEART: S1 S2  regular; no murmurs, gallops or rubs  ABDOMEN: Soft, Nontender, Nondistended; Bowel sounds present  EXTREMITIES:  DP and PT pulses non-palpable b/l.  Left DP faintly dopplerable.  Unable to doppler left PT, Unable to find right DP or PT with doppler.  No CFT to right 2nd toe and right 1st toe. CFT >3sec to plantar aspect of right 3rd and 4th toe. Derm: Right 2nd toe: black necrotic to the level of the proximal with exposed bone.  Distal aspect of the right 2nd toe is hard and dry, no drainage to the right 2nd toe.   Right foot erythema to level just distal to ankle joint. Right 1st, 3rd, and 4th toes are dusky. Hyperkeratotic tissue to dorsal 3rd and 4th toes on the right.      SKIN: warm and dry; no rash  NERVOUS SYSTEM:  Awake and alert;      LABS:  CBC Full  -  ( 18 Jan 2020 02:03 )  WBC Count : 5.97 K/uL  RBC Count : 4.51 M/uL  Hemoglobin : 13.7 g/dL  Hematocrit : 43.2 %  Platelet Count - Automated : 206 K/uL  Mean Cell Volume : 95.8 fl  Mean Cell Hemoglobin : 30.4 pg  Mean Cell Hemoglobin Concentration : 31.7 gm/dL  Auto Neutrophil # : x  Auto Lymphocyte # : x  Auto Monocyte # : x  Auto Eosinophil # : x  Auto Basophil # : x  Auto Neutrophil % : x  Auto Lymphocyte % : x  Auto Monocyte % : x  Auto Eosinophil % : x  Auto Basophil % : x    01-18    141  |  107  |  28<H>  ----------------------------<  101<H>  4.2   |  29  |  0.91    Ca    8.5      18 Jan 2020 02:03  Mg     1.9     01-18    TPro  7.1  /  Alb  2.8<L>  /  TBili  0.3  /  DBili  x   /  AST  27  /  ALT  21  /  AlkPhos  65  01-18    PT/INR - ( 17 Jan 2020 06:50 )   PT: 12.7 sec;   INR: 1.14 ratio         PTT - ( 17 Jan 2020 06:50 )  PTT:37.7 sec        RADIOLOGY & ADDITIONAL STUDIES REVIEWED: INTERVAL HPI/OVERNIGHT EVENTS:   No acute events reported overnight.    Pt is seen at the bedside. Pt is found resting comfortably in bed in no acute distress, reports feeling well and denies any new complaints today. Patient denies nausea, vomiting, diarrhea, chest pain, SOB, headache, dizziness. Plan for Bypass surgery after Cardiac clearance,     PRESSORS: [ ] YES [X] NO  WHICH:    ANTIBIOTICS:                  DATE STARTED:  ANTIBIOTICS:                  DATE STARTED:  ANTIBIOTICS:                  DATE STARTED:    Antimicrobial:  cefepime   IVPB      cefepime   IVPB 2000 milliGRAM(s) IV Intermittent every 12 hours  metroNIDAZOLE  IVPB 500 milliGRAM(s) IV Intermittent every 8 hours  metroNIDAZOLE  IVPB        Cardiovascular:  lisinopril 10 milliGRAM(s) Oral daily  metoprolol tartrate 25 milliGRAM(s) Oral two times a day    Pulmonary:    Hematalogic:  aspirin enteric coated 81 milliGRAM(s) Oral daily  enoxaparin Injectable 40 milliGRAM(s) SubCutaneous daily    Other:  acetaminophen   Tablet .. 650 milliGRAM(s) Oral every 6 hours PRN  gabapentin 100 milliGRAM(s) Oral every 12 hours  oxycodone    5 mG/acetaminophen 325 mG 1 Tablet(s) Oral every 6 hours PRN  polyethylene glycol 3350 17 Gram(s) Oral daily PRN  risperiDONE   Tablet 0.5 milliGRAM(s) Oral two times a day  senna 2 Tablet(s) Oral at bedtime  traMADol 25 milliGRAM(s) Oral every 6 hours PRN    acetaminophen   Tablet .. 650 milliGRAM(s) Oral every 6 hours PRN  aspirin enteric coated 81 milliGRAM(s) Oral daily  cefepime   IVPB      cefepime   IVPB 2000 milliGRAM(s) IV Intermittent every 12 hours  enoxaparin Injectable 40 milliGRAM(s) SubCutaneous daily  gabapentin 100 milliGRAM(s) Oral every 12 hours  lisinopril 10 milliGRAM(s) Oral daily  metoprolol tartrate 25 milliGRAM(s) Oral two times a day  metroNIDAZOLE  IVPB 500 milliGRAM(s) IV Intermittent every 8 hours  metroNIDAZOLE  IVPB      oxycodone    5 mG/acetaminophen 325 mG 1 Tablet(s) Oral every 6 hours PRN  polyethylene glycol 3350 17 Gram(s) Oral daily PRN  risperiDONE   Tablet 0.5 milliGRAM(s) Oral two times a day  senna 2 Tablet(s) Oral at bedtime  traMADol 25 milliGRAM(s) Oral every 6 hours PRN    Drug Dosing Weight  Height (cm): 170.2 (18 Jan 2020 00:00)  Weight (kg): 54.3 (18 Jan 2020 00:00)  BMI (kg/m2): 18.7 (18 Jan 2020 00:00)  BSA (m2): 1.63 (18 Jan 2020 00:00)    CENTRAL LINE: [ ] YES [X] NO  LOCATION:   DATE INSERTED:  REMOVE: [ ] YES [ ] NO  EXPLAIN:    BIANCHI: [ ] YES [X] NO    DATE INSERTED:  REMOVE:  [ ] YES [ ] NO  EXPLAIN:    A-LINE:  [ ] YES [X] NO  LOCATION:   DATE INSERTED:  REMOVE:  [ ] YES [ ] NO  EXPLAIN:    PMH -reviewed admission note, no change since admission  PAST MEDICAL & SURGICAL HISTORY:  COPD (chronic obstructive pulmonary disease)  Hypercholesterolemia  Myocardial infarct, old  S/P CABG x 1      ICU Vital Signs Last 24 Hrs  T(C): 36.5 (18 Jan 2020 23:50), Max: 36.5 (18 Jan 2020 23:50)  T(F): 97.7 (18 Jan 2020 23:50), Max: 97.7 (18 Jan 2020 23:50)  HR: 72 (19 Jan 2020 00:00) (68 - 92)  BP: 98/47 (19 Jan 2020 00:00) (68/39 - 162/75)  BP(mean): 60 (19 Jan 2020 00:00) (46 - 99)  ABP: --  ABP(mean): --  RR: 15 (19 Jan 2020 00:00) (11 - 22)  SpO2: 96% (19 Jan 2020 00:00) (77% - 100%)            01-17 @ 07:01  -  01-18 @ 07:00  --------------------------------------------------------  IN: 360 mL / OUT: 500 mL / NET: -140 mL            PHYSICAL EXAM:    GENERAL: NAD  HEENT: Normocephalic;  conjunctivae and sclerae clear; moist mucous membranes;   NECK : supple  CHEST/LUNG: Clear to auscultation bilaterally with good air entry   HEART: S1 S2  regular; no murmurs, gallops or rubs  ABDOMEN: Soft, Nontender, Nondistended; Bowel sounds present  EXTREMITIES:  DP and PT pulses non-palpable b/l.  Left DP faintly dopplerable.  Unable to doppler left PT, Unable to find right DP or PT with doppler.  No CFT to right 2nd toe and right 1st toe. CFT >3sec to plantar aspect of right 3rd and 4th toe. Derm: Right 2nd toe: black necrotic to the level of the proximal with exposed bone.  Distal aspect of the right 2nd toe is hard and dry, no drainage to the right 2nd toe.   Right foot erythema to level just distal to ankle joint. Right 1st, 3rd, and 4th toes are dusky. Hyperkeratotic tissue to dorsal 3rd and 4th toes on the right.      SKIN: warm and dry; no rash  NERVOUS SYSTEM:  Awake and alert;      LABS:  CBC Full  -  ( 18 Jan 2020 02:03 )  WBC Count : 5.97 K/uL  RBC Count : 4.51 M/uL  Hemoglobin : 13.7 g/dL  Hematocrit : 43.2 %  Platelet Count - Automated : 206 K/uL  Mean Cell Volume : 95.8 fl  Mean Cell Hemoglobin : 30.4 pg  Mean Cell Hemoglobin Concentration : 31.7 gm/dL  Auto Neutrophil # : x  Auto Lymphocyte # : x  Auto Monocyte # : x  Auto Eosinophil # : x  Auto Basophil # : x  Auto Neutrophil % : x  Auto Lymphocyte % : x  Auto Monocyte % : x  Auto Eosinophil % : x  Auto Basophil % : x    01-18    141  |  107  |  28<H>  ----------------------------<  101<H>  4.2   |  29  |  0.91    Ca    8.5      18 Jan 2020 02:03  Mg     1.9     01-18    TPro  7.1  /  Alb  2.8<L>  /  TBili  0.3  /  DBili  x   /  AST  27  /  ALT  21  /  AlkPhos  65  01-18    PT/INR - ( 17 Jan 2020 06:50 )   PT: 12.7 sec;   INR: 1.14 ratio         PTT - ( 17 Jan 2020 06:50 )  PTT:37.7 sec        RADIOLOGY & ADDITIONAL STUDIES REVIEWED:

## 2020-01-19 NOTE — PROGRESS NOTE ADULT - SUBJECTIVE AND OBJECTIVE BOX
INTERVAL HPI/OVERNIGHT EVENTS:       Antimicrobial:  cefepime   IVPB      cefepime   IVPB 2000 milliGRAM(s) IV Intermittent every 12 hours  metroNIDAZOLE  IVPB 500 milliGRAM(s) IV Intermittent every 8 hours  metroNIDAZOLE  IVPB        Cardiovascular:  lisinopril 10 milliGRAM(s) Oral daily  metoprolol tartrate 25 milliGRAM(s) Oral two times a day    Pulmonary:    Hematalogic:  aspirin enteric coated 81 milliGRAM(s) Oral daily  enoxaparin Injectable 40 milliGRAM(s) SubCutaneous daily    Other:  acetaminophen   Tablet .. 650 milliGRAM(s) Oral every 6 hours PRN  gabapentin 100 milliGRAM(s) Oral every 12 hours  oxycodone    5 mG/acetaminophen 325 mG 1 Tablet(s) Oral every 6 hours PRN  polyethylene glycol 3350 17 Gram(s) Oral daily PRN  risperiDONE   Tablet 0.5 milliGRAM(s) Oral two times a day  senna 2 Tablet(s) Oral at bedtime  traMADol 25 milliGRAM(s) Oral every 6 hours PRN      Drug Dosing Weight  Height (cm): 170.2 (18 Jan 2020 00:00)  Weight (kg): 54.3 (18 Jan 2020 00:00)  BMI (kg/m2): 18.7 (18 Jan 2020 00:00)  BSA (m2): 1.63 (18 Jan 2020 00:00)    CENTRAL LINE: [ ] YES [ ] NO  LOCATION:   DATE INSERTED:    BIANCHI: [ ] YES [ ] NO    DATE INSERTED:    A-LINE:  [ ] YES [ ] NO  LOCATION:   DATE INSERTED:    PMH/Social Hx/Fam Hx -reviewed admission note, no change since admission  PAST MEDICAL & SURGICAL HISTORY:  COPD (chronic obstructive pulmonary disease)  Hypercholesterolemia  Myocardial infarct, old  S/P CABG x 1      T(C): 36.4 (01-19-20 @ 07:00), Max: 36.5 (01-18-20 @ 23:50)  HR: 79 (01-19-20 @ 12:00)  BP: 169/78 (01-19-20 @ 12:00)  BP(mean): 92 (01-19-20 @ 12:00)  ABP: --  ABP(mean): --  RR: 24 (01-19-20 @ 12:00)  SpO2: 82% (01-19-20 @ 12:00)  Wt(kg): --          01-18 @ 07:01 - 01-19 @ 07:00  --------------------------------------------------------  IN: 400 mL / OUT: 950 mL / NET: -550 mL            PHYSICAL EXAM:    GENERAL: No signs of distress, comfortable  HEAD: Atraumatic, Normocephalic  EYES: EOMI, PERRLA  ENMT: No erythema, exudates, or enlargement, Moist mucous membranes  NECK: Supple, normal appearance, No JVD; [  ] central line (if applicable)  CHEST/LUNG: No chest deformity, fair bilateral air entry; No rales, rhonchi, wheezing; crackles  HEART: Regular rate and rhythm; No murmurs, rubs, or gallops;   ABDOMEN: Soft, Nontender, Nondistended; Bowel sounds present  EXTREMITIES:  No Right pulse with dry gangrene toes  NERVOUS SYSTEM: awake and alert x 3, follows commands, upper and lower extremities  LYMPH: No lymphadenopathy noted  SKIN: No rashes or lesions; good turgor, warm, dry      LABS:  CBC Full  -  ( 19 Jan 2020 10:49 )  WBC Count : 7.25 K/uL  RBC Count : 4.59 M/uL  Hemoglobin : 14.1 g/dL  Hematocrit : 44.1 %  Platelet Count - Automated : 183 K/uL  Mean Cell Volume : 96.1 fl  Mean Cell Hemoglobin : 30.7 pg  Mean Cell Hemoglobin Concentration : 32.0 gm/dL  Auto Neutrophil # : x  Auto Lymphocyte # : x  Auto Monocyte # : x  Auto Eosinophil # : x  Auto Basophil # : x  Auto Neutrophil % : x  Auto Lymphocyte % : x  Auto Monocyte % : x  Auto Eosinophil % : x  Auto Basophil % : x    01-19    143  |  112<H>  |  24<H>  ----------------------------<  186<H>  3.4<L>   |  22  |  0.81    Ca    6.8<L>      19 Jan 2020 06:46  Phos  <2.5     01-19  Mg     1.4     01-19    TPro  5.0<L>  /  Alb  2.0<L>  /  TBili  0.2  /  DBili  x   /  AST  21  /  ALT  10  /  AlkPhos  46  01-19            RADIOLOGY & ADDITIONAL STUDIES REVIEWED         IMPRESSION:  PAST MEDICAL & SURGICAL HISTORY:  COPD (chronic obstructive pulmonary disease)  Hypercholesterolemia  Myocardial infarct, old  S/P CABG x 1   p/w       IMP: This is a 74 year old woman , live by herself, ambulates with cane  has medical hx significant for dementia, cad, pvd, hld, htn presents to the ED with complaint of discoloration for a toe on the right foot.  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 She is found to have diffuse atherosclerotic disease with multilevel arterial occlusion. Marginal/poor candidate for endovascular intervention. Patient is transferred to ICU for frequent monitoring after the angiogram.  Vascular surgery f/u.. will need b/l  open bypass and cardiac cl. message sent to dr monge .. cards            Plan:    Neuro:   History of dementia  c/w risperidone   Pain not well controlled with tramadol. started morphine    CVS: will need cardiac eval for surgery  CAD  c/w asa  History of HTN  c/w Lisinopril and metoprolol  History of PVD:  s/p Angiogram. No intervention performed due to poor candidate for endovascular reintervention  Frequent Hourly groin check x Q 24 hours  Plan for Vascular surgery after cardiac clearance.    Pulmonary:  No issues    GI:  Bowel regime due to being on pain control meds such as opioids   No issues  DASH diet    Nephrology  No issues    ID:  c/w cefepime (day 6) and metronidazole (day 7) for possible osteomyelitis of  right second toe gangrene possible underlying osteomyelitis  afebrile, No leucocytosis  cultures blood are negative so far  Podiatry follow up       Musculoskeletal : will need open b/l bypass surgery as per vascular surgery     Heme-onc  No issues    Prophylaxis  DVT prophylaxis with Lovenox        GOALS OF CARE DISCUSSION WITH PATIENT/FAMILY/PROXY/ icu team on rounds    CRITICAL CARE TIME SPENT: 35 minutes

## 2020-01-19 NOTE — PROGRESS NOTE ADULT - SUBJECTIVE AND OBJECTIVE BOX
Post Operative Day #: 2    SUBJECTIVE: 74y Female s/p OR angiogram 1/17    INTERVAL HPI/OVERNIGHT EVENTS:    Pt states feels better this morning  C/o pain in R foot with touch, unchanged  NO overnight events      MEDICATIONS  (STANDING):  aspirin enteric coated 81 milliGRAM(s) Oral daily  cefepime   IVPB      cefepime   IVPB 2000 milliGRAM(s) IV Intermittent every 12 hours  enoxaparin Injectable 40 milliGRAM(s) SubCutaneous daily  gabapentin 100 milliGRAM(s) Oral every 12 hours  lisinopril 10 milliGRAM(s) Oral daily  metoprolol tartrate 25 milliGRAM(s) Oral two times a day  metoprolol tartrate Injectable 5 milliGRAM(s) IV Push every 6 hours  metroNIDAZOLE  IVPB 500 milliGRAM(s) IV Intermittent every 8 hours  metroNIDAZOLE  IVPB      risperiDONE   Tablet 0.5 milliGRAM(s) Oral two times a day  senna 2 Tablet(s) Oral at bedtime    MEDICATIONS  (PRN):  acetaminophen   Tablet .. 650 milliGRAM(s) Oral every 6 hours PRN Mild Pain (1 - 3)  polyethylene glycol 3350 17 Gram(s) Oral daily PRN Constipation  traMADol 25 milliGRAM(s) Oral every 6 hours PRN Moderate Pain (4 - 6)      OBJECTIVE:  ICU Vital Signs Last 24 Hrs  T(C): 36.4 (19 Jan 2020 07:00), Max: 36.5 (18 Jan 2020 23:50)  T(F): 97.6 (19 Jan 2020 07:00), Max: 97.7 (18 Jan 2020 23:50)  HR: 65 (19 Jan 2020 09:00) (65 - 98)  BP: 84/43 (19 Jan 2020 09:00) (68/39 - 153/100)  BP(mean): 53 (19 Jan 2020 09:00) (46 - 107)  ABP: --  ABP(mean): --  RR: 13 (19 Jan 2020 09:00) (13 - 22)  SpO2: 99% (19 Jan 2020 09:00) (78% - 100%)      Physical Exam:    Gen: awake, alert not oriented, mild distress secondary to pain  Abdomen: soft NT ND  Pelvic: L groin angiogram access dressing in place c/d/i  Extremities: R LE with gangrene of toes  Vasc: no dopplerable R DP/PT pulses, dopplerable L DP pulse            I&O's Detail    17 Jan 2020 07:01  -  18 Jan 2020 07:00  --------------------------------------------------------  IN:    dextrose 5% + sodium chloride 0.9%: 60 mL    Solution: 100 mL    Solution: 200 mL  Total IN: 360 mL    OUT:    Voided: 500 mL  Total OUT: 500 mL    Total NET: -140 mL          Labs:                          8.3    5.15  )-----------( 126      ( 19 Jan 2020 06:51 )             27.0       01-19    143  |  112<H>  |  24<H>  ----------------------------<  186<H>  3.4<L>   |  22  |  0.81    Ca    6.8<L>      19 Jan 2020 06:46  Mg     1.4     01-19    TPro  5.0<L>  /  Alb  2.0<L>  /  TBili  0.2  /  DBili  x   /  AST  x   /  ALT  x   /  AlkPhos  46  01-19

## 2020-01-19 NOTE — PROGRESS NOTE ADULT - ATTENDING COMMENTS
Patient was seen and examined ,interim events noted,Laboratory and Imaging studies were reviewed.  Thank you for the courtesy of the consultation,I would be available for any further discussion if needed.  Tamir Flanagan MD,FACC.  [address] [place] [id number] [phone]

## 2020-01-20 LAB
ALBUMIN SERPL ELPH-MCNC: 2.8 G/DL — LOW (ref 3.5–5)
ALP SERPL-CCNC: 65 U/L — SIGNIFICANT CHANGE UP (ref 40–120)
ALT FLD-CCNC: 17 U/L DA — SIGNIFICANT CHANGE UP (ref 10–60)
ANION GAP SERPL CALC-SCNC: 8 MMOL/L — SIGNIFICANT CHANGE UP (ref 5–17)
AST SERPL-CCNC: 28 U/L — SIGNIFICANT CHANGE UP (ref 10–40)
BASOPHILS # BLD AUTO: 0.07 K/UL — SIGNIFICANT CHANGE UP (ref 0–0.2)
BASOPHILS NFR BLD AUTO: 0.7 % — SIGNIFICANT CHANGE UP (ref 0–2)
BILIRUB SERPL-MCNC: 0.3 MG/DL — SIGNIFICANT CHANGE UP (ref 0.2–1.2)
BUN SERPL-MCNC: 31 MG/DL — HIGH (ref 7–18)
CALCIUM SERPL-MCNC: 9.6 MG/DL — SIGNIFICANT CHANGE UP (ref 8.4–10.5)
CHLORIDE SERPL-SCNC: 108 MMOL/L — SIGNIFICANT CHANGE UP (ref 96–108)
CO2 SERPL-SCNC: 22 MMOL/L — SIGNIFICANT CHANGE UP (ref 22–31)
CREAT SERPL-MCNC: 0.98 MG/DL — SIGNIFICANT CHANGE UP (ref 0.5–1.3)
EOSINOPHIL # BLD AUTO: 0.41 K/UL — SIGNIFICANT CHANGE UP (ref 0–0.5)
EOSINOPHIL NFR BLD AUTO: 4.2 % — SIGNIFICANT CHANGE UP (ref 0–6)
GLUCOSE SERPL-MCNC: 112 MG/DL — HIGH (ref 70–99)
HCT VFR BLD CALC: 45.7 % — HIGH (ref 34.5–45)
HGB BLD-MCNC: 14.8 G/DL — SIGNIFICANT CHANGE UP (ref 11.5–15.5)
IMM GRANULOCYTES NFR BLD AUTO: 0.4 % — SIGNIFICANT CHANGE UP (ref 0–1.5)
LYMPHOCYTES # BLD AUTO: 1.77 K/UL — SIGNIFICANT CHANGE UP (ref 1–3.3)
LYMPHOCYTES # BLD AUTO: 18.1 % — SIGNIFICANT CHANGE UP (ref 13–44)
MAGNESIUM SERPL-MCNC: 1.8 MG/DL — SIGNIFICANT CHANGE UP (ref 1.6–2.6)
MCHC RBC-ENTMCNC: 30.5 PG — SIGNIFICANT CHANGE UP (ref 27–34)
MCHC RBC-ENTMCNC: 32.4 GM/DL — SIGNIFICANT CHANGE UP (ref 32–36)
MCV RBC AUTO: 94.2 FL — SIGNIFICANT CHANGE UP (ref 80–100)
MONOCYTES # BLD AUTO: 1.2 K/UL — HIGH (ref 0–0.9)
MONOCYTES NFR BLD AUTO: 12.3 % — SIGNIFICANT CHANGE UP (ref 2–14)
MRSA PCR RESULT.: SIGNIFICANT CHANGE UP
NEUTROPHILS # BLD AUTO: 6.3 K/UL — SIGNIFICANT CHANGE UP (ref 1.8–7.4)
NEUTROPHILS NFR BLD AUTO: 64.3 % — SIGNIFICANT CHANGE UP (ref 43–77)
NRBC # BLD: 0 /100 WBCS — SIGNIFICANT CHANGE UP (ref 0–0)
PHOSPHATE SERPL-MCNC: 2.5 MG/DL — SIGNIFICANT CHANGE UP (ref 2.5–4.5)
PLATELET # BLD AUTO: 227 K/UL — SIGNIFICANT CHANGE UP (ref 150–400)
POTASSIUM SERPL-MCNC: 5 MMOL/L — SIGNIFICANT CHANGE UP (ref 3.5–5.3)
POTASSIUM SERPL-SCNC: 5 MMOL/L — SIGNIFICANT CHANGE UP (ref 3.5–5.3)
PROT SERPL-MCNC: 7.5 G/DL — SIGNIFICANT CHANGE UP (ref 6–8.3)
RBC # BLD: 4.85 M/UL — SIGNIFICANT CHANGE UP (ref 3.8–5.2)
RBC # FLD: 12.3 % — SIGNIFICANT CHANGE UP (ref 10.3–14.5)
S AUREUS DNA NOSE QL NAA+PROBE: SIGNIFICANT CHANGE UP
SODIUM SERPL-SCNC: 138 MMOL/L — SIGNIFICANT CHANGE UP (ref 135–145)
WBC # BLD: 9.79 K/UL — SIGNIFICANT CHANGE UP (ref 3.8–10.5)
WBC # FLD AUTO: 9.79 K/UL — SIGNIFICANT CHANGE UP (ref 3.8–10.5)

## 2020-01-20 PROCEDURE — 99231 SBSQ HOSP IP/OBS SF/LOW 25: CPT

## 2020-01-20 RX ORDER — MAGNESIUM SULFATE 500 MG/ML
1 VIAL (ML) INJECTION ONCE
Refills: 0 | Status: DISCONTINUED | OUTPATIENT
Start: 2020-01-20 | End: 2020-01-20

## 2020-01-20 RX ORDER — MAGNESIUM SULFATE 500 MG/ML
1 VIAL (ML) INJECTION ONCE
Refills: 0 | Status: COMPLETED | OUTPATIENT
Start: 2020-01-20 | End: 2020-01-20

## 2020-01-20 RX ADMIN — OXYCODONE AND ACETAMINOPHEN 1 TABLET(S): 5; 325 TABLET ORAL at 20:18

## 2020-01-20 RX ADMIN — Medication 81 MILLIGRAM(S): at 11:54

## 2020-01-20 RX ADMIN — OXYCODONE AND ACETAMINOPHEN 1 TABLET(S): 5; 325 TABLET ORAL at 11:55

## 2020-01-20 RX ADMIN — CEFEPIME 50 MILLIGRAM(S): 1 INJECTION, POWDER, FOR SOLUTION INTRAMUSCULAR; INTRAVENOUS at 05:26

## 2020-01-20 RX ADMIN — Medication 100 MILLIGRAM(S): at 21:39

## 2020-01-20 RX ADMIN — Medication 100 MILLIGRAM(S): at 13:55

## 2020-01-20 RX ADMIN — RISPERIDONE 0.5 MILLIGRAM(S): 4 TABLET ORAL at 05:26

## 2020-01-20 RX ADMIN — OXYCODONE AND ACETAMINOPHEN 1 TABLET(S): 5; 325 TABLET ORAL at 05:28

## 2020-01-20 RX ADMIN — TRAMADOL HYDROCHLORIDE 25 MILLIGRAM(S): 50 TABLET ORAL at 16:15

## 2020-01-20 RX ADMIN — CEFEPIME 50 MILLIGRAM(S): 1 INJECTION, POWDER, FOR SOLUTION INTRAMUSCULAR; INTRAVENOUS at 17:44

## 2020-01-20 RX ADMIN — Medication 100 MILLIGRAM(S): at 05:26

## 2020-01-20 RX ADMIN — OXYCODONE AND ACETAMINOPHEN 1 TABLET(S): 5; 325 TABLET ORAL at 19:28

## 2020-01-20 RX ADMIN — Medication 25 MILLIGRAM(S): at 17:45

## 2020-01-20 RX ADMIN — OXYCODONE AND ACETAMINOPHEN 1 TABLET(S): 5; 325 TABLET ORAL at 07:30

## 2020-01-20 RX ADMIN — GABAPENTIN 100 MILLIGRAM(S): 400 CAPSULE ORAL at 05:26

## 2020-01-20 RX ADMIN — CHLORHEXIDINE GLUCONATE 1 APPLICATION(S): 213 SOLUTION TOPICAL at 05:28

## 2020-01-20 RX ADMIN — OXYCODONE AND ACETAMINOPHEN 1 TABLET(S): 5; 325 TABLET ORAL at 12:30

## 2020-01-20 RX ADMIN — GABAPENTIN 100 MILLIGRAM(S): 400 CAPSULE ORAL at 17:45

## 2020-01-20 RX ADMIN — TRAMADOL HYDROCHLORIDE 25 MILLIGRAM(S): 50 TABLET ORAL at 15:28

## 2020-01-20 RX ADMIN — RISPERIDONE 0.5 MILLIGRAM(S): 4 TABLET ORAL at 17:45

## 2020-01-20 RX ADMIN — Medication 25 MILLIGRAM(S): at 05:26

## 2020-01-20 RX ADMIN — LISINOPRIL 10 MILLIGRAM(S): 2.5 TABLET ORAL at 05:26

## 2020-01-20 RX ADMIN — ENOXAPARIN SODIUM 40 MILLIGRAM(S): 100 INJECTION SUBCUTANEOUS at 11:54

## 2020-01-20 RX ADMIN — Medication 100 GRAM(S): at 11:54

## 2020-01-20 NOTE — PROGRESS NOTE ADULT - SUBJECTIVE AND OBJECTIVE BOX
INTERVAL HPI/OVERNIGHT EVENTS:       Antimicrobial:  cefepime   IVPB      cefepime   IVPB 2000 milliGRAM(s) IV Intermittent every 12 hours  metroNIDAZOLE  IVPB 500 milliGRAM(s) IV Intermittent every 8 hours  metroNIDAZOLE  IVPB        Cardiovascular:  lisinopril 10 milliGRAM(s) Oral daily  metoprolol tartrate 25 milliGRAM(s) Oral two times a day    Pulmonary:    Hematalogic:  aspirin enteric coated 81 milliGRAM(s) Oral daily  enoxaparin Injectable 40 milliGRAM(s) SubCutaneous daily    Other:  acetaminophen   Tablet .. 650 milliGRAM(s) Oral every 6 hours PRN  gabapentin 100 milliGRAM(s) Oral every 12 hours  oxycodone    5 mG/acetaminophen 325 mG 1 Tablet(s) Oral every 6 hours PRN  polyethylene glycol 3350 17 Gram(s) Oral daily PRN  risperiDONE   Tablet 0.5 milliGRAM(s) Oral two times a day  senna 2 Tablet(s) Oral at bedtime  traMADol 25 milliGRAM(s) Oral every 6 hours PRN      Drug Dosing Weight  Height (cm): 170.2 (18 Jan 2020 00:00)  Weight (kg): 54.3 (18 Jan 2020 00:00)  BMI (kg/m2): 18.7 (18 Jan 2020 00:00)  BSA (m2): 1.63 (18 Jan 2020 00:00)    CENTRAL LINE: [ ] YES [ ] NO  LOCATION:   DATE INSERTED:    BIANCHI: [ ] YES [ ] NO    DATE INSERTED:    A-LINE:  [ ] YES [ ] NO  LOCATION:   DATE INSERTED:    PMH/Social Hx/Fam Hx -reviewed admission note, no change since admission  PAST MEDICAL & SURGICAL HISTORY:  COPD (chronic obstructive pulmonary disease)  Hypercholesterolemia  Myocardial infarct, old  S/P CABG x 1      T(C): 36.1 (01-20-20 @ 16:26), Max: 36.5 (01-20-20 @ 11:00)  HR: 89 (01-20-20 @ 18:00)  BP: 123/68 (01-20-20 @ 18:00)  BP(mean): 82 (01-20-20 @ 18:00)  ABP: --  ABP(mean): --  RR: 21 (01-20-20 @ 18:00)  SpO2: 99% (01-20-20 @ 18:00)  Wt(kg): --          01-19 @ 07:01 - 01-20 @ 07:00  --------------------------------------------------------  IN: 800 mL / OUT: 1500 mL / NET: -700 mL            PHYSICAL EXAM:    GENERAL: No signs of distress, comfortable  HEAD: Atraumatic, Normocephalic  EYES: EOMI, PERRLA  ENMT: No erythema, exudates, or enlargement, Moist mucous membranes  NECK: Supple, normal appearance, No JVD; [  ] central line (if applicable)  CHEST/LUNG: No chest deformity, fair bilateral air entry; No rales, rhonchi, wheezing; crackles  HEART: Regular rate and rhythm; No murmurs, rubs, or gallops;   ABDOMEN: Soft, Nontender, Nondistended; Bowel sounds present  EXTREMITIES: R toes and partial foot dry gangrene left toes in dressing  NERVOUS SYSTEM: awake and alert x 3,  LYMPH: No lymphadenopathy noted  SKIN: No rashes or lesions; good turgor, warm, dry      LABS:  CBC Full  -  ( 20 Jan 2020 05:45 )  WBC Count : 9.79 K/uL  RBC Count : 4.85 M/uL  Hemoglobin : 14.8 g/dL  Hematocrit : 45.7 %  Platelet Count - Automated : 227 K/uL  Mean Cell Volume : 94.2 fl  Mean Cell Hemoglobin : 30.5 pg  Mean Cell Hemoglobin Concentration : 32.4 gm/dL  Auto Neutrophil # : 6.30 K/uL  Auto Lymphocyte # : 1.77 K/uL  Auto Monocyte # : 1.20 K/uL  Auto Eosinophil # : 0.41 K/uL  Auto Basophil # : 0.07 K/uL  Auto Neutrophil % : 64.3 %  Auto Lymphocyte % : 18.1 %  Auto Monocyte % : 12.3 %  Auto Eosinophil % : 4.2 %  Auto Basophil % : 0.7 %    01-20    138  |  108  |  31<H>  ----------------------------<  112<H>  5.0   |  22  |  0.98    Ca    9.6      20 Jan 2020 05:45  Phos  2.5     01-20  Mg     1.8     01-20    TPro  7.5  /  Alb  2.8<L>  /  TBili  0.3  /  DBili  x   /  AST  28  /  ALT  17  /  AlkPhos  65  01-20            RADIOLOGY & ADDITIONAL STUDIES REVIEWED         IMPRESSION:  PAST MEDICAL & SURGICAL HISTORY:  COPD (chronic obstructive pulmonary disease)  Hypercholesterolemia  Myocardial infarct, old  S/P CABG x 1   p/w         IMP: This is a 74 year old woman , live by herself, ambulates with cane  has medical hx significant for dementia, cad, pvd, hld, htn presents to the ED with complaint of discoloration for a toe on the right foot.  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 She is found to have diffuse atherosclerotic disease with multilevel arterial occlusion. Marginal/poor candidate for endovascular intervention. Patient is transferred to ICU for frequent monitoring after the angiogram.  Vascular surgery f/u.. will need b/l  open bypass and cardiac cl. message sent to dr monge .. cards. vascular surgery pending cardiac clearence        Plan:    Neuro:   History of dementia  c/w risperidone   Pain not well controlled with tramadol. started morphine    CVS:  CAD  c/w asa  History of HTN  c/w Lisinopril and metoprolol  History of PVD:  s/p Angiogram. No intervention performed due to poor candidate for endovascular reintervention  Frequent Hourly groin check x Q 24 hours  Cardiology cleared for the procedure    Pulmonary:  No issues    GI:  Bowel regime due to being on pain control meds such as opioids   No issues  DASH diet    Nephrology  No issues    ID:  c/w cefepime (day 6) and metronidazole (day 7) for possible osteomyelitis of  right second toe gangrene possible underlying osteomyelitis  afebrile, No leucocytosis  cultures blood are negative so far  Podiatry follow up     Heme-onc  No issues    Prophylaxis  DVT prophylaxis with Lovenox                GOALS OF CARE DISCUSSION WITH PATIENT/FAMILY/PROXY/ icu team on round and with cards attend

## 2020-01-20 NOTE — PROGRESS NOTE ADULT - SUBJECTIVE AND OBJECTIVE BOX
Patient is a 74y old  Female who presents with a chief complaint of discoloration of toe of right foot.     HPI: This 74 year old non-diabetic female presents to the ED with complaint of discoloration for a toe on the right foot. She states that she was seen by her primary care physician a few days ago and told her to go to the emergency room.  Patient states she smokes at least a pack a day of cigarettes since she was 22 years old.  Patient states she can only ambulate 10 feet and gets a cramp in her right calf for which she needs to sit down.  She states she is very minimally ambulatory.   She denies pain at rest with her feet elevated.  Patient lives alone with a cat. Patient massaging leg throughout the visit.  Patient denies n/v/d/sob/cp/f.  Patient states her blood pressure is not usually low.     Podiatry Interval HPI:  Patient seen bedside this PM with attending at bedside s/p vascular procedure 1/17. Patient awake and alert, no acute event overnight. She relates doing well.  She understands she is waiting on vascular surgery team for the next interventions. Patient denies n/v/d/sob/cp.       PMH:COPD (chronic obstructive pulmonary disease)  Hypercholesterolemia  Myocardial infarct, old    Allergies: PC Pen VK (Rash)  penicillin (Other; Rash)  statins (Other)  sulfa drugs (Other)  sulfamethizole (Other)    Medications:   FH:  PSX: S/P CABG x 1    SH:     Labs                        14.8   9.79  )-----------( 227      ( 20 Jan 2020 05:45 )             45.7     01-20    138  |  108  |  31<H>  ----------------------------<  112<H>  5.0   |  22  |  0.98    Ca    9.6      20 Jan 2020 05:45  Phos  2.5     01-20  Mg     1.8     01-20    TPro  7.5  /  Alb  2.8<L>  /  TBili  0.3  /  DBili  x   /  AST  28  /  ALT  17  /  AlkPhos  65  01-20    Vital Signs Last 24 Hrs  T(C): 36.1 (20 Jan 2020 16:26), Max: 36.5 (20 Jan 2020 11:00)  T(F): 97 (20 Jan 2020 16:26), Max: 97.7 (20 Jan 2020 11:00)  HR: 81 (20 Jan 2020 13:00) (67 - 100)  BP: 136/48 (20 Jan 2020 13:00) (95/49 - 176/81)  BP(mean): 64 (20 Jan 2020 13:00) (60 - 112)  RR: 17 (20 Jan 2020 13:00) (13 - 22)  SpO2: 98% (20 Jan 2020 13:00) (96% - 100%)  Hemoglobin A1C, Whole Blood: 5.6 % (01-11-20 @ 09:30)         WBC Count: 9.79 K/uL (01-20-20 @ 05:45)         PHYSICAL EXAM  GEN: MERARY MEYER is a pleasant well-nourished, well developed 74y Female in no acute distress. Refused exam this AM, will attempt at later time  Per previous examination:   LE Focused:    Vasc:  DP and PT pulses non-palpable b/l. Left DP faintly dopplerable.  Unable to doppler left PT, Unable to find right DP or PT with doppler.  No CFT to right 2nd toe and right 1st toe. CFT >3sec to plantar aspect of right 3rd and 4th toe. Shiny taught skin b/l dorsal feet.  Derm:   Right 2nd toe: black necrotic to the level of the proximal with exposed bone.  Distal aspect of the right 2nd toe is hard and dry, no drainage to the right 2nd toe.   Right foot erythema to level just distal to ankle joint. Right 1st, 3rd, and 4th toes are dusky. Hyperkeratotic tissue to dorsal 3rd and 4th toes on the right.      Neuro: Sensation diminished to digits b/l.  MSK: Tenderness to palpation distal foot.  Pain to touch dorsal right foot.     Imaging:   < from: Xray Foot AP + Lateral + Oblique, Right (01.10.20 @ 15:05) >    EXAM:  FOOT RIGHT (MINIMUM 3 VIEWS)                        PROCEDURE DATE:  01/10/2020    INTERPRETATION:  Right foot. 3 views. Patient has local pain.    The foot shows mild degeneration at the first MTP joint.    There is an old fracture deformity of the distal fibula.    No bone destruction or acute fracture is evident.    IMPRESSION: No acute finding.    JASKARAN MOSLEY M.D., ATTENDING RADIOLOGIST  This document has been electronically signed. Angel 10 2020  3:06PM  < end of copied text >    < from: [facility] Physiol Extremity Lower 3+ Level, BI (01.13.20 @ 13:49) >    EXAM:  US PHYSIOL LWR EXT 3+ LEV BI                        PROCEDURE DATE:  01/13/2020    INTERPRETATION:  Clinical indication: Right toe gangrene    Comparison: None    FINDINGS AND   IMPRESSION: Segmental pressures could not be obtaineddue to patient discomfort. Therefore, ABIs could not be calculated.    There are biphasic waveforms in the lower left thigh. Abnormally, monophasic flow within the remainder of the lower extremities.    RACQUEL CHA M.D., ATTENDING RADIOLOGIST  This document has been electronically signed. Jan 13 2020  2:20PM  < end of copied text >    < from: CT Angio Abd Aorta w/run-off w/ IV Cont (01.12.20 @ 19:40) >    EXAM:  CT ANGIO ABD AOR W RUN(W)AW IC                        PROCEDURE DATE:  01/12/2020    INTERPRETATION:  CT ANGIO ABDOMINAL AORTA RUNOFF WITHOUT AND OR WITH IV CONTRAST    CLINICAL INFORMATION: Atherosclerosis of the native arteries of the right and left lower extremity with right foot gangrene    PROCEDURE INFORMATION:   Exam: CTA Angiogram of the Abdominal Aorta and Bilateral Lower Extremities   (Run-off) With IV Contrast   Exam date and time: 1/12/2020 6:55 PM   Age: 74 years old   Clinical indication: Other: Pad, right 2nd toe gangrene, 3rd and 4th toes wet   early gang     TECHNIQUE:   Imaging protocol: CT angiogram of the abdominal aorta, pelvis and bilateral   lower extremities with IV iodinated contrast.   Intravenous contrast: 90 cc Omnipaque 350  3D rendering: MIP and/or 3D reconstructed images were created by the   technologist.     COMPARISON:   CR XR FOOT 3 VIEWS RIGHT 1/10/2020 2:46 PM     FINDINGS:   Aorta: Severe calcified and noncalcified atherosclerotic plaque. Aorta is   otherwise fully patent.   Celiac trunk and mesenteric arteries: No occlusion or significant stenosis.    Renal arteries: No occlusion or significant stenosis.      Right iliac arteries: Marked stenosis of the right external iliac artery distally.   Right femoral/popliteal arteries: Distal CFA stenosis. Severely and diffusely attenuated SFA. Occlusion of the mid-distal right superficial   femoral artery with reconstitution of the suprageniculate popliteal  artery. Stenotic right popliteal artery.   Right infrapopliteal arteries: Single-vessel runoff via the right peroneal   artery, diffusely attenuated, which feeds the plantar artery. The right anterior and posterior tibial   arteries are occluded. Reconstitution of the right pedis dorsalis artery at the   level of the foot, however severely attenuated.     Left iliac arteries: Moderate calcification and stenosis of the left iliac arteries. Occluded left internal iliac artery.  Left femoral/popliteal arteries: Long segmentOcclusion of the entire left superficial femoral   artery and Reconstitution of left popliteal artery, which shows multifocal disease without occlusion.  Left infrapopliteal arteries: Occlusion of the left anterior posterior tibial   arteries at the level of the ankle. Single-vessel left peroneal artery which   feeds the plantar artery. Left pedis dorsalis is attenuated but patent.    Liver: No mass.   Gallbladder and bile ducts: Unremarkable. No calcified stones. No ductal   dilation.    Pancreas: Unremarkable. No mass. No ductal dilation.    Spleen: Normal. No splenomegaly.    Adrenals: Normal. No mass.    Kidneys and ureters: Normal. No mass.      Stomach and bowel: . No obstruction. No mucosal thickening. Moderate stool   throughout the colon.    Appendix: No evidence of appendicitis.      Bladder: Unremarkable. No mass.    Reproductive: Unremarkable as visualized.      Intraperitoneal space: Unremarkable. No free air. No significant fluid   collection.    Lymph nodes: No lymphadenopathy.     Bones/joints: No acute fracture. No dislocation.    Soft tissues: Soft tissue irregularity of the second right toe presumably   related to underlying gangrene.       IMPRESSION:   1. Right lower extremity demonstrates marked diffuse stenosis of the lower   extremity with occlusion of the mid-distal right SFA which reconstitutes distally and   with single-vessel runoff to the right foot via the right peroneal artery which   feeds the plantar artery. The right pedis dorsalis artery reconstitutes at the   level of the ankle.   2. The left lower extremity demonstrates diffuse marked stenosis with total occlusion   of the left SFA with reconstitution of attenuated left popliteal artery. There is   single-vessel runoff via the left peronealartery is the which then feeds the   left plantar artery. The pedis dorsalis artery is patent.   3. evidence of inflow disease at the level of the right external iliac artery.  4. Slight irregularity of the soft tissues of the right second toe which may be   related to underlying gangrene.    GALILEA KING M.D., ATTENDING RADIOLOGIST  This document has been electronically signed. Jan 13 2020  4:12PM  < end of copied text >      A:   Critical Limb Ischemia, right  Gangrene right 2nd toe  PVD  Right 1st, 3rd and 4th toes dusky  s/p angiogram 1/17    P:  Patient evaluated, chart reviewed  Xrays- reviewed  NICKI/PVR-reviewed  CTA-reviewed- single vessel runoff to right foot.  s/p angiogram 1/17 by vascular Dr. Izaguirre, per note poor candidate for endovascular intervention  vascular rec. multilevel arterial occlusion plan for open bypass  Dry gauze to gangrenous toes, prince only to toes to allow frequent pulse checks    amputation planned for Wed 1/22 likely postponed since pt is in need of open bypass  Podiatry will continue to monitor  Examined with Dr. Rdz

## 2020-01-20 NOTE — PROGRESS NOTE ADULT - SUBJECTIVE AND OBJECTIVE BOX
ICU visit:  74y Female is under our care for    REVIEW OF SYSTEMS:  [  ] Not able to illicit  General:	  Chest:	  GI:	  :  Skin:	  Musculoskeletal:	  Neuro:	    MEDS:  cefepime   IVPB      cefepime   IVPB 2000 milliGRAM(s) IV Intermittent every 12 hours  metroNIDAZOLE  IVPB 500 milliGRAM(s) IV Intermittent every 8 hours  metroNIDAZOLE  IVPB        ALLERGIES: Allergies    influenza virus vaccine, live, trivalent (Unknown)  PC Pen VK (Rash)  penicillin (Other; Rash)  statins (Other)  sulfa drugs (Other)  sulfamethizole (Other)    Intolerances        VITALS:  ICU Vital Signs Last 24 Hrs  T(C): 36.1 (20 Jan 2020 16:26), Max: 36.5 (20 Jan 2020 11:00)  T(F): 97 (20 Jan 2020 16:26), Max: 97.7 (20 Jan 2020 11:00)  HR: 81 (20 Jan 2020 13:00) (67 - 100)  BP: 136/48 (20 Jan 2020 13:00) (95/49 - 176/81)  BP(mean): 64 (20 Jan 2020 13:00) (60 - 112)  ABP: --  ABP(mean): --  RR: 17 (20 Jan 2020 13:00) (13 - 22)  SpO2: 98% (20 Jan 2020 13:00) (96% - 100%)      PHYSICAL EXAM:  HEENT:  Neck:  Respiratory:  Cardiovascular:  Gastrointestinal:  Extremities:  Skin:  Ortho:  Neuro:    LABS/DIAGNOSTIC TESTS:                        14.8   9.79  )-----------( 227      ( 20 Jan 2020 05:45 )             45.7     WBC Count: 9.79 K/uL (01-20 @ 05:45)  WBC Count: 7.25 K/uL (01-19 @ 10:49)  WBC Count: 5.15 K/uL (01-19 @ 06:51)  WBC Count: 5.97 K/uL (01-18 @ 02:03)  WBC Count: 8.21 K/uL (01-17 @ 06:50)    01-20    138  |  108  |  31<H>  ----------------------------<  112<H>  5.0   |  22  |  0.98    Ca    9.6      20 Jan 2020 05:45  Phos  2.5     01-20  Mg     1.8     01-20    TPro  7.5  /  Alb  2.8<L>  /  TBili  0.3  /  DBili  x   /  AST  28  /  ALT  17  /  AlkPhos  65  01-20          CULTURES:   .Blood  01-11 @ 01:37   No growth at 5 days.  --  --        RADIOLOGY:  no new studies ICU visit:  74y Female is under our care for gangrene of right foot with possible underlying osteomyelitis. Patient underwent bilateral lower extremity angiogram on 1/17, severe atherosclerotic dz  was found at multiple levels so angioplasty was not done. Plan is for bilateral LE bypass, needs cardiac clearance so will go for stress test tomorrow. Remains afebrile with normal wbc count.  Awaiting downgrade to floor.    REVIEW OF SYSTEMS:  [  ] Not able to illicit  General: no fevers no malaise  Chest: no cough no sob  GI: no nvd  : no urinary sxs   Skin: no rashes  Musculoskeletal: no trauma no LBP +foot pain  Neuro: no ha's no dizziness     MEDS:  cefepime   IVPB      cefepime   IVPB 2000 milliGRAM(s) IV Intermittent every 12 hours  metroNIDAZOLE  IVPB 500 milliGRAM(s) IV Intermittent every 8 hours  metroNIDAZOLE  IVPB        ALLERGIES: Allergies    influenza virus vaccine, live, trivalent (Unknown)  PC Pen VK (Rash)  penicillin (Other; Rash)  statins (Other)  sulfa drugs (Other)  sulfamethizole (Other)    Intolerances      VITALS:  ICU Vital Signs Last 24 Hrs  T(C): 36.1 (20 Jan 2020 16:26), Max: 36.5 (20 Jan 2020 11:00)  T(F): 97 (20 Jan 2020 16:26), Max: 97.7 (20 Jan 2020 11:00)  HR: 81 (20 Jan 2020 13:00) (67 - 100)  BP: 136/48 (20 Jan 2020 13:00) (95/49 - 176/81)  BP(mean): 64 (20 Jan 2020 13:00) (60 - 112)  ABP: --  ABP(mean): --  RR: 17 (20 Jan 2020 13:00) (13 - 22)  SpO2: 98% (20 Jan 2020 13:00) (96% - 100%)      PHYSICAL EXAM:  HEENT: n/a  Neck: supple no LN's   Respiratory: lungs clear no rales  Cardiovascular: S1 S2 reg no murmurs  Gastrointestinal: +BS with soft, nondistended abdomen; nontender  Extremities: no edema  Skin: noticed gangrenous changes of all right foot digits now, not able to assess rest of foot since patient since it's too painful  Ortho: n/a  Neuro: awake and alert      LABS/DIAGNOSTIC TESTS:                        14.8   9.79  )-----------( 227      ( 20 Jan 2020 05:45 )             45.7     WBC Count: 9.79 K/uL (01-20 @ 05:45)  WBC Count: 7.25 K/uL (01-19 @ 10:49)  WBC Count: 5.15 K/uL (01-19 @ 06:51)  WBC Count: 5.97 K/uL (01-18 @ 02:03)  WBC Count: 8.21 K/uL (01-17 @ 06:50)    01-20    138  |  108  |  31<H>  ----------------------------<  112<H>  5.0   |  22  |  0.98    Ca    9.6      20 Jan 2020 05:45  Phos  2.5     01-20  Mg     1.8     01-20    TPro  7.5  /  Alb  2.8<L>  /  TBili  0.3  /  DBili  x   /  AST  28  /  ALT  17  /  AlkPhos  65  01-20        CULTURES:   .Blood  01-11 @ 01:37   No growth at 5 days.  --  --        RADIOLOGY:  no new studies

## 2020-01-20 NOTE — PROGRESS NOTE ADULT - ASSESSMENT
s/p OR angio 1/17. NO angioplasty as pt with severe bilateral multilevel arterial occlusive disease    1. Pt will need open bypass surgery bilaterally  2. Cardiology clearance  3. Will follow

## 2020-01-20 NOTE — PROGRESS NOTE ADULT - ASSESSMENT
Patient is 74 year old female, live by herself, ambulates with cane  has medical hx significant for dementia, cad, pvd, hld, htn presents to the ED with complaint of discoloration for a toe on the right foot.  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 She is found to have diffuse atherosclerotic disease with multilevel arterial occlusion. Marginal/poor candidate for endovascular intervention. Patient is transferred to ICU for frequent monitoring after the angiogram.     Plan:    Neuro:   History of dementia  c/w risperidone   Pain not well controlled with tramadol. started morphine    CVS:  CAD  c/w asa  History of HTN  c/w Lisinopril and metoprolol  History of PVD:  s/p Angiogram. No intervention performed due to poor candidate for endovascular reintervention  Frequent Hourly groin check x Q 24 hours  Cardiology cleared for the procedure    Pulmonary:  No issues    GI:  Bowel regime due to being on pain control meds such as opioids   No issues  DASH diet    Nephrology  No issues    ID:  c/w cefepime (day 6) and metronidazole (day 7) for possible osteomyelitis of  right second toe gangrene possible underlying osteomyelitis  afebrile, No leucocytosis  cultures blood are negative so far  Podiatry follow up     Heme-onc  No issues    Prophylaxis  DVT prophylaxis with Lovenox

## 2020-01-20 NOTE — PROGRESS NOTE ADULT - SUBJECTIVE AND OBJECTIVE BOX
INTERVAL HPI/OVERNIGHT EVENTS: ***    PRESSORS: [ ] YES [ ] NO  WHICH:    ANTIBIOTICS:                  DATE STARTED:  ANTIBIOTICS:                  DATE STARTED:  ANTIBIOTICS:                  DATE STARTED:    Antimicrobial:  cefepime   IVPB      cefepime   IVPB 2000 milliGRAM(s) IV Intermittent every 12 hours  metroNIDAZOLE  IVPB 500 milliGRAM(s) IV Intermittent every 8 hours  metroNIDAZOLE  IVPB        Cardiovascular:  lisinopril 10 milliGRAM(s) Oral daily  metoprolol tartrate 25 milliGRAM(s) Oral two times a day    Pulmonary:    Hematalogic:  aspirin enteric coated 81 milliGRAM(s) Oral daily  enoxaparin Injectable 40 milliGRAM(s) SubCutaneous daily    Other:  acetaminophen   Tablet .. 650 milliGRAM(s) Oral every 6 hours PRN  gabapentin 100 milliGRAM(s) Oral every 12 hours  magnesium sulfate  IVPB 1 Gram(s) IV Intermittent once  oxycodone    5 mG/acetaminophen 325 mG 1 Tablet(s) Oral every 6 hours PRN  polyethylene glycol 3350 17 Gram(s) Oral daily PRN  risperiDONE   Tablet 0.5 milliGRAM(s) Oral two times a day  senna 2 Tablet(s) Oral at bedtime  traMADol 25 milliGRAM(s) Oral every 6 hours PRN    acetaminophen   Tablet .. 650 milliGRAM(s) Oral every 6 hours PRN  aspirin enteric coated 81 milliGRAM(s) Oral daily  cefepime   IVPB      cefepime   IVPB 2000 milliGRAM(s) IV Intermittent every 12 hours  enoxaparin Injectable 40 milliGRAM(s) SubCutaneous daily  gabapentin 100 milliGRAM(s) Oral every 12 hours  lisinopril 10 milliGRAM(s) Oral daily  magnesium sulfate  IVPB 1 Gram(s) IV Intermittent once  metoprolol tartrate 25 milliGRAM(s) Oral two times a day  metroNIDAZOLE  IVPB 500 milliGRAM(s) IV Intermittent every 8 hours  metroNIDAZOLE  IVPB      oxycodone    5 mG/acetaminophen 325 mG 1 Tablet(s) Oral every 6 hours PRN  polyethylene glycol 3350 17 Gram(s) Oral daily PRN  risperiDONE   Tablet 0.5 milliGRAM(s) Oral two times a day  senna 2 Tablet(s) Oral at bedtime  traMADol 25 milliGRAM(s) Oral every 6 hours PRN    Drug Dosing Weight  Height (cm): 170.2 (18 Jan 2020 00:00)  Weight (kg): 54.3 (18 Jan 2020 00:00)  BMI (kg/m2): 18.7 (18 Jan 2020 00:00)  BSA (m2): 1.63 (18 Jan 2020 00:00)    CENTRAL LINE: [ ] YES [ ] NO  LOCATION:         BIANCHI: [ ] YES [ ] NO          A-LINE:  [ ] YES [ ] NO  LOCATION:             ICU Vital Signs Last 24 Hrs  T(C): 36.2 (20 Jan 2020 06:00), Max: 37 (19 Jan 2020 13:00)  T(F): 97.1 (20 Jan 2020 06:00), Max: 98.6 (19 Jan 2020 13:00)  HR: 71 (20 Jan 2020 10:00) (67 - 100)  BP: 102/48 (20 Jan 2020 10:00) (92/39 - 176/81)  BP(mean): 61 (20 Jan 2020 10:00) (50 - 112)  ABP: --  ABP(mean): --  RR: 16 (20 Jan 2020 10:00) (14 - 24)  SpO2: 97% (20 Jan 2020 10:00) (82% - 100%)            01-19 @ 07:01  -  01-20 @ 07:00  --------------------------------------------------------  IN: 800 mL / OUT: 1500 mL / NET: -700 mL            REVIEW OF SYSTEMS:    CONSTITUTIONAL: No weakness, fevers or chills  NECK: No pain or stiffness  RESPIRATORY: No cough, wheezing, hemoptysis; No shortness of breath  CARDIOVASCULAR: No chest pain or palpitations  GASTROINTESTINAL: No abdominal or epigastric pain. No nausea, vomiting, No diarrhea or constipation. No melena or hematochezia.  GENITOURINARY: No dysuria, frequency or hematuria  NEUROLOGICAL: No numbness or weakness  All other review of systems is negative unless indicated above      PHYSICAL EXAM:    GENERAL: NAD, well-groomed, well-developed  EYES: EOMI, PERRLA,   NECK: Supple, No JVD; Normal thyroid; Trachea midline  NERVOUS SYSTEM:  Alert & Oriented X3,  Motor Strength 5/5 B/L upper and lower extremities; DTRs 2+ intact and symmetric  CHEST/LUNG: No rales, rhonchi, wheezing   HEART: Regular rate and rhythm; No murmurs,   ABDOMEN: Soft, Nontender, Nondistended; Bowel sounds present  EXTREMITIES:  2+ Peripheral Pulses, No clubbing, cyanosis, or edema        LABS:  CBC Full  -  ( 20 Jan 2020 05:45 )  WBC Count : 9.79 K/uL  RBC Count : 4.85 M/uL  Hemoglobin : 14.8 g/dL  Hematocrit : 45.7 %  Platelet Count - Automated : 227 K/uL  Mean Cell Volume : 94.2 fl  Mean Cell Hemoglobin : 30.5 pg  Mean Cell Hemoglobin Concentration : 32.4 gm/dL  Auto Neutrophil # : 6.30 K/uL  Auto Lymphocyte # : 1.77 K/uL  Auto Monocyte # : 1.20 K/uL  Auto Eosinophil # : 0.41 K/uL  Auto Basophil # : 0.07 K/uL  Auto Neutrophil % : 64.3 %  Auto Lymphocyte % : 18.1 %  Auto Monocyte % : 12.3 %  Auto Eosinophil % : 4.2 %  Auto Basophil % : 0.7 %    01-20    138  |  108  |  31<H>  ----------------------------<  112<H>  5.0   |  22  |  0.98    Ca    9.6      20 Jan 2020 05:45  Phos  2.5     01-20  Mg     1.8     01-20    TPro  7.5  /  Alb  2.8<L>  /  TBili  0.3  /  DBili  x   /  AST  28  /  ALT  17  /  AlkPhos  65  01-20            RADIOLOGY & ADDITIONAL STUDIES REVIEWED:  ***    [ ]GOALS OF CARE DISCUSSION WITH PATIENT/FAMILY/PROXY:    CRITICAL CARE TIME SPENT: 35 minutes INTERVAL HPI/OVERNIGHT EVENTS: Pt siting comfortably on bed. C/o pain in toe if R foot. Cardiology cleared pt for the procedure.     PRESSORS: [ ] YES [ x] NO  WHICH:    ANTIBIOTICS:                  DATE STARTED:  ANTIBIOTICS:                  DATE STARTED:  ANTIBIOTICS:                  DATE STARTED:    Antimicrobial:  cefepime   IVPB      cefepime   IVPB 2000 milliGRAM(s) IV Intermittent every 12 hours  metroNIDAZOLE  IVPB 500 milliGRAM(s) IV Intermittent every 8 hours  metroNIDAZOLE  IVPB        Cardiovascular:  lisinopril 10 milliGRAM(s) Oral daily  metoprolol tartrate 25 milliGRAM(s) Oral two times a day    Pulmonary:    Hematalogic:  aspirin enteric coated 81 milliGRAM(s) Oral daily  enoxaparin Injectable 40 milliGRAM(s) SubCutaneous daily    Other:  acetaminophen   Tablet .. 650 milliGRAM(s) Oral every 6 hours PRN  gabapentin 100 milliGRAM(s) Oral every 12 hours  magnesium sulfate  IVPB 1 Gram(s) IV Intermittent once  oxycodone    5 mG/acetaminophen 325 mG 1 Tablet(s) Oral every 6 hours PRN  polyethylene glycol 3350 17 Gram(s) Oral daily PRN  risperiDONE   Tablet 0.5 milliGRAM(s) Oral two times a day  senna 2 Tablet(s) Oral at bedtime  traMADol 25 milliGRAM(s) Oral every 6 hours PRN    acetaminophen   Tablet .. 650 milliGRAM(s) Oral every 6 hours PRN  aspirin enteric coated 81 milliGRAM(s) Oral daily  cefepime   IVPB      cefepime   IVPB 2000 milliGRAM(s) IV Intermittent every 12 hours  enoxaparin Injectable 40 milliGRAM(s) SubCutaneous daily  gabapentin 100 milliGRAM(s) Oral every 12 hours  lisinopril 10 milliGRAM(s) Oral daily  magnesium sulfate  IVPB 1 Gram(s) IV Intermittent once  metoprolol tartrate 25 milliGRAM(s) Oral two times a day  metroNIDAZOLE  IVPB 500 milliGRAM(s) IV Intermittent every 8 hours  metroNIDAZOLE  IVPB      oxycodone    5 mG/acetaminophen 325 mG 1 Tablet(s) Oral every 6 hours PRN  polyethylene glycol 3350 17 Gram(s) Oral daily PRN  risperiDONE   Tablet 0.5 milliGRAM(s) Oral two times a day  senna 2 Tablet(s) Oral at bedtime  traMADol 25 milliGRAM(s) Oral every 6 hours PRN    Drug Dosing Weight  Height (cm): 170.2 (18 Jan 2020 00:00)  Weight (kg): 54.3 (18 Jan 2020 00:00)  BMI (kg/m2): 18.7 (18 Jan 2020 00:00)  BSA (m2): 1.63 (18 Jan 2020 00:00)    CENTRAL LINE: [ ] YES [ ] NO  LOCATION:         BAINCHI: [ ] YES [ ] NO          A-LINE:  [ ] YES [ ] NO  LOCATION:             ICU Vital Signs Last 24 Hrs  T(C): 36.2 (20 Jan 2020 06:00), Max: 37 (19 Jan 2020 13:00)  T(F): 97.1 (20 Jan 2020 06:00), Max: 98.6 (19 Jan 2020 13:00)  HR: 71 (20 Jan 2020 10:00) (67 - 100)  BP: 102/48 (20 Jan 2020 10:00) (92/39 - 176/81)  BP(mean): 61 (20 Jan 2020 10:00) (50 - 112)  ABP: --  ABP(mean): --  RR: 16 (20 Jan 2020 10:00) (14 - 24)  SpO2: 97% (20 Jan 2020 10:00) (82% - 100%)            01-19 @ 07:01  -  01-20 @ 07:00  --------------------------------------------------------  IN: 800 mL / OUT: 1500 mL / NET: -700 mL            REVIEW OF SYSTEMS:    CONSTITUTIONAL: No weakness, fevers or chills  NECK: No pain or stiffness  RESPIRATORY: No cough, wheezing, hemoptysis; No shortness of breath  CARDIOVASCULAR: No chest pain or palpitations  GASTROINTESTINAL: No abdominal or epigastric pain. No nausea, vomiting, No diarrhea or constipation. No melena or hematochezia.  GENITOURINARY: No dysuria, frequency or hematuria  NEUROLOGICAL: pain in R foot  All other review of systems is negative unless indicated above      PHYSICAL EXAM:    GENERAL: NAD, well-groomed, well-developed  EYES: EOMI, PERRLA,   NECK: Supple, No JVD; Normal thyroid; Trachea midline  NERVOUS SYSTEM:  Alert & Oriented X3,  Motor Strength 5/5 B/L upper and lower extremities; DTRs 2+ intact and symmetric  CHEST/LUNG: No rales, rhonchi, wheezing   HEART: Regular rate and rhythm; No murmurs,   ABDOMEN: Soft, Nontender, Nondistended; Bowel sounds present  EXTREMITIES:  discoloration on toes in R foot. pulses + intact      LABS:  CBC Full  -  ( 20 Jan 2020 05:45 )  WBC Count : 9.79 K/uL  RBC Count : 4.85 M/uL  Hemoglobin : 14.8 g/dL  Hematocrit : 45.7 %  Platelet Count - Automated : 227 K/uL  Mean Cell Volume : 94.2 fl  Mean Cell Hemoglobin : 30.5 pg  Mean Cell Hemoglobin Concentration : 32.4 gm/dL  Auto Neutrophil # : 6.30 K/uL  Auto Lymphocyte # : 1.77 K/uL  Auto Monocyte # : 1.20 K/uL  Auto Eosinophil # : 0.41 K/uL  Auto Basophil # : 0.07 K/uL  Auto Neutrophil % : 64.3 %  Auto Lymphocyte % : 18.1 %  Auto Monocyte % : 12.3 %  Auto Eosinophil % : 4.2 %  Auto Basophil % : 0.7 %    01-20    138  |  108  |  31<H>  ----------------------------<  112<H>  5.0   |  22  |  0.98    Ca    9.6      20 Jan 2020 05:45  Phos  2.5     01-20  Mg     1.8     01-20    TPro  7.5  /  Alb  2.8<L>  /  TBili  0.3  /  DBili  x   /  AST  28  /  ALT  17  /  AlkPhos  65  01-20            RADIOLOGY & ADDITIONAL STUDIES REVIEWED:  ***    [ ]GOALS OF CARE DISCUSSION WITH PATIENT/FAMILY/PROXY:    CRITICAL CARE TIME SPENT: 35 minutes

## 2020-01-20 NOTE — PROGRESS NOTE ADULT - SUBJECTIVE AND OBJECTIVE BOX
INTERVAL HPI/OVERNIGHT EVENTS:    Pt seen and examined at bedside. No acute complaints at this time     Vital Signs Last 24 Hrs  T(C): 36.2 (20 Jan 2020 06:00), Max: 37 (19 Jan 2020 13:00)  T(F): 97.1 (20 Jan 2020 06:00), Max: 98.6 (19 Jan 2020 13:00)  HR: 74 (20 Jan 2020 09:00) (65 - 100)  BP: 111/47 (20 Jan 2020 09:00) (87/34 - 176/81)  BP(mean): 64 (20 Jan 2020 09:00) (47 - 112)  RR: 18 (20 Jan 2020 09:00) (14 - 24)  SpO2: 99% (20 Jan 2020 09:00) (82% - 100%)  I&O's Detail    19 Jan 2020 07:01  -  20 Jan 2020 07:00  --------------------------------------------------------  IN:    Oral Fluid: 400 mL    Solution: 300 mL    Solution: 100 mL  Total IN: 800 mL    OUT:    Voided: 1500 mL  Total OUT: 1500 mL    Total NET: -700 mL        cefepime   IVPB      cefepime   IVPB 2000 milliGRAM(s) IV Intermittent every 12 hours  metroNIDAZOLE  IVPB 500 milliGRAM(s) IV Intermittent every 8 hours  metroNIDAZOLE  IVPB      polyethylene glycol 3350 17 Gram(s) Oral daily PRN  senna 2 Tablet(s) Oral at bedtime      Physical Exam  General: AAOx3, No acute distress  Skin: No jaundice, no icterus  Abdomen: soft,  nondistended, nontender, no rebound tenderness, no guarding, no palpable masses  Pelvic: L groin angiogram access dressing in place c/d/i  Extremities: R LE with gangrene of toes  Vasc: no dopplerable R DP/PT pulses, dopplerable L DP pulse        Labs:                        14.8   9.79  )-----------( 227      ( 20 Jan 2020 05:45 )             45.7     01-20    138  |  108  |  31<H>  ----------------------------<  112<H>  5.0   |  22  |  0.98    Ca    9.6      20 Jan 2020 05:45  Phos  2.5     01-20  Mg     1.8     01-20    TPro  7.5  /  Alb  2.8<L>  /  TBili  0.3  /  DBili  x   /  AST  28  /  ALT  17  /  AlkPhos  65  01-20

## 2020-01-20 NOTE — CHART NOTE - NSCHARTNOTEFT_GEN_A_CORE
Patient is 74 year old female, live by herself, ambulates with cane  has medical hx significant for dementia, cad, pvd, hld, htn presents to the ED with complaint of discoloration for a toe on the right foot.  Per pt she has black colored rt 2nd toe. pt states she notice discoloration gradually over last 3 weeks associated with swelling and erythema, pt states she bumped in to something and hurt her right big toe but dose not remember when. Today she was seen by her primary care physician a few days ago and told her to go to the emergency room. Patient states she can only ambulate 10 feet and gets a cramp in her right calf for which she needs to sit down.  She states she is very minimally ambulatory.  She denies pain at rest with her feet elevated.  Patient lives alone with a cat. Patient massaging leg throughout the visit.  Patient denies fever, cp, sob, cough, n/v/d. Pt has urinary incontinence for many years. Patient states her blood pressure is not usually low. Patient states she smokes at least a pack a day of cigarettes since she was 22 years old.  Patient was taken to OR for Intraoperative Angiogram bilateral Lower extremity for peripheral arterial disease, intermittent claudication and right 1st and 2nd toe gangrene on 1/17. She was  found to have diffuse atherosclerotic disease with multilevel arterial occlusion. Marginal/poor candidate for endovascular intervention. Patient was being  transferred to ICU for frequent monitoring after the angiogram.   In the ICU she was having Frequent groin checks.   She was followed by vascular Surgery team in the ICU. She is been planned for a B/L bypass surgery. Cardiac consult was requested to Dr. Flanagan for cardiac clearance. She is recommended to undergo Cardiac stress test to obtain clearance. Pt stable to be downgraded to the floor.     Allergy: Sulfa and penicillin  Code status: Full code (10 Angel 2020 21:52)    Things to follow:  Cardiac stress test for cardiac clearance  Bypass surgery as per Vascular surgery team recc

## 2020-01-20 NOTE — PROGRESS NOTE ADULT - ASSESSMENT
1.	Gangrene of right foot possible underlying osteomyelitis   ·	plan is for BLE open bypass pending medical clearance  ·	cont maxipime 2gms iv q12h  ·	cont flagyl 500mgs po TID   ·	awaiting downgrade to floor  Time spent - 33 mins

## 2020-01-21 DIAGNOSIS — Z51.5 ENCOUNTER FOR PALLIATIVE CARE: ICD-10-CM

## 2020-01-21 DIAGNOSIS — R53.81 OTHER MALAISE: ICD-10-CM

## 2020-01-21 DIAGNOSIS — E43 UNSPECIFIED SEVERE PROTEIN-CALORIE MALNUTRITION: ICD-10-CM

## 2020-01-21 DIAGNOSIS — I96 GANGRENE, NOT ELSEWHERE CLASSIFIED: ICD-10-CM

## 2020-01-21 DIAGNOSIS — F03.91 UNSPECIFIED DEMENTIA WITH BEHAVIORAL DISTURBANCE: ICD-10-CM

## 2020-01-21 LAB
ANION GAP SERPL CALC-SCNC: 4 MMOL/L — LOW (ref 5–17)
BUN SERPL-MCNC: 39 MG/DL — HIGH (ref 7–18)
CALCIUM SERPL-MCNC: 9.2 MG/DL — SIGNIFICANT CHANGE UP (ref 8.4–10.5)
CHLORIDE SERPL-SCNC: 102 MMOL/L — SIGNIFICANT CHANGE UP (ref 96–108)
CO2 SERPL-SCNC: 32 MMOL/L — HIGH (ref 22–31)
CREAT SERPL-MCNC: 1.19 MG/DL — SIGNIFICANT CHANGE UP (ref 0.5–1.3)
GLUCOSE SERPL-MCNC: 101 MG/DL — HIGH (ref 70–99)
HCT VFR BLD CALC: 45.5 % — HIGH (ref 34.5–45)
HGB BLD-MCNC: 14.6 G/DL — SIGNIFICANT CHANGE UP (ref 11.5–15.5)
MAGNESIUM SERPL-MCNC: 2 MG/DL — SIGNIFICANT CHANGE UP (ref 1.6–2.6)
MCHC RBC-ENTMCNC: 30.7 PG — SIGNIFICANT CHANGE UP (ref 27–34)
MCHC RBC-ENTMCNC: 32.1 GM/DL — SIGNIFICANT CHANGE UP (ref 32–36)
MCV RBC AUTO: 95.8 FL — SIGNIFICANT CHANGE UP (ref 80–100)
NRBC # BLD: 0 /100 WBCS — SIGNIFICANT CHANGE UP (ref 0–0)
PHOSPHATE SERPL-MCNC: 2.8 MG/DL — SIGNIFICANT CHANGE UP (ref 2.5–4.5)
PLATELET # BLD AUTO: 237 K/UL — SIGNIFICANT CHANGE UP (ref 150–400)
POTASSIUM SERPL-MCNC: 4.4 MMOL/L — SIGNIFICANT CHANGE UP (ref 3.5–5.3)
POTASSIUM SERPL-SCNC: 4.4 MMOL/L — SIGNIFICANT CHANGE UP (ref 3.5–5.3)
RBC # BLD: 4.75 M/UL — SIGNIFICANT CHANGE UP (ref 3.8–5.2)
RBC # FLD: 12.6 % — SIGNIFICANT CHANGE UP (ref 10.3–14.5)
SODIUM SERPL-SCNC: 138 MMOL/L — SIGNIFICANT CHANGE UP (ref 135–145)
WBC # BLD: 7.8 K/UL — SIGNIFICANT CHANGE UP (ref 3.8–10.5)
WBC # FLD AUTO: 7.8 K/UL — SIGNIFICANT CHANGE UP (ref 3.8–10.5)

## 2020-01-21 PROCEDURE — 99223 1ST HOSP IP/OBS HIGH 75: CPT

## 2020-01-21 PROCEDURE — 99231 SBSQ HOSP IP/OBS SF/LOW 25: CPT

## 2020-01-21 RX ORDER — CHLORHEXIDINE GLUCONATE 213 G/1000ML
1 SOLUTION TOPICAL
Refills: 0 | Status: COMPLETED | OUTPATIENT
Start: 2020-01-21 | End: 2020-01-22

## 2020-01-21 RX ADMIN — Medication 25 MILLIGRAM(S): at 17:53

## 2020-01-21 RX ADMIN — OXYCODONE AND ACETAMINOPHEN 1 TABLET(S): 5; 325 TABLET ORAL at 12:00

## 2020-01-21 RX ADMIN — OXYCODONE AND ACETAMINOPHEN 1 TABLET(S): 5; 325 TABLET ORAL at 05:16

## 2020-01-21 RX ADMIN — TRAMADOL HYDROCHLORIDE 25 MILLIGRAM(S): 50 TABLET ORAL at 13:12

## 2020-01-21 RX ADMIN — RISPERIDONE 0.5 MILLIGRAM(S): 4 TABLET ORAL at 05:16

## 2020-01-21 RX ADMIN — TRAMADOL HYDROCHLORIDE 25 MILLIGRAM(S): 50 TABLET ORAL at 05:56

## 2020-01-21 RX ADMIN — RISPERIDONE 0.5 MILLIGRAM(S): 4 TABLET ORAL at 17:52

## 2020-01-21 RX ADMIN — OXYCODONE AND ACETAMINOPHEN 1 TABLET(S): 5; 325 TABLET ORAL at 11:24

## 2020-01-21 RX ADMIN — TRAMADOL HYDROCHLORIDE 25 MILLIGRAM(S): 50 TABLET ORAL at 14:00

## 2020-01-21 RX ADMIN — Medication 650 MILLIGRAM(S): at 22:01

## 2020-01-21 RX ADMIN — Medication 81 MILLIGRAM(S): at 11:24

## 2020-01-21 RX ADMIN — CEFEPIME 50 MILLIGRAM(S): 1 INJECTION, POWDER, FOR SOLUTION INTRAMUSCULAR; INTRAVENOUS at 05:16

## 2020-01-21 RX ADMIN — OXYCODONE AND ACETAMINOPHEN 1 TABLET(S): 5; 325 TABLET ORAL at 20:00

## 2020-01-21 RX ADMIN — Medication 100 MILLIGRAM(S): at 21:12

## 2020-01-21 RX ADMIN — Medication 650 MILLIGRAM(S): at 21:12

## 2020-01-21 RX ADMIN — GABAPENTIN 100 MILLIGRAM(S): 400 CAPSULE ORAL at 05:16

## 2020-01-21 RX ADMIN — Medication 100 MILLIGRAM(S): at 05:16

## 2020-01-21 RX ADMIN — Medication 100 MILLIGRAM(S): at 13:12

## 2020-01-21 RX ADMIN — GABAPENTIN 100 MILLIGRAM(S): 400 CAPSULE ORAL at 17:52

## 2020-01-21 RX ADMIN — ENOXAPARIN SODIUM 40 MILLIGRAM(S): 100 INJECTION SUBCUTANEOUS at 11:24

## 2020-01-21 RX ADMIN — CEFEPIME 50 MILLIGRAM(S): 1 INJECTION, POWDER, FOR SOLUTION INTRAMUSCULAR; INTRAVENOUS at 17:53

## 2020-01-21 RX ADMIN — OXYCODONE AND ACETAMINOPHEN 1 TABLET(S): 5; 325 TABLET ORAL at 17:52

## 2020-01-21 RX ADMIN — OXYCODONE AND ACETAMINOPHEN 1 TABLET(S): 5; 325 TABLET ORAL at 05:56

## 2020-01-21 RX ADMIN — TRAMADOL HYDROCHLORIDE 25 MILLIGRAM(S): 50 TABLET ORAL at 06:58

## 2020-01-21 NOTE — PROGRESS NOTE ADULT - ASSESSMENT
74 yoF with severe BL PAD s/p OR angio w/o angioplasty 1/17, R necrotic toes     - plan for OR R ileal-popliteal bypass and angiogram with/without angioplasty on 1/23 by Dr. Izaguirre  - cardiology cleared, intermediate risk  - pain control as needed, continue mng per icu  - d/w attending

## 2020-01-21 NOTE — PROGRESS NOTE ADULT - SUBJECTIVE AND OBJECTIVE BOX
DATE OF SERVICE:01/21/2020    PRESENTING CC:PAD    SUBJ: 74 year old female, live by herself, ambulates with cane  has medical hx significant for dementia, CAD s/p CABG,HTN,HLD,PAD presents to the ED with complaint of discoloration for a toe on the right foot.Had angiogram-severe bilateral multilevel arterial occlusive disease scheduled for open bypass surgery bilaterally,no chest oain dyspnea no recent cardiac events.      PMH -reviewed admission note, no change since admission  Heart failure: acute [ ] chronic [ ] acute or chronic [ ] diastolic [ ] systolic [ ] combined systolic and diastolic[ ]  VANESSA: ATN[ ] renal medullary necrosis [ ] CKD I [ ]CKDII [ ]CKD III [ ]CKD IV [ ]CKD V [ ]Other pathological lesions [ ]    MEDICATIONS  (STANDING):  aspirin enteric coated 81 milliGRAM(s) Oral daily  cefepime   IVPB      cefepime   IVPB 2000 milliGRAM(s) IV Intermittent every 12 hours  enoxaparin Injectable 40 milliGRAM(s) SubCutaneous daily  gabapentin 100 milliGRAM(s) Oral every 12 hours  lisinopril 10 milliGRAM(s) Oral daily  metoprolol tartrate 25 milliGRAM(s) Oral two times a day  metroNIDAZOLE  IVPB 500 milliGRAM(s) IV Intermittent every 8 hours  metroNIDAZOLE  IVPB      risperiDONE   Tablet 0.5 milliGRAM(s) Oral two times a day  senna 2 Tablet(s) Oral at bedtime    MEDICATIONS  (PRN):  acetaminophen   Tablet .. 650 milliGRAM(s) Oral every 6 hours PRN Mild Pain (1 - 3)  oxycodone    5 mG/acetaminophen 325 mG 1 Tablet(s) Oral every 6 hours PRN Moderate Pain (4 - 6)  polyethylene glycol 3350 17 Gram(s) Oral daily PRN Constipation  traMADol 25 milliGRAM(s) Oral every 6 hours PRN Severe Pain (7 - 10)              REVIEW OF SYSTEMS:  Constitutional: [ ] fever, [ ]weight loss,  [x ]fatigue  Eyes: [ ] visual changes  Respiratory: [- ]shortness of breath;  [ ] cough, [ ]wheezing, [ ]chills, [ ]hemoptysis  Cardiovascular: [- ] chest pain, [ ]palpitations, [ ]dizziness,  [ ]leg swelling[- ]orthopnea[- ]PND  Gastrointestinal: [ ] abdominal pain, [ ]nausea, [ ]vomiting,  [ ]diarrhea   Genitourinary: [ ] dysuria, [ ] hematuria  Neurologic: [ ] headaches [ ] tremors[ ]weakness  Skin: [ ] itching, [ ]burning, [ ] rashes  Endocrine: [ ] heat or cold intolerance  Musculoskeletal: [ ] joint pain or swelling; [ ] muscle, back, or extremity pain  Psychiatric: [ ] depression, [ ]anxiety, [ ]mood swings, or [ ]difficulty sleeping  Hematologic: [ ] easy bruising, [ ] bleeding gums    [x] All remaining systems negative except as per above.   [ ]Unable to obtain.    Vital Signs Last 24 Hrs  T(C): 36.1 (21 Jan 2020 05:00), Max: 36.5 (20 Jan 2020 11:00)  T(F): 97 (21 Jan 2020 05:00), Max: 97.7 (20 Jan 2020 11:00)  HR: 83 (21 Jan 2020 07:00) (71 - 100)  BP: 128/64 (21 Jan 2020 07:00) (94/44 - 157/67)  BP(mean): 78 (21 Jan 2020 07:00) (57 - 106)  RR: 18 (21 Jan 2020 07:00) (13 - 27)  SpO2: 96% (21 Jan 2020 07:00) (96% - 100%)  I&O's Summary    20 Jan 2020 07:01  -  21 Jan 2020 07:00  --------------------------------------------------------  IN: 1300 mL / OUT: 1850 mL / NET: -550 mL        PHYSICAL EXAM:  General: No acute distress BMI-18.7  HEENT: EOMI, PERRL  Neck: Supple, [ ] JVD  Lungs: Equal air entry bilaterally; [ ] rales [ ] wheezing [ ] rhonchi  Heart: Regular rate and rhythm; [x ] murmur  2 /6 [x ] systolic [ ] diastolic [ ] radiation[ ] rubs [ ]  gallops  Abdomen: Nontender, bowel sounds present  Extremities: No clubbing, cyanosis, [ ] edema R toes and partial foot dry gangrene left toes in dressing  Nervous system:  Alert & Oriented X3, no focal deficits  Psychiatric: Normal affect  Skin: No rashes or lesions    LABS:  01-21    138  |  102  |  39<H>  ----------------------------<  101<H>  4.4   |  32<H>  |  1.19    Ca    9.2      21 Jan 2020 03:45  Phos  2.8     01-21  Mg     2.0     01-21    TPro  7.5  /  Alb  2.8<L>  /  TBili  0.3  /  DBili  x   /  AST  28  /  ALT  17  /  AlkPhos  65  01-20    Creatinine Trend: 1.19<--, 0.98<--, 0.81<--, 0.91<--, 0.93<--, 1.05<--                        14.6   7.80  )-----------( 237      ( 21 Jan 2020 03:45 )             45.5         RADIOLOGY:  XR CHEST AP OR PA 1V      IMPRESSION:-Nonspecific wire overlies chest.   Biapical pleural thickening. No consolidation or pleural effusion.  Heart size within normal limits.      ECG [my interpretation]:Sinus Rhythm no acute ST T wave abnormalities    ECHO: Grossly normal left ventricular systolic function.EF >55 %  Moderate, eccentric, anteriorly directed mitral regurgitation.  Normal left atrium.  RV systolic pressure is 35 mm Hg.      IMPRESSION AND PLAN:    74 year old woman , live by herself, ambulates with cane  has medical hx significant for Dementia, CAD s/p CABG,HTN,HLD,PAD presents to the ED with complaint of discoloration for a toe on the right foot.  Patient was taken to OR for Intraoperative- Angiogram bilateral Lower extremity for peripheral arterial disease, intermittent claudication and right 1st and 2nd toe gangrene. She is found to have diffuse atherosclerotic disease with multilevel arterial occlusion. Marginal/poor candidate for endovascular intervention. Scheduled for open bypass.    Problem/Plan - 1:  ·  Problem: Dry gangrene.  Plan: Dry gangrene with surrounding cellulitis  Scheduled for open bypass  Patient's Revised Cardiac Risk Index (RCRI) score is 1  ( 6 % risk) . Patient is at  Intermediate risk of  adverse perioperative cardiac events undergoing intermediate risk surgery.   No further cardiac testing is needed prior to patient's surgery.     Problem/Plan - 2:  ·  Problem: CAD (coronary artery disease)s/p CABG.  Plan: Chronic Stable Ischemic heart disease  Continue Aspirin/Metoprolol  TTE Normal LV Systolic function    Problem/Plan - 3:  ·  Problem: COPD (chronic obstructive pulmonary disease).  Plan: currently pt in not in exacerbation.   Smoking cessation

## 2020-01-21 NOTE — PROGRESS NOTE ADULT - ASSESSMENT
Patient is 74 year old female, live by herself, ambulates with cane  has medical hx significant for dementia, cad, pvd, hld, htn presents to the ED with complaint of discoloration for a toe on the right foot.  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 She is found to have diffuse atherosclerotic disease with multilevel arterial occlusion. Marginal/poor candidate for endovascular intervention. Patient is transferred to ICU for frequent monitoring after the angiogram.     Plan:    Neuro:   History of dementia  c/w risperidone   pt on morphine for pain control    CVS:  CAD  c/w asa  History of HTN  c/w Lisinopril and metoprolol  History of PVD:  s/p Angiogram. No intervention performed due to poor candidate for endovascular reintervention  Frequent Hourly groin check x Q 24 hours  Cardiology cleared for the procedure    Pulmonary:  No issues    GI:  Bowel regime due to being on pain control meds such as opioids   No issues  DASH diet    Nephrology  No issues    ID:  c/w cefepime (day 6) and metronidazole (day 7) for possible osteomyelitis of  right second toe gangrene possible underlying osteomyelitis  afebrile, No leucocytosis  cultures blood are negative so far  Podiatry follow up     Heme-onc  No issues    Prophylaxis  DVT prophylaxis with Lovenox

## 2020-01-21 NOTE — PROGRESS NOTE ADULT - ATTENDING COMMENTS
Patient was seen and examined ,interim events noted,Laboratory and Imaging studies were reviewed.  Thank you for the courtesy of the consultation,I would be available for any further discussion if needed.  Tamir Flanagan MD,FACC.  [address] [place] [id number] [phone]

## 2020-01-21 NOTE — CONSULT NOTE ADULT - PROBLEM SELECTOR RECOMMENDATION 2
FAST 6A. Patient A&O x 3 but noted with + memory loss, repetitive statements, cachexia, follows commands. Patient from home, lives alone. Patient acknowledges she could benefit from additional support at home. Continues risperdal. HCP form on file.

## 2020-01-21 NOTE — CONSULT NOTE ADULT - PROBLEM SELECTOR RECOMMENDATION 4
Per patient, she is minimally ambulatory with cane, independent of ADLs "but takes a long time", incontinent of bladder, no home O2 use. + gangrene. Patient noted with + generalized weakness, +2 bilateral  strength, dependent on all ADLs except meals at this time. PT to elizabeth.

## 2020-01-21 NOTE — CONSULT NOTE ADULT - PROBLEM SELECTOR RECOMMENDATION 3
Dx per dietary. Patient noted with + temporal wasting, loss of muscle mass/subcutaneous fat, cachexia. Encourage PO intake. Dx per dietary. Patient noted with + temporal wasting, loss of muscle mass/subcutaneous fat, cachexia; BMI 18.7. Encourage PO intake.

## 2020-01-21 NOTE — PROGRESS NOTE ADULT - SUBJECTIVE AND OBJECTIVE BOX
INTERVAL HPI/OVERNIGHT EVENTS:    No acute events overnight.   Pt resting comfortably. c/o bl foot pain, unchanged    MEDICATIONS  (STANDING):  aspirin enteric coated 81 milliGRAM(s) Oral daily  cefepime   IVPB      cefepime   IVPB 2000 milliGRAM(s) IV Intermittent every 12 hours  enoxaparin Injectable 40 milliGRAM(s) SubCutaneous daily  gabapentin 100 milliGRAM(s) Oral every 12 hours  lisinopril 10 milliGRAM(s) Oral daily  metoprolol tartrate 25 milliGRAM(s) Oral two times a day  metroNIDAZOLE  IVPB 500 milliGRAM(s) IV Intermittent every 8 hours  metroNIDAZOLE  IVPB      risperiDONE   Tablet 0.5 milliGRAM(s) Oral two times a day  senna 2 Tablet(s) Oral at bedtime    MEDICATIONS  (PRN):  acetaminophen   Tablet .. 650 milliGRAM(s) Oral every 6 hours PRN Mild Pain (1 - 3)  oxycodone    5 mG/acetaminophen 325 mG 1 Tablet(s) Oral every 6 hours PRN Moderate Pain (4 - 6)  polyethylene glycol 3350 17 Gram(s) Oral daily PRN Constipation  traMADol 25 milliGRAM(s) Oral every 6 hours PRN Severe Pain (7 - 10)      Vital Signs Last 24 Hrs  T(C): 36.1 (21 Jan 2020 05:00), Max: 36.5 (20 Jan 2020 11:00)  T(F): 97 (21 Jan 2020 05:00), Max: 97.7 (20 Jan 2020 11:00)  HR: 83 (21 Jan 2020 07:00) (71 - 100)  BP: 128/64 (21 Jan 2020 07:00) (94/44 - 157/67)  BP(mean): 78 (21 Jan 2020 07:00) (57 - 106)  RR: 18 (21 Jan 2020 07:00) (13 - 27)  SpO2: 96% (21 Jan 2020 07:00) (96% - 100%)      PHYSICAL EXAM  General: Alert and oriented, not in acute distress  Resp: Breathing unlabored  Abdomen: Soft, nondistended, nontender  Extremities: BL feet cold, intact s/m, L foot DP dopplerable no ulcer, R foot dressing c/d/i, markedly tender    I&O's Detail    20 Jan 2020 07:01  -  21 Jan 2020 07:00  --------------------------------------------------------  IN:    Oral Fluid: 900 mL    Solution: 300 mL    Solution: 100 mL  Total IN: 1300 mL    OUT:    Voided: 1850 mL  Total OUT: 1850 mL    Total NET: -550 mL          LABS:                        14.6   7.80  )-----------( 237      ( 21 Jan 2020 03:45 )             45.5             01-21    138  |  102  |  39<H>  ----------------------------<  101<H>  4.4   |  32<H>  |  1.19    Ca    9.2      21 Jan 2020 03:45  Phos  2.8     01-21  Mg     2.0     01-21    TPro  7.5  /  Alb  2.8<L>  /  TBili  0.3  /  DBili  x   /  AST  28  /  ALT  17  /  AlkPhos  65  01-20

## 2020-01-21 NOTE — PROGRESS NOTE ADULT - SUBJECTIVE AND OBJECTIVE BOX
INTERVAL HPI/OVERNIGHT EVENTS: ***    PRESSORS: [ ] YES [ ] NO  WHICH:    ANTIBIOTICS:                  DATE STARTED:  ANTIBIOTICS:                  DATE STARTED:  ANTIBIOTICS:                  DATE STARTED:    Antimicrobial:  cefepime   IVPB      cefepime   IVPB 2000 milliGRAM(s) IV Intermittent every 12 hours  metroNIDAZOLE  IVPB 500 milliGRAM(s) IV Intermittent every 8 hours  metroNIDAZOLE  IVPB        Cardiovascular:  lisinopril 10 milliGRAM(s) Oral daily  metoprolol tartrate 25 milliGRAM(s) Oral two times a day    Pulmonary:    Hematalogic:  aspirin enteric coated 81 milliGRAM(s) Oral daily  enoxaparin Injectable 40 milliGRAM(s) SubCutaneous daily    Other:  acetaminophen   Tablet .. 650 milliGRAM(s) Oral every 6 hours PRN  gabapentin 100 milliGRAM(s) Oral every 12 hours  oxycodone    5 mG/acetaminophen 325 mG 1 Tablet(s) Oral every 6 hours PRN  polyethylene glycol 3350 17 Gram(s) Oral daily PRN  risperiDONE   Tablet 0.5 milliGRAM(s) Oral two times a day  senna 2 Tablet(s) Oral at bedtime  traMADol 25 milliGRAM(s) Oral every 6 hours PRN    acetaminophen   Tablet .. 650 milliGRAM(s) Oral every 6 hours PRN  aspirin enteric coated 81 milliGRAM(s) Oral daily  cefepime   IVPB      cefepime   IVPB 2000 milliGRAM(s) IV Intermittent every 12 hours  enoxaparin Injectable 40 milliGRAM(s) SubCutaneous daily  gabapentin 100 milliGRAM(s) Oral every 12 hours  lisinopril 10 milliGRAM(s) Oral daily  metoprolol tartrate 25 milliGRAM(s) Oral two times a day  metroNIDAZOLE  IVPB 500 milliGRAM(s) IV Intermittent every 8 hours  metroNIDAZOLE  IVPB      oxycodone    5 mG/acetaminophen 325 mG 1 Tablet(s) Oral every 6 hours PRN  polyethylene glycol 3350 17 Gram(s) Oral daily PRN  risperiDONE   Tablet 0.5 milliGRAM(s) Oral two times a day  senna 2 Tablet(s) Oral at bedtime  traMADol 25 milliGRAM(s) Oral every 6 hours PRN    Drug Dosing Weight  Height (cm): 170.2 (18 Jan 2020 00:00)  Weight (kg): 54.3 (18 Jan 2020 00:00)  BMI (kg/m2): 18.7 (18 Jan 2020 00:00)  BSA (m2): 1.63 (18 Jan 2020 00:00)    CENTRAL LINE: [ ] YES [ ] NO  LOCATION:         BIANCHI: [ ] YES [ ] NO          A-LINE:  [ ] YES [ ] NO  LOCATION:             ICU Vital Signs Last 24 Hrs  T(C): 36.1 (21 Jan 2020 05:00), Max: 36.1 (20 Jan 2020 16:26)  T(F): 97 (21 Jan 2020 05:00), Max: 97 (20 Jan 2020 16:26)  HR: 91 (21 Jan 2020 11:00) (74 - 100)  BP: 132/60 (21 Jan 2020 11:00) (94/44 - 157/67)  BP(mean): 78 (21 Jan 2020 11:00) (57 - 128)  ABP: --  ABP(mean): --  RR: 25 (21 Jan 2020 11:00) (12 - 27)  SpO2: 100% (21 Jan 2020 11:00) (96% - 100%)            01-20 @ 07:01  -  01-21 @ 07:00  --------------------------------------------------------  IN: 1300 mL / OUT: 1850 mL / NET: -550 mL            REVIEW OF SYSTEMS:    CONSTITUTIONAL: No weakness, fevers or chills  NECK: No pain or stiffness  RESPIRATORY: No cough, wheezing, hemoptysis; No shortness of breath  CARDIOVASCULAR: No chest pain or palpitations  GASTROINTESTINAL: No abdominal or epigastric pain. No nausea, vomiting, No diarrhea or constipation. No melena or hematochezia.  GENITOURINARY: No dysuria, frequency or hematuria  NEUROLOGICAL: No numbness or weakness  All other review of systems is negative unless indicated above      PHYSICAL EXAM:    GENERAL: NAD, well-groomed, well-developed  EYES: EOMI, PERRLA,   NECK: Supple, No JVD; Normal thyroid; Trachea midline  NERVOUS SYSTEM:  Alert & Oriented X3,  Motor Strength 5/5 B/L upper and lower extremities; DTRs 2+ intact and symmetric  CHEST/LUNG: No rales, rhonchi, wheezing   HEART: Regular rate and rhythm; No murmurs,   ABDOMEN: Soft, Nontender, Nondistended; Bowel sounds present  EXTREMITIES:  2+ Peripheral Pulses, No clubbing, cyanosis, or edema        LABS:  CBC Full  -  ( 21 Jan 2020 03:45 )  WBC Count : 7.80 K/uL  RBC Count : 4.75 M/uL  Hemoglobin : 14.6 g/dL  Hematocrit : 45.5 %  Platelet Count - Automated : 237 K/uL  Mean Cell Volume : 95.8 fl  Mean Cell Hemoglobin : 30.7 pg  Mean Cell Hemoglobin Concentration : 32.1 gm/dL  Auto Neutrophil # : x  Auto Lymphocyte # : x  Auto Monocyte # : x  Auto Eosinophil # : x  Auto Basophil # : x  Auto Neutrophil % : x  Auto Lymphocyte % : x  Auto Monocyte % : x  Auto Eosinophil % : x  Auto Basophil % : x    01-21    138  |  102  |  39<H>  ----------------------------<  101<H>  4.4   |  32<H>  |  1.19    Ca    9.2      21 Jan 2020 03:45  Phos  2.8     01-21  Mg     2.0     01-21    TPro  7.5  /  Alb  2.8<L>  /  TBili  0.3  /  DBili  x   /  AST  28  /  ALT  17  /  AlkPhos  65  01-20            RADIOLOGY & ADDITIONAL STUDIES REVIEWED:  ***    [ ]GOALS OF CARE DISCUSSION WITH PATIENT/FAMILY/PROXY:    CRITICAL CARE TIME SPENT: 35 minutes INTERVAL HPI/OVERNIGHT EVENTS: Pt had no events overnight. Pt to undergo bypass on thursday.     PRESSORS: [ ] YES [x ] NO  WHICH:    ANTIBIOTICS:                  DATE STARTED:  ANTIBIOTICS:                  DATE STARTED:  ANTIBIOTICS:                  DATE STARTED:    Antimicrobial:  cefepime   IVPB      cefepime   IVPB 2000 milliGRAM(s) IV Intermittent every 12 hours  metroNIDAZOLE  IVPB 500 milliGRAM(s) IV Intermittent every 8 hours  metroNIDAZOLE  IVPB        Cardiovascular:  lisinopril 10 milliGRAM(s) Oral daily  metoprolol tartrate 25 milliGRAM(s) Oral two times a day    Pulmonary:    Hematalogic:  aspirin enteric coated 81 milliGRAM(s) Oral daily  enoxaparin Injectable 40 milliGRAM(s) SubCutaneous daily    Other:  acetaminophen   Tablet .. 650 milliGRAM(s) Oral every 6 hours PRN  gabapentin 100 milliGRAM(s) Oral every 12 hours  oxycodone    5 mG/acetaminophen 325 mG 1 Tablet(s) Oral every 6 hours PRN  polyethylene glycol 3350 17 Gram(s) Oral daily PRN  risperiDONE   Tablet 0.5 milliGRAM(s) Oral two times a day  senna 2 Tablet(s) Oral at bedtime  traMADol 25 milliGRAM(s) Oral every 6 hours PRN    acetaminophen   Tablet .. 650 milliGRAM(s) Oral every 6 hours PRN  aspirin enteric coated 81 milliGRAM(s) Oral daily  cefepime   IVPB      cefepime   IVPB 2000 milliGRAM(s) IV Intermittent every 12 hours  enoxaparin Injectable 40 milliGRAM(s) SubCutaneous daily  gabapentin 100 milliGRAM(s) Oral every 12 hours  lisinopril 10 milliGRAM(s) Oral daily  metoprolol tartrate 25 milliGRAM(s) Oral two times a day  metroNIDAZOLE  IVPB 500 milliGRAM(s) IV Intermittent every 8 hours  metroNIDAZOLE  IVPB      oxycodone    5 mG/acetaminophen 325 mG 1 Tablet(s) Oral every 6 hours PRN  polyethylene glycol 3350 17 Gram(s) Oral daily PRN  risperiDONE   Tablet 0.5 milliGRAM(s) Oral two times a day  senna 2 Tablet(s) Oral at bedtime  traMADol 25 milliGRAM(s) Oral every 6 hours PRN    Drug Dosing Weight  Height (cm): 170.2 (18 Jan 2020 00:00)  Weight (kg): 54.3 (18 Jan 2020 00:00)  BMI (kg/m2): 18.7 (18 Jan 2020 00:00)  BSA (m2): 1.63 (18 Jan 2020 00:00)    CENTRAL LINE: [ ] YES [x ] NO  LOCATION:         BIANCHI: [ ] YES [ x] NO          A-LINE:  [ ] YES [x ] NO  LOCATION:             ICU Vital Signs Last 24 Hrs  T(C): 36.1 (21 Jan 2020 05:00), Max: 36.1 (20 Jan 2020 16:26)  T(F): 97 (21 Jan 2020 05:00), Max: 97 (20 Jan 2020 16:26)  HR: 91 (21 Jan 2020 11:00) (74 - 100)  BP: 132/60 (21 Jan 2020 11:00) (94/44 - 157/67)  BP(mean): 78 (21 Jan 2020 11:00) (57 - 128)  ABP: --  ABP(mean): --  RR: 25 (21 Jan 2020 11:00) (12 - 27)  SpO2: 100% (21 Jan 2020 11:00) (96% - 100%)            01-20 @ 07:01  -  01-21 @ 07:00  --------------------------------------------------------  IN: 1300 mL / OUT: 1850 mL / NET: -550 mL            REVIEW OF SYSTEMS:    CONSTITUTIONAL: No weakness, fevers or chills  NECK: No pain or stiffness  RESPIRATORY: No cough, wheezing, hemoptysis; No shortness of breath  CARDIOVASCULAR: No chest pain or palpitations  GASTROINTESTINAL: No abdominal or epigastric pain. No nausea, vomiting, No diarrhea or constipation. No melena or hematochezia.  GENITOURINARY: No dysuria, frequency or hematuria  NEUROLOGICAL: pain R foot at the site of affected toe  All other review of systems is negative unless indicated above      PHYSICAL EXAM:    GENERAL: lean, old female on bed.   EYES: EOMI, PERRLA,   NECK: Supple, No JVD; Normal thyroid; Trachea midline  NERVOUS SYSTEM:  Alert & Oriented X3,  generalised weakness in LL>UL  CHEST/LUNG: No rales, rhonchi, wheezing   HEART: Regular rate and rhythm; No murmurs,   ABDOMEN: Soft, Nontender, Nondistended; Bowel sounds present  EXTREMITIES: pulses not palpable DPA b/l.   Dry gangrenous toe+ r foot.        LABS:  CBC Full  -  ( 21 Jan 2020 03:45 )  WBC Count : 7.80 K/uL  RBC Count : 4.75 M/uL  Hemoglobin : 14.6 g/dL  Hematocrit : 45.5 %  Platelet Count - Automated : 237 K/uL  Mean Cell Volume : 95.8 fl  Mean Cell Hemoglobin : 30.7 pg  Mean Cell Hemoglobin Concentration : 32.1 gm/dL  Auto Neutrophil # : x  Auto Lymphocyte # : x  Auto Monocyte # : x  Auto Eosinophil # : x  Auto Basophil # : x  Auto Neutrophil % : x  Auto Lymphocyte % : x  Auto Monocyte % : x  Auto Eosinophil % : x  Auto Basophil % : x    01-21    138  |  102  |  39<H>  ----------------------------<  101<H>  4.4   |  32<H>  |  1.19    Ca    9.2      21 Jan 2020 03:45  Phos  2.8     01-21  Mg     2.0     01-21    TPro  7.5  /  Alb  2.8<L>  /  TBili  0.3  /  DBili  x   /  AST  28  /  ALT  17  /  AlkPhos  65  01-20            RADIOLOGY & ADDITIONAL STUDIES REVIEWED:  *    [ ]GOALS OF CARE DISCUSSION WITH PATIENT/FAMILY/PROXY:    CRITICAL CARE TIME SPENT: 35 minutes

## 2020-01-21 NOTE — PROGRESS NOTE ADULT - SUBJECTIVE AND OBJECTIVE BOX
INTERVAL HPI/OVERNIGHT EVENTS:       Antimicrobial:  cefepime   IVPB      cefepime   IVPB 2000 milliGRAM(s) IV Intermittent every 12 hours  metroNIDAZOLE  IVPB 500 milliGRAM(s) IV Intermittent every 8 hours  metroNIDAZOLE  IVPB        Cardiovascular:  lisinopril 10 milliGRAM(s) Oral daily  metoprolol tartrate 25 milliGRAM(s) Oral two times a day    Pulmonary:    Hematalogic:  aspirin enteric coated 81 milliGRAM(s) Oral daily  enoxaparin Injectable 40 milliGRAM(s) SubCutaneous daily    Other:  acetaminophen   Tablet .. 650 milliGRAM(s) Oral every 6 hours PRN  gabapentin 100 milliGRAM(s) Oral every 12 hours  oxycodone    5 mG/acetaminophen 325 mG 1 Tablet(s) Oral every 6 hours PRN  polyethylene glycol 3350 17 Gram(s) Oral daily PRN  risperiDONE   Tablet 0.5 milliGRAM(s) Oral two times a day  senna 2 Tablet(s) Oral at bedtime  traMADol 25 milliGRAM(s) Oral every 6 hours PRN      Drug Dosing Weight  Height (cm): 170.2 (18 Jan 2020 00:00)  Weight (kg): 54.3 (18 Jan 2020 00:00)  BMI (kg/m2): 18.7 (18 Jan 2020 00:00)  BSA (m2): 1.63 (18 Jan 2020 00:00)    CENTRAL LINE: [ ] YES [ ] NO  LOCATION:   DATE INSERTED:    BIANCHI: [ ] YES [ ] NO    DATE INSERTED:    A-LINE:  [ ] YES [ ] NO  LOCATION:   DATE INSERTED:    PMH/Social Hx/Fam Hx -reviewed admission note, no change since admission  PAST MEDICAL & SURGICAL HISTORY:  COPD (chronic obstructive pulmonary disease)  Hypercholesterolemia  Myocardial infarct, old  S/P CABG x 1      T(C): 36.3 (01-21-20 @ 15:49), Max: 36.3 (01-21-20 @ 15:49)  HR: 89 (01-21-20 @ 13:00)  BP: 133/70 (01-21-20 @ 13:00)  BP(mean): 86 (01-21-20 @ 13:00)  ABP: --  ABP(mean): --  RR: 17 (01-21-20 @ 13:00)  SpO2: 98% (01-21-20 @ 13:00)  Wt(kg): --          01-20 @ 07:01 - 01-21 @ 07:00  --------------------------------------------------------  IN: 1300 mL / OUT: 1850 mL / NET: -550 mL            PHYSICAL EXAM:    GENERAL: No signs of distress, comfortable  HEAD: Atraumatic, Normocephalic  EYES: EOMI, PERRLA  ENMT: No erythema, exudates, or enlargement, Moist mucous membranes  NECK: Supple, normal appearance, No JVD; [  ] central line (if applicable)  CHEST/LUNG: No chest deformity, fair bilateral air entry; No rales, rhonchi, wheezing; crackles  HEART: Regular rate and rhythm; No murmurs, rubs, or gallops;   ABDOMEN: Soft, Nontender, Nondistended; Bowel sounds present  EXTREMITIES:  no Righ pedal pulses. Right toe with and partial foot + dry gangrene  NERVOUS SYSTEM: awake and alert   LYMPH: No lymphadenopathy noted  SKIN: No rashes or lesions; good turgor, warm, dry      LABS:  CBC Full  -  ( 21 Jan 2020 03:45 )  WBC Count : 7.80 K/uL  RBC Count : 4.75 M/uL  Hemoglobin : 14.6 g/dL  Hematocrit : 45.5 %  Platelet Count - Automated : 237 K/uL  Mean Cell Volume : 95.8 fl  Mean Cell Hemoglobin : 30.7 pg  Mean Cell Hemoglobin Concentration : 32.1 gm/dL  Auto Neutrophil # : x  Auto Lymphocyte # : x  Auto Monocyte # : x  Auto Eosinophil # : x  Auto Basophil # : x  Auto Neutrophil % : x  Auto Lymphocyte % : x  Auto Monocyte % : x  Auto Eosinophil % : x  Auto Basophil % : x    01-21    138  |  102  |  39<H>  ----------------------------<  101<H>  4.4   |  32<H>  |  1.19    Ca    9.2      21 Jan 2020 03:45  Phos  2.8     01-21  Mg     2.0     01-21    TPro  7.5  /  Alb  2.8<L>  /  TBili  0.3  /  DBili  x   /  AST  28  /  ALT  17  /  AlkPhos  65  01-20            RADIOLOGY & ADDITIONAL STUDIES REVIEWED         IMPRESSION:  PAST MEDICAL & SURGICAL HISTORY:  COPD (chronic obstructive pulmonary disease)  Hypercholesterolemia  Myocardial infarct, old  S/P CABG x 1   p/w         IMP: This is a 74 year old woman , live by herself, ambulates with cane  has medical hx significant for dementia, cad, pvd, hld, htn presents to the ED with complaint of discoloration for a toe on the right foot.  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 She is found to have diffuse atherosclerotic disease with multilevel arterial occlusion. Marginal/poor candidate for endovascular intervention. Patient is transferred to ICU for frequent monitoring after the angiogram.  Vascular surgery f/u.. will need b/l  open bypass and cardiac cl. message sent to dr monge .. cards. vascular surgery pending cardiac clearance .. intermediate risk and no further cardiac testing . Vascular surger.. for OR 1/23         Plan:    Neuro:   History of dementia  c/w risperidone   Pain not well controlled with tramadol. started morphine    CVS:  CAD  c/w asa  History of HTN  c/w Lisinopril and metoprolol  History of PVD:  s/p Angiogram. No intervention performed due to poor candidate for endovascular reintervention  Frequent Hourly groin check x Q 24 hours  Cardiology cleared for the procedure    Pulmonary:  No issues    GI:  Bowel regime due to being on pain control meds such as opioids   No issues  DASH diet    Nephrology  No issues    ID:  c/w cefepime (day 6) and metronidazole (day 7) for possible osteomyelitis of  right second toe gangrene possible underlying osteomyelitis  afebrile, No leucocytosis  cultures blood are negative so far  Podiatry follow up     Heme-onc  No issues    Prophylaxis  DVT prophylaxis with Lovenox    GOALS OF CARE DISCUSSION WITH PATIENT/FAMILY/PROXY/ icu team on round and with cards attend

## 2020-01-21 NOTE — CONSULT NOTE ADULT - PROBLEM SELECTOR RECOMMENDATION 5
Patient A&O x 3 but noted with + memory loss, repetitive statements at time of exam. HCP form on file -  Vicky Marquez (HCP): [phone] / [phone]. Plan for OR 1/23. Pending status, patient will benefit from goals of care discussion with HCP and VALERIE. Patient remains a full code at this time.

## 2020-01-21 NOTE — CONSULT NOTE ADULT - CONSULT REASON
75 y/o F from home, lives alone, with dementia, with severe BL PAD s/p OR angio w/o angioplasty 1/17, R necrotic toes. Request to establish goals of care. Dementia, severe PAD, debility, Request to establish goals of care.

## 2020-01-21 NOTE — CONSULT NOTE ADULT - PROBLEM SELECTOR RECOMMENDATION 9
s/p angio w/o angioplasty 1/17, R necrotic toes - dressing in place. Plan for OR R ileal-popliteal bypass and angiogram with/without angioplasty on 1/23. Patient reports pain 8/10 at time of exam; continues tylenol, percocet, tramadol, IV abx, neurontin. HCP on file. Full code.

## 2020-01-21 NOTE — PROGRESS NOTE ADULT - ATTENDING COMMENTS
Seen ex'ed dw'ed PA  PAD/gang/rest pain  Clinically stable  L LE stable- no tissue loss  R LE dry gang  Plan R LE bypasss on thursday depending on OR availabilty.  DW'ed pt and Silva (HCP)

## 2020-01-21 NOTE — CONSULT NOTE ADULT - SUBJECTIVE AND OBJECTIVE BOX
HPI:  Patient is 74 year old female, live by herself, ambulates with cane, medical hx significant for dementia, cad, pvd, hld, htn presented to the ED with complaint of discoloration for a toe on the right foot. Per patient, she has black colored rt 2nd toe, which worsened gradually over past 3 weeks associated with swelling and erythema. Patient reported she can only ambulate 10 feet and gets a cramp in her right calf for which she needs to sit down. Code status: Full code (10 Angel 2020 21:52)    Hospital Course:  Patient s/p OR angio w/o angioplasty 1/17, R necrotic toes. She was found to have diffuse atherosclerotic disease with multilevel arterial occlusion. Marginal/poor candidate for endovascular intervention. Patient was transferred to ICU for frequent monitoring after the angiogram.  Plan for OR R ileal-popliteal bypass and angiogram with/without angioplasty on 1/23. HCP form on file - request to establish goals of care.     PAST MEDICAL & SURGICAL HISTORY:  COPD (chronic obstructive pulmonary disease)  Hypercholesterolemia  Myocardial infarct, old  S/P CABG x 1      SOCIAL HISTORY:    Admitted from:  home, lives alone  Substance abuse history:   none           Tobacco hx:  1 ppd x 50+ years                Alcohol hx:  none            Home Opioid hx: none  Baptism:   no Taoist affiliation                                 Preferred Language: English    Surrogate/HCP/Guardian:  Vicky Marquez (HCP): [phone]      FAMILY HISTORY:  Patient reports no significant family history.     Baseline ADLs (prior to admission): Per patient, she is minimally ambulatory with cane, independent of ADLs "but takes a long time", incontinent of bladder, no home O2 use.     Allergies:  influenza virus vaccine, live, trivalent (Unknown)  PC Pen VK (Rash)  penicillin (Other; Rash)  statins (Other)  sulfa drugs (Other)  sulfamethizole (Other)    Present Symptoms:   Dyspnea: none  Nausea/Vomiting: none  Pain:  R foot pain 8/10       Review of Systems: Patient with no further c/o at time of exam.     MEDICATIONS  (STANDING):  aspirin enteric coated 81 milliGRAM(s) Oral daily  cefepime   IVPB      cefepime   IVPB 2000 milliGRAM(s) IV Intermittent every 12 hours  enoxaparin Injectable 40 milliGRAM(s) SubCutaneous daily  gabapentin 100 milliGRAM(s) Oral every 12 hours  lisinopril 10 milliGRAM(s) Oral daily  metoprolol tartrate 25 milliGRAM(s) Oral two times a day  metroNIDAZOLE  IVPB 500 milliGRAM(s) IV Intermittent every 8 hours  metroNIDAZOLE  IVPB      risperiDONE   Tablet 0.5 milliGRAM(s) Oral two times a day  senna 2 Tablet(s) Oral at bedtime    MEDICATIONS  (PRN):  acetaminophen   Tablet .. 650 milliGRAM(s) Oral every 6 hours PRN Mild Pain (1 - 3)  oxycodone    5 mG/acetaminophen 325 mG 1 Tablet(s) Oral every 6 hours PRN Moderate Pain (4 - 6)  polyethylene glycol 3350 17 Gram(s) Oral daily PRN Constipation  traMADol 25 milliGRAM(s) Oral every 6 hours PRN Severe Pain (7 - 10)      PHYSICAL EXAM:    Vital Signs Last 24 Hrs  T(C): 36.1 (21 Jan 2020 05:00), Max: 36.1 (20 Jan 2020 16:26)  T(F): 97 (21 Jan 2020 05:00), Max: 97 (20 Jan 2020 16:26)  HR: 85 (21 Jan 2020 10:00) (74 - 100)  BP: 123/56 (21 Jan 2020 10:00) (94/44 - 157/67)  BP(mean): 69 (21 Jan 2020 10:00) (57 - 128)  RR: 17 (21 Jan 2020 10:00) (12 - 27)  SpO2: 97% (21 Jan 2020 10:00) (96% - 100%)    General: alert  oriented x 3, + memory loss, repetitive statements, cachexia, follows commands. No distress.   Karnofsky Performance Score/Palliative Performance Status Version2:     30%    HEENT:   dry lips, + temporal wasting     Lungs: comfortable, on room air. No respiratory distress.   CV: S1S2, RRR.   GI: abdomen soft, NTND, continent   : incontinent, primafit  Musculoskeletal: minimally ambulatory at baseline; patient with +2 bilateral  strength, loss of muscle mass/subcutaneous fat, assist with ADLs except independent with feeding  Skin: R foot s/p angio w/o angioplasty 1/17, R necrotic toes - dressing in place  Neuro: patient A&O x 3 but noted with + memory loss/repetitive statements. Follows commands.    Oral intake ability: full capability  Diet: DASH/TLC    LABS:                        14.6   7.80  )-----------( 237      ( 21 Jan 2020 03:45 )             45.5     01-21    138  |  102  |  39<H>  ----------------------------<  101<H>  4.4   |  32<H>  |  1.19    Ca    9.2      21 Jan 2020 03:45  Phos  2.8     01-21  Mg     2.0     01-21    TPro  7.5  /  Alb  2.8<L>  /  TBili  0.3  /  DBili  x   /  AST  28  /  ALT  17  /  AlkPhos  65  01-20    albumin: 2.8    RADIOLOGY & ADDITIONAL STUDIES:  < from: Transthoracic Echocardiogram (01.14.20 @ 07:53) >  Patient name: MERARY MEYER  YOB: 1945   Age: 74 (F)   MR#: [id number]  Study Date: 1/14/2020  Location: [address] [place], [place] [address]Sonographer: Angel Michel MARGE  Study quality: Fair  Referring Physician:  VASU PATTERSON MD  Blood Pressure: 110/62 mmHg  Height: 170 cm  Weight: 50 kg  BSA: 1.6 m2  ------------------------------------------------------------------------    PROCEDURE: Transthoracic echocardiogram with 2-D, M-Mode  and complete spectral and color flow Doppler.  INDICATION: Pre-op exam  (Z01.818)  HISTORY:  ------------------------------------------------------------------------  DIMENSIONS:  Dimensions:     Normal Values:  LA:     3.2 cm    2.0 - 4.0 cm  Ao:     2.7 cm    2.0 - 3.8 cm  SEPTUM: 0.8 cm    0.6 - 1.2 cm  PWT:    0.7cm    0.6 - 1.1 cm  LVIDd:  3.9 cm    3.0 - 5.6 cm  LVIDs:  2.5 cm    1.8 - 4.0 cm      Derived Variables:  LVMI: 52 g/m2  RWT: 0.35  Ejection Fraction Visual Estimate: >55 %    ------------------------------------------------------------------------  OBSERVATIONS:  Mitral Valve: Dense mitral annular calcification. Probably  moderate, eccentric, anteriorly directed mitral  regurgitation.  Consider SHEY for better evaluation if  clinically appropriate.  Aortic Root: Normal aortic root.  Aortic Valve: Normal trileaflet aortic valve.  Left Atrium: Normal left atrium.  LA volume index = 29  cc/m2.  Left Ventricle: Endocardium not well visualized; grossly  normal left ventricular systolic function. Normal left  ventricular internal dimensions and wall thicknesses.  Right Heart: Normal right atrium. Normal right ventricular  size and function. There is moderate tricuspid  regurgitation.  Pericardium/PleuraNormal pericardium with no pericardial  effusion.  Hemodynamic: RA Pressure is 8 mm Hg. RV systolic pressure  is 35 mm Hg.  ------------------------------------------------------------------------  CONCLUSIONS:  1. Dense mitral annular calcification. Probably moderate,  eccentric, anteriorly directed mitral regurgitation.  Consider SHEY for better evaluation if clinically  appropriate.  2. Normal left ventricular internal dimensions and wall  thicknesses.  3. Endocardium not well visualized; grossly normal left  ventricular systolic function.  4. Normal right ventricular size and function.      < end of copied text >    < from: Xray Foot AP + Lateral + Oblique, Right (01.10.20 @ 15:05) >    EXAM:  FOOT RIGHT (MINIMUM 3 VIEWS)                            PROCEDURE DATE:  01/10/2020          INTERPRETATION:  Right foot. 3 views. Patient has local pain.    The foot shows mild degeneration at the first MTP joint.    There is an old fracture deformity of the distal fibula.    No bone destruction or acute fracture is evident.    IMPRESSION: No acute finding.    < end of copied text >    < from: CT Angio Abd Aorta w/run-off w/ IV Cont (01.12.20 @ 19:40) >  EXAM:  CT ANGIO ABD AOR W RUN(W)AW IC                            PROCEDURE DATE:  01/12/2020          INTERPRETATION:  CT ANGIO ABDOMINAL AORTA RUNOFF WITHOUT AND OR WITH IV CONTRAST    CLINICAL INFORMATION: Atherosclerosis of the native arteries of the right and left lower extremity with right foot gangrene    PROCEDURE INFORMATION:   Exam: CTA Angiogram of the Abdominal Aorta and Bilateral Lower Extremities   (Run-off) With IV Contrast   Exam date and time: 1/12/2020 6:55 PM   Age: 74 years old   Clinical indication: Other: Pad, right 2nd toe gangrene, 3rd and 4th toes wet   early gang     TECHNIQUE:   Imaging protocol: CT angiogram of the abdominal aorta, pelvis and bilateral   lower extremities with IV iodinated contrast.   Intravenous contrast: 90 cc Omnipaque 350  3D rendering: MIP and/or 3D reconstructed images were created by the   technologist.     COMPARISON:   CR XR FOOT 3 VIEWS RIGHT 1/10/2020 2:46 PM     FINDINGS:   Aorta: Severe calcified and noncalcified atherosclerotic plaque. Aorta is   otherwise fully patent.   Celiac trunk and mesenteric arteries: No occlusion or significant stenosis.    Renal arteries: No occlusion or significant stenosis.      Right iliac arteries: Marked stenosis of the right external iliac artery distally.   Right femoral/popliteal arteries: Distal CFA stenosis. Severely and diffusely attenuated SFA. Occlusion of the mid-distal right superficial   femoral artery with reconstitution of the suprageniculate popliteal  artery. Stenotic right popliteal artery.   Right infrapopliteal arteries: Single-vessel runoff via the right peroneal   artery, diffusely attenuated, which feeds the plantar artery. The right anterior and posterior tibial   arteries are occluded. Reconstitution of the right pedis dorsalis artery at the   level of the foot, however severely attenuated.     Left iliac arteries: Moderate calcification and stenosis of the left iliac arteries. Occluded left internal iliac artery.  Left femoral/popliteal arteries: Long segmentOcclusion of the entire left superficial femoral   artery and Reconstitution of left popliteal artery, which shows multifocal disease without occlusion.  Left infrapopliteal arteries: Occlusion of the left anterior posterior tibial   arteries at the level of the ankle. Single-vessel left peroneal artery which   feeds the plantar artery. Left pedis dorsalis is attenuated but patent.    Liver: No mass.   Gallbladder and bile ducts: Unremarkable. No calcified stones. No ductal   dilation.    Pancreas: Unremarkable. No mass. No ductal dilation.    Spleen: Normal. No splenomegaly.    Adrenals: Normal. No mass.    Kidneys and ureters: Normal. No mass.      Stomach and bowel: . No obstruction. No mucosal thickening. Moderate stool   throughout the colon.    Appendix: No evidence of appendicitis.      Bladder: Unremarkable. No mass.    Reproductive: Unremarkable as visualized.      Intraperitoneal space: Unremarkable. No free air. No significant fluid   collection.    Lymph nodes: No lymphadenopathy.     Bones/joints: No acute fracture. No dislocation.    Soft tissues: Soft tissue irregularity of the second right toe presumably   related to underlying gangrene.       IMPRESSION:   1. Right lower extremity demonstrates marked diffuse stenosis of the lower   extremity with occlusion of the mid-distal right SFA which reconstitutes distally and   with single-vessel runoff to the right foot via the right peroneal artery which   feeds the plantar artery. The right pedis dorsalis artery reconstitutes at the   level of the ankle.   2. The left lower extremity demonstrates diffuse marked stenosis with total occlusion   of the left SFA with reconstitution of attenuated left popliteal artery. There is   single-vessel runoff via the left peronealartery is the which then feeds the   left plantar artery. The pedis dorsalis artery is patent.   3. evidence of inflow disease at the level of the right external iliac artery.  4. Slight irregularity of the soft tissues of the right second toe which may be   related to underlying gangrene.    < end of copied text >      ADVANCE DIRECTIVES:   Advanced Care Planning discussion total time spent: HPI:  Patient is 74 year old female, live by herself, ambulates with cane, medical hx significant for dementia, cad, pvd, hld, htn presented to the ED with complaint of discoloration for a toe on the right foot. Per patient, she has black colored rt 2nd toe, which worsened gradually over past 3 weeks associated with swelling and erythema. Patient reported she can only ambulate 10 feet and gets a cramp in her right calf for which she needs to sit down. Code status: Full code (10 Angel 2020 21:52)    Hospital Course:  Patient s/p OR angio w/o angioplasty 1/17, R necrotic toes. She was found to have diffuse atherosclerotic disease with multilevel arterial occlusion. Marginal/poor candidate for endovascular intervention. Patient was transferred to ICU for frequent monitoring after the angiogram.  Plan for OR R ileal-popliteal bypass and angiogram with/without angioplasty on 1/23. HCP form on file - request to establish goals of care.     PAST MEDICAL & SURGICAL HISTORY:  COPD (chronic obstructive pulmonary disease)  Hypercholesterolemia  Myocardial infarct, old  S/P CABG x 1      SOCIAL HISTORY:    Admitted from:  home, lives alone  Substance abuse history:   none           Tobacco hx:  1 ppd x 50+ years                Alcohol hx:  none            Home Opioid hx: none  Orthodox:   no Episcopalian affiliation           Preferred Language: English    Surrogate/HCP/Guardian:  Vicky Marquez (HCP): [phone] / [phone]      FAMILY HISTORY:  Patient reports no significant family history.     Baseline ADLs (prior to admission): Per patient, she is minimally ambulatory with cane, independent of ADLs "but takes a long time", incontinent of bladder, no home O2 use.     Allergies:  influenza virus vaccine, live, trivalent (Unknown)  PC Pen VK (Rash)  penicillin (Other; Rash)  statins (Other)  sulfa drugs (Other)  sulfamethizole (Other)    Present Symptoms:   Dyspnea: none  Nausea/Vomiting: none  Pain:  R foot pain 8/10       Review of Systems: Patient with no further c/o at time of exam.     MEDICATIONS  (STANDING):  aspirin enteric coated 81 milliGRAM(s) Oral daily  cefepime   IVPB      cefepime   IVPB 2000 milliGRAM(s) IV Intermittent every 12 hours  enoxaparin Injectable 40 milliGRAM(s) SubCutaneous daily  gabapentin 100 milliGRAM(s) Oral every 12 hours  lisinopril 10 milliGRAM(s) Oral daily  metoprolol tartrate 25 milliGRAM(s) Oral two times a day  metroNIDAZOLE  IVPB 500 milliGRAM(s) IV Intermittent every 8 hours  metroNIDAZOLE  IVPB      risperiDONE   Tablet 0.5 milliGRAM(s) Oral two times a day  senna 2 Tablet(s) Oral at bedtime    MEDICATIONS  (PRN):  acetaminophen   Tablet .. 650 milliGRAM(s) Oral every 6 hours PRN Mild Pain (1 - 3)  oxycodone    5 mG/acetaminophen 325 mG 1 Tablet(s) Oral every 6 hours PRN Moderate Pain (4 - 6)  polyethylene glycol 3350 17 Gram(s) Oral daily PRN Constipation  traMADol 25 milliGRAM(s) Oral every 6 hours PRN Severe Pain (7 - 10)      PHYSICAL EXAM:    Vital Signs Last 24 Hrs  T(C): 36.1 (21 Jan 2020 05:00), Max: 36.1 (20 Jan 2020 16:26)  T(F): 97 (21 Jan 2020 05:00), Max: 97 (20 Jan 2020 16:26)  HR: 85 (21 Jan 2020 10:00) (74 - 100)  BP: 123/56 (21 Jan 2020 10:00) (94/44 - 157/67)  BP(mean): 69 (21 Jan 2020 10:00) (57 - 128)  RR: 17 (21 Jan 2020 10:00) (12 - 27)  SpO2: 97% (21 Jan 2020 10:00) (96% - 100%)    General: alert  oriented x 3, + memory loss, repetitive statements, cachexia, follows commands. No distress.   Karnofsky Performance Score/Palliative Performance Status Version2:     30%    HEENT:   dry lips, + temporal wasting     Lungs: comfortable, on room air. No respiratory distress.   CV: S1S2, RRR.   GI: abdomen soft, NTND, continent   : incontinent, primafit  Musculoskeletal: minimally ambulatory at baseline; patient with +2 bilateral  strength, loss of muscle mass/subcutaneous fat, assist with ADLs except independent with feeding  Skin: R foot s/p angio w/o angioplasty 1/17, R necrotic toes - dressing in place  Neuro: patient A&O x 3 but noted with + memory loss/repetitive statements. Follows commands.    Oral intake ability: full capability  Diet: DASH/TLC    LABS:                        14.6   7.80  )-----------( 237      ( 21 Jan 2020 03:45 )             45.5     01-21    138  |  102  |  39<H>  ----------------------------<  101<H>  4.4   |  32<H>  |  1.19    Ca    9.2      21 Jan 2020 03:45  Phos  2.8     01-21  Mg     2.0     01-21    TPro  7.5  /  Alb  2.8<L>  /  TBili  0.3  /  DBili  x   /  AST  28  /  ALT  17  /  AlkPhos  65  01-20    albumin: 2.8    RADIOLOGY & ADDITIONAL STUDIES:  < from: Transthoracic Echocardiogram (01.14.20 @ 07:53) >  Patient name: MERARY MEYER  YOB: 1945   Age: 74 (F)   MR#: [id number]  Study Date: 1/14/2020  Location: [address] [place], [place] [address]Sonographer: Angel Michel [facility]  Study quality: Fair  Referring Physician:  VASU PATTERSON MD  Blood Pressure: 110/62 mmHg  Height: 170 cm  Weight: 50 kg  BSA: 1.6 m2  ------------------------------------------------------------------------    PROCEDURE: Transthoracic echocardiogram with 2-D, M-Mode  and complete spectral and color flow Doppler.  INDICATION: Pre-op exam  (Z01.818)  HISTORY:  ------------------------------------------------------------------------  DIMENSIONS:  Dimensions:     Normal Values:  LA:     3.2 cm    2.0 - 4.0 cm  Ao:     2.7 cm    2.0 - 3.8 cm  SEPTUM: 0.8 cm    0.6 - 1.2 cm  PWT:    0.7cm    0.6 - 1.1 cm  LVIDd:  3.9 cm    3.0 - 5.6 cm  LVIDs:  2.5 cm    1.8 - 4.0 cm      Derived Variables:  LVMI: 52 g/m2  RWT: 0.35  Ejection Fraction Visual Estimate: >55 %    ------------------------------------------------------------------------  OBSERVATIONS:  Mitral Valve: Dense mitral annular calcification. Probably  moderate, eccentric, anteriorly directed mitral  regurgitation.  Consider SHEY for better evaluation if  clinically appropriate.  Aortic Root: Normal aortic root.  Aortic Valve: Normal trileaflet aortic valve.  Left Atrium: Normal left atrium.  LA volume index = 29  cc/m2.  Left Ventricle: Endocardium not well visualized; grossly  normal left ventricular systolic function. Normal left  ventricular internal dimensions and wall thicknesses.  Right Heart: Normal right atrium. Normal right ventricular  size and function. There is moderate tricuspid  regurgitation.  Pericardium/PleuraNormal pericardium with no pericardial  effusion.  Hemodynamic: RA Pressure is 8 mm Hg. RV systolic pressure  is 35 mm Hg.  ------------------------------------------------------------------------  CONCLUSIONS:  1. Dense mitral annular calcification. Probably moderate,  eccentric, anteriorly directed mitral regurgitation.  Consider SHEY for better evaluation if clinically  appropriate.  2. Normal left ventricular internal dimensions and wall  thicknesses.  3. Endocardium not well visualized; grossly normal left  ventricular systolic function.  4. Normal right ventricular size and function.  < end of copied text >    < from: Xray Foot AP + Lateral + Oblique, Right (01.10.20 @ 15:05) >  EXAM:  FOOT RIGHT (MINIMUM 3 VIEWS)                        PROCEDURE DATE:  01/10/2020    INTERPRETATION:  Right foot. 3 views. Patient has local pain.  The foot shows mild degeneration at the first MTP joint.  There is an old fracture deformity of the distal fibula.  No bone destruction or acute fracture is evident.    IMPRESSION: No acute finding.  < end of copied text >    < from: CT Angio Abd Aorta w/run-off w/ IV Cont (01.12.20 @ 19:40) >  EXAM:  CT ANGIO ABD AOR W RUN(W)AW IC                        PROCEDURE DATE:  01/12/2020    INTERPRETATION:  CT ANGIO ABDOMINAL AORTA RUNOFF WITHOUT AND OR WITH IV CONTRAST  CLINICAL INFORMATION: Atherosclerosis of the native arteries of the right and left lower extremity with right foot gangrene  PROCEDURE INFORMATION:   Exam: CTA Angiogram of the Abdominal Aorta and Bilateral Lower Extremities   (Run-off) With IV Contrast   Exam date and time: 1/12/2020 6:55 PM   Age: 74 years old   Clinical indication: Other: Pad, right 2nd toe gangrene, 3rd and 4th toes wet   early gang     TECHNIQUE:   Imaging protocol: CT angiogram of the abdominal aorta, pelvis and bilateral   lower extremities with IV iodinated contrast.   Intravenous contrast: 90 cc Omnipaque 350  3D rendering: MIP and/or 3D reconstructed images were created by the   technologist.     COMPARISON:   CR XR FOOT 3 VIEWS RIGHT 1/10/2020 2:46 PM     FINDINGS:   Aorta: Severe calcified and noncalcified atherosclerotic plaque. Aorta is   otherwise fully patent.   Celiac trunk and mesenteric arteries: No occlusion or significant stenosis.    Renal arteries: No occlusion or significant stenosis.      Right iliac arteries: Marked stenosis of the right external iliac artery distally.   Right femoral/popliteal arteries: Distal CFA stenosis. Severely and diffusely attenuated SFA. Occlusion of the mid-distal right superficial   femoral artery with reconstitution of the suprageniculate popliteal  artery. Stenotic right popliteal artery.   Right infrapopliteal arteries: Single-vessel runoff via the right peroneal   artery, diffusely attenuated, which feeds the plantar artery. The right anterior and posterior tibial   arteries are occluded. Reconstitution of the right pedis dorsalis artery at the   level of the foot, however severely attenuated.     Left iliac arteries: Moderate calcification and stenosis of the left iliac arteries. Occluded left internal iliac artery.  Left femoral/popliteal arteries: Long segmentOcclusion of the entire left superficial femoral   artery and Reconstitution of left popliteal artery, which shows multifocal disease without occlusion.  Left infrapopliteal arteries: Occlusion of the left anterior posterior tibial   arteries at the level of the ankle. Single-vessel left peroneal artery which   feeds the plantar artery. Left pedis dorsalis is attenuated but patent.    Liver: No mass.   Gallbladder and bile ducts: Unremarkable. No calcified stones. No ductal   dilation.    Pancreas: Unremarkable. No mass. No ductal dilation.    Spleen: Normal. No splenomegaly.    Adrenals: Normal. No mass.    Kidneys and ureters: Normal. No mass.      Stomach and bowel: . No obstruction. No mucosal thickening. Moderate stool   throughout the colon.    Appendix: No evidence of appendicitis.      Bladder: Unremarkable. No mass.    Reproductive: Unremarkable as visualized.      Intraperitoneal space: Unremarkable. No free air. No significant fluid   collection.    Lymph nodes: No lymphadenopathy.     Bones/joints: No acute fracture. No dislocation.    Soft tissues: Soft tissue irregularity of the second right toe presumably   related to underlying gangrene.       IMPRESSION:   1. Right lower extremity demonstrates marked diffuse stenosis of the lower   extremity with occlusion of the mid-distal right SFA which reconstitutes distally and   with single-vessel runoff to the right foot via the right peroneal artery which   feeds the plantar artery. The right pedis dorsalis artery reconstitutes at the   level of the ankle.   2. The left lower extremity demonstrates diffuse marked stenosis with total occlusion   of the left SFA with reconstitution of attenuated left popliteal artery. There is   single-vessel runoff via the left peronealartery is the which then feeds the   left plantar artery. The pedis dorsalis artery is patent.   3. evidence of inflow disease at the level of the right external iliac artery.  4. Slight irregularity of the soft tissues of the right second toe which may be   related to underlying gangrene.    < end of copied text >      ADVANCE DIRECTIVES: HCP form on file. Full code for now.

## 2020-01-22 ENCOUNTER — TRANSCRIPTION ENCOUNTER (OUTPATIENT)
Age: 75
End: 2020-01-22

## 2020-01-22 LAB
ANION GAP SERPL CALC-SCNC: 5 MMOL/L — SIGNIFICANT CHANGE UP (ref 5–17)
BUN SERPL-MCNC: 39 MG/DL — HIGH (ref 7–18)
CALCIUM SERPL-MCNC: 9.9 MG/DL — SIGNIFICANT CHANGE UP (ref 8.4–10.5)
CHLORIDE SERPL-SCNC: 103 MMOL/L — SIGNIFICANT CHANGE UP (ref 96–108)
CO2 SERPL-SCNC: 31 MMOL/L — SIGNIFICANT CHANGE UP (ref 22–31)
CREAT SERPL-MCNC: 1.09 MG/DL — SIGNIFICANT CHANGE UP (ref 0.5–1.3)
CRP SERPL-MCNC: 1.8 MG/DL — HIGH (ref 0–0.4)
ERYTHROCYTE [SEDIMENTATION RATE] IN BLOOD: 60 MM/HR — HIGH (ref 0–20)
GLUCOSE SERPL-MCNC: 93 MG/DL — SIGNIFICANT CHANGE UP (ref 70–99)
HCT VFR BLD CALC: 48.7 % — HIGH (ref 34.5–45)
HGB BLD-MCNC: 15.3 G/DL — SIGNIFICANT CHANGE UP (ref 11.5–15.5)
MAGNESIUM SERPL-MCNC: 2.1 MG/DL — SIGNIFICANT CHANGE UP (ref 1.6–2.6)
MCHC RBC-ENTMCNC: 30.1 PG — SIGNIFICANT CHANGE UP (ref 27–34)
MCHC RBC-ENTMCNC: 31.4 GM/DL — LOW (ref 32–36)
MCV RBC AUTO: 95.7 FL — SIGNIFICANT CHANGE UP (ref 80–100)
NRBC # BLD: 0 /100 WBCS — SIGNIFICANT CHANGE UP (ref 0–0)
PHOSPHATE SERPL-MCNC: 3.3 MG/DL — SIGNIFICANT CHANGE UP (ref 2.5–4.5)
PLATELET # BLD AUTO: 281 K/UL — SIGNIFICANT CHANGE UP (ref 150–400)
POTASSIUM SERPL-MCNC: 5 MMOL/L — SIGNIFICANT CHANGE UP (ref 3.5–5.3)
POTASSIUM SERPL-SCNC: 5 MMOL/L — SIGNIFICANT CHANGE UP (ref 3.5–5.3)
RBC # BLD: 5.09 M/UL — SIGNIFICANT CHANGE UP (ref 3.8–5.2)
RBC # FLD: 12.6 % — SIGNIFICANT CHANGE UP (ref 10.3–14.5)
SODIUM SERPL-SCNC: 139 MMOL/L — SIGNIFICANT CHANGE UP (ref 135–145)
WBC # BLD: 8.67 K/UL — SIGNIFICANT CHANGE UP (ref 3.8–10.5)
WBC # FLD AUTO: 8.67 K/UL — SIGNIFICANT CHANGE UP (ref 3.8–10.5)

## 2020-01-22 PROCEDURE — 71045 X-RAY EXAM CHEST 1 VIEW: CPT | Mod: 26

## 2020-01-22 RX ORDER — POVIDONE-IODINE 5 %
1 AEROSOL (ML) TOPICAL DAILY
Refills: 0 | Status: DISCONTINUED | OUTPATIENT
Start: 2020-01-22 | End: 2020-01-23

## 2020-01-22 RX ORDER — POVIDONE-IODINE 5 %
1 AEROSOL (ML) TOPICAL DAILY
Refills: 0 | Status: DISCONTINUED | OUTPATIENT
Start: 2020-01-22 | End: 2020-01-22

## 2020-01-22 RX ADMIN — TRAMADOL HYDROCHLORIDE 25 MILLIGRAM(S): 50 TABLET ORAL at 05:59

## 2020-01-22 RX ADMIN — CEFEPIME 50 MILLIGRAM(S): 1 INJECTION, POWDER, FOR SOLUTION INTRAMUSCULAR; INTRAVENOUS at 17:33

## 2020-01-22 RX ADMIN — OXYCODONE AND ACETAMINOPHEN 1 TABLET(S): 5; 325 TABLET ORAL at 14:14

## 2020-01-22 RX ADMIN — OXYCODONE AND ACETAMINOPHEN 1 TABLET(S): 5; 325 TABLET ORAL at 20:09

## 2020-01-22 RX ADMIN — GABAPENTIN 100 MILLIGRAM(S): 400 CAPSULE ORAL at 05:56

## 2020-01-22 RX ADMIN — CEFEPIME 50 MILLIGRAM(S): 1 INJECTION, POWDER, FOR SOLUTION INTRAMUSCULAR; INTRAVENOUS at 05:56

## 2020-01-22 RX ADMIN — Medication 25 MILLIGRAM(S): at 17:34

## 2020-01-22 RX ADMIN — SENNA PLUS 2 TABLET(S): 8.6 TABLET ORAL at 21:55

## 2020-01-22 RX ADMIN — Medication 25 MILLIGRAM(S): at 05:56

## 2020-01-22 RX ADMIN — CHLORHEXIDINE GLUCONATE 1 APPLICATION(S): 213 SOLUTION TOPICAL at 17:34

## 2020-01-22 RX ADMIN — OXYCODONE AND ACETAMINOPHEN 1 TABLET(S): 5; 325 TABLET ORAL at 14:45

## 2020-01-22 RX ADMIN — Medication 81 MILLIGRAM(S): at 11:00

## 2020-01-22 RX ADMIN — Medication 100 MILLIGRAM(S): at 21:55

## 2020-01-22 RX ADMIN — RISPERIDONE 0.5 MILLIGRAM(S): 4 TABLET ORAL at 05:59

## 2020-01-22 RX ADMIN — TRAMADOL HYDROCHLORIDE 25 MILLIGRAM(S): 50 TABLET ORAL at 06:30

## 2020-01-22 RX ADMIN — OXYCODONE AND ACETAMINOPHEN 1 TABLET(S): 5; 325 TABLET ORAL at 02:15

## 2020-01-22 RX ADMIN — CHLORHEXIDINE GLUCONATE 1 APPLICATION(S): 213 SOLUTION TOPICAL at 05:57

## 2020-01-22 RX ADMIN — LISINOPRIL 10 MILLIGRAM(S): 2.5 TABLET ORAL at 05:59

## 2020-01-22 RX ADMIN — OXYCODONE AND ACETAMINOPHEN 1 TABLET(S): 5; 325 TABLET ORAL at 01:46

## 2020-01-22 RX ADMIN — OXYCODONE AND ACETAMINOPHEN 1 TABLET(S): 5; 325 TABLET ORAL at 20:30

## 2020-01-22 RX ADMIN — ENOXAPARIN SODIUM 40 MILLIGRAM(S): 100 INJECTION SUBCUTANEOUS at 11:00

## 2020-01-22 RX ADMIN — Medication 100 MILLIGRAM(S): at 05:56

## 2020-01-22 RX ADMIN — Medication 100 MILLIGRAM(S): at 13:11

## 2020-01-22 RX ADMIN — GABAPENTIN 100 MILLIGRAM(S): 400 CAPSULE ORAL at 17:34

## 2020-01-22 RX ADMIN — RISPERIDONE 0.5 MILLIGRAM(S): 4 TABLET ORAL at 17:34

## 2020-01-22 NOTE — PROGRESS NOTE ADULT - SUBJECTIVE AND OBJECTIVE BOX
ICU visit:  74y Female is under our care for gangrene of right foot with possible underlying osteomyelitis. Patient is in same disposition with no new complaints. Scheduled tomorrow for RLE bypass. Remains afebrile with normal wbc count.     REVIEW OF SYSTEMS:  [  ] Not able to illicit  General: no fevers no malaise  Chest: no cough no sob  GI: no nvd  Skin: no rashes  Musculoskeletal: no trauma no LBP +foot pain  Neuro: no ha's no dizziness     MEDS:     cefepime   IVPB 2000 milliGRAM(s) IV Intermittent every 12 hours  metroNIDAZOLE  IVPB 500 milliGRAM(s) IV Intermittent every 8 hours        ALLERGIES: Allergies    influenza virus vaccine, live, trivalent (Unknown)  PC Pen VK (Rash)  penicillin (Other; Rash)  statins (Other)  sulfa drugs (Other)  sulfamethizole (Other)      VITALS:  Vital Signs Last 24 Hrs  T(C): 35.8 (22 Jan 2020 11:00), Max: 36.7 (21 Jan 2020 23:50)  T(F): 96.5 (22 Jan 2020 11:00), Max: 98.1 (21 Jan 2020 23:50)  HR: 84 (22 Jan 2020 12:00) (71 - 97)  BP: 122/73 (22 Jan 2020 12:00) (85/42 - 168/72)  BP(mean): 78 (22 Jan 2020 12:00) (51 - 124)  RR: 24 (22 Jan 2020 12:00) (12 - 24)  SpO2: 100% (22 Jan 2020 12:00) (96% - 100%)      PHYSICAL EXAM:  HEENT: n/a  Neck: supple no LN's   Respiratory: lungs clear no rales  Cardiovascular: S1 S2 reg no murmurs  Gastrointestinal: +BS with soft, nondistended abdomen; nontender  Extremities: no edema  Skin: right foot is dressed  Ortho: n/a  Neuro: awake and alert      LABS/DIAGNOSTIC TESTS:                          15.3   8.67  )-----------( 281      ( 22 Jan 2020 06:32 )             48.7   WBC Count: 8.67 K/uL (01-22 @ 06:32)  WBC Count: 7.80 K/uL (01-21 @ 03:45)  WBC Count: 9.79 K/uL (01-20 @ 05:45)  WBC Count: 7.25 K/uL (01-19 @ 10:49)  WBC Count: 5.15 K/uL (01-19 @ 06:51)    01-22    139  |  103  |  39<H>  ----------------------------<  93  5.0   |  31  |  1.09    Ca    9.9      22 Jan 2020 06:32  Phos  3.3     01-22  Mg     2.1     01-22        CULTURES:   .Blood  01-11 @ 01:37   No growth at 5 days.  --  --        RADIOLOGY:  no new studies

## 2020-01-22 NOTE — PROGRESS NOTE ADULT - SUBJECTIVE AND OBJECTIVE BOX
Patient is a 74y old  Female who presents with a chief complaint of dry gangrene (22 Jan 2020 19:43)    PATIENT IS SEEN AND EXAMINED IN ICU    ALLERGIES:  influenza virus vaccine, live, trivalent (Unknown)  PC Pen VK (Rash)  penicillin (Other; Rash)  statins (Other)  sulfa drugs (Other)  sulfamethizole (Other)      VITALS:    Vital Signs Last 24 Hrs  T(C): 36.1 (22 Jan 2020 21:23), Max: 36.7 (21 Jan 2020 23:50)  T(F): 97 (22 Jan 2020 21:23), Max: 98.1 (21 Jan 2020 23:50)  HR: 105 (22 Jan 2020 20:15) (71 - 106)  BP: 149/90 (22 Jan 2020 20:15) (85/42 - 168/72)  BP(mean): 100 (22 Jan 2020 20:15) (51 - 127)  RR: 22 (22 Jan 2020 20:15) (12 - 27)  SpO2: 98% (22 Jan 2020 20:15) (80% - 100%)    LABS:    CBC Full  -  ( 22 Jan 2020 06:32 )  WBC Count : 8.67 K/uL  RBC Count : 5.09 M/uL  Hemoglobin : 15.3 g/dL  Hematocrit : 48.7 %  Platelet Count - Automated : 281 K/uL  Mean Cell Volume : 95.7 fl  Mean Cell Hemoglobin : 30.1 pg  Mean Cell Hemoglobin Concentration : 31.4 gm/dL  Auto Neutrophil # : x  Auto Lymphocyte # : x  Auto Monocyte # : x  Auto Eosinophil # : x  Auto Basophil # : x  Auto Neutrophil % : x  Auto Lymphocyte % : x  Auto Monocyte % : x  Auto Eosinophil % : x  Auto Basophil % : x      01-22    139  |  103  |  39<H>  ----------------------------<  93  5.0   |  31  |  1.09    Ca    9.9      22 Jan 2020 06:32  Phos  3.3     01-22  Mg     2.1     01-22        Creatinine Trend: 1.09<--, 1.19<--, 0.98<--, 0.81<--, 0.91<--, 0.93<--  I&O's Summary    21 Jan 2020 07:01  -  22 Jan 2020 07:00  --------------------------------------------------------  IN: 1250 mL / OUT: 2230 mL / NET: -980 mL    22 Jan 2020 07:01  -  22 Jan 2020 22:06  --------------------------------------------------------  IN: 1060 mL / OUT: 400 mL / NET: 660 mL        .Blood  01-11 @ 01:37   No growth at 5 days.  --  --          MEDICATIONS:    MEDICATIONS  (STANDING):  aspirin enteric coated 81 milliGRAM(s) Oral daily  cefepime   IVPB      cefepime   IVPB 2000 milliGRAM(s) IV Intermittent every 12 hours  gabapentin 100 milliGRAM(s) Oral every 12 hours  metoprolol tartrate 25 milliGRAM(s) Oral two times a day  metroNIDAZOLE  IVPB 500 milliGRAM(s) IV Intermittent every 8 hours  metroNIDAZOLE  IVPB      povidone iodine 10% Solution 1 Application(s) Topical daily  risperiDONE   Tablet 0.5 milliGRAM(s) Oral two times a day  senna 2 Tablet(s) Oral at bedtime      MEDICATIONS  (PRN):  acetaminophen   Tablet .. 650 milliGRAM(s) Oral every 6 hours PRN Mild Pain (1 - 3)  oxycodone    5 mG/acetaminophen 325 mG 1 Tablet(s) Oral every 6 hours PRN Moderate Pain (4 - 6)  polyethylene glycol 3350 17 Gram(s) Oral daily PRN Constipation  traMADol 25 milliGRAM(s) Oral every 6 hours PRN Severe Pain (7 - 10)      REVIEW OF SYSTEMS:                           ALL ROS DONE [ X   ]    CONSTITUTIONAL:  LETHARGIC [   ], FEVER [   ], UNRESPONSIVE [   ]  CVS:  CP  [   ], SOB, [   ], PALPITATIONS [   ], DIZZYNESS [   ]  RS: COUGH [   ], SPUTUM [   ]  GI: ABDOMINAL PAIN [   ], NAUSEA [   ], VOMITINGS [   ], DIARRHEA [   ], CONSTIPATION [   ]  :  DYSURIA [   ], NOCTURIA [   ], INCREASED FREQUENCY [   ], DRIBLING [   ],  SKELETAL: PAINFUL JOINTS [   ], SWOLLEN JOINTS [   ], NECK ACHE [   ], LOW BACK ACHE [   ],  SKIN : ULCERS [   ], RASH [   ], ITCHING [   ]  CNS: HEAD ACHE [   ], DOUBLE VISION [   ], BLURRED VISION [   ], AMS / CONFUSION [   ], SEIZURES [   ], WEAKNESS [   ],TINGLING / NUMBNESS [   ]    PHYSICAL EXAMINATION:  GENERAL APPEARANCE: NO DISTRESS  HEENT:  NO PALLOR, NO  JVD,  NO   NODES, NECK SUPPLE  CVS: S1 +, S2 +,   RS: AEEB,  OCCASIONAL  RALES +, MILD B/L RONCHI +  ABD: SOFT, NT, NO, BS +  EXT: NO PE  SKIN: WARM,   SKELETAL:  ROM ACCEPTABLE  CNS:  AAO X 2-3 , NO DEFICITS    RADIOLOGY :    < from: CT Angio Abd Aorta w/run-off w/ IV Cont (01.12.20 @ 19:40) >    IMPRESSION:   1. Right lower extremity demonstrates marked diffuse stenosis of the lower   extremity with occlusion of the mid-distal right SFA which reconstitutes distally and   with single-vessel runoff to the right foot via the right peroneal artery which   feeds the plantar artery. The right pedis dorsalis artery reconstitutes at the   level of the ankle.   2. The left lower extremity demonstrates diffuse marked stenosis with total occlusion   of the left SFA with reconstitution of attenuated left popliteal artery. There is   single-vessel runoff via the left peronealartery is the which then feeds the   left plantar artery. The pedis dorsalis artery is patent.   3. evidence of inflow disease at the level of the right external iliac artery.  4. Slight irregularity of the soft tissues of the right second toe which may be   related to underlying gangrene.    < end of copied text >    < from: Xray Foot AP + Lateral + Oblique, Right (01.10.20 @ 15:05) >  INTERPRETATION:  Right foot. 3 views. Patient has local pain.    The foot shows mild degeneration at the first MTP joint.    There is an old fracture deformity of the distal fibula.    No bone destruction or acute fracture is evident.    IMPRESSION: No acute finding.    < end of copied text >    < from: Transthoracic Echocardiogram (01.14.20 @ 07:53) >  CONCLUSIONS:  1. Dense mitral annular calcification. Probably moderate,  eccentric, anteriorly directed mitral regurgitation.  Consider SHEY for better evaluation if clinically  appropriate.  2. Normal left ventricular internal dimensions and wall  thicknesses.  3. Endocardium not well visualized; grossly normal left  ventricular systolic function.  4. Normal right ventricular size and function.    < end of copied text >        ASSESSMENT :     Gangrene, not elsewhere classified  COPD (chronic obstructive pulmonary disease)  Hypercholesterolemia  Myocardial infarct, old  S/P CABG x 1      PLAN:  HPI:  Patient is 74 year old female, live by herself, ambulates with cane  has medical hx significant for dementia, cad, pvd, hld, htn presents to the ED with complaint of discoloration for a toe on the right foot.  Per pt she has black colored rt 2nd toe. pt states she notice discoloration gradually over last 3 weeks associated with swelling and erythema, pt states she bumped in to something and hurt her right big toe but dose not remember when. Today she was seen by her primary care physician a few days ago and told her to go to the emergency room. Patient states she can only ambulate 10 feet and gets a cramp in her right calf for which she needs to sit down.  She states she is very minimally ambulatory.  She denies pain at rest with her feet elevated.  Patient lives alone with a cat. Patient massaging leg throughout the visit.  Patient denies fever, cp, sob, cough, n/v/d. Pt has urinary incontinence for many years. Patient states her blood pressure is not usually low. Patient states she smokes at least a pack a day of cigarettes since she was 22 years old.    Allergy: Sulfa and penicillin    Code status: Full code (10 Angel 2020 21:52)    - SEVERE PAD B/L LOWER EXTREMITIES, R > L, SUSPECT BUERGER'S DISEASE , DRY GANGRENE OF RIGHT BIG TOE AND 2ND TOE , SUSPECT OSTEOMYELITIS OF RIGHT 1ST AND 2ND TOES ON IV CEFEPIME, AND METRONIDAZOLE. ID AND ICU F/UP IS IN PROGRESS. RLE BY PASS SURGERY IS SCHEDULED BY VASCULAR TEAM IN AM, PATIENT IS MODERATE TO HIGH SURGICAL RISK  - ACTIVE SMOKER, COPD, SMOKER'S BRONCHITIS - COUNSELLED PATIENT AT LENGTH TO QUIT SMOKING, SMOKING CESSATION AIDS OFFERED. OBTAIN CXR URGENT, ALSO OBTAIN CT CHEST WITH OUT CONTRAST PRIOR TO DISCHARGE TO R/O CA. LUNG. D/W PATIENT AND ICU TEAM.  - MODERATE PROTEIN CALORIE MALNUTRITION  - SUSPECT STAGE 3 CKD   - GI AND DVT PROPHYLAXIS  - DR. REYES  ( COVERING DR. YESENIA LEONE )

## 2020-01-22 NOTE — PROGRESS NOTE ADULT - SUBJECTIVE AND OBJECTIVE BOX
INTERVAL HPI/OVERNIGHT EVENTS:       Antimicrobial:  cefepime   IVPB      cefepime   IVPB 2000 milliGRAM(s) IV Intermittent every 12 hours  metroNIDAZOLE  IVPB 500 milliGRAM(s) IV Intermittent every 8 hours  metroNIDAZOLE  IVPB        Cardiovascular:  metoprolol tartrate 25 milliGRAM(s) Oral two times a day    Pulmonary:    Hematalogic:  aspirin enteric coated 81 milliGRAM(s) Oral daily    Other:  acetaminophen   Tablet .. 650 milliGRAM(s) Oral every 6 hours PRN  gabapentin 100 milliGRAM(s) Oral every 12 hours  oxycodone    5 mG/acetaminophen 325 mG 1 Tablet(s) Oral every 6 hours PRN  polyethylene glycol 3350 17 Gram(s) Oral daily PRN  povidone iodine 10% Solution 1 Application(s) Topical daily  risperiDONE   Tablet 0.5 milliGRAM(s) Oral two times a day  senna 2 Tablet(s) Oral at bedtime  traMADol 25 milliGRAM(s) Oral every 6 hours PRN      Drug Dosing Weight  Height (cm): 170.2 (18 Jan 2020 00:00)  Weight (kg): 54.3 (18 Jan 2020 00:00)  BMI (kg/m2): 18.7 (18 Jan 2020 00:00)  BSA (m2): 1.63 (18 Jan 2020 00:00)    CENTRAL LINE: [ ] YES [ ] NO  LOCATION:   DATE INSERTED:    BIANCHI: [ ] YES [ ] NO    DATE INSERTED:    A-LINE:  [ ] YES [ ] NO  LOCATION:   DATE INSERTED:    PMH/Social Hx/Fam Hx -reviewed admission note, no change since admission  PAST MEDICAL & SURGICAL HISTORY:  COPD (chronic obstructive pulmonary disease)  Hypercholesterolemia  Myocardial infarct, old  S/P CABG x 1      T(C): 36.1 (01-22-20 @ 16:02), Max: 36.7 (01-21-20 @ 23:50)  HR: 106 (01-22-20 @ 19:00)  BP: 86/73 (01-22-20 @ 19:00)  BP(mean): 77 (01-22-20 @ 19:00)  ABP: --  ABP(mean): --  RR: 22 (01-22-20 @ 19:00)  SpO2: 100% (01-22-20 @ 19:00)  Wt(kg): --          01-21 @ 07:01  -  01-22 @ 07:00  --------------------------------------------------------  IN: 1250 mL / OUT: 2230 mL / NET: -980 mL            PHYSICAL EXAM:    GENERAL: No signs of distress, comfortable  HEAD: Atraumatic, Normocephalic  EYES: EOMI, PERRLA  ENMT: No erythema, exudates, or enlargement, Moist mucous membranes  NECK: Supple, normal appearance, No JVD; [  ] central line (if applicable)  CHEST/LUNG: No chest deformity, fair bilateral air entry; No rales, rhonchi, wheezing; crackles  HEART: Regular rate and rhythm; No murmurs, rubs, or gallops;   ABDOMEN: Soft, Nontender, Nondistended; Bowel sounds present  EXTREMITIES:  + Peripheral Pulses, No clubbing, cyanosis, or edema  NERVOUS SYSTEM: right toes dry gangrene  LYMPH: No lymphadenopathy noted  SKIN: No rashes or lesions; good turgor, warm, dry      LABS:  CBC Full  -  ( 22 Jan 2020 06:32 )  WBC Count : 8.67 K/uL  RBC Count : 5.09 M/uL  Hemoglobin : 15.3 g/dL  Hematocrit : 48.7 %  Platelet Count - Automated : 281 K/uL  Mean Cell Volume : 95.7 fl  Mean Cell Hemoglobin : 30.1 pg  Mean Cell Hemoglobin Concentration : 31.4 gm/dL  Auto Neutrophil # : x  Auto Lymphocyte # : x  Auto Monocyte # : x  Auto Eosinophil # : x  Auto Basophil # : x  Auto Neutrophil % : x  Auto Lymphocyte % : x  Auto Monocyte % : x  Auto Eosinophil % : x  Auto Basophil % : x    01-22    139  |  103  |  39<H>  ----------------------------<  93  5.0   |  31  |  1.09    Ca    9.9      22 Jan 2020 06:32  Phos  3.3     01-22  Mg     2.1     01-22              RADIOLOGY & ADDITIONAL STUDIES REVIEWED         IMPRESSION:  PAST MEDICAL & SURGICAL HISTORY:  COPD (chronic obstructive pulmonary disease)  Hypercholesterolemia  Myocardial infarct, old  S/P CABG x 1   p/w         IMP: This is a 74 year old woman , live by herself, ambulates with cane  has medical hx significant for dementia, cad, pvd, hld, htn presents to the ED with complaint of discoloration for a toe on the right foot.  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 She is found to have diffuse atherosclerotic disease with multilevel arterial occlusion. Marginal/poor candidate for endovascular intervention. Patient is transferred to ICU for frequent monitoring after the angiogram.  Vascular surgery f/u.. will need b/l  open bypass and cardiac cl. message sent to dr monge .. cards. vascular surgery pending cardiac clearance .. intermediate risk and no further cardiac testing . Vascular surgery.. for OR 1/23          Plan:      Plan:    Neuro:   History of dementia  c/w risperidone   pt on morphine for pain '    CVS:  CAD  c/w asa  History of HTN  on Lisinopril and metoprolol. Held lisinopril today to prevent Hypotension during the procedure tmrw.  History of PVD:  s/p Angiogram. No intervention performed due to poor candidate for endovascular reintervention 1/23  Frequent Hourly groin check x Q 24 hours  Cardiology cleared pending     Pulmonary:  No issues    GI:  Bowel regime due to being on pain control meds such as opioids   No issues  DASH diet  PT NPO after midnight for the procedure    Nephrology  No issues    ID:  c/w cefepime (day 6) and metronidazole (day 7) for possible osteomyelitis of  right second toe gangrene possible underlying osteomyelitis  afebrile, No leucocytosis  cultures blood are negative so far  Podiatry follow up     Heme-onc  No issues    Prophylaxis  DVT prophylaxis  Heparin held for the procedure tmrw          GOALS OF CARE DISCUSSION WITH PATIENT/FAMILY/PROXY/ icu team on rounds INTERVAL HPI/OVERNIGHT EVENTS:       Antimicrobial:  cefepime   IVPB      cefepime   IVPB 2000 milliGRAM(s) IV Intermittent every 12 hours  metroNIDAZOLE  IVPB 500 milliGRAM(s) IV Intermittent every 8 hours  metroNIDAZOLE  IVPB        Cardiovascular:  metoprolol tartrate 25 milliGRAM(s) Oral two times a day    Pulmonary:    Hematalogic:  aspirin enteric coated 81 milliGRAM(s) Oral daily    Other:  acetaminophen   Tablet .. 650 milliGRAM(s) Oral every 6 hours PRN  gabapentin 100 milliGRAM(s) Oral every 12 hours  oxycodone    5 mG/acetaminophen 325 mG 1 Tablet(s) Oral every 6 hours PRN  polyethylene glycol 3350 17 Gram(s) Oral daily PRN  povidone iodine 10% Solution 1 Application(s) Topical daily  risperiDONE   Tablet 0.5 milliGRAM(s) Oral two times a day  senna 2 Tablet(s) Oral at bedtime  traMADol 25 milliGRAM(s) Oral every 6 hours PRN      Drug Dosing Weight  Height (cm): 170.2 (18 Jan 2020 00:00)  Weight (kg): 54.3 (18 Jan 2020 00:00)  BMI (kg/m2): 18.7 (18 Jan 2020 00:00)  BSA (m2): 1.63 (18 Jan 2020 00:00)    CENTRAL LINE: [ ] YES [ ] NO  LOCATION:   DATE INSERTED:    BIANCHI: [ ] YES [ ] NO    DATE INSERTED:    A-LINE:  [ ] YES [ ] NO  LOCATION:   DATE INSERTED:    PMH/Social Hx/Fam Hx -reviewed admission note, no change since admission  PAST MEDICAL & SURGICAL HISTORY:  COPD (chronic obstructive pulmonary disease)  Hypercholesterolemia  Myocardial infarct, old  S/P CABG x 1      T(C): 36.1 (01-22-20 @ 16:02), Max: 36.7 (01-21-20 @ 23:50)  HR: 106 (01-22-20 @ 19:00)  BP: 86/73 (01-22-20 @ 19:00)  BP(mean): 77 (01-22-20 @ 19:00)  ABP: --  ABP(mean): --  RR: 22 (01-22-20 @ 19:00)  SpO2: 100% (01-22-20 @ 19:00)  Wt(kg): --          01-21 @ 07:01  -  01-22 @ 07:00  --------------------------------------------------------  IN: 1250 mL / OUT: 2230 mL / NET: -980 mL            PHYSICAL EXAM:    GENERAL: No signs of distress, comfortable  HEAD: Atraumatic, Normocephalic  EYES: EOMI, PERRLA  ENMT: No erythema, exudates, or enlargement, Moist mucous membranes  NECK: Supple, normal appearance, No JVD; [  ] central line (if applicable)  CHEST/LUNG: No chest deformity, fair bilateral air entry; No rales, rhonchi, wheezing; crackles  HEART: Regular rate and rhythm; No murmurs, rubs, or gallops;   ABDOMEN: Soft, Nontender, Nondistended; Bowel sounds present  EXTREMITIES:  + Peripheral Pulses, No clubbing, cyanosis, or edema  NERVOUS SYSTEM: right toes dry gangrene  LYMPH: No lymphadenopathy noted  SKIN: No rashes or lesions; good turgor, warm, dry      LABS:  CBC Full  -  ( 22 Jan 2020 06:32 )  WBC Count : 8.67 K/uL  RBC Count : 5.09 M/uL  Hemoglobin : 15.3 g/dL  Hematocrit : 48.7 %  Platelet Count - Automated : 281 K/uL  Mean Cell Volume : 95.7 fl  Mean Cell Hemoglobin : 30.1 pg  Mean Cell Hemoglobin Concentration : 31.4 gm/dL  Auto Neutrophil # : x  Auto Lymphocyte # : x  Auto Monocyte # : x  Auto Eosinophil # : x  Auto Basophil # : x  Auto Neutrophil % : x  Auto Lymphocyte % : x  Auto Monocyte % : x  Auto Eosinophil % : x  Auto Basophil % : x    01-22    139  |  103  |  39<H>  ----------------------------<  93  5.0   |  31  |  1.09    Ca    9.9      22 Jan 2020 06:32  Phos  3.3     01-22  Mg     2.1     01-22              RADIOLOGY & ADDITIONAL STUDIES REVIEWED         IMPRESSION:  PAST MEDICAL & SURGICAL HISTORY:  COPD (chronic obstructive pulmonary disease)  Hypercholesterolemia  Myocardial infarct, old  S/P CABG x 1   p/w         IMP: This is a 74 year old woman , live by herself, ambulates with cane  has medical hx significant for dementia, cad, pvd, hld, htn presents to the ED with complaint of discoloration for a toe on the right foot.  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 She is found to have diffuse atherosclerotic disease with multilevel arterial occlusion. Marginal/poor candidate for endovascular intervention. Patient is transferred to ICU for frequent monitoring after the angiogram.  Vascular surgery f/u.. will need b/l  open bypass and cardiac cl. message sent to dr monge .. cards. vascular surgery pending cardiac clearance .. intermediate risk and no further cardiac testing . Vascular surgery.. for OR 1/23          Plan:      Plan:    Neuro:   History of dementia  c/w risperidone   pt on morphine for pain '    CVS:  CAD  c/w asa  History of HTN  on Lisinopril and metoprolol. Held lisinopril today to prevent Hypotension during the procedure tmrw.  History of PVD:  s/p Angiogram. No intervention performed due to poor candidate for endovascular reintervention 1/23  Frequent Hourly groin check x Q 24 hours  Cardiology cleared moderate risk     Pulmonary:  No issues    GI:  Bowel regime due to being on pain control meds such as opioids   No issues  DASH diet  PT NPO after midnight for the procedure    Nephrology  No issues    ID:  c/w cefepime (day 6) and metronidazole (day 7) for possible osteomyelitis of  right second toe gangrene possible underlying osteomyelitis  afebrile, No leucocytosis  cultures blood are negative so far  Podiatry follow up     Heme-onc  No issues    Prophylaxis  DVT prophylaxis  Heparin held for the procedure tmrw          GOALS OF CARE DISCUSSION WITH PATIENT/FAMILY/PROXY/ icu team on rounds

## 2020-01-22 NOTE — PROGRESS NOTE ADULT - ASSESSMENT
Patient is 74 year old female, live by herself, ambulates with cane  has medical hx significant for dementia, cad, pvd, hld, htn presents to the ED with complaint of discoloration for a toe on the right foot.  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 She is found to have diffuse atherosclerotic disease with multilevel arterial occlusion. Marginal/poor candidate for endovascular intervention. Patient is transferred to ICU for frequent monitoring after the angiogram.     Plan:    Neuro:   History of dementia  c/w risperidone   pt on morphine for pain '    CVS:  CAD  c/w asa  History of HTN  on Lisinopril and metoprolol. Held lisinopril today to prevent Hypotension during the procedure tmrw.  History of PVD:  s/p Angiogram. No intervention performed due to poor candidate for endovascular reintervention  Frequent Hourly groin check x Q 24 hours  Cardiology cleared for the procedure    Pulmonary:  No issues    GI:  Bowel regime due to being on pain control meds such as opioids   No issues  DASH diet  PT NPO after midnight for the procedure    Nephrology  No issues    ID:  c/w cefepime (day 6) and metronidazole (day 7) for possible osteomyelitis of  right second toe gangrene possible underlying osteomyelitis  afebrile, No leucocytosis  cultures blood are negative so far  Podiatry follow up     Heme-onc  No issues    Prophylaxis  DVT prophylaxis  Heparin held for the procedure tmrw

## 2020-01-22 NOTE — PROGRESS NOTE ADULT - SUBJECTIVE AND OBJECTIVE BOX
INTERVAL HPI/OVERNIGHT EVENTS: Pt scheduled for Vascular  Bypass surgery tmrw.  PRESSORS: [ ] YES [ x ] NO  WHICH:    ANTIBIOTICS:                  DATE STARTED:  ANTIBIOTICS:                  DATE STARTED:  ANTIBIOTICS:                  DATE STARTED:    Antimicrobial:  cefepime   IVPB      cefepime   IVPB 2000 milliGRAM(s) IV Intermittent every 12 hours  metroNIDAZOLE  IVPB 500 milliGRAM(s) IV Intermittent every 8 hours  metroNIDAZOLE  IVPB        Cardiovascular:  metoprolol tartrate 25 milliGRAM(s) Oral two times a day    Pulmonary:    Hematalogic:  aspirin enteric coated 81 milliGRAM(s) Oral daily    Other:  acetaminophen   Tablet .. 650 milliGRAM(s) Oral every 6 hours PRN  chlorhexidine 2% Cloths 1 Application(s) Topical two times a day  gabapentin 100 milliGRAM(s) Oral every 12 hours  oxycodone    5 mG/acetaminophen 325 mG 1 Tablet(s) Oral every 6 hours PRN  polyethylene glycol 3350 17 Gram(s) Oral daily PRN  povidone iodine 10% Solution 1 Application(s) Topical daily  risperiDONE   Tablet 0.5 milliGRAM(s) Oral two times a day  senna 2 Tablet(s) Oral at bedtime  traMADol 25 milliGRAM(s) Oral every 6 hours PRN    acetaminophen   Tablet .. 650 milliGRAM(s) Oral every 6 hours PRN  aspirin enteric coated 81 milliGRAM(s) Oral daily  cefepime   IVPB      cefepime   IVPB 2000 milliGRAM(s) IV Intermittent every 12 hours  chlorhexidine 2% Cloths 1 Application(s) Topical two times a day  gabapentin 100 milliGRAM(s) Oral every 12 hours  metoprolol tartrate 25 milliGRAM(s) Oral two times a day  metroNIDAZOLE  IVPB 500 milliGRAM(s) IV Intermittent every 8 hours  metroNIDAZOLE  IVPB      oxycodone    5 mG/acetaminophen 325 mG 1 Tablet(s) Oral every 6 hours PRN  polyethylene glycol 3350 17 Gram(s) Oral daily PRN  povidone iodine 10% Solution 1 Application(s) Topical daily  risperiDONE   Tablet 0.5 milliGRAM(s) Oral two times a day  senna 2 Tablet(s) Oral at bedtime  traMADol 25 milliGRAM(s) Oral every 6 hours PRN    Drug Dosing Weight  Height (cm): 170.2 (18 Jan 2020 00:00)  Weight (kg): 54.3 (18 Jan 2020 00:00)  BMI (kg/m2): 18.7 (18 Jan 2020 00:00)  BSA (m2): 1.63 (18 Jan 2020 00:00)    CENTRAL LINE: [ ] YES [ ] NO  LOCATION:         BIANCHI: [ ] YES [ ] NO          A-LINE:  [ ] YES [ ] NO  LOCATION:             ICU Vital Signs Last 24 Hrs  T(C): 35.8 (22 Jan 2020 11:00), Max: 36.7 (21 Jan 2020 23:50)  T(F): 96.5 (22 Jan 2020 11:00), Max: 98.1 (21 Jan 2020 23:50)  HR: 79 (22 Jan 2020 11:00) (71 - 97)  BP: 131/62 (22 Jan 2020 11:00) (85/42 - 168/72)  BP(mean): 78 (22 Jan 2020 11:00) (51 - 124)  ABP: --  ABP(mean): --  RR: 17 (22 Jan 2020 11:00) (12 - 21)  SpO2: 99% (22 Jan 2020 11:00) (96% - 100%)            01-21 @ 07:01  -  01-22 @ 07:00  --------------------------------------------------------  IN: 1250 mL / OUT: 2230 mL / NET: -980 mL            REVIEW OF SYSTEMS:    CONSTITUTIONAL: No weakness, fevers or chills  NECK: No pain or stiffness  RESPIRATORY: No cough, wheezing, hemoptysis; No shortness of breath  CARDIOVASCULAR: No chest pain or palpitations  GASTROINTESTINAL: No abdominal or epigastric pain. No nausea, vomiting, No diarrhea or constipation. No melena or hematochezia.  GENITOURINARY: No dysuria, frequency or hematuria  NEUROLOGICAL:c/o pain in affected toe.   All other review of systems is negative unless indicated above      PHYSICAL EXAM:    GENERAL: lean and thin old women on bed.  EYES: EOMI, PERRLA,   NECK: Supple, No JVD; Normal thyroid; Trachea midline  NERVOUS SYSTEM:  Alert & Oriented X3,  Motor Strength 5/5 B/L upper and lower extremities; DTRs 2+ intact and symmetric  CHEST/LUNG: No rales, rhonchi, wheezing   HEART: Regular rate and rhythm; No murmurs,   ABDOMEN: Soft, Nontender, Nondistended; Bowel sounds present  EXTREMITIES:  b/l DPA pulses no t appreciated        LABS:  CBC Full  -  ( 22 Jan 2020 06:32 )  WBC Count : 8.67 K/uL  RBC Count : 5.09 M/uL  Hemoglobin : 15.3 g/dL  Hematocrit : 48.7 %  Platelet Count - Automated : 281 K/uL  Mean Cell Volume : 95.7 fl  Mean Cell Hemoglobin : 30.1 pg  Mean Cell Hemoglobin Concentration : 31.4 gm/dL  Auto Neutrophil # : x  Auto Lymphocyte # : x  Auto Monocyte # : x  Auto Eosinophil # : x  Auto Basophil # : x  Auto Neutrophil % : x  Auto Lymphocyte % : x  Auto Monocyte % : x  Auto Eosinophil % : x  Auto Basophil % : x    01-22    139  |  103  |  39<H>  ----------------------------<  93  5.0   |  31  |  1.09    Ca    9.9      22 Jan 2020 06:32  Phos  3.3     01-22  Mg     2.1     01-22              RADIOLOGY & ADDITIONAL STUDIES REVIEWED:  ***    [ ]GOALS OF CARE DISCUSSION WITH PATIENT/FAMILY/PROXY:    CRITICAL CARE TIME SPENT: 35 minutes

## 2020-01-22 NOTE — PROGRESS NOTE ADULT - SUBJECTIVE AND OBJECTIVE BOX
Patient is a 74y old  Female who presents with a chief complaint of discoloration of toe of right foot.     HPI: This 74 year old non-diabetic female presents to the ED with complaint of discoloration for a toe on the right foot. She states that she was seen by her primary care physician a few days ago and told her to go to the emergency room.  Patient states she smokes at least a pack a day of cigarettes since she was 22 years old.  Patient states she can only ambulate 10 feet and gets a cramp in her right calf for which she needs to sit down.  She states she is very minimally ambulatory.   She denies pain at rest with her feet elevated.  Patient lives alone with a cat. Patient massaging leg throughout the visit.  Patient denies n/v/d/sob/cp/f.  Patient states her blood pressure is not usually low.     Podiatry Interval HPI:  Patient seen bedside this AM.  Patient awake and alert, no acute overnight events.  Patient has dressing intact.  Patient is awaiting vascular intervention.  Foot may or may not be salvageable following vascular intervention due to current level of demarcation. Podiatry continues to follow. Patient denies n/v/d/sob/cp.       PMH:COPD (chronic obstructive pulmonary disease)  Hypercholesterolemia  Myocardial infarct, old    Allergies: PC Pen VK (Rash)  penicillin (Other; Rash)  statins (Other)  sulfa drugs (Other)  sulfamethizole (Other)    MEDICATIONS  (STANDING):  aspirin enteric coated 81 milliGRAM(s) Oral daily  cefepime   IVPB      cefepime   IVPB 2000 milliGRAM(s) IV Intermittent every 12 hours  chlorhexidine 2% Cloths 1 Application(s) Topical two times a day  enoxaparin Injectable 40 milliGRAM(s) SubCutaneous daily  gabapentin 100 milliGRAM(s) Oral every 12 hours  lisinopril 10 milliGRAM(s) Oral daily  metoprolol tartrate 25 milliGRAM(s) Oral two times a day  metroNIDAZOLE  IVPB 500 milliGRAM(s) IV Intermittent every 8 hours  metroNIDAZOLE  IVPB      risperiDONE   Tablet 0.5 milliGRAM(s) Oral two times a day  senna 2 Tablet(s) Oral at bedtime    FH:  PSX: S/P CABG x 1    SH:     Labs                        15.3   8.67  )-----------( 281      ( 22 Jan 2020 06:32 )             48.7     01-22    139  |  103  |  39<H>  ----------------------------<  93  5.0   |  31  |  1.09    Ca    9.9      22 Jan 2020 06:32  Phos  3.3     01-22  Mg     2.1     01-22      Vital Signs Last 24 Hrs  T(C): 35.9 (22 Jan 2020 07:00), Max: 36.7 (21 Jan 2020 23:50)  T(F): 96.7 (22 Jan 2020 07:00), Max: 98.1 (21 Jan 2020 23:50)  HR: 73 (22 Jan 2020 09:00) (71 - 97)  BP: 120/52 (22 Jan 2020 09:00) (85/42 - 168/72)  BP(mean): 67 (22 Jan 2020 09:00) (51 - 124)  RR: 13 (22 Jan 2020 09:00) (12 - 25)  SpO2: 100% (22 Jan 2020 09:00) (96% - 100%)  Hemoglobin A1C, Whole Blood: 5.6 % (01-11-20 @ 09:30)         WBC Count: 8.67 K/uL (01-22-20 @ 06:32)       PHYSICAL EXAM  GEN: MERARY MEYER is a pleasant well-nourished, well developed 74y Female in no acute distress. Refused exam this AM, will attempt at later time  Per previous examination:   LE Focused:    Vasc:  DP and PT pulses non-palpable b/l. Left DP faintly dopplerable.  Unable to doppler left PT, Unable to find right DP or PT with doppler.  No CFT to right 1st, 2nd, 3rd, or 4th toes.  Shiny taught skin b/l dorsal feet with level of violaceous color extending proximally to tarsal joints.  Derm:   Right 2nd toe: black necrotic to the level of the proximal with exposed bone.  Distal aspect of the right 2nd toe is hard and dry, no drainage to the right 2nd toe.   Right foot erythema to level just distal to ankle joint. Right 1st, 3rd, and 4th toes are necrotic and demarcated.  No drainage, no malodor, no signs of infection.  Neuro: Sensation diminished to digits b/l.  MSK: Tenderness to palpation distal foot.  Pain to touch dorsal right foot.     Imaging:   < from: Xray Foot AP + Lateral + Oblique, Right (01.10.20 @ 15:05) >    EXAM:  FOOT RIGHT (MINIMUM 3 VIEWS)                        PROCEDURE DATE:  01/10/2020    INTERPRETATION:  Right foot. 3 views. Patient has local pain.    The foot shows mild degeneration at the first MTP joint.    There is an old fracture deformity of the distal fibula.    No bone destruction or acute fracture is evident.    IMPRESSION: No acute finding.    JASKARAN MOSLEY M.D., ATTENDING RADIOLOGIST  This document has been electronically signed. Angel 10 2020  3:06PM  < end of copied text >    < from: [facility] Physiol Extremity Lower 3+ Level, BI (01.13.20 @ 13:49) >    EXAM:  US PHYSIOL LWR EXT 3+ LEV BI                        PROCEDURE DATE:  01/13/2020    INTERPRETATION:  Clinical indication: Right toe gangrene    Comparison: None    FINDINGS AND   IMPRESSION: Segmental pressures could not be obtaineddue to patient discomfort. Therefore, ABIs could not be calculated.    There are biphasic waveforms in the lower left thigh. Abnormally, monophasic flow within the remainder of the lower extremities.    RACQUEL CHA M.D., ATTENDING RADIOLOGIST  This document has been electronically signed. Jan 13 2020  2:20PM  < end of copied text >    < from: CT Angio Abd Aorta w/run-off w/ IV Cont (01.12.20 @ 19:40) >    EXAM:  CT ANGIO ABD AOR W RUN(W)AW IC                        PROCEDURE DATE:  01/12/2020    INTERPRETATION:  CT ANGIO ABDOMINAL AORTA RUNOFF WITHOUT AND OR WITH IV CONTRAST    CLINICAL INFORMATION: Atherosclerosis of the native arteries of the right and left lower extremity with right foot gangrene    PROCEDURE INFORMATION:   Exam: CTA Angiogram of the Abdominal Aorta and Bilateral Lower Extremities   (Run-off) With IV Contrast   Exam date and time: 1/12/2020 6:55 PM   Age: 74 years old   Clinical indication: Other: Pad, right 2nd toe gangrene, 3rd and 4th toes wet   early gang     TECHNIQUE:   Imaging protocol: CT angiogram of the abdominal aorta, pelvis and bilateral   lower extremities with IV iodinated contrast.   Intravenous contrast: 90 cc Omnipaque 350  3D rendering: MIP and/or 3D reconstructed images were created by the   technologist.     COMPARISON:   CR XR FOOT 3 VIEWS RIGHT 1/10/2020 2:46 PM     FINDINGS:   Aorta: Severe calcified and noncalcified atherosclerotic plaque. Aorta is   otherwise fully patent.   Celiac trunk and mesenteric arteries: No occlusion or significant stenosis.    Renal arteries: No occlusion or significant stenosis.      Right iliac arteries: Marked stenosis of the right external iliac artery distally.   Right femoral/popliteal arteries: Distal CFA stenosis. Severely and diffusely attenuated SFA. Occlusion of the mid-distal right superficial   femoral artery with reconstitution of the suprageniculate popliteal  artery. Stenotic right popliteal artery.   Right infrapopliteal arteries: Single-vessel runoff via the right peroneal   artery, diffusely attenuated, which feeds the plantar artery. The right anterior and posterior tibial   arteries are occluded. Reconstitution of the right pedis dorsalis artery at the   level of the foot, however severely attenuated.     Left iliac arteries: Moderate calcification and stenosis of the left iliac arteries. Occluded left internal iliac artery.  Left femoral/popliteal arteries: Long segmentOcclusion of the entire left superficial femoral   artery and Reconstitution of left popliteal artery, which shows multifocal disease without occlusion.  Left infrapopliteal arteries: Occlusion of the left anterior posterior tibial   arteries at the level of the ankle. Single-vessel left peroneal artery which   feeds the plantar artery. Left pedis dorsalis is attenuated but patent.    Liver: No mass.   Gallbladder and bile ducts: Unremarkable. No calcified stones. No ductal   dilation.    Pancreas: Unremarkable. No mass. No ductal dilation.    Spleen: Normal. No splenomegaly.    Adrenals: Normal. No mass.    Kidneys and ureters: Normal. No mass.      Stomach and bowel: . No obstruction. No mucosal thickening. Moderate stool   throughout the colon.    Appendix: No evidence of appendicitis.      Bladder: Unremarkable. No mass.    Reproductive: Unremarkable as visualized.      Intraperitoneal space: Unremarkable. No free air. No significant fluid   collection.    Lymph nodes: No lymphadenopathy.     Bones/joints: No acute fracture. No dislocation.    Soft tissues: Soft tissue irregularity of the second right toe presumably   related to underlying gangrene.       IMPRESSION:   1. Right lower extremity demonstrates marked diffuse stenosis of the lower   extremity with occlusion of the mid-distal right SFA which reconstitutes distally and   with single-vessel runoff to the right foot via the right peroneal artery which   feeds the plantar artery. The right pedis dorsalis artery reconstitutes at the   level of the ankle.   2. The left lower extremity demonstrates diffuse marked stenosis with total occlusion   of the left SFA with reconstitution of attenuated left popliteal artery. There is   single-vessel runoff via the left peronealartery is the which then feeds the   left plantar artery. The pedis dorsalis artery is patent.   3. evidence of inflow disease at the level of the right external iliac artery.  4. Slight irregularity of the soft tissues of the right second toe which may be   related to underlying gangrene.    GALILEA KING M.D., ATTENDING RADIOLOGIST  This document has been electronically signed. 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  PVD  s/p angiogram 1/17    P:  Patient evaluated, chart reviewed  Xrays- reviewed  NICKI/PVR-reviewed  CTA-reviewed- single vessel runoff to right foot.  s/p angiogram 1/17 by vascular Dr. Izaguirre, per note poor candidate for endovascular intervention  vascular rec. multilevel arterial occlusion plan for open bypass with plan for bypass 1/23.  Betadine, 4x4 gauze, prince to right foot digits.    Podiatry will continue to monitor and plan for surgical intervention pending vascular recommendations following bypass.  Patient's right foot continues to demarcate with worsening dry gangrene.  Examined with Dr. Welsh

## 2020-01-22 NOTE — PROGRESS NOTE ADULT - SUBJECTIVE AND OBJECTIVE BOX
PRESENTING CC:    SUBJ:       PMH -reviewed admission note, no change since admission  Heart failure: acute [ ] chronic [ ] acute or chronic [ ] diastolic [ ] systolic [ ] combined systolic and diastolic[ ]  VANESSA: ATN[ ] renal medullary necrosis [ ] CKD I [ ]CKDII [ ]CKD III [ ]CKD IV [ ]CKD V [ ]Other pathological lesions [ ]    MEDICATIONS  (STANDING):  aspirin enteric coated 81 milliGRAM(s) Oral daily  cefepime   IVPB      cefepime   IVPB 2000 milliGRAM(s) IV Intermittent every 12 hours  chlorhexidine 2% Cloths 1 Application(s) Topical two times a day  gabapentin 100 milliGRAM(s) Oral every 12 hours  metoprolol tartrate 25 milliGRAM(s) Oral two times a day  metroNIDAZOLE  IVPB 500 milliGRAM(s) IV Intermittent every 8 hours  metroNIDAZOLE  IVPB      povidone iodine 10% Solution 1 Application(s) Topical daily  risperiDONE   Tablet 0.5 milliGRAM(s) Oral two times a day  senna 2 Tablet(s) Oral at bedtime    MEDICATIONS  (PRN):  acetaminophen   Tablet .. 650 milliGRAM(s) Oral every 6 hours PRN Mild Pain (1 - 3)  oxycodone    5 mG/acetaminophen 325 mG 1 Tablet(s) Oral every 6 hours PRN Moderate Pain (4 - 6)  polyethylene glycol 3350 17 Gram(s) Oral daily PRN Constipation  traMADol 25 milliGRAM(s) Oral every 6 hours PRN Severe Pain (7 - 10)              REVIEW OF SYSTEMS:  Constitutional: [ ] fever, [ ]weight loss,  [ ]fatigue  Eyes: [ ] visual changes  Respiratory: [ ]shortness of breath;  [ ] cough, [ ]wheezing, [ ]chills, [ ]hemoptysis  Cardiovascular: [ ] chest pain, [ ]palpitations, [ ]dizziness,  [ ]leg swelling[ ]orthopnea[ ]PND  Gastrointestinal: [ ] abdominal pain, [ ]nausea, [ ]vomiting,  [ ]diarrhea   Genitourinary: [ ] dysuria, [ ] hematuria  Neurologic: [ ] headaches [ ] tremors[ ]weakness  Skin: [ ] itching, [ ]burning, [ ] rashes  Endocrine: [ ] heat or cold intolerance  Musculoskeletal: [ ] joint pain or swelling; [ ] muscle, back, or extremity pain  Psychiatric: [ ] depression, [ ]anxiety, [ ]mood swings, or [ ]difficulty sleeping  Hematologic: [ ] easy bruising, [ ] bleeding gums    [x] All remaining systems negative except as per above.   [ ]Unable to obtain.    Vital Signs Last 24 Hrs  T(C): 35.8 (22 Jan 2020 11:00), Max: 36.7 (21 Jan 2020 23:50)  T(F): 96.5 (22 Jan 2020 11:00), Max: 98.1 (21 Jan 2020 23:50)  HR: 100 (22 Jan 2020 14:16) (71 - 100)  BP: 128/63 (22 Jan 2020 14:00) (85/42 - 168/72)  BP(mean): 72 (22 Jan 2020 14:00) (51 - 127)  RR: 21 (22 Jan 2020 14:16) (12 - 27)  SpO2: 100% (22 Jan 2020 14:16) (80% - 100%)  I&O's Summary    21 Jan 2020 07:01  -  22 Jan 2020 07:00  --------------------------------------------------------  IN: 1250 mL / OUT: 2230 mL / NET: -980 mL    22 Jan 2020 07:01  -  22 Jan 2020 14:41  --------------------------------------------------------  IN: 700 mL / OUT: 200 mL / NET: 500 mL        PHYSICAL EXAM:  General: No acute distress BMI-  HEENT: EOMI, PERRL  Neck: Supple, [ ] JVD  Lungs: Equal air entry bilaterally; [ ] rales [ ] wheezing [ ] rhonchi  Heart: Regular rate and rhythm; [ ] murmur   /6 [ ] systolic [ ] diastolic [ ] radiation[ ] rubs [ ]  gallops  Abdomen: Nontender, bowel sounds present  Extremities: No clubbing, cyanosis, [ ] edema  Nervous system:  Alert & Oriented X3, no focal deficits  Psychiatric: Normal affect  Skin: No rashes or lesions    LABS:  01-22    139  |  103  |  39<H>  ----------------------------<  93  5.0   |  31  |  1.09    Ca    9.9      22 Jan 2020 06:32  Phos  3.3     01-22  Mg     2.1     01-22      Creatinine Trend: 1.09<--, 1.19<--, 0.98<--, 0.81<--, 0.91<--, 0.93<--                        15.3   8.67  )-----------( 281      ( 22 Jan 2020 06:32 )             48.7       Lipid Panel:   Cardiac Enzymes:           RADIOLOGY:    ECG [my interpretation]:    TELEMETRY:    ECHO:    STRESS TEST:    CATHETERIZATION:      IMPRESSION AND PLAN:

## 2020-01-22 NOTE — PROGRESS NOTE ADULT - ATTENDING COMMENTS
Right foot is cool to touch, extending towards ankle.  Digits are gangrenous.  Awaiting possible bypass however would still be concerned with healing potential of a TMA given the current ischemic changes extending more proximally.

## 2020-01-22 NOTE — PROGRESS NOTE ADULT - ASSESSMENT
74y.o. Female with RLE ischemia secondary to PAD    -OR 1/23/2020  -Pre-op note to follow in evening.  -Cardiac cleared per ICU note.  -con't medical optimization  -Consent to be obtained via HCP.

## 2020-01-22 NOTE — PROGRESS NOTE ADULT - ASSESSMENT
74 year old female with peripheral arterial disease    - OR tomorrow   - NPO after midnight  - preop labs  - cardio/ICU clearance  - consent to be obtained  - continue ICU management

## 2020-01-22 NOTE — PROGRESS NOTE ADULT - ASSESSMENT
1.	Gangrene of right foot possible underlying osteomyelitis   ·	scheduled for RLE bypass tomorrow  ·	cont maxipime 2gms iv q12h and flagyl 500mgs po TID both for now  Time spent - 33 mins

## 2020-01-22 NOTE — PROGRESS NOTE ADULT - SUBJECTIVE AND OBJECTIVE BOX
Surgery Pre-op Note    Preoperative Diagnosis: PAD    Planned Procedure: right ileal-popliteal bypass, possible angio, possible stent                          15.3   8.67  )-----------( 281      ( 22 Jan 2020 06:32 )             48.7       01-22    139  |  103  |  39<H>  ----------------------------<  93  5.0   |  31  |  1.09    Ca    9.9      22 Jan 2020 06:32  Phos  3.3     01-22  Mg     2.1     01-22    Type & Screen: ABO RH Interpretation: O POS (01.17.20 @ 06:54)    EKG: < from: 12 Lead ECG (02.05.18 @ 23:28) >  Ventricular Rate 105 BPM    Atrial Rate 105 BPM    P-R Interval 130 ms    QRS Duration 76 ms     ms    QTc 463 ms    P Axis 73 degrees    R Axis 59 degrees    T Axis 71 degrees    Diagnosis Line *** Poor data quality, interpretation may be adversely affected  Sinus tachycardia  Otherwise normal ECG  Confirmed by MARIE VILLAFANA, [facility] ([id number]) on 08-Feb-2018 11:15:26    < end of copied text >    Echo: < from: Transthoracic Echocardiogram (01.14.20 @ 07:53) >  CONCLUSIONS:  1. Dense mitral annular calcification. Probably moderate,  eccentric, anteriorly directed mitral regurgitation.  Consider SHEY for better evaluation if clinically  appropriate.  2. Normal left ventricular internal dimensions and wall  thicknesses.  3. Endocardium not well visualized; grossly normal left  ventricular systolic function.  4. Normal right ventricular size and function.    ------------------------------------------------------------------------  Confirmed on  1/15/2020 - 09:29:49 by Sukhjinder Vo MD    < end of copied text >    CXR: < from: Xray Chest 1 View AP/PA (02.05.18 @ 18:57) >  EXAM:  XR CHEST AP OR PA 1V                          PROCEDURE DATE:  02/05/2018      INTERPRETATION:  CLINICAL STATEMENT: Fall    TECHNIQUE: AP view of the chest.    COMPARISON: 3/27/2008    FINDINGS/  IMPRESSION:  Nonspecific wire overlies chest. Biapical pleural thickening. No   consolidation or pleural effusion.    Heart size within normal limits.    MARYANN URIBE M.D., ATTENDING RADIOLOGIST  This document has been electronically signed. Feb 6 2018  7:14AM    < end of copied text >

## 2020-01-22 NOTE — PROGRESS NOTE ADULT - SUBJECTIVE AND OBJECTIVE BOX
Vascular Surgery     Pt stable. No overnight events.     Vital Signs Last 24 Hrs  T(C): 35.9 (22 Jan 2020 07:00), Max: 36.7 (21 Jan 2020 23:50)  T(F): 96.7 (22 Jan 2020 07:00), Max: 98.1 (21 Jan 2020 23:50)  HR: 73 (22 Jan 2020 09:00) (71 - 97)  BP: 120/52 (22 Jan 2020 09:00) (85/42 - 168/72)  BP(mean): 67 (22 Jan 2020 09:00) (51 - 124)  RR: 13 (22 Jan 2020 09:00) (12 - 25)  SpO2: 100% (22 Jan 2020 09:00) (96% - 100%)    LABS:                        15.3   8.67  )-----------( 281      ( 22 Jan 2020 06:32 )             48.7              01-22    139  |  103  |  39<H>  ----------------------------<  93  5.0   |  31  |  1.09    Ca    9.9      22 Jan 2020 06:32  Phos  3.3     01-22  Mg     2.1     01-22    RADIOLOGY & ADDITIONAL STUDIES:  < from: CT Angio Abd Aorta w/run-off w/ IV Cont (01.12.20 @ 19:40) >  IMPRESSION:   1. Right lower extremity demonstrates marked diffuse stenosis of the lower   extremity with occlusion of the mid-distal right SFA which reconstitutes distally and   with single-vessel runoff to the right foot via the right peroneal artery which   feeds the plantar artery. The right pedis dorsalis artery reconstitutes at the   level of the ankle.   2. The left lower extremity demonstrates diffuse marked stenosis with total occlusion   of the left SFA with reconstitution of attenuated left popliteal artery. There is   single-vessel runoff via the left peronealartery is the which then feeds the   left plantar artery. The pedis dorsalis artery is patent.   3. evidence of inflow disease at the level of the right external iliac artery.  4. Slight irregularity of the soft tissues of the right second toe which may be   related to underlying gangrene.    < end of copied text >

## 2020-01-23 ENCOUNTER — RESULT REVIEW (OUTPATIENT)
Age: 75
End: 2020-01-23

## 2020-01-23 LAB
ANION GAP SERPL CALC-SCNC: 1 MMOL/L — LOW (ref 5–17)
APTT BLD: 36 SEC — SIGNIFICANT CHANGE UP (ref 27.5–36.3)
BUN SERPL-MCNC: 39 MG/DL — HIGH (ref 7–18)
CALCIUM SERPL-MCNC: 9.5 MG/DL — SIGNIFICANT CHANGE UP (ref 8.4–10.5)
CHLORIDE SERPL-SCNC: 102 MMOL/L — SIGNIFICANT CHANGE UP (ref 96–108)
CO2 SERPL-SCNC: 31 MMOL/L — SIGNIFICANT CHANGE UP (ref 22–31)
CREAT SERPL-MCNC: 0.97 MG/DL — SIGNIFICANT CHANGE UP (ref 0.5–1.3)
GLUCOSE SERPL-MCNC: 112 MG/DL — HIGH (ref 70–99)
HCT VFR BLD CALC: 39.9 % — SIGNIFICANT CHANGE UP (ref 34.5–45)
HCT VFR BLD CALC: 46.8 % — HIGH (ref 34.5–45)
HGB BLD-MCNC: 12.9 G/DL — SIGNIFICANT CHANGE UP (ref 11.5–15.5)
HGB BLD-MCNC: 14.8 G/DL — SIGNIFICANT CHANGE UP (ref 11.5–15.5)
INR BLD: 1.04 RATIO — SIGNIFICANT CHANGE UP (ref 0.88–1.16)
MCHC RBC-ENTMCNC: 30 PG — SIGNIFICANT CHANGE UP (ref 27–34)
MCHC RBC-ENTMCNC: 30.6 PG — SIGNIFICANT CHANGE UP (ref 27–34)
MCHC RBC-ENTMCNC: 31.6 GM/DL — LOW (ref 32–36)
MCHC RBC-ENTMCNC: 32.3 GM/DL — SIGNIFICANT CHANGE UP (ref 32–36)
MCV RBC AUTO: 94.5 FL — SIGNIFICANT CHANGE UP (ref 80–100)
MCV RBC AUTO: 94.9 FL — SIGNIFICANT CHANGE UP (ref 80–100)
NRBC # BLD: 0 /100 WBCS — SIGNIFICANT CHANGE UP (ref 0–0)
PLATELET # BLD AUTO: 249 K/UL — SIGNIFICANT CHANGE UP (ref 150–400)
PLATELET # BLD AUTO: 263 K/UL — SIGNIFICANT CHANGE UP (ref 150–400)
POTASSIUM SERPL-MCNC: 4.9 MMOL/L — SIGNIFICANT CHANGE UP (ref 3.5–5.3)
POTASSIUM SERPL-SCNC: 4.9 MMOL/L — SIGNIFICANT CHANGE UP (ref 3.5–5.3)
PROTHROM AB SERPL-ACNC: 11.6 SEC — SIGNIFICANT CHANGE UP (ref 10–12.9)
RBC # BLD: 4.22 M/UL — SIGNIFICANT CHANGE UP (ref 3.8–5.2)
RBC # BLD: 4.93 M/UL — SIGNIFICANT CHANGE UP (ref 3.8–5.2)
RBC # FLD: 12.7 % — SIGNIFICANT CHANGE UP (ref 10.3–14.5)
RBC # FLD: 12.7 % — SIGNIFICANT CHANGE UP (ref 10.3–14.5)
SODIUM SERPL-SCNC: 134 MMOL/L — LOW (ref 135–145)
WBC # BLD: 10.85 K/UL — HIGH (ref 3.8–10.5)
WBC # BLD: 13.8 K/UL — HIGH (ref 3.8–10.5)
WBC # FLD AUTO: 10.85 K/UL — HIGH (ref 3.8–10.5)
WBC # FLD AUTO: 13.8 K/UL — HIGH (ref 3.8–10.5)

## 2020-01-23 PROCEDURE — 88311 DECALCIFY TISSUE: CPT | Mod: 26

## 2020-01-23 PROCEDURE — 35665 BPG ILIOFEMORAL: CPT | Mod: GC,RT

## 2020-01-23 PROCEDURE — 88304 TISSUE EXAM BY PATHOLOGIST: CPT | Mod: 26

## 2020-01-23 RX ORDER — SODIUM CHLORIDE 9 MG/ML
1000 INJECTION INTRAMUSCULAR; INTRAVENOUS; SUBCUTANEOUS
Refills: 0 | Status: DISCONTINUED | OUTPATIENT
Start: 2020-01-23 | End: 2020-01-25

## 2020-01-23 RX ORDER — METRONIDAZOLE 500 MG
500 TABLET ORAL EVERY 8 HOURS
Refills: 0 | Status: DISCONTINUED | OUTPATIENT
Start: 2020-01-23 | End: 2020-02-04

## 2020-01-23 RX ORDER — ASPIRIN/CALCIUM CARB/MAGNESIUM 324 MG
81 TABLET ORAL DAILY
Refills: 0 | Status: DISCONTINUED | OUTPATIENT
Start: 2020-01-23 | End: 2020-02-04

## 2020-01-23 RX ORDER — CEFEPIME 1 G/1
2000 INJECTION, POWDER, FOR SOLUTION INTRAMUSCULAR; INTRAVENOUS EVERY 12 HOURS
Refills: 0 | Status: DISCONTINUED | OUTPATIENT
Start: 2020-01-23 | End: 2020-02-04

## 2020-01-23 RX ORDER — POVIDONE-IODINE 5 %
1 AEROSOL (ML) TOPICAL DAILY
Refills: 0 | Status: DISCONTINUED | OUTPATIENT
Start: 2020-01-23 | End: 2020-02-04

## 2020-01-23 RX ORDER — HALOPERIDOL DECANOATE 100 MG/ML
0.5 INJECTION INTRAMUSCULAR ONCE
Refills: 0 | Status: COMPLETED | OUTPATIENT
Start: 2020-01-23 | End: 2020-01-23

## 2020-01-23 RX ORDER — SODIUM CHLORIDE 9 MG/ML
1000 INJECTION INTRAMUSCULAR; INTRAVENOUS; SUBCUTANEOUS
Refills: 0 | Status: DISCONTINUED | OUTPATIENT
Start: 2020-01-23 | End: 2020-01-23

## 2020-01-23 RX ORDER — SENNA PLUS 8.6 MG/1
2 TABLET ORAL AT BEDTIME
Refills: 0 | Status: DISCONTINUED | OUTPATIENT
Start: 2020-01-23 | End: 2020-02-04

## 2020-01-23 RX ORDER — HEPARIN SODIUM 5000 [USP'U]/ML
500 INJECTION INTRAVENOUS; SUBCUTANEOUS
Qty: 25000 | Refills: 0 | Status: DISCONTINUED | OUTPATIENT
Start: 2020-01-23 | End: 2020-01-23

## 2020-01-23 RX ORDER — METOPROLOL TARTRATE 50 MG
25 TABLET ORAL
Refills: 0 | Status: DISCONTINUED | OUTPATIENT
Start: 2020-01-23 | End: 2020-01-25

## 2020-01-23 RX ORDER — DEXMEDETOMIDINE HYDROCHLORIDE IN 0.9% SODIUM CHLORIDE 4 UG/ML
0.5 INJECTION INTRAVENOUS
Qty: 200 | Refills: 0 | Status: DISCONTINUED | OUTPATIENT
Start: 2020-01-23 | End: 2020-01-24

## 2020-01-23 RX ORDER — TRAMADOL HYDROCHLORIDE 50 MG/1
25 TABLET ORAL EVERY 6 HOURS
Refills: 0 | Status: DISCONTINUED | OUTPATIENT
Start: 2020-01-23 | End: 2020-01-28

## 2020-01-23 RX ORDER — HEPARIN SODIUM 5000 [USP'U]/ML
500 INJECTION INTRAVENOUS; SUBCUTANEOUS
Qty: 25000 | Refills: 0 | Status: DISCONTINUED | OUTPATIENT
Start: 2020-01-23 | End: 2020-01-24

## 2020-01-23 RX ORDER — ACETAMINOPHEN 500 MG
650 TABLET ORAL EVERY 6 HOURS
Refills: 0 | Status: DISCONTINUED | OUTPATIENT
Start: 2020-01-23 | End: 2020-02-04

## 2020-01-23 RX ORDER — POLYETHYLENE GLYCOL 3350 17 G/17G
17 POWDER, FOR SOLUTION ORAL DAILY
Refills: 0 | Status: DISCONTINUED | OUTPATIENT
Start: 2020-01-23 | End: 2020-02-04

## 2020-01-23 RX ORDER — RISPERIDONE 4 MG/1
0.5 TABLET ORAL
Refills: 0 | Status: DISCONTINUED | OUTPATIENT
Start: 2020-01-23 | End: 2020-02-04

## 2020-01-23 RX ORDER — HYDROMORPHONE HYDROCHLORIDE 2 MG/ML
0.5 INJECTION INTRAMUSCULAR; INTRAVENOUS; SUBCUTANEOUS
Refills: 0 | Status: DISCONTINUED | OUTPATIENT
Start: 2020-01-23 | End: 2020-01-25

## 2020-01-23 RX ORDER — GABAPENTIN 400 MG/1
100 CAPSULE ORAL EVERY 12 HOURS
Refills: 0 | Status: DISCONTINUED | OUTPATIENT
Start: 2020-01-23 | End: 2020-02-04

## 2020-01-23 RX ADMIN — TRAMADOL HYDROCHLORIDE 25 MILLIGRAM(S): 50 TABLET ORAL at 06:00

## 2020-01-23 RX ADMIN — GABAPENTIN 100 MILLIGRAM(S): 400 CAPSULE ORAL at 05:50

## 2020-01-23 RX ADMIN — HYDROMORPHONE HYDROCHLORIDE 0.5 MILLIGRAM(S): 2 INJECTION INTRAMUSCULAR; INTRAVENOUS; SUBCUTANEOUS at 23:12

## 2020-01-23 RX ADMIN — Medication 100 MILLIGRAM(S): at 05:52

## 2020-01-23 RX ADMIN — SODIUM CHLORIDE 50 MILLILITER(S): 9 INJECTION INTRAMUSCULAR; INTRAVENOUS; SUBCUTANEOUS at 10:13

## 2020-01-23 RX ADMIN — DEXMEDETOMIDINE HYDROCHLORIDE IN 0.9% SODIUM CHLORIDE 6.79 MICROGRAM(S)/KG/HR: 4 INJECTION INTRAVENOUS at 22:01

## 2020-01-23 RX ADMIN — RISPERIDONE 0.5 MILLIGRAM(S): 4 TABLET ORAL at 05:50

## 2020-01-23 RX ADMIN — OXYCODONE AND ACETAMINOPHEN 1 TABLET(S): 5; 325 TABLET ORAL at 02:37

## 2020-01-23 RX ADMIN — TRAMADOL HYDROCHLORIDE 25 MILLIGRAM(S): 50 TABLET ORAL at 05:49

## 2020-01-23 RX ADMIN — HALOPERIDOL DECANOATE 0.5 MILLIGRAM(S): 100 INJECTION INTRAMUSCULAR at 21:45

## 2020-01-23 RX ADMIN — Medication 650 MILLIGRAM(S): at 12:58

## 2020-01-23 RX ADMIN — Medication 100 MILLIGRAM(S): at 22:48

## 2020-01-23 RX ADMIN — HYDROMORPHONE HYDROCHLORIDE 0.5 MILLIGRAM(S): 2 INJECTION INTRAMUSCULAR; INTRAVENOUS; SUBCUTANEOUS at 22:57

## 2020-01-23 RX ADMIN — Medication 650 MILLIGRAM(S): at 12:57

## 2020-01-23 RX ADMIN — OXYCODONE AND ACETAMINOPHEN 1 TABLET(S): 5; 325 TABLET ORAL at 02:38

## 2020-01-23 RX ADMIN — Medication 25 MILLIGRAM(S): at 05:52

## 2020-01-23 RX ADMIN — CEFEPIME 50 MILLIGRAM(S): 1 INJECTION, POWDER, FOR SOLUTION INTRAMUSCULAR; INTRAVENOUS at 05:50

## 2020-01-23 RX ADMIN — Medication 100 MILLIGRAM(S): at 13:28

## 2020-01-23 NOTE — PROGRESS NOTE ADULT - SUBJECTIVE AND OBJECTIVE BOX
INTERVAL HPI/OVERNIGHT EVENTS:       Antimicrobial:    Cardiovascular:    Pulmonary:    Hematalogic:    Other:      Drug Dosing Weight  Height (cm): 170.2 (18 Jan 2020 00:00)  Weight (kg): 54.3 (18 Jan 2020 00:00)  BMI (kg/m2): 18.7 (18 Jan 2020 00:00)  BSA (m2): 1.63 (18 Jan 2020 00:00)    CENTRAL LINE: [ ] YES [ ] NO  LOCATION:   DATE INSERTED:    BIANCHI: [ ] YES [ ] NO    DATE INSERTED:    A-LINE:  [ ] YES [ ] NO  LOCATION:   DATE INSERTED:    PMH/Social Hx/Fam Hx -reviewed admission note, no change since admission  PAST MEDICAL & SURGICAL HISTORY:  COPD (chronic obstructive pulmonary disease)  Hypercholesterolemia  Myocardial infarct, old  S/P CABG x 1      T(C): 37 (01-23-20 @ 07:00), Max: 37 (01-23-20 @ 07:00)  HR: 93 (01-23-20 @ 14:00)  BP: 142/55 (01-23-20 @ 14:00)  BP(mean): 77 (01-23-20 @ 14:00)  ABP: --  ABP(mean): --  RR: 17 (01-23-20 @ 14:00)  SpO2: 100% (01-23-20 @ 14:00)  Wt(kg): --          01-22 @ 07:01  -  01-23 @ 07:00  --------------------------------------------------------  IN: 1360 mL / OUT: 1400 mL / NET: -40 mL            PHYSICAL EXAM:    GENERAL: No signs of distress, comfortable  HEAD: Atraumatic, Normocephalic  EYES: EOMI, PERRLA  ENMT: No erythema, exudates, or enlargement, Moist mucous membranes  NECK: Supple, normal appearance, No JVD; [  ] central line (if applicable)  CHEST/LUNG: No chest deformity, fair bilateral air entry; No rales, rhonchi, wheezing; crackles  HEART: Regular rate and rhythm; No murmurs, rubs, or gallops;   ABDOMEN: Soft, Nontender, Nondistended; Bowel sounds present  EXTREMITIES:  + Peripheral Pulses, No clubbing, cyanosis, or edema  NERVOUS SYSTEM: awake and alert x 3, follows commands, upper and lower extremities  LYMPH: No lymphadenopathy noted  SKIN: No rashes or lesions; good turgor, warm, dry      LABS:  CBC Full  -  ( 23 Jan 2020 06:15 )  WBC Count : 10.85 K/uL  RBC Count : 4.93 M/uL  Hemoglobin : 14.8 g/dL  Hematocrit : 46.8 %  Platelet Count - Automated : 263 K/uL  Mean Cell Volume : 94.9 fl  Mean Cell Hemoglobin : 30.0 pg  Mean Cell Hemoglobin Concentration : 31.6 gm/dL  Auto Neutrophil # : x  Auto Lymphocyte # : x  Auto Monocyte # : x  Auto Eosinophil # : x  Auto Basophil # : x  Auto Neutrophil % : x  Auto Lymphocyte % : x  Auto Monocyte % : x  Auto Eosinophil % : x  Auto Basophil % : x    01-23    134<L>  |  102  |  39<H>  ----------------------------<  112<H>  4.9   |  31  |  0.97    Ca    9.5      23 Jan 2020 06:15  Phos  3.3     01-22  Mg     2.1     01-22      PT/INR - ( 23 Jan 2020 06:15 )   PT: 11.6 sec;   INR: 1.04 ratio         PTT - ( 23 Jan 2020 06:15 )  PTT:36.0 sec        RADIOLOGY & ADDITIONAL STUDIES REVIEWED         IMPRESSION:  PAST MEDICAL & SURGICAL HISTORY:  COPD (chronic obstructive pulmonary disease)  Hypercholesterolemia  Myocardial infarct, old  S/P CABG x 1   p/w       IMP: This is a 74 year old woman , live by herself, ambulates with cane  has medical hx significant for dementia, cad, pvd, hld, htn presents to the ED with complaint of discoloration for a toe on the right foot.  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 She is found to have diffuse atherosclerotic disease with multilevel arterial occlusion. Marginal/poor candidate for endovascular intervention. Patient is transferred to ICU for frequent monitoring after the angiogram.  Vascular surgery f/u.. will need b/l  open bypass and cardiac cl. message sent to dr monge .. cards. vascular surgery pending cardiac clearance .. intermediate risk and no further cardiac testing . Vascular surgery.. for OR 1/23              Plan:    Neuro:   History of dementia  c/w risperidone   pt on morphine for pain '    CVS:  CAD  c/w asa  History of HTN  on Lisinopril and metoprolol. Held lisinopril today to prevent Hypotension during the procedure tmrw.  History of PVD:  s/p Angiogram. No intervention performed due to poor candidate for endovascular reintervention  Frequent Hourly groin check x Q 24 hours  Cardiology cleared for the procedure    Pulmonary:  No issues    GI:  Bowel regime due to being on pain control meds such as opioids   No issues  DASH diet  PT NPO after midnight for the procedure    Nephrology  No issues    ID:  c/w cefepime (day 6) and metronidazole (day 7) for possible osteomyelitis of  right second toe gangrene possible underlying osteomyelitis  afebrile, No leucocytosis  cultures blood are negative so far  Podiatry follow up     Heme-onc  No issues    Prophylaxis  DVT prophylaxis  Heparin held for the procedure tmrw      GOALS OF CARE DISCUSSION WITH PATIENT/FAMILY/PROXY

## 2020-01-23 NOTE — PROGRESS NOTE ADULT - SUBJECTIVE AND OBJECTIVE BOX
ICU visit:  74y Female is under our care for gangrene of right foot with possible underlying osteomyelitis. Patient is patiently waiting to be called to the OR for RLE bypass today. No overnight events. Remains afebrile with normal wbc count.     REVIEW OF SYSTEMS:  [  ] Not able to illicit  General: no fevers no malaise  Chest: no cough no sob  GI: no nvd  Skin: no rashes  Musculoskeletal: no trauma no LBP +foot pain  Neuro: no ha's no dizziness     MEDS:     cefepime   IVPB 2000 milliGRAM(s) IV Intermittent every 12 hours  metroNIDAZOLE  IVPB 500 milliGRAM(s) IV Intermittent every 8 hours        ALLERGIES: Allergies    influenza virus vaccine, live, trivalent (Unknown)  PC Pen VK (Rash)  penicillin (Other; Rash)  statins (Other)  sulfa drugs (Other)  sulfamethizole (Other)      VITALS:  ICU Vital Signs Last 24 Hrs  T(C): 37 (23 Jan 2020 07:00), Max: 37 (23 Jan 2020 07:00)  T(F): 98.6 (23 Jan 2020 07:00), Max: 98.6 (23 Jan 2020 07:00)  HR: 95 (23 Jan 2020 13:00) (76 - 107)  BP: 132/60 (23 Jan 2020 13:00) (86/73 - 162/78)  BP(mean): 77 (23 Jan 2020 13:00) (61 - 115)  ABP: --  ABP(mean): --  RR: 19 (23 Jan 2020 13:00) (12 - 25)  SpO2: 96% (23 Jan 2020 13:00) (82% - 100%)      PHYSICAL EXAM:  HEENT: n/a  Neck: supple no LN's   Respiratory: lungs clear no rales  Cardiovascular: S1 S2 reg no murmurs  Gastrointestinal: +BS with soft, nondistended abdomen; nontender  Extremities: no edema  Skin: right foot is dressed  Ortho: n/a  Neuro: awake and alert      LABS/DIAGNOSTIC TESTS:                         14.8   10.85 )-----------( 263      ( 23 Jan 2020 06:15 )             46.8   WBC Count: 10.85 K/uL (01-23 @ 06:15)  WBC Count: 8.67 K/uL (01-22 @ 06:32)  WBC Count: 7.80 K/uL (01-21 @ 03:45)  WBC Count: 9.79 K/uL (01-20 @ 05:45)  WBC Count: 7.25 K/uL (01-19 @ 10:49)    01-23    134<L>  |  102  |  39<H>  ----------------------------<  112<H>  4.9   |  31  |  0.97    Ca    9.5      23 Jan 2020 06:15  Phos  3.3     01-22  Mg     2.1     01-22        CULTURES:   .Blood  01-11 @ 01:37   No growth at 5 days.  --  --        RADIOLOGY:  no new studies

## 2020-01-23 NOTE — PROGRESS NOTE ADULT - ASSESSMENT
1.	Gangrene of right foot possible underlying osteomyelitis   ·	scheduled for RLE bypass today pending OR availability   ·	cont maxipime 2gms iv q12h and flagyl 500mgs po TID both for now  Time spent - 30 mins

## 2020-01-23 NOTE — PROGRESS NOTE ADULT - SUBJECTIVE AND OBJECTIVE BOX
FU PAD/R gang/rest pain  Clinically stable  Plan R bypass today  COnd, options, procedure, risks/benefits/implications dw'ed and Silva (HCP).  Informed consent obtained. FU PAD/R gang/rest pain  Clinically stable  Plan R bypass today  DW'ed cardio: pt w CAD- clinically optimized - cleared - will try bpp w PTFE to expedite proceure.  COnd, options, procedure, risks/benefits/implications dw'ed and Silva (HCP).  Informed consent obtained.

## 2020-01-23 NOTE — PROGRESS NOTE ADULT - SUBJECTIVE AND OBJECTIVE BOX
INTERVAL HPI/OVERNIGHT EVENTS: ***    PRESSORS: [ ] YES [ ] NO  WHICH:    ANTIBIOTICS:                  DATE STARTED:  ANTIBIOTICS:                  DATE STARTED:  ANTIBIOTICS:                  DATE STARTED:    Antimicrobial:  cefepime   IVPB      cefepime   IVPB 2000 milliGRAM(s) IV Intermittent every 12 hours  metroNIDAZOLE  IVPB 500 milliGRAM(s) IV Intermittent every 8 hours  metroNIDAZOLE  IVPB        Cardiovascular:  metoprolol tartrate 25 milliGRAM(s) Oral two times a day    Pulmonary:    Hematalogic:  aspirin enteric coated 81 milliGRAM(s) Oral daily    Other:  acetaminophen   Tablet .. 650 milliGRAM(s) Oral every 6 hours PRN  gabapentin 100 milliGRAM(s) Oral every 12 hours  oxycodone    5 mG/acetaminophen 325 mG 1 Tablet(s) Oral every 6 hours PRN  polyethylene glycol 3350 17 Gram(s) Oral daily PRN  povidone iodine 10% Solution 1 Application(s) Topical daily  risperiDONE   Tablet 0.5 milliGRAM(s) Oral two times a day  senna 2 Tablet(s) Oral at bedtime  traMADol 25 milliGRAM(s) Oral every 6 hours PRN    acetaminophen   Tablet .. 650 milliGRAM(s) Oral every 6 hours PRN  aspirin enteric coated 81 milliGRAM(s) Oral daily  cefepime   IVPB      cefepime   IVPB 2000 milliGRAM(s) IV Intermittent every 12 hours  gabapentin 100 milliGRAM(s) Oral every 12 hours  metoprolol tartrate 25 milliGRAM(s) Oral two times a day  metroNIDAZOLE  IVPB 500 milliGRAM(s) IV Intermittent every 8 hours  metroNIDAZOLE  IVPB      oxycodone    5 mG/acetaminophen 325 mG 1 Tablet(s) Oral every 6 hours PRN  polyethylene glycol 3350 17 Gram(s) Oral daily PRN  povidone iodine 10% Solution 1 Application(s) Topical daily  risperiDONE   Tablet 0.5 milliGRAM(s) Oral two times a day  senna 2 Tablet(s) Oral at bedtime  traMADol 25 milliGRAM(s) Oral every 6 hours PRN    Drug Dosing Weight  Height (cm): 170.2 (18 Jan 2020 00:00)  Weight (kg): 54.3 (18 Jan 2020 00:00)  BMI (kg/m2): 18.7 (18 Jan 2020 00:00)  BSA (m2): 1.63 (18 Jan 2020 00:00)    CENTRAL LINE: [ ] YES [ ] NO  LOCATION:         BIANCHI: [ ] YES [ ] NO          A-LINE:  [ ] YES [ ] NO  LOCATION:             ICU Vital Signs Last 24 Hrs  T(C): 37 (23 Jan 2020 07:00), Max: 37 (23 Jan 2020 07:00)  T(F): 98.6 (23 Jan 2020 07:00), Max: 98.6 (23 Jan 2020 07:00)  HR: 80 (23 Jan 2020 07:00) (73 - 107)  BP: 106/52 (23 Jan 2020 07:00) (86/73 - 162/78)  BP(mean): 64 (23 Jan 2020 07:00) (61 - 127)  ABP: --  ABP(mean): --  RR: 15 (23 Jan 2020 07:00) (12 - 27)  SpO2: 98% (23 Jan 2020 07:00) (80% - 100%)            01-22 @ 07:01  -  01-23 @ 07:00  --------------------------------------------------------  IN: 1360 mL / OUT: 1400 mL / NET: -40 mL            REVIEW OF SYSTEMS:    CONSTITUTIONAL: No weakness, fevers or chills  NECK: No pain or stiffness  RESPIRATORY: No cough, wheezing, hemoptysis; No shortness of breath  CARDIOVASCULAR: No chest pain or palpitations  GASTROINTESTINAL: No abdominal or epigastric pain. No nausea, vomiting, No diarrhea or constipation. No melena or hematochezia.  GENITOURINARY: No dysuria, frequency or hematuria  NEUROLOGICAL: No numbness or weakness  All other review of systems is negative unless indicated above      PHYSICAL EXAM:    GENERAL: NAD, well-groomed, well-developed  EYES: EOMI, PERRLA,   NECK: Supple, No JVD; Normal thyroid; Trachea midline  NERVOUS SYSTEM:  Alert & Oriented X3,  Motor Strength 5/5 B/L upper and lower extremities; DTRs 2+ intact and symmetric  CHEST/LUNG: No rales, rhonchi, wheezing   HEART: Regular rate and rhythm; No murmurs,   ABDOMEN: Soft, Nontender, Nondistended; Bowel sounds present  EXTREMITIES:  2+ Peripheral Pulses, No clubbing, cyanosis, or edema        LABS:  CBC Full  -  ( 23 Jan 2020 06:15 )  WBC Count : 10.85 K/uL  RBC Count : 4.93 M/uL  Hemoglobin : 14.8 g/dL  Hematocrit : 46.8 %  Platelet Count - Automated : 263 K/uL  Mean Cell Volume : 94.9 fl  Mean Cell Hemoglobin : 30.0 pg  Mean Cell Hemoglobin Concentration : 31.6 gm/dL  Auto Neutrophil # : x  Auto Lymphocyte # : x  Auto Monocyte # : x  Auto Eosinophil # : x  Auto Basophil # : x  Auto Neutrophil % : x  Auto Lymphocyte % : x  Auto Monocyte % : x  Auto Eosinophil % : x  Auto Basophil % : x    01-23    134<L>  |  102  |  39<H>  ----------------------------<  112<H>  4.9   |  31  |  0.97    Ca    9.5      23 Jan 2020 06:15  Phos  3.3     01-22  Mg     2.1     01-22      PT/INR - ( 23 Jan 2020 06:15 )   PT: 11.6 sec;   INR: 1.04 ratio         PTT - ( 23 Jan 2020 06:15 )  PTT:36.0 sec        RADIOLOGY & ADDITIONAL STUDIES REVIEWED:  ***    [ ]GOALS OF CARE DISCUSSION WITH PATIENT/FAMILY/PROXY:    CRITICAL CARE TIME SPENT: 35 minutes INTERVAL HPI/OVERNIGHT EVENTS: Pt scheduled for BYpass procedure today    PRESSORS: [ ] YES [ ] NO  WHICH:    ANTIBIOTICS:                  DATE STARTED:  ANTIBIOTICS:                  DATE STARTED:  ANTIBIOTICS:                  DATE STARTED:    Antimicrobial:  cefepime   IVPB      cefepime   IVPB 2000 milliGRAM(s) IV Intermittent every 12 hours  metroNIDAZOLE  IVPB 500 milliGRAM(s) IV Intermittent every 8 hours  metroNIDAZOLE  IVPB        Cardiovascular:  metoprolol tartrate 25 milliGRAM(s) Oral two times a day    Pulmonary:    Hematalogic:  aspirin enteric coated 81 milliGRAM(s) Oral daily    Other:  acetaminophen   Tablet .. 650 milliGRAM(s) Oral every 6 hours PRN  gabapentin 100 milliGRAM(s) Oral every 12 hours  oxycodone    5 mG/acetaminophen 325 mG 1 Tablet(s) Oral every 6 hours PRN  polyethylene glycol 3350 17 Gram(s) Oral daily PRN  povidone iodine 10% Solution 1 Application(s) Topical daily  risperiDONE   Tablet 0.5 milliGRAM(s) Oral two times a day  senna 2 Tablet(s) Oral at bedtime  traMADol 25 milliGRAM(s) Oral every 6 hours PRN    acetaminophen   Tablet .. 650 milliGRAM(s) Oral every 6 hours PRN  aspirin enteric coated 81 milliGRAM(s) Oral daily  cefepime   IVPB      cefepime   IVPB 2000 milliGRAM(s) IV Intermittent every 12 hours  gabapentin 100 milliGRAM(s) Oral every 12 hours  metoprolol tartrate 25 milliGRAM(s) Oral two times a day  metroNIDAZOLE  IVPB 500 milliGRAM(s) IV Intermittent every 8 hours  metroNIDAZOLE  IVPB      oxycodone    5 mG/acetaminophen 325 mG 1 Tablet(s) Oral every 6 hours PRN  polyethylene glycol 3350 17 Gram(s) Oral daily PRN  povidone iodine 10% Solution 1 Application(s) Topical daily  risperiDONE   Tablet 0.5 milliGRAM(s) Oral two times a day  senna 2 Tablet(s) Oral at bedtime  traMADol 25 milliGRAM(s) Oral every 6 hours PRN    Drug Dosing Weight  Height (cm): 170.2 (18 Jan 2020 00:00)  Weight (kg): 54.3 (18 Jan 2020 00:00)  BMI (kg/m2): 18.7 (18 Jan 2020 00:00)  BSA (m2): 1.63 (18 Jan 2020 00:00)    CENTRAL LINE: [ ] YES [ ] NO  LOCATION:         BIANCHI: [ ] YES [ ] NO          A-LINE:  [ ] YES [ ] NO  LOCATION:             ICU Vital Signs Last 24 Hrs  T(C): 37 (23 Jan 2020 07:00), Max: 37 (23 Jan 2020 07:00)  T(F): 98.6 (23 Jan 2020 07:00), Max: 98.6 (23 Jan 2020 07:00)  HR: 80 (23 Jan 2020 07:00) (73 - 107)  BP: 106/52 (23 Jan 2020 07:00) (86/73 - 162/78)  BP(mean): 64 (23 Jan 2020 07:00) (61 - 127)  ABP: --  ABP(mean): --  RR: 15 (23 Jan 2020 07:00) (12 - 27)  SpO2: 98% (23 Jan 2020 07:00) (80% - 100%)            01-22 @ 07:01  -  01-23 @ 07:00  --------------------------------------------------------  IN: 1360 mL / OUT: 1400 mL / NET: -40 mL            REVIEW OF SYSTEMS:    CONSTITUTIONAL: No weakness, fevers or chills  NECK: No pain or stiffness  RESPIRATORY: No cough, wheezing, hemoptysis; No shortness of breath  CARDIOVASCULAR: No chest pain or palpitations  GASTROINTESTINAL: No abdominal or epigastric pain. No nausea, vomiting, No diarrhea or constipation. No melena or hematochezia.  GENITOURINARY: No dysuria, frequency or hematuria  NEUROLOGICAL: No numbness or weakness  All other review of systems is negative unless indicated above      PHYSICAL EXAM:    GENERAL: NAD, well-groomed, well-developed  EYES: EOMI, PERRLA,   NECK: Supple, No JVD; Normal thyroid; Trachea midline  NERVOUS SYSTEM:  Alert & Oriented X3,  Motor Strength 5/5 B/L upper and lower extremities; DTRs 2+ intact and symmetric  CHEST/LUNG: No rales, rhonchi, wheezing   HEART: Regular rate and rhythm; No murmurs,   ABDOMEN: Soft, Nontender, Nondistended; Bowel sounds present  EXTREMITIES:  2+ Peripheral Pulses, No clubbing, cyanosis, or edema        LABS:  CBC Full  -  ( 23 Jan 2020 06:15 )  WBC Count : 10.85 K/uL  RBC Count : 4.93 M/uL  Hemoglobin : 14.8 g/dL  Hematocrit : 46.8 %  Platelet Count - Automated : 263 K/uL  Mean Cell Volume : 94.9 fl  Mean Cell Hemoglobin : 30.0 pg  Mean Cell Hemoglobin Concentration : 31.6 gm/dL  Auto Neutrophil # : x  Auto Lymphocyte # : x  Auto Monocyte # : x  Auto Eosinophil # : x  Auto Basophil # : x  Auto Neutrophil % : x  Auto Lymphocyte % : x  Auto Monocyte % : x  Auto Eosinophil % : x  Auto Basophil % : x    01-23    134<L>  |  102  |  39<H>  ----------------------------<  112<H>  4.9   |  31  |  0.97    Ca    9.5      23 Jan 2020 06:15  Phos  3.3     01-22  Mg     2.1     01-22      PT/INR - ( 23 Jan 2020 06:15 )   PT: 11.6 sec;   INR: 1.04 ratio         PTT - ( 23 Jan 2020 06:15 )  PTT:36.0 sec        RADIOLOGY & ADDITIONAL STUDIES REVIEWED:  ***    [ ]GOALS OF CARE DISCUSSION WITH PATIENT/FAMILY/PROXY:    CRITICAL CARE TIME SPENT: 35 minutes

## 2020-01-23 NOTE — BRIEF OPERATIVE NOTE - OPERATION/FINDINGS
occlusion of common femoral artery with significant plaque. Endarterectomy performed and bypass created from external iliac to popliteal artery with doppler pulse at case end. Good hemostasis at case end

## 2020-01-23 NOTE — CHART NOTE - NSCHARTNOTEFT_GEN_A_CORE
Assessment:   74yFemalePatient is a 74y old  Female who presents with a chief complaint of dry gangrene (23 Jan 2020 11:02)      Factors impacting intake: [ ] none [ ] nausea  [ ] vomiting [ ] diarrhea [ ] constipation  [ ]chewing problems [ ] swallowing issues  [x ] other: patient currently NPO for OR. reports adequate intake prior to that. also drinking the ensure, 3 cans/d. resume diet post-surgery.     Diet Presciption: Diet, NPO after Midnight:      NPO Start Date: 22-Jan-2020,   NPO Start Time: 23:59 (01-22-20 @ 11:22)    Intake: adequate     Current Weight: Weight (kg):  54kg(1/23)   % Weight change: 6% wt gain in 5 days     Pertinent Medications: MEDICATIONS  (STANDING):  aspirin enteric coated 81 milliGRAM(s) Oral daily  cefepime   IVPB      cefepime   IVPB 2000 milliGRAM(s) IV Intermittent every 12 hours  gabapentin 100 milliGRAM(s) Oral every 12 hours  metoprolol tartrate 25 milliGRAM(s) Oral two times a day  metroNIDAZOLE  IVPB 500 milliGRAM(s) IV Intermittent every 8 hours  metroNIDAZOLE  IVPB      povidone iodine 10% Solution 1 Application(s) Topical daily  risperiDONE   Tablet 0.5 milliGRAM(s) Oral two times a day  senna 2 Tablet(s) Oral at bedtime  sodium chloride 0.9%. 1000 milliLiter(s) (50 mL/Hr) IV Continuous <Continuous>    MEDICATIONS  (PRN):  acetaminophen   Tablet .. 650 milliGRAM(s) Oral every 6 hours PRN Mild Pain (1 - 3)  oxycodone    5 mG/acetaminophen 325 mG 1 Tablet(s) Oral every 6 hours PRN Moderate Pain (4 - 6)  polyethylene glycol 3350 17 Gram(s) Oral daily PRN Constipation  traMADol 25 milliGRAM(s) Oral every 6 hours PRN Severe Pain (7 - 10)    Pertinent Labs: 01-23 Na134 mmol/L<L> Glu 112 mg/dL<H> K+ 4.9 mmol/L Cr  0.97 mg/dL BUN 39 mg/dL<H> 01-22 Phos 3.3 mg/dL 01-20 Alb 2.8 g/dL<L> 01-11 [id number] 5.6 %     CAPILLARY BLOOD GLUCOSE        Skin: No pressure ulcers     Estimated Needs:   [ x] no change since previous assessment  [ ] recalculated:     Previous Nutrition Diagnosis:   [ ] Inadequate Energy Intake [ ]Inadequate Oral Intake [ ] Excessive Energy Intake   [ ] Underweight [ ] Increased Nutrient Needs [ ] Overweight/Obesity   [ ] Altered GI Function [ ] Unintended Weight Loss [ ] Food & Nutrition Related Knowledge Deficit [x ] Malnutrition , severe     Nutrition Diagnosis is [x ] ongoing  [ ] resolved [ ] not applicable     New Nutrition Diagnosis: [ ] not applicable       Interventions:   Recommend  [ ] Change Diet To:  [ ] Nutrition Supplement  [ ] Nutrition Support  [ ] Other: resume diet post-surgery. spoke with RN       Monitoring and Evaluation:   [x ] PO intake [ x ] Tolerance to diet prescription [ x ] weights [ x ] labs[ x ] follow up per protocol  [ ] other:

## 2020-01-23 NOTE — PROGRESS NOTE ADULT - SUBJECTIVE AND OBJECTIVE BOX
Postop instructions:    please start heparin for anticoagulation NO BOLUS, give 500 units/hour initially with a goal PTT of 50-60.  Please resume aspirin  Post op labs  Keep head of bed < 30 degrees.  Keep right leg straight  Wound checks

## 2020-01-24 LAB
ALBUMIN SERPL ELPH-MCNC: 2.8 G/DL — LOW (ref 3.5–5)
ALP SERPL-CCNC: 68 U/L — SIGNIFICANT CHANGE UP (ref 40–120)
ALT FLD-CCNC: 20 U/L DA — SIGNIFICANT CHANGE UP (ref 10–60)
ANION GAP SERPL CALC-SCNC: 3 MMOL/L — LOW (ref 5–17)
ANION GAP SERPL CALC-SCNC: 7 MMOL/L — SIGNIFICANT CHANGE UP (ref 5–17)
APTT BLD: 28.7 SEC — SIGNIFICANT CHANGE UP (ref 27.5–36.3)
APTT BLD: 48.2 SEC — HIGH (ref 27.5–36.3)
APTT BLD: 51 SEC — HIGH (ref 27.5–36.3)
APTT BLD: 51.3 SEC — HIGH (ref 27.5–36.3)
AST SERPL-CCNC: 37 U/L — SIGNIFICANT CHANGE UP (ref 10–40)
BASOPHILS # BLD AUTO: 0 K/UL — SIGNIFICANT CHANGE UP (ref 0–0.2)
BASOPHILS NFR BLD AUTO: 0 % — SIGNIFICANT CHANGE UP (ref 0–2)
BILIRUB SERPL-MCNC: 0.5 MG/DL — SIGNIFICANT CHANGE UP (ref 0.2–1.2)
BUN SERPL-MCNC: 29 MG/DL — HIGH (ref 7–18)
BUN SERPL-MCNC: 31 MG/DL — HIGH (ref 7–18)
CALCIUM SERPL-MCNC: 8.7 MG/DL — SIGNIFICANT CHANGE UP (ref 8.4–10.5)
CALCIUM SERPL-MCNC: 8.8 MG/DL — SIGNIFICANT CHANGE UP (ref 8.4–10.5)
CHLORIDE SERPL-SCNC: 102 MMOL/L — SIGNIFICANT CHANGE UP (ref 96–108)
CHLORIDE SERPL-SCNC: 106 MMOL/L — SIGNIFICANT CHANGE UP (ref 96–108)
CK MB BLD-MCNC: 1 % — SIGNIFICANT CHANGE UP (ref 0–3.5)
CK MB CFR SERPL CALC: 2.4 NG/ML — SIGNIFICANT CHANGE UP (ref 0–3.6)
CK SERPL-CCNC: 242 U/L — HIGH (ref 21–215)
CO2 SERPL-SCNC: 29 MMOL/L — SIGNIFICANT CHANGE UP (ref 22–31)
CO2 SERPL-SCNC: 30 MMOL/L — SIGNIFICANT CHANGE UP (ref 22–31)
CREAT SERPL-MCNC: 0.76 MG/DL — SIGNIFICANT CHANGE UP (ref 0.5–1.3)
CREAT SERPL-MCNC: 1.03 MG/DL — SIGNIFICANT CHANGE UP (ref 0.5–1.3)
EOSINOPHIL # BLD AUTO: 0 K/UL — SIGNIFICANT CHANGE UP (ref 0–0.5)
EOSINOPHIL NFR BLD AUTO: 0 % — SIGNIFICANT CHANGE UP (ref 0–6)
GLUCOSE BLDC GLUCOMTR-MCNC: 174 MG/DL — HIGH (ref 70–99)
GLUCOSE SERPL-MCNC: 166 MG/DL — HIGH (ref 70–99)
GLUCOSE SERPL-MCNC: 237 MG/DL — HIGH (ref 70–99)
HCT VFR BLD CALC: 33.4 % — LOW (ref 34.5–45)
HCT VFR BLD CALC: 37.2 % — SIGNIFICANT CHANGE UP (ref 34.5–45)
HGB BLD-MCNC: 10.9 G/DL — LOW (ref 11.5–15.5)
HGB BLD-MCNC: 11.7 G/DL — SIGNIFICANT CHANGE UP (ref 11.5–15.5)
LACTATE SERPL-SCNC: 1.7 MMOL/L — SIGNIFICANT CHANGE UP (ref 0.7–2)
LACTATE SERPL-SCNC: 2.4 MMOL/L — HIGH (ref 0.7–2)
LG PLATELETS BLD QL AUTO: SLIGHT — SIGNIFICANT CHANGE UP
LYMPHOCYTES # BLD AUTO: 0.41 K/UL — LOW (ref 1–3.3)
LYMPHOCYTES # BLD AUTO: 3 % — LOW (ref 13–44)
MAGNESIUM SERPL-MCNC: 1.6 MG/DL — SIGNIFICANT CHANGE UP (ref 1.6–2.6)
MAGNESIUM SERPL-MCNC: 1.8 MG/DL — SIGNIFICANT CHANGE UP (ref 1.6–2.6)
MANUAL SMEAR VERIFICATION: SIGNIFICANT CHANGE UP
MCHC RBC-ENTMCNC: 30 PG — SIGNIFICANT CHANGE UP (ref 27–34)
MCHC RBC-ENTMCNC: 30.8 PG — SIGNIFICANT CHANGE UP (ref 27–34)
MCHC RBC-ENTMCNC: 31.5 GM/DL — LOW (ref 32–36)
MCHC RBC-ENTMCNC: 32.6 GM/DL — SIGNIFICANT CHANGE UP (ref 32–36)
MCV RBC AUTO: 94.4 FL — SIGNIFICANT CHANGE UP (ref 80–100)
MCV RBC AUTO: 95.4 FL — SIGNIFICANT CHANGE UP (ref 80–100)
MONOCYTES # BLD AUTO: 0.69 K/UL — SIGNIFICANT CHANGE UP (ref 0–0.9)
MONOCYTES NFR BLD AUTO: 5 % — SIGNIFICANT CHANGE UP (ref 2–14)
NEUTROPHILS # BLD AUTO: 12.7 K/UL — HIGH (ref 1.8–7.4)
NEUTROPHILS NFR BLD AUTO: 92 % — HIGH (ref 43–77)
NRBC # BLD: 0 /100 WBCS — SIGNIFICANT CHANGE UP (ref 0–0)
NRBC # BLD: 0 /100 WBCS — SIGNIFICANT CHANGE UP (ref 0–0)
NRBC # BLD: 0 /100 — SIGNIFICANT CHANGE UP (ref 0–0)
PHOSPHATE SERPL-MCNC: 3.4 MG/DL — SIGNIFICANT CHANGE UP (ref 2.5–4.5)
PHOSPHATE SERPL-MCNC: 3.5 MG/DL — SIGNIFICANT CHANGE UP (ref 2.5–4.5)
PLAT MORPH BLD: NORMAL — SIGNIFICANT CHANGE UP
PLATELET # BLD AUTO: 226 K/UL — SIGNIFICANT CHANGE UP (ref 150–400)
PLATELET # BLD AUTO: 229 K/UL — SIGNIFICANT CHANGE UP (ref 150–400)
POTASSIUM SERPL-MCNC: 4.5 MMOL/L — SIGNIFICANT CHANGE UP (ref 3.5–5.3)
POTASSIUM SERPL-MCNC: 4.6 MMOL/L — SIGNIFICANT CHANGE UP (ref 3.5–5.3)
POTASSIUM SERPL-SCNC: 4.5 MMOL/L — SIGNIFICANT CHANGE UP (ref 3.5–5.3)
POTASSIUM SERPL-SCNC: 4.6 MMOL/L — SIGNIFICANT CHANGE UP (ref 3.5–5.3)
PROT SERPL-MCNC: 7.2 G/DL — SIGNIFICANT CHANGE UP (ref 6–8.3)
RBC # BLD: 3.54 M/UL — LOW (ref 3.8–5.2)
RBC # BLD: 3.9 M/UL — SIGNIFICANT CHANGE UP (ref 3.8–5.2)
RBC # FLD: 12.6 % — SIGNIFICANT CHANGE UP (ref 10.3–14.5)
RBC # FLD: 12.6 % — SIGNIFICANT CHANGE UP (ref 10.3–14.5)
RBC BLD AUTO: NORMAL — SIGNIFICANT CHANGE UP
SODIUM SERPL-SCNC: 138 MMOL/L — SIGNIFICANT CHANGE UP (ref 135–145)
SODIUM SERPL-SCNC: 139 MMOL/L — SIGNIFICANT CHANGE UP (ref 135–145)
TROPONIN I SERPL-MCNC: <0.015 NG/ML — SIGNIFICANT CHANGE UP (ref 0–0.04)
WBC # BLD: 10.41 K/UL — SIGNIFICANT CHANGE UP (ref 3.8–10.5)
WBC # BLD: 11.1 K/UL — HIGH (ref 3.8–10.5)
WBC # FLD AUTO: 10.41 K/UL — SIGNIFICANT CHANGE UP (ref 3.8–10.5)
WBC # FLD AUTO: 11.1 K/UL — HIGH (ref 3.8–10.5)

## 2020-01-24 PROCEDURE — 99232 SBSQ HOSP IP/OBS MODERATE 35: CPT

## 2020-01-24 RX ORDER — HEPARIN SODIUM 5000 [USP'U]/ML
500 INJECTION INTRAVENOUS; SUBCUTANEOUS
Qty: 25000 | Refills: 0 | Status: DISCONTINUED | OUTPATIENT
Start: 2020-01-24 | End: 2020-01-25

## 2020-01-24 RX ORDER — HALOPERIDOL DECANOATE 100 MG/ML
1 INJECTION INTRAMUSCULAR EVERY 4 HOURS
Refills: 0 | Status: DISCONTINUED | OUTPATIENT
Start: 2020-01-24 | End: 2020-01-24

## 2020-01-24 RX ORDER — DEXMEDETOMIDINE HYDROCHLORIDE IN 0.9% SODIUM CHLORIDE 4 UG/ML
0.5 INJECTION INTRAVENOUS
Qty: 200 | Refills: 0 | Status: DISCONTINUED | OUTPATIENT
Start: 2020-01-24 | End: 2020-01-24

## 2020-01-24 RX ORDER — ACETAMINOPHEN 500 MG
1000 TABLET ORAL ONCE
Refills: 0 | Status: DISCONTINUED | OUTPATIENT
Start: 2020-01-24 | End: 2020-01-24

## 2020-01-24 RX ORDER — HALOPERIDOL DECANOATE 100 MG/ML
1 INJECTION INTRAMUSCULAR EVERY 4 HOURS
Refills: 0 | Status: DISCONTINUED | OUTPATIENT
Start: 2020-01-24 | End: 2020-02-04

## 2020-01-24 RX ORDER — CHLORHEXIDINE GLUCONATE 213 G/1000ML
1 SOLUTION TOPICAL
Refills: 0 | Status: DISCONTINUED | OUTPATIENT
Start: 2020-01-24 | End: 2020-01-25

## 2020-01-24 RX ORDER — RISPERIDONE 4 MG/1
0.5 TABLET ORAL ONCE
Refills: 0 | Status: COMPLETED | OUTPATIENT
Start: 2020-01-24 | End: 2020-01-24

## 2020-01-24 RX ORDER — SODIUM CHLORIDE 9 MG/ML
1000 INJECTION, SOLUTION INTRAVENOUS ONCE
Refills: 0 | Status: COMPLETED | OUTPATIENT
Start: 2020-01-24 | End: 2020-01-24

## 2020-01-24 RX ADMIN — Medication 650 MILLIGRAM(S): at 21:14

## 2020-01-24 RX ADMIN — TRAMADOL HYDROCHLORIDE 25 MILLIGRAM(S): 50 TABLET ORAL at 22:39

## 2020-01-24 RX ADMIN — CEFEPIME 50 MILLIGRAM(S): 1 INJECTION, POWDER, FOR SOLUTION INTRAMUSCULAR; INTRAVENOUS at 17:27

## 2020-01-24 RX ADMIN — HYDROMORPHONE HYDROCHLORIDE 0.5 MILLIGRAM(S): 2 INJECTION INTRAMUSCULAR; INTRAVENOUS; SUBCUTANEOUS at 15:15

## 2020-01-24 RX ADMIN — HEPARIN SODIUM 500 UNIT(S)/HR: 5000 INJECTION INTRAVENOUS; SUBCUTANEOUS at 06:47

## 2020-01-24 RX ADMIN — Medication 100 MILLIGRAM(S): at 05:29

## 2020-01-24 RX ADMIN — Medication 25 MILLIGRAM(S): at 23:20

## 2020-01-24 RX ADMIN — CEFEPIME 50 MILLIGRAM(S): 1 INJECTION, POWDER, FOR SOLUTION INTRAMUSCULAR; INTRAVENOUS at 05:29

## 2020-01-24 RX ADMIN — DEXMEDETOMIDINE HYDROCHLORIDE IN 0.9% SODIUM CHLORIDE 6.79 MICROGRAM(S)/KG/HR: 4 INJECTION INTRAVENOUS at 11:38

## 2020-01-24 RX ADMIN — HYDROMORPHONE HYDROCHLORIDE 0.5 MILLIGRAM(S): 2 INJECTION INTRAMUSCULAR; INTRAVENOUS; SUBCUTANEOUS at 11:05

## 2020-01-24 RX ADMIN — HYDROMORPHONE HYDROCHLORIDE 0.5 MILLIGRAM(S): 2 INJECTION INTRAMUSCULAR; INTRAVENOUS; SUBCUTANEOUS at 15:14

## 2020-01-24 RX ADMIN — Medication 100 MILLIGRAM(S): at 13:01

## 2020-01-24 RX ADMIN — SODIUM CHLORIDE 50 MILLILITER(S): 9 INJECTION INTRAMUSCULAR; INTRAVENOUS; SUBCUTANEOUS at 00:30

## 2020-01-24 RX ADMIN — Medication 650 MILLIGRAM(S): at 22:20

## 2020-01-24 RX ADMIN — HEPARIN SODIUM 500 UNIT(S)/HR: 5000 INJECTION INTRAVENOUS; SUBCUTANEOUS at 00:38

## 2020-01-24 RX ADMIN — CHLORHEXIDINE GLUCONATE 1 APPLICATION(S): 213 SOLUTION TOPICAL at 12:21

## 2020-01-24 RX ADMIN — TRAMADOL HYDROCHLORIDE 25 MILLIGRAM(S): 50 TABLET ORAL at 23:55

## 2020-01-24 RX ADMIN — SODIUM CHLORIDE 1000 MILLILITER(S): 9 INJECTION, SOLUTION INTRAVENOUS at 00:30

## 2020-01-24 RX ADMIN — RISPERIDONE 0.5 MILLIGRAM(S): 4 TABLET ORAL at 23:26

## 2020-01-24 RX ADMIN — Medication 100 MILLIGRAM(S): at 21:14

## 2020-01-24 RX ADMIN — HEPARIN SODIUM 600 UNIT(S)/HR: 5000 INJECTION INTRAVENOUS; SUBCUTANEOUS at 13:02

## 2020-01-24 NOTE — PROGRESS NOTE ADULT - SUBJECTIVE AND OBJECTIVE BOX
INTERVAL HPI/OVERNIGHT EVENTS: ***    PRESSORS: [ ] YES [ ] NO  WHICH:    ANTIBIOTICS:                  DATE STARTED:  ANTIBIOTICS:                  DATE STARTED:  ANTIBIOTICS:                  DATE STARTED:    Antimicrobial:  cefepime   IVPB 2000 milliGRAM(s) IV Intermittent every 12 hours  metroNIDAZOLE  IVPB 500 milliGRAM(s) IV Intermittent every 8 hours    Cardiovascular:  metoprolol tartrate 25 milliGRAM(s) Oral two times a day    Pulmonary:    Hematalogic:  aspirin  chewable 81 milliGRAM(s) Oral daily  heparin  Infusion. 500 Unit(s)/Hr IV Continuous <Continuous>    Other:  acetaminophen   Tablet .. 650 milliGRAM(s) Oral every 6 hours PRN  chlorhexidine 2% Cloths 1 Application(s) Topical <User Schedule>  dexMEDEtomidine Infusion 0.5 MICROgram(s)/kG/Hr IV Continuous <Continuous>  gabapentin 100 milliGRAM(s) Oral every 12 hours  haloperidol    Injectable 1 milliGRAM(s) IV Push every 4 hours PRN  HYDROmorphone  Injectable 0.5 milliGRAM(s) IV Push every 10 minutes PRN  polyethylene glycol 3350 17 Gram(s) Oral daily PRN  povidone iodine 10% Solution 1 Application(s) Topical daily  risperiDONE   Tablet 0.5 milliGRAM(s) Oral two times a day  senna 2 Tablet(s) Oral at bedtime  sodium chloride 0.9%. 1000 milliLiter(s) IV Continuous <Continuous>  traMADol 25 milliGRAM(s) Oral every 6 hours PRN    acetaminophen   Tablet .. 650 milliGRAM(s) Oral every 6 hours PRN  aspirin  chewable 81 milliGRAM(s) Oral daily  cefepime   IVPB 2000 milliGRAM(s) IV Intermittent every 12 hours  chlorhexidine 2% Cloths 1 Application(s) Topical <User Schedule>  dexMEDEtomidine Infusion 0.5 MICROgram(s)/kG/Hr IV Continuous <Continuous>  gabapentin 100 milliGRAM(s) Oral every 12 hours  haloperidol    Injectable 1 milliGRAM(s) IV Push every 4 hours PRN  heparin  Infusion. 500 Unit(s)/Hr IV Continuous <Continuous>  HYDROmorphone  Injectable 0.5 milliGRAM(s) IV Push every 10 minutes PRN  metoprolol tartrate 25 milliGRAM(s) Oral two times a day  metroNIDAZOLE  IVPB 500 milliGRAM(s) IV Intermittent every 8 hours  polyethylene glycol 3350 17 Gram(s) Oral daily PRN  povidone iodine 10% Solution 1 Application(s) Topical daily  risperiDONE   Tablet 0.5 milliGRAM(s) Oral two times a day  senna 2 Tablet(s) Oral at bedtime  sodium chloride 0.9%. 1000 milliLiter(s) IV Continuous <Continuous>  traMADol 25 milliGRAM(s) Oral every 6 hours PRN    Drug Dosing Weight  Height (cm): 170.2 (18 Jan 2020 00:00)  Weight (kg): 54.3 (18 Jan 2020 00:00)  BMI (kg/m2): 18.7 (18 Jan 2020 00:00)  BSA (m2): 1.63 (18 Jan 2020 00:00)    CENTRAL LINE: [ ] YES [ ] NO  LOCATION:         BIANCHI: [ ] YES [ ] NO          A-LINE:  [ ] YES [ ] NO  LOCATION:             ICU Vital Signs Last 24 Hrs  T(C): 36.4 (24 Jan 2020 07:00), Max: 36.6 (24 Jan 2020 00:00)  T(F): 97.6 (24 Jan 2020 07:00), Max: 97.8 (24 Jan 2020 00:00)  HR: 89 (24 Jan 2020 11:00) (70 - 121)  BP: 114/62 (24 Jan 2020 11:00) (76/40 - 172/99)  BP(mean): 74 (24 Jan 2020 11:00) (48 - 114)  ABP: --  ABP(mean): --  RR: 28 (24 Jan 2020 11:00) (12 - 32)  SpO2: 98% (24 Jan 2020 11:00) (96% - 100%)            01-23 @ 07:01  -  01-24 @ 07:00  --------------------------------------------------------  IN: 987.1 mL / OUT: 900 mL / NET: 87.1 mL            REVIEW OF SYSTEMS:    CONSTITUTIONAL: No weakness, fevers or chills  NECK: No pain or stiffness  RESPIRATORY: No cough, wheezing, hemoptysis; No shortness of breath  CARDIOVASCULAR: No chest pain or palpitations  GASTROINTESTINAL: No abdominal or epigastric pain. No nausea, vomiting, No diarrhea or constipation. No melena or hematochezia.  GENITOURINARY: No dysuria, frequency or hematuria  NEUROLOGICAL: No numbness or weakness  All other review of systems is negative unless indicated above      PHYSICAL EXAM:    GENERAL: NAD, well-groomed, well-developed  EYES: EOMI, PERRLA,   NECK: Supple, No JVD; Normal thyroid; Trachea midline  NERVOUS SYSTEM:  Alert & Oriented X3,  Motor Strength 5/5 B/L upper and lower extremities; DTRs 2+ intact and symmetric  CHEST/LUNG: No rales, rhonchi, wheezing   HEART: Regular rate and rhythm; No murmurs,   ABDOMEN: Soft, Nontender, Nondistended; Bowel sounds present  EXTREMITIES:  2+ Peripheral Pulses, No clubbing, cyanosis, or edema        LABS:  CBC Full  -  ( 24 Jan 2020 05:34 )  WBC Count : 11.10 K/uL  RBC Count : 3.90 M/uL  Hemoglobin : 11.7 g/dL  Hematocrit : 37.2 %  Platelet Count - Automated : 226 K/uL  Mean Cell Volume : 95.4 fl  Mean Cell Hemoglobin : 30.0 pg  Mean Cell Hemoglobin Concentration : 31.5 gm/dL  Auto Neutrophil # : x  Auto Lymphocyte # : x  Auto Monocyte # : x  Auto Eosinophil # : x  Auto Basophil # : x  Auto Neutrophil % : x  Auto Lymphocyte % : x  Auto Monocyte % : x  Auto Eosinophil % : x  Auto Basophil % : x    01-24    139  |  106  |  29<H>  ----------------------------<  166<H>  4.5   |  30  |  0.76    Ca    8.8      24 Jan 2020 05:34  Phos  3.4     01-24  Mg     1.8     01-24    TPro  7.2  /  Alb  2.8<L>  /  TBili  0.5  /  DBili  x   /  AST  37  /  ALT  20  /  AlkPhos  68  01-23    PT/INR - ( 23 Jan 2020 06:15 )   PT: 11.6 sec;   INR: 1.04 ratio         PTT - ( 24 Jan 2020 05:34 )  PTT:48.2 sec        RADIOLOGY & ADDITIONAL STUDIES REVIEWED:  ***    [ ]GOALS OF CARE DISCUSSION WITH PATIENT/FAMILY/PROXY:    CRITICAL CARE TIME SPENT: 35 minutes INTERVAL HPI/OVERNIGHT EVENTS: Pt noted to be very agitated and combative. Plan to get off the sedation. Plan to wean off the precedax along with Haldol. C/o severe Toe pain.    PRESSORS: [ ] YES [ x] NO  WHICH:    ANTIBIOTICS:                  DATE STARTED:  ANTIBIOTICS:                  DATE STARTED:  ANTIBIOTICS:                  DATE STARTED:    Antimicrobial:  cefepime   IVPB 2000 milliGRAM(s) IV Intermittent every 12 hours  metroNIDAZOLE  IVPB 500 milliGRAM(s) IV Intermittent every 8 hours    Cardiovascular:  metoprolol tartrate 25 milliGRAM(s) Oral two times a day    Pulmonary:    Hematalogic:  aspirin  chewable 81 milliGRAM(s) Oral daily  heparin  Infusion. 500 Unit(s)/Hr IV Continuous <Continuous>    Other:  acetaminophen   Tablet .. 650 milliGRAM(s) Oral every 6 hours PRN  chlorhexidine 2% Cloths 1 Application(s) Topical <User Schedule>  dexMEDEtomidine Infusion 0.5 MICROgram(s)/kG/Hr IV Continuous <Continuous>  gabapentin 100 milliGRAM(s) Oral every 12 hours  haloperidol    Injectable 1 milliGRAM(s) IV Push every 4 hours PRN  HYDROmorphone  Injectable 0.5 milliGRAM(s) IV Push every 10 minutes PRN  polyethylene glycol 3350 17 Gram(s) Oral daily PRN  povidone iodine 10% Solution 1 Application(s) Topical daily  risperiDONE   Tablet 0.5 milliGRAM(s) Oral two times a day  senna 2 Tablet(s) Oral at bedtime  sodium chloride 0.9%. 1000 milliLiter(s) IV Continuous <Continuous>  traMADol 25 milliGRAM(s) Oral every 6 hours PRN    acetaminophen   Tablet .. 650 milliGRAM(s) Oral every 6 hours PRN  aspirin  chewable 81 milliGRAM(s) Oral daily  cefepime   IVPB 2000 milliGRAM(s) IV Intermittent every 12 hours  chlorhexidine 2% Cloths 1 Application(s) Topical <User Schedule>  dexMEDEtomidine Infusion 0.5 MICROgram(s)/kG/Hr IV Continuous <Continuous>  gabapentin 100 milliGRAM(s) Oral every 12 hours  haloperidol    Injectable 1 milliGRAM(s) IV Push every 4 hours PRN  heparin  Infusion. 500 Unit(s)/Hr IV Continuous <Continuous>  HYDROmorphone  Injectable 0.5 milliGRAM(s) IV Push every 10 minutes PRN  metoprolol tartrate 25 milliGRAM(s) Oral two times a day  metroNIDAZOLE  IVPB 500 milliGRAM(s) IV Intermittent every 8 hours  polyethylene glycol 3350 17 Gram(s) Oral daily PRN  povidone iodine 10% Solution 1 Application(s) Topical daily  risperiDONE   Tablet 0.5 milliGRAM(s) Oral two times a day  senna 2 Tablet(s) Oral at bedtime  sodium chloride 0.9%. 1000 milliLiter(s) IV Continuous <Continuous>  traMADol 25 milliGRAM(s) Oral every 6 hours PRN    Drug Dosing Weight  Height (cm): 170.2 (18 Jan 2020 00:00)  Weight (kg): 54.3 (18 Jan 2020 00:00)  BMI (kg/m2): 18.7 (18 Jan 2020 00:00)  BSA (m2): 1.63 (18 Jan 2020 00:00)    CENTRAL LINE: [ ] YES [x ] NO  LOCATION:         BIANCHI: [x ] YES [ ] NO          A-LINE:  [ ] YES [ ] NO  LOCATION:             ICU Vital Signs Last 24 Hrs  T(C): 36.4 (24 Jan 2020 07:00), Max: 36.6 (24 Jan 2020 00:00)  T(F): 97.6 (24 Jan 2020 07:00), Max: 97.8 (24 Jan 2020 00:00)  HR: 89 (24 Jan 2020 11:00) (70 - 121)  BP: 114/62 (24 Jan 2020 11:00) (76/40 - 172/99)  BP(mean): 74 (24 Jan 2020 11:00) (48 - 114)  ABP: --  ABP(mean): --  RR: 28 (24 Jan 2020 11:00) (12 - 32)  SpO2: 98% (24 Jan 2020 11:00) (96% - 100%)            01-23 @ 07:01  -  01-24 @ 07:00  --------------------------------------------------------  IN: 987.1 mL / OUT: 900 mL / NET: 87.1 mL            REVIEW OF SYSTEMS:    CONSTITUTIONAL: No weakness, fevers or chills  NECK: No pain or stiffness  RESPIRATORY: No cough, wheezing, hemoptysis; No shortness of breath  CARDIOVASCULAR: No chest pain or palpitations  GASTROINTESTINAL: No abdominal or epigastric pain. No nausea, vomiting, No diarrhea or constipation. No melena or hematochezia.  GENITOURINARY: No dysuria, frequency or hematuria  NEUROLOGICAL: pain in R toe  All other review of systems is negative unless indicated above      PHYSICAL EXAM:    GENERAL: agitated, restless on bed  EYES: EOMI, PERRLA,   NECK: Supple, No JVD; Normal thyroid; Trachea midline  NERVOUS SYSTEM:  Alert & Oriented X2,  on and of sleepy from the sedation.  CHEST/LUNG: No rales, rhonchi, wheezing   HEART: Regular rate and rhythm; No murmurs,   ABDOMEN: Soft, Nontender, Nondistended; Bowel sounds present  EXTREMITIES:  R extremity braced        LABS:  CBC Full  -  ( 24 Jan 2020 05:34 )  WBC Count : 11.10 K/uL  RBC Count : 3.90 M/uL  Hemoglobin : 11.7 g/dL  Hematocrit : 37.2 %  Platelet Count - Automated : 226 K/uL  Mean Cell Volume : 95.4 fl  Mean Cell Hemoglobin : 30.0 pg  Mean Cell Hemoglobin Concentration : 31.5 gm/dL  Auto Neutrophil # : x  Auto Lymphocyte # : x  Auto Monocyte # : x  Auto Eosinophil # : x  Auto Basophil # : x  Auto Neutrophil % : x  Auto Lymphocyte % : x  Auto Monocyte % : x  Auto Eosinophil % : x  Auto Basophil % : x    01-24    139  |  106  |  29<H>  ----------------------------<  166<H>  4.5   |  30  |  0.76    Ca    8.8      24 Jan 2020 05:34  Phos  3.4     01-24  Mg     1.8     01-24    TPro  7.2  /  Alb  2.8<L>  /  TBili  0.5  /  DBili  x   /  AST  37  /  ALT  20  /  AlkPhos  68  01-23    PT/INR - ( 23 Jan 2020 06:15 )   PT: 11.6 sec;   INR: 1.04 ratio         PTT - ( 24 Jan 2020 05:34 )  PTT:48.2 sec        RADIOLOGY & ADDITIONAL STUDIES REVIEWED:  ***    [ ]GOALS OF CARE DISCUSSION WITH PATIENT/FAMILY/PROXY:    CRITICAL CARE TIME SPENT: 35 minutes

## 2020-01-24 NOTE — PROGRESS NOTE ADULT - SUBJECTIVE AND OBJECTIVE BOX
74F POD0 external iliac to popliteal PTFE bypass.    No acute events overnight. Vitals stable. Patient combatitive requiring haldol. Postop labs hb stable, PTT nearly to goal at 48 on heparin drip.      Vital Signs Last 24 Hrs  T(C): 36.4 (24 Jan 2020 07:00), Max: 36.6 (24 Jan 2020 00:00)  T(F): 97.6 (24 Jan 2020 07:00), Max: 97.8 (24 Jan 2020 00:00)  HR: 106 (24 Jan 2020 07:00) (73 - 121)  BP: 132/81 (24 Jan 2020 07:00) (76/40 - 172/99)  BP(mean): 94 (24 Jan 2020 07:00) (48 - 114)  RR: 18 (24 Jan 2020 07:00) (12 - 32)  SpO2: 100% (24 Jan 2020 07:00) (94% - 100%)  NAD  R groin CDI, R thigh CDI dressings. Dopplerable DP of R leg diminishes with compression of graft over thigh, leg warm, foot w dressing in place                          11.7   11.10 )-----------( 226      ( 24 Jan 2020 05:34 )             37.2     01-24    139  |  106  |  29<H>  ----------------------------<  166<H>  4.5   |  30  |  0.76    Ca    8.8      24 Jan 2020 05:34  Phos  3.4     01-24  Mg     1.8     01-24    TPro  7.2  /  Alb  2.8<L>  /  TBili  0.5  /  DBili  x   /  AST  37  /  ALT  20  /  AlkPhos  68  01-23    PT/INR - ( 23 Jan 2020 06:15 )   PT: 11.6 sec;   INR: 1.04 ratio         PTT - ( 24 Jan 2020 05:34 )  PTT:48.2 sec

## 2020-01-24 NOTE — PROGRESS NOTE ADULT - SUBJECTIVE AND OBJECTIVE BOX
Patient is a 74y old  Female who presents with a chief complaint of discoloration of toe of right foot.     HPI: This 74 year old non-diabetic female presents to the ED with complaint of discoloration for a toe on the right foot. She states that she was seen by her primary care physician a few days ago and told her to go to the emergency room.  Patient states she smokes at least a pack a day of cigarettes since she was 22 years old.  Patient states she can only ambulate 10 feet and gets a cramp in her right calf for which she needs to sit down.  She states she is very minimally ambulatory.   She denies pain at rest with her feet elevated.  Patient lives alone with a cat. Patient massaging leg throughout the visit.  Patient denies n/v/d/sob/cp/f.  Patient states her blood pressure is not usually low.     Podiatry Interval HPI:  Patient seen bedside this AM examined with attending.  Patient was not awake or alert, however moans and groans during exam. Per chart note, pt was combative post-op. Patient has dressing intact. s/p R open bypass 1/23.  Podiatry continues to follow. Pod surgical intervention pending discussion with vascular team.       PMH: COPD (chronic obstructive pulmonary disease)  Hypercholesterolemia  Myocardial infarct, old    Allergies: PC Pen VK (Rash)  penicillin (Other; Rash)  statins (Other)  sulfa drugs (Other)  sulfamethizole (Other)    MEDICATIONS  (STANDING):  aspirin enteric coated 81 milliGRAM(s) Oral daily  cefepime   IVPB      cefepime   IVPB 2000 milliGRAM(s) IV Intermittent every 12 hours  chlorhexidine 2% Cloths 1 Application(s) Topical two times a day  enoxaparin Injectable 40 milliGRAM(s) SubCutaneous daily  gabapentin 100 milliGRAM(s) Oral every 12 hours  lisinopril 10 milliGRAM(s) Oral daily  metoprolol tartrate 25 milliGRAM(s) Oral two times a day  metroNIDAZOLE  IVPB 500 milliGRAM(s) IV Intermittent every 8 hours  metroNIDAZOLE  IVPB      risperiDONE   Tablet 0.5 milliGRAM(s) Oral two times a day  senna 2 Tablet(s) Oral at bedtime    FH:  PSX: S/P CABG x 1    SH:     Labs                        11.7   11.10 )-----------( 226      ( 24 Jan 2020 05:34 )             37.2       01-24    139  |  106  |  29<H>  ----------------------------<  166<H>  4.5   |  30  |  0.76    Ca    8.8      24 Jan 2020 05:34  Phos  3.4     01-24  Mg     1.8     01-24    TPro  7.2  /  Alb  2.8<L>  /  TBili  0.5  /  DBili  x   /  AST  37  /  ALT  20  /  AlkPhos  68  01-23      Vital Signs Last 24 Hrs  T(C): 36.4 (24 Jan 2020 07:00), Max: 36.6 (24 Jan 2020 00:00)  T(F): 97.6 (24 Jan 2020 07:00), Max: 97.8 (24 Jan 2020 00:00)  HR: 70 (24 Jan 2020 08:00) (70 - 121)  BP: 107/49 (24 Jan 2020 08:00) (76/40 - 172/99)  BP(mean): 63 (24 Jan 2020 08:00) (48 - 114)  RR: 12 (24 Jan 2020 08:00) (12 - 32)  SpO2: 100% (24 Jan 2020 08:00) (94% - 100%)    Sedimentation Rate, Erythrocyte: 60 mm/Hr (01-22-20 @ 06:32)  Hemoglobin A1C, Whole Blood: 5.6 % (01-11-20 @ 09:30)      C-Reactive Protein, Serum: 1.80 mg/dL (01-22-20 @ 09:57)                   PHYSICAL EXAM  GEN: MERARY MEYER is a pleasant well-nourished, well developed 74y Female in no acute distress. Refused exam this AM, will attempt at later time  Per previous examination:   LE Focused:    Vasc:  DP and PT pulses non-palpable b/l. Left DP faintly dopplerable. Unable to doppler left PT, Unable to find right DP or PT with doppler. No CFT to right 1st, 2nd, 3rd, or 4th toes. Shiny taught skin b/l dorsal feet with level of violaceous color extending proximally to tarsal joints. Overall perfusion to foot notably improved, right foot is warm to touch.  Derm: Area of ischemia remains the stable.   Right 2nd toe: black necrotic to the level of the proximal with exposed bone.  Distal aspect of the right 2nd toe is hard and dry, no drainage to the right 2nd toe.  Right foot erythema to level just distal to ankle joint. Right 1st, 3rd, and 4th toes are necrotic and demarcated.  No drainage, no malodor, no signs of infection.  Neuro: Sensation diminished to digits b/l.  MSK: Tenderness to palpation distal foot.  Pain to touch dorsal right foot.     Imaging:   < from: Xray Foot AP + Lateral + Oblique, Right (01.10.20 @ 15:05) >    EXAM:  FOOT RIGHT (MINIMUM 3 VIEWS)                        PROCEDURE DATE:  01/10/2020    INTERPRETATION:  Right foot. 3 views. Patient has local pain.    The foot shows mild degeneration at the first MTP joint.    There is an old fracture deformity of the distal fibula.    No bone destruction or acute fracture is evident.    IMPRESSION: No acute finding.    JASKARAN MOSLEY M.D., ATTENDING RADIOLOGIST  This document has been electronically signed. Angel 10 2020  3:06PM  < end of copied text >    < from: [facility] Physiol Extremity Lower 3+ Level, BI (01.13.20 @ 13:49) >    EXAM:  US PHYSIOL LWR EXT 3+ LEV BI                        PROCEDURE DATE:  01/13/2020    INTERPRETATION:  Clinical indication: Right toe gangrene    Comparison: None    FINDINGS AND   IMPRESSION: Segmental pressures could not be obtaineddue to patient discomfort. Therefore, ABIs could not be calculated.    There are biphasic waveforms in the lower left thigh. Abnormally, monophasic flow within the remainder of the lower extremities.    RACQUEL CHA M.D., ATTENDING RADIOLOGIST  This document has been electronically signed. Jan 13 2020  2:20PM  < end of copied text >    < from: CT Angio Abd Aorta w/run-off w/ IV Cont (01.12.20 @ 19:40) >    EXAM:  CT ANGIO ABD AOR W RUN(W)AW IC                        PROCEDURE DATE:  01/12/2020    INTERPRETATION:  CT ANGIO ABDOMINAL AORTA RUNOFF WITHOUT AND OR WITH IV CONTRAST    CLINICAL INFORMATION: Atherosclerosis of the native arteries of the right and left lower extremity with right foot gangrene    PROCEDURE INFORMATION:   Exam: CTA Angiogram of the Abdominal Aorta and Bilateral Lower Extremities   (Run-off) With IV Contrast   Exam date and time: 1/12/2020 6:55 PM   Age: 74 years old   Clinical indication: Other: Pad, right 2nd toe gangrene, 3rd and 4th toes wet   early gang     TECHNIQUE:   Imaging protocol: CT angiogram of the abdominal aorta, pelvis and bilateral   lower extremities with IV iodinated contrast.   Intravenous contrast: 90 cc Omnipaque 350  3D rendering: MIP and/or 3D reconstructed images were created by the   technologist.     COMPARISON:   CR XR FOOT 3 VIEWS RIGHT 1/10/2020 2:46 PM     FINDINGS:   Aorta: Severe calcified and noncalcified atherosclerotic plaque. Aorta is   otherwise fully patent.   Celiac trunk and mesenteric arteries: No occlusion or significant stenosis.    Renal arteries: No occlusion or significant stenosis.      Right iliac arteries: Marked stenosis of the right external iliac artery distally.   Right femoral/popliteal arteries: Distal CFA stenosis. Severely and diffusely attenuated SFA. Occlusion of the mid-distal right superficial   femoral artery with reconstitution of the suprageniculate popliteal  artery. Stenotic right popliteal artery.   Right infrapopliteal arteries: Single-vessel runoff via the right peroneal   artery, diffusely attenuated, which feeds the plantar artery. The right anterior and posterior tibial   arteries are occluded. Reconstitution of the right pedis dorsalis artery at the   level of the foot, however severely attenuated.     Left iliac arteries: Moderate calcification and stenosis of the left iliac arteries. Occluded left internal iliac artery.  Left femoral/popliteal arteries: Long segmentOcclusion of the entire left superficial femoral   artery and Reconstitution of left popliteal artery, which shows multifocal disease without occlusion.  Left infrapopliteal arteries: Occlusion of the left anterior posterior tibial   arteries at the level of the ankle. Single-vessel left peroneal artery which   feeds the plantar artery. Left pedis dorsalis is attenuated but patent.    Liver: No mass.   Gallbladder and bile ducts: Unremarkable. No calcified stones. No ductal   dilation.    Pancreas: Unremarkable. No mass. No ductal dilation.    Spleen: Normal. No splenomegaly.    Adrenals: Normal. No mass.    Kidneys and ureters: Normal. No mass.      Stomach and bowel: . No obstruction. No mucosal thickening. Moderate stool   throughout the colon.    Appendix: No evidence of appendicitis.      Bladder: Unremarkable. No mass.    Reproductive: Unremarkable as visualized.      Intraperitoneal space: Unremarkable. No free air. No significant fluid   collection.    Lymph nodes: No lymphadenopathy.     Bones/joints: No acute fracture. No dislocation.    Soft tissues: Soft tissue irregularity of the second right toe presumably   related to underlying gangrene.       IMPRESSION:   1. Right lower extremity demonstrates marked diffuse stenosis of the lower   extremity with occlusion of the mid-distal right SFA which reconstitutes distally and   with single-vessel runoff to the right foot via the right peroneal artery which   feeds the plantar artery. The right pedis dorsalis artery reconstitutes at the   level of the ankle.   2. The left lower extremity demonstrates diffuse marked stenosis with total occlusion   of the left SFA with reconstitution of attenuated left popliteal artery. There is   single-vessel runoff via the left peronealartery is the which then feeds the   left plantar artery. The pedis dorsalis artery is patent.   3. evidence of inflow disease at the level of the right external iliac artery.  4. Slight irregularity of the soft tissues of the right second toe which may be   related to underlying gangrene.    GALILEA KING M.D., ATTENDING RADIOLOGIST  This document has been electronically signed. 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  PVD  s/p angiogram 1/17    P:  Patient evaluated, chart reviewed  Xrays- reviewed  NICKI/PVR-reviewed  CTA-reviewed- single vessel runoff to right foot.  s/p angiogram 1/17 by vascular Dr. Izaguirre, per note poor candidate for endovascular intervention  s/p RLE Iliac-pop open bypass Dr. Izaguirre  Betadine, 4x4 gauze, prince to right foot digits distally to allow frequent doppler exam  Podiatry will continue to monitor and plan for surgical intervention pending vascular recommendations following bypass.  Patient's right foot dry gangrene continues to demarcate, however perfusion to foot notably improved  Examined with Dr. Slaughter

## 2020-01-24 NOTE — PROGRESS NOTE ADULT - ATTENDING COMMENTS
74F POD1 R iliac popliteal bypass  Agitation    Pain control  D/C Precedex  Heparin drip  Reorientation

## 2020-01-24 NOTE — PROGRESS NOTE ADULT - ASSESSMENT
Patient is 74 year old female, live by herself, ambulates with cane  has medical hx significant for dementia, cad, pvd, hld, htn presents to the ED with complaint of discoloration for a toe on the right foot.  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 She is found to have diffuse atherosclerotic disease with multilevel arterial occlusion. Marginal/poor candidate for endovascular intervention. Patient is transferred to ICU for frequent monitoring after the angiogram.     Plan:    Neuro:   History of dementia  c/w risperidone   on precedax and haldol for agitation   plan to wean off the precedax      CVS:  CAD  c/w asa  History of HTN  s/p R femoral Angioplasty  Frequent Hourly groin check x Q 24 hours      Pulmonary:  No issues    GI:  Bowel regime due to being on pain control meds such as opioids   No issues  DASH diet      Nephrology  No issues    ID:  c/w cefepime and flagyl  afebrile, No leucocytosis  cultures blood are negative so far  Podiatry follow up     Heme-onc  No issues    Prophylaxis  DVT prophylaxis  Heparin held for the procedure tmrw

## 2020-01-24 NOTE — PROGRESS NOTE ADULT - ASSESSMENT
74F POD1 R iliac popliteal bypass  - may transfer to floor under vascular surgery  - continue heparin drip  - continue pain control  - continue ASA  - may resume diet  - maintain alicia, keep groin site clean

## 2020-01-24 NOTE — PROGRESS NOTE ADULT - SUBJECTIVE AND OBJECTIVE BOX
Vascular Surgery    Subjective:  Pt resting comfortably.   Reported to be combative post-op, pt kicking and trying to get out of bed. Knee immobilizer placed on RLE  Hep drip started post-op.    T(C): 36.6 (01-24-20 @ 00:00), Max: 37 (01-23-20 @ 07:00)  HR: 83 (01-24-20 @ 01:00) (76 - 121)  BP: 125/54 (01-24-20 @ 01:00) (76/40 - 172/99)  RR: 13 (01-24-20 @ 01:00) (13 - 32)  SpO2: 100% (01-24-20 @ 01:00) (94% - 100%)    Physical:  Gen: NAD  Pelvis: R groin dressing C/D/I  RLE: Distal thigh Dressing C/D/I. Warm, no skin discoloration noted Vascular Surgery    Subjective:  Pt resting comfortably.   Reported to be combative post-op, pt kicking and trying to get out of bed. Knee immobilizer placed on RLE  Hep drip started post-op.    T(C): 36.6 (01-24-20 @ 00:00), Max: 37 (01-23-20 @ 07:00)  HR: 83 (01-24-20 @ 01:00) (76 - 121)  BP: 125/54 (01-24-20 @ 01:00) (76/40 - 172/99)  RR: 13 (01-24-20 @ 01:00) (13 - 32)  SpO2: 100% (01-24-20 @ 01:00) (94% - 100%)    Physical:  Gen: NAD  Pelvis: R groin dressing C/D/I  RLE: Distal thigh Dressing C/D/I. Warm, no skin discoloration above foot dressing noted. Distal signals dopplerable

## 2020-01-24 NOTE — PROGRESS NOTE ADULT - ASSESSMENT
74y.o. Female s/p R iliac-pop bypass    -Hep gtt, PTT goal of 50-60  -Restart ASA  -Pain control prn  -HOB <30 degrees  -Dressing change POD#2  -con't podiatry care  -Keep RLE straight 74y.o. Female s/p R iliac-pop bypass    -Hep gtt, PTT goal of 50-60  -Restart ASA  -Pain control prn  -HOB <30 degrees  -Dressing change POD#2  -con't podiatry care  -Keep RLE straight  -Serial doppler checks

## 2020-01-24 NOTE — PROGRESS NOTE ADULT - ATTENDING COMMENTS
As above  POD 1 s/p R EIA endarterectomy and PTFE bypass to AK pop.  Pt comfortable and cooperative now.  Pain controlled  DSGs clean.  +pop pulse  +Pedal DS's  R Dry gang  L foot w cyanosis but skin intact    Cont support  Hep drip: keep PTT 50-70 (no boluses)  Will need long term Tx AC for graft patency  ABX until after amp by podiatry  Local care/protect feet/heels.  Incision care.

## 2020-01-25 LAB
ANION GAP SERPL CALC-SCNC: 3 MMOL/L — LOW (ref 5–17)
ANION GAP SERPL CALC-SCNC: 8 MMOL/L — SIGNIFICANT CHANGE UP (ref 5–17)
APTT BLD: 31.8 SEC — SIGNIFICANT CHANGE UP (ref 27.5–36.3)
APTT BLD: 36.4 SEC — HIGH (ref 27.5–36.3)
APTT BLD: 41.9 SEC — HIGH (ref 27.5–36.3)
APTT BLD: 46.9 SEC — HIGH (ref 27.5–36.3)
APTT BLD: 87.8 SEC — HIGH (ref 27.5–36.3)
BUN SERPL-MCNC: 23 MG/DL — HIGH (ref 7–18)
BUN SERPL-MCNC: 28 MG/DL — HIGH (ref 7–18)
CALCIUM SERPL-MCNC: 8 MG/DL — LOW (ref 8.4–10.5)
CALCIUM SERPL-MCNC: 8.3 MG/DL — LOW (ref 8.4–10.5)
CHLORIDE SERPL-SCNC: 102 MMOL/L — SIGNIFICANT CHANGE UP (ref 96–108)
CHLORIDE SERPL-SCNC: 104 MMOL/L — SIGNIFICANT CHANGE UP (ref 96–108)
CO2 SERPL-SCNC: 26 MMOL/L — SIGNIFICANT CHANGE UP (ref 22–31)
CO2 SERPL-SCNC: 29 MMOL/L — SIGNIFICANT CHANGE UP (ref 22–31)
CREAT SERPL-MCNC: 0.8 MG/DL — SIGNIFICANT CHANGE UP (ref 0.5–1.3)
CREAT SERPL-MCNC: 0.93 MG/DL — SIGNIFICANT CHANGE UP (ref 0.5–1.3)
GLUCOSE BLDC GLUCOMTR-MCNC: 117 MG/DL — HIGH (ref 70–99)
GLUCOSE SERPL-MCNC: 128 MG/DL — HIGH (ref 70–99)
GLUCOSE SERPL-MCNC: 96 MG/DL — SIGNIFICANT CHANGE UP (ref 70–99)
HCT VFR BLD CALC: 34.8 % — SIGNIFICANT CHANGE UP (ref 34.5–45)
HGB BLD-MCNC: 11.2 G/DL — LOW (ref 11.5–15.5)
INR BLD: 1.14 RATIO — SIGNIFICANT CHANGE UP (ref 0.88–1.16)
INR BLD: 1.17 RATIO — HIGH (ref 0.88–1.16)
MAGNESIUM SERPL-MCNC: 1.7 MG/DL — SIGNIFICANT CHANGE UP (ref 1.6–2.6)
MAGNESIUM SERPL-MCNC: 2.4 MG/DL — SIGNIFICANT CHANGE UP (ref 1.6–2.6)
MCHC RBC-ENTMCNC: 30.4 PG — SIGNIFICANT CHANGE UP (ref 27–34)
MCHC RBC-ENTMCNC: 32.2 GM/DL — SIGNIFICANT CHANGE UP (ref 32–36)
MCV RBC AUTO: 94.3 FL — SIGNIFICANT CHANGE UP (ref 80–100)
NRBC # BLD: 0 /100 WBCS — SIGNIFICANT CHANGE UP (ref 0–0)
PHOSPHATE SERPL-MCNC: 1 MG/DL — CRITICAL LOW (ref 2.5–4.5)
PHOSPHATE SERPL-MCNC: 3.3 MG/DL — SIGNIFICANT CHANGE UP (ref 2.5–4.5)
PLATELET # BLD AUTO: 241 K/UL — SIGNIFICANT CHANGE UP (ref 150–400)
POTASSIUM SERPL-MCNC: 3.9 MMOL/L — SIGNIFICANT CHANGE UP (ref 3.5–5.3)
POTASSIUM SERPL-MCNC: 4.3 MMOL/L — SIGNIFICANT CHANGE UP (ref 3.5–5.3)
POTASSIUM SERPL-SCNC: 3.9 MMOL/L — SIGNIFICANT CHANGE UP (ref 3.5–5.3)
POTASSIUM SERPL-SCNC: 4.3 MMOL/L — SIGNIFICANT CHANGE UP (ref 3.5–5.3)
PROTHROM AB SERPL-ACNC: 12.7 SEC — SIGNIFICANT CHANGE UP (ref 10–12.9)
PROTHROM AB SERPL-ACNC: 13 SEC — HIGH (ref 10–12.9)
RBC # BLD: 3.69 M/UL — LOW (ref 3.8–5.2)
RBC # FLD: 12.7 % — SIGNIFICANT CHANGE UP (ref 10.3–14.5)
SODIUM SERPL-SCNC: 136 MMOL/L — SIGNIFICANT CHANGE UP (ref 135–145)
SODIUM SERPL-SCNC: 136 MMOL/L — SIGNIFICANT CHANGE UP (ref 135–145)
WBC # BLD: 11.6 K/UL — HIGH (ref 3.8–10.5)
WBC # FLD AUTO: 11.6 K/UL — HIGH (ref 3.8–10.5)

## 2020-01-25 PROCEDURE — 99232 SBSQ HOSP IP/OBS MODERATE 35: CPT | Mod: GC

## 2020-01-25 RX ORDER — MAGNESIUM SULFATE 500 MG/ML
2 VIAL (ML) INJECTION ONCE
Refills: 0 | Status: COMPLETED | OUTPATIENT
Start: 2020-01-25 | End: 2020-01-25

## 2020-01-25 RX ORDER — HEPARIN SODIUM 5000 [USP'U]/ML
800 INJECTION INTRAVENOUS; SUBCUTANEOUS
Qty: 25000 | Refills: 0 | Status: DISCONTINUED | OUTPATIENT
Start: 2020-01-25 | End: 2020-01-29

## 2020-01-25 RX ORDER — GABAPENTIN 400 MG/1
100 CAPSULE ORAL ONCE
Refills: 0 | Status: COMPLETED | OUTPATIENT
Start: 2020-01-25 | End: 2020-01-25

## 2020-01-25 RX ORDER — METOPROLOL TARTRATE 50 MG
25 TABLET ORAL
Refills: 0 | Status: DISCONTINUED | OUTPATIENT
Start: 2020-01-25 | End: 2020-02-04

## 2020-01-25 RX ORDER — HEPARIN SODIUM 5000 [USP'U]/ML
500 INJECTION INTRAVENOUS; SUBCUTANEOUS
Qty: 25000 | Refills: 0 | Status: DISCONTINUED | OUTPATIENT
Start: 2020-01-25 | End: 2020-01-25

## 2020-01-25 RX ORDER — HEPARIN SODIUM 5000 [USP'U]/ML
INJECTION INTRAVENOUS; SUBCUTANEOUS
Qty: 25000 | Refills: 0 | Status: DISCONTINUED | OUTPATIENT
Start: 2020-01-25 | End: 2020-01-25

## 2020-01-25 RX ADMIN — GABAPENTIN 100 MILLIGRAM(S): 400 CAPSULE ORAL at 17:37

## 2020-01-25 RX ADMIN — SENNA PLUS 2 TABLET(S): 8.6 TABLET ORAL at 21:56

## 2020-01-25 RX ADMIN — GABAPENTIN 100 MILLIGRAM(S): 400 CAPSULE ORAL at 01:38

## 2020-01-25 RX ADMIN — Medication 1 APPLICATION(S): at 12:23

## 2020-01-25 RX ADMIN — HEPARIN SODIUM 500 UNIT(S)/HR: 5000 INJECTION INTRAVENOUS; SUBCUTANEOUS at 18:02

## 2020-01-25 RX ADMIN — Medication 50 GRAM(S): at 08:07

## 2020-01-25 RX ADMIN — Medication 100 MILLIGRAM(S): at 13:24

## 2020-01-25 RX ADMIN — HEPARIN SODIUM 800 UNIT(S)/HR: 5000 INJECTION INTRAVENOUS; SUBCUTANEOUS at 13:56

## 2020-01-25 RX ADMIN — CHLORHEXIDINE GLUCONATE 1 APPLICATION(S): 213 SOLUTION TOPICAL at 05:02

## 2020-01-25 RX ADMIN — TRAMADOL HYDROCHLORIDE 25 MILLIGRAM(S): 50 TABLET ORAL at 18:20

## 2020-01-25 RX ADMIN — RISPERIDONE 0.5 MILLIGRAM(S): 4 TABLET ORAL at 05:02

## 2020-01-25 RX ADMIN — Medication 81 MILLIGRAM(S): at 12:23

## 2020-01-25 RX ADMIN — TRAMADOL HYDROCHLORIDE 25 MILLIGRAM(S): 50 TABLET ORAL at 17:37

## 2020-01-25 RX ADMIN — RISPERIDONE 0.5 MILLIGRAM(S): 4 TABLET ORAL at 17:37

## 2020-01-25 RX ADMIN — HEPARIN SODIUM 800 UNIT(S)/HR: 5000 INJECTION INTRAVENOUS; SUBCUTANEOUS at 21:52

## 2020-01-25 RX ADMIN — Medication 100 MILLIGRAM(S): at 05:03

## 2020-01-25 RX ADMIN — Medication 25 MILLIGRAM(S): at 21:56

## 2020-01-25 RX ADMIN — GABAPENTIN 100 MILLIGRAM(S): 400 CAPSULE ORAL at 05:02

## 2020-01-25 RX ADMIN — HYDROMORPHONE HYDROCHLORIDE 0.5 MILLIGRAM(S): 2 INJECTION INTRAMUSCULAR; INTRAVENOUS; SUBCUTANEOUS at 07:12

## 2020-01-25 RX ADMIN — Medication 25 MILLIGRAM(S): at 17:37

## 2020-01-25 RX ADMIN — Medication 25 MILLIGRAM(S): at 05:02

## 2020-01-25 RX ADMIN — HEPARIN SODIUM 800 UNIT(S)/HR: 5000 INJECTION INTRAVENOUS; SUBCUTANEOUS at 07:11

## 2020-01-25 RX ADMIN — CEFEPIME 50 MILLIGRAM(S): 1 INJECTION, POWDER, FOR SOLUTION INTRAMUSCULAR; INTRAVENOUS at 17:37

## 2020-01-25 RX ADMIN — CEFEPIME 50 MILLIGRAM(S): 1 INJECTION, POWDER, FOR SOLUTION INTRAMUSCULAR; INTRAVENOUS at 05:02

## 2020-01-25 RX ADMIN — Medication 100 MILLIGRAM(S): at 21:57

## 2020-01-25 RX ADMIN — Medication 85 MILLIMOLE(S): at 09:21

## 2020-01-25 RX ADMIN — HEPARIN SODIUM 700 UNIT(S)/HR: 5000 INJECTION INTRAVENOUS; SUBCUTANEOUS at 00:16

## 2020-01-25 RX ADMIN — HYDROMORPHONE HYDROCHLORIDE 0.5 MILLIGRAM(S): 2 INJECTION INTRAMUSCULAR; INTRAVENOUS; SUBCUTANEOUS at 06:44

## 2020-01-25 NOTE — PROGRESS NOTE ADULT - ATTENDING COMMENTS
74F POD2 R iliac popliteal bypass  Agitation  Dry gangrene    Mental status improved  Pain control  PT/OT  Complete course of abx per ID  OOB to chair as tolerated  Vascular surgery appreciated  Continue heparin drip

## 2020-01-25 NOTE — CHART NOTE - NSCHARTNOTEFT_GEN_A_CORE
ICU DOWNGRADE NOTE    74 year old female, live by herself, ambulates with cane  has medical hx significant for dementia, cad, pvd, hld, htn presents to the ED with complaint of discoloration for a toe on the right foot.  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 She is found to have diffuse atherosclerotic disease with multilevel arterial occlusion. Marginal/poor candidate for endovascular intervention. Patient is transferred to ICU for frequent monitoring after the angiogram.     Plan:    Neuro:   History of dementia  c/w risperidone   haldol PRN for agitation     CVS:  CAD  c/w asa  History of HTN  s/p R femoral Angioplasty  Frequent groin check as per vascular    Pulmonary:  No issues    GI:  Bowel regimen due to being on pain control meds such as opioids   No issues  DASH diet    Nephrology  No issues    ID:  c/w cefepime and flagyl  afebrile, No leucocytosis  cultures blood are negative so far  Podiatry follow up   ID follow-up Dr. Jameson    Heme-onc  No issues    Prophylaxis  DVT prophylaxis  Heparin drip for goal of PTT 50-60 per vascular Dr. Izaguirre  running at 300units/hr, last PTT 51    Out of bed to chair  f/u PT consult    Pt signed out to surgical house officer ICU DOWNGRADE NOTE    74 year old female, live by herself, ambulates with cane  has medical hx significant for dementia, cad, pvd, hld, htn presents to the ED with complaint of discoloration for a toe on the right foot.  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 She is found to have diffuse atherosclerotic disease with multilevel arterial occlusion. Marginal/poor candidate for endovascular intervention. Patient is transferred to ICU for frequent monitoring after the angiogram. Pt is POD#2 for femoral-popliteal artery bypass using expanded polytetrafluoroethylene (ePTFE) graft.     Plan:    Neuro:   History of dementia  c/w risperidone   haldol PRN for agitation     CVS:  CAD  c/w asa  History of HTN  s/p R femoral Angioplasty  Frequent groin check as per vascular    Pulmonary:  No issues    GI:  Bowel regimen due to being on pain control meds such as opioids   No issues  DASH diet    Nephrology  No issues    ID:  c/w cefepime and flagyl  afebrile, No leucocytosis  cultures blood are negative so far  Podiatry follow up   ID follow-up Dr. Jameson    Heme-onc  No issues    Prophylaxis  DVT prophylaxis  Heparin drip for goal of PTT 50-60 per vascular Dr. Izaguirre  running at 300units/hr, last PTT 51    Out of bed to chair  f/u PT consult    Pt stable for downgrade to surgical floor.  Pt signed out to surgical house officer on 1/25/20 at 5:15 PM

## 2020-01-25 NOTE — PROGRESS NOTE ADULT - ASSESSMENT
74F POD2  external iliac to popliteal PTFE bypass, R foot toe dry gangrene  -  continue heparin drip  - continue pain control  - Dressing change R foot  - ICU care

## 2020-01-25 NOTE — CHART NOTE - NSCHARTNOTEFT_GEN_A_CORE
Assessment:   Patient is a 74y old  Female who presents with a chief complaint of dry gangrene (2020 09:43)Pt's diet s/p sx advanced to solids. PO supplement missing from order. Discussed w/ ICU RN who reports pt with varying intake. Discussed with PGY 2, Enlive TID added back. Pt downgraded to 6N. Pt seen. Was requesting Enlive. Explained it is back on diet order. Obtained some food preferences. Called kitchen, co-ordinator aware of Enlive, sending, along with some food requests.      Factors impacting intake: [ ] none [ ] nausea  [ ] vomiting [ ] diarrhea [ ] constipation  [ ]chewing problems [ ] swallowing issues  [ ] other:     Diet Prescription: Diet, DASH/TLC:   Sodium & Cholesterol Restricted  Supplement Feeding Modality:  Oral  Ensure Enlive Cans or Servings Per Day:  1       Frequency:  Three Times a day (20 @ 15:21)    Intake:     Daily Height in cm: 170.18 (2020 00:00)  Height in cm: 170.18 (15 Angel 2020 04:08)  Height in cm: 170.18 (10 Angel 2020 12:41)      Daily Weight in k (2020 07:00)  Weight in k (2020 07:00)  Weight in k (2020 07:00)  Weight in k.7 (15 Angel 2020 14:15)    % Weight Change    Pertinent Medications: MEDICATIONS  (STANDING):  aspirin  chewable 81 milliGRAM(s) Oral daily  cefepime   IVPB 2000 milliGRAM(s) IV Intermittent every 12 hours  chlorhexidine 2% Cloths 1 Application(s) Topical <User Schedule>  gabapentin 100 milliGRAM(s) Oral every 12 hours  heparin  Infusion. 500 Unit(s)/Hr (5 mL/Hr) IV Continuous <Continuous>  metoprolol tartrate 25 milliGRAM(s) Oral two times a day  metroNIDAZOLE  IVPB 500 milliGRAM(s) IV Intermittent every 8 hours  povidone iodine 10% Solution 1 Application(s) Topical daily  risperiDONE   Tablet 0.5 milliGRAM(s) Oral two times a day  senna 2 Tablet(s) Oral at bedtime    MEDICATIONS  (PRN):  acetaminophen   Tablet .. 650 milliGRAM(s) Oral every 6 hours PRN Mild Pain (1 - 3)  haloperidol    Injectable 1 milliGRAM(s) IV Push every 4 hours PRN agitation  polyethylene glycol 3350 17 Gram(s) Oral daily PRN Constipation  traMADol 25 milliGRAM(s) Oral every 6 hours PRN Severe Pain (7 - 10)    Pertinent Labs:  Na136 mmol/L Glu 128 mg/dL<H> K+ 4.3 mmol/L Cr  0.93 mg/dL BUN 23 mg/dL<H>  Phos 3.3 mg/dL  Alb 2.8 g/dL<L>  [id number] 5.6 %     CAPILLARY BLOOD GLUCOSE        Skin:     Estimated Needs:   [ ] no change since previous assessment  [ ] recalculated:       Previous Nutrition Diagnosis:   [ ] Altered GI function  [ ]Inadequate Oral Intake [ ] Swallowing Difficulty   [ ] Altered nutrition related labs [ ] Increased Nutrient Needs [ ] Overweight/Obesity   [ ] Unintended Weight Loss [ ] Food & Nutrition Related Knowledge Deficit [x ] Malnutrition (severe)  [ ] Other:     Nutrition Diagnosis is [x ] ongoing  [ ] resolved [ ] not applicable     New Nutrition Diagnosis: [ ] not applicable       Interventions:   Recommend  [ ] Change Diet To:  [x ] Nutrition Supplement: Enlive TID  [ ] Nutrition Support  [x ] Other: Obtained some food requests. MD to monitor. RD available.     Monitoring and Evaluation:   [x ] PO intake [ x ] Tolerance to diet prescription [ x ] weights [ x ] labs[ x ] follow up per protocol  [ ] other:

## 2020-01-25 NOTE — PROGRESS NOTE ADULT - SUBJECTIVE AND OBJECTIVE BOX
74F POD2  external iliac to popliteal PTFE bypass. No acute events overnight. Vitals stable. Patient remains on heparin drip.  HD stable    Vital Signs Last 24 Hrs  T(C): 36.6 (25 Jan 2020 08:00), Max: 36.7 (24 Jan 2020 23:48)  T(F): 97.8 (25 Jan 2020 08:00), Max: 98.1 (24 Jan 2020 23:48)  HR: 93 (25 Jan 2020 09:00) (65 - 124)  BP: 123/57 (25 Jan 2020 09:00) (92/43 - 174/75)  BP(mean): 67 (25 Jan 2020 09:00) (54 - 117)  RR: 22 (25 Jan 2020 09:00) (12 - 33)  SpO2: 100% (25 Jan 2020 09:00) (91% - 100%)    PHYSICAL EXAM  NAD  Normal resp effort  R groin CDI, R thigh CDI . Dopplerable DP of R leg diminishes with compression of graft over thigh, leg warm, foot w dressing in place, Dry gangrene of toes with no sign of infection  Awake and alert    Labs                        11.2   11.60 )-----------( 241      ( 25 Jan 2020 06:53 )             34.8   01-25    136  |  102  |  28<H>  ----------------------------<  96  3.9   |  26  |  0.80    Ca    8.3<L>      25 Jan 2020 06:53  Phos  1.0     01-25  Mg     1.7     01-25    TPro  7.2  /  Alb  2.8<L>  /  TBili  0.5  /  DBili  x   /  AST  37  /  ALT  20  /  AlkPhos  68  01-23

## 2020-01-25 NOTE — PROGRESS NOTE ADULT - SUBJECTIVE AND OBJECTIVE BOX
INTERVAL HPI/OVERNIGHT EVENTS: Patient seen and examined at bedside. Pt c/o pain in legs and is agitated.    PRESSORS: [ ] YES [ ] NO    Antimicrobial:  cefepime   IVPB 2000 milliGRAM(s) IV Intermittent every 12 hours  metroNIDAZOLE  IVPB 500 milliGRAM(s) IV Intermittent every 8 hours    Cardiovascular:  metoprolol tartrate 25 milliGRAM(s) Oral two times a day    Pulmonary:    Hematalogic:  aspirin  chewable 81 milliGRAM(s) Oral daily  heparin  Infusion. 500 Unit(s)/Hr IV Continuous <Continuous>    Other:  acetaminophen   Tablet .. 650 milliGRAM(s) Oral every 6 hours PRN  chlorhexidine 2% Cloths 1 Application(s) Topical <User Schedule>  gabapentin 100 milliGRAM(s) Oral every 12 hours  gabapentin 100 milliGRAM(s) Oral once  haloperidol    Injectable 1 milliGRAM(s) IV Push every 4 hours PRN  HYDROmorphone  Injectable 0.5 milliGRAM(s) IV Push every 10 minutes PRN  polyethylene glycol 3350 17 Gram(s) Oral daily PRN  povidone iodine 10% Solution 1 Application(s) Topical daily  risperiDONE   Tablet 0.5 milliGRAM(s) Oral two times a day  senna 2 Tablet(s) Oral at bedtime  sodium chloride 0.9%. 1000 milliLiter(s) IV Continuous <Continuous>  traMADol 25 milliGRAM(s) Oral every 6 hours PRN    acetaminophen   Tablet .. 650 milliGRAM(s) Oral every 6 hours PRN  aspirin  chewable 81 milliGRAM(s) Oral daily  cefepime   IVPB 2000 milliGRAM(s) IV Intermittent every 12 hours  chlorhexidine 2% Cloths 1 Application(s) Topical <User Schedule>  gabapentin 100 milliGRAM(s) Oral every 12 hours  gabapentin 100 milliGRAM(s) Oral once  haloperidol    Injectable 1 milliGRAM(s) IV Push every 4 hours PRN  heparin  Infusion. 500 Unit(s)/Hr IV Continuous <Continuous>  HYDROmorphone  Injectable 0.5 milliGRAM(s) IV Push every 10 minutes PRN  metoprolol tartrate 25 milliGRAM(s) Oral two times a day  metroNIDAZOLE  IVPB 500 milliGRAM(s) IV Intermittent every 8 hours  polyethylene glycol 3350 17 Gram(s) Oral daily PRN  povidone iodine 10% Solution 1 Application(s) Topical daily  risperiDONE   Tablet 0.5 milliGRAM(s) Oral two times a day  senna 2 Tablet(s) Oral at bedtime  sodium chloride 0.9%. 1000 milliLiter(s) IV Continuous <Continuous>  traMADol 25 milliGRAM(s) Oral every 6 hours PRN    Drug Dosing Weight  Height (cm): 170.2 (18 Jan 2020 00:00)  Weight (kg): 54.3 (18 Jan 2020 00:00)  BMI (kg/m2): 18.7 (18 Jan 2020 00:00)  BSA (m2): 1.63 (18 Jan 2020 00:00)    CENTRAL LINE: [ ] YES [ ] NO  LOCATION:   DATE INSERTED:  REMOVE: [ ] YES [ ] NO  EXPLAIN:    BIANCHI: [ ] YES [ ] NO    DATE INSERTED:  REMOVE:  [ ] YES [ ] NO  EXPLAIN:    A-LINE:  [ ] YES [ ] NO  LOCATION:   DATE INSERTED:  REMOVE:  [ ] YES [ ] NO  EXPLAIN:    PMH -reviewed admission note, no change since admission            01-23 @ 07:01  -  01-24 @ 07:00  --------------------------------------------------------  IN: 987.1 mL / OUT: 900 mL / NET: 87.1 mL            PHYSICAL EXAM:    GENERAL: agitated, restless on bed  EYES: EOMI, PERRLA,   NECK: Supple, No JVD; Normal thyroid; Trachea midline  NERVOUS SYSTEM:  Alert & Oriented X2,  on and of sleepy from the sedation.  CHEST/LUNG: No rales, rhonchi, wheezing   HEART: Regular rate and rhythm; No murmurs,   ABDOMEN: Soft, Nontender, Nondistended; Bowel sounds present  EXTREMITIES:  R extremity braced      LABS:  CBC Full  -  ( 24 Jan 2020 12:46 )  WBC Count : 10.41 K/uL  RBC Count : 3.54 M/uL  Hemoglobin : 10.9 g/dL  Hematocrit : 33.4 %  Platelet Count - Automated : 229 K/uL  Mean Cell Volume : 94.4 fl  Mean Cell Hemoglobin : 30.8 pg  Mean Cell Hemoglobin Concentration : 32.6 gm/dL  Auto Neutrophil # : x  Auto Lymphocyte # : x  Auto Monocyte # : x  Auto Eosinophil # : x  Auto Basophil # : x  Auto Neutrophil % : x  Auto Lymphocyte % : x  Auto Monocyte % : x  Auto Eosinophil % : x  Auto Basophil % : x    01-24    139  |  106  |  29<H>  ----------------------------<  166<H>  4.5   |  30  |  0.76    Ca    8.8      24 Jan 2020 05:34  Phos  3.4     01-24  Mg     1.8     01-24    TPro  7.2  /  Alb  2.8<L>  /  TBili  0.5  /  DBili  x   /  AST  37  /  ALT  20  /  AlkPhos  68  01-23    PT/INR - ( 23 Jan 2020 06:15 )   PT: 11.6 sec;   INR: 1.04 ratio         PTT - ( 24 Jan 2020 23:39 )  PTT:51.0 sec        RADIOLOGY & ADDITIONAL STUDIES REVIEWED:  ***    [ ]GOALS OF CARE DISCUSSION WITH PATIENT/FAMILY/PROXY:    CRITICAL CARE TIME SPENT: 35 minutes INTERVAL HPI/OVERNIGHT EVENTS: Patient seen and examined at bedside. Pt c/o pain in legs and is agitated.    PRESSORS: [ ] YES [X] NO    Antimicrobial:  cefepime   IVPB 2000 milliGRAM(s) IV Intermittent every 12 hours  metroNIDAZOLE  IVPB 500 milliGRAM(s) IV Intermittent every 8 hours    Cardiovascular:  metoprolol tartrate 25 milliGRAM(s) Oral two times a day    Pulmonary:    Hematalogic:  aspirin  chewable 81 milliGRAM(s) Oral daily  heparin  Infusion. 500 Unit(s)/Hr IV Continuous <Continuous>    Other:  acetaminophen   Tablet .. 650 milliGRAM(s) Oral every 6 hours PRN  chlorhexidine 2% Cloths 1 Application(s) Topical <User Schedule>  gabapentin 100 milliGRAM(s) Oral every 12 hours  gabapentin 100 milliGRAM(s) Oral once  haloperidol    Injectable 1 milliGRAM(s) IV Push every 4 hours PRN  HYDROmorphone  Injectable 0.5 milliGRAM(s) IV Push every 10 minutes PRN  polyethylene glycol 3350 17 Gram(s) Oral daily PRN  povidone iodine 10% Solution 1 Application(s) Topical daily  risperiDONE   Tablet 0.5 milliGRAM(s) Oral two times a day  senna 2 Tablet(s) Oral at bedtime  sodium chloride 0.9%. 1000 milliLiter(s) IV Continuous <Continuous>  traMADol 25 milliGRAM(s) Oral every 6 hours PRN    acetaminophen   Tablet .. 650 milliGRAM(s) Oral every 6 hours PRN  aspirin  chewable 81 milliGRAM(s) Oral daily  cefepime   IVPB 2000 milliGRAM(s) IV Intermittent every 12 hours  chlorhexidine 2% Cloths 1 Application(s) Topical <User Schedule>  gabapentin 100 milliGRAM(s) Oral every 12 hours  gabapentin 100 milliGRAM(s) Oral once  haloperidol    Injectable 1 milliGRAM(s) IV Push every 4 hours PRN  heparin  Infusion. 500 Unit(s)/Hr IV Continuous <Continuous>  HYDROmorphone  Injectable 0.5 milliGRAM(s) IV Push every 10 minutes PRN  metoprolol tartrate 25 milliGRAM(s) Oral two times a day  metroNIDAZOLE  IVPB 500 milliGRAM(s) IV Intermittent every 8 hours  polyethylene glycol 3350 17 Gram(s) Oral daily PRN  povidone iodine 10% Solution 1 Application(s) Topical daily  risperiDONE   Tablet 0.5 milliGRAM(s) Oral two times a day  senna 2 Tablet(s) Oral at bedtime  sodium chloride 0.9%. 1000 milliLiter(s) IV Continuous <Continuous>  traMADol 25 milliGRAM(s) Oral every 6 hours PRN    Drug Dosing Weight  Height (cm): 170.2 (18 Jan 2020 00:00)  Weight (kg): 54.3 (18 Jan 2020 00:00)  BMI (kg/m2): 18.7 (18 Jan 2020 00:00)  BSA (m2): 1.63 (18 Jan 2020 00:00)    CENTRAL LINE: [ ] YES [ ] NO  LOCATION:   DATE INSERTED:  REMOVE: [ ] YES [ ] NO  EXPLAIN:    BIANCHI: [ ] YES [ ] NO    DATE INSERTED:  REMOVE:  [ ] YES [ ] NO  EXPLAIN:    A-LINE:  [ ] YES [ ] NO  LOCATION:   DATE INSERTED:  REMOVE:  [ ] YES [ ] NO  EXPLAIN:    PMH -reviewed admission note, no change since admission            01-23 @ 07:01  -  01-24 @ 07:00  --------------------------------------------------------  IN: 987.1 mL / OUT: 900 mL / NET: 87.1 mL            PHYSICAL EXAM:    GENERAL: agitated, restless on bed  EYES: EOMI, PERRLA,   NECK: Supple, No JVD; Normal thyroid; Trachea midline  NERVOUS SYSTEM:  Alert & Oriented X2,  on and of sleepy from the sedation.  CHEST/LUNG: No rales, rhonchi, wheezing   HEART: Regular rate and rhythm; No murmurs,   ABDOMEN: Soft, Nontender, Nondistended; Bowel sounds present  EXTREMITIES:  R extremity braced      LABS:  CBC Full  -  ( 24 Jan 2020 12:46 )  WBC Count : 10.41 K/uL  RBC Count : 3.54 M/uL  Hemoglobin : 10.9 g/dL  Hematocrit : 33.4 %  Platelet Count - Automated : 229 K/uL  Mean Cell Volume : 94.4 fl  Mean Cell Hemoglobin : 30.8 pg  Mean Cell Hemoglobin Concentration : 32.6 gm/dL  Auto Neutrophil # : x  Auto Lymphocyte # : x  Auto Monocyte # : x  Auto Eosinophil # : x  Auto Basophil # : x  Auto Neutrophil % : x  Auto Lymphocyte % : x  Auto Monocyte % : x  Auto Eosinophil % : x  Auto Basophil % : x    01-24    139  |  106  |  29<H>  ----------------------------<  166<H>  4.5   |  30  |  0.76    Ca    8.8      24 Jan 2020 05:34  Phos  3.4     01-24  Mg     1.8     01-24    TPro  7.2  /  Alb  2.8<L>  /  TBili  0.5  /  DBili  x   /  AST  37  /  ALT  20  /  AlkPhos  68  01-23    PT/INR - ( 23 Jan 2020 06:15 )   PT: 11.6 sec;   INR: 1.04 ratio         PTT - ( 24 Jan 2020 23:39 )  PTT:51.0 sec        RADIOLOGY & ADDITIONAL STUDIES REVIEWED:  ***    [ ]GOALS OF CARE DISCUSSION WITH PATIENT/FAMILY/PROXY:    CRITICAL CARE TIME SPENT: 35 minutes

## 2020-01-26 LAB
ALBUMIN SERPL ELPH-MCNC: 2 G/DL — LOW (ref 3.5–5)
ALP SERPL-CCNC: 46 U/L — SIGNIFICANT CHANGE UP (ref 40–120)
ALT FLD-CCNC: 20 U/L DA — SIGNIFICANT CHANGE UP (ref 10–60)
ANION GAP SERPL CALC-SCNC: 3 MMOL/L — LOW (ref 5–17)
APTT BLD: 44.1 SEC — HIGH (ref 27.5–36.3)
APTT BLD: 62.9 SEC — HIGH (ref 27.5–36.3)
APTT BLD: 98.3 SEC — HIGH (ref 27.5–36.3)
AST SERPL-CCNC: 58 U/L — HIGH (ref 10–40)
BASOPHILS # BLD AUTO: 0.07 K/UL — SIGNIFICANT CHANGE UP (ref 0–0.2)
BASOPHILS NFR BLD AUTO: 0.7 % — SIGNIFICANT CHANGE UP (ref 0–2)
BILIRUB SERPL-MCNC: 0.4 MG/DL — SIGNIFICANT CHANGE UP (ref 0.2–1.2)
BUN SERPL-MCNC: 21 MG/DL — HIGH (ref 7–18)
CALCIUM SERPL-MCNC: 8.1 MG/DL — LOW (ref 8.4–10.5)
CHLORIDE SERPL-SCNC: 106 MMOL/L — SIGNIFICANT CHANGE UP (ref 96–108)
CO2 SERPL-SCNC: 30 MMOL/L — SIGNIFICANT CHANGE UP (ref 22–31)
CREAT SERPL-MCNC: 0.69 MG/DL — SIGNIFICANT CHANGE UP (ref 0.5–1.3)
EOSINOPHIL # BLD AUTO: 0.08 K/UL — SIGNIFICANT CHANGE UP (ref 0–0.5)
EOSINOPHIL NFR BLD AUTO: 0.8 % — SIGNIFICANT CHANGE UP (ref 0–6)
GLUCOSE SERPL-MCNC: 127 MG/DL — HIGH (ref 70–99)
HCT VFR BLD CALC: 30.3 % — LOW (ref 34.5–45)
HCT VFR BLD CALC: 31.4 % — LOW (ref 34.5–45)
HGB BLD-MCNC: 10.2 G/DL — LOW (ref 11.5–15.5)
HGB BLD-MCNC: 9.9 G/DL — LOW (ref 11.5–15.5)
IMM GRANULOCYTES NFR BLD AUTO: 0.6 % — SIGNIFICANT CHANGE UP (ref 0–1.5)
LYMPHOCYTES # BLD AUTO: 1.57 K/UL — SIGNIFICANT CHANGE UP (ref 1–3.3)
LYMPHOCYTES # BLD AUTO: 15.9 % — SIGNIFICANT CHANGE UP (ref 13–44)
MAGNESIUM SERPL-MCNC: 2 MG/DL — SIGNIFICANT CHANGE UP (ref 1.6–2.6)
MCHC RBC-ENTMCNC: 30.6 PG — SIGNIFICANT CHANGE UP (ref 27–34)
MCHC RBC-ENTMCNC: 30.7 PG — SIGNIFICANT CHANGE UP (ref 27–34)
MCHC RBC-ENTMCNC: 32.5 GM/DL — SIGNIFICANT CHANGE UP (ref 32–36)
MCHC RBC-ENTMCNC: 32.7 GM/DL — SIGNIFICANT CHANGE UP (ref 32–36)
MCV RBC AUTO: 93.8 FL — SIGNIFICANT CHANGE UP (ref 80–100)
MCV RBC AUTO: 94.3 FL — SIGNIFICANT CHANGE UP (ref 80–100)
MONOCYTES # BLD AUTO: 1.31 K/UL — HIGH (ref 0–0.9)
MONOCYTES NFR BLD AUTO: 13.3 % — SIGNIFICANT CHANGE UP (ref 2–14)
NEUTROPHILS # BLD AUTO: 6.78 K/UL — SIGNIFICANT CHANGE UP (ref 1.8–7.4)
NEUTROPHILS NFR BLD AUTO: 68.7 % — SIGNIFICANT CHANGE UP (ref 43–77)
NRBC # BLD: 0 /100 WBCS — SIGNIFICANT CHANGE UP (ref 0–0)
NRBC # BLD: 0 /100 WBCS — SIGNIFICANT CHANGE UP (ref 0–0)
PHOSPHATE SERPL-MCNC: 1.7 MG/DL — LOW (ref 2.5–4.5)
PLATELET # BLD AUTO: 215 K/UL — SIGNIFICANT CHANGE UP (ref 150–400)
PLATELET # BLD AUTO: 241 K/UL — SIGNIFICANT CHANGE UP (ref 150–400)
POTASSIUM SERPL-MCNC: 3.6 MMOL/L — SIGNIFICANT CHANGE UP (ref 3.5–5.3)
POTASSIUM SERPL-SCNC: 3.6 MMOL/L — SIGNIFICANT CHANGE UP (ref 3.5–5.3)
PROT SERPL-MCNC: 5.7 G/DL — LOW (ref 6–8.3)
RBC # BLD: 3.23 M/UL — LOW (ref 3.8–5.2)
RBC # BLD: 3.33 M/UL — LOW (ref 3.8–5.2)
RBC # FLD: 12.9 % — SIGNIFICANT CHANGE UP (ref 10.3–14.5)
RBC # FLD: 13.1 % — SIGNIFICANT CHANGE UP (ref 10.3–14.5)
SODIUM SERPL-SCNC: 139 MMOL/L — SIGNIFICANT CHANGE UP (ref 135–145)
WBC # BLD: 11.63 K/UL — HIGH (ref 3.8–10.5)
WBC # BLD: 9.87 K/UL — SIGNIFICANT CHANGE UP (ref 3.8–10.5)
WBC # FLD AUTO: 11.63 K/UL — HIGH (ref 3.8–10.5)
WBC # FLD AUTO: 9.87 K/UL — SIGNIFICANT CHANGE UP (ref 3.8–10.5)

## 2020-01-26 RX ADMIN — Medication 81 MILLIGRAM(S): at 11:36

## 2020-01-26 RX ADMIN — GABAPENTIN 100 MILLIGRAM(S): 400 CAPSULE ORAL at 05:52

## 2020-01-26 RX ADMIN — Medication 100 MILLIGRAM(S): at 05:53

## 2020-01-26 RX ADMIN — TRAMADOL HYDROCHLORIDE 25 MILLIGRAM(S): 50 TABLET ORAL at 02:15

## 2020-01-26 RX ADMIN — Medication 100 MILLIGRAM(S): at 13:19

## 2020-01-26 RX ADMIN — TRAMADOL HYDROCHLORIDE 25 MILLIGRAM(S): 50 TABLET ORAL at 17:15

## 2020-01-26 RX ADMIN — TRAMADOL HYDROCHLORIDE 25 MILLIGRAM(S): 50 TABLET ORAL at 17:45

## 2020-01-26 RX ADMIN — SENNA PLUS 2 TABLET(S): 8.6 TABLET ORAL at 21:50

## 2020-01-26 RX ADMIN — Medication 25 MILLIGRAM(S): at 05:52

## 2020-01-26 RX ADMIN — HEPARIN SODIUM 850 UNIT(S)/HR: 5000 INJECTION INTRAVENOUS; SUBCUTANEOUS at 04:13

## 2020-01-26 RX ADMIN — RISPERIDONE 0.5 MILLIGRAM(S): 4 TABLET ORAL at 05:52

## 2020-01-26 RX ADMIN — TRAMADOL HYDROCHLORIDE 25 MILLIGRAM(S): 50 TABLET ORAL at 01:00

## 2020-01-26 RX ADMIN — HEPARIN SODIUM 850 UNIT(S)/HR: 5000 INJECTION INTRAVENOUS; SUBCUTANEOUS at 17:14

## 2020-01-26 RX ADMIN — Medication 1 APPLICATION(S): at 11:36

## 2020-01-26 RX ADMIN — GABAPENTIN 100 MILLIGRAM(S): 400 CAPSULE ORAL at 17:16

## 2020-01-26 RX ADMIN — TRAMADOL HYDROCHLORIDE 25 MILLIGRAM(S): 50 TABLET ORAL at 01:33

## 2020-01-26 RX ADMIN — TRAMADOL HYDROCHLORIDE 25 MILLIGRAM(S): 50 TABLET ORAL at 09:24

## 2020-01-26 RX ADMIN — Medication 25 MILLIGRAM(S): at 17:16

## 2020-01-26 RX ADMIN — CEFEPIME 50 MILLIGRAM(S): 1 INJECTION, POWDER, FOR SOLUTION INTRAMUSCULAR; INTRAVENOUS at 17:16

## 2020-01-26 RX ADMIN — HEPARIN SODIUM 850 UNIT(S)/HR: 5000 INJECTION INTRAVENOUS; SUBCUTANEOUS at 10:36

## 2020-01-26 RX ADMIN — RISPERIDONE 0.5 MILLIGRAM(S): 4 TABLET ORAL at 17:16

## 2020-01-26 RX ADMIN — Medication 100 MILLIGRAM(S): at 21:50

## 2020-01-26 RX ADMIN — CEFEPIME 50 MILLIGRAM(S): 1 INJECTION, POWDER, FOR SOLUTION INTRAMUSCULAR; INTRAVENOUS at 05:53

## 2020-01-26 NOTE — PROGRESS NOTE ADULT - SUBJECTIVE AND OBJECTIVE BOX
74F POD3  external iliac to popliteal PTFE bypass. Patient was transferred to floor yesterday. She states she is doing well. No acute events overnight.  Patient remains on heparin drip.  Afebrile and HD stable    Vital Signs Last 24 Hrs  T(C): 37.5 (26 Jan 2020 05:20), Max: 37.6 (25 Jan 2020 22:07)  T(F): 99.5 (26 Jan 2020 05:20), Max: 99.6 (25 Jan 2020 22:07)  HR: 102 (26 Jan 2020 05:20) (84 - 108)  BP: 155/72 (26 Jan 2020 05:20) (104/53 - 155/72)  BP(mean): 63 (25 Jan 2020 14:00) (61 - 88)  RR: 18 (26 Jan 2020 05:20) (17 - 21)  SpO2: 95% (26 Jan 2020 05:20) (95% - 100%)    PHYSICAL EXAM  NAD  Normal resp effort  R groin CDI, R thigh CDI . Dopplerable DP of R leg diminishes with compression of graft over thigh, leg warm, foot w dressing in place, Dry gangrene of toes with no sign of infection  Awake and alert    Labs                        10.2   9.87  )-----------( 241      ( 26 Jan 2020 08:02 )             31.4   01-26    139  |  106  |  21<H>    ----------------------------<  127<H>  3.6   |  30  |  0.69    Ca    8.1<L>      26 Jan 2020 08:02  Phos  1.7     01-26  Mg     2.0     01-26    TPro  5.7<L>  /  Alb  2.0<L>  /  TBili  0.4  /  DBili  x   /  AST  58<H>  /  ALT  20  /  AlkPhos  46  01-26  PT/INR - ( 25 Jan 2020 18:00 )   PT: 12.7 sec;   INR: 1.14 ratio         PTT - ( 26 Jan 2020 03:55 )  PTT:44.1 sec

## 2020-01-26 NOTE — PROGRESS NOTE ADULT - ASSESSMENT
74F POD3  external iliac to popliteal PTFE bypass.  - C/w heparin drip  - F/u SW for dispo  - OOBTC  - Daily dressing changes

## 2020-01-27 DIAGNOSIS — G30.0 ALZHEIMER'S DISEASE WITH EARLY ONSET: ICD-10-CM

## 2020-01-27 LAB
ANION GAP SERPL CALC-SCNC: 3 MMOL/L — LOW (ref 5–17)
APTT BLD: 69.7 SEC — HIGH (ref 27.5–36.3)
BASOPHILS # BLD AUTO: 0.12 K/UL — SIGNIFICANT CHANGE UP (ref 0–0.2)
BASOPHILS NFR BLD AUTO: 1.1 % — SIGNIFICANT CHANGE UP (ref 0–2)
BUN SERPL-MCNC: 23 MG/DL — HIGH (ref 7–18)
CALCIUM SERPL-MCNC: 8.2 MG/DL — LOW (ref 8.4–10.5)
CHLORIDE SERPL-SCNC: 105 MMOL/L — SIGNIFICANT CHANGE UP (ref 96–108)
CO2 SERPL-SCNC: 30 MMOL/L — SIGNIFICANT CHANGE UP (ref 22–31)
CREAT SERPL-MCNC: 0.7 MG/DL — SIGNIFICANT CHANGE UP (ref 0.5–1.3)
EOSINOPHIL # BLD AUTO: 0.23 K/UL — SIGNIFICANT CHANGE UP (ref 0–0.5)
EOSINOPHIL NFR BLD AUTO: 2.1 % — SIGNIFICANT CHANGE UP (ref 0–6)
GLUCOSE SERPL-MCNC: 116 MG/DL — HIGH (ref 70–99)
HCT VFR BLD CALC: 31.5 % — LOW (ref 34.5–45)
HGB BLD-MCNC: 10.1 G/DL — LOW (ref 11.5–15.5)
IMM GRANULOCYTES NFR BLD AUTO: 1 % — SIGNIFICANT CHANGE UP (ref 0–1.5)
LYMPHOCYTES # BLD AUTO: 1.58 K/UL — SIGNIFICANT CHANGE UP (ref 1–3.3)
LYMPHOCYTES # BLD AUTO: 14.4 % — SIGNIFICANT CHANGE UP (ref 13–44)
MAGNESIUM SERPL-MCNC: 1.9 MG/DL — SIGNIFICANT CHANGE UP (ref 1.6–2.6)
MCHC RBC-ENTMCNC: 30.5 PG — SIGNIFICANT CHANGE UP (ref 27–34)
MCHC RBC-ENTMCNC: 32.1 GM/DL — SIGNIFICANT CHANGE UP (ref 32–36)
MCV RBC AUTO: 95.2 FL — SIGNIFICANT CHANGE UP (ref 80–100)
MONOCYTES # BLD AUTO: 1.52 K/UL — HIGH (ref 0–0.9)
MONOCYTES NFR BLD AUTO: 13.9 % — SIGNIFICANT CHANGE UP (ref 2–14)
NEUTROPHILS # BLD AUTO: 7.39 K/UL — SIGNIFICANT CHANGE UP (ref 1.8–7.4)
NEUTROPHILS NFR BLD AUTO: 67.5 % — SIGNIFICANT CHANGE UP (ref 43–77)
NRBC # BLD: 0 /100 WBCS — SIGNIFICANT CHANGE UP (ref 0–0)
PHOSPHATE SERPL-MCNC: 1.8 MG/DL — LOW (ref 2.5–4.5)
PLATELET # BLD AUTO: 247 K/UL — SIGNIFICANT CHANGE UP (ref 150–400)
POTASSIUM SERPL-MCNC: 4.1 MMOL/L — SIGNIFICANT CHANGE UP (ref 3.5–5.3)
POTASSIUM SERPL-SCNC: 4.1 MMOL/L — SIGNIFICANT CHANGE UP (ref 3.5–5.3)
RBC # BLD: 3.31 M/UL — LOW (ref 3.8–5.2)
RBC # FLD: 13.1 % — SIGNIFICANT CHANGE UP (ref 10.3–14.5)
SODIUM SERPL-SCNC: 138 MMOL/L — SIGNIFICANT CHANGE UP (ref 135–145)
WBC # BLD: 10.95 K/UL — HIGH (ref 3.8–10.5)
WBC # FLD AUTO: 10.95 K/UL — HIGH (ref 3.8–10.5)

## 2020-01-27 PROCEDURE — 99233 SBSQ HOSP IP/OBS HIGH 50: CPT

## 2020-01-27 RX ORDER — POTASSIUM PHOSPHATE, MONOBASIC POTASSIUM PHOSPHATE, DIBASIC 236; 224 MG/ML; MG/ML
15 INJECTION, SOLUTION INTRAVENOUS ONCE
Refills: 0 | Status: COMPLETED | OUTPATIENT
Start: 2020-01-27 | End: 2020-01-27

## 2020-01-27 RX ADMIN — TRAMADOL HYDROCHLORIDE 25 MILLIGRAM(S): 50 TABLET ORAL at 19:08

## 2020-01-27 RX ADMIN — TRAMADOL HYDROCHLORIDE 25 MILLIGRAM(S): 50 TABLET ORAL at 06:40

## 2020-01-27 RX ADMIN — Medication 25 MILLIGRAM(S): at 17:28

## 2020-01-27 RX ADMIN — Medication 650 MILLIGRAM(S): at 23:05

## 2020-01-27 RX ADMIN — TRAMADOL HYDROCHLORIDE 25 MILLIGRAM(S): 50 TABLET ORAL at 07:37

## 2020-01-27 RX ADMIN — POTASSIUM PHOSPHATE, MONOBASIC POTASSIUM PHOSPHATE, DIBASIC 62.5 MILLIMOLE(S): 236; 224 INJECTION, SOLUTION INTRAVENOUS at 11:03

## 2020-01-27 RX ADMIN — HEPARIN SODIUM 850 UNIT(S)/HR: 5000 INJECTION INTRAVENOUS; SUBCUTANEOUS at 07:48

## 2020-01-27 RX ADMIN — Medication 100 MILLIGRAM(S): at 14:17

## 2020-01-27 RX ADMIN — GABAPENTIN 100 MILLIGRAM(S): 400 CAPSULE ORAL at 17:26

## 2020-01-27 RX ADMIN — Medication 650 MILLIGRAM(S): at 08:55

## 2020-01-27 RX ADMIN — GABAPENTIN 100 MILLIGRAM(S): 400 CAPSULE ORAL at 05:23

## 2020-01-27 RX ADMIN — RISPERIDONE 0.5 MILLIGRAM(S): 4 TABLET ORAL at 17:27

## 2020-01-27 RX ADMIN — Medication 25 MILLIGRAM(S): at 05:24

## 2020-01-27 RX ADMIN — TRAMADOL HYDROCHLORIDE 25 MILLIGRAM(S): 50 TABLET ORAL at 18:19

## 2020-01-27 RX ADMIN — CEFEPIME 50 MILLIGRAM(S): 1 INJECTION, POWDER, FOR SOLUTION INTRAMUSCULAR; INTRAVENOUS at 17:27

## 2020-01-27 RX ADMIN — Medication 100 MILLIGRAM(S): at 05:24

## 2020-01-27 RX ADMIN — Medication 81 MILLIGRAM(S): at 11:06

## 2020-01-27 RX ADMIN — CEFEPIME 50 MILLIGRAM(S): 1 INJECTION, POWDER, FOR SOLUTION INTRAMUSCULAR; INTRAVENOUS at 05:24

## 2020-01-27 RX ADMIN — RISPERIDONE 0.5 MILLIGRAM(S): 4 TABLET ORAL at 05:23

## 2020-01-27 RX ADMIN — Medication 650 MILLIGRAM(S): at 08:25

## 2020-01-27 RX ADMIN — Medication 650 MILLIGRAM(S): at 21:47

## 2020-01-27 RX ADMIN — SENNA PLUS 2 TABLET(S): 8.6 TABLET ORAL at 21:46

## 2020-01-27 RX ADMIN — Medication 100 MILLIGRAM(S): at 21:50

## 2020-01-27 NOTE — PROGRESS NOTE ADULT - SUBJECTIVE AND OBJECTIVE BOX
Time of Visit:  Patient seen and examined.     MEDICATIONS  (STANDING):  aspirin  chewable 81 milliGRAM(s) Oral daily  cefepime   IVPB 2000 milliGRAM(s) IV Intermittent every 12 hours  gabapentin 100 milliGRAM(s) Oral every 12 hours  heparin  Infusion. 800 Unit(s)/Hr (8 mL/Hr) IV Continuous <Continuous>  metoprolol tartrate 25 milliGRAM(s) Oral two times a day  metroNIDAZOLE  IVPB 500 milliGRAM(s) IV Intermittent every 8 hours  povidone iodine 10% Solution 1 Application(s) Topical daily  risperiDONE   Tablet 0.5 milliGRAM(s) Oral two times a day  senna 2 Tablet(s) Oral at bedtime      MEDICATIONS  (PRN):  acetaminophen   Tablet .. 650 milliGRAM(s) Oral every 6 hours PRN Mild Pain (1 - 3)  haloperidol    Injectable 1 milliGRAM(s) IV Push every 4 hours PRN agitation  polyethylene glycol 3350 17 Gram(s) Oral daily PRN Constipation  traMADol 25 milliGRAM(s) Oral every 6 hours PRN Severe Pain (7 - 10)       Medications up to date at time of exam.      PHYSICAL EXAMINATION:  Patient has no new complaints.  GENERAL: The patient is a well-developed, well-nourished, in no apparent distress.     Vital Signs Last 24 Hrs  T(C): 36.7 (27 Jan 2020 13:51), Max: 37.6 (27 Jan 2020 05:00)  T(F): 98.1 (27 Jan 2020 13:51), Max: 99.7 (27 Jan 2020 05:00)  HR: 56 (27 Jan 2020 13:51) (56 - 105)  BP: 102/42 (27 Jan 2020 13:51) (98/41 - 144/56)  BP(mean): --  RR: 17 (27 Jan 2020 13:51) (15 - 18)  SpO2: 94% (27 Jan 2020 13:51) (94% - 100%)   (if applicable)    Chest Tube (if applicable)    HEENT: Head is normocephalic and atraumatic. Extraocular muscles are intact. Mucous membranes are moist.     NECK: Supple, no palpable adenopathy.    LUNGS: Clear to auscultation, no wheezing, rales, or rhonchi.    HEART: Regular rate and rhythm without murmur.    ABDOMEN: Soft, nontender, and nondistended.  No hepatosplenomegaly is noted.    : No painful voiding, no pelvic pain    EXTREMITIES: right toes necrotic     NEUROLOGIC: Awake, alert, oriented, grossly intact    SKIN: Warm, dry, good turgor.      LABS:                        10.1   10.95 )-----------( 247      ( 27 Jan 2020 07:21 )             31.5     01-27    138  |  105  |  23<H>  ----------------------------<  116<H>  4.1   |  30  |  0.70    Ca    8.2<L>      27 Jan 2020 07:21  Phos  1.8     01-27  Mg     1.9     01-27    TPro  5.7<L>  /  Alb  2.0<L>  /  TBili  0.4  /  DBili  x   /  AST  58<H>  /  ALT  20  /  AlkPhos  46  01-26    PT/INR - ( 25 Jan 2020 18:00 )   PT: 12.7 sec;   INR: 1.14 ratio         PTT - ( 27 Jan 2020 07:21 )  PTT:69.7 sec                    MICROBIOLOGY: (if applicable)    RADIOLOGY & ADDITIONAL STUDIES:  EKG:   CXR:  ECHO:    IMPRESSION: 74y Female PAST MEDICAL & SURGICAL HISTORY:  COPD (chronic obstructive pulmonary disease)  Hypercholesterolemia  Myocardial infarct, old  S/P CABG x 1   p/w       IMP: This is a 74 year old woman , live by herself, ambulates with cane  has medical hx significant for dementia, cad, pvd, hld, htn presents to the ED with complaint of discoloration for a toe on the right foot.  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 She is found to have diffuse atherosclerotic disease with multilevel arterial occlusion. Marginal/poor candidate for endovascular intervention. Patient is transferred to ICU for frequent monitoring after the angiogram.  Vascular surgery f/u.. will need b/l  open bypass and cardiac cl. message sent to dr monge .. cards. vascular surgery pending cardiac clearance .. intermediate risk and no further cardiac testing . Vascular surgery.. for OR 1/23  . COPD stable    Sugg:  -continue antibx  -duonebs q6h prn  -OOB to chair   -dvt/gi porphy

## 2020-01-27 NOTE — PROGRESS NOTE ADULT - SUBJECTIVE AND OBJECTIVE BOX
PAD/R gang, b/l rest pain, L foot cyanosis  POD 4  s/p R EIA endarterectomy/PTFE bypass to AK pop.  Post op Pain controlled  Incisions C/D/I  +R pop pulse  R toes/foot: dry gangrene  R knee ant aspect bruise     L LE: dorsal foot cyanosis stable, skin intact    Plan:   Tx AC (marginal outflow w PTFE conduit) Hep drip for now PTT 50-90. Will eventually transition to PO agent and ASA 81  Abx  R debridement/TMA by podiatry - prefer ASAP to prevent graft infection  b/l Feet/heels protection/offload  Observe R ant knee bruise- DC R knee immobilizer  May need L LE bypass   Med fu

## 2020-01-27 NOTE — PROGRESS NOTE ADULT - ASSESSMENT
1.	Gangrene of right foot possible underlying osteomyelitis - s/p bypass  ·	cont maxipime 2gms iv q12h and flagyl 500mgs po TID  ·	possible right TMA for Wednesday

## 2020-01-27 NOTE — PROGRESS NOTE ADULT - PROBLEM SELECTOR PLAN 2
FAST 6A. Patient A&O x 3 but noted with + memory loss, repetitive statements, cachexia, follows commands. Patient from home, lives alone, + hoarding as per HCP. Continues risperdal, haldol. HCP form on file.

## 2020-01-27 NOTE — PROGRESS NOTE ADULT - SUBJECTIVE AND OBJECTIVE BOX
OVERNIGHT EVENTS: s/p R EIA endarterectomy/PTFE bypass to AK pop.; POD 4    Present Symptoms:   Dyspnea: none  Nausea/Vomiting: none  Loss of appetite:   Pain:  none  Review of Systems: All others negative     MEDICATIONS  (STANDING):  aspirin  chewable 81 milliGRAM(s) Oral daily  cefepime   IVPB 2000 milliGRAM(s) IV Intermittent every 12 hours  gabapentin 100 milliGRAM(s) Oral every 12 hours  heparin  Infusion. 800 Unit(s)/Hr (8 mL/Hr) IV Continuous <Continuous>  metoprolol tartrate 25 milliGRAM(s) Oral two times a day  metroNIDAZOLE  IVPB 500 milliGRAM(s) IV Intermittent every 8 hours  povidone iodine 10% Solution 1 Application(s) Topical daily  risperiDONE   Tablet 0.5 milliGRAM(s) Oral two times a day  senna 2 Tablet(s) Oral at bedtime    MEDICATIONS  (PRN):  acetaminophen   Tablet .. 650 milliGRAM(s) Oral every 6 hours PRN Mild Pain (1 - 3)  haloperidol    Injectable 1 milliGRAM(s) IV Push every 4 hours PRN agitation  polyethylene glycol 3350 17 Gram(s) Oral daily PRN Constipation  traMADol 25 milliGRAM(s) Oral every 6 hours PRN Severe Pain (7 - 10)      PHYSICAL EXAM:  Vital Signs Last 24 Hrs  T(C): 36.7 (27 Jan 2020 13:51), Max: 37.6 (27 Jan 2020 05:00)  T(F): 98.1 (27 Jan 2020 13:51), Max: 99.7 (27 Jan 2020 05:00)  HR: 56 (27 Jan 2020 13:51) (56 - 105)  BP: 102/42 (27 Jan 2020 13:51) (98/41 - 144/56)  BP(mean): --  RR: 17 (27 Jan 2020 13:51) (15 - 18)  SpO2: 94% (27 Jan 2020 13:51) (94% - 100%)  General: alert  oriented x 3, + memory loss, repetitive statements, cachexia, follows commands. No distress.   Karnofsky Performance Score/Palliative Performance Status Version2:     30%    HEENT:   dry lips, + temporal wasting     Lungs: comfortable, on room air. No respiratory distress.   CV: S1S2, RRR.   GI: abdomen soft, NTND, continent   : incontinent  Musculoskeletal: minimally ambulatory at baseline; patient with +2 bilateral  strength, loss of muscle mass/subcutaneous fat, assist with ADLs except independent with feeding  Skin: R foot s/p angio w/o angioplasty 1/17, s/p R EIA endarterectomy/PTFE bypass to AK pop, POD 4;   Right 2nd toe: black necrotic; distal aspect of the right 2nd toe is hard and dry; right 1st, 3rd, and 4th toes are necrotic and demarcated. R foot dressing in place  Neuro: patient A&O x 3 but noted with + memory loss/repetitive statements. Follows commands.    Oral intake ability: full capability  Diet: DASH/TLC    LABS:                          10.1   10.95 )-----------( 247      ( 27 Jan 2020 07:21 )             31.5     01-27    138  |  105  |  23<H>  ----------------------------<  116<H>  4.1   |  30  |  0.70    Ca    8.2<L>      27 Jan 2020 07:21  Phos  1.8     01-27  Mg     1.9     01-27    TPro  5.7<L>  /  Alb  2.0<L>  /  TBili  0.4  /  DBili  x   /  AST  58<H>  /  ALT  20  /  AlkPhos  46  01-26    Albumin: 2.0    RADIOLOGY & ADDITIONAL STUDIES: reviewed    ADVANCE DIRECTIVES: MOLST completed as per HCP's wishes: CPR / trial intubation / send to hospital / long term feeding tube if needed / trial IV fluids / use abx

## 2020-01-27 NOTE — PROGRESS NOTE ADULT - SUBJECTIVE AND OBJECTIVE BOX
74y Female is under our care for gangrene of right foot with possible underlying osteomyelitis. Underwent RLE bypass on 1/23. Patient is doing well, but admits she has expected discomfort of right leg. Remains afebrile with normal wbc count. Possible right TMA for Wednesday.     REVIEW OF SYSTEMS:  [  ] Not able to illicit  General: no fevers no malaise  Chest: no cough no sob  GI: no nvd  Skin: no rashes  Musculoskeletal: no trauma no LBP +leg pain  Neuro: no ha's no dizziness     MEDS:     cefepime   IVPB 2000 milliGRAM(s) IV Intermittent every 12 hours  metroNIDAZOLE  IVPB 500 milliGRAM(s) IV Intermittent every 8 hours        ALLERGIES: Allergies    influenza virus vaccine, live, trivalent (Unknown)  PC Pen VK (Rash)  penicillin (Other; Rash)  statins (Other)  sulfa drugs (Other)  sulfamethizole (Other)    VITALS:  Vital Signs Last 24 Hrs  T(C): 36.7 (27 Jan 2020 13:51), Max: 37.6 (27 Jan 2020 05:00)  T(F): 98.1 (27 Jan 2020 13:51), Max: 99.7 (27 Jan 2020 05:00)  HR: 56 (27 Jan 2020 13:51) (56 - 105)  BP: 102/42 (27 Jan 2020 13:51) (98/41 - 144/56)  BP(mean): --  RR: 17 (27 Jan 2020 13:51) (15 - 18)  SpO2: 94% (27 Jan 2020 13:51) (94% - 100%)      PHYSICAL EXAM:  HEENT: n/a  Neck: supple no LN's   Respiratory: lungs clear no rales  Cardiovascular: S1 S2 reg no murmurs  Gastrointestinal: +BS with soft, nondistended abdomen; nontender  Extremities: no edema  Skin: right foot with dry gangrene primarily of first 4 digits, increased warmth of right leg +contusion of right knee  Ortho: n/a  Neuro: awake and alert      LABS/DIAGNOSTIC TESTS:                        10.1   10.95 )-----------( 247      ( 27 Jan 2020 07:21 )             31.5   WBC Count: 10.95 K/uL (01-27 @ 07:21)  WBC Count: 9.87 K/uL (01-26 @ 08:02)  WBC Count: 11.63 K/uL (01-26 @ 03:55)  WBC Count: 11.60 K/uL (01-25 @ 06:53)  WBC Count: 10.41 K/uL (01-24 @ 12:46)    01-27    138  |  105  |  23<H>  ----------------------------<  116<H>  4.1   |  30  |  0.70    Ca    8.2<L>      27 Jan 2020 07:21  Phos  1.8     01-27  Mg     1.9     01-27    TPro  5.7<L>  /  Alb  2.0<L>  /  TBili  0.4  /  DBili  x   /  AST  58<H>  /  ALT  20  /  AlkPhos  46  01-26        CULTURES:   .Blood  01-11 @ 01:37   No growth at 5 days.  --  --        RADIOLOGY:  no new studies

## 2020-01-27 NOTE — PROGRESS NOTE ADULT - PROBLEM SELECTOR PLAN 1
s/p angio w/o angioplasty 1/17, s/p R EIA endarterectomy/PTFE bypass to AK pop.; POD 4. Patient's right foot dry gangrene continues to demarcate, however perfusion to foot notably improved as per podiatry. Plan for Transmetatarsal Amputation on Wednesday pending OR availability. Patient may need May need L LE bypass in the future as per vascular. Patient continues tylenol, tramadol, IV abx, neurontin. HCP on file. Full code.

## 2020-01-27 NOTE — PROGRESS NOTE ADULT - NSHPATTENDINGPLANDISCUSS_GEN_ALL_CORE
Vascular surgeon
Dr. Izaguirre, Dr. Tinajero (podiatry)
np covering
pt and np
np covering

## 2020-01-28 LAB
APTT BLD: 62.1 SEC — HIGH (ref 27.5–36.3)
HCT VFR BLD CALC: 35 % — SIGNIFICANT CHANGE UP (ref 34.5–45)
HGB BLD-MCNC: 11.4 G/DL — LOW (ref 11.5–15.5)
MCHC RBC-ENTMCNC: 31.2 PG — SIGNIFICANT CHANGE UP (ref 27–34)
MCHC RBC-ENTMCNC: 32.6 GM/DL — SIGNIFICANT CHANGE UP (ref 32–36)
MCV RBC AUTO: 95.9 FL — SIGNIFICANT CHANGE UP (ref 80–100)
NRBC # BLD: 0 /100 WBCS — SIGNIFICANT CHANGE UP (ref 0–0)
PLATELET # BLD AUTO: 303 K/UL — SIGNIFICANT CHANGE UP (ref 150–400)
RBC # BLD: 3.65 M/UL — LOW (ref 3.8–5.2)
RBC # FLD: 13.3 % — SIGNIFICANT CHANGE UP (ref 10.3–14.5)
WBC # BLD: 11.79 K/UL — HIGH (ref 3.8–10.5)
WBC # FLD AUTO: 11.79 K/UL — HIGH (ref 3.8–10.5)

## 2020-01-28 RX ORDER — METOPROLOL TARTRATE 50 MG
25 TABLET ORAL ONCE
Refills: 0 | Status: COMPLETED | OUTPATIENT
Start: 2020-01-28 | End: 2020-01-28

## 2020-01-28 RX ADMIN — CEFEPIME 50 MILLIGRAM(S): 1 INJECTION, POWDER, FOR SOLUTION INTRAMUSCULAR; INTRAVENOUS at 06:00

## 2020-01-28 RX ADMIN — Medication 81 MILLIGRAM(S): at 11:24

## 2020-01-28 RX ADMIN — GABAPENTIN 100 MILLIGRAM(S): 400 CAPSULE ORAL at 17:32

## 2020-01-28 RX ADMIN — GABAPENTIN 100 MILLIGRAM(S): 400 CAPSULE ORAL at 06:00

## 2020-01-28 RX ADMIN — Medication 1 APPLICATION(S): at 13:18

## 2020-01-28 RX ADMIN — TRAMADOL HYDROCHLORIDE 25 MILLIGRAM(S): 50 TABLET ORAL at 15:51

## 2020-01-28 RX ADMIN — Medication 100 MILLIGRAM(S): at 21:44

## 2020-01-28 RX ADMIN — TRAMADOL HYDROCHLORIDE 25 MILLIGRAM(S): 50 TABLET ORAL at 23:43

## 2020-01-28 RX ADMIN — TRAMADOL HYDROCHLORIDE 25 MILLIGRAM(S): 50 TABLET ORAL at 01:47

## 2020-01-28 RX ADMIN — CEFEPIME 50 MILLIGRAM(S): 1 INJECTION, POWDER, FOR SOLUTION INTRAMUSCULAR; INTRAVENOUS at 17:30

## 2020-01-28 RX ADMIN — RISPERIDONE 0.5 MILLIGRAM(S): 4 TABLET ORAL at 06:00

## 2020-01-28 RX ADMIN — Medication 100 MILLIGRAM(S): at 06:00

## 2020-01-28 RX ADMIN — TRAMADOL HYDROCHLORIDE 25 MILLIGRAM(S): 50 TABLET ORAL at 02:44

## 2020-01-28 RX ADMIN — Medication 25 MILLIGRAM(S): at 06:00

## 2020-01-28 RX ADMIN — TRAMADOL HYDROCHLORIDE 25 MILLIGRAM(S): 50 TABLET ORAL at 16:30

## 2020-01-28 RX ADMIN — HEPARIN SODIUM 850 UNIT(S)/HR: 5000 INJECTION INTRAVENOUS; SUBCUTANEOUS at 08:07

## 2020-01-28 RX ADMIN — SENNA PLUS 2 TABLET(S): 8.6 TABLET ORAL at 21:44

## 2020-01-28 RX ADMIN — RISPERIDONE 0.5 MILLIGRAM(S): 4 TABLET ORAL at 17:32

## 2020-01-28 RX ADMIN — Medication 100 MILLIGRAM(S): at 13:20

## 2020-01-28 RX ADMIN — Medication 25 MILLIGRAM(S): at 22:47

## 2020-01-28 RX ADMIN — TRAMADOL HYDROCHLORIDE 25 MILLIGRAM(S): 50 TABLET ORAL at 22:43

## 2020-01-28 NOTE — PROGRESS NOTE ADULT - ATTENDING COMMENTS
As above  Stable from vasc  agree w podiatry plan for OR tomorrow  Can hold AC 3 hrs preop and restart 3 hrs post op (no boluses)  NO TORNIQUET

## 2020-01-28 NOTE — PROGRESS NOTE ADULT - ASSESSMENT
1.	Dry gangrene of right foot   2.	Possible underlying osteomyelitis   ·	cont maxipime 2gms iv q12h and flagyl 500mgs po TID  ·	for possible right TMA tomorrow

## 2020-01-28 NOTE — PROGRESS NOTE ADULT - SUBJECTIVE AND OBJECTIVE BOX
74F POD5  external iliac to popliteal PTFE bypass. Patient states she is doing well. No acute events overnight.  Patient remains on heparin drip, pending AM PTT.  Afebrile and HD stable.    Vital Signs Last 24 Hrs  T(C): 36.8 (28 Jan 2020 05:27), Max: 37.2 (27 Jan 2020 22:33)  T(F): 98.2 (28 Jan 2020 05:27), Max: 98.9 (27 Jan 2020 22:33)  HR: 102 (28 Jan 2020 05:27) (56 - 102)  BP: 142/64 (28 Jan 2020 05:27) (98/41 - 142/64)  BP(mean): 73 (27 Jan 2020 18:25) (73 - 73)  RR: 16 (28 Jan 2020 05:27) (16 - 18)  SpO2: 97% (28 Jan 2020 05:27) (94% - 99%)  NAD  Nonlabored resp  R groin dressing CDI, palpable popliteal pulse on R side. Leg warm and well perfused. Dry gangrene of toes.                            11.4   11.79 )-----------( 303      ( 28 Jan 2020 07:15 )             35.0     01-27    138  |  105  |  23<H>  ----------------------------<  116<H>  4.1   |  30  |  0.70    Ca    8.2<L>      27 Jan 2020 07:21  Phos  1.8     01-27  Mg     1.9     01-27    TPro  5.7<L>  /  Alb  2.0<L>  /  TBili  0.4  /  DBili  x   /  AST  58<H>  /  ALT  20  /  AlkPhos  46  01-26

## 2020-01-28 NOTE — PROGRESS NOTE ADULT - ASSESSMENT
74F POD5  external iliac to popliteal PTFE bypass.  - C/w heparin drip  - F/u SW for dispo  - podiatry for dry gangrene  - OOBTC  - Daily dressing changes

## 2020-01-28 NOTE — PROGRESS NOTE ADULT - SUBJECTIVE AND OBJECTIVE BOX
Patient is a 74y old  Female who presents with a chief complaint of discoloration of toe of right foot.     HPI: This 74 year old non-diabetic female presents to the ED with complaint of discoloration for a toe on the right foot. She states that she was seen by her primary care physician a few days ago and told her to go to the emergency room.  Patient states she smokes at least a pack a day of cigarettes since she was 22 years old.  Patient states she can only ambulate 10 feet and gets a cramp in her right calf for which she needs to sit down.  She states she is very minimally ambulatory.   She denies pain at rest with her feet elevated.  Patient lives alone with a cat. Patient massaging leg throughout the visit.  Patient denies n/v/d/sob/cp/f.  Patient states her blood pressure is not usually low.     Podiatry Interval HPI:  Patient seen bedside this AM examined .  Patient was awake and alert.  Patients foot continues to demarcate indicating level of salvage following open bypass.  Discussed with patient the plan for transmetatarsal amputation on Wednesday pending OR availability. Trying to get in contact with patient's health proxy.  Patient denies n/f/d/f/sob.      PMH: COPD (chronic obstructive pulmonary disease)  Hypercholesterolemia  Myocardial infarct, old    Allergies: PC Pen VK (Rash)  penicillin (Other; Rash)  statins (Other)  sulfa drugs (Other)  sulfamethizole (Other)    MEDICATIONS  (STANDING):  aspirin  chewable 81 milliGRAM(s) Oral daily  cefepime   IVPB 2000 milliGRAM(s) IV Intermittent every 12 hours  gabapentin 100 milliGRAM(s) Oral every 12 hours  heparin  Infusion. 800 Unit(s)/Hr (8 mL/Hr) IV Continuous <Continuous>  metoprolol tartrate 25 milliGRAM(s) Oral two times a day  metroNIDAZOLE  IVPB 500 milliGRAM(s) IV Intermittent every 8 hours  povidone iodine 10% Solution 1 Application(s) Topical daily  risperiDONE   Tablet 0.5 milliGRAM(s) Oral two times a day  senna 2 Tablet(s) Oral at bedtime      FH:  PSX: S/P CABG x 1    Social History:  pt states she smokes 1ppd since she was 22  denies etoh or substance use (10 Angel 2020 21:52)      Labs                        11.4   11.79 )-----------( 303      ( 28 Jan 2020 07:15 )             35.0     01-27    138  |  105  |  23<H>  ----------------------------<  116<H>  4.1   |  30  |  0.70    Ca    8.2<L>      27 Jan 2020 07:21  Phos  1.8     01-27  Mg     1.9     01-27      Vital Signs Last 24 Hrs  T(C): 36.8 (28 Jan 2020 05:27), Max: 37.2 (27 Jan 2020 22:33)  T(F): 98.2 (28 Jan 2020 05:27), Max: 98.9 (27 Jan 2020 22:33)  HR: 102 (28 Jan 2020 05:27) (56 - 102)  BP: 142/64 (28 Jan 2020 05:27) (98/41 - 142/64)  BP(mean): 73 (27 Jan 2020 18:25) (73 - 73)  RR: 16 (28 Jan 2020 05:27) (16 - 18)  SpO2: 97% (28 Jan 2020 05:27) (94% - 99%)  Sedimentation Rate, Erythrocyte: 60 mm/Hr (01-22-20 @ 06:32)  Hemoglobin A1C, Whole Blood: 5.6 % (01-11-20 @ 09:30)       C-Reactive Protein, Serum: 1.80 mg/dL (01-22-20 @ 09:57)    WBC Count: 11.79 K/uL <H> (01-28-20 @ 07:15)              PHYSICAL EXAM  GEN: MERARY MEYER is a pleasant well-nourished, well developed 74y Female in no acute distress. Refused exam this AM, will attempt at later time  Per previous examination:   LE Focused:    Vasc:  DP and PT pulses non-palpable b/l. Left DP faintly dopplerable. Unable to doppler left PT, Unable to find right DP or PT with doppler. No CFT to right 1st, 2nd, 3rd, or 4th toes. Shiny taught skin b/l dorsal feet with level of violaceous color extending proximally to tarsal joints. Overall perfusion to foot notably improved, right foot is warm to touch.  Derm: Area of ischemia remains the stable.   Right 2nd toe: black necrotic to the level of the proximal with exposed bone.  Distal aspect of the right 2nd toe is hard and dry, no drainage to the right 2nd toe.  Right foot erythema to level just distal to ankle joint. Right 1st, 3rd, and 4th toes are necrotic and demarcated.  No drainage, no malodor, no signs of infection.  Neuro: Sensation diminished to digits b/l.  MSK: Tenderness to palpation distal foot.  Pain to touch dorsal right foot.     Imaging:   < from: Xray Foot AP + Lateral + Oblique, Right (01.10.20 @ 15:05) >    EXAM:  FOOT RIGHT (MINIMUM 3 VIEWS)                        PROCEDURE DATE:  01/10/2020    INTERPRETATION:  Right foot. 3 views. Patient has local pain.    The foot shows mild degeneration at the first MTP joint.    There is an old fracture deformity of the distal fibula.    No bone destruction or acute fracture is evident.    IMPRESSION: No acute finding.    JASKARAN MOSLEY M.D., ATTENDING RADIOLOGIST  This document has been electronically signed. Angel 10 2020  3:06PM  < end of copied text >    < from: [facility] Physiol Extremity Lower 3+ Level, BI (01.13.20 @ 13:49) >    EXAM:  US PHYSIOL LWR EXT 3+ LEV BI                        PROCEDURE DATE:  01/13/2020    INTERPRETATION:  Clinical indication: Right toe gangrene    Comparison: None    FINDINGS AND   IMPRESSION: Segmental pressures could not be obtaineddue to patient discomfort. Therefore, ABIs could not be calculated.    There are biphasic waveforms in the lower left thigh. Abnormally, monophasic flow within the remainder of the lower extremities.    RACQUEL CHA M.D., ATTENDING RADIOLOGIST  This document has been electronically signed. Jan 13 2020  2:20PM  < end of copied text >    < from: CT Angio Abd Aorta w/run-off w/ IV Cont (01.12.20 @ 19:40) >    EXAM:  CT ANGIO ABD AOR W RUN(W)AW IC                        PROCEDURE DATE:  01/12/2020    INTERPRETATION:  CT ANGIO ABDOMINAL AORTA RUNOFF WITHOUT AND OR WITH IV CONTRAST    CLINICAL INFORMATION: Atherosclerosis of the native arteries of the right and left lower extremity with right foot gangrene    PROCEDURE INFORMATION:   Exam: CTA Angiogram of the Abdominal Aorta and Bilateral Lower Extremities   (Run-off) With IV Contrast   Exam date and time: 1/12/2020 6:55 PM   Age: 74 years old   Clinical indication: Other: Pad, right 2nd toe gangrene, 3rd and 4th toes wet   early gang     TECHNIQUE:   Imaging protocol: CT angiogram of the abdominal aorta, pelvis and bilateral   lower extremities with IV iodinated contrast.   Intravenous contrast: 90 cc Omnipaque 350  3D rendering: MIP and/or 3D reconstructed images were created by the   technologist.     COMPARISON:   CR XR FOOT 3 VIEWS RIGHT 1/10/2020 2:46 PM     FINDINGS:   Aorta: Severe calcified and noncalcified atherosclerotic plaque. Aorta is   otherwise fully patent.   Celiac trunk and mesenteric arteries: No occlusion or significant stenosis.    Renal arteries: No occlusion or significant stenosis.      Right iliac arteries: Marked stenosis of the right external iliac artery distally.   Right femoral/popliteal arteries: Distal CFA stenosis. Severely and diffusely attenuated SFA. Occlusion of the mid-distal right superficial   femoral artery with reconstitution of the suprageniculate popliteal  artery. Stenotic right popliteal artery.   Right infrapopliteal arteries: Single-vessel runoff via the right peroneal   artery, diffusely attenuated, which feeds the plantar artery. The right anterior and posterior tibial   arteries are occluded. Reconstitution of the right pedis dorsalis artery at the   level of the foot, however severely attenuated.     Left iliac arteries: Moderate calcification and stenosis of the left iliac arteries. Occluded left internal iliac artery.  Left femoral/popliteal arteries: Long segmentOcclusion of the entire left superficial femoral   artery and Reconstitution of left popliteal artery, which shows multifocal disease without occlusion.  Left infrapopliteal arteries: Occlusion of the left anterior posterior tibial   arteries at the level of the ankle. Single-vessel left peroneal artery which   feeds the plantar artery. Left pedis dorsalis is attenuated but patent.    Liver: No mass.   Gallbladder and bile ducts: Unremarkable. No calcified stones. No ductal   dilation.    Pancreas: Unremarkable. No mass. No ductal dilation.    Spleen: Normal. No splenomegaly.    Adrenals: Normal. No mass.    Kidneys and ureters: Normal. No mass.      Stomach and bowel: . No obstruction. No mucosal thickening. Moderate stool   throughout the colon.    Appendix: No evidence of appendicitis.      Bladder: Unremarkable. No mass.    Reproductive: Unremarkable as visualized.      Intraperitoneal space: Unremarkable. No free air. No significant fluid   collection.    Lymph nodes: No lymphadenopathy.     Bones/joints: No acute fracture. No dislocation.    Soft tissues: Soft tissue irregularity of the second right toe presumably   related to underlying gangrene.       IMPRESSION:   1. Right lower extremity demonstrates marked diffuse stenosis of the lower   extremity with occlusion of the mid-distal right SFA which reconstitutes distally and   with single-vessel runoff to the right foot via the right peroneal artery which   feeds the plantar artery. The right pedis dorsalis artery reconstitutes at the   level of the ankle.   2. The left lower extremity demonstrates diffuse marked stenosis with total occlusion   of the left SFA with reconstitution of attenuated left popliteal artery. There is   single-vessel runoff via the left peronealartery is the which then feeds the   left plantar artery. The pedis dorsalis artery is patent.   3. evidence of inflow disease at the level of the right external iliac artery.  4. Slight irregularity of the soft tissues of the right second toe which may be   related to underlying gangrene.    GALILEA KING M.D., ATTENDING RADIOLOGIST  This document has been electronically signed. 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  PVD  s/p angiogram 1/17  s/p open bypass iliac-pop RLE 1/23 with dr. shepard    P:  Patient evaluated, chart reviewed  Xrays- reviewed  NICKI/PVR-reviewed  CTA-reviewed- single vessel runoff to right foot.  4x4 gauze, prince to right foot digits distally to allow frequent doppler exam  Podiatry will continue to monitor and plan for surgical intervention pending vascular recommendations following bypass.  Patient's right foot dry gangrene continues to demarcate, however perfusion to foot notably improved  Scheduled for Transmetatarsal Amputation on Wednesday  NPO after midnight  Discussed plan with Dr. Felix and he agreed  Discussed with attending Dr. Welsh Patient is a 74y old  Female who presents with a chief complaint of discoloration of toe of right foot.     HPI: This 74 year old non-diabetic female presents to the ED with complaint of discoloration for a toe on the right foot. She states that she was seen by her primary care physician a few days ago and told her to go to the emergency room.  Patient states she smokes at least a pack a day of cigarettes since she was 22 years old.  Patient states she can only ambulate 10 feet and gets a cramp in her right calf for which she needs to sit down.  She states she is very minimally ambulatory.   She denies pain at rest with her feet elevated.  Patient lives alone with a cat. Patient massaging leg throughout the visit.  Patient denies n/v/d/sob/cp/f.  Patient states her blood pressure is not usually low.     Podiatry Interval HPI:  Patient seen bedside this AM examined .  Patient was awake and alert.  Patients foot continues to demarcate indicating level of salvage following open bypass.  Discussed with patient the plan for transmetatarsal amputation on Wednesday pending OR availability. Trying to get in contact with patient's health proxy.  Patient denies n/f/d/f/sob.      PMH: COPD (chronic obstructive pulmonary disease)  Hypercholesterolemia  Myocardial infarct, old    Allergies: PC Pen VK (Rash)  penicillin (Other; Rash)  statins (Other)  sulfa drugs (Other)  sulfamethizole (Other)    MEDICATIONS  (STANDING):  aspirin  chewable 81 milliGRAM(s) Oral daily  cefepime   IVPB 2000 milliGRAM(s) IV Intermittent every 12 hours  gabapentin 100 milliGRAM(s) Oral every 12 hours  heparin  Infusion. 800 Unit(s)/Hr (8 mL/Hr) IV Continuous <Continuous>  metoprolol tartrate 25 milliGRAM(s) Oral two times a day  metroNIDAZOLE  IVPB 500 milliGRAM(s) IV Intermittent every 8 hours  povidone iodine 10% Solution 1 Application(s) Topical daily  risperiDONE   Tablet 0.5 milliGRAM(s) Oral two times a day  senna 2 Tablet(s) Oral at bedtime      FH:  PSX: S/P CABG x 1    Social History:  pt states she smokes 1ppd since she was 22  denies etoh or substance use (10 Angel 2020 21:52)      Labs                        11.4   11.79 )-----------( 303      ( 28 Jan 2020 07:15 )             35.0     01-27    138  |  105  |  23<H>  ----------------------------<  116<H>  4.1   |  30  |  0.70    Ca    8.2<L>      27 Jan 2020 07:21  Phos  1.8     01-27  Mg     1.9     01-27      Vital Signs Last 24 Hrs  T(C): 36.8 (28 Jan 2020 05:27), Max: 37.2 (27 Jan 2020 22:33)  T(F): 98.2 (28 Jan 2020 05:27), Max: 98.9 (27 Jan 2020 22:33)  HR: 102 (28 Jan 2020 05:27) (56 - 102)  BP: 142/64 (28 Jan 2020 05:27) (98/41 - 142/64)  BP(mean): 73 (27 Jan 2020 18:25) (73 - 73)  RR: 16 (28 Jan 2020 05:27) (16 - 18)  SpO2: 97% (28 Jan 2020 05:27) (94% - 99%)  Sedimentation Rate, Erythrocyte: 60 mm/Hr (01-22-20 @ 06:32)  Hemoglobin A1C, Whole Blood: 5.6 % (01-11-20 @ 09:30)       C-Reactive Protein, Serum: 1.80 mg/dL (01-22-20 @ 09:57)    WBC Count: 11.79 K/uL <H> (01-28-20 @ 07:15)              PHYSICAL EXAM  GEN: MERARY MEYER is a pleasant well-nourished, well developed 74y Female in no acute distress. Refused exam this AM, will attempt at later time  Per previous examination:   LE Focused:    Vasc:  DP and PT pulses non-palpable b/l. Left DP faintly dopplerable. Unable to doppler left PT, Unable to find right DP or PT with doppler. No CFT to right 1st, 2nd, 3rd, or 4th toes. Shiny taught skin b/l dorsal feet with level of violaceous color extending proximally to tarsal joints. Overall perfusion to foot notably improved, right foot is warm to touch.  Derm: Area of ischemia remains the stable.   Right 2nd toe: black necrotic to the level of the proximal with exposed bone.  Distal aspect of the right 2nd toe is hard and dry, no drainage to the right 2nd toe.  Right foot erythema to level just distal to ankle joint. Right 1st, 3rd, and 4th toes are necrotic and demarcated.  No drainage, no malodor, no signs of infection.  Neuro: Sensation diminished to digits b/l.  MSK: Tenderness to palpation distal foot.  Pain to touch dorsal right foot.     Imaging:   < from: Xray Foot AP + Lateral + Oblique, Right (01.10.20 @ 15:05) >    EXAM:  FOOT RIGHT (MINIMUM 3 VIEWS)                        PROCEDURE DATE:  01/10/2020    INTERPRETATION:  Right foot. 3 views. Patient has local pain.    The foot shows mild degeneration at the first MTP joint.    There is an old fracture deformity of the distal fibula.    No bone destruction or acute fracture is evident.    IMPRESSION: No acute finding.    JASKARAN MOSLEY M.D., ATTENDING RADIOLOGIST  This document has been electronically signed. Angel 10 2020  3:06PM  < end of copied text >    < from: [facility] Physiol Extremity Lower 3+ Level, BI (01.13.20 @ 13:49) >    EXAM:  US PHYSIOL LWR EXT 3+ LEV BI                        PROCEDURE DATE:  01/13/2020    INTERPRETATION:  Clinical indication: Right toe gangrene    Comparison: None    FINDINGS AND   IMPRESSION: Segmental pressures could not be obtaineddue to patient discomfort. Therefore, ABIs could not be calculated.    There are biphasic waveforms in the lower left thigh. Abnormally, monophasic flow within the remainder of the lower extremities.    RACQUEL CHA M.D., ATTENDING RADIOLOGIST  This document has been electronically signed. Jan 13 2020  2:20PM  < end of copied text >    < from: CT Angio Abd Aorta w/run-off w/ IV Cont (01.12.20 @ 19:40) >    EXAM:  CT ANGIO ABD AOR W RUN(W)AW IC                        PROCEDURE DATE:  01/12/2020    INTERPRETATION:  CT ANGIO ABDOMINAL AORTA RUNOFF WITHOUT AND OR WITH IV CONTRAST    CLINICAL INFORMATION: Atherosclerosis of the native arteries of the right and left lower extremity with right foot gangrene    PROCEDURE INFORMATION:   Exam: CTA Angiogram of the Abdominal Aorta and Bilateral Lower Extremities   (Run-off) With IV Contrast   Exam date and time: 1/12/2020 6:55 PM   Age: 74 years old   Clinical indication: Other: Pad, right 2nd toe gangrene, 3rd and 4th toes wet   early gang     TECHNIQUE:   Imaging protocol: CT angiogram of the abdominal aorta, pelvis and bilateral   lower extremities with IV iodinated contrast.   Intravenous contrast: 90 cc Omnipaque 350  3D rendering: MIP and/or 3D reconstructed images were created by the   technologist.     COMPARISON:   CR XR FOOT 3 VIEWS RIGHT 1/10/2020 2:46 PM     FINDINGS:   Aorta: Severe calcified and noncalcified atherosclerotic plaque. Aorta is   otherwise fully patent.   Celiac trunk and mesenteric arteries: No occlusion or significant stenosis.    Renal arteries: No occlusion or significant stenosis.      Right iliac arteries: Marked stenosis of the right external iliac artery distally.   Right femoral/popliteal arteries: Distal CFA stenosis. Severely and diffusely attenuated SFA. Occlusion of the mid-distal right superficial   femoral artery with reconstitution of the suprageniculate popliteal  artery. Stenotic right popliteal artery.   Right infrapopliteal arteries: Single-vessel runoff via the right peroneal   artery, diffusely attenuated, which feeds the plantar artery. The right anterior and posterior tibial   arteries are occluded. Reconstitution of the right pedis dorsalis artery at the   level of the foot, however severely attenuated.     Left iliac arteries: Moderate calcification and stenosis of the left iliac arteries. Occluded left internal iliac artery.  Left femoral/popliteal arteries: Long segmentOcclusion of the entire left superficial femoral   artery and Reconstitution of left popliteal artery, which shows multifocal disease without occlusion.  Left infrapopliteal arteries: Occlusion of the left anterior posterior tibial   arteries at the level of the ankle. Single-vessel left peroneal artery which   feeds the plantar artery. Left pedis dorsalis is attenuated but patent.    Liver: No mass.   Gallbladder and bile ducts: Unremarkable. No calcified stones. No ductal   dilation.    Pancreas: Unremarkable. No mass. No ductal dilation.    Spleen: Normal. No splenomegaly.    Adrenals: Normal. No mass.    Kidneys and ureters: Normal. No mass.      Stomach and bowel: . No obstruction. No mucosal thickening. Moderate stool   throughout the colon.    Appendix: No evidence of appendicitis.      Bladder: Unremarkable. No mass.    Reproductive: Unremarkable as visualized.      Intraperitoneal space: Unremarkable. No free air. No significant fluid   collection.    Lymph nodes: No lymphadenopathy.     Bones/joints: No acute fracture. No dislocation.    Soft tissues: Soft tissue irregularity of the second right toe presumably   related to underlying gangrene.       IMPRESSION:   1. Right lower extremity demonstrates marked diffuse stenosis of the lower   extremity with occlusion of the mid-distal right SFA which reconstitutes distally and   with single-vessel runoff to the right foot via the right peroneal artery which   feeds the plantar artery. The right pedis dorsalis artery reconstitutes at the   level of the ankle.   2. The left lower extremity demonstrates diffuse marked stenosis with total occlusion   of the left SFA with reconstitution of attenuated left popliteal artery. There is   single-vessel runoff via the left peronealartery is the which then feeds the   left plantar artery. The pedis dorsalis artery is patent.   3. evidence of inflow disease at the level of the right external iliac artery.  4. Slight irregularity of the soft tissues of the right second toe which may be   related to underlying gangrene.    GALILEA KING M.D., ATTENDING RADIOLOGIST  This document has been electronically signed. 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  PVD  s/p angiogram 1/17  s/p open bypass iliac-pop RLE 1/23 with dr. shepard    P:  Patient evaluated, chart reviewed  Xrays- reviewed  NICKI/PVR-reviewed  CTA-reviewed- single vessel runoff to right foot.  4x4 gauze, prince to right foot digits distally to allow frequent doppler exam  Podiatry will continue to monitor and plan for surgical intervention pending vascular recommendations following bypass.  Patient's right foot dry gangrene continues to demarcate, however perfusion to foot notably improved  Scheduled for Transmetatarsal Amputation on Wednesday  NPO after midnight  Please hold heparin in AM, can restart immediately following surgery  Discussed plan with Dr. Felix and he agreed  Discussed with attending Dr. Welsh

## 2020-01-28 NOTE — PROGRESS NOTE ADULT - SUBJECTIVE AND OBJECTIVE BOX
74y Female is under our care for gangrene of right foot with possible underlying osteomyelitis. Patient is doing well and reports that right leg discomfort has been tolerable. For possible right TMA tomorrow pending HCP availability for consent.     REVIEW OF SYSTEMS:  [  ] Not able to illicit  General: no fevers no malaise  Chest: no cough no sob  GI: no nvd  Skin: no rashes  Musculoskeletal: no trauma no LBP +leg pain  Neuro: no ha's no dizziness     MEDS:     cefepime   IVPB 2000 milliGRAM(s) IV Intermittent every 12 hours  metroNIDAZOLE  IVPB 500 milliGRAM(s) IV Intermittent every 8 hours        ALLERGIES: Allergies    influenza virus vaccine, live, trivalent (Unknown)  PC Pen VK (Rash)  penicillin (Other; Rash)  statins (Other)  sulfa drugs (Other)  sulfamethizole (Other)    VITALS:  Vital Signs Last 24 Hrs  T(C): 37.1 (28 Jan 2020 13:59), Max: 37.2 (27 Jan 2020 22:33)  T(F): 98.8 (28 Jan 2020 13:59), Max: 98.9 (27 Jan 2020 22:33)  HR: 97 (28 Jan 2020 13:59) (92 - 102)  BP: 109/44 (28 Jan 2020 13:59) (109/44 - 142/64)  BP(mean): 73 (27 Jan 2020 18:25) (73 - 73)  RR: 18 (28 Jan 2020 13:59) (16 - 18)  SpO2: 97% (28 Jan 2020 13:59) (96% - 99%)      PHYSICAL EXAM:  HEENT: n/a  Neck: supple no LN's   Respiratory: lungs clear no rales  Cardiovascular: S1 S2 reg no murmurs  Gastrointestinal: +BS with soft, nondistended abdomen; nontender  Extremities: no edema  Skin: right foot with dry gangrene primarily of first 4 digits, stable warmth of right leg   Ortho: n/a  Neuro: awake and alert      LABS/DIAGNOSTIC TESTS:                          11.4   11.79 )-----------( 303      ( 28 Jan 2020 07:15 )             35.0   WBC Count: 11.79 K/uL (01-28 @ 07:15)  WBC Count: 10.95 K/uL (01-27 @ 07:21)  WBC Count: 9.87 K/uL (01-26 @ 08:02)  WBC Count: 11.63 K/uL (01-26 @ 03:55)  WBC Count: 11.60 K/uL (01-25 @ 06:53)    01-27    138  |  105  |  23<H>  ----------------------------<  116<H>  4.1   |  30  |  0.70    Ca    8.2<L>      27 Jan 2020 07:21  Phos  1.8     01-27  Mg     1.9     01-27          CULTURES:   .Blood  01-11 @ 01:37   No growth at 5 days.  --  --        RADIOLOGY:  no new studies

## 2020-01-28 NOTE — PROGRESS NOTE ADULT - SUBJECTIVE AND OBJECTIVE BOX
Time of Visit:  Patient seen and examined.  pat seen earlier today resting in bed comfortable    MEDICATIONS  (STANDING):  aspirin  chewable 81 milliGRAM(s) Oral daily  cefepime   IVPB 2000 milliGRAM(s) IV Intermittent every 12 hours  gabapentin 100 milliGRAM(s) Oral every 12 hours  heparin  Infusion. 800 Unit(s)/Hr (8 mL/Hr) IV Continuous <Continuous>  metoprolol tartrate 25 milliGRAM(s) Oral two times a day  metroNIDAZOLE  IVPB 500 milliGRAM(s) IV Intermittent every 8 hours  povidone iodine 10% Solution 1 Application(s) Topical daily  risperiDONE   Tablet 0.5 milliGRAM(s) Oral two times a day  senna 2 Tablet(s) Oral at bedtime      MEDICATIONS  (PRN):  acetaminophen   Tablet .. 650 milliGRAM(s) Oral every 6 hours PRN Mild Pain (1 - 3)  haloperidol    Injectable 1 milliGRAM(s) IV Push every 4 hours PRN agitation  polyethylene glycol 3350 17 Gram(s) Oral daily PRN Constipation  traMADol 25 milliGRAM(s) Oral every 6 hours PRN Severe Pain (7 - 10)       Medications up to date at time of exam.      PHYSICAL EXAMINATION:  Patient has no new complaints.  GENERAL: The patient is a well-developed, well-nourished, in no apparent distress.     Vital Signs Last 24 Hrs  T(C): 36.8 (28 Jan 2020 17:15), Max: 37.2 (27 Jan 2020 22:33)  T(F): 98.2 (28 Jan 2020 17:15), Max: 98.9 (27 Jan 2020 22:33)  HR: 98 (28 Jan 2020 17:15) (92 - 102)  BP: 109/49 (28 Jan 2020 17:15) (109/44 - 142/64)  BP(mean): --  RR: 16 (28 Jan 2020 17:15) (16 - 18)  SpO2: 98% (28 Jan 2020 17:15) (97% - 99%)   (if applicable)    Chest Tube (if applicable)    HEENT: Head is normocephalic and atraumatic. Extraocular muscles are intact. Mucous membranes are moist.     NECK: Supple, no palpable adenopathy.    LUNGS: Clear to auscultation, no wheezing, rales, or rhonchi.    HEART: Regular rate and rhythm without murmur.    ABDOMEN: Soft, nontender, and nondistended.  No hepatosplenomegaly is noted.    : No painful voiding, no pelvic pain    EXTREMITIES: + gangrene right toes    NEUROLOGIC: Awake, alert, oriented, grossly intact    SKIN: Warm, dry, good turgor.      LABS:                        11.4   11.79 )-----------( 303      ( 28 Jan 2020 07:15 )             35.0     01-27    138  |  105  |  23<H>  ----------------------------<  116<H>  4.1   |  30  |  0.70    Ca    8.2<L>      27 Jan 2020 07:21  Phos  1.8     01-27  Mg     1.9     01-27      PTT - ( 28 Jan 2020 07:15 )  PTT:62.1 sec                    MICROBIOLOGY: (if applicable)    RADIOLOGY & ADDITIONAL STUDIES:  EKG:   CXR:  ECHO:    IMPRESSION: 74y Female PAST MEDICAL & SURGICAL HISTORY:  COPD (chronic obstructive pulmonary disease)  Hypercholesterolemia  Myocardial infarct, old  S/P CABG x 1   p/w           IMP: This is a 74 year old woman , live by herself, ambulates with cane  has medical hx significant for dementia, cad, pvd, hld, htn presents to the ED with complaint of discoloration for a toe on the right foot.  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 She is found to have diffuse atherosclerotic disease with multilevel arterial occlusion. Marginal/poor candidate for endovascular intervention. Patient is transferred to ICU for frequent monitoring after the angiogram.  Vascular surgery f/u.. will need b/l  open bypass and cardiac cl. message sent to dr monge .. cards. vascular surgery pending cardiac clearance .. intermediate risk and no further cardiac testing . Vascular surgery.. for OR 1/23  . COPD stable    Sugg:  -continue antibx  -duonebs q6h prn  -OOB to chair   -dvt/gi porphy

## 2020-01-29 ENCOUNTER — TRANSCRIPTION ENCOUNTER (OUTPATIENT)
Age: 75
End: 2020-01-29

## 2020-01-29 ENCOUNTER — RESULT REVIEW (OUTPATIENT)
Age: 75
End: 2020-01-29

## 2020-01-29 LAB
ANION GAP SERPL CALC-SCNC: 6 MMOL/L — SIGNIFICANT CHANGE UP (ref 5–17)
APTT BLD: 71.4 SEC — HIGH (ref 27.5–36.3)
BLD GP AB SCN SERPL QL: SIGNIFICANT CHANGE UP
BUN SERPL-MCNC: 21 MG/DL — HIGH (ref 7–18)
CALCIUM SERPL-MCNC: 8.9 MG/DL — SIGNIFICANT CHANGE UP (ref 8.4–10.5)
CHLORIDE SERPL-SCNC: 101 MMOL/L — SIGNIFICANT CHANGE UP (ref 96–108)
CO2 SERPL-SCNC: 31 MMOL/L — SIGNIFICANT CHANGE UP (ref 22–31)
CREAT SERPL-MCNC: 0.77 MG/DL — SIGNIFICANT CHANGE UP (ref 0.5–1.3)
GLUCOSE SERPL-MCNC: 122 MG/DL — HIGH (ref 70–99)
HCT VFR BLD CALC: 35.7 % — SIGNIFICANT CHANGE UP (ref 34.5–45)
HGB BLD-MCNC: 11.5 G/DL — SIGNIFICANT CHANGE UP (ref 11.5–15.5)
MAGNESIUM SERPL-MCNC: 1.7 MG/DL — SIGNIFICANT CHANGE UP (ref 1.6–2.6)
MCHC RBC-ENTMCNC: 31.1 PG — SIGNIFICANT CHANGE UP (ref 27–34)
MCHC RBC-ENTMCNC: 32.2 GM/DL — SIGNIFICANT CHANGE UP (ref 32–36)
MCV RBC AUTO: 96.5 FL — SIGNIFICANT CHANGE UP (ref 80–100)
NRBC # BLD: 0 /100 WBCS — SIGNIFICANT CHANGE UP (ref 0–0)
PHOSPHATE SERPL-MCNC: 2.6 MG/DL — SIGNIFICANT CHANGE UP (ref 2.5–4.5)
PLATELET # BLD AUTO: 330 K/UL — SIGNIFICANT CHANGE UP (ref 150–400)
POTASSIUM SERPL-MCNC: 4.1 MMOL/L — SIGNIFICANT CHANGE UP (ref 3.5–5.3)
POTASSIUM SERPL-SCNC: 4.1 MMOL/L — SIGNIFICANT CHANGE UP (ref 3.5–5.3)
RBC # BLD: 3.7 M/UL — LOW (ref 3.8–5.2)
RBC # FLD: 13.8 % — SIGNIFICANT CHANGE UP (ref 10.3–14.5)
SODIUM SERPL-SCNC: 138 MMOL/L — SIGNIFICANT CHANGE UP (ref 135–145)
WBC # BLD: 11.62 K/UL — HIGH (ref 3.8–10.5)
WBC # FLD AUTO: 11.62 K/UL — HIGH (ref 3.8–10.5)

## 2020-01-29 PROCEDURE — 88311 DECALCIFY TISSUE: CPT | Mod: 26

## 2020-01-29 PROCEDURE — 99233 SBSQ HOSP IP/OBS HIGH 50: CPT | Mod: GC

## 2020-01-29 PROCEDURE — 88304 TISSUE EXAM BY PATHOLOGIST: CPT | Mod: 26

## 2020-01-29 PROCEDURE — 88305 TISSUE EXAM BY PATHOLOGIST: CPT | Mod: 26

## 2020-01-29 RX ORDER — CHLORHEXIDINE GLUCONATE 213 G/1000ML
1 SOLUTION TOPICAL ONCE
Refills: 0 | Status: COMPLETED | OUTPATIENT
Start: 2020-01-29 | End: 2020-01-29

## 2020-01-29 RX ORDER — OXYCODONE AND ACETAMINOPHEN 5; 325 MG/1; MG/1
1 TABLET ORAL EVERY 4 HOURS
Refills: 0 | Status: DISCONTINUED | OUTPATIENT
Start: 2020-01-29 | End: 2020-02-04

## 2020-01-29 RX ORDER — ACETAMINOPHEN 500 MG
1000 TABLET ORAL ONCE
Refills: 0 | Status: DISCONTINUED | OUTPATIENT
Start: 2020-01-29 | End: 2020-01-29

## 2020-01-29 RX ORDER — HYDROMORPHONE HYDROCHLORIDE 2 MG/ML
0.5 INJECTION INTRAMUSCULAR; INTRAVENOUS; SUBCUTANEOUS
Refills: 0 | Status: DISCONTINUED | OUTPATIENT
Start: 2020-01-29 | End: 2020-01-29

## 2020-01-29 RX ORDER — HEPARIN SODIUM 5000 [USP'U]/ML
800 INJECTION INTRAVENOUS; SUBCUTANEOUS
Qty: 25000 | Refills: 0 | Status: DISCONTINUED | OUTPATIENT
Start: 2020-01-29 | End: 2020-02-04

## 2020-01-29 RX ORDER — ONDANSETRON 8 MG/1
4 TABLET, FILM COATED ORAL ONCE
Refills: 0 | Status: DISCONTINUED | OUTPATIENT
Start: 2020-01-29 | End: 2020-01-29

## 2020-01-29 RX ORDER — KETOROLAC TROMETHAMINE 30 MG/ML
15 SYRINGE (ML) INJECTION ONCE
Refills: 0 | Status: DISCONTINUED | OUTPATIENT
Start: 2020-01-29 | End: 2020-01-29

## 2020-01-29 RX ORDER — SODIUM CHLORIDE 9 MG/ML
1000 INJECTION, SOLUTION INTRAVENOUS
Refills: 0 | Status: DISCONTINUED | OUTPATIENT
Start: 2020-01-29 | End: 2020-01-29

## 2020-01-29 RX ADMIN — GABAPENTIN 100 MILLIGRAM(S): 400 CAPSULE ORAL at 17:37

## 2020-01-29 RX ADMIN — RISPERIDONE 0.5 MILLIGRAM(S): 4 TABLET ORAL at 17:37

## 2020-01-29 RX ADMIN — Medication 25 MILLIGRAM(S): at 17:37

## 2020-01-29 RX ADMIN — Medication 100 MILLIGRAM(S): at 22:15

## 2020-01-29 RX ADMIN — Medication 100 MILLIGRAM(S): at 05:46

## 2020-01-29 RX ADMIN — CEFEPIME 50 MILLIGRAM(S): 1 INJECTION, POWDER, FOR SOLUTION INTRAMUSCULAR; INTRAVENOUS at 17:37

## 2020-01-29 RX ADMIN — Medication 25 MILLIGRAM(S): at 06:50

## 2020-01-29 RX ADMIN — CHLORHEXIDINE GLUCONATE 1 APPLICATION(S): 213 SOLUTION TOPICAL at 10:55

## 2020-01-29 RX ADMIN — CEFEPIME 50 MILLIGRAM(S): 1 INJECTION, POWDER, FOR SOLUTION INTRAMUSCULAR; INTRAVENOUS at 05:47

## 2020-01-29 RX ADMIN — RISPERIDONE 0.5 MILLIGRAM(S): 4 TABLET ORAL at 06:50

## 2020-01-29 RX ADMIN — GABAPENTIN 100 MILLIGRAM(S): 400 CAPSULE ORAL at 06:50

## 2020-01-29 NOTE — PROGRESS NOTE ADULT - ASSESSMENT
74F s/p external iliac to above knee popliteal bypass  - OR today for TMA right  - NPO since midnight  - no heparin 3 hours pre and post procedure

## 2020-01-29 NOTE — PROGRESS NOTE ADULT - ASSESSMENT
1. Severe PVD   S/p Ileopopliteal bypass  Cont Heparin drip   Dressing change as per Vascular and Podiatry   Gabapentin, tylenol and tramadol for pain  Cont Aspirin     2. 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 Alzheimer's dementia   Cont Risperdal and Haloperidol (PRN)    4. HTN  Cont Metoprolol tartrate     5. CAD  Cont Aspirin  Patient is allergic to statin     6. COPD   Duoneb PRN    Full code

## 2020-01-29 NOTE — PROGRESS NOTE ADULT - ATTENDING COMMENTS
As above  FU post TMA  Watching L foot/toes- may need revasc if worse As above  R LE bypass patent  Incisions C/D/I  ant knee bruise stable.  FU post R TMA (Asked podiatry to DC hard post splint)  L foot/toes gradually worse isch changes - Plan L LE bypass on tue/wed.  Cont Hep drip.  Offload pressure points/feet

## 2020-01-29 NOTE — PROGRESS NOTE ADULT - SUBJECTIVE AND OBJECTIVE BOX
Patient is a 74y old  Female who presents with a chief complaint of dry gangrene of toes (right). S/p ileopopliteal bypass of right side, POD6; s/p TMA (right) today    Today  Patient was seen and examined at bedside today  S/p Rt TMA   Heparin drip resumed  Denies pain but severely tender in left foot  AAOx1    REVIEW OF SYSTEMS: denies fever, chills, SOB, palpitations, chest pain, abdominal pain, nausea, vomiting diarrhea, constipation, dizziness    MEDICATIONS  (STANDING):  aspirin  chewable 81 milliGRAM(s) Oral daily  cefepime   IVPB 2000 milliGRAM(s) IV Intermittent every 12 hours  gabapentin 100 milliGRAM(s) Oral every 12 hours  heparin  Infusion. 800 Unit(s)/Hr (8 mL/Hr) IV Continuous <Continuous>  metoprolol tartrate 25 milliGRAM(s) Oral two times a day  metroNIDAZOLE  IVPB 500 milliGRAM(s) IV Intermittent every 8 hours  povidone iodine 10% Solution 1 Application(s) Topical daily  risperiDONE   Tablet 0.5 milliGRAM(s) Oral two times a day  senna 2 Tablet(s) Oral at bedtime    MEDICATIONS  (PRN):  acetaminophen   Tablet .. 650 milliGRAM(s) Oral every 6 hours PRN Mild Pain (1 - 3)  haloperidol    Injectable 1 milliGRAM(s) IV Push every 4 hours PRN agitation  oxycodone    5 mG/acetaminophen 325 mG 1 Tablet(s) Oral every 4 hours PRN Moderate Pain (4 - 6)  polyethylene glycol 3350 17 Gram(s) Oral daily PRN Constipation  traMADol 25 milliGRAM(s) Oral every 6 hours PRN Severe Pain (7 - 10)      PHYSICAL EXAM:  GENERAL: NAD  NERVOUS SYSTEM:  Alert & Oriented X1, Good concentration  CHEST/LUNG: Clear to auscultation bilaterally; No rales, rhonchi, wheezing, or rubs  HEART: Regular rate and rhythm; No murmurs, rubs, or gallops  ABDOMEN: Soft, Nontender, Nondistended; Bowel sounds present  EXTREMITIES:  RLE bandage noted s/p TMA, small area of erythema in right knee ( necrosis?), Left 2nd toe gangrene, ADP not palpable    LABS:                        11.5   11.62 )-----------( 330      ( 29 Jan 2020 07:49 )             35.7       138  |  101  |  21<H>  ----------------------------<  122<H>  4.1   |  31  |  0.77    Ca    8.9      29 Jan 2020 07:49  Phos  2.6     01-29  Mg     1.7     01-29      PTT - ( 29 Jan 2020 07:49 )  PTT:71.4 sec

## 2020-01-29 NOTE — PROGRESS NOTE ADULT - SUBJECTIVE AND OBJECTIVE BOX
Patient is a 74y old  Female who presents with a chief complaint of discoloration of toe of right foot.     HPI: This 74 year old non-diabetic female presents to the ED with complaint of discoloration for a toe on the right foot. She states that she was seen by her primary care physician a few days ago and told her to go to the emergency room.  Patient states she smokes at least a pack a day of cigarettes since she was 22 years old.  Patient states she can only ambulate 10 feet and gets a cramp in her right calf for which she needs to sit down.  She states she is very minimally ambulatory.   She denies pain at rest with her feet elevated.  Patient lives alone with a cat. Patient massaging leg throughout the visit.  Patient denies n/v/d/sob/cp/f.  Patient states her blood pressure is not usually low.     Podiatry Interval HPI:  Patient seen bedside this AM  .  Patient was awake and alert.   Pt going to OR today at 12:30 for transmetatarsal amputation right foot. consent in place . foot marked. Patient denies n/f/d/f/sob.      PMH: COPD (chronic obstructive pulmonary disease)  Hypercholesterolemia  Myocardial infarct, old    Allergies: PC Pen VK (Rash)  penicillin (Other; Rash)  statins (Other)  sulfa drugs (Other)  sulfamethizole (Other)    MEDICATIONS  (STANDING):  aspirin  chewable 81 milliGRAM(s) Oral daily  cefepime   IVPB 2000 milliGRAM(s) IV Intermittent every 12 hours  gabapentin 100 milliGRAM(s) Oral every 12 hours  heparin  Infusion. 800 Unit(s)/Hr (8 mL/Hr) IV Continuous <Continuous>  metoprolol tartrate 25 milliGRAM(s) Oral two times a day  metroNIDAZOLE  IVPB 500 milliGRAM(s) IV Intermittent every 8 hours  povidone iodine 10% Solution 1 Application(s) Topical daily  risperiDONE   Tablet 0.5 milliGRAM(s) Oral two times a day  senna 2 Tablet(s) Oral at bedtime      FH:  PSX: S/P CABG x 1    Social History:  pt states she smokes 1ppd since she was 22  denies etoh or substance use (10 Angel 2020 21:52)      Labs                        11.5   11.62 )-----------( 330      ( 29 Jan 2020 07:49 )             35.7   01-29    138  |  101  |  21<H>  ----------------------------<  122<H>  4.1   |  31  |  0.77    Ca    8.9      29 Jan 2020 07:49  Phos  2.6     01-29  Mg     1.7     01-29    )  Sedimentation Rate, Erythrocyte: 60 mm/Hr (01-22-20 @ 06:32)  Hemoglobin A1C, Whole Blood: 5.6 % (01-11-20 @ 09:30)       C-Reactive Protein, Serum: 1.80 mg/dL (01-22-20 @ 09:57)    WBC Count: 11.79 K/uL <H> (01-28-20 @ 07:15)    Vital Signs Last 24 Hrs  T(C): 36.8 (29 Jan 2020 06:02), Max: 37.1 (28 Jan 2020 13:59)  T(F): 98.3 (29 Jan 2020 06:02), Max: 98.8 (28 Jan 2020 13:59)  HR: 97 (29 Jan 2020 07:00) (92 - 108)  BP: 115/58 (29 Jan 2020 07:00) (109/44 - 146/54)  BP(mean): --  RR: 17 (29 Jan 2020 07:00) (16 - 18)  SpO2: 96% (29 Jan 2020 06:02) (96% - 99%)          PHYSICAL EXAM  GEN: MERARY MEYER is a pleasant well-nourished, well developed 74y Female in no acute distress. Refused exam this AM, will attempt at later time  Per previous examination:   LE Focused:    Vasc:  DP and PT pulses non-palpable b/l. Left DP faintly dopplerable. Unable to doppler left PT, Unable to find right DP or PT with doppler. No CFT to right 1st, 2nd, 3rd, or 4th toes. Shiny taught skin b/l dorsal feet with level of violaceous color extending proximally to tarsal joints. Overall perfusion to foot notably improved, right foot is warm to touch.  Derm: Area of ischemia remains the stable.   Right 2nd toe: black necrotic to the level of the proximal with exposed bone.  Distal aspect of the right 2nd toe is hard and dry, no drainage to the right 2nd toe.  Right foot erythema to level just distal to ankle joint. Right 1st, 3rd, and 4th toes are necrotic and demarcated.  No drainage, no malodor, no signs of infection.  Neuro: Sensation diminished to digits b/l.  MSK: Tenderness to palpation distal foot.  Pain to touch dorsal right foot.     Imaging:   < from: Xray Foot AP + Lateral + Oblique, Right (01.10.20 @ 15:05) >    EXAM:  FOOT RIGHT (MINIMUM 3 VIEWS)                        PROCEDURE DATE:  01/10/2020    INTERPRETATION:  Right foot. 3 views. Patient has local pain.    The foot shows mild degeneration at the first MTP joint.    There is an old fracture deformity of the distal fibula.    No bone destruction or acute fracture is evident.    IMPRESSION: No acute finding.    JASKARAN MOSLEY M.D., ATTENDING RADIOLOGIST  This document has been electronically signed. Angel 10 2020  3:06PM  < end of copied text >    < from: [facility] Physiol Extremity Lower 3+ Level, BI (01.13.20 @ 13:49) >    EXAM:  US PHYSIOL LWR EXT 3+ LEV BI                        PROCEDURE DATE:  01/13/2020    INTERPRETATION:  Clinical indication: Right toe gangrene    Comparison: None    FINDINGS AND   IMPRESSION: Segmental pressures could not be obtaineddue to patient discomfort. Therefore, ABIs could not be calculated.    There are biphasic waveforms in the lower left thigh. Abnormally, monophasic flow within the remainder of the lower extremities.    RACQUEL CHA M.D., ATTENDING RADIOLOGIST  This document has been electronically signed. Jan 13 2020  2:20PM  < end of copied text >    < from: CT Angio Abd Aorta w/run-off w/ IV Cont (01.12.20 @ 19:40) >    EXAM:  CT ANGIO ABD AOR W RUN(W)AW IC                        PROCEDURE DATE:  01/12/2020    INTERPRETATION:  CT ANGIO ABDOMINAL AORTA RUNOFF WITHOUT AND OR WITH IV CONTRAST    CLINICAL INFORMATION: Atherosclerosis of the native arteries of the right and left lower extremity with right foot gangrene    PROCEDURE INFORMATION:   Exam: CTA Angiogram of the Abdominal Aorta and Bilateral Lower Extremities   (Run-off) With IV Contrast   Exam date and time: 1/12/2020 6:55 PM   Age: 74 years old   Clinical indication: Other: Pad, right 2nd toe gangrene, 3rd and 4th toes wet   early gang     TECHNIQUE:   Imaging protocol: CT angiogram of the abdominal aorta, pelvis and bilateral   lower extremities with IV iodinated contrast.   Intravenous contrast: 90 cc Omnipaque 350  3D rendering: MIP and/or 3D reconstructed images were created by the   technologist.     COMPARISON:   CR XR FOOT 3 VIEWS RIGHT 1/10/2020 2:46 PM     FINDINGS:   Aorta: Severe calcified and noncalcified atherosclerotic plaque. Aorta is   otherwise fully patent.   Celiac trunk and mesenteric arteries: No occlusion or significant stenosis.    Renal arteries: No occlusion or significant stenosis.      Right iliac arteries: Marked stenosis of the right external iliac artery distally.   Right femoral/popliteal arteries: Distal CFA stenosis. Severely and diffusely attenuated SFA. Occlusion of the mid-distal right superficial   femoral artery with reconstitution of the suprageniculate popliteal  artery. Stenotic right popliteal artery.   Right infrapopliteal arteries: Single-vessel runoff via the right peroneal   artery, diffusely attenuated, which feeds the plantar artery. The right anterior and posterior tibial   arteries are occluded. Reconstitution of the right pedis dorsalis artery at the   level of the foot, however severely attenuated.     Left iliac arteries: Moderate calcification and stenosis of the left iliac arteries. Occluded left internal iliac artery.  Left femoral/popliteal arteries: Long segmentOcclusion of the entire left superficial femoral   artery and Reconstitution of left popliteal artery, which shows multifocal disease without occlusion.  Left infrapopliteal arteries: Occlusion of the left anterior posterior tibial   arteries at the level of the ankle. Single-vessel left peroneal artery which   feeds the plantar artery. Left pedis dorsalis is attenuated but patent.    Liver: No mass.   Gallbladder and bile ducts: Unremarkable. No calcified stones. No ductal   dilation.    Pancreas: Unremarkable. No mass. No ductal dilation.    Spleen: Normal. No splenomegaly.    Adrenals: Normal. No mass.    Kidneys and ureters: Normal. No mass.      Stomach and bowel: . No obstruction. No mucosal thickening. Moderate stool   throughout the colon.    Appendix: No evidence of appendicitis.      Bladder: Unremarkable. No mass.    Reproductive: Unremarkable as visualized.      Intraperitoneal space: Unremarkable. No free air. No significant fluid   collection.    Lymph nodes: No lymphadenopathy.     Bones/joints: No acute fracture. No dislocation.    Soft tissues: Soft tissue irregularity of the second right toe presumably   related to underlying gangrene.       IMPRESSION:   1. Right lower extremity demonstrates marked diffuse stenosis of the lower   extremity with occlusion of the mid-distal right SFA which reconstitutes distally and   with single-vessel runoff to the right foot via the right peroneal artery which   feeds the plantar artery. The right pedis dorsalis artery reconstitutes at the   level of the ankle.   2. The left lower extremity demonstrates diffuse marked stenosis with total occlusion   of the left SFA with reconstitution of attenuated left popliteal artery. There is   single-vessel runoff via the left peronealartery is the which then feeds the   left plantar artery. The pedis dorsalis artery is patent.   3. evidence of inflow disease at the level of the right external iliac artery.  4. Slight irregularity of the soft tissues of the right second toe which may be   related to underlying gangrene.    GALILEA KING M.D., ATTENDING RADIOLOGIST  This document has been electronically signed. 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  PVD  s/p angiogram 1/17  s/p open bypass iliac-pop RLE 1/23 with dr. shepard    P:  Patient evaluated, chart reviewed  Xrays- reviewed  NICKI/PVR-reviewed  CTA-reviewed- single vessel runoff to right foot.  4x4 gauze, prince to right foot digits distally to allow frequent doppler exam  consent in place  foot marked  Scheduled for Transmetatarsal Amputation on today at noon  NPO after midnight  Please hold heparin in AM, can restart immediately following surgery  Discussed plan with Dr. Felix and he agreed  Discussed and seen with attending Dr. Welsh Patient is a 74y old  Female who presents with a chief complaint of discoloration of toe of right foot.     HPI: This 74 year old non-diabetic female presents to the ED with complaint of discoloration for a toe on the right foot. She states that she was seen by her primary care physician a few days ago and told her to go to the emergency room.  Patient states she smokes at least a pack a day of cigarettes since she was 22 years old.  Patient states she can only ambulate 10 feet and gets a cramp in her right calf for which she needs to sit down.  She states she is very minimally ambulatory.   She denies pain at rest with her feet elevated.  Patient lives alone with a cat. Patient massaging leg throughout the visit.  Patient denies n/v/d/sob/cp/f.  Patient states her blood pressure is not usually low.     Podiatry Interval HPI:  Patient seen bedside this AM  .  Patient was awake and alert.   Pt going to OR today at 12:30 for transmetatarsal amputation right foot. consent in place . foot marked. Patient denies n/f/d/f/sob.      PMH: COPD (chronic obstructive pulmonary disease)  Hypercholesterolemia  Myocardial infarct, old    Allergies: PC Pen VK (Rash)  penicillin (Other; Rash)  statins (Other)  sulfa drugs (Other)  sulfamethizole (Other)    MEDICATIONS  (STANDING):  aspirin  chewable 81 milliGRAM(s) Oral daily  cefepime   IVPB 2000 milliGRAM(s) IV Intermittent every 12 hours  gabapentin 100 milliGRAM(s) Oral every 12 hours  heparin  Infusion. 800 Unit(s)/Hr (8 mL/Hr) IV Continuous <Continuous>  metoprolol tartrate 25 milliGRAM(s) Oral two times a day  metroNIDAZOLE  IVPB 500 milliGRAM(s) IV Intermittent every 8 hours  povidone iodine 10% Solution 1 Application(s) Topical daily  risperiDONE   Tablet 0.5 milliGRAM(s) Oral two times a day  senna 2 Tablet(s) Oral at bedtime      FH:  PSX: S/P CABG x 1    Social History:  pt states she smokes 1ppd since she was 22  denies etoh or substance use (10 Angel 2020 21:52)      Labs                        11.5   11.62 )-----------( 330      ( 29 Jan 2020 07:49 )             35.7   01-29    138  |  101  |  21<H>  ----------------------------<  122<H>  4.1   |  31  |  0.77    Ca    8.9      29 Jan 2020 07:49  Phos  2.6     01-29  Mg     1.7     01-29    )  Sedimentation Rate, Erythrocyte: 60 mm/Hr (01-22-20 @ 06:32)  Hemoglobin A1C, Whole Blood: 5.6 % (01-11-20 @ 09:30)       C-Reactive Protein, Serum: 1.80 mg/dL (01-22-20 @ 09:57)    WBC Count: 11.79 K/uL <H> (01-28-20 @ 07:15)    Vital Signs Last 24 Hrs  T(C): 36.8 (29 Jan 2020 06:02), Max: 37.1 (28 Jan 2020 13:59)  T(F): 98.3 (29 Jan 2020 06:02), Max: 98.8 (28 Jan 2020 13:59)  HR: 97 (29 Jan 2020 07:00) (92 - 108)  BP: 115/58 (29 Jan 2020 07:00) (109/44 - 146/54)  BP(mean): --  RR: 17 (29 Jan 2020 07:00) (16 - 18)  SpO2: 96% (29 Jan 2020 06:02) (96% - 99%)          PHYSICAL EXAM  GEN: MERARY MEYER is a pleasant well-nourished, well developed 74y Female in no acute distress. Vasc:  DP and PT pulses non-palpable b/l. Left DP faintly dopplerable. Unable to doppler left PT, Unable to find right DP or PT with doppler. No CFT to right 1st, 2nd, 3rd, or 4th toes. Shiny taught skin b/l dorsal feet with level of violaceous color extending proximally to tarsal joints. Overall perfusion to foot notably improved, right foot is warm to touch.  Derm: Area of ischemia remains the stable.   Right 2nd toe: black necrotic to the level of the proximal with exposed bone.  Distal aspect of the right 2nd toe is hard and dry, no drainage to the right 2nd toe.  Right foot erythema to level just distal to ankle joint. Right 1st, 3rd, and 4th toes are necrotic and demarcated.  No drainage, no malodor, no signs of infection.  Neuro: Sensation diminished to digits b/l.  MSK: Tenderness to palpation distal foot.  Pain to touch dorsal right foot.     Imaging:   < from: Xray Foot AP + Lateral + Oblique, Right (01.10.20 @ 15:05) >    EXAM:  FOOT RIGHT (MINIMUM 3 VIEWS)                        PROCEDURE DATE:  01/10/2020    INTERPRETATION:  Right foot. 3 views. Patient has local pain.    The foot shows mild degeneration at the first MTP joint.    There is an old fracture deformity of the distal fibula.    No bone destruction or acute fracture is evident.    IMPRESSION: No acute finding.    JASKARAN MOSLEY M.D., ATTENDING RADIOLOGIST  This document has been electronically signed. Angel 10 2020  3:06PM  < end of copied text >    < from: [facility] Physiol Extremity Lower 3+ Level, BI (01.13.20 @ 13:49) >    EXAM:  US PHYSIOL LWR EXT 3+ LEV BI                        PROCEDURE DATE:  01/13/2020    INTERPRETATION:  Clinical indication: Right toe gangrene    Comparison: None    FINDINGS AND   IMPRESSION: Segmental pressures could not be obtaineddue to patient discomfort. Therefore, ABIs could not be calculated.    There are biphasic waveforms in the lower left thigh. Abnormally, monophasic flow within the remainder of the lower extremities.    RACQUEL CHA M.D., ATTENDING RADIOLOGIST  This document has been electronically signed. Jan 13 2020  2:20PM  < end of copied text >    < from: CT Angio Abd Aorta w/run-off w/ IV Cont (01.12.20 @ 19:40) >    EXAM:  CT ANGIO ABD AOR W RUN(W)AW IC                        PROCEDURE DATE:  01/12/2020    INTERPRETATION:  CT ANGIO ABDOMINAL AORTA RUNOFF WITHOUT AND OR WITH IV CONTRAST    CLINICAL INFORMATION: Atherosclerosis of the native arteries of the right and left lower extremity with right foot gangrene    PROCEDURE INFORMATION:   Exam: CTA Angiogram of the Abdominal Aorta and Bilateral Lower Extremities   (Run-off) With IV Contrast   Exam date and time: 1/12/2020 6:55 PM   Age: 74 years old   Clinical indication: Other: Pad, right 2nd toe gangrene, 3rd and 4th toes wet   early gang     TECHNIQUE:   Imaging protocol: CT angiogram of the abdominal aorta, pelvis and bilateral   lower extremities with IV iodinated contrast.   Intravenous contrast: 90 cc Omnipaque 350  3D rendering: MIP and/or 3D reconstructed images were created by the   technologist.     COMPARISON:   CR XR FOOT 3 VIEWS RIGHT 1/10/2020 2:46 PM     FINDINGS:   Aorta: Severe calcified and noncalcified atherosclerotic plaque. Aorta is   otherwise fully patent.   Celiac trunk and mesenteric arteries: No occlusion or significant stenosis.    Renal arteries: No occlusion or significant stenosis.      Right iliac arteries: Marked stenosis of the right external iliac artery distally.   Right femoral/popliteal arteries: Distal CFA stenosis. Severely and diffusely attenuated SFA. Occlusion of the mid-distal right superficial   femoral artery with reconstitution of the suprageniculate popliteal  artery. Stenotic right popliteal artery.   Right infrapopliteal arteries: Single-vessel runoff via the right peroneal   artery, diffusely attenuated, which feeds the plantar artery. The right anterior and posterior tibial   arteries are occluded. Reconstitution of the right pedis dorsalis artery at the   level of the foot, however severely attenuated.     Left iliac arteries: Moderate calcification and stenosis of the left iliac arteries. Occluded left internal iliac artery.  Left femoral/popliteal arteries: Long segmentOcclusion of the entire left superficial femoral   artery and Reconstitution of left popliteal artery, which shows multifocal disease without occlusion.  Left infrapopliteal arteries: Occlusion of the left anterior posterior tibial   arteries at the level of the ankle. Single-vessel left peroneal artery which   feeds the plantar artery. Left pedis dorsalis is attenuated but patent.    Liver: No mass.   Gallbladder and bile ducts: Unremarkable. No calcified stones. No ductal   dilation.    Pancreas: Unremarkable. No mass. No ductal dilation.    Spleen: Normal. No splenomegaly.    Adrenals: Normal. No mass.    Kidneys and ureters: Normal. No mass.      Stomach and bowel: . No obstruction. No mucosal thickening. Moderate stool   throughout the colon.    Appendix: No evidence of appendicitis.      Bladder: Unremarkable. No mass.    Reproductive: Unremarkable as visualized.      Intraperitoneal space: Unremarkable. No free air. No significant fluid   collection.    Lymph nodes: No lymphadenopathy.     Bones/joints: No acute fracture. No dislocation.    Soft tissues: Soft tissue irregularity of the second right toe presumably   related to underlying gangrene.       IMPRESSION:   1. Right lower extremity demonstrates marked diffuse stenosis of the lower   extremity with occlusion of the mid-distal right SFA which reconstitutes distally and   with single-vessel runoff to the right foot via the right peroneal artery which   feeds the plantar artery. The right pedis dorsalis artery reconstitutes at the   level of the ankle.   2. The left lower extremity demonstrates diffuse marked stenosis with total occlusion   of the left SFA with reconstitution of attenuated left popliteal artery. There is   single-vessel runoff via the left peronealartery is the which then feeds the   left plantar artery. The pedis dorsalis artery is patent.   3. evidence of inflow disease at the level of the right external iliac artery.  4. Slight irregularity of the soft tissues of the right second toe which may be   related to underlying gangrene.    GALILEA KING M.D., ATTENDING RADIOLOGIST  This document has been electronically signed. 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  PVD  s/p angiogram 1/17  s/p open bypass iliac-pop RLE 1/23 with dr. shepard    P:  Patient evaluated, chart reviewed  Xrays- reviewed  NICKI/PVR-reviewed  CTA-reviewed- single vessel runoff to right foot.  4x4 gauze, prince to right foot digits distally to allow frequent doppler exam  consent in place  foot marked  Scheduled for Transmetatarsal Amputation on today at 1230  NPO after midnight  Please hold heparin in AM, can restart immediately following surgery  Discussed plan with Dr. Felix and he agreed  Discussed and seen with attending Dr. Welsh

## 2020-01-29 NOTE — PROGRESS NOTE ADULT - SUBJECTIVE AND OBJECTIVE BOX
74F POD6  external iliac to popliteal PTFE bypass. Patient states she is doing well. No acute events overnight.  Patient remains on heparin drip held 8:30 3 hours before procedure, AM PTT w/in range.  Afebrile and HD stable. Distal pulses dopplerable. Podiatry to OR today for TMA.    Vital Signs Last 24 Hrs  T(C): 36.8 (29 Jan 2020 06:02), Max: 37.1 (28 Jan 2020 13:59)  T(F): 98.3 (29 Jan 2020 06:02), Max: 98.8 (28 Jan 2020 13:59)  HR: 97 (29 Jan 2020 07:00) (92 - 108)  BP: 115/58 (29 Jan 2020 07:00) (109/44 - 146/54)  BP(mean): --  RR: 17 (29 Jan 2020 07:00) (16 - 18)  SpO2: 96% (29 Jan 2020 06:02) (96% - 99%)  NAD  Palp popliteal R leg  Dopllerable PT and DP R leg                          11.4   11.79 )-----------( 303      ( 28 Jan 2020 07:15 )             35.0     PTT - ( 29 Jan 2020 07:49 )  PTT:71.4 sec

## 2020-01-29 NOTE — PROGRESS NOTE ADULT - ATTENDING COMMENTS
Patient seen at bedside.  Appears to understand planned procedure.  Consent obtained from proxy.  Plan for TMA with HIEU today.  Concerned with healing potential based on circulation going into foot.  If patient does not heal TMA she would be at risk for more proximal amputation.  Todays procedure is an attempt to salvage the limb. No guarantees were given or implied

## 2020-01-29 NOTE — BRIEF OPERATIVE NOTE - OPERATION/FINDINGS
see post op note -minimal intra-op bleeding, concerned for healing potential  -TMA with stravix and muscle flap with HIEU

## 2020-01-30 LAB
APTT BLD: 59.9 SEC — HIGH (ref 27.5–36.3)
APTT BLD: 60.6 SEC — HIGH (ref 27.5–36.3)
APTT BLD: 62.3 SEC — HIGH (ref 27.5–36.3)
HCT VFR BLD CALC: 30.3 % — LOW (ref 34.5–45)
HCT VFR BLD CALC: 31.1 % — LOW (ref 34.5–45)
HCT VFR BLD CALC: 31.3 % — LOW (ref 34.5–45)
HGB BLD-MCNC: 10.1 G/DL — LOW (ref 11.5–15.5)
HGB BLD-MCNC: 10.1 G/DL — LOW (ref 11.5–15.5)
HGB BLD-MCNC: 9.9 G/DL — LOW (ref 11.5–15.5)
MCHC RBC-ENTMCNC: 30.9 PG — SIGNIFICANT CHANGE UP (ref 27–34)
MCHC RBC-ENTMCNC: 31.2 PG — SIGNIFICANT CHANGE UP (ref 27–34)
MCHC RBC-ENTMCNC: 31.4 PG — SIGNIFICANT CHANGE UP (ref 27–34)
MCHC RBC-ENTMCNC: 32.3 GM/DL — SIGNIFICANT CHANGE UP (ref 32–36)
MCHC RBC-ENTMCNC: 32.5 GM/DL — SIGNIFICANT CHANGE UP (ref 32–36)
MCHC RBC-ENTMCNC: 32.7 GM/DL — SIGNIFICANT CHANGE UP (ref 32–36)
MCV RBC AUTO: 95.7 FL — SIGNIFICANT CHANGE UP (ref 80–100)
MCV RBC AUTO: 96 FL — SIGNIFICANT CHANGE UP (ref 80–100)
MCV RBC AUTO: 96.2 FL — SIGNIFICANT CHANGE UP (ref 80–100)
NRBC # BLD: 0 /100 WBCS — SIGNIFICANT CHANGE UP (ref 0–0)
PLATELET # BLD AUTO: 301 K/UL — SIGNIFICANT CHANGE UP (ref 150–400)
PLATELET # BLD AUTO: 309 K/UL — SIGNIFICANT CHANGE UP (ref 150–400)
PLATELET # BLD AUTO: 336 K/UL — SIGNIFICANT CHANGE UP (ref 150–400)
RBC # BLD: 3.15 M/UL — LOW (ref 3.8–5.2)
RBC # BLD: 3.24 M/UL — LOW (ref 3.8–5.2)
RBC # BLD: 3.27 M/UL — LOW (ref 3.8–5.2)
RBC # FLD: 14.1 % — SIGNIFICANT CHANGE UP (ref 10.3–14.5)
RBC # FLD: 14.3 % — SIGNIFICANT CHANGE UP (ref 10.3–14.5)
RBC # FLD: 14.3 % — SIGNIFICANT CHANGE UP (ref 10.3–14.5)
WBC # BLD: 11.99 K/UL — HIGH (ref 3.8–10.5)
WBC # BLD: 13.17 K/UL — HIGH (ref 3.8–10.5)
WBC # BLD: 15.05 K/UL — HIGH (ref 3.8–10.5)
WBC # FLD AUTO: 11.99 K/UL — HIGH (ref 3.8–10.5)
WBC # FLD AUTO: 13.17 K/UL — HIGH (ref 3.8–10.5)
WBC # FLD AUTO: 15.05 K/UL — HIGH (ref 3.8–10.5)

## 2020-01-30 PROCEDURE — 99222 1ST HOSP IP/OBS MODERATE 55: CPT

## 2020-01-30 PROCEDURE — 73630 X-RAY EXAM OF FOOT: CPT | Mod: 26,RT

## 2020-01-30 PROCEDURE — 99233 SBSQ HOSP IP/OBS HIGH 50: CPT | Mod: GC

## 2020-01-30 RX ADMIN — CEFEPIME 50 MILLIGRAM(S): 1 INJECTION, POWDER, FOR SOLUTION INTRAMUSCULAR; INTRAVENOUS at 18:50

## 2020-01-30 RX ADMIN — OXYCODONE AND ACETAMINOPHEN 1 TABLET(S): 5; 325 TABLET ORAL at 16:00

## 2020-01-30 RX ADMIN — Medication 1 APPLICATION(S): at 11:58

## 2020-01-30 RX ADMIN — HEPARIN SODIUM 800 UNIT(S)/HR: 5000 INJECTION INTRAVENOUS; SUBCUTANEOUS at 18:17

## 2020-01-30 RX ADMIN — Medication 100 MILLIGRAM(S): at 15:22

## 2020-01-30 RX ADMIN — Medication 100 MILLIGRAM(S): at 21:32

## 2020-01-30 RX ADMIN — RISPERIDONE 0.5 MILLIGRAM(S): 4 TABLET ORAL at 05:10

## 2020-01-30 RX ADMIN — SENNA PLUS 2 TABLET(S): 8.6 TABLET ORAL at 21:32

## 2020-01-30 RX ADMIN — GABAPENTIN 100 MILLIGRAM(S): 400 CAPSULE ORAL at 05:09

## 2020-01-30 RX ADMIN — OXYCODONE AND ACETAMINOPHEN 1 TABLET(S): 5; 325 TABLET ORAL at 11:58

## 2020-01-30 RX ADMIN — HEPARIN SODIUM 800 UNIT(S)/HR: 5000 INJECTION INTRAVENOUS; SUBCUTANEOUS at 11:59

## 2020-01-30 RX ADMIN — OXYCODONE AND ACETAMINOPHEN 1 TABLET(S): 5; 325 TABLET ORAL at 12:30

## 2020-01-30 RX ADMIN — Medication 100 MILLIGRAM(S): at 05:09

## 2020-01-30 RX ADMIN — OXYCODONE AND ACETAMINOPHEN 1 TABLET(S): 5; 325 TABLET ORAL at 15:24

## 2020-01-30 RX ADMIN — GABAPENTIN 100 MILLIGRAM(S): 400 CAPSULE ORAL at 18:13

## 2020-01-30 RX ADMIN — RISPERIDONE 0.5 MILLIGRAM(S): 4 TABLET ORAL at 18:13

## 2020-01-30 RX ADMIN — Medication 25 MILLIGRAM(S): at 18:50

## 2020-01-30 RX ADMIN — Medication 81 MILLIGRAM(S): at 11:57

## 2020-01-30 RX ADMIN — CEFEPIME 50 MILLIGRAM(S): 1 INJECTION, POWDER, FOR SOLUTION INTRAMUSCULAR; INTRAVENOUS at 05:09

## 2020-01-30 RX ADMIN — Medication 25 MILLIGRAM(S): at 05:10

## 2020-01-30 NOTE — PROGRESS NOTE ADULT - ASSESSMENT
1.	Dry gangrene of right foot with underlying osteomyelitis - s/p TMA  2.	Leukocytosis - likely reactive  ·	cont maxipime 2gms iv q12h and flagyl 500mgs po TID, give for 3 days more  ·	reconsult prn

## 2020-01-30 NOTE — PROGRESS NOTE ADULT - ASSESSMENT
1. Severe PVD   S/p Ileopopliteal bypass  Cont Heparin drip   Scheduled for left fem-pop bypass on Jan 4  Dressing change as per Vascular and Podiatry   Gabapentin, Tylenol and tramadol for pain  Cont Aspirin     2. 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 Alzheimer's dementia   Cont Risperdal and Haloperidol (PRN)    4. HTN  Cont Metoprolol tartrate     5. CAD  Cont Aspirin  Patient is allergic to statin     6. COPD   Duoneb PRN    7. 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

## 2020-01-30 NOTE — PROGRESS NOTE ADULT - SUBJECTIVE AND OBJECTIVE BOX
Patient is a 74y old  Female who presents with a chief complaint of discoloration of toe of right foot.     HPI: This 74 year old non-diabetic female presents to the ED with complaint of discoloration for a toe on the right foot. She states that she was seen by her primary care physician a few days ago and told her to go to the emergency room.  Patient states she smokes at least a pack a day of cigarettes since she was 22 years old.  Patient states she can only ambulate 10 feet and gets a cramp in her right calf for which she needs to sit down.  She states she is very minimally ambulatory.   She denies pain at rest with her feet elevated.  Patient lives alone with a cat. Patient massaging leg throughout the visit.  Patient denies n/v/d/sob/cp/f.  Patient states her blood pressure is not usually low.     Podiatry Interval HPI:  Patient seen bedside this AM POD#1.  Patient was awake and alert. Patient states she is unable to fully extend her left hand.  She states it may have been that way for a couple of days.  She states her left foot is very painful to the touch and she is sensitive when someone touches the right leg but states it is not painful.    Patient denies n/f/d/f/sob.      PMH: COPD (chronic obstructive pulmonary disease)  Hypercholesterolemia  Myocardial infarct, old    Allergies: PC Pen VK (Rash)  penicillin (Other; Rash)  statins (Other)  sulfa drugs (Other)  sulfamethizole (Other)    MEDICATIONS  (STANDING):  aspirin  chewable 81 milliGRAM(s) Oral daily  cefepime   IVPB 2000 milliGRAM(s) IV Intermittent every 12 hours  gabapentin 100 milliGRAM(s) Oral every 12 hours  heparin  Infusion. 800 Unit(s)/Hr (8 mL/Hr) IV Continuous <Continuous>  metoprolol tartrate 25 milliGRAM(s) Oral two times a day  metroNIDAZOLE  IVPB 500 milliGRAM(s) IV Intermittent every 8 hours  povidone iodine 10% Solution 1 Application(s) Topical daily  risperiDONE   Tablet 0.5 milliGRAM(s) Oral two times a day  senna 2 Tablet(s) Oral at bedtime    MEDICATIONS  (PRN):  acetaminophen   Tablet .. 650 milliGRAM(s) Oral every 6 hours PRN Mild Pain (1 - 3)  haloperidol    Injectable 1 milliGRAM(s) IV Push every 4 hours PRN agitation  oxycodone    5 mG/acetaminophen 325 mG 1 Tablet(s) Oral every 4 hours PRN Moderate Pain (4 - 6)  polyethylene glycol 3350 17 Gram(s) Oral daily PRN Constipation  traMADol 25 milliGRAM(s) Oral every 6 hours PRN Severe Pain (7 - 10)      FH:  PSX: S/P CABG x 1    Social History:  pt states she smokes 1ppd since she was 22  denies etoh or substance use (10 Angel 2020 21:52)      Labs                        10.1   13.17 )-----------( 309      ( 30 Jan 2020 06:44 )             31.3     01-29    138  |  101  |  21<H>  ----------------------------<  122<H>  4.1   |  31  |  0.77    Ca    8.9      29 Jan 2020 07:49  Phos  2.6     01-29  Mg     1.7     01-29      Vital Signs Last 24 Hrs  T(C): 37.1 (30 Jan 2020 05:00), Max: 37.2 (29 Jan 2020 22:20)  T(F): 98.8 (30 Jan 2020 05:00), Max: 99 (29 Jan 2020 22:20)  HR: 94 (30 Jan 2020 05:00) (82 - 102)  BP: 145/70 (30 Jan 2020 05:00) (116/57 - 145/70)  BP(mean): 86 (29 Jan 2020 14:56) (68 - 120)  RR: 16 (30 Jan 2020 05:00) (12 - 22)  SpO2: 98% (30 Jan 2020 05:00) (90% - 98%)  Sedimentation Rate, Erythrocyte: 60 mm/Hr (01-22-20 @ 06:32)  Hemoglobin A1C, Whole Blood: 5.6 % (01-11-20 @ 09:30)       C-Reactive Protein, Serum: 1.80 mg/dL (01-22-20 @ 09:57)    WBC Count: 13.17 K/uL <H> (01-30-20 @ 06:44)  WBC Count: 11.99 K/uL <H> (01-30-20 @ 00:33)            PHYSICAL EXAM  GEN: MERARY MEYER is a pleasant well-nourished, well developed 74y Female in no acute distress. Patient is alert,  Vasc:  DP and PT pulses non-palpable b/l. Left DP faintly dopplerable. Unable to doppler left PT, Unable to find right DP or PT with doppler. Shiny taught skin b/l dorsal feet with level of violaceous color extending proximally to tarsal joints. Overall perfusion to foot notably improved, right foot is warm to touch.  Derm: Surgical incision with well approximated skin edges right distal TMA. No drainage, All sutures intact.  Dorsal foot slight ecchymotic patch.  Lateral foot slight ecchymotic patch. Sutures intact with well approximated skin edges posterior ankle.    Necrotic patch on dorsal left 3rd digit.    MSK: Tenderness to palpation distal foot.  Pain to touch dorsal left foot foot.     Imaging:   < from: Xray Foot AP + Lateral + Oblique, Right (01.10.20 @ 15:05) >    EXAM:  FOOT RIGHT (MINIMUM 3 VIEWS)                        PROCEDURE DATE:  01/10/2020    INTERPRETATION:  Right foot. 3 views. Patient has local pain.    The foot shows mild degeneration at the first MTP joint.    There is an old fracture deformity of the distal fibula.    No bone destruction or acute fracture is evident.    IMPRESSION: No acute finding.    JASKARAN MOSLEY M.D., ATTENDING RADIOLOGIST  This document has been electronically signed. Angel 10 2020  3:06PM  < end of copied text >    < from: [facility] Physiol Extremity Lower 3+ Level, BI (01.13.20 @ 13:49) >    EXAM:  US PHYSIOL LWR EXT 3+ LEV BI                        PROCEDURE DATE:  01/13/2020    INTERPRETATION:  Clinical indication: Right toe gangrene    Comparison: None    FINDINGS AND   IMPRESSION: Segmental pressures could not be obtaineddue to patient discomfort. Therefore, ABIs could not be calculated.    There are biphasic waveforms in the lower left thigh. Abnormally, monophasic flow within the remainder of the lower extremities.    RACQUEL CHA M.D., ATTENDING RADIOLOGIST  This document has been electronically signed. Jan 13 2020  2:20PM  < end of copied text >    < from: CT Angio Abd Aorta w/run-off w/ IV Cont (01.12.20 @ 19:40) >    EXAM:  CT ANGIO ABD AOR W RUN(W)AW IC                        PROCEDURE DATE:  01/12/2020    INTERPRETATION:  CT ANGIO ABDOMINAL AORTA RUNOFF WITHOUT AND OR WITH IV CONTRAST    CLINICAL INFORMATION: Atherosclerosis of the native arteries of the right and left lower extremity with right foot gangrene    PROCEDURE INFORMATION:   Exam: CTA Angiogram of the Abdominal Aorta and Bilateral Lower Extremities   (Run-off) With IV Contrast   Exam date and time: 1/12/2020 6:55 PM   Age: 74 years old   Clinical indication: Other: Pad, right 2nd toe gangrene, 3rd and 4th toes wet   early gang     TECHNIQUE:   Imaging protocol: CT angiogram of the abdominal aorta, pelvis and bilateral   lower extremities with IV iodinated contrast.   Intravenous contrast: 90 cc Omnipaque 350  3D rendering: MIP and/or 3D reconstructed images were created by the   technologist.     COMPARISON:   CR XR FOOT 3 VIEWS RIGHT 1/10/2020 2:46 PM     FINDINGS:   Aorta: Severe calcified and noncalcified atherosclerotic plaque. Aorta is   otherwise fully patent.   Celiac trunk and mesenteric arteries: No occlusion or significant stenosis.    Renal arteries: No occlusion or significant stenosis.      Right iliac arteries: Marked stenosis of the right external iliac artery distally.   Right femoral/popliteal arteries: Distal CFA stenosis. Severely and diffusely attenuated SFA. Occlusion of the mid-distal right superficial   femoral artery with reconstitution of the suprageniculate popliteal  artery. Stenotic right popliteal artery.   Right infrapopliteal arteries: Single-vessel runoff via the right peroneal   artery, diffusely attenuated, which feeds the plantar artery. The right anterior and posterior tibial   arteries are occluded. Reconstitution of the right pedis dorsalis artery at the   level of the foot, however severely attenuated.     Left iliac arteries: Moderate calcification and stenosis of the left iliac arteries. Occluded left internal iliac artery.  Left femoral/popliteal arteries: Long segmentOcclusion of the entire left superficial femoral   artery and Reconstitution of left popliteal artery, which shows multifocal disease without occlusion.  Left infrapopliteal arteries: Occlusion of the left anterior posterior tibial   arteries at the level of the ankle. Single-vessel left peroneal artery which   feeds the plantar artery. Left pedis dorsalis is attenuated but patent.    Liver: No mass.   Gallbladder and bile ducts: Unremarkable. No calcified stones. No ductal   dilation.    Pancreas: Unremarkable. No mass. No ductal dilation.    Spleen: Normal. No splenomegaly.    Adrenals: Normal. No mass.    Kidneys and ureters: Normal. No mass.      Stomach and bowel: . No obstruction. No mucosal thickening. Moderate stool   throughout the colon.    Appendix: No evidence of appendicitis.      Bladder: Unremarkable. No mass.    Reproductive: Unremarkable as visualized.      Intraperitoneal space: Unremarkable. No free air. No significant fluid   collection.    Lymph nodes: No lymphadenopathy.     Bones/joints: No acute fracture. No dislocation.    Soft tissues: Soft tissue irregularity of the second right toe presumably   related to underlying gangrene.       IMPRESSION:   1. Right lower extremity demonstrates marked diffuse stenosis of the lower   extremity with occlusion of the mid-distal right SFA which reconstitutes distally and   with single-vessel runoff to the right foot via the right peroneal artery which   feeds the plantar artery. The right pedis dorsalis artery reconstitutes at the   level of the ankle.   2. The left lower extremity demonstrates diffuse marked stenosis with total occlusion   of the left SFA with reconstitution of attenuated left popliteal artery. There is   single-vessel runoff via the left peronealartery is the which then feeds the   left plantar artery. The pedis dorsalis artery is patent.   3. evidence of inflow disease at the level of the right external iliac artery.  4. Slight irregularity of the soft tissues of the right second toe which may be   related to underlying gangrene.    GALILEA KING M.D., ATTENDING RADIOLOGIST  This document has been electronically signed. Jan 13 2020  4:12PM  < end of copied text >      A:   Critical Limb Ischemia, right  PVD  s/p angiogram 1/17  s/p open bypass iliac-pop RLE 1/23 with dr. shepard  s/p TMA right foot 1/29 for treatment of dry gangrene    P:  Patient evaluated, chart reviewed  Xrays- reviewed  NICKI/PVR-reviewed  CTA-reviewed- single vessel runoff to right foot.  Right foot dressed with 4x4 gauze, kerlix, ACE.    Will continue to monitor demarcation of left and right foot.  Will f/u vascular findings next week  NWB Godfreyt  Discussed with attending Dr. Welsh

## 2020-01-30 NOTE — CONSULT NOTE ADULT - SUBJECTIVE AND OBJECTIVE BOX
Patient is a 75y old  Female who presents with a chief complaint of dry gangrene (30 Jan 2020 14:04)      HPI: After surgery noticed weakness of left wrist with inability to lift the wrist and fingers, painless    PAST MEDICAL & SURGICAL HISTORY:  COPD (chronic obstructive pulmonary disease)  Hypercholesterolemia  Myocardial infarct, old  S/P CABG x 1      FAMILY HISTORY:        Social Hx:  Nonsmoker, no drug or alcohol use    MEDICATIONS  (STANDING):  aspirin  chewable 81 milliGRAM(s) Oral daily  cefepime   IVPB 2000 milliGRAM(s) IV Intermittent every 12 hours  gabapentin 100 milliGRAM(s) Oral every 12 hours  heparin  Infusion. 800 Unit(s)/Hr (8 mL/Hr) IV Continuous <Continuous>  metoprolol tartrate 25 milliGRAM(s) Oral two times a day  metroNIDAZOLE  IVPB 500 milliGRAM(s) IV Intermittent every 8 hours  povidone iodine 10% Solution 1 Application(s) Topical daily  risperiDONE   Tablet 0.5 milliGRAM(s) Oral two times a day  senna 2 Tablet(s) Oral at bedtime       Allergies    influenza virus vaccine, live, trivalent (Unknown)  PC Pen VK (Rash)  penicillin (Other; Rash)  statins (Other)  sulfa drugs (Other)  sulfamethizole (Other)    Intolerances        ROS: Pertinent positives in HPI, all other ROS were reviewed and are negative.      Vital Signs Last 24 Hrs  T(C): 36.6 (30 Jan 2020 14:08), Max: 37.2 (29 Jan 2020 22:20)  T(F): 97.8 (30 Jan 2020 14:08), Max: 99 (29 Jan 2020 22:20)  HR: 78 (30 Jan 2020 14:08) (78 - 94)  BP: 124/78 (30 Jan 2020 14:08) (99/42 - 145/70)  BP(mean): --  RR: 16 (30 Jan 2020 14:08) (16 - 18)  SpO2: 98% (30 Jan 2020 14:08) (96% - 98%)        Constitutional: awake and alert.  HEENT: PERRLA, EOMI,   Neck: Supple.  Respiratory: Breath sounds are clear bilaterally  Cardiovascular: S1 and S2, regular / irregular rhythm  Gastrointestinal: soft, nontender  Extremities:  no edema  Vascular: Caritid Bruit - no  Musculoskeletal: no joint swelling/tenderness, no abnormal movements  Skin: No rashes    Neurological exam:  HF: A x O x 3. Appropriately interactive, normal affect. Speech fluent, No Aphasia or paraphasic errors. Naming /repetition intact   CN: JAKOB, EOMI, VFF, facial sensation normal, no NLFD, tongue midline, Palate moves equally, SCM equal bilaterally  Motor: No pronator drift, Strength Wrist extension 0/5, finger extension 1/5, wrist flexion 4/5, finger flexion 4/5 9 helped by passive wrist dorsiflexion; all other muscles are normal, normal bulk and tone, no tremor, rigidity or bradykinesia.    Sens: Intact to light touch / PP/ VS/ JS    Reflexes: Symmetric and normal . BJ 2+, BR 2+, KJ 2+, AJ 0 left amputation right, downgoing toes left;  Coord:  No FNFA, dysmetria, CLEMENT intact   Gait/Balance: normal except for right amputaion    NIHSS: 1  MRS: 2          Labs:                        10.1   13.17 )-----------( 309      ( 30 Jan 2020 06:44 )             31.3     01-29    138  |  101  |  21<H>  ----------------------------<  122<H>  4.1   |  31  |  0.77    Ca    8.9      29 Jan 2020 07:49  Phos  2.6     01-29  Mg     1.7     01-29      01-11 [id number] 5.6      PTT - ( 30 Jan 2020 06:44 )  PTT:60.6 sec    Radiology report:  - CT head:

## 2020-01-30 NOTE — PROGRESS NOTE ADULT - SUBJECTIVE AND OBJECTIVE BOX
74y Female is under our care for gangrene of right foot with possible underlying osteomyelitis. Patient underwent right TMA yesterday. Patient is doing well, but has expected incisional foot pain. Has been getting percocet for foot control. No fevers, but wbc count has been elevating slowly.      REVIEW OF SYSTEMS:  [  ] Not able to illicit  General: no fevers no malaise  Chest: no cough no sob  GI: no nvd  Skin: no rashes  Musculoskeletal: no trauma no LBP +foot pain  Neuro: no ha's no dizziness     MEDS:     cefepime   IVPB 2000 milliGRAM(s) IV Intermittent every 12 hours  metroNIDAZOLE  IVPB 500 milliGRAM(s) IV Intermittent every 8 hours        ALLERGIES: Allergies    influenza virus vaccine, live, trivalent (Unknown)  PC Pen VK (Rash)  penicillin (Other; Rash)  statins (Other)  sulfa drugs (Other)  sulfamethizole (Other)    VITALS:  Vital Signs Last 24 Hrs  T(C): 36.6 (30 Jan 2020 14:08), Max: 37.2 (29 Jan 2020 22:20)  T(F): 97.8 (30 Jan 2020 14:08), Max: 99 (29 Jan 2020 22:20)  HR: 78 (30 Jan 2020 14:08) (78 - 94)  BP: 124/78 (30 Jan 2020 14:08) (99/42 - 145/70)  BP(mean): --  RR: 16 (30 Jan 2020 14:08) (16 - 18)  SpO2: 98% (30 Jan 2020 14:08) (96% - 98%)      PHYSICAL EXAM:  HEENT: n/a  Neck: supple no LN's   Respiratory: lungs clear no rales  Cardiovascular: S1 S2 reg no murmurs  Gastrointestinal: +BS with soft, nondistended abdomen; nontender  Extremities: no edema  Skin: right residual foot is wrapped; stable right knee abrasion  Ortho: right TMA  Neuro: awake and alert      LABS/DIAGNOSTIC TESTS:                        10.1   13.17 )-----------( 309      ( 30 Jan 2020 06:44 )             31.3   WBC Count: 13.17 K/uL (01-30 @ 06:44)  WBC Count: 11.99 K/uL (01-30 @ 00:33)  WBC Count: 11.62 K/uL (01-29 @ 07:49)  WBC Count: 11.79 K/uL (01-28 @ 07:15)  WBC Count: 10.95 K/uL (01-27 @ 07:21)    01-29    138  |  101  |  21<H>  ----------------------------<  122<H>  4.1   |  31  |  0.77    Ca    8.9      29 Jan 2020 07:49  Phos  2.6     01-29  Mg     1.7     01-29        CULTURES:   .Blood  01-11 @ 01:37   No growth at 5 days.  --  --        RADIOLOGY:  no new studies

## 2020-01-30 NOTE — CHART NOTE - NSCHARTNOTEFT_GEN_A_CORE
Assessment:   75yFemalePatient is a 75y old  Female who presents with a chief complaint of dry gangrene (30 Jan 2020 14:04)      Factors impacting intake: [ ] none [ ] nausea  [ ] vomiting [ ] diarrhea [ ] constipation  [ ]chewing problems [ ] swallowing issues  [x ] other: patient reports adequate oral intake of meals. takes ensure. food preferences taken. Per RN, patient with forgetfulness.     Diet Presciption: Diet, DASH/TLC:   Sodium & Cholesterol Restricted  Supplement Feeding Modality:  Oral  Ensure Enlive Cans or Servings Per Day:  1       Frequency:  Three Times a day (01-25-20 @ 15:21)    Intake: adequate     Current Weight: none   % Weight Change    Pertinent Medications: MEDICATIONS  (STANDING):  aspirin  chewable 81 milliGRAM(s) Oral daily  cefepime   IVPB 2000 milliGRAM(s) IV Intermittent every 12 hours  gabapentin 100 milliGRAM(s) Oral every 12 hours  heparin  Infusion. 800 Unit(s)/Hr (8 mL/Hr) IV Continuous <Continuous>  metoprolol tartrate 25 milliGRAM(s) Oral two times a day  metroNIDAZOLE  IVPB 500 milliGRAM(s) IV Intermittent every 8 hours  povidone iodine 10% Solution 1 Application(s) Topical daily  risperiDONE   Tablet 0.5 milliGRAM(s) Oral two times a day  senna 2 Tablet(s) Oral at bedtime    MEDICATIONS  (PRN):  acetaminophen   Tablet .. 650 milliGRAM(s) Oral every 6 hours PRN Mild Pain (1 - 3)  haloperidol    Injectable 1 milliGRAM(s) IV Push every 4 hours PRN agitation  oxycodone    5 mG/acetaminophen 325 mG 1 Tablet(s) Oral every 4 hours PRN Moderate Pain (4 - 6)  polyethylene glycol 3350 17 Gram(s) Oral daily PRN Constipation  traMADol 25 milliGRAM(s) Oral every 6 hours PRN Severe Pain (7 - 10)    Pertinent Labs: 01-29 Na138 mmol/L Glu 122 mg/dL<H> K+ 4.1 mmol/L Cr  0.77 mg/dL BUN 21 mg/dL<H> 01-29 Phos 2.6 mg/dL 01-26 Alb 2.0 g/dL<L> 01-11 [id number] 5.6 %     CAPILLARY BLOOD GLUCOSE        Skin: pressure ulcers -healed     Estimated Needs:   [x ] no change since previous assessment  [ ] recalculated:     Previous Nutrition Diagnosis:   [ ] Inadequate Energy Intake [ ]Inadequate Oral Intake [ ] Excessive Energy Intake   [ ] Underweight [ ] Increased Nutrient Needs [ ] Overweight/Obesity   [ ] Altered GI Function [ ] Unintended Weight Loss [ ] Food & Nutrition Related Knowledge Deficit [x ] Malnutrition , severe     Nutrition Diagnosis is [ x] ongoing  [ ] resolved [ ] not applicable     New Nutrition Diagnosis: [ ] not applicable       Interventions:   Recommend  [ ] Change Diet To:  [ ] Nutrition Supplement  [ ] Nutrition Support  [x ] Other: continue with DASH/TLC diet  and ensure enlive tid , ( prefers vanilla and strawberry)     Monitoring and Evaluation:   [x ] PO intake [ x ] Tolerance to diet prescription [ x ] weights [ x ] labs[ x ] follow up per protocol  [ ] other:

## 2020-01-30 NOTE — CONSULT NOTE ADULT - ASSESSMENT
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

## 2020-01-30 NOTE — PROGRESS NOTE ADULT - SUBJECTIVE AND OBJECTIVE BOX
Time of Visit:  Patient seen and examined.     MEDICATIONS  (STANDING):  aspirin  chewable 81 milliGRAM(s) Oral daily  cefepime   IVPB 2000 milliGRAM(s) IV Intermittent every 12 hours  gabapentin 100 milliGRAM(s) Oral every 12 hours  heparin  Infusion. 800 Unit(s)/Hr (8 mL/Hr) IV Continuous <Continuous>  metoprolol tartrate 25 milliGRAM(s) Oral two times a day  metroNIDAZOLE  IVPB 500 milliGRAM(s) IV Intermittent every 8 hours  povidone iodine 10% Solution 1 Application(s) Topical daily  risperiDONE   Tablet 0.5 milliGRAM(s) Oral two times a day  senna 2 Tablet(s) Oral at bedtime      MEDICATIONS  (PRN):  acetaminophen   Tablet .. 650 milliGRAM(s) Oral every 6 hours PRN Mild Pain (1 - 3)  haloperidol    Injectable 1 milliGRAM(s) IV Push every 4 hours PRN agitation  oxycodone    5 mG/acetaminophen 325 mG 1 Tablet(s) Oral every 4 hours PRN Moderate Pain (4 - 6)  polyethylene glycol 3350 17 Gram(s) Oral daily PRN Constipation  traMADol 25 milliGRAM(s) Oral every 6 hours PRN Severe Pain (7 - 10)       Medications up to date at time of exam.    ROS; No fever, chills, cough, congestion. Denies SOB.   PHYSICAL EXAMINATION:    Vital Signs Last 24 Hrs  T(C): 36.6 (30 Jan 2020 14:08), Max: 37.2 (29 Jan 2020 22:20)  T(F): 97.8 (30 Jan 2020 14:08), Max: 99 (29 Jan 2020 22:20)  HR: 78 (30 Jan 2020 14:08) (78 - 94)  BP: 124/78 (30 Jan 2020 14:08) (99/42 - 145/70)  BP(mean): --  RR: 16 (30 Jan 2020 14:08) (16 - 18)  SpO2: 98% (30 Jan 2020 14:08) (96% - 98%)   (if applicable)    General; Alert and oriented. Able to answer question with no SOB. No acute distress.     HEENT: Head is normocephalic and atraumatic. Extraocular muscles are intact. Mucous membranes are moist.     NECK: Supple, no palpable adenopathy.    LUNGS: Clear to auscultation, no wheezing, rales, or rhonchi. No use of accessory muscle.      HEART: S1 S2 Regular rate and no click/ rub.     ABDOMEN: Soft, nontender, and nondistended.  No abdominal guarding. Active bowel sounds.     Gu; No bladder distention and tenderness.     NEUROLOGIC: Awake, alert, oriented.       LABS:                        10.1   13.17 )-----------( 309      ( 30 Jan 2020 06:44 )             31.3     01-29    138  |  101  |  21<H>  ----------------------------<  122<H>  4.1   |  31  |  0.77    Ca    8.9      29 Jan 2020 07:49  Phos  2.6     01-29  Mg     1.7     01-29      PTT - ( 30 Jan 2020 06:44 )  PTT:60.6 sec                    MICROBIOLOGY: (if applicable)    RADIOLOGY & ADDITIONAL STUDIES:  EKG:   CXR:  ECHO:    IMPRESSION: 75y Female PAST MEDICAL & SURGICAL HISTORY:  COPD (chronic obstructive pulmonary disease)  Hypercholesterolemia  Myocardial infarct, old  S/P CABG x 1     Impression; 76 Y/O Female ED with complaint of discoloration for a toe on the right foot.  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 She is found to have diffuse atherosclerotic disease with multilevel arterial occlusion. Marginal/poor candidate for endovascular intervention. Patient is transferred to ICU for frequent monitoring after the angiogram. Had s/p TMA. Now in medicine Unit . So far saturating good room air with no exacerbation of COPD .     Suggestion;  O2 saturation 96% room air. No need for continuous oxygen supplementation.  No exacerbation of COPD on exam.   pulmonary oral hygiene care.  DVT / GI prophylactic. Time of Visit:  Patient seen and examined.     MEDICATIONS  (STANDING):  aspirin  chewable 81 milliGRAM(s) Oral daily  cefepime   IVPB 2000 milliGRAM(s) IV Intermittent every 12 hours  gabapentin 100 milliGRAM(s) Oral every 12 hours  heparin  Infusion. 800 Unit(s)/Hr (8 mL/Hr) IV Continuous <Continuous>  metoprolol tartrate 25 milliGRAM(s) Oral two times a day  metroNIDAZOLE  IVPB 500 milliGRAM(s) IV Intermittent every 8 hours  povidone iodine 10% Solution 1 Application(s) Topical daily  risperiDONE   Tablet 0.5 milliGRAM(s) Oral two times a day  senna 2 Tablet(s) Oral at bedtime      MEDICATIONS  (PRN):  acetaminophen   Tablet .. 650 milliGRAM(s) Oral every 6 hours PRN Mild Pain (1 - 3)  haloperidol    Injectable 1 milliGRAM(s) IV Push every 4 hours PRN agitation  oxycodone    5 mG/acetaminophen 325 mG 1 Tablet(s) Oral every 4 hours PRN Moderate Pain (4 - 6)  polyethylene glycol 3350 17 Gram(s) Oral daily PRN Constipation  traMADol 25 milliGRAM(s) Oral every 6 hours PRN Severe Pain (7 - 10)       Medications up to date at time of exam.    ROS; No fever, chills, cough, congestion. Denies SOB.   PHYSICAL EXAMINATION:    Vital Signs Last 24 Hrs  T(C): 36.6 (30 Jan 2020 14:08), Max: 37.2 (29 Jan 2020 22:20)  T(F): 97.8 (30 Jan 2020 14:08), Max: 99 (29 Jan 2020 22:20)  HR: 78 (30 Jan 2020 14:08) (78 - 94)  BP: 124/78 (30 Jan 2020 14:08) (99/42 - 145/70)  BP(mean): --  RR: 16 (30 Jan 2020 14:08) (16 - 18)  SpO2: 98% (30 Jan 2020 14:08) (96% - 98%)   (if applicable)    General; Alert and oriented. Able to answer question with no SOB. No acute distress.     HEENT: Head is normocephalic and atraumatic. Extraocular muscles are intact. Mucous membranes are moist.     NECK: Supple, no palpable adenopathy.    LUNGS: Clear to auscultation, no wheezing, rales, or rhonchi. No use of accessory muscle.      HEART: S1 S2 Regular rate and no click/ rub.     ABDOMEN: Soft, nontender, and nondistended.  No abdominal guarding. Active bowel sounds.     Gu; No bladder distention and tenderness.     NEUROLOGIC: Awake, alert, oriented.       LABS:                        10.1   13.17 )-----------( 309      ( 30 Jan 2020 06:44 )             31.3     01-29    138  |  101  |  21<H>  ----------------------------<  122<H>  4.1   |  31  |  0.77    Ca    8.9      29 Jan 2020 07:49  Phos  2.6     01-29  Mg     1.7     01-29      PTT - ( 30 Jan 2020 06:44 )  PTT:60.6 sec                    MICROBIOLOGY: (if applicable)    RADIOLOGY & ADDITIONAL STUDIES:  EKG:   CXR:  ECHO:    IMPRESSION: 75y Female PAST MEDICAL & SURGICAL HISTORY:  COPD (chronic obstructive pulmonary disease)  Hypercholesterolemia  Myocardial infarct, old  S/P CABG x 1     Impression; 76 Y/O Female ED with complaint of discoloration for a toe on the right foot.  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 She is found to have diffuse atherosclerotic disease with multilevel arterial occlusion. Marginal/poor candidate for endovascular intervention. Patient is transferred to ICU for frequent monitoring after the angiogram. Had s/p TMA. Now in medicine Unit . So far saturating good room air with no exacerbation of COPD .     Suggestion;  O2 saturation 96% room air. No need for continuous oxygen supplementation.  No exacerbation of COPD on exam.   pulmonary oral hygiene care.  DVT / GI prophylactic.        Agree with above assessment and plan as transcribed.

## 2020-01-30 NOTE — PROGRESS NOTE ADULT - SUBJECTIVE AND OBJECTIVE BOX
74F external iliac to popliteal PTFE bypass and TMA. Patient states she is doing well, with improvement in symptoms of the right leg but with worsening of the left.. No acute events overnight.  Patient remains on heparin drip  with PTT in range. Afebrile and HD stable. Palpable right popliteal. TMA site on right with areas of demarcation and necrosis. Incision site appears clean and dry. Left leg painful to gentle touch. Patient complaining of left hand weakness starting either today or yesterday after surgery, does not recall.  strength and sensation is preserved but patient is complaining of decreased dexterity with that hand.    Vital Signs Last 24 Hrs  T(C): 37.1 (30 Jan 2020 05:00), Max: 37.2 (29 Jan 2020 22:20)  T(F): 98.8 (30 Jan 2020 05:00), Max: 99 (29 Jan 2020 22:20)  HR: 94 (30 Jan 2020 05:00) (82 - 102)  BP: 145/70 (30 Jan 2020 05:00) (116/57 - 145/70)  BP(mean): 86 (29 Jan 2020 14:56) (68 - 120)  RR: 16 (30 Jan 2020 05:00) (12 - 22)  SpO2: 98% (30 Jan 2020 05:00) (90% - 98%)  NAD  nonlabored breathing  right popliteal pulses intact, TMA site appears clean and dry but with area of necrosis over the dorsal skin and small areas of demarcation over the incision site; left foot lukewarm to touch, exam limited due to pain  Neuro: A+Ox3, CN2-12 intact, gross strength preserved in upper and lower extremities, isolated floppy appearing left hand but with preserved  strength and normal sensation throughout  psyh appropriate                          10.1   13.17 )-----------( 309      ( 30 Jan 2020 06:44 )             31.3     PTT - ( 30 Jan 2020 06:44 )  PTT:60.6 sec

## 2020-01-30 NOTE — PROGRESS NOTE ADULT - ATTENDING COMMENTS
As above   R bypass patent- observe post TMA  L foot isch changes/pain- Planning bypass on tue  Cont AC (will need long term Tx AC for PTFE bypass patency)  FU L hand symps- check neuro

## 2020-01-30 NOTE — PROGRESS NOTE ADULT - ASSESSMENT
74F s/p external iliac to above knee popliteal bypass  - continue heparin  - continue podiatry for R TMA, no hard splint  - monitor neurologic status and concern for possitional neuropathy of left hand  - offload pressure points  - pain control  - plan for OR Tues Feb 4th for left fem-pop bypass

## 2020-01-31 DIAGNOSIS — I10 ESSENTIAL (PRIMARY) HYPERTENSION: ICD-10-CM

## 2020-01-31 DIAGNOSIS — I73.9 PERIPHERAL VASCULAR DISEASE, UNSPECIFIED: ICD-10-CM

## 2020-01-31 DIAGNOSIS — I96 GANGRENE, NOT ELSEWHERE CLASSIFIED: ICD-10-CM

## 2020-01-31 DIAGNOSIS — J44.9 CHRONIC OBSTRUCTIVE PULMONARY DISEASE, UNSPECIFIED: ICD-10-CM

## 2020-01-31 DIAGNOSIS — Z29.9 ENCOUNTER FOR PROPHYLACTIC MEASURES, UNSPECIFIED: ICD-10-CM

## 2020-01-31 DIAGNOSIS — I25.10 ATHEROSCLEROTIC HEART DISEASE OF NATIVE CORONARY ARTERY WITHOUT ANGINA PECTORIS: ICD-10-CM

## 2020-01-31 DIAGNOSIS — M21.332 WRIST DROP, LEFT WRIST: ICD-10-CM

## 2020-01-31 LAB
APTT BLD: 50 SEC — HIGH (ref 27.5–36.3)
APTT BLD: 70.5 SEC — HIGH (ref 27.5–36.3)
APTT BLD: 84.7 SEC — HIGH (ref 27.5–36.3)
HCT VFR BLD CALC: 31.5 % — LOW (ref 34.5–45)
HGB BLD-MCNC: 10.1 G/DL — LOW (ref 11.5–15.5)
MCHC RBC-ENTMCNC: 31.3 PG — SIGNIFICANT CHANGE UP (ref 27–34)
MCHC RBC-ENTMCNC: 32.1 GM/DL — SIGNIFICANT CHANGE UP (ref 32–36)
MCV RBC AUTO: 97.5 FL — SIGNIFICANT CHANGE UP (ref 80–100)
NRBC # BLD: 0 /100 WBCS — SIGNIFICANT CHANGE UP (ref 0–0)
PLATELET # BLD AUTO: 323 K/UL — SIGNIFICANT CHANGE UP (ref 150–400)
RBC # BLD: 3.23 M/UL — LOW (ref 3.8–5.2)
RBC # FLD: 14.7 % — HIGH (ref 10.3–14.5)
WBC # BLD: 12.77 K/UL — HIGH (ref 3.8–10.5)
WBC # FLD AUTO: 12.77 K/UL — HIGH (ref 3.8–10.5)

## 2020-01-31 PROCEDURE — 99233 SBSQ HOSP IP/OBS HIGH 50: CPT | Mod: GC

## 2020-01-31 RX ORDER — POLYETHYLENE GLYCOL 3350 17 G/17G
17 POWDER, FOR SOLUTION ORAL ONCE
Refills: 0 | Status: COMPLETED | OUTPATIENT
Start: 2020-01-31 | End: 2020-01-31

## 2020-01-31 RX ADMIN — Medication 25 MILLIGRAM(S): at 05:45

## 2020-01-31 RX ADMIN — POLYETHYLENE GLYCOL 3350 17 GRAM(S): 17 POWDER, FOR SOLUTION ORAL at 00:59

## 2020-01-31 RX ADMIN — CEFEPIME 50 MILLIGRAM(S): 1 INJECTION, POWDER, FOR SOLUTION INTRAMUSCULAR; INTRAVENOUS at 18:26

## 2020-01-31 RX ADMIN — RISPERIDONE 0.5 MILLIGRAM(S): 4 TABLET ORAL at 05:45

## 2020-01-31 RX ADMIN — HEPARIN SODIUM 900 UNIT(S)/HR: 5000 INJECTION INTRAVENOUS; SUBCUTANEOUS at 07:43

## 2020-01-31 RX ADMIN — Medication 81 MILLIGRAM(S): at 11:32

## 2020-01-31 RX ADMIN — Medication 100 MILLIGRAM(S): at 23:48

## 2020-01-31 RX ADMIN — HEPARIN SODIUM 900 UNIT(S)/HR: 5000 INJECTION INTRAVENOUS; SUBCUTANEOUS at 23:43

## 2020-01-31 RX ADMIN — GABAPENTIN 100 MILLIGRAM(S): 400 CAPSULE ORAL at 18:28

## 2020-01-31 RX ADMIN — Medication 1 ENEMA: at 04:37

## 2020-01-31 RX ADMIN — HEPARIN SODIUM 900 UNIT(S)/HR: 5000 INJECTION INTRAVENOUS; SUBCUTANEOUS at 16:28

## 2020-01-31 RX ADMIN — Medication 100 MILLIGRAM(S): at 06:02

## 2020-01-31 RX ADMIN — Medication 25 MILLIGRAM(S): at 18:26

## 2020-01-31 RX ADMIN — GABAPENTIN 100 MILLIGRAM(S): 400 CAPSULE ORAL at 06:26

## 2020-01-31 RX ADMIN — SENNA PLUS 2 TABLET(S): 8.6 TABLET ORAL at 23:48

## 2020-01-31 RX ADMIN — RISPERIDONE 0.5 MILLIGRAM(S): 4 TABLET ORAL at 18:25

## 2020-01-31 RX ADMIN — Medication 1 APPLICATION(S): at 11:23

## 2020-01-31 RX ADMIN — CEFEPIME 50 MILLIGRAM(S): 1 INJECTION, POWDER, FOR SOLUTION INTRAMUSCULAR; INTRAVENOUS at 05:01

## 2020-01-31 RX ADMIN — Medication 100 MILLIGRAM(S): at 14:03

## 2020-01-31 NOTE — PROGRESS NOTE ADULT - ATTENDING COMMENTS
Covering for Dr. Ochoa   Agree with above   patient seen and examined together with medical student. No new complains or events.   On heparin drip with therapeutic levels.   Next surgery planned for next Tuesday     Vital signs and labs reviewed     Vital Signs Last 24 Hrs  T(C): 36.3 (31 Jan 2020 15:55), Max: 36.8 (31 Jan 2020 08:14)  T(F): 97.3 (31 Jan 2020 15:55), Max: 98.2 (31 Jan 2020 08:14)  HR: 89 (31 Jan 2020 15:55) (78 - 104)  BP: 131/62 (31 Jan 2020 15:55) (118/79 - 143/96)  BP(mean): --  RR: 18 (31 Jan 2020 15:55) (16 - 18)  SpO2: 98% (31 Jan 2020 15:55) (97% - 99%)    1. B/l PVD s/p right external iliac to popliteal PTFE bypass and TMA  Left side planned for Tuesday  2.HTN: BP controlled   3. CAD: on   4. Foot gangrene: s/p TMA; on Cefepime and metronidazole   5. DVT prophylaxis: on heparin drip

## 2020-01-31 NOTE — PROGRESS NOTE ADULT - ASSESSMENT
74 year old female, live by herself, ambulates with cane  has medical hx significant for dementia, cad, pvd, hld, htn presents to the ED with complaint of discoloration for a toe on the right foot.

## 2020-01-31 NOTE — PROGRESS NOTE ADULT - SUBJECTIVE AND OBJECTIVE BOX
PGY1 Note discussed with supervising resident and primary attending.    Patient is a 75y old  Female who presents with a chief complaint of dry gangrene (31 Jan 2020 07:12)    INTERVAL HPI/OVERNIGHT EVENTS:  - No acute events overnight   - Patient states that she is feeling much better   - Tolerating normal diet   - States that the pain in her foot only occurs with excessive movement   - Hemodynamically stable and afebrile     MEDICATIONS  (STANDING):  aspirin  chewable 81 milliGRAM(s) Oral daily  cefepime   IVPB 2000 milliGRAM(s) IV Intermittent every 12 hours  gabapentin 100 milliGRAM(s) Oral every 12 hours  heparin  Infusion. 800 Unit(s)/Hr (8 mL/Hr) IV Continuous <Continuous>  metoprolol tartrate 25 milliGRAM(s) Oral two times a day  metroNIDAZOLE  IVPB 500 milliGRAM(s) IV Intermittent every 8 hours  povidone iodine 10% Solution 1 Application(s) Topical daily  risperiDONE   Tablet 0.5 milliGRAM(s) Oral two times a day  senna 2 Tablet(s) Oral at bedtime    MEDICATIONS  (PRN):  acetaminophen   Tablet .. 650 milliGRAM(s) Oral every 6 hours PRN Mild Pain (1 - 3)  haloperidol    Injectable 1 milliGRAM(s) IV Push every 4 hours PRN agitation  oxycodone    5 mG/acetaminophen 325 mG 1 Tablet(s) Oral every 4 hours PRN Moderate Pain (4 - 6)  polyethylene glycol 3350 17 Gram(s) Oral daily PRN Constipation    Allergies    influenza virus vaccine, live, trivalent (Unknown)  PC Pen VK (Rash)  penicillin (Other; Rash)  statins (Other)  sulfa drugs (Other)  sulfamethizole (Other)    Intolerances    REVIEW OF SYSTEMS:  CONSTITUTIONAL: No fever, weight loss, or fatigue  RESPIRATORY: No cough, wheezing, chills or hemoptysis; No shortness of breath  CARDIOVASCULAR: No chest pain, palpitations, dizziness, or leg swelling  GASTROINTESTINAL: No abdominal or epigastric pain. No nausea, vomiting, or hematemesis; No diarrhea or constipation. No melena or hematochezia.  NEUROLOGICAL: No headaches, memory loss, loss of strength, numbness, or tremors  SKIN: No itching, burning, rashes, or lesions; pain on right foot with rapid/forceful movement    Vital Signs Last 24 Hrs  T(C): 36.8 (31 Jan 2020 08:14), Max: 36.8 (31 Jan 2020 08:14)  T(F): 98.2 (31 Jan 2020 08:14), Max: 98.2 (31 Jan 2020 08:14)  HR: 78 (31 Jan 2020 10:49) (78 - 104)  BP: 118/79 (31 Jan 2020 08:14) (118/79 - 143/96)  BP(mean): --  RR: 18 (31 Jan 2020 08:14) (16 - 18)  SpO2: 98% (31 Jan 2020 10:49) (97% - 99%)    PHYSICAL EXAM:  GENERAL: NAD, well-groomed, well-developed  HEAD:  Atraumatic, Normocephalic  EYES: EOMI, PERRLA, conjunctiva and sclera clear  NECK: Supple, No JVD, Normal thyroid  CHEST/LUNG: Clear to percussion bilaterally; No rales, rhonchi, wheezing, or rubs  HEART: Regular rate and rhythm; No murmurs, rubs, or gallops  ABDOMEN: Soft, Nontender, Nondistended; Bowel sounds present  NERVOUS SYSTEM:  Alert & Oriented X3, Good concentration; Motor Strength 5/5 B/L   EXTREMITIES:  2+ Peripheral Pulses, No clubbing, cyanosis, or edema; Right foot wrapped in ACE bandage; tender to palpation     LABS:                        10.1   12.77 )-----------( 323      ( 31 Jan 2020 07:02 )             31.5     PTT - ( 31 Jan 2020 13:40 )  PTT:70.5 sec    CAPILLARY BLOOD GLUCOSE    RADIOLOGY & ADDITIONAL TESTS:    Imaging Personally Reviewed:  [ ] YES  [ ] NO    Consultant(s) Notes Reviewed:  [ ] YES  [ ] NO

## 2020-01-31 NOTE — PROGRESS NOTE ADULT - ASSESSMENT
74F s/p external iliac to above knee popliteal bypass  - continue heparin  - continue podiatry f/u for R TMA, no hard splint  - OT  - offload pressure points  - pain control  - plan for OR Tues Feb 4th for left fem-pop bypass

## 2020-01-31 NOTE — PROGRESS NOTE ADULT - PROBLEM SELECTOR PLAN 1
S/p Ileopopliteal bypass  Cont Heparin drip   Scheduled for left fem-pop bypass on Jan 4  Dressing change as per Vascular and Podiatry   Gabapentin, Tylenol and tramadol for pain  Cont Aspirin   01/31/2020: Await Left Fem-Pop Bypass to occur on 02/04/2020

## 2020-01-31 NOTE — PROGRESS NOTE ADULT - PROBLEM SELECTOR PLAN 8
IMPROVE VTE Individual Risk Assessment          RISK                                                          Points  [  ] Previous VTE                                                3  [  ] Thrombophilia                                             2  [ x ] Lower limb paralysis                                   2        (unable to hold up >15 seconds)    [  ] Current Cancer                                             2         (within 6 months)  [ x ] Immobilization > 24 hrs                              1  [  ] ICU/CCU stay > 24 hours                             1  [ x ] Age > 60                                                         1    IMPROVE VTE Score: 4    Heparin Drip

## 2020-01-31 NOTE — PROGRESS NOTE ADULT - SUBJECTIVE AND OBJECTIVE BOX
Time of Visit:  Patient seen and examined.     MEDICATIONS  (STANDING):  aspirin  chewable 81 milliGRAM(s) Oral daily  cefepime   IVPB 2000 milliGRAM(s) IV Intermittent every 12 hours  gabapentin 100 milliGRAM(s) Oral every 12 hours  heparin  Infusion. 800 Unit(s)/Hr (8 mL/Hr) IV Continuous <Continuous>  metoprolol tartrate 25 milliGRAM(s) Oral two times a day  metroNIDAZOLE  IVPB 500 milliGRAM(s) IV Intermittent every 8 hours  povidone iodine 10% Solution 1 Application(s) Topical daily  risperiDONE   Tablet 0.5 milliGRAM(s) Oral two times a day  senna 2 Tablet(s) Oral at bedtime      MEDICATIONS  (PRN):  acetaminophen   Tablet .. 650 milliGRAM(s) Oral every 6 hours PRN Mild Pain (1 - 3)  haloperidol    Injectable 1 milliGRAM(s) IV Push every 4 hours PRN agitation  oxycodone    5 mG/acetaminophen 325 mG 1 Tablet(s) Oral every 4 hours PRN Moderate Pain (4 - 6)  polyethylene glycol 3350 17 Gram(s) Oral daily PRN Constipation       Medications up to date at time of exam.      PHYSICAL EXAMINATION:  Patient has no new complaints.  GENERAL: The patient is a well-developed, well-nourished, in no apparent distress.     Vital Signs Last 24 Hrs  T(C): 36.3 (31 Jan 2020 15:55), Max: 36.8 (31 Jan 2020 08:14)  T(F): 97.3 (31 Jan 2020 15:55), Max: 98.2 (31 Jan 2020 08:14)  HR: 89 (31 Jan 2020 15:55) (78 - 104)  BP: 131/62 (31 Jan 2020 15:55) (118/79 - 143/96)  BP(mean): --  RR: 18 (31 Jan 2020 15:55) (16 - 18)  SpO2: 98% (31 Jan 2020 15:55) (97% - 99%)   (if applicable)    Chest Tube (if applicable)    HEENT: Head is normocephalic and atraumatic. Extraocular muscles are intact. Mucous membranes are moist.     NECK: Supple, no palpable adenopathy.    LUNGS: Clear to auscultation, no wheezing, rales, or rhonchi.    HEART: Regular rate and rhythm without murmur.    ABDOMEN: Soft, nontender, and nondistended.  No hepatosplenomegaly is noted.    : No painful voiding, no pelvic pain    EXTREMITIES: Right  TMA with clean dressing    NEUROLOGIC: Awake, alert, oriented, grossly intact    SKIN: Warm, dry, good turgor.      LABS:                        10.1   12.77 )-----------( 323      ( 31 Jan 2020 07:02 )             31.5           PTT - ( 31 Jan 2020 13:40 )  PTT:70.5 sec                    MICROBIOLOGY: (if applicable)    RADIOLOGY & ADDITIONAL STUDIES:  EKG:   CXR:  ECHO:    IMPRESSION: 75y Female PAST MEDICAL & SURGICAL HISTORY:  Hypercholesterolemia  Myocardial infarct, old  S/P CABG x 1   p/w           IMP: This is a 74 year old woman , live by herself, ambulates with cane  has medical hx significant for dementia, cad, pvd, hld, htn presents to the ED with complaint of discoloration for a toe on the right foot.  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 She is found to have diffuse atherosclerotic disease with multilevel arterial occlusion. Marginal/poor candidate for endovascular intervention. Patient is transferred to ICU for frequent monitoring after the angiogram.  Vascular surgery f/u.. will need b/l  open bypass and cardiac cl. message sent to dr monge .. cards. vascular surgery pending cardiac clearance .. intermediate risk and no further cardiac testing . Vascular surgery.. for OR 1/23  . COPD stable S/P Right TMA 1/29    Sugg:  -continue antibx  -duonebs q6h prn  -OOB to chair   -dvt/gi porphy  -wound dressing

## 2020-01-31 NOTE — PROGRESS NOTE ADULT - SUBJECTIVE AND OBJECTIVE BOX
74F s/p  external iliac to popliteal PTFE bypass and TMA. Patient states she is feeling better, with some improvement in symptoms of the b/l LE. Patient was seen yesterday by Neurology for L hand weakness and states her  is better today.  Patient remains on heparin drip  with PTT in range. Afebrile and HD stable.  No acute events overnight.      Vital Signs Last 24 Hrs  T(C): 36.2 (30 Jan 2020 23:55), Max: 36.6 (30 Jan 2020 14:08)  T(F): 97.2 (30 Jan 2020 23:55), Max: 97.8 (30 Jan 2020 14:08)  HR: 91 (31 Jan 2020 05:40) (78 - 104)  BP: 143/96 (31 Jan 2020 05:40) (99/42 - 143/96)  BP(mean): --  RR: 18 (30 Jan 2020 23:55) (16 - 18)  SpO2: 97% (30 Jan 2020 23:55) (96% - 98%)    PHYSICAL EXAM  Gen: NAD  Resp: nonlabored breathing  L/E: right popliteal pulses intact, TMA site appears clean and dry but with area of necrosis over the dorsal skin and small areas of demarcation over the incision site; left foot warm to touch, exam limited due to pain  Neuro: A+Ox3, CN2-12 intact, gross strength preserved in upper and lower extremities, isolated floppy appearing left hand but with preserved  strength and normal sensation throughout

## 2020-01-31 NOTE — PROGRESS NOTE ADULT - PROBLEM SELECTOR PLAN 7
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

## 2020-01-31 NOTE — PROGRESS NOTE ADULT - PROBLEM SELECTOR PLAN 2
Cont Cefepime and flagyl  01/31/2020: Transmetatarsal amputation of right foot 01/29; c/w Cefepime and Flagyl; Podiatry continues to follow

## 2020-02-01 LAB
ANION GAP SERPL CALC-SCNC: 3 MMOL/L — LOW (ref 5–17)
ANISOCYTOSIS BLD QL: SLIGHT — SIGNIFICANT CHANGE UP
APTT BLD: 82 SEC — HIGH (ref 27.5–36.3)
BASOPHILS # BLD AUTO: 0 K/UL — SIGNIFICANT CHANGE UP (ref 0–0.2)
BASOPHILS NFR BLD AUTO: 0 % — SIGNIFICANT CHANGE UP (ref 0–2)
BUN SERPL-MCNC: 26 MG/DL — HIGH (ref 7–18)
CALCIUM SERPL-MCNC: 8.6 MG/DL — SIGNIFICANT CHANGE UP (ref 8.4–10.5)
CHLORIDE SERPL-SCNC: 105 MMOL/L — SIGNIFICANT CHANGE UP (ref 96–108)
CO2 SERPL-SCNC: 32 MMOL/L — HIGH (ref 22–31)
CREAT SERPL-MCNC: 0.95 MG/DL — SIGNIFICANT CHANGE UP (ref 0.5–1.3)
EOSINOPHIL # BLD AUTO: 0.23 K/UL — SIGNIFICANT CHANGE UP (ref 0–0.5)
EOSINOPHIL NFR BLD AUTO: 2 % — SIGNIFICANT CHANGE UP (ref 0–6)
GLUCOSE SERPL-MCNC: 98 MG/DL — SIGNIFICANT CHANGE UP (ref 70–99)
HCT VFR BLD CALC: 32.5 % — LOW (ref 34.5–45)
HGB BLD-MCNC: 10.3 G/DL — LOW (ref 11.5–15.5)
HYPOCHROMIA BLD QL: SLIGHT — SIGNIFICANT CHANGE UP
LYMPHOCYTES # BLD AUTO: 1.97 K/UL — SIGNIFICANT CHANGE UP (ref 1–3.3)
LYMPHOCYTES # BLD AUTO: 17 % — SIGNIFICANT CHANGE UP (ref 13–44)
MAGNESIUM SERPL-MCNC: 2.1 MG/DL — SIGNIFICANT CHANGE UP (ref 1.6–2.6)
MANUAL SMEAR VERIFICATION: SIGNIFICANT CHANGE UP
MCHC RBC-ENTMCNC: 31.1 PG — SIGNIFICANT CHANGE UP (ref 27–34)
MCHC RBC-ENTMCNC: 31.7 GM/DL — LOW (ref 32–36)
MCV RBC AUTO: 98.2 FL — SIGNIFICANT CHANGE UP (ref 80–100)
MONOCYTES # BLD AUTO: 1.62 K/UL — HIGH (ref 0–0.9)
MONOCYTES NFR BLD AUTO: 14 % — SIGNIFICANT CHANGE UP (ref 2–14)
NEUTROPHILS # BLD AUTO: 7.75 K/UL — HIGH (ref 1.8–7.4)
NEUTROPHILS NFR BLD AUTO: 66 % — SIGNIFICANT CHANGE UP (ref 43–77)
NEUTS BAND # BLD: 1 % — SIGNIFICANT CHANGE UP (ref 0–8)
NRBC # BLD: 0 /100 — SIGNIFICANT CHANGE UP (ref 0–0)
OVALOCYTES BLD QL SMEAR: SLIGHT — SIGNIFICANT CHANGE UP
PHOSPHATE SERPL-MCNC: 3.2 MG/DL — SIGNIFICANT CHANGE UP (ref 2.5–4.5)
PLAT MORPH BLD: NORMAL — SIGNIFICANT CHANGE UP
PLATELET # BLD AUTO: 320 K/UL — SIGNIFICANT CHANGE UP (ref 150–400)
POLYCHROMASIA BLD QL SMEAR: SLIGHT — SIGNIFICANT CHANGE UP
POTASSIUM SERPL-MCNC: 4.4 MMOL/L — SIGNIFICANT CHANGE UP (ref 3.5–5.3)
POTASSIUM SERPL-SCNC: 4.4 MMOL/L — SIGNIFICANT CHANGE UP (ref 3.5–5.3)
RBC # BLD: 3.31 M/UL — LOW (ref 3.8–5.2)
RBC # FLD: 16.1 % — HIGH (ref 10.3–14.5)
RBC BLD AUTO: ABNORMAL
SODIUM SERPL-SCNC: 140 MMOL/L — SIGNIFICANT CHANGE UP (ref 135–145)
WBC # BLD: 11.56 K/UL — HIGH (ref 3.8–10.5)
WBC # FLD AUTO: 11.56 K/UL — HIGH (ref 3.8–10.5)

## 2020-02-01 PROCEDURE — 99233 SBSQ HOSP IP/OBS HIGH 50: CPT | Mod: GC

## 2020-02-01 RX ORDER — AMLODIPINE BESYLATE 2.5 MG/1
10 TABLET ORAL DAILY
Refills: 0 | Status: DISCONTINUED | OUTPATIENT
Start: 2020-02-01 | End: 2020-02-04

## 2020-02-01 RX ADMIN — AMLODIPINE BESYLATE 10 MILLIGRAM(S): 2.5 TABLET ORAL at 18:06

## 2020-02-01 RX ADMIN — Medication 81 MILLIGRAM(S): at 11:57

## 2020-02-01 RX ADMIN — RISPERIDONE 0.5 MILLIGRAM(S): 4 TABLET ORAL at 18:06

## 2020-02-01 RX ADMIN — Medication 25 MILLIGRAM(S): at 05:22

## 2020-02-01 RX ADMIN — SENNA PLUS 2 TABLET(S): 8.6 TABLET ORAL at 23:46

## 2020-02-01 RX ADMIN — Medication 100 MILLIGRAM(S): at 23:46

## 2020-02-01 RX ADMIN — CEFEPIME 50 MILLIGRAM(S): 1 INJECTION, POWDER, FOR SOLUTION INTRAMUSCULAR; INTRAVENOUS at 05:22

## 2020-02-01 RX ADMIN — Medication 100 MILLIGRAM(S): at 05:22

## 2020-02-01 RX ADMIN — CEFEPIME 50 MILLIGRAM(S): 1 INJECTION, POWDER, FOR SOLUTION INTRAMUSCULAR; INTRAVENOUS at 18:07

## 2020-02-01 RX ADMIN — Medication 25 MILLIGRAM(S): at 18:06

## 2020-02-01 RX ADMIN — Medication 100 MILLIGRAM(S): at 15:22

## 2020-02-01 RX ADMIN — RISPERIDONE 0.5 MILLIGRAM(S): 4 TABLET ORAL at 05:22

## 2020-02-01 RX ADMIN — GABAPENTIN 100 MILLIGRAM(S): 400 CAPSULE ORAL at 18:08

## 2020-02-01 RX ADMIN — GABAPENTIN 100 MILLIGRAM(S): 400 CAPSULE ORAL at 05:25

## 2020-02-01 RX ADMIN — HEPARIN SODIUM 900 UNIT(S)/HR: 5000 INJECTION INTRAVENOUS; SUBCUTANEOUS at 08:14

## 2020-02-01 NOTE — PROGRESS NOTE ADULT - SUBJECTIVE AND OBJECTIVE BOX
Time of Visit:  Patient seen and examined.     MEDICATIONS  (STANDING):  amLODIPine   Tablet 10 milliGRAM(s) Oral daily  aspirin  chewable 81 milliGRAM(s) Oral daily  cefepime   IVPB 2000 milliGRAM(s) IV Intermittent every 12 hours  gabapentin 100 milliGRAM(s) Oral every 12 hours  heparin  Infusion. 800 Unit(s)/Hr (8 mL/Hr) IV Continuous <Continuous>  metoprolol tartrate 25 milliGRAM(s) Oral two times a day  metroNIDAZOLE  IVPB 500 milliGRAM(s) IV Intermittent every 8 hours  povidone iodine 10% Solution 1 Application(s) Topical daily  risperiDONE   Tablet 0.5 milliGRAM(s) Oral two times a day  senna 2 Tablet(s) Oral at bedtime      MEDICATIONS  (PRN):  acetaminophen   Tablet .. 650 milliGRAM(s) Oral every 6 hours PRN Mild Pain (1 - 3)  haloperidol    Injectable 1 milliGRAM(s) IV Push every 4 hours PRN agitation  oxycodone    5 mG/acetaminophen 325 mG 1 Tablet(s) Oral every 4 hours PRN Moderate Pain (4 - 6)  polyethylene glycol 3350 17 Gram(s) Oral daily PRN Constipation       Medications up to date at time of exam.      PHYSICAL EXAMINATION:  Patient has no new complaints.  GENERAL: The patient is a well-developed, well-nourished, in no apparent distress.     Vital Signs Last 24 Hrs  T(C): 36.2 (01 Feb 2020 16:03), Max: 36.9 (31 Jan 2020 23:47)  T(F): 97.2 (01 Feb 2020 16:03), Max: 98.5 (31 Jan 2020 23:47)  HR: 92 (01 Feb 2020 16:03) (81 - 94)  BP: 166/73 (01 Feb 2020 16:03) (131/56 - 166/73)  BP(mean): --  RR: 18 (01 Feb 2020 16:03) (18 - 18)  SpO2: 96% (01 Feb 2020 16:03) (96% - 98%)   (if applicable)    Chest Tube (if applicable)    HEENT: Head is normocephalic and atraumatic. Extraocular muscles are intact. Mucous membranes are moist.     NECK: Supple, no palpable adenopathy.    LUNGS: Clear to auscultation, no wheezing, rales, or rhonchi.    HEART: Regular rate and rhythm without murmur.    ABDOMEN: Soft, nontender, and nondistended.  No hepatosplenomegaly is noted.    : No painful voiding, no pelvic pain    EXTREMITIES: R TMA with clean dressing    NEUROLOGIC: Awake, alert, oriented, grossly intact    SKIN: Warm, dry, good turgor.      LABS:                        10.3   11.56 )-----------( 320      ( 01 Feb 2020 07:02 )             32.5     02-01    140  |  105  |  26<H>  ----------------------------<  98  4.4   |  32<H>  |  0.95    Ca    8.6      01 Feb 2020 07:02  Phos  3.2     02-01  Mg     2.1     02-01      PTT - ( 01 Feb 2020 07:02 )  PTT:82.0 sec                    MICROBIOLOGY: (if applicable)    RADIOLOGY & ADDITIONAL STUDIES:  EKG:   CXR:  ECHO:    IMPRESSION: 75y Female PAST MEDICAL & SURGICAL HISTORY:  Hypercholesterolemia  Myocardial infarct, old  S/P CABG x 1   p/w           IMP: This is a 74 year old woman , live by herself, ambulates with cane  has medical hx significant for dementia, cad, pvd, hld, htn presents to the ED with complaint of discoloration for a toe on the right foot.  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 She is found to have diffuse atherosclerotic disease with multilevel arterial occlusion. Marginal/poor candidate for endovascular intervention. Patient is transferred to ICU for frequent monitoring after the angiogram.  Vascular surgery f/u.. will need b/l  open bypass and cardiac cl. message sent to dr monge .. cards. vascular surgery pending cardiac clearance .. intermediate risk and no further cardiac testing . Vascular surgery.. for OR 1/23  . COPD stable S/P Right TMA 1/29    Sugg:  -continue antibx  -duonebs q6h prn  -OOB to chair   -dvt/gi porphy  -wound dressing

## 2020-02-01 NOTE — PROGRESS NOTE ADULT - SUBJECTIVE AND OBJECTIVE BOX
PGY1 Note discussed with supervising resident and primary attending.    Patient is a 75y old  Female who presents with a chief complaint of dry gangrene (01 Feb 2020 08:41)    INTERVAL HPI/OVERNIGHT EVENTS:  - No acute events overnight  - Patient states her foot pain is under much better pain control  - Patient is having "very good" bowel movements  - Is annoyed that her left hand "does not work" - there is a bit of a wrist drop   - Hemodynamically stable and afebrile     MEDICATIONS  (STANDING):  aspirin  chewable 81 milliGRAM(s) Oral daily  cefepime   IVPB 2000 milliGRAM(s) IV Intermittent every 12 hours  gabapentin 100 milliGRAM(s) Oral every 12 hours  heparin  Infusion. 800 Unit(s)/Hr (8 mL/Hr) IV Continuous <Continuous>  metoprolol tartrate 25 milliGRAM(s) Oral two times a day  metroNIDAZOLE  IVPB 500 milliGRAM(s) IV Intermittent every 8 hours  povidone iodine 10% Solution 1 Application(s) Topical daily  risperiDONE   Tablet 0.5 milliGRAM(s) Oral two times a day  senna 2 Tablet(s) Oral at bedtime    MEDICATIONS  (PRN):  acetaminophen   Tablet .. 650 milliGRAM(s) Oral every 6 hours PRN Mild Pain (1 - 3)  haloperidol    Injectable 1 milliGRAM(s) IV Push every 4 hours PRN agitation  oxycodone    5 mG/acetaminophen 325 mG 1 Tablet(s) Oral every 4 hours PRN Moderate Pain (4 - 6)  polyethylene glycol 3350 17 Gram(s) Oral daily PRN Constipation    Allergies    influenza virus vaccine, live, trivalent (Unknown)  PC Pen VK (Rash)  penicillin (Other; Rash)  statins (Other)  sulfa drugs (Other)  sulfamethizole (Other)    Intolerances    REVIEW OF SYSTEMS:  CONSTITUTIONAL: No fever, weight loss, or fatigue  RESPIRATORY: No cough, wheezing, chills or hemoptysis; No shortness of breath  CARDIOVASCULAR: No chest pain, palpitations, dizziness, or leg swelling  GASTROINTESTINAL: No abdominal or epigastric pain. No nausea, vomiting, or hematemesis; No diarrhea or constipation. No melena or hematochezia.  NEUROLOGICAL: No headaches, memory loss, loss of strength, numbness, or tremors  MSK: left wrist is floppy and doesn't work  SKIN: No itching, burning, rashes, or lesions; Improved pain on right foot    Vital Signs Last 24 Hrs  T(C): 36.7 (01 Feb 2020 08:06), Max: 36.9 (31 Jan 2020 23:47)  T(F): 98.1 (01 Feb 2020 08:06), Max: 98.5 (31 Jan 2020 23:47)  HR: 981 (01 Feb 2020 08:06) (78 - 981)  BP: 161/52 (01 Feb 2020 08:06) (131/56 - 161/52)  BP(mean): --  RR: 18 (01 Feb 2020 08:06) (18 - 18)  SpO2: 98% (01 Feb 2020 08:06) (98% - 98%)    PHYSICAL EXAM:  GENERAL: NAD, well-groomed, well-developed  HEAD:  Atraumatic, Normocephalic  EYES: EOMI, PERRLA, conjunctiva and sclera clear  NECK: Supple, No JVD, Normal thyroid  CHEST/LUNG: Clear to percussion bilaterally; No rales, rhonchi, wheezing, or rubs  HEART: Regular rate and rhythm; No murmurs, rubs, or gallops  ABDOMEN: Soft, Nontender, Nondistended; Bowel sounds present  NERVOUS SYSTEM:  Alert & Oriented X3, Good concentration; Motor Strength 5/5 B/L   MSK: left radial nerve palsy;  strength 3/5; decreased wrist flexion/extension   EXTREMITIES:  2+ Peripheral Pulses, No clubbing, cyanosis, or edema; Right foot wrapped in ACE bandage; tender to palpation     LABS:                        10.3   11.56 )-----------( 320      ( 01 Feb 2020 07:02 )             32.5     02-01    140  |  105  |  26<H>  ----------------------------<  98  4.4   |  32<H>  |  0.95    Ca    8.6      01 Feb 2020 07:02  Phos  3.2     02-01  Mg     2.1     02-01    PTT - ( 01 Feb 2020 07:02 )  PTT:82.0 sec    CAPILLARY BLOOD GLUCOSE    RADIOLOGY & ADDITIONAL TESTS:    Imaging Personally Reviewed:  [ ] YES  [ ] NO    Consultant(s) Notes Reviewed:  [ ] YES  [ ] NO

## 2020-02-01 NOTE — PROGRESS NOTE ADULT - ATTENDING COMMENTS
Patient is a 75y old  Female who presents with a chief complaint of dry gangrene. S/p angiogram 1/17, s/p open bypass iliac-pop RLE 1/23, S/p TMA on 1/29    Today  Patient was seen and examined at bedside   Pain in left foot somehow better today  Wrist drop same as before     REVIEW OF SYSTEMS: denies fever, chills, SOB, palpitations, chest pain, abdominal pain, nausea, vomiting, diarrhea, constipation, dizziness    PHYSICAL EXAM:  GENERAL: NAD  NERVOUS SYSTEM:  Alert & Oriented X1, Good concentration; very poor short term memory, left wrist drop  CHEST/LUNG: Clear to auscultation bilaterally; No rales, rhonchi, wheezing, or rubs  HEART: Regular rate and rhythm; No murmurs, rubs, or gallops  ABDOMEN: Soft, Nontender, Nondistended; Bowel sounds present  EXTREMITIES:  Right foot s/p TMA, bandaged, left foot areas of erythema, 3rd toe area of gangrene     Assessment and plan:  1. Severe PVD   S/p Ileopopliteal bypass  Cont Heparin drip   Scheduled for left fem-pop bypass on Jan 4  Dressing change as per Vascular and Podiatry   Gabapentin, Tylenol and tramadol for pain  Cont Aspirin     2. 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 Alzheimer's dementia   Cont Risperdal and Haloperidol (PRN)    4. HTN  Cont Metoprolol tartrate     5. CAD  Cont Aspirin  Patient is allergic to statin     6. COPD   Duoneb PRN    7. 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

## 2020-02-01 NOTE — PROGRESS NOTE ADULT - PROBLEM SELECTOR PLAN 1
S/p Ileopopliteal bypass  Cont Heparin drip   Scheduled for left fem-pop bypass on Jan 4  Dressing change as per Vascular and Podiatry   Gabapentin, Tylenol and tramadol for pain  Cont Aspirin   01/31/2020: Await Left Fem-Pop Bypass to occur on 02/04/2020 02/01/2020: Await Left Fem-Pop Bypass to occur on 02/04/2020

## 2020-02-01 NOTE — PROGRESS NOTE ADULT - PROBLEM SELECTOR PLAN 7
01/31/2020: Neuro Reccs - Isolated radial nerve palsy due to pressure on nerve in the radial groove; wrist extension brace are advised for recovery - usually good prognosis for nerve recovery; f/u PT [ ]  02/01/2020: continues to have left wrist drop; f/u with Brace/splint

## 2020-02-01 NOTE — PROGRESS NOTE ADULT - SUBJECTIVE AND OBJECTIVE BOX
74F s/p  external iliac to popliteal PTFE bypass and TMA. Patient states she is feeling better, with some improvement in symptoms of the b/l LE. Patient was seen yesterday by Neurology for L hand weakness and states her  is better today.  Patient remains on heparin drip  with PTT in range. Afebrile and HD stable.  No acute events overnight.      ICU Vital Signs Last 24 Hrs  T(C): 36.7 (01 Feb 2020 08:06), Max: 36.9 (31 Jan 2020 23:47)  T(F): 98.1 (01 Feb 2020 08:06), Max: 98.5 (31 Jan 2020 23:47)  HR: 981 (01 Feb 2020 08:06) (78 - 981) (RECORDING ISSUE)  BP: 161/52 (01 Feb 2020 08:06) (131/56 - 161/52)  BP(mean): --  ABP: --  ABP(mean): --  RR: 18 (01 Feb 2020 08:06) (18 - 18)  SpO2: 98% (01 Feb 2020 08:06) (98% - 98%)    PHYSICAL EXAM  Gen: NAD  Resp: nonlabored breathing  L/E: UNCHANGED right popliteal pulses intact, TMA site appears clean and dry but with area of necrosis over the dorsal skin and small areas of demarcation over the incision site; left foot warm to touch, exam limited due to pain  Neuro: A+Ox3, CN2-12 intact, gross strength preserved in upper and lower extremities, isolated floppy appearing left hand but with preserved  strength and normal sensation throughout --> APPEARS IMPROVED, can  and point better today    PTT - ( 01 Feb 2020 07:02 )  PTT:82.0 sec

## 2020-02-01 NOTE — PROGRESS NOTE ADULT - SUBJECTIVE AND OBJECTIVE BOX
Patient is a 74y old  Female who presents with a chief complaint of discoloration of toe of right foot.     HPI: This 74 year old non-diabetic female presents to the ED with complaint of discoloration for a toe on the right foot. She states that she was seen by her primary care physician a few days ago and told her to go to the emergency room.  Patient states she smokes at least a pack a day of cigarettes since she was 22 years old.  Patient states she can only ambulate 10 feet and gets a cramp in her right calf for which she needs to sit down.  She states she is very minimally ambulatory.   She denies pain at rest with her feet elevated.  Patient lives alone with a cat. Patient massaging leg throughout the visit.  Patient denies n/v/d/sob/cp/f.  Patient states her blood pressure is not usually low.     Podiatry Interval HPI:  Patient seen bedside this AM POD#3. Patient states she is unable to extend her left hand but when distracted she extended her hand and pointed up.  She states her left foot continues to be very painful to the touch and she is sensitive when someone touches the right leg but states it is not painful.   She relates to minimal pain in the right foot.  Patient denies n/f/d/f/sob.      PMH: COPD (chronic obstructive pulmonary disease)  Hypercholesterolemia  Myocardial infarct, old    Allergies: PC Pen VK (Rash)  penicillin (Other; Rash)  statins (Other)  sulfa drugs (Other)  sulfamethizole (Other)    MEDICATIONS  (STANDING):  amLODIPine   Tablet 10 milliGRAM(s) Oral daily  aspirin  chewable 81 milliGRAM(s) Oral daily  cefepime   IVPB 2000 milliGRAM(s) IV Intermittent every 12 hours  gabapentin 100 milliGRAM(s) Oral every 12 hours  heparin  Infusion. 800 Unit(s)/Hr (8 mL/Hr) IV Continuous <Continuous>  metoprolol tartrate 25 milliGRAM(s) Oral two times a day  metroNIDAZOLE  IVPB 500 milliGRAM(s) IV Intermittent every 8 hours  povidone iodine 10% Solution 1 Application(s) Topical daily  risperiDONE   Tablet 0.5 milliGRAM(s) Oral two times a day  senna 2 Tablet(s) Oral at bedtime      FH:  PSX: S/P CABG x 1    Social History:  pt states she smokes 1ppd since she was 22  denies etoh or substance use (10 Angel 2020 21:52)        Labs                        10.3   11.56 )-----------( 320      ( 01 Feb 2020 07:02 )             32.5     02-01    140  |  105  |  26<H>  ----------------------------<  98  4.4   |  32<H>  |  0.95    Ca    8.6      01 Feb 2020 07:02  Phos  3.2     02-01  Mg     2.1     02-01      Vital Signs Last 24 Hrs  T(C): 36.7 (01 Feb 2020 08:06), Max: 36.9 (31 Jan 2020 23:47)  T(F): 98.1 (01 Feb 2020 08:06), Max: 98.5 (31 Jan 2020 23:47)  HR: 81 (01 Feb 2020 08:06) (81 - 94)  BP: 161/52 (01 Feb 2020 08:06) (131/56 - 161/52)  BP(mean): --  RR: 18 (01 Feb 2020 08:06) (18 - 18)  SpO2: 98% (01 Feb 2020 08:06) (98% - 98%)  Sedimentation Rate, Erythrocyte: 60 mm/Hr (01-22-20 @ 06:32)  Hemoglobin A1C, Whole Blood: 5.6 % (01-11-20 @ 09:30)       C-Reactive Protein, Serum: 1.80 mg/dL (01-22-20 @ 09:57)    WBC Count: 11.56 K/uL <H> (02-01-20 @ 07:02)     PHYSICAL EXAM  GEN: MERARY MEYER is a pleasant well-nourished, well developed 74y Female in no acute distress. Patient is alert,  Vasc:  DP and PT pulses non-palpable b/l. Left DP faintly dopplerable. Unable to doppler left PT, Unable to find right DP or PT with doppler. Shiny taught skin b/l dorsal feet with level of violaceous color extending proximally to tarsal joints. Overall perfusion to foot notably improved, right foot is warm to touch.  Derm: Surgical incision with well approximated skin edges right distal TMA with violaceous discoloration central incision. No drainage, All sutures intact.  Dorsal foot slight ecchymotic patch.  Lateral foot slight ecchymotic patch. Sutures intact with well approximated skin edges posterior ankle.    Necrotic patch on dorsal left 3rd digit.    MSK: Tenderness to palpation distal foot.  Pain to touch dorsal left foot foot.     Imaging:   < from: Xray Foot AP + Lateral + Oblique, Right (01.10.20 @ 15:05) >    EXAM:  FOOT RIGHT (MINIMUM 3 VIEWS)                        PROCEDURE DATE:  01/10/2020    INTERPRETATION:  Right foot. 3 views. Patient has local pain.    The foot shows mild degeneration at the first MTP joint.    There is an old fracture deformity of the distal fibula.    No bone destruction or acute fracture is evident.    IMPRESSION: No acute finding.    JASKARAN MOSLEY M.D., ATTENDING RADIOLOGIST  This document has been electronically signed. Angel 10 2020  3:06PM  < end of copied text >    < from: [facility] Physiol Extremity Lower 3+ Level, BI (01.13.20 @ 13:49) >    EXAM:  US PHYSIOL LWR EXT 3+ LEV BI                        PROCEDURE DATE:  01/13/2020    INTERPRETATION:  Clinical indication: Right toe gangrene    Comparison: None    FINDINGS AND   IMPRESSION: Segmental pressures could not be obtaineddue to patient discomfort. Therefore, ABIs could not be calculated.    There are biphasic waveforms in the lower left thigh. Abnormally, monophasic flow within the remainder of the lower extremities.    RACQUEL CHA M.D., ATTENDING RADIOLOGIST  This document has been electronically signed. Jan 13 2020  2:20PM  < end of copied text >    < from: CT Angio Abd Aorta w/run-off w/ IV Cont (01.12.20 @ 19:40) >    EXAM:  CT ANGIO ABD AOR W RUN(W)AW IC                        PROCEDURE DATE:  01/12/2020    INTERPRETATION:  CT ANGIO ABDOMINAL AORTA RUNOFF WITHOUT AND OR WITH IV CONTRAST    CLINICAL INFORMATION: Atherosclerosis of the native arteries of the right and left lower extremity with right foot gangrene    PROCEDURE INFORMATION:   Exam: CTA Angiogram of the Abdominal Aorta and Bilateral Lower Extremities   (Run-off) With IV Contrast   Exam date and time: 1/12/2020 6:55 PM   Age: 74 years old   Clinical indication: Other: Pad, right 2nd toe gangrene, 3rd and 4th toes wet   early gang     TECHNIQUE:   Imaging protocol: CT angiogram of the abdominal aorta, pelvis and bilateral   lower extremities with IV iodinated contrast.   Intravenous contrast: 90 cc Omnipaque 350  3D rendering: MIP and/or 3D reconstructed images were created by the   technologist.     COMPARISON:   CR XR FOOT 3 VIEWS RIGHT 1/10/2020 2:46 PM     FINDINGS:   Aorta: Severe calcified and noncalcified atherosclerotic plaque. Aorta is   otherwise fully patent.   Celiac trunk and mesenteric arteries: No occlusion or significant stenosis.    Renal arteries: No occlusion or significant stenosis.      Right iliac arteries: Marked stenosis of the right external iliac artery distally.   Right femoral/popliteal arteries: Distal CFA stenosis. Severely and diffusely attenuated SFA. Occlusion of the mid-distal right superficial   femoral artery with reconstitution of the suprageniculate popliteal  artery. Stenotic right popliteal artery.   Right infrapopliteal arteries: Single-vessel runoff via the right peroneal   artery, diffusely attenuated, which feeds the plantar artery. The right anterior and posterior tibial   arteries are occluded. Reconstitution of the right pedis dorsalis artery at the   level of the foot, however severely attenuated.     Left iliac arteries: Moderate calcification and stenosis of the left iliac arteries. Occluded left internal iliac artery.  Left femoral/popliteal arteries: Long segmentOcclusion of the entire left superficial femoral   artery and Reconstitution of left popliteal artery, which shows multifocal disease without occlusion.  Left infrapopliteal arteries: Occlusion of the left anterior posterior tibial   arteries at the level of the ankle. Single-vessel left peroneal artery which   feeds the plantar artery. Left pedis dorsalis is attenuated but patent.    Liver: No mass.   Gallbladder and bile ducts: Unremarkable. No calcified stones. No ductal   dilation.    Pancreas: Unremarkable. No mass. No ductal dilation.    Spleen: Normal. No splenomegaly.    Adrenals: Normal. No mass.    Kidneys and ureters: Normal. No mass.      Stomach and bowel: . No obstruction. No mucosal thickening. Moderate stool   throughout the colon.    Appendix: No evidence of appendicitis.      Bladder: Unremarkable. No mass.    Reproductive: Unremarkable as visualized.      Intraperitoneal space: Unremarkable. No free air. No significant fluid   collection.    Lymph nodes: No lymphadenopathy.     Bones/joints: No acute fracture. No dislocation.    Soft tissues: Soft tissue irregularity of the second right toe presumably   related to underlying gangrene.       IMPRESSION:   1. Right lower extremity demonstrates marked diffuse stenosis of the lower   extremity with occlusion of the mid-distal right SFA which reconstitutes distally and   with single-vessel runoff to the right foot via the right peroneal artery which   feeds the plantar artery. The right pedis dorsalis artery reconstitutes at the   level of the ankle.   2. The left lower extremity demonstrates diffuse marked stenosis with total occlusion   of the left SFA with reconstitution of attenuated left popliteal artery. There is   single-vessel runoff via the left peronealartery is the which then feeds the   left plantar artery. The pedis dorsalis artery is patent.   3. evidence of inflow disease at the level of the right external iliac artery.  4. Slight irregularity of the soft tissues of the right second toe which may be   related to underlying gangrene.    GALILEA KING M.D., ATTENDING RADIOLOGIST  This document has been electronically signed. Jan 13 2020  4:12PM  < end of copied text >      A:   Critical Limb Ischemia, right  PVD  s/p angiogram 1/17  s/p open bypass iliac-pop RLE 1/23 with dr. shepard  s/p TMA right foot 1/29 for treatment of dry gangrene    P:  Patient evaluated, chart reviewed  Xrays- reviewed  NICKI/PVR-reviewed  CTA-reviewed- single vessel runoff to right foot.  Right foot dressed with 4x4 gauze, kerlix, ACE.    Will continue to monitor demarcation of left and right foot.  Will f/u vascular findings next week  NWB Right  Examined with attending Dr. Slaughter

## 2020-02-02 LAB
ANION GAP SERPL CALC-SCNC: 4 MMOL/L — LOW (ref 5–17)
APTT BLD: 85.6 SEC — HIGH (ref 27.5–36.3)
BASOPHILS # BLD AUTO: 0.17 K/UL — SIGNIFICANT CHANGE UP (ref 0–0.2)
BASOPHILS NFR BLD AUTO: 1.4 % — SIGNIFICANT CHANGE UP (ref 0–2)
BUN SERPL-MCNC: 24 MG/DL — HIGH (ref 7–18)
CALCIUM SERPL-MCNC: 8.7 MG/DL — SIGNIFICANT CHANGE UP (ref 8.4–10.5)
CHLORIDE SERPL-SCNC: 106 MMOL/L — SIGNIFICANT CHANGE UP (ref 96–108)
CO2 SERPL-SCNC: 30 MMOL/L — SIGNIFICANT CHANGE UP (ref 22–31)
CREAT SERPL-MCNC: 0.85 MG/DL — SIGNIFICANT CHANGE UP (ref 0.5–1.3)
EOSINOPHIL # BLD AUTO: 0.34 K/UL — SIGNIFICANT CHANGE UP (ref 0–0.5)
EOSINOPHIL NFR BLD AUTO: 2.8 % — SIGNIFICANT CHANGE UP (ref 0–6)
GLUCOSE BLDC GLUCOMTR-MCNC: 233 MG/DL — HIGH (ref 70–99)
GLUCOSE SERPL-MCNC: 107 MG/DL — HIGH (ref 70–99)
HCT VFR BLD CALC: 35.2 % — SIGNIFICANT CHANGE UP (ref 34.5–45)
HGB BLD-MCNC: 11.1 G/DL — LOW (ref 11.5–15.5)
IMM GRANULOCYTES NFR BLD AUTO: 5.2 % — HIGH (ref 0–1.5)
LYMPHOCYTES # BLD AUTO: 18.7 % — SIGNIFICANT CHANGE UP (ref 13–44)
LYMPHOCYTES # BLD AUTO: 2.25 K/UL — SIGNIFICANT CHANGE UP (ref 1–3.3)
MAGNESIUM SERPL-MCNC: 2.2 MG/DL — SIGNIFICANT CHANGE UP (ref 1.6–2.6)
MCHC RBC-ENTMCNC: 31.5 GM/DL — LOW (ref 32–36)
MCHC RBC-ENTMCNC: 31.6 PG — SIGNIFICANT CHANGE UP (ref 27–34)
MCV RBC AUTO: 100.3 FL — HIGH (ref 80–100)
MONOCYTES # BLD AUTO: 1.51 K/UL — HIGH (ref 0–0.9)
MONOCYTES NFR BLD AUTO: 12.6 % — SIGNIFICANT CHANGE UP (ref 2–14)
NEUTROPHILS # BLD AUTO: 7.12 K/UL — SIGNIFICANT CHANGE UP (ref 1.8–7.4)
NEUTROPHILS NFR BLD AUTO: 59.3 % — SIGNIFICANT CHANGE UP (ref 43–77)
NRBC # BLD: 0 /100 WBCS — SIGNIFICANT CHANGE UP (ref 0–0)
PHOSPHATE SERPL-MCNC: 3.1 MG/DL — SIGNIFICANT CHANGE UP (ref 2.5–4.5)
PLATELET # BLD AUTO: 327 K/UL — SIGNIFICANT CHANGE UP (ref 150–400)
POTASSIUM SERPL-MCNC: 4.2 MMOL/L — SIGNIFICANT CHANGE UP (ref 3.5–5.3)
POTASSIUM SERPL-SCNC: 4.2 MMOL/L — SIGNIFICANT CHANGE UP (ref 3.5–5.3)
RBC # BLD: 3.51 M/UL — LOW (ref 3.8–5.2)
RBC # FLD: 16.6 % — HIGH (ref 10.3–14.5)
SODIUM SERPL-SCNC: 140 MMOL/L — SIGNIFICANT CHANGE UP (ref 135–145)
WBC # BLD: 12.02 K/UL — HIGH (ref 3.8–10.5)
WBC # FLD AUTO: 12.02 K/UL — HIGH (ref 3.8–10.5)

## 2020-02-02 PROCEDURE — 99233 SBSQ HOSP IP/OBS HIGH 50: CPT | Mod: GC

## 2020-02-02 RX ADMIN — RISPERIDONE 0.5 MILLIGRAM(S): 4 TABLET ORAL at 18:58

## 2020-02-02 RX ADMIN — OXYCODONE AND ACETAMINOPHEN 1 TABLET(S): 5; 325 TABLET ORAL at 23:00

## 2020-02-02 RX ADMIN — OXYCODONE AND ACETAMINOPHEN 1 TABLET(S): 5; 325 TABLET ORAL at 08:09

## 2020-02-02 RX ADMIN — CEFEPIME 50 MILLIGRAM(S): 1 INJECTION, POWDER, FOR SOLUTION INTRAMUSCULAR; INTRAVENOUS at 18:57

## 2020-02-02 RX ADMIN — HEPARIN SODIUM 900 UNIT(S)/HR: 5000 INJECTION INTRAVENOUS; SUBCUTANEOUS at 10:02

## 2020-02-02 RX ADMIN — Medication 100 MILLIGRAM(S): at 15:56

## 2020-02-02 RX ADMIN — OXYCODONE AND ACETAMINOPHEN 1 TABLET(S): 5; 325 TABLET ORAL at 21:59

## 2020-02-02 RX ADMIN — Medication 100 MILLIGRAM(S): at 05:12

## 2020-02-02 RX ADMIN — Medication 100 MILLIGRAM(S): at 21:59

## 2020-02-02 RX ADMIN — SENNA PLUS 2 TABLET(S): 8.6 TABLET ORAL at 21:58

## 2020-02-02 RX ADMIN — RISPERIDONE 0.5 MILLIGRAM(S): 4 TABLET ORAL at 05:12

## 2020-02-02 RX ADMIN — CEFEPIME 50 MILLIGRAM(S): 1 INJECTION, POWDER, FOR SOLUTION INTRAMUSCULAR; INTRAVENOUS at 05:13

## 2020-02-02 RX ADMIN — Medication 25 MILLIGRAM(S): at 05:12

## 2020-02-02 RX ADMIN — Medication 25 MILLIGRAM(S): at 18:58

## 2020-02-02 RX ADMIN — Medication 81 MILLIGRAM(S): at 12:28

## 2020-02-02 RX ADMIN — GABAPENTIN 100 MILLIGRAM(S): 400 CAPSULE ORAL at 05:12

## 2020-02-02 RX ADMIN — OXYCODONE AND ACETAMINOPHEN 1 TABLET(S): 5; 325 TABLET ORAL at 07:39

## 2020-02-02 RX ADMIN — AMLODIPINE BESYLATE 10 MILLIGRAM(S): 2.5 TABLET ORAL at 05:12

## 2020-02-02 RX ADMIN — GABAPENTIN 100 MILLIGRAM(S): 400 CAPSULE ORAL at 18:57

## 2020-02-02 NOTE — PROGRESS NOTE ADULT - ASSESSMENT
Assessment and plan:  1. Severe PVD   S/p Ileopopliteal bypass  Cont Heparin drip   Scheduled for left fem-pop bypass on Jan 4  Dressing change as per Vascular and Podiatry   Gabapentin, Tylenol and tramadol for pain  Cont Aspirin     2. 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 Alzheimer's dementia   Cont Risperdal and Haloperidol (PRN)    4. HTN  Cont Metoprolol tartrate     5. CAD  Cont Aspirin  Patient is allergic to statin     6. COPD   Duoneb PRN    7. 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

## 2020-02-02 NOTE — PROGRESS NOTE ADULT - SUBJECTIVE AND OBJECTIVE BOX
74F s/p  external iliac to popliteal PTFE bypass and TMA. No acute events overnight. Continues to report pain in the left leg. Patient remains on heparin drip  with PTT in range. Afebrile and HD stable.    Vital Signs Last 24 Hrs  T(C): 36.9 (02 Feb 2020 00:35), Max: 36.9 (02 Feb 2020 00:35)  T(F): 98.4 (02 Feb 2020 00:35), Max: 98.4 (02 Feb 2020 00:35)  HR: 96 (02 Feb 2020 05:30) (81 - 97)  BP: 148/58 (02 Feb 2020 05:30) (138/54 - 166/73)  BP(mean): --  RR: 18 (02 Feb 2020 00:35) (18 - 18)  SpO2: 97% (02 Feb 2020 00:35) (96% - 98%)    PHYSICAL EXAM  Gen: NAD  Resp: nonlabored breathing  L/E: UNCHANGED right popliteal pulses intact, TMA site appears clean and dry but with area of necrosis over the dorsal skin and small areas of demarcation over the incision site; left foot warm to touch, exam limited due to pain  Neuro: A+Ox3, CN2-12 intact, gross strength preserved in upper and lower extremities, isolated floppy appearing left hand but with preserved  strength and normal sensation throughout --> APPEARS IMPROVED, can  and point better today    PTT - ( 01 Feb 2020 07:02 )  PTT:82.0 sec

## 2020-02-02 NOTE — PROGRESS NOTE ADULT - SUBJECTIVE AND OBJECTIVE BOX
75y old  Female who presents with a chief complaint of dry gangrene. S/p angiogram 1/17, s/p open bypass iliac-pop RLE 1/23, S/p TMA on 1/29    Today  Patient was seen and examined at bedside   Denies any complains     REVIEW OF SYSTEMS: denies fever, chills, SOB, palpitations, chest pain, abdominal pain, nausea, vomiting diarrhea, constipation, dizziness    PHYSICAL EXAM:  GENERAL: NAD  NERVOUS SYSTEM:  Alert & Oriented X1, Good concentration; very poor short term memory, left wrist drop  CHEST/LUNG: Clear to auscultation bilaterally; No rales, rhonchi, wheezing, or rubs  HEART: Regular rate and rhythm; No murmurs, rubs, or gallops  ABDOMEN: Soft, Nontender, Nondistended; Bowel sounds present  EXTREMITIES:  Right foot s/p TMA, bandaged, left foot areas of erythema, 3rd toe area of gangrene       MEDICATIONS  (STANDING):  amLODIPine   Tablet 10 milliGRAM(s) Oral daily  aspirin  chewable 81 milliGRAM(s) Oral daily  cefepime   IVPB 2000 milliGRAM(s) IV Intermittent every 12 hours  gabapentin 100 milliGRAM(s) Oral every 12 hours  heparin  Infusion. 800 Unit(s)/Hr (8 mL/Hr) IV Continuous <Continuous>  metoprolol tartrate 25 milliGRAM(s) Oral two times a day  metroNIDAZOLE  IVPB 500 milliGRAM(s) IV Intermittent every 8 hours  povidone iodine 10% Solution 1 Application(s) Topical daily  risperiDONE   Tablet 0.5 milliGRAM(s) Oral two times a day  senna 2 Tablet(s) Oral at bedtime    MEDICATIONS  (PRN):  acetaminophen   Tablet .. 650 milliGRAM(s) Oral every 6 hours PRN Mild Pain (1 - 3)  haloperidol    Injectable 1 milliGRAM(s) IV Push every 4 hours PRN agitation  oxycodone    5 mG/acetaminophen 325 mG 1 Tablet(s) Oral every 4 hours PRN Moderate Pain (4 - 6)  polyethylene glycol 3350 17 Gram(s) Oral daily PRN Constipation    LABS:                        11.1   12.02 )-----------( 327      ( 02 Feb 2020 08:15 )             35.2       140  |  106  |  24<H>  ----------------------------<  107<H>  4.2   |  30  |  0.85      PTT - ( 02 Feb 2020 08:15 )  PTT:85.6 sec    Blood Glucose.: 233 mg/dL (02 Feb 2020 11:31)

## 2020-02-02 NOTE — PROGRESS NOTE ADULT - SUBJECTIVE AND OBJECTIVE BOX
Time of Visit:  Patient seen and examined.     MEDICATIONS  (STANDING):  amLODIPine   Tablet 10 milliGRAM(s) Oral daily  aspirin  chewable 81 milliGRAM(s) Oral daily  cefepime   IVPB 2000 milliGRAM(s) IV Intermittent every 12 hours  gabapentin 100 milliGRAM(s) Oral every 12 hours  heparin  Infusion. 800 Unit(s)/Hr (8 mL/Hr) IV Continuous <Continuous>  metoprolol tartrate 25 milliGRAM(s) Oral two times a day  metroNIDAZOLE  IVPB 500 milliGRAM(s) IV Intermittent every 8 hours  povidone iodine 10% Solution 1 Application(s) Topical daily  risperiDONE   Tablet 0.5 milliGRAM(s) Oral two times a day  senna 2 Tablet(s) Oral at bedtime      MEDICATIONS  (PRN):  acetaminophen   Tablet .. 650 milliGRAM(s) Oral every 6 hours PRN Mild Pain (1 - 3)  haloperidol    Injectable 1 milliGRAM(s) IV Push every 4 hours PRN agitation  oxycodone    5 mG/acetaminophen 325 mG 1 Tablet(s) Oral every 4 hours PRN Moderate Pain (4 - 6)  polyethylene glycol 3350 17 Gram(s) Oral daily PRN Constipation       Medications up to date at time of exam.      PHYSICAL EXAMINATION:  Patient has no new complaints.  GENERAL: The patient is a well-developed, well-nourished, in no apparent distress.     Vital Signs Last 24 Hrs  T(C): 36.5 (02 Feb 2020 08:05), Max: 36.9 (02 Feb 2020 00:35)  T(F): 97.7 (02 Feb 2020 08:05), Max: 98.4 (02 Feb 2020 00:35)  HR: 82 (02 Feb 2020 08:05) (82 - 97)  BP: 138/57 (02 Feb 2020 08:05) (138/54 - 166/73)  BP(mean): --  RR: 16 (02 Feb 2020 08:05) (16 - 18)  SpO2: 95% (02 Feb 2020 08:05) (95% - 97%)   (if applicable)    Chest Tube (if applicable)    HEENT: Head is normocephalic and atraumatic. Extraocular muscles are intact. Mucous membranes are moist.     NECK: Supple, no palpable adenopathy.    LUNGS: Clear to auscultation, no wheezing, rales, or rhonchi.    HEART: Regular rate and rhythm without murmur.    ABDOMEN: Soft, nontender, and nondistended.  No hepatosplenomegaly is noted.    : No painful voiding, no pelvic pain    EXTREMITIES: R TMA with clean dressing    NEUROLOGIC: Awake, alert, oriented, grossly intact    SKIN: Warm, dry, good turgor.      LABS:                        11.1   12.02 )-----------( 327      ( 02 Feb 2020 08:15 )             35.2     02-02    140  |  106  |  24<H>  ----------------------------<  107<H>  4.2   |  30  |  0.85    Ca    8.7      02 Feb 2020 08:15  Phos  3.1     02-02  Mg     2.2     02-02      PTT - ( 02 Feb 2020 08:15 )  PTT:85.6 sec                    MICROBIOLOGY: (if applicable)    RADIOLOGY & ADDITIONAL STUDIES:  EKG:   CXR:  ECHO:    IMPRESSION: 75y Female PAST MEDICAL & SURGICAL HISTORY:  Hypercholesterolemia  Myocardial infarct, old  S/P CABG x 1   p/w           IMP: This is a 74 year old woman , live by herself, ambulates with cane  has medical hx significant for dementia, cad, pvd, hld, htn presents to the ED with complaint of discoloration for a toe on the right foot.  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 She is found to have diffuse atherosclerotic disease with multilevel arterial occlusion. Marginal/poor candidate for endovascular intervention. Patient is transferred to ICU for frequent monitoring after the angiogram.  Vascular surgery f/u.. will need b/l  open bypass and cardiac cl. message sent to dr monge .. cards. vascular surgery pending cardiac clearance .. intermediate risk and no further cardiac testing . Vascular surgery.. for OR 1/23  . COPD stable S/P Right TMA 1/29    Sugg:  -continue antibx  -duonebs q6h prn  -OOB to chair   -dvt/gi porphy  -wound dressing

## 2020-02-03 ENCOUNTER — TRANSCRIPTION ENCOUNTER (OUTPATIENT)
Age: 75
End: 2020-02-03

## 2020-02-03 LAB
ANION GAP SERPL CALC-SCNC: 7 MMOL/L — SIGNIFICANT CHANGE UP (ref 5–17)
APTT BLD: 101.1 SEC — HIGH (ref 27.5–36.3)
APTT BLD: 86.5 SEC — HIGH (ref 27.5–36.3)
APTT BLD: 88.6 SEC — HIGH (ref 27.5–36.3)
BASOPHILS # BLD AUTO: 0.19 K/UL — SIGNIFICANT CHANGE UP (ref 0–0.2)
BASOPHILS NFR BLD AUTO: 1.7 % — SIGNIFICANT CHANGE UP (ref 0–2)
BUN SERPL-MCNC: 28 MG/DL — HIGH (ref 7–18)
CALCIUM SERPL-MCNC: 9.4 MG/DL — SIGNIFICANT CHANGE UP (ref 8.4–10.5)
CHLORIDE SERPL-SCNC: 104 MMOL/L — SIGNIFICANT CHANGE UP (ref 96–108)
CO2 SERPL-SCNC: 29 MMOL/L — SIGNIFICANT CHANGE UP (ref 22–31)
CREAT SERPL-MCNC: 0.92 MG/DL — SIGNIFICANT CHANGE UP (ref 0.5–1.3)
EOSINOPHIL # BLD AUTO: 0.44 K/UL — SIGNIFICANT CHANGE UP (ref 0–0.5)
EOSINOPHIL NFR BLD AUTO: 3.9 % — SIGNIFICANT CHANGE UP (ref 0–6)
GLUCOSE SERPL-MCNC: 128 MG/DL — HIGH (ref 70–99)
HCT VFR BLD CALC: 36.5 % — SIGNIFICANT CHANGE UP (ref 34.5–45)
HGB BLD-MCNC: 11.6 G/DL — SIGNIFICANT CHANGE UP (ref 11.5–15.5)
IMM GRANULOCYTES NFR BLD AUTO: 4.8 % — HIGH (ref 0–1.5)
INR BLD: 1.26 RATIO — HIGH (ref 0.88–1.16)
LYMPHOCYTES # BLD AUTO: 1.6 K/UL — SIGNIFICANT CHANGE UP (ref 1–3.3)
LYMPHOCYTES # BLD AUTO: 14.2 % — SIGNIFICANT CHANGE UP (ref 13–44)
MAGNESIUM SERPL-MCNC: 2 MG/DL — SIGNIFICANT CHANGE UP (ref 1.6–2.6)
MCHC RBC-ENTMCNC: 31.8 GM/DL — LOW (ref 32–36)
MCHC RBC-ENTMCNC: 32 PG — SIGNIFICANT CHANGE UP (ref 27–34)
MCV RBC AUTO: 100.8 FL — HIGH (ref 80–100)
MONOCYTES # BLD AUTO: 1.18 K/UL — HIGH (ref 0–0.9)
MONOCYTES NFR BLD AUTO: 10.5 % — SIGNIFICANT CHANGE UP (ref 2–14)
NEUTROPHILS # BLD AUTO: 7.31 K/UL — SIGNIFICANT CHANGE UP (ref 1.8–7.4)
NEUTROPHILS NFR BLD AUTO: 64.9 % — SIGNIFICANT CHANGE UP (ref 43–77)
NRBC # BLD: 0 /100 WBCS — SIGNIFICANT CHANGE UP (ref 0–0)
PHOSPHATE SERPL-MCNC: 3.1 MG/DL — SIGNIFICANT CHANGE UP (ref 2.5–4.5)
PLATELET # BLD AUTO: 328 K/UL — SIGNIFICANT CHANGE UP (ref 150–400)
POTASSIUM SERPL-MCNC: 3.9 MMOL/L — SIGNIFICANT CHANGE UP (ref 3.5–5.3)
POTASSIUM SERPL-SCNC: 3.9 MMOL/L — SIGNIFICANT CHANGE UP (ref 3.5–5.3)
PROTHROM AB SERPL-ACNC: 14.1 SEC — HIGH (ref 10–12.9)
RBC # BLD: 3.62 M/UL — LOW (ref 3.8–5.2)
RBC # FLD: 17.2 % — HIGH (ref 10.3–14.5)
SODIUM SERPL-SCNC: 140 MMOL/L — SIGNIFICANT CHANGE UP (ref 135–145)
SURGICAL PATHOLOGY STUDY: SIGNIFICANT CHANGE UP
WBC # BLD: 11.26 K/UL — HIGH (ref 3.8–10.5)
WBC # FLD AUTO: 11.26 K/UL — HIGH (ref 3.8–10.5)

## 2020-02-03 PROCEDURE — 99233 SBSQ HOSP IP/OBS HIGH 50: CPT | Mod: GC

## 2020-02-03 RX ORDER — CHLORHEXIDINE GLUCONATE 213 G/1000ML
1 SOLUTION TOPICAL DAILY
Refills: 0 | Status: DISCONTINUED | OUTPATIENT
Start: 2020-02-03 | End: 2020-02-04

## 2020-02-03 RX ADMIN — HEPARIN SODIUM 800 UNIT(S)/HR: 5000 INJECTION INTRAVENOUS; SUBCUTANEOUS at 21:33

## 2020-02-03 RX ADMIN — Medication 100 MILLIGRAM(S): at 21:34

## 2020-02-03 RX ADMIN — RISPERIDONE 0.5 MILLIGRAM(S): 4 TABLET ORAL at 06:28

## 2020-02-03 RX ADMIN — CEFEPIME 50 MILLIGRAM(S): 1 INJECTION, POWDER, FOR SOLUTION INTRAMUSCULAR; INTRAVENOUS at 17:16

## 2020-02-03 RX ADMIN — OXYCODONE AND ACETAMINOPHEN 1 TABLET(S): 5; 325 TABLET ORAL at 15:00

## 2020-02-03 RX ADMIN — Medication 81 MILLIGRAM(S): at 12:05

## 2020-02-03 RX ADMIN — CHLORHEXIDINE GLUCONATE 1 APPLICATION(S): 213 SOLUTION TOPICAL at 12:07

## 2020-02-03 RX ADMIN — Medication 1 APPLICATION(S): at 12:05

## 2020-02-03 RX ADMIN — CEFEPIME 50 MILLIGRAM(S): 1 INJECTION, POWDER, FOR SOLUTION INTRAMUSCULAR; INTRAVENOUS at 06:28

## 2020-02-03 RX ADMIN — OXYCODONE AND ACETAMINOPHEN 1 TABLET(S): 5; 325 TABLET ORAL at 13:17

## 2020-02-03 RX ADMIN — Medication 25 MILLIGRAM(S): at 17:16

## 2020-02-03 RX ADMIN — AMLODIPINE BESYLATE 10 MILLIGRAM(S): 2.5 TABLET ORAL at 06:27

## 2020-02-03 RX ADMIN — Medication 100 MILLIGRAM(S): at 13:13

## 2020-02-03 RX ADMIN — RISPERIDONE 0.5 MILLIGRAM(S): 4 TABLET ORAL at 17:16

## 2020-02-03 RX ADMIN — GABAPENTIN 100 MILLIGRAM(S): 400 CAPSULE ORAL at 17:16

## 2020-02-03 RX ADMIN — Medication 25 MILLIGRAM(S): at 06:28

## 2020-02-03 RX ADMIN — SENNA PLUS 2 TABLET(S): 8.6 TABLET ORAL at 21:34

## 2020-02-03 RX ADMIN — GABAPENTIN 100 MILLIGRAM(S): 400 CAPSULE ORAL at 06:27

## 2020-02-03 RX ADMIN — Medication 100 MILLIGRAM(S): at 06:28

## 2020-02-03 RX ADMIN — HEPARIN SODIUM 800 UNIT(S)/HR: 5000 INJECTION INTRAVENOUS; SUBCUTANEOUS at 13:23

## 2020-02-03 RX ADMIN — HEPARIN SODIUM 800 UNIT(S)/HR: 5000 INJECTION INTRAVENOUS; SUBCUTANEOUS at 08:56

## 2020-02-03 NOTE — PROGRESS NOTE ADULT - ASSESSMENT
74F s/p external iliac to above knee popliteal bypass  - continue heparin SUPRATHERAPEUTIC PLEASE CORRECT  - OR TOMORROW - NPO AT MIDNIGHT  - continue podiatry f/u for R TMA, no hard splint  - OT  - offload pressure points  - pain control  - OR Tues Feb 4th for left fem-pop bypass 74F s/p external iliac to above knee popliteal bypass  - continue heparin SUPRATHERAPEUTIC PLEASE CORRECT  - ONCE PATIENT CALLED TO OR TOMORROW - HOLD HEPARIN  - CONTINUE ASA  - OR TOMORROW - NPO AT MIDNIGHT  - continue podiatry f/u for R TMA, no hard splint  - OT  - offload pressure points  - pain control  - OR Tues Feb 4th for left fem-pop bypass

## 2020-02-03 NOTE — PROGRESS NOTE ADULT - SUBJECTIVE AND OBJECTIVE BOX
74F s/p  external iliac to popliteal PTFE bypass and TMA. No acute events overnight. Continues to report pain in the left leg. Patient remains on heparin drip  with PTT in range. Afebrile and HD stable.    Vital Signs Last 24 Hrs  Vital Signs Last 24 Hrs  T(C): 36.7 (03 Feb 2020 01:00), Max: 37 (02 Feb 2020 16:00)  T(F): 98 (03 Feb 2020 01:00), Max: 98.6 (02 Feb 2020 16:00)  HR: 82 (03 Feb 2020 01:00) (82 - 83)  BP: 118/50 (03 Feb 2020 01:00) (105/43 - 138/57)  BP(mean): --  RR: 18 (03 Feb 2020 01:00) (16 - 18)  SpO2: 100% (03 Feb 2020 01:00) (95% - 100%)    PHYSICAL EXAM  Gen: NAD  Resp: nonlabored breathing  L/E: UNCHANGED right popliteal pulses intact, TMA site appears clean and dry but with area of necrosis over the dorsal skin and small areas of demarcation over the incision site; left foot warm to touch, exam limited due to pain  Neuro: intact, no residual neuropathy apparent of left hand    PTT - ( 03 Feb 2020 07:09 )  PTT:101.1 sec

## 2020-02-03 NOTE — PROGRESS NOTE ADULT - SUBJECTIVE AND OBJECTIVE BOX
PGY1 Note discussed with supervising resident and primary attending.    Patient is a 75y old  Female who presents with a chief complaint of dry gangrene (03 Feb 2020 07:44)    INTERVAL HPI/OVERNIGHT EVENTS:  - No acute events overnight  - Patient states that her pain is under much better control  - Tolerating a normal diet   - Regular urinary habits and bowel movements  - aPTT is supratherapeutic to 101 will try to decrease prior to surgery for Bypass on 02/04/2020  - Hemodynamically stable and afebrile  - No WBC noted     MEDICATIONS  (STANDING):  amLODIPine   Tablet 10 milliGRAM(s) Oral daily  aspirin  chewable 81 milliGRAM(s) Oral daily  cefepime   IVPB 2000 milliGRAM(s) IV Intermittent every 12 hours  chlorhexidine 2% Cloths 1 Application(s) Topical daily  gabapentin 100 milliGRAM(s) Oral every 12 hours  heparin  Infusion. 800 Unit(s)/Hr (8 mL/Hr) IV Continuous <Continuous>  metoprolol tartrate 25 milliGRAM(s) Oral two times a day  metroNIDAZOLE  IVPB 500 milliGRAM(s) IV Intermittent every 8 hours  povidone iodine 10% Solution 1 Application(s) Topical daily  risperiDONE   Tablet 0.5 milliGRAM(s) Oral two times a day  senna 2 Tablet(s) Oral at bedtime    MEDICATIONS  (PRN):  acetaminophen   Tablet .. 650 milliGRAM(s) Oral every 6 hours PRN Mild Pain (1 - 3)  haloperidol    Injectable 1 milliGRAM(s) IV Push every 4 hours PRN agitation  oxycodone    5 mG/acetaminophen 325 mG 1 Tablet(s) Oral every 4 hours PRN Moderate Pain (4 - 6)  polyethylene glycol 3350 17 Gram(s) Oral daily PRN Constipation    Allergies    influenza virus vaccine, live, trivalent (Unknown)  PC Pen VK (Rash)  penicillin (Other; Rash)  statins (Other)  sulfa drugs (Other)  sulfamethizole (Other)    Intolerances    REVIEW OF SYSTEMS:  CONSTITUTIONAL: No fever, weight loss, or fatigue  RESPIRATORY: No cough, wheezing, chills or hemoptysis; No shortness of breath  CARDIOVASCULAR: No chest pain, palpitations, dizziness, or leg swelling  GASTROINTESTINAL: No abdominal or epigastric pain. No nausea, vomiting, or hematemesis; No diarrhea or constipation. No melena or hematochezia.  NEUROLOGICAL: No headaches, memory loss, loss of strength, numbness, or tremors  MSK: left wrist is much more improved from a strength standpoint   SKIN: No itching, burning, rashes, or lesions; Improved pain on right foot    Vital Signs Last 24 Hrs  T(C): 36.7 (03 Feb 2020 07:59), Max: 37 (02 Feb 2020 16:00)  T(F): 98 (03 Feb 2020 07:59), Max: 98.6 (02 Feb 2020 16:00)  HR: 72 (03 Feb 2020 07:59) (72 - 83)  BP: 103/49 (03 Feb 2020 07:59) (103/49 - 118/50)  BP(mean): --  RR: 16 (03 Feb 2020 07:59) (16 - 18)  SpO2: 98% (03 Feb 2020 07:59) (98% - 100%)    PHYSICAL EXAM:  GENERAL: NAD, well-groomed, well-developed  HEAD:  Atraumatic, Normocephalic  EYES: EOMI, PERRLA, conjunctiva and sclera clear  NECK: Supple, No JVD, Normal thyroid  CHEST/LUNG: Clear to percussion bilaterally; No rales, rhonchi, wheezing, or rubs  HEART: Regular rate and rhythm; No murmurs, rubs, or gallops  ABDOMEN: Soft, Nontender, Nondistended; Bowel sounds present  NERVOUS SYSTEM:  Alert & Oriented X3, Good concentration; Motor Strength 5/5 B/L   MSK: left radial nerve palsy;  strength 4/5; Increased wrist flexion/extension   EXTREMITIES:  2+ Peripheral Pulses, No clubbing, cyanosis, or edema; Right foot wrapped in ACE bandage; mildly tender to palpation; Left foot demonstrates ulcers and notable PVD    LABS:                        11.6   11.26 )-----------( 328      ( 03 Feb 2020 07:09 )             36.5     02-03    140  |  104  |  28<H>  ----------------------------<  128<H>  3.9   |  29  |  0.92    Ca    9.4      03 Feb 2020 07:09  Phos  3.1     02-03  Mg     2.0     02-03    PTT - ( 03 Feb 2020 07:09 )  PTT:101.1 sec    CAPILLARY BLOOD GLUCOSE    POCT Blood Glucose.: 233 mg/dL (02 Feb 2020 11:31)    RADIOLOGY & ADDITIONAL TESTS:    Imaging Personally Reviewed:  [ ] YES  [ ] NO    Consultant(s) Notes Reviewed:  [ ] YES  [ ] NO

## 2020-02-03 NOTE — PROGRESS NOTE ADULT - PROBLEM SELECTOR PLAN 1
S/p Ileopopliteal bypass  Cont Heparin drip   Dressing change as per Vascular and Podiatry   Gabapentin, Tylenol and tramadol for pain  Cont Aspirin   01/31/2020: Await Left Fem-Pop Bypass to occur on 02/04/2020 02/01/2020: Await Left Fem-Pop Bypass to occur on 02/04/2020 02/03/2020: Await Left Fem-Pop Bypass to occur on 02/04/2020; NPO after midnight; will d/c Heparin immediately prior to surgery

## 2020-02-03 NOTE — PROGRESS NOTE ADULT - ATTENDING COMMENTS
Patient is a 75y old  Female who presents with a chief complaint of Dry Gangrene. S/p angiogram 1/17, s/p open bypass iliac-pop RLE 1/23, S/p TMA on 1/29.    Today  Patient was seen and examined at bedside   Complains of pain in BL LE specially after sitting on the chair     REVIEW OF SYSTEMS: denies fever, chills, SOB, palpitations, chest pain, abdominal pain, nausea, vomiting diarrhea, constipation, dizziness    PHYSICAL EXAM:  GENERAL: NAD  NERVOUS SYSTEM:  Alert & Oriented X1, Good concentration; very poor short term memory, left wrist drop  CHEST/LUNG: Clear to auscultation bilaterally; No rales, rhonchi, wheezing, or rubs  HEART: Regular rate and rhythm; No murmurs, rubs, or gallops  ABDOMEN: Soft, Nontender, Nondistended; Bowel sounds present  EXTREMITIES:  Right foot s/p TMA, bandaged, left foot areas of erythema, 3rd toe area of gangrene     Assessment and plan:  1. Severe PVD   S/p Illeopopliteal bypass  Cont Heparin drip   Scheduled for left fem-pop bypass on Jan 4- NPO from midnight  Hold heparin drip 3hrs before procedure   Dressing change as per Vascular and Podiatry   Gabapentin, Tylenol and tramadol for pain  Cont Aspirin     2. Dry gangrene of right foot with possible osteomyelitis   Cont Cefepime and flagyl  S/p TMA - bone pathology neg for OM     3. Alzheimer's dementia   Cont Risperdal and Haloperidol (PRN)    4. HTN  Cont Metoprolol tartrate     5. CAD  Cont Aspirin  Patient is allergic to statin     6. COPD   Duoneb PRN    7. 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

## 2020-02-03 NOTE — PROGRESS NOTE ADULT - SUBJECTIVE AND OBJECTIVE BOX
Time of Visit:  Patient seen and examined.     MEDICATIONS  (STANDING):  amLODIPine   Tablet 10 milliGRAM(s) Oral daily  aspirin  chewable 81 milliGRAM(s) Oral daily  cefepime   IVPB 2000 milliGRAM(s) IV Intermittent every 12 hours  chlorhexidine 2% Cloths 1 Application(s) Topical daily  gabapentin 100 milliGRAM(s) Oral every 12 hours  heparin  Infusion. 800 Unit(s)/Hr (8 mL/Hr) IV Continuous <Continuous>  metoprolol tartrate 25 milliGRAM(s) Oral two times a day  metroNIDAZOLE  IVPB 500 milliGRAM(s) IV Intermittent every 8 hours  povidone iodine 10% Solution 1 Application(s) Topical daily  risperiDONE   Tablet 0.5 milliGRAM(s) Oral two times a day  senna 2 Tablet(s) Oral at bedtime      MEDICATIONS  (PRN):  acetaminophen   Tablet .. 650 milliGRAM(s) Oral every 6 hours PRN Mild Pain (1 - 3)  haloperidol    Injectable 1 milliGRAM(s) IV Push every 4 hours PRN agitation  oxycodone    5 mG/acetaminophen 325 mG 1 Tablet(s) Oral every 4 hours PRN Moderate Pain (4 - 6)  polyethylene glycol 3350 17 Gram(s) Oral daily PRN Constipation       Medications up to date at time of exam.      PHYSICAL EXAMINATION:  Patient has no new complaints.  GENERAL: The patient is a well-developed, well-nourished, in no apparent distress.     Vital Signs Last 24 Hrs  T(C): 36.6 (03 Feb 2020 16:00), Max: 36.7 (03 Feb 2020 01:00)  T(F): 97.9 (03 Feb 2020 16:00), Max: 98 (03 Feb 2020 01:00)  HR: 82 (03 Feb 2020 16:00) (70 - 82)  BP: 113/65 (03 Feb 2020 16:00) (103/49 - 118/50)  BP(mean): --  RR: 18 (03 Feb 2020 16:00) (16 - 98)  SpO2: 99% (03 Feb 2020 16:00) (98% - 100%)   (if applicable)    Chest Tube (if applicable)    HEENT: Head is normocephalic and atraumatic. Extraocular muscles are intact. Mucous membranes are moist.     NECK: Supple, no palpable adenopathy.    LUNGS: Clear to auscultation, no wheezing, rales, or rhonchi.    HEART: Regular rate and rhythm without murmur.    ABDOMEN: Soft, nontender, and nondistended.  No hepatosplenomegaly is noted.    : No painful voiding, no pelvic pain    EXTREMITIES: R TMA with dressing     NEUROLOGIC: Awake,     SKIN: Warm, dry, good turgor.      LABS:                        11.6   11.26 )-----------( 328      ( 03 Feb 2020 07:09 )             36.5     02-03    140  |  104  |  28<H>  ----------------------------<  128<H>  3.9   |  29  |  0.92    Ca    9.4      03 Feb 2020 07:09  Phos  3.1     02-03  Mg     2.0     02-03      PT/INR - ( 03 Feb 2020 12:54 )   PT: 14.1 sec;   INR: 1.26 ratio         PTT - ( 03 Feb 2020 19:20 )  PTT:86.5 sec                    MICROBIOLOGY: (if applicable)    RADIOLOGY & ADDITIONAL STUDIES:  EKG:   CXR:  ECHO:    IMPRESSION: 75y Female PAST MEDICAL & SURGICAL HISTORY:  Hypercholesterolemia  Myocardial infarct, old  S/P CABG x 1   p/w     IMP: This is a 74 year old woman , live by herself, ambulates with cane  has medical hx significant for dementia, cad, pvd, hld, htn presents to the ED with complaint of discoloration for a toe on the right foot.  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 She is found to have diffuse atherosclerotic disease with multilevel arterial occlusion. Marginal/poor candidate for endovascular intervention. Patient is transferred to ICU for frequent monitoring after the angiogram.  Vascular surgery f/u.. will need b/l  open bypass and cardiac cl. message sent to dr monge .. cards. vascular surgery pending cardiac clearance .. intermediate risk and no further cardiac testing . Vascular surgery.. for OR 1/23  . COPD stable S/P Right TMA 1/29. Schedule for OR 2/4 by Vascular team     Sugg:  -continue antibx  -duonebs q6h prn  -OOB to chair   -dvt/gi porphy  -wound dressing  -anticoag

## 2020-02-03 NOTE — PROGRESS NOTE ADULT - PROBLEM SELECTOR PLAN 7
01/31/2020: Neuro Reccs - Isolated radial nerve palsy due to pressure on nerve in the radial groove; wrist extension brace are advised for recovery - usually good prognosis for nerve recovery; f/u PT [ ]  02/01/2020: continues to have left wrist drop; f/u with Brace/splint  02/03/2020: Improved left wrist function; PT reccs VALERIE placement

## 2020-02-03 NOTE — CHART NOTE - NSCHARTNOTEFT_GEN_A_CORE
Assessment:   Patient  Visited . Pt appears Thin and cachectic. Pt reports Po intake < 50 % of meal. Food choices. Food choices Updated. Offered Nutrient Dense snacks..  Pt agreed to try. Pt w S/P JENNA. D/W RN. Pt  is on DASH / TLC diet Ensure Enlive TID. BP Noted May benefit from Low sodium diet ( Least restrictive)     PO intake: < 50 % of meal.  Source for PO intake [ x] Patient/family [ ] chart [ ] staff     Current Weight:   % Weight Change bed scale   122 Lb bed scale     Pertinent Medications: MEDICATIONS  (STANDING):  amLODIPine   Tablet 10 milliGRAM(s) Oral daily  aspirin  chewable 81 milliGRAM(s) Oral daily  cefepime   IVPB 2000 milliGRAM(s) IV Intermittent every 12 hours  chlorhexidine 2% Cloths 1 Application(s) Topical daily  gabapentin 100 milliGRAM(s) Oral every 12 hours  heparin  Infusion. 800 Unit(s)/Hr (8 mL/Hr) IV Continuous <Continuous>  metoprolol tartrate 25 milliGRAM(s) Oral two times a day  metroNIDAZOLE  IVPB 500 milliGRAM(s) IV Intermittent every 8 hours  povidone iodine 10% Solution 1 Application(s) Topical daily  risperiDONE   Tablet 0.5 milliGRAM(s) Oral two times a day  senna 2 Tablet(s) Oral at bedtime    MEDICATIONS  (PRN):  acetaminophen   Tablet .. 650 milliGRAM(s) Oral every 6 hours PRN Mild Pain (1 - 3)  haloperidol    Injectable 1 milliGRAM(s) IV Push every 4 hours PRN agitation  oxycodone    5 mG/acetaminophen 325 mG 1 Tablet(s) Oral every 4 hours PRN Moderate Pain (4 - 6)  polyethylene glycol 3350 17 Gram(s) Oral daily PRN Constipation    Pertinent Labs:  02-03 Na140 mmol/L Glu 128 mg/dL<H> K+ 3.9 mmol/L Cr  0.92 mg/dL BUN 28 mg/dL<H> 02-03 Phos 3.1 mg/dL 01-11 [id number] 5.6 %      Skin: No pressure injury    Estimated Needs:   [ ] no change since previous assessment  [ ] recalculated:       Previous Nutrition Diagnosis:   [ ] Inadequate Energy Intake [ ]Inadequate Oral Intake [ ] Excessive Energy Intake   [ ] Underweight [ ] Increased Nutrient Needs [ ] Overweight/Obesity   [ ] Altered GI Function [ ] Unintended Weight Loss [ ] Food & Nutrition Related Knowledge Deficit [ x] Malnutrition Severe    Nutrition Diagnosis is [x ] ongoing  [ ] resolved [ ] not applicable     New Nutrition Diagnosis: [ ] not applicable  [ ] Inadequate Energy Intake [ ]Inadequate Oral Intake [ ] Excessive Energy Intake   [ ] Underweight [ ] Increased Nutrient Needs [ ] Overweight/Obesity   [ ] Altered GI Function [ ] Unintended Weight Loss [ ] Food & Nutrition Related Knowledge Deficit [ ] Malnutrition     Related to:     As evidenced by:     Interventions:   Recommend  [ ] Change Diet To:  [x ] Nutrition Supplement Increase Ensure  Enlive 2 Pks TID.  [x ] Nutrition Support  [ x] Other: MVim, VITC, zn   (x) Provide food of choice, Nutrient dense  snacks   (x) Low sodium diet ( Least Restrictive)   D/W RN     Monitoring and Evaluation:   [ ] PO intake [ ] Tolerance to diet prescription [ ] weights [ x] follow up per protocol  [ ] other:

## 2020-02-03 NOTE — PROGRESS NOTE ADULT - PROBLEM SELECTOR PLAN 2
Cont Cefepime and flagyl  01/31/2020: Transmetatarsal amputation of right foot 01/29; c/w Cefepime and Flagyl; Podiatry continues to follow  02/03/2020: c/w Cefepime and Flagyl and f/u Vascular & Podiatry reccs

## 2020-02-04 LAB
ANION GAP SERPL CALC-SCNC: 7 MMOL/L — SIGNIFICANT CHANGE UP (ref 5–17)
APTT BLD: 85.9 SEC — HIGH (ref 27.5–36.3)
BASOPHILS # BLD AUTO: 0.1 K/UL — SIGNIFICANT CHANGE UP (ref 0–0.2)
BASOPHILS NFR BLD AUTO: 1 % — SIGNIFICANT CHANGE UP (ref 0–2)
BUN SERPL-MCNC: 31 MG/DL — HIGH (ref 7–18)
CALCIUM SERPL-MCNC: 9.1 MG/DL — SIGNIFICANT CHANGE UP (ref 8.4–10.5)
CHLORIDE SERPL-SCNC: 105 MMOL/L — SIGNIFICANT CHANGE UP (ref 96–108)
CO2 SERPL-SCNC: 28 MMOL/L — SIGNIFICANT CHANGE UP (ref 22–31)
CREAT SERPL-MCNC: 0.9 MG/DL — SIGNIFICANT CHANGE UP (ref 0.5–1.3)
EOSINOPHIL # BLD AUTO: 0.37 K/UL — SIGNIFICANT CHANGE UP (ref 0–0.5)
EOSINOPHIL NFR BLD AUTO: 3.6 % — SIGNIFICANT CHANGE UP (ref 0–6)
GLUCOSE SERPL-MCNC: 119 MG/DL — HIGH (ref 70–99)
HCT VFR BLD CALC: 34.8 % — SIGNIFICANT CHANGE UP (ref 34.5–45)
HGB BLD-MCNC: 11 G/DL — LOW (ref 11.5–15.5)
IMM GRANULOCYTES NFR BLD AUTO: 4.2 % — HIGH (ref 0–1.5)
LYMPHOCYTES # BLD AUTO: 1.14 K/UL — SIGNIFICANT CHANGE UP (ref 1–3.3)
LYMPHOCYTES # BLD AUTO: 11.1 % — LOW (ref 13–44)
MAGNESIUM SERPL-MCNC: 2.1 MG/DL — SIGNIFICANT CHANGE UP (ref 1.6–2.6)
MCHC RBC-ENTMCNC: 31.6 GM/DL — LOW (ref 32–36)
MCHC RBC-ENTMCNC: 32 PG — SIGNIFICANT CHANGE UP (ref 27–34)
MCV RBC AUTO: 101.2 FL — HIGH (ref 80–100)
MONOCYTES # BLD AUTO: 1.27 K/UL — HIGH (ref 0–0.9)
MONOCYTES NFR BLD AUTO: 12.4 % — SIGNIFICANT CHANGE UP (ref 2–14)
NEUTROPHILS # BLD AUTO: 6.94 K/UL — SIGNIFICANT CHANGE UP (ref 1.8–7.4)
NEUTROPHILS NFR BLD AUTO: 67.7 % — SIGNIFICANT CHANGE UP (ref 43–77)
NRBC # BLD: 0 /100 WBCS — SIGNIFICANT CHANGE UP (ref 0–0)
PHOSPHATE SERPL-MCNC: 3.2 MG/DL — SIGNIFICANT CHANGE UP (ref 2.5–4.5)
PLATELET # BLD AUTO: 317 K/UL — SIGNIFICANT CHANGE UP (ref 150–400)
POTASSIUM SERPL-MCNC: 4.2 MMOL/L — SIGNIFICANT CHANGE UP (ref 3.5–5.3)
POTASSIUM SERPL-SCNC: 4.2 MMOL/L — SIGNIFICANT CHANGE UP (ref 3.5–5.3)
RBC # BLD: 3.44 M/UL — LOW (ref 3.8–5.2)
RBC # FLD: 18.1 % — HIGH (ref 10.3–14.5)
SODIUM SERPL-SCNC: 140 MMOL/L — SIGNIFICANT CHANGE UP (ref 135–145)
WBC # BLD: 10.25 K/UL — SIGNIFICANT CHANGE UP (ref 3.8–10.5)
WBC # FLD AUTO: 10.25 K/UL — SIGNIFICANT CHANGE UP (ref 3.8–10.5)

## 2020-02-04 PROCEDURE — 35656 BPG FEMORAL-POPLITEAL: CPT | Mod: LT,78,GC

## 2020-02-04 PROCEDURE — 99233 SBSQ HOSP IP/OBS HIGH 50: CPT | Mod: GC

## 2020-02-04 RX ORDER — ACETAMINOPHEN 500 MG
650 TABLET ORAL EVERY 6 HOURS
Refills: 0 | Status: DISCONTINUED | OUTPATIENT
Start: 2020-02-04 | End: 2020-02-06

## 2020-02-04 RX ORDER — METRONIDAZOLE 500 MG
500 TABLET ORAL EVERY 8 HOURS
Refills: 0 | Status: DISCONTINUED | OUTPATIENT
Start: 2020-02-04 | End: 2020-02-04

## 2020-02-04 RX ORDER — GABAPENTIN 400 MG/1
100 CAPSULE ORAL EVERY 12 HOURS
Refills: 0 | Status: DISCONTINUED | OUTPATIENT
Start: 2020-02-04 | End: 2020-02-06

## 2020-02-04 RX ORDER — ONDANSETRON 8 MG/1
4 TABLET, FILM COATED ORAL ONCE
Refills: 0 | Status: DISCONTINUED | OUTPATIENT
Start: 2020-02-04 | End: 2020-02-04

## 2020-02-04 RX ORDER — GABAPENTIN 400 MG/1
100 CAPSULE ORAL EVERY 12 HOURS
Refills: 0 | Status: DISCONTINUED | OUTPATIENT
Start: 2020-02-04 | End: 2020-02-04

## 2020-02-04 RX ORDER — HEPARIN SODIUM 5000 [USP'U]/ML
2000 INJECTION INTRAVENOUS; SUBCUTANEOUS EVERY 6 HOURS
Refills: 0 | Status: DISCONTINUED | OUTPATIENT
Start: 2020-02-04 | End: 2020-02-04

## 2020-02-04 RX ORDER — AMLODIPINE BESYLATE 2.5 MG/1
10 TABLET ORAL DAILY
Refills: 0 | Status: DISCONTINUED | OUTPATIENT
Start: 2020-02-04 | End: 2020-02-05

## 2020-02-04 RX ORDER — OXYCODONE AND ACETAMINOPHEN 5; 325 MG/1; MG/1
1 TABLET ORAL EVERY 6 HOURS
Refills: 0 | Status: DISCONTINUED | OUTPATIENT
Start: 2020-02-04 | End: 2020-02-06

## 2020-02-04 RX ORDER — HEPARIN SODIUM 5000 [USP'U]/ML
500 INJECTION INTRAVENOUS; SUBCUTANEOUS
Qty: 25000 | Refills: 0 | Status: DISCONTINUED | OUTPATIENT
Start: 2020-02-05 | End: 2020-02-05

## 2020-02-04 RX ORDER — RISPERIDONE 4 MG/1
0.5 TABLET ORAL
Refills: 0 | Status: DISCONTINUED | OUTPATIENT
Start: 2020-02-04 | End: 2020-02-04

## 2020-02-04 RX ORDER — CEFEPIME 1 G/1
2000 INJECTION, POWDER, FOR SOLUTION INTRAMUSCULAR; INTRAVENOUS EVERY 12 HOURS
Refills: 0 | Status: DISCONTINUED | OUTPATIENT
Start: 2020-02-04 | End: 2020-02-04

## 2020-02-04 RX ORDER — HYDROMORPHONE HYDROCHLORIDE 2 MG/ML
0.5 INJECTION INTRAMUSCULAR; INTRAVENOUS; SUBCUTANEOUS
Refills: 0 | Status: DISCONTINUED | OUTPATIENT
Start: 2020-02-04 | End: 2020-02-04

## 2020-02-04 RX ORDER — SODIUM CHLORIDE 9 MG/ML
1000 INJECTION, SOLUTION INTRAVENOUS
Refills: 0 | Status: DISCONTINUED | OUTPATIENT
Start: 2020-02-04 | End: 2020-02-04

## 2020-02-04 RX ORDER — METOPROLOL TARTRATE 50 MG
25 TABLET ORAL
Refills: 0 | Status: DISCONTINUED | OUTPATIENT
Start: 2020-02-04 | End: 2020-02-06

## 2020-02-04 RX ORDER — POLYETHYLENE GLYCOL 3350 17 G/17G
17 POWDER, FOR SOLUTION ORAL DAILY
Refills: 0 | Status: DISCONTINUED | OUTPATIENT
Start: 2020-02-04 | End: 2020-02-06

## 2020-02-04 RX ORDER — RISPERIDONE 4 MG/1
0.5 TABLET ORAL
Refills: 0 | Status: DISCONTINUED | OUTPATIENT
Start: 2020-02-04 | End: 2020-02-06

## 2020-02-04 RX ORDER — ASPIRIN/CALCIUM CARB/MAGNESIUM 324 MG
81 TABLET ORAL DAILY
Refills: 0 | Status: DISCONTINUED | OUTPATIENT
Start: 2020-02-04 | End: 2020-02-06

## 2020-02-04 RX ORDER — SENNA PLUS 8.6 MG/1
2 TABLET ORAL AT BEDTIME
Refills: 0 | Status: DISCONTINUED | OUTPATIENT
Start: 2020-02-04 | End: 2020-02-06

## 2020-02-04 RX ORDER — CHLORHEXIDINE GLUCONATE 213 G/1000ML
1 SOLUTION TOPICAL
Refills: 0 | Status: DISCONTINUED | OUTPATIENT
Start: 2020-02-04 | End: 2020-02-06

## 2020-02-04 RX ORDER — FENTANYL CITRATE 50 UG/ML
25 INJECTION INTRAVENOUS
Refills: 0 | Status: DISCONTINUED | OUTPATIENT
Start: 2020-02-04 | End: 2020-02-04

## 2020-02-04 RX ORDER — SODIUM CHLORIDE 9 MG/ML
1000 INJECTION, SOLUTION INTRAVENOUS
Refills: 0 | Status: DISCONTINUED | OUTPATIENT
Start: 2020-02-04 | End: 2020-02-05

## 2020-02-04 RX ORDER — ONDANSETRON 8 MG/1
4 TABLET, FILM COATED ORAL ONCE
Refills: 0 | Status: DISCONTINUED | OUTPATIENT
Start: 2020-02-04 | End: 2020-02-06

## 2020-02-04 RX ORDER — HEPARIN SODIUM 5000 [USP'U]/ML
INJECTION INTRAVENOUS; SUBCUTANEOUS
Qty: 25000 | Refills: 0 | Status: DISCONTINUED | OUTPATIENT
Start: 2020-02-04 | End: 2020-02-04

## 2020-02-04 RX ORDER — HEPARIN SODIUM 5000 [USP'U]/ML
4000 INJECTION INTRAVENOUS; SUBCUTANEOUS EVERY 6 HOURS
Refills: 0 | Status: DISCONTINUED | OUTPATIENT
Start: 2020-02-04 | End: 2020-02-04

## 2020-02-04 RX ORDER — POVIDONE-IODINE 5 %
1 AEROSOL (ML) TOPICAL DAILY
Refills: 0 | Status: DISCONTINUED | OUTPATIENT
Start: 2020-02-04 | End: 2020-02-06

## 2020-02-04 RX ADMIN — AMLODIPINE BESYLATE 10 MILLIGRAM(S): 2.5 TABLET ORAL at 05:52

## 2020-02-04 RX ADMIN — OXYCODONE AND ACETAMINOPHEN 1 TABLET(S): 5; 325 TABLET ORAL at 06:59

## 2020-02-04 RX ADMIN — RISPERIDONE 0.5 MILLIGRAM(S): 4 TABLET ORAL at 05:52

## 2020-02-04 RX ADMIN — Medication 25 MILLIGRAM(S): at 05:52

## 2020-02-04 RX ADMIN — CEFEPIME 50 MILLIGRAM(S): 1 INJECTION, POWDER, FOR SOLUTION INTRAMUSCULAR; INTRAVENOUS at 05:51

## 2020-02-04 RX ADMIN — GABAPENTIN 100 MILLIGRAM(S): 400 CAPSULE ORAL at 05:57

## 2020-02-04 RX ADMIN — HYDROMORPHONE HYDROCHLORIDE 0.5 MILLIGRAM(S): 2 INJECTION INTRAMUSCULAR; INTRAVENOUS; SUBCUTANEOUS at 21:10

## 2020-02-04 RX ADMIN — HYDROMORPHONE HYDROCHLORIDE 0.5 MILLIGRAM(S): 2 INJECTION INTRAMUSCULAR; INTRAVENOUS; SUBCUTANEOUS at 20:40

## 2020-02-04 RX ADMIN — SODIUM CHLORIDE 100 MILLILITER(S): 9 INJECTION, SOLUTION INTRAVENOUS at 23:50

## 2020-02-04 RX ADMIN — Medication 100 MILLIGRAM(S): at 05:51

## 2020-02-04 RX ADMIN — OXYCODONE AND ACETAMINOPHEN 1 TABLET(S): 5; 325 TABLET ORAL at 05:57

## 2020-02-04 NOTE — PROGRESS NOTE ADULT - SUBJECTIVE AND OBJECTIVE BOX
74F s/p  external iliac to popliteal PTFE bypass and TMA. No acute events overnight. Continues to report pain in the left leg. Patient remains on heparin drip, to be held on way to OR. Afebrile and HD stable.    Vital Signs Last 24 Hrs  T(C): 36.9 (04 Feb 2020 07:33), Max: 36.9 (04 Feb 2020 07:33)  T(F): 98.4 (04 Feb 2020 07:33), Max: 98.4 (04 Feb 2020 07:33)  HR: 72 (04 Feb 2020 07:33) (70 - 96)  BP: 121/53 (04 Feb 2020 07:33) (103/49 - 158/66)  BP(mean): --  RR: 17 (04 Feb 2020 07:33) (16 - 98)  SpO2: 97% (04 Feb 2020 07:33) (97% - 99%)  Gen: NAD  Resp: nonlabored breathing  L/E: UNCHANGED right popliteal pulses intact, TMA site appears clean and dry but with area of necrosis over the dorsal skin and small areas of demarcation over the incision site; left foot warm to touch, exam limited due to pain  Neuro: intact, no residual neuropathy apparent of left hand

## 2020-02-04 NOTE — CONSULT NOTE ADULT - RS GEN PE MLT RESP DETAILS PC
breath sounds equal/no rales/good air movement/no rhonchi/no wheezes
respirations non-labored/airway patent/good air movement/breath sounds equal/clear to auscultation bilaterally

## 2020-02-04 NOTE — CONSULT NOTE ADULT - NSHPATTENDINGPLANDISCUSS_GEN_ALL_CORE
HS
Dr. Lee, ICU team, Dr. Flanagan, Dr. Delgado Soco/PA; LM for Dr. Izaguirre
House staff, ICU nurses

## 2020-02-04 NOTE — PROGRESS NOTE ADULT - PROBLEM SELECTOR PROBLEM 3
CAD (coronary artery disease)
COPD (chronic obstructive pulmonary disease)
Severe protein-calorie malnutrition
COPD (chronic obstructive pulmonary disease)
COPD (chronic obstructive pulmonary disease)
Early onset Alzheimer's disease with behavioral disturbance

## 2020-02-04 NOTE — PROGRESS NOTE ADULT - SUBJECTIVE AND OBJECTIVE BOX
PGY1 Note discussed with supervising resident and primary attending.    Patient is a 75y old  Female who presents with a chief complaint of dry gangrene (04 Feb 2020 07:49)    INTERVAL HPI/OVERNIGHT EVENTS:  - No acute events overnight  - Heparin drip was held immediately prior to bypass graft surgery   - Patient was NPO after midnight     MEDICATIONS  (STANDING):    MEDICATIONS  (PRN):    Allergies    influenza virus vaccine, live, trivalent (Unknown)  PC Pen VK (Rash)  penicillin (Other; Rash)  statins (Other)  sulfa drugs (Other)  sulfamethizole (Other)    Intolerances    REVIEW OF SYSTEMS:  CONSTITUTIONAL: No fever, weight loss, or fatigue  RESPIRATORY: No cough, wheezing, chills or hemoptysis; No shortness of breath  CARDIOVASCULAR: No chest pain, palpitations, dizziness, or leg swelling  GASTROINTESTINAL: No abdominal or epigastric pain. No nausea, vomiting, or hematemesis; No diarrhea or constipation. No melena or hematochezia.  NEUROLOGICAL: No headaches, memory loss, loss of strength, numbness, or tremors; improved muscle function in the left wrist  SKIN: No itching, burning, rashes, or lesions; minimal pain on the lower extremity    Vital Signs Last 24 Hrs  T(C): 36.9 (04 Feb 2020 07:33), Max: 36.9 (04 Feb 2020 07:33)  T(F): 98.4 (04 Feb 2020 07:33), Max: 98.4 (04 Feb 2020 07:33)  HR: 72 (04 Feb 2020 07:33) (70 - 96)  BP: 121/53 (04 Feb 2020 07:33) (110/52 - 158/66)  BP(mean): --  RR: 17 (04 Feb 2020 07:33) (17 - 98)  SpO2: 97% (04 Feb 2020 07:33) (97% - 99%)    PHYSICAL EXAM:  GENERAL: NAD, well-groomed, well-developed  HEAD:  Atraumatic, Normocephalic  EYES: EOMI, PERRLA, conjunctiva and sclera clear  NECK: Supple, No JVD, Normal thyroid  CHEST/LUNG: Clear to percussion bilaterally; No rales, rhonchi, wheezing, or rubs  HEART: Regular rate and rhythm; No murmurs, rubs, or gallops  ABDOMEN: Soft, Nontender, Nondistended; Bowel sounds present  NERVOUS SYSTEM:  Alert & Oriented X3, Good concentration; Motor Strength 5/5 B/L   MSK: left radial nerve palsy;  strength 4/5; Increased wrist flexion/extension   EXTREMITIES:  2+ Peripheral Pulses, No clubbing, cyanosis, or edema; Right foot wrapped in ACE bandage; mildly tender to palpation; Left foot demonstrates ulcers and notable     LABS:                        11.0   10.25 )-----------( 317      ( 04 Feb 2020 07:45 )             34.8     02-04    140  |  105  |  31<H>  ----------------------------<  119<H>  4.2   |  28  |  0.90    Ca    9.1      04 Feb 2020 07:45  Phos  3.2     02-04  Mg     2.1     02-04    PT/INR - ( 03 Feb 2020 12:54 )   PT: 14.1 sec;   INR: 1.26 ratio      PTT - ( 04 Feb 2020 07:45 )  PTT:85.9 sec    CAPILLARY BLOOD GLUCOSE    RADIOLOGY & ADDITIONAL TESTS:    Imaging Personally Reviewed:  [ ] YES  [ ] NO    Consultant(s) Notes Reviewed:  [ ] YES  [ ] NO

## 2020-02-04 NOTE — CONSULT NOTE ADULT - CONSTITUTIONAL DETAILS
well-groomed/distress due to pain/well-developed/thin female
well-developed/well-groomed/well-nourished/no distress

## 2020-02-04 NOTE — PROGRESS NOTE ADULT - PROBLEM SELECTOR PLAN 5
as per CT as above  added lactulose and miralax  c/w senna  monitor BM
Active smoker  Not interested in smoking cessation  Refused nicotine patch or gums
Active smoker  Not interested in smoking cessation  Refused nicotine patch or gums.
Active smoker  Not interested in smoking cessation  Refused nicotine patch or gums.
Patient A&O x 3 but noted with + memory loss, repetitive statements. HCP form on file - Vicky Marquez (HCP): [phone] / [phone]. Spoke with Silva on the phone - discussed status, reviewed risks vs benefits of resuscitation, intubation, artificial nutrition. Silva reports patient's wishes are to "stay on this earth for as long as humanly possible" which would include artificial life support. Reviewed [facility] and completed as per HCP's wishes: CPR / trial intubation / send to hospital / long term feeding tube if needed / trial IV fluids / use abx. All questions answered; supportive counseling provided.
active smoker -- not interested in smoking cessation  refused nicotine patch or gums.
Cont Aspirin  Patient is allergic to statin

## 2020-02-04 NOTE — PROGRESS NOTE ADULT - PROBLEM SELECTOR PLAN 4
At baseline, patient is minimally ambulatory with cane, independent of ADLs "but takes a long time", incontinent of bladder, + gangrene R foot. Patient noted with + generalized weakness, +2 bilateral  strength, dependent on all ADLs except meals at this time. + Hoarding home situation as pr HCP. For VALERIE.
Not in exacerbation  Likely due to smoking  Pt not on any home meds.
Not in exacerbation  Likely due to smoking  Pt not on any home meds.   Asymptomatic
Pt had large bowel movement  Continue with Miralax PRN  Continue with Senna
as CT above  had large BM   c/w bowel regimen
Cont Metoprolol tartrate
Cont Metoprolol tartrate  02/01/2020: Added Amlodipine 10 mg daily for better BP control
VTE score 1   started lovenox for dvt ppx
VTE score 1   started lovenox for dvt ppx
active smoker -- not interested in smoking cessation  refused nicotine patch or gums

## 2020-02-04 NOTE — PROGRESS NOTE ADULT - PROBLEM SELECTOR PLAN 3
Dx per dietary. Patient noted with + temporal wasting, loss of muscle mass/subcutaneous fat, cachexia; albumin: 2.0. BMI 18.7. Encourage PO intake. +Long term feeding tube as per [facility].
Pt on ASA at home  Continue with home regimen
Pt on ASA at home  Continue with home regimen.
Pt on ASA at home  Continue with home regimen.
breathing well  due to smoking   no exacerbation  monitor respiratory distress
c/w aspirin
Cont Risperdal and Haloperidol (PRN)
currently pt in not in exacerbation
currently pt in not in exacerbation

## 2020-02-04 NOTE — CONSULT NOTE ADULT - MUSCULOSKELETAL COMMENTS
left foot looks normal to visual inspection, she has thin legs and is hairless likely secondary to poor circulation.
left radial nerve palsy;  strength 4/5; Increased wrist flexion/extension;  Right foot wrapped in ACE bandage; mildly tender to palpation;

## 2020-02-04 NOTE — PROGRESS NOTE ADULT - PROBLEM SELECTOR PLAN 2
Cont Cefepime and flagyl  01/31/2020: Transmetatarsal amputation of right foot 01/29; c/w Cefepime and Flagyl; Podiatry continues to follow  02/03-02/04/2020: c/w Cefepime and Flagyl and f/u Vascular & Podiatry reccs

## 2020-02-04 NOTE — PROGRESS NOTE ADULT - SUBJECTIVE AND OBJECTIVE BOX
POSTOPERATIVE INSTRUCTIONS:    PLEASE  Heparin 500u - start 6 hours postop, please do not give a bolus; PTT goal 50-90  Continue cefepime  neurovascular checks q1 hours  resume diet

## 2020-02-04 NOTE — CONSULT NOTE ADULT - ATTENDING COMMENTS
Agree with above  74 year old female with right 2nd toe wet gangrene with CLI of R foot  Severe PVD noted to right distal foot with ischemic changes to multiple toes with secondary infection of the gangrenous digit  Xray reviewed, no emergent findings  C/w IV abx  Vasc recs  Pt will need TMA or more proximal amp as allowed by vascular intervention
Seen ex'ed dw'ed PA  PAD/R toes/forefoot gangrene  Limb threatened  Rec:   Med mgmt  Podiatry fu  Check NICKI/PVRs  CTA of aorta and runoff  Will need angio +/- revasc
I have personally seen and examined the pt. I was physically present for the key elements service provided. I agree with the above history, physical and plan which I edited where appropriate. I total spent 35 min critical care time.   75 y/o with PMH of HTN and PVD presented with right toe gangrene. S/P angio . Admitted to ICU for close monitoring.    A/P PVD   Gangrene   HTN   MICU   Monitor for bleeding   Frequent vascular checks  Vascular surgery f/u   Cont antibiotics   Cont antiHTN   Monitor BP  DVT prophylaxis
Patient was seen and examined ,interim events noted,Laboratory and Imaging studies were reviewed.  Thank you for the courtesy of the consultation,I would be available for any further discussion if needed.  Tamir Flanagan MD,FACC.  [address] [place] [address] [phone]
Patient seen and examined in ICU, reports pain controlled at present. Alert but forgetful, cooperative with exam. Reports her niece and  are her HCP. Palliative to follow to address goals of care. Agree with H&P, plan as above, edited where appropriate, discussed w Palliative NP.
74 year old female with Alzheimer's dementia, CAD, PVD, COPD, HLD, and HTN who presents to the ED with complaint of discoloration  toe on the right foot.  Admitted with concern for dry gangrene and PVD.  Pt underwent Left lower extremity femoral popliteal bypass 2/4/2020.  EBL 100cc.  UOP intra op 500ml.  Pt was given 2.5L of NS intra OP. Pt was safely extubated in recovery post op.  ICU consulted for post op monitoring.  On presentation pt presents afebrile and hemodynamically stable saturating 100% on 2L ncann in no acute distress.      ICU consulted for post Left lower extremity femoral popliteal bypass for critical monitoring.    Assessment:  -Lt femoral popliteal bypass POD 0  -PVD  -CAD  -COPD  -HTN  -HLD  -Alzheimers Dementia   -Lt Wrist Drop    Plan:  Post op labs   Neurovascular checks  Will resume heparin drip 6hrs post op per vascular surgery recs with PTT goal of 50-90  CAD- c/w asa therapy in AM   HLD- c/w statin therapy   Dry gangrene to Rt foot- will continue cefepime therapy for now per ID  NPO overnight  Pain control

## 2020-02-04 NOTE — PROGRESS NOTE ADULT - SUBJECTIVE AND OBJECTIVE BOX
Time of Visit:  Patient seen and examined.  pat seen in ICU    MEDICATIONS  (STANDING):  lactated ringers. 1000 milliLiter(s) (75 mL/Hr) IV Continuous <Continuous>      MEDICATIONS  (PRN):       Medications up to date at time of exam.      PHYSICAL EXAMINATION:  Patient has no new complaints.  GENERAL: The patient is a well-developed, well-nourished, in no apparent distress.     Vital Signs Last 24 Hrs  T(C): 36.3 (04 Feb 2020 19:12), Max: 36.9 (04 Feb 2020 07:33)  T(F): 97.4 (04 Feb 2020 19:12), Max: 98.4 (04 Feb 2020 07:33)  HR: 84 (04 Feb 2020 21:45) (72 - 96)  BP: 127/51 (04 Feb 2020 21:45) (106/47 - 158/66)  BP(mean): 71 (04 Feb 2020 21:45) (65 - 81)  RR: 16 (04 Feb 2020 21:45) (16 - 25)  SpO2: 100% (04 Feb 2020 21:45) (93% - 100%)   (if applicable)    Chest Tube (if applicable)    HEENT: Head is normocephalic and atraumatic. Extraocular muscles are intact. Mucous membranes are moist.     NECK: Supple, no palpable adenopathy.    LUNGS: Clear to auscultation, no wheezing, rales, or rhonchi.    HEART: Regular rate and rhythm without murmur.    ABDOMEN: Soft, nontender, and nondistended.  No hepatosplenomegaly is noted.    : No painful voiding, no pelvic pain    EXTREMITIES: Without any cyanosis, clubbing, rash, lesions or edema.    NEUROLOGIC: Awake, alert, oriented, grossly intact    SKIN: Warm, dry, good turgor.      LABS:                        11.0   10.25 )-----------( 317      ( 04 Feb 2020 07:45 )             34.8     02-04    140  |  105  |  31<H>  ----------------------------<  119<H>  4.2   |  28  |  0.90    Ca    9.1      04 Feb 2020 07:45  Phos  3.2     02-04  Mg     2.1     02-04      PT/INR - ( 03 Feb 2020 12:54 )   PT: 14.1 sec;   INR: 1.26 ratio         PTT - ( 04 Feb 2020 07:45 )  PTT:85.9 sec                    MICROBIOLOGY: (if applicable)    RADIOLOGY & ADDITIONAL STUDIES:  EKG:   CXR:  ECHO:    IMPRESSION: 75y Female PAST MEDICAL & SURGICAL HISTORY:  Hypercholesterolemia  Myocardial infarct, old  S/P CABG x 1   p/w           RECOMMENDATIONS: Time of Visit:  Patient seen and examined.  pat seen in ICU    MEDICATIONS  (STANDING):  lactated ringers. 1000 milliLiter(s) (75 mL/Hr) IV Continuous <Continuous>      MEDICATIONS  (PRN):       Medications up to date at time of exam.      PHYSICAL EXAMINATION:  Patient has no new complaints.  GENERAL: The patient is a well-developed, well-nourished, in no apparent distress.     Vital Signs Last 24 Hrs  T(C): 36.3 (04 Feb 2020 19:12), Max: 36.9 (04 Feb 2020 07:33)  T(F): 97.4 (04 Feb 2020 19:12), Max: 98.4 (04 Feb 2020 07:33)  HR: 84 (04 Feb 2020 21:45) (72 - 96)  BP: 127/51 (04 Feb 2020 21:45) (106/47 - 158/66)  BP(mean): 71 (04 Feb 2020 21:45) (65 - 81)  RR: 16 (04 Feb 2020 21:45) (16 - 25)  SpO2: 100% (04 Feb 2020 21:45) (93% - 100%)   (if applicable)    Chest Tube (if applicable)    HEENT: Head is normocephalic and atraumatic. Extraocular muscles are intact. Mucous membranes are moist.     NECK: Supple, no palpable adenopathy.    LUNGS: Clear to auscultation, no wheezing, rales, or rhonchi.    HEART: Regular rate and rhythm without murmur.    ABDOMEN: Soft, nontender, and nondistended.  No hepatosplenomegaly is noted.    : No painful voiding, no pelvic pain    EXTREMITIES: R TMA    NEUROLOGIC: Awake, alert, oriented, grossly intact    SKIN: Warm, dry, good turgor.      LABS:                        11.0   10.25 )-----------( 317      ( 04 Feb 2020 07:45 )             34.8     02-04    140  |  105  |  31<H>  ----------------------------<  119<H>  4.2   |  28  |  0.90    Ca    9.1      04 Feb 2020 07:45  Phos  3.2     02-04  Mg     2.1     02-04      PT/INR - ( 03 Feb 2020 12:54 )   PT: 14.1 sec;   INR: 1.26 ratio         PTT - ( 04 Feb 2020 07:45 )  PTT:85.9 sec                    MICROBIOLOGY: (if applicable)    RADIOLOGY & ADDITIONAL STUDIES:  EKG:   CXR:  ECHO:    IMPRESSION: 75y Female PAST MEDICAL & SURGICAL HISTORY:  Hypercholesterolemia  Myocardial infarct, old  S/P CABG x 1   p/w         IMP: This is a 74 year old woman , live by herself, ambulates with cane  has medical hx significant for dementia, cad, pvd, hld, htn presents to the ED with complaint of discoloration for a toe on the right foot.  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 She is found to have diffuse atherosclerotic disease with multilevel arterial occlusion. Marginal/poor candidate for endovascular intervention. Patient is transferred to ICU for frequent monitoring after the angiogram.  Vascular surgery f/u.. will need b/l  open bypass and cardiac cl. message sent to dr monge .. cards. vascular surgery pending cardiac clearance .. intermediate risk and no further cardiac testing . Vascular surgery.. for OR 1/23  . COPD stable S/P Right TMA  1/29.    2/4 Left  Femp Pop bypass. Post op extubated and tolerating NC    Sugg:  -continue antibx  -duonebs q6h prn  -OOB to chair   -dvt/gi porphy  -wound dressing  -anticoag  -dvt/gi   -neuro vascular checks as per vascular

## 2020-02-04 NOTE — PROGRESS NOTE ADULT - ASSESSMENT
74F s/p external iliac to above knee popliteal bypass  - OR TODAY FOR RLE BYPASS  - continue podiatry f/u for R TMA, no hard splint  - OT  - offload pressure points  - pain control

## 2020-02-04 NOTE — CONSULT NOTE ADULT - SUBJECTIVE AND OBJECTIVE BOX
75y  Female    Patient is a 75y old  Female who presents with a chief complaint of dry gangrene (04 Feb 2020 22:29)    HPI:  Patient is 74 year old female, live by herself, ambulates with cane  has medical hx significant for dementia, cad, pvd, hld, htn presents to the ED with complaint of discoloration for a toe on the right foot.  Per pt she has black colored rt 2nd toe. pt states she notice discoloration gradually over last 3 weeks associated with swelling and erythema, pt states she bumped in to something and hurt her right big toe but dose not remember when. Today she was seen by her primary care physician a few days ago and told her to go to the emergency room. Patient states she can only ambulate 10 feet and gets a cramp in her right calf for which she needs to sit down.  She states she is very minimally ambulatory.  She denies pain at rest with her feet elevated.  Patient lives alone with a cat. Patient massaging leg throughout the visit.  Patient denies fever, cp, sob, cough, n/v/d. Pt has urinary incontinence for many years. Patient states her blood pressure is not usually low. Patient states she smokes at least a pack a day of cigarettes since she was 22 years old.    Allergy: Sulfa and penicillin    Code status: Full code (10 Angel 2020 21:52)    PAST MEDICAL & SURGICAL HISTORY:  Hypercholesterolemia  Myocardial infarct, old  S/P CABG x 1    FAMILY HISTORY:    MEDICATIONS  (STANDING):  lactated ringers. 1000 milliLiter(s) (75 mL/Hr) IV Continuous <Continuous>    MEDICATIONS  (PRN):      Vital Signs Last 24 Hrs  T(C): 36.9 (04 Feb 2020 21:45), Max: 36.9 (04 Feb 2020 07:33)  T(F): 98.5 (04 Feb 2020 21:45), Max: 98.5 (04 Feb 2020 21:45)  HR: 84 (04 Feb 2020 21:45) (72 - 96)  BP: 127/51 (04 Feb 2020 21:45) (106/47 - 158/66)  BP(mean): 71 (04 Feb 2020 21:45) (65 - 81)  RR: 16 (04 Feb 2020 21:45) (16 - 25)  SpO2: 100% (04 Feb 2020 21:45) (93% - 100%)  CBC Full  -  ( 04 Feb 2020 07:45 )  WBC Count : 10.25 K/uL  RBC Count : 3.44 M/uL  Hemoglobin : 11.0 g/dL  Hematocrit : 34.8 %  Platelet Count - Automated : 317 K/uL  Mean Cell Volume : 101.2 fl  Mean Cell Hemoglobin : 32.0 pg  Mean Cell Hemoglobin Concentration : 31.6 gm/dL  Auto Neutrophil # : 6.94 K/uL  Auto Lymphocyte # : 1.14 K/uL  Auto Monocyte # : 1.27 K/uL  Auto Eosinophil # : 0.37 K/uL  Auto Basophil # : 0.10 K/uL  Auto Neutrophil % : 67.7 %  Auto Lymphocyte % : 11.1 %  Auto Monocyte % : 12.4 %  Auto Eosinophil % : 3.6 %  Auto Basophil % : 1.0 %    PT/INR - ( 03 Feb 2020 12:54 )   PT: 14.1 sec;   INR: 1.26 ratio         PTT - ( 04 Feb 2020 07:45 )  PTT:85.9 sec  02-04    140  |  105  |  31<H>  ----------------------------<  119<H>  4.2   |  28  |  0.90    Ca    9.1      04 Feb 2020 07:45  Phos  3.2     02-04  Mg     2.1     02-04

## 2020-02-04 NOTE — PROGRESS NOTE ADULT - PROBLEM SELECTOR PROBLEM 5
Constipation
Nicotine dependence
Palliative care encounter
Coronary artery disease, angina presence unspecified, unspecified vessel or lesion type, unspecified whether native or transplanted heart

## 2020-02-04 NOTE — PROGRESS NOTE ADULT - PROBLEM SELECTOR PROBLEM 4
COPD (chronic obstructive pulmonary disease)
COPD (chronic obstructive pulmonary disease)
Constipation
Constipation
Debility
Nicotine dependence
Hypertension, unspecified type
Prophylactic measure
Prophylactic measure

## 2020-02-04 NOTE — CONSULT NOTE ADULT - ASSESSMENT
Patient is 74 year old female w/ PMHx of Alzheimer's dementia, CAD, PVD, COPD, HLD, and HTN who presents to the ED with complaint of discoloration  toe on the right foot.  Admitted with concern for dry gangrene and PVD.  Pt underwent Left lower extremity femoral popliteal bypass 2/4/2020.  EBL 100cc.  UOP intra op 500ml.  Pt was given 2.5L of NS intra OP. Pt was safely extubated in recovery post op.  ICU consulted for post op monitoring.  On presentation pt presents afebrile and hemodynamically stable saturating 100% on 2L ncann in no acute distress.      ICU consulted for post Left lower extremity femoral popliteal bypass for critical monitoring.    Assessment:  -Lt femoral popliteal bypass POD 0  -PVD  -CAD  -COPD  -HTN  -HLD  -Alzheimers Dementia   -Lt Wrist Drop    Plan:  Neuro:  -Currently AO x 2 post-op.    -Pt has history of baseline dementia- will continue to monitor neuro status post op  -No reports of pain or discomfort at this time- will continue prescribed pain regimen PRN   -Lt wrist drop- Isolated radial nerve palsy due to pressure on nerve in the radial groove; wrist extension brace are advised for recovery    CV:  -Hypertension- on Lopressor and amlodipine   c/w lopressor therapy and monitor BP, add Norvasc as clinically indicated     -PVD- will resume heparin drip 6hrs post op per vascular surgery recs with PTT goal of 50-90    -CAD- c/w asa therapy in AM     -HLD- c/w statin therapy     Pulm:  -Extubated safely post op- saturating 100% on 2L ncann  -COPD-will continue PRN duoneb therapy, no need for continuous O2    ID:   -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 Prognosis guarded based on hemodynamic integrity     [facility]   FULL CODE

## 2020-02-04 NOTE — CONSULT NOTE ADULT - NEUROLOGICAL DETAILS
alert and oriented x 3
alert and oriented x 3/responds to pain/responds to verbal commands/sensation intact/cranial nerves intact

## 2020-02-04 NOTE — PROGRESS NOTE ADULT - PROBLEM SELECTOR PLAN 7
01/31/2020: Neuro Reccs - Isolated radial nerve palsy due to pressure on nerve in the radial groove; wrist extension brace are advised for recovery - usually good prognosis for nerve recovery; f/u PT [ ]  02/01/2020: continues to have left wrist drop; f/u with Brace/splint  02/03/2020: Improved left wrist function; PT reccs VALERIE placement  02/04/2020: Improved left wrist drop

## 2020-02-04 NOTE — BRIEF OPERATIVE NOTE - OPERATION/FINDINGS
left lower extremity femoral popliteal bypass with good hemostasis at case end, strong doppler signal

## 2020-02-04 NOTE — PROGRESS NOTE ADULT - ATTENDING COMMENTS
Patient is a 75y old  Female who presents with a chief complaint of Dry Gangrene. S/p angiogram 1/17, s/p open bypass iliac-pop RLE 1/23, S/p TMA on 1/29.    Today  Patient was seen and examined at bedside   Scheduled for bypass in [facility]     REVIEW OF SYSTEMS: denies fever, chills, SOB, palpitations, chest pain, abdominal pain, nausea, vomiting diarrhea, constipation, dizziness    PHYSICAL EXAM:  GENERAL: NAD  NERVOUS SYSTEM:  Alert & Oriented X1, Good concentration; very poor short term memory, left wrist drop- improved   CHEST/LUNG: Clear to auscultation bilaterally; No rales, rhonchi, wheezing, or rubs  HEART: Regular rate and rhythm; No murmurs, rubs, or gallops  ABDOMEN: Soft, Nontender, Nondistended; Bowel sounds present  EXTREMITIES:  Right foot s/p TMA, bandaged, left foot areas of erythema, 3rd toe area of gangrene     Assessment and plan:  1. Severe PVD   S/p Illeopopliteal bypass of right   Scheduled for bypass in [facility]  Cont Heparin drip s/p procedure   Dressing change as per Vascular and Podiatry   Gabapentin, Tylenol and tramadol for pain  Cont Aspirin     2. 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 Alzheimer's dementia   Cont Risperdal and Haloperidol (PRN)    4. HTN  Cont Metoprolol tartrate with holding parameter    5. CAD  Cont Aspirin  Patient is allergic to statin     6. COPD   Duoneb PRN    7. 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

## 2020-02-04 NOTE — PROGRESS NOTE ADULT - PROBLEM SELECTOR PROBLEM 6
Anticoagulation Summary  As of 10/2/2017    INR goal:   2.0-3.0   TTR:   70.7 % (1.1 mo)   Today's INR:   2.3   Maintenance plan:   4 mg (4 mg x 1) every day   Weekly total:   28 mg   Plan last modified:   Tobias Encinas, PharmD (10/2/2017)   Next INR check:   10/23/2017   Target end date:   Indefinite    Indications    Coagulopathy (CMS-HCC) [D68.9]  Long term (current) use of anticoagulants [Z79.01] [Z79.01]  A-fib (CMS-HCC) (Resolved) [I48.91]             Anticoagulation Episode Summary     INR check location:   [facility]    Preferred lab:       Send INR reminders to:       Comments:           Anticoagulation Care Providers     Provider Role Specialty Phone number    [facility] Responsible  [phone]        Anticoagulation Patient Findings      HPI:  Francesco Jaylen Schulte seen in clinic today, on anticoagulation therapy with warfarin for afib  Changes to current medical/health status since last appt: he wanted to use 4mg once daily   Denies signs/symptoms of bleeding and/or thrombosis since the last appt.    Denies any interval changes to diet  Denies any interval changes to medications since last appt.   Denies any complications or cost restrictions with current therapy.   BP recorded in vitals.    A/P   INR  therapeutic.   Continue weekly warfarin dose as noted    Follow up appointment in 3 week(s).     Tobias Encinas, PharmD           
Prophylactic measure
COPD (chronic obstructive pulmonary disease)
COPD (chronic obstructive pulmonary disease)
Constipation
Constipation
Chronic obstructive pulmonary disease, unspecified COPD type

## 2020-02-04 NOTE — CONSULT NOTE ADULT - GASTROINTESTINAL DETAILS
no masses palpable/bowel sounds normal/nontender/no guarding/no rigidity/no organomegaly/soft/no distention
no distention/bowel sounds normal/nontender/soft

## 2020-02-04 NOTE — PROGRESS NOTE ADULT - PROBLEM SELECTOR PLAN 1
S/p Ileopopliteal bypass  Cont Heparin drip   Dressing change as per Vascular and Podiatry   Gabapentin, Tylenol and tramadol for pain  Cont Aspirin   01/31/2020: Await Left Fem-Pop Bypass to occur on 02/04/2020 02/01/2020: Await Left Fem-Pop Bypass to occur on 02/04/2020 02/03/2020: Await Left Fem-Pop Bypass to occur on 02/04/2020; NPO after midnight; will d/c Heparin immediately prior to surgery  02/04/2020: f/u results of the Bypass; patient went to surgery today

## 2020-02-04 NOTE — PROGRESS NOTE ADULT - SUBJECTIVE AND OBJECTIVE BOX
PAD/ gang, rest pain,  Clinically stable  R LE: recent EIA-AK pop PTFE bypass stable/patent/incisions C/D/I.  FU post podiatry surg  L LE: Gradually progressing isch rest pain and tissue loss     Plan L LE bypass  Cond, options, procedure risks/benefits/implications dw'ed pt and HCP Silva (informed consent obtained by tel ~20:00 yest). PAD/ gang, rest pain,  Clinically stable  R LE: recent EIA-AK pop PTFE bypass stable/patent/incisions C/D/I.  FU post podiatry surg  R ant knee abrasion dry/clean/superf- improving.  L LE: Gradually progressing isch rest pain and tissue loss     Plan L LE bypass  Cond, options, procedure risks/benefits/implications dw'ed pt and HCP Silva (informed consent obtained by tel ~20:00 yest). PAD/ gang, rest pain,  Clinically stable  R LE: recent EIA-AK pop PTFE bypass stable/patent/incisions C/D/I.  FU post podiatry surg  R ant knee abrasion dry/clean/superf- improving.  R TMA: dry resid gangrene of forefoot/dorsum.  Incisions intact but partially necrotic  L LE: Gradually progressing (worsening since admission) isch rest pain and tissue loss (heel dry necrotic patch, b/l malleoli w superf dry ulcers, foot dorsum w pregang changes)    Plan L LE bypass  Cond, options, procedure risks/benefits/implications dw'ed pt and HCP Silva (informed consent obtained by tel ~20:00 yest).

## 2020-02-04 NOTE — PROGRESS NOTE ADULT - PROBLEM SELECTOR PLAN 6
c/w lovenox for DVT ppx
Not in exacerbation  Likely due to smoking  Pt not on any home meds
breathing well  due to smoking   no exacerbation  monitor respiratory distress.
continue with stool softeners   monitor bowel movement
continue with stool softeners   monitor bowel movement.
Williams PRN  01/31/2020: No need for continuous oxygen supplementation.

## 2020-02-05 ENCOUNTER — TRANSCRIPTION ENCOUNTER (OUTPATIENT)
Age: 75
End: 2020-02-05

## 2020-02-05 LAB
ALBUMIN SERPL ELPH-MCNC: 2.2 G/DL — LOW (ref 3.5–5)
ALP SERPL-CCNC: 54 U/L — SIGNIFICANT CHANGE UP (ref 40–120)
ALT FLD-CCNC: 17 U/L DA — SIGNIFICANT CHANGE UP (ref 10–60)
ANION GAP SERPL CALC-SCNC: 4 MMOL/L — LOW (ref 5–17)
ANION GAP SERPL CALC-SCNC: 8 MMOL/L — SIGNIFICANT CHANGE UP (ref 5–17)
APTT BLD: 28.7 SEC — SIGNIFICANT CHANGE UP (ref 27.5–36.3)
APTT BLD: 37.4 SEC — HIGH (ref 27.5–36.3)
APTT BLD: 40.7 SEC — HIGH (ref 27.5–36.3)
APTT BLD: 44.8 SEC — HIGH (ref 27.5–36.3)
AST SERPL-CCNC: 24 U/L — SIGNIFICANT CHANGE UP (ref 10–40)
BASOPHILS # BLD AUTO: 0.08 K/UL — SIGNIFICANT CHANGE UP (ref 0–0.2)
BASOPHILS # BLD AUTO: 0.09 K/UL — SIGNIFICANT CHANGE UP (ref 0–0.2)
BASOPHILS NFR BLD AUTO: 0.7 % — SIGNIFICANT CHANGE UP (ref 0–2)
BASOPHILS NFR BLD AUTO: 0.9 % — SIGNIFICANT CHANGE UP (ref 0–2)
BILIRUB SERPL-MCNC: 0.4 MG/DL — SIGNIFICANT CHANGE UP (ref 0.2–1.2)
BUN SERPL-MCNC: 19 MG/DL — HIGH (ref 7–18)
BUN SERPL-MCNC: 22 MG/DL — HIGH (ref 7–18)
CALCIUM SERPL-MCNC: 8.5 MG/DL — SIGNIFICANT CHANGE UP (ref 8.4–10.5)
CALCIUM SERPL-MCNC: 8.6 MG/DL — SIGNIFICANT CHANGE UP (ref 8.4–10.5)
CHLORIDE SERPL-SCNC: 104 MMOL/L — SIGNIFICANT CHANGE UP (ref 96–108)
CHLORIDE SERPL-SCNC: 106 MMOL/L — SIGNIFICANT CHANGE UP (ref 96–108)
CO2 SERPL-SCNC: 27 MMOL/L — SIGNIFICANT CHANGE UP (ref 22–31)
CO2 SERPL-SCNC: 29 MMOL/L — SIGNIFICANT CHANGE UP (ref 22–31)
CREAT SERPL-MCNC: 0.7 MG/DL — SIGNIFICANT CHANGE UP (ref 0.5–1.3)
CREAT SERPL-MCNC: 0.8 MG/DL — SIGNIFICANT CHANGE UP (ref 0.5–1.3)
EOSINOPHIL # BLD AUTO: 0.14 K/UL — SIGNIFICANT CHANGE UP (ref 0–0.5)
EOSINOPHIL # BLD AUTO: 0.3 K/UL — SIGNIFICANT CHANGE UP (ref 0–0.5)
EOSINOPHIL NFR BLD AUTO: 1.2 % — SIGNIFICANT CHANGE UP (ref 0–6)
EOSINOPHIL NFR BLD AUTO: 2.9 % — SIGNIFICANT CHANGE UP (ref 0–6)
GLUCOSE SERPL-MCNC: 119 MG/DL — HIGH (ref 70–99)
GLUCOSE SERPL-MCNC: 97 MG/DL — SIGNIFICANT CHANGE UP (ref 70–99)
HCT VFR BLD CALC: 33.3 % — LOW (ref 34.5–45)
HCT VFR BLD CALC: 35.4 % — SIGNIFICANT CHANGE UP (ref 34.5–45)
HCT VFR BLD CALC: 37.8 % — SIGNIFICANT CHANGE UP (ref 34.5–45)
HGB BLD-MCNC: 10.3 G/DL — LOW (ref 11.5–15.5)
HGB BLD-MCNC: 11.1 G/DL — LOW (ref 11.5–15.5)
HGB BLD-MCNC: 11.8 G/DL — SIGNIFICANT CHANGE UP (ref 11.5–15.5)
IMM GRANULOCYTES NFR BLD AUTO: 2.2 % — HIGH (ref 0–1.5)
IMM GRANULOCYTES NFR BLD AUTO: 2.6 % — HIGH (ref 0–1.5)
INR BLD: 1.21 RATIO — HIGH (ref 0.88–1.16)
INR BLD: 1.24 RATIO — HIGH (ref 0.88–1.16)
LYMPHOCYTES # BLD AUTO: 1.18 K/UL — SIGNIFICANT CHANGE UP (ref 1–3.3)
LYMPHOCYTES # BLD AUTO: 1.18 K/UL — SIGNIFICANT CHANGE UP (ref 1–3.3)
LYMPHOCYTES # BLD AUTO: 10.3 % — LOW (ref 13–44)
LYMPHOCYTES # BLD AUTO: 11.3 % — LOW (ref 13–44)
MAGNESIUM SERPL-MCNC: 1.7 MG/DL — SIGNIFICANT CHANGE UP (ref 1.6–2.6)
MCHC RBC-ENTMCNC: 30.9 GM/DL — LOW (ref 32–36)
MCHC RBC-ENTMCNC: 31.2 GM/DL — LOW (ref 32–36)
MCHC RBC-ENTMCNC: 31.4 GM/DL — LOW (ref 32–36)
MCHC RBC-ENTMCNC: 31.9 PG — SIGNIFICANT CHANGE UP (ref 27–34)
MCHC RBC-ENTMCNC: 32.3 PG — SIGNIFICANT CHANGE UP (ref 27–34)
MCHC RBC-ENTMCNC: 32.3 PG — SIGNIFICANT CHANGE UP (ref 27–34)
MCV RBC AUTO: 102.9 FL — HIGH (ref 80–100)
MCV RBC AUTO: 103.1 FL — HIGH (ref 80–100)
MCV RBC AUTO: 103.6 FL — HIGH (ref 80–100)
MONOCYTES # BLD AUTO: 1.19 K/UL — HIGH (ref 0–0.9)
MONOCYTES # BLD AUTO: 1.21 K/UL — HIGH (ref 0–0.9)
MONOCYTES NFR BLD AUTO: 10.6 % — SIGNIFICANT CHANGE UP (ref 2–14)
MONOCYTES NFR BLD AUTO: 11.4 % — SIGNIFICANT CHANGE UP (ref 2–14)
NEUTROPHILS # BLD AUTO: 7.41 K/UL — HIGH (ref 1.8–7.4)
NEUTROPHILS # BLD AUTO: 8.6 K/UL — HIGH (ref 1.8–7.4)
NEUTROPHILS NFR BLD AUTO: 70.9 % — SIGNIFICANT CHANGE UP (ref 43–77)
NEUTROPHILS NFR BLD AUTO: 75 % — SIGNIFICANT CHANGE UP (ref 43–77)
NRBC # BLD: 0 /100 WBCS — SIGNIFICANT CHANGE UP (ref 0–0)
PHOSPHATE SERPL-MCNC: 3.5 MG/DL — SIGNIFICANT CHANGE UP (ref 2.5–4.5)
PLATELET # BLD AUTO: 272 K/UL — SIGNIFICANT CHANGE UP (ref 150–400)
PLATELET # BLD AUTO: 289 K/UL — SIGNIFICANT CHANGE UP (ref 150–400)
PLATELET # BLD AUTO: 293 K/UL — SIGNIFICANT CHANGE UP (ref 150–400)
POTASSIUM SERPL-MCNC: 4.1 MMOL/L — SIGNIFICANT CHANGE UP (ref 3.5–5.3)
POTASSIUM SERPL-MCNC: 4.2 MMOL/L — SIGNIFICANT CHANGE UP (ref 3.5–5.3)
POTASSIUM SERPL-SCNC: 4.1 MMOL/L — SIGNIFICANT CHANGE UP (ref 3.5–5.3)
POTASSIUM SERPL-SCNC: 4.2 MMOL/L — SIGNIFICANT CHANGE UP (ref 3.5–5.3)
PROT SERPL-MCNC: 5.7 G/DL — LOW (ref 6–8.3)
PROTHROM AB SERPL-ACNC: 13.5 SEC — HIGH (ref 10–12.9)
PROTHROM AB SERPL-ACNC: 13.9 SEC — HIGH (ref 10–12.9)
RBC # BLD: 3.23 M/UL — LOW (ref 3.8–5.2)
RBC # BLD: 3.44 M/UL — LOW (ref 3.8–5.2)
RBC # BLD: 3.65 M/UL — LOW (ref 3.8–5.2)
RBC # FLD: 17.8 % — HIGH (ref 10.3–14.5)
RBC # FLD: 18.2 % — HIGH (ref 10.3–14.5)
RBC # FLD: 18.3 % — HIGH (ref 10.3–14.5)
SODIUM SERPL-SCNC: 139 MMOL/L — SIGNIFICANT CHANGE UP (ref 135–145)
SODIUM SERPL-SCNC: 139 MMOL/L — SIGNIFICANT CHANGE UP (ref 135–145)
WBC # BLD: 10.44 K/UL — SIGNIFICANT CHANGE UP (ref 3.8–10.5)
WBC # BLD: 11.46 K/UL — HIGH (ref 3.8–10.5)
WBC # BLD: 12.79 K/UL — HIGH (ref 3.8–10.5)
WBC # FLD AUTO: 10.44 K/UL — SIGNIFICANT CHANGE UP (ref 3.8–10.5)
WBC # FLD AUTO: 11.46 K/UL — HIGH (ref 3.8–10.5)
WBC # FLD AUTO: 12.79 K/UL — HIGH (ref 3.8–10.5)

## 2020-02-05 RX ORDER — HEPARIN SODIUM 5000 [USP'U]/ML
4500 INJECTION INTRAVENOUS; SUBCUTANEOUS EVERY 6 HOURS
Refills: 0 | Status: DISCONTINUED | OUTPATIENT
Start: 2020-02-05 | End: 2020-02-05

## 2020-02-05 RX ORDER — HEPARIN SODIUM 5000 [USP'U]/ML
500 INJECTION INTRAVENOUS; SUBCUTANEOUS
Qty: 25000 | Refills: 0 | Status: DISCONTINUED | OUTPATIENT
Start: 2020-02-05 | End: 2020-02-05

## 2020-02-05 RX ORDER — HEPARIN SODIUM 5000 [USP'U]/ML
2000 INJECTION INTRAVENOUS; SUBCUTANEOUS EVERY 6 HOURS
Refills: 0 | Status: DISCONTINUED | OUTPATIENT
Start: 2020-02-05 | End: 2020-02-05

## 2020-02-05 RX ORDER — HYDROMORPHONE HYDROCHLORIDE 2 MG/ML
0.5 INJECTION INTRAMUSCULAR; INTRAVENOUS; SUBCUTANEOUS ONCE
Refills: 0 | Status: DISCONTINUED | OUTPATIENT
Start: 2020-02-05 | End: 2020-02-05

## 2020-02-05 RX ORDER — HEPARIN SODIUM 5000 [USP'U]/ML
500 INJECTION INTRAVENOUS; SUBCUTANEOUS
Qty: 25000 | Refills: 0 | Status: DISCONTINUED | OUTPATIENT
Start: 2020-02-05 | End: 2020-02-06

## 2020-02-05 RX ORDER — KETOROLAC TROMETHAMINE 30 MG/ML
15 SYRINGE (ML) INJECTION EVERY 6 HOURS
Refills: 0 | Status: DISCONTINUED | OUTPATIENT
Start: 2020-02-05 | End: 2020-02-06

## 2020-02-05 RX ADMIN — HYDROMORPHONE HYDROCHLORIDE 0.5 MILLIGRAM(S): 2 INJECTION INTRAMUSCULAR; INTRAVENOUS; SUBCUTANEOUS at 01:19

## 2020-02-05 RX ADMIN — RISPERIDONE 0.5 MILLIGRAM(S): 4 TABLET ORAL at 19:12

## 2020-02-05 RX ADMIN — Medication 81 MILLIGRAM(S): at 12:08

## 2020-02-05 RX ADMIN — Medication 650 MILLIGRAM(S): at 12:00

## 2020-02-05 RX ADMIN — HEPARIN SODIUM 700 UNIT(S)/HR: 5000 INJECTION INTRAVENOUS; SUBCUTANEOUS at 21:32

## 2020-02-05 RX ADMIN — CHLORHEXIDINE GLUCONATE 1 APPLICATION(S): 213 SOLUTION TOPICAL at 05:14

## 2020-02-05 RX ADMIN — Medication 25 MILLIGRAM(S): at 19:12

## 2020-02-05 RX ADMIN — GABAPENTIN 100 MILLIGRAM(S): 400 CAPSULE ORAL at 19:12

## 2020-02-05 RX ADMIN — HEPARIN SODIUM 1100 UNIT(S)/HR: 5000 INJECTION INTRAVENOUS; SUBCUTANEOUS at 02:16

## 2020-02-05 RX ADMIN — Medication 650 MILLIGRAM(S): at 13:00

## 2020-02-05 RX ADMIN — GABAPENTIN 100 MILLIGRAM(S): 400 CAPSULE ORAL at 05:14

## 2020-02-05 RX ADMIN — RISPERIDONE 0.5 MILLIGRAM(S): 4 TABLET ORAL at 05:15

## 2020-02-05 RX ADMIN — Medication 25 MILLIGRAM(S): at 05:15

## 2020-02-05 RX ADMIN — Medication 1 APPLICATION(S): at 12:10

## 2020-02-05 RX ADMIN — HEPARIN SODIUM 500 UNIT(S)/HR: 5000 INJECTION INTRAVENOUS; SUBCUTANEOUS at 08:26

## 2020-02-05 RX ADMIN — HYDROMORPHONE HYDROCHLORIDE 0.5 MILLIGRAM(S): 2 INJECTION INTRAMUSCULAR; INTRAVENOUS; SUBCUTANEOUS at 01:04

## 2020-02-05 RX ADMIN — SENNA PLUS 2 TABLET(S): 8.6 TABLET ORAL at 21:32

## 2020-02-05 RX ADMIN — HEPARIN SODIUM 600 UNIT(S)/HR: 5000 INJECTION INTRAVENOUS; SUBCUTANEOUS at 15:00

## 2020-02-05 NOTE — PROGRESS NOTE ADULT - SUBJECTIVE AND OBJECTIVE BOX
INTERVAL HPI/OVERNIGHT EVENTS: ***    PRESSORS: [ ] YES [ ] NO    Antimicrobial:    Cardiovascular:  metoprolol tartrate 25 milliGRAM(s) Oral two times a day    Pulmonary:    Hematalogic:  aspirin  chewable 81 milliGRAM(s) Oral daily  heparin  Infusion. 500 Unit(s)/Hr IV Continuous <Continuous>  heparin  Injectable 4500 Unit(s) IV Push every 6 hours PRN  heparin  Injectable 2000 Unit(s) IV Push every 6 hours PRN    Other:  acetaminophen   Tablet .. 650 milliGRAM(s) Oral every 6 hours PRN  chlorhexidine 2% Cloths 1 Application(s) Topical <User Schedule>  gabapentin 100 milliGRAM(s) Oral every 12 hours  lactated ringers. 1000 milliLiter(s) IV Continuous <Continuous>  ondansetron Injectable 4 milliGRAM(s) IV Push once PRN  oxycodone    5 mG/acetaminophen 325 mG 1 Tablet(s) Oral every 6 hours PRN  polyethylene glycol 3350 17 Gram(s) Oral daily PRN  povidone iodine 10% Solution 1 Application(s) Topical daily  risperiDONE   Tablet 0.5 milliGRAM(s) Oral two times a day  senna 2 Tablet(s) Oral at bedtime    acetaminophen   Tablet .. 650 milliGRAM(s) Oral every 6 hours PRN  aspirin  chewable 81 milliGRAM(s) Oral daily  chlorhexidine 2% Cloths 1 Application(s) Topical <User Schedule>  gabapentin 100 milliGRAM(s) Oral every 12 hours  heparin  Infusion. 500 Unit(s)/Hr IV Continuous <Continuous>  heparin  Injectable 4500 Unit(s) IV Push every 6 hours PRN  heparin  Injectable 2000 Unit(s) IV Push every 6 hours PRN  lactated ringers. 1000 milliLiter(s) IV Continuous <Continuous>  metoprolol tartrate 25 milliGRAM(s) Oral two times a day  ondansetron Injectable 4 milliGRAM(s) IV Push once PRN  oxycodone    5 mG/acetaminophen 325 mG 1 Tablet(s) Oral every 6 hours PRN  polyethylene glycol 3350 17 Gram(s) Oral daily PRN  povidone iodine 10% Solution 1 Application(s) Topical daily  risperiDONE   Tablet 0.5 milliGRAM(s) Oral two times a day  senna 2 Tablet(s) Oral at bedtime    Drug Dosing Weight  Height (cm): 170.2 (18 Jan 2020 00:00)  Weight (kg): 56.7 (04 Feb 2020 22:42)  BMI (kg/m2): 19.6 (04 Feb 2020 22:42)  BSA (m2): 1.66 (04 Feb 2020 22:42)    CENTRAL LINE: [ ] YES [ ] NO  LOCATION:   DATE INSERTED:  REMOVE: [ ] YES [ ] NO  EXPLAIN:    BIANCHI: [ ] YES [ ] NO    DATE INSERTED:  REMOVE:  [ ] YES [ ] NO  EXPLAIN:    A-LINE:  [ ] YES [ ] NO  LOCATION:   DATE INSERTED:  REMOVE:  [ ] YES [ ] NO  EXPLAIN:    PMH -reviewed admission note, no change since admission            02-04 @ 07:01  -  02-05 @ 07:00  --------------------------------------------------------  IN: 3649 mL / OUT: 1375 mL / NET: 2274 mL            PHYSICAL EXAM:    GENERAL: NAD, well-groomed, well-developed  HEAD:  Atraumatic, Normocephalic  EYES: EOMI, PERRLA, conjunctiva and sclera clear  ENMT: No tonsillar erythema, exudates, or enlargement; Moist mucous membranes, Good dentition, [ ]No lesions  NECK: Supple, normal appearance, No JVD; Normal thyroid; Trachea midline  NERVOUS SYSTEM:  Alert & Oriented X3, Good concentration; Motor Strength 5/5 B/L upper and lower extremities; DTRs 2+ intact and symmetric  CHEST/LUNG: No chest deformity; Normal percussion bilaterally; No rales, rhonchi, wheezing   HEART: Regular rate and rhythm; No murmurs, rubs, or gallops  ABDOMEN: Soft, Nontender, Nondistended;Bowel sounds present  EXTREMITIES:  2+ Peripheral Pulses, No clubbing, cyanosis, or edema  LYMPH: No lymphadenopathy noted  SKIN: No rashes or lesions; Good capillary refill      LABS:  CBC Full  -  ( 05 Feb 2020 06:39 )  WBC Count : 10.44 K/uL  RBC Count : 3.23 M/uL  Hemoglobin : 10.3 g/dL  Hematocrit : 33.3 %  Platelet Count - Automated : 289 K/uL  Mean Cell Volume : 103.1 fl  Mean Cell Hemoglobin : 31.9 pg  Mean Cell Hemoglobin Concentration : 30.9 gm/dL  Auto Neutrophil # : 7.41 K/uL  Auto Lymphocyte # : 1.18 K/uL  Auto Monocyte # : 1.19 K/uL  Auto Eosinophil # : 0.30 K/uL  Auto Basophil # : 0.09 K/uL  Auto Neutrophil % : 70.9 %  Auto Lymphocyte % : 11.3 %  Auto Monocyte % : 11.4 %  Auto Eosinophil % : 2.9 %  Auto Basophil % : 0.9 %    02-05    139  |  104  |  19<H>  ----------------------------<  97  4.1   |  27  |  0.70    Ca    8.6      05 Feb 2020 06:39  Phos  3.5     02-05  Mg     1.7     02-05    TPro  5.7<L>  /  Alb  2.2<L>  /  TBili  0.4  /  DBili  x   /  AST  24  /  ALT  17  /  AlkPhos  54  02-05    PT/INR - ( 05 Feb 2020 07:54 )   PT: 13.9 sec;   INR: 1.24 ratio         PTT - ( 05 Feb 2020 07:54 )  PTT:37.4 sec        RADIOLOGY & ADDITIONAL STUDIES REVIEWED:  ***    [ ]GOALS OF CARE DISCUSSION WITH PATIENT/FAMILY/PROXY:    CRITICAL CARE TIME SPENT: 35 minutes INTERVAL HPI/OVERNIGHT EVENTS: Patient seen and examined at bedside. Started on Heparin drip and diet.     PRESSORS: [ ] YES [x ] NO    Antimicrobial:    Cardiovascular:  metoprolol tartrate 25 milliGRAM(s) Oral two times a day    Pulmonary:    Hematalogic:  aspirin  chewable 81 milliGRAM(s) Oral daily  heparin  Infusion. 500 Unit(s)/Hr IV Continuous <Continuous>  heparin  Injectable 4500 Unit(s) IV Push every 6 hours PRN  heparin  Injectable 2000 Unit(s) IV Push every 6 hours PRN    Other:  acetaminophen   Tablet .. 650 milliGRAM(s) Oral every 6 hours PRN  chlorhexidine 2% Cloths 1 Application(s) Topical <User Schedule>  gabapentin 100 milliGRAM(s) Oral every 12 hours  lactated ringers. 1000 milliLiter(s) IV Continuous <Continuous>  ondansetron Injectable 4 milliGRAM(s) IV Push once PRN  oxycodone    5 mG/acetaminophen 325 mG 1 Tablet(s) Oral every 6 hours PRN  polyethylene glycol 3350 17 Gram(s) Oral daily PRN  povidone iodine 10% Solution 1 Application(s) Topical daily  risperiDONE   Tablet 0.5 milliGRAM(s) Oral two times a day  senna 2 Tablet(s) Oral at bedtime    acetaminophen   Tablet .. 650 milliGRAM(s) Oral every 6 hours PRN  aspirin  chewable 81 milliGRAM(s) Oral daily  chlorhexidine 2% Cloths 1 Application(s) Topical <User Schedule>  gabapentin 100 milliGRAM(s) Oral every 12 hours  heparin  Infusion. 500 Unit(s)/Hr IV Continuous <Continuous>  heparin  Injectable 4500 Unit(s) IV Push every 6 hours PRN  heparin  Injectable 2000 Unit(s) IV Push every 6 hours PRN  lactated ringers. 1000 milliLiter(s) IV Continuous <Continuous>  metoprolol tartrate 25 milliGRAM(s) Oral two times a day  ondansetron Injectable 4 milliGRAM(s) IV Push once PRN  oxycodone    5 mG/acetaminophen 325 mG 1 Tablet(s) Oral every 6 hours PRN  polyethylene glycol 3350 17 Gram(s) Oral daily PRN  povidone iodine 10% Solution 1 Application(s) Topical daily  risperiDONE   Tablet 0.5 milliGRAM(s) Oral two times a day  senna 2 Tablet(s) Oral at bedtime    Drug Dosing Weight  Height (cm): 170.2 (18 Jan 2020 00:00)  Weight (kg): 56.7 (04 Feb 2020 22:42)  BMI (kg/m2): 19.6 (04 Feb 2020 22:42)  BSA (m2): 1.66 (04 Feb 2020 22:42)    CENTRAL LINE: [ ] YES [x ] NO  LOCATION:   DATE INSERTED:  REMOVE: [ ] YES [ ] NO  EXPLAIN:    BIANCHI: [x ] YES [ ] NO    DATE INSERTED:  REMOVE:  [ ] YES [ ] NO  EXPLAIN:    A-LINE:  [ ] YES [x ] NO  LOCATION:   DATE INSERTED:  REMOVE:  [ ] YES [ ] NO  EXPLAIN:    PMH -reviewed admission note, no change since admission            02-04 @ 07:01  -  02-05 @ 07:00  --------------------------------------------------------  IN: 3649 mL / OUT: 1375 mL / NET: 2274 mL            PHYSICAL EXAM:    GENERAL: NAD, well-groomed, well-developed  HEAD:  Atraumatic, Normocephalic  EYES: EOMI, PERRLA, conjunctiva and sclera clear  ENMT: No tonsillar erythema, exudates, or enlargement; Moist mucous membranes.  NECK: Supple, normal appearance, No JVD; Normal thyroid; Trachea midline  NERVOUS SYSTEM:  Alert & Oriented X3, Good concentration; Motor Strength 5/5 B/L upper and lower extremities; DTRs 2+ intact and symmetric  CHEST/LUNG: No chest deformity; Normal percussion bilaterally; No rales, rhonchi, wheezing   HEART: Regular rate and rhythm; No murmurs, rubs, or gallops  ABDOMEN: Soft, Nontender, Nondistended; Bowel sounds present  EXTREMITIES: R TMA.   LYMPH: No lymphadenopathy noted      LABS:  CBC Full  -  ( 05 Feb 2020 06:39 )  WBC Count : 10.44 K/uL  RBC Count : 3.23 M/uL  Hemoglobin : 10.3 g/dL  Hematocrit : 33.3 %  Platelet Count - Automated : 289 K/uL  Mean Cell Volume : 103.1 fl  Mean Cell Hemoglobin : 31.9 pg  Mean Cell Hemoglobin Concentration : 30.9 gm/dL  Auto Neutrophil # : 7.41 K/uL  Auto Lymphocyte # : 1.18 K/uL  Auto Monocyte # : 1.19 K/uL  Auto Eosinophil # : 0.30 K/uL  Auto Basophil # : 0.09 K/uL  Auto Neutrophil % : 70.9 %  Auto Lymphocyte % : 11.3 %  Auto Monocyte % : 11.4 %  Auto Eosinophil % : 2.9 %  Auto Basophil % : 0.9 %    02-05    139  |  104  |  19<H>  ----------------------------<  97  4.1   |  27  |  0.70    Ca    8.6      05 Feb 2020 06:39  Phos  3.5     02-05  Mg     1.7     02-05    TPro  5.7<L>  /  Alb  2.2<L>  /  TBili  0.4  /  DBili  x   /  AST  24  /  ALT  17  /  AlkPhos  54  02-05    PT/INR - ( 05 Feb 2020 07:54 )   PT: 13.9 sec;   INR: 1.24 ratio         PTT - ( 05 Feb 2020 07:54 )  PTT:37.4 sec        RADIOLOGY & ADDITIONAL STUDIES REVIEWED:  ***    [ ]GOALS OF CARE DISCUSSION WITH PATIENT/FAMILY/PROXY:    CRITICAL CARE TIME SPENT: 35 minutes

## 2020-02-05 NOTE — PROGRESS NOTE ADULT - SUBJECTIVE AND OBJECTIVE BOX
Patient is a 74y old  Female who presents with a chief complaint of discoloration of toe of right foot.     HPI: This 74 year old non-diabetic female presents to the ED with complaint of discoloration for a toe on the right foot. She states that she was seen by her primary care physician a few days ago and told her to go to the emergency room.  Patient states she smokes at least a pack a day of cigarettes since she was 22 years old.  Patient states she can only ambulate 10 feet and gets a cramp in her right calf for which she needs to sit down.  She states she is very minimally ambulatory.   She denies pain at rest with her feet elevated.  Patient lives alone with a cat. Patient massaging leg throughout the visit.  Patient denies n/v/d/sob/cp/f.  Patient states her blood pressure is not usually low.     Podiatry Interval HPI:  Patient seen bedside this AM s/p TMA right foot.  She states her left foot continues to be very painful to the touch and she is sensitive when someone touches the right leg.    She relates to minimal pain in the right foot, but was more guarded today.  Her right foot continues to demarcate.  Patient denies n/f/d/f/sob.      PMH: COPD (chronic obstructive pulmonary disease)  Hypercholesterolemia  Myocardial infarct, old    Allergies: PC Pen VK (Rash)  penicillin (Other; Rash)  statins (Other)  sulfa drugs (Other)  sulfamethizole (Other)    Social History:  pt states she smokes 1ppd since she was 22  denies etoh or substance use (10 Angel 2020 21:52)      Medications acetaminophen   Tablet .. 650 milliGRAM(s) Oral every 6 hours PRN  aspirin  chewable 81 milliGRAM(s) Oral daily  chlorhexidine 2% Cloths 1 Application(s) Topical <User Schedule>  gabapentin 100 milliGRAM(s) Oral every 12 hours  heparin  Infusion. 500 Unit(s)/Hr IV Continuous <Continuous>  ketorolac   Injectable 15 milliGRAM(s) IV Push every 6 hours PRN  metoprolol tartrate 25 milliGRAM(s) Oral two times a day  ondansetron Injectable 4 milliGRAM(s) IV Push once PRN  oxycodone    5 mG/acetaminophen 325 mG 1 Tablet(s) Oral every 6 hours PRN  polyethylene glycol 3350 17 Gram(s) Oral daily PRN  povidone iodine 10% Solution 1 Application(s) Topical daily  risperiDONE   Tablet 0.5 milliGRAM(s) Oral two times a day  senna 2 Tablet(s) Oral at bedtime    FH,   PMHCOPD (chronic obstructive pulmonary disease)  Hypercholesterolemia  Myocardial infarct, old     PSHS/P CABG x 1    Labs                          10.3   10.44 )-----------( 289      ( 05 Feb 2020 06:39 )             33.3      02-05    139  |  104  |  19<H>  ----------------------------<  97  4.1   |  27  |  0.70    Ca    8.6      05 Feb 2020 06:39  Phos  3.5     02-05  Mg     1.7     02-05    TPro  5.7<L>  /  Alb  2.2<L>  /  TBili  0.4  /  DBili  x   /  AST  24  /  ALT  17  /  AlkPhos  54  02-05     Vital Signs Last 24 Hrs  T(C): 36.6 (05 Feb 2020 12:00), Max: 37.4 (05 Feb 2020 05:30)  T(F): 97.8 (05 Feb 2020 12:00), Max: 99.3 (05 Feb 2020 05:30)  HR: 95 (05 Feb 2020 12:00) (83 - 107)  BP: 146/50 (05 Feb 2020 12:00) (80/56 - 158/83)  BP(mean): 73 (05 Feb 2020 12:00) (51 - 104)  RR: 17 (05 Feb 2020 12:00) (10 - 30)  SpO2: 96% (05 Feb 2020 11:00) (93% - 100%)  Sedimentation Rate, Erythrocyte: 60 mm/Hr (01-22-20 @ 06:32)  Hemoglobin A1C, Whole Blood: 5.6 % (01-11-20 @ 09:30)         C-Reactive Protein, Serum: 1.80 mg/dL (01-22-20 @ 09:57)   WBC Count: 10.44 K/uL (02-05-20 @ 06:39)  WBC Count: 11.46 K/uL <H> (02-05-20 @ 00:23)           PHYSICAL EXAM  GEN: MERARY MEYER is a pleasant well-nourished, well developed 74y Female in no acute distress. Patient is alert,  Vasc:  DP and PT pulses non-palpable b/l. Left DP faintly dopplerable. Unable to doppler left PT, Unable to find right DP or PT with doppler. Shiny taught skin b/l dorsal feet with level of violaceous color extending proximally to tarsal joints. Overall perfusion to foot notably improved, right foot is warm to touch.  Derm: Surgical incision with well approximated skin edges right distal TMA with necrotic discoloration central incision. No drainage, All sutures intact.  Dorsal foot with 2 ecchymotic patches encompassing 50% of the dorsal foot.   Lateral foot slight ecchymotic patch. Sutures intact with well approximated skin edges posterior ankle.    Necrotic patch on dorsal left 3rd digit.  Left 5th toe dusky and necrotic.    MSK: Tenderness to palpation distal foot.  Increasing Pain to touch dorsal left foot foot.     Imaging:   < from: Xray Foot AP + Lateral + Oblique, Right (01.10.20 @ 15:05) >    EXAM:  FOOT RIGHT (MINIMUM 3 VIEWS)                        PROCEDURE DATE:  01/10/2020    INTERPRETATION:  Right foot. 3 views. Patient has local pain.    The foot shows mild degeneration at the first MTP joint.    There is an old fracture deformity of the distal fibula.    No bone destruction or acute fracture is evident.    IMPRESSION: No acute finding.    JASKARAN MOSLEY M.D., ATTENDING RADIOLOGIST  This document has been electronically signed. Angel 10 2020  3:06PM  < end of copied text >    < from: [facility] Physiol Extremity Lower 3+ Level, BI (01.13.20 @ 13:49) >    EXAM:  US PHYSIOL LWR EXT 3+ LEV BI                        PROCEDURE DATE:  01/13/2020    INTERPRETATION:  Clinical indication: Right toe gangrene    Comparison: None    FINDINGS AND   IMPRESSION: Segmental pressures could not be obtaineddue to patient discomfort. Therefore, ABIs could not be calculated.    There are biphasic waveforms in the lower left thigh. Abnormally, monophasic flow within the remainder of the lower extremities.    RACQUEL CHA M.D., ATTENDING RADIOLOGIST  This document has been electronically signed. Jan 13 2020  2:20PM  < end of copied text >    < from: CT Angio Abd Aorta w/run-off w/ IV Cont (01.12.20 @ 19:40) >    EXAM:  CT ANGIO ABD AOR W RUN(W)AW IC                        PROCEDURE DATE:  01/12/2020    INTERPRETATION:  CT ANGIO ABDOMINAL AORTA RUNOFF WITHOUT AND OR WITH IV CONTRAST    CLINICAL INFORMATION: Atherosclerosis of the native arteries of the right and left lower extremity with right foot gangrene    PROCEDURE INFORMATION:   Exam: CTA Angiogram of the Abdominal Aorta and Bilateral Lower Extremities   (Run-off) With IV Contrast   Exam date and time: 1/12/2020 6:55 PM   Age: 74 years old   Clinical indication: Other: Pad, right 2nd toe gangrene, 3rd and 4th toes wet   early gang     TECHNIQUE:   Imaging protocol: CT angiogram of the abdominal aorta, pelvis and bilateral   lower extremities with IV iodinated contrast.   Intravenous contrast: 90 cc Omnipaque 350  3D rendering: MIP and/or 3D reconstructed images were created by the   technologist.     COMPARISON:   CR XR FOOT 3 VIEWS RIGHT 1/10/2020 2:46 PM     FINDINGS:   Aorta: Severe calcified and noncalcified atherosclerotic plaque. Aorta is   otherwise fully patent.   Celiac trunk and mesenteric arteries: No occlusion or significant stenosis.    Renal arteries: No occlusion or significant stenosis.      Right iliac arteries: Marked stenosis of the right external iliac artery distally.   Right femoral/popliteal arteries: Distal CFA stenosis. Severely and diffusely attenuated SFA. Occlusion of the mid-distal right superficial   femoral artery with reconstitution of the suprageniculate popliteal  artery. Stenotic right popliteal artery.   Right infrapopliteal arteries: Single-vessel runoff via the right peroneal   artery, diffusely attenuated, which feeds the plantar artery. The right anterior and posterior tibial   arteries are occluded. Reconstitution of the right pedis dorsalis artery at the   level of the foot, however severely attenuated.     Left iliac arteries: Moderate calcification and stenosis of the left iliac arteries. Occluded left internal iliac artery.  Left femoral/popliteal arteries: Long segmentOcclusion of the entire left superficial femoral   artery and Reconstitution of left popliteal artery, which shows multifocal disease without occlusion.  Left infrapopliteal arteries: Occlusion of the left anterior posterior tibial   arteries at the level of the ankle. Single-vessel left peroneal artery which   feeds the plantar artery. Left pedis dorsalis is attenuated but patent.    Liver: No mass.   Gallbladder and bile ducts: Unremarkable. No calcified stones. No ductal   dilation.    Pancreas: Unremarkable. No mass. No ductal dilation.    Spleen: Normal. No splenomegaly.    Adrenals: Normal. No mass.    Kidneys and ureters: Normal. No mass.      Stomach and bowel: . No obstruction. No mucosal thickening. Moderate stool   throughout the colon.    Appendix: No evidence of appendicitis.      Bladder: Unremarkable. No mass.    Reproductive: Unremarkable as visualized.      Intraperitoneal space: Unremarkable. No free air. No significant fluid   collection.    Lymph nodes: No lymphadenopathy.     Bones/joints: No acute fracture. No dislocation.    Soft tissues: Soft tissue irregularity of the second right toe presumably   related to underlying gangrene.       IMPRESSION:   1. Right lower extremity demonstrates marked diffuse stenosis of the lower   extremity with occlusion of the mid-distal right SFA which reconstitutes distally and   with single-vessel runoff to the right foot via the right peroneal artery which   feeds the plantar artery. The right pedis dorsalis artery reconstitutes at the   level of the ankle.   2. The left lower extremity demonstrates diffuse marked stenosis with total occlusion   of the left SFA with reconstitution of attenuated left popliteal artery. There is   single-vessel runoff via the left peronealartery is the which then feeds the   left plantar artery. The pedis dorsalis artery is patent.   3. evidence of inflow disease at the level of the right external iliac artery.  4. Slight irregularity of the soft tissues of the right second toe which may be   related to underlying gangrene.    GALILEA KING M.D., ATTENDING RADIOLOGIST  This document has been electronically signed. Jan 13 2020  4:12PM  < end of copied text >      A:   Critical Limb Ischemia, right  PVD  s/p angiogram 1/17  s/p open bypass iliac-pop RLE 1/23 with dr. shepard  s/p TMA right foot 1/29 for treatment of dry gangrene    P:  Patient evaluated, chart reviewed  Xrays- reviewed  NICKI/PVR-reviewed  CTA-reviewed- single vessel runoff to right foot.  Right foot dressed with 4x4 gauze, kerlix, ACE.    Will continue to monitor demarcation of left and right foot.  Foot unlikely to be salvageable.  Patient is showing poor ability to heal.  Will continue to monitor.    Agree with vascular recommendations.   NWB Right  Examined with attending Dr. Rodriguez

## 2020-02-05 NOTE — PROGRESS NOTE ADULT - ASSESSMENT
75F POD1 LLE fem pop bypass, recovering in ICU  - continue neurovascular checks q6  - continue heparin with goal PTT 50-90, increase where appropriate  - DC alicia  - advance diet as tolerated  - will discuss downgrade to floor status with attending 75F POD1 LLE fem pop bypass, recovering in ICU  - continue neurovascular checks q6  - continue heparin with goal PTT 50-90, increase where appropriate  - keep alicia  - advance diet as tolerated  - will discuss downgrade to floor status with attending

## 2020-02-05 NOTE — PROGRESS NOTE ADULT - ASSESSMENT
Patient is 74 year old female w/ PMHx of Alzheimer's dementia, CAD, PVD, COPD, HLD, and HTN who presents to the ED with complaint of discoloration  toe on the right foot.  Admitted with concern for dry gangrene and PVD.  Pt underwent Left lower extremity femoral popliteal bypass 2/4/2020.  EBL 100cc.  UOP intra op 500ml.  Pt was given 2.5L of NS intra OP. Pt was safely extubated in recovery post op.  ICU consulted for post op monitoring.  On presentation pt presents afebrile and hemodynamically stable saturating 100% on 2L ncann in no acute distress.      ICU consulted for post Left lower extremity femoral popliteal bypass for critical monitoring.    Assessment:  -Lt femoral popliteal bypass POD 0  -PVD  -CAD  -COPD  -HTN  -HLD  -Alzheimers Dementia   -Lt Wrist Drop    Plan:  Neuro:  -Currently AO x 2 post-op.    -Pt has history of baseline dementia- will continue to monitor neuro status post op  -No reports of pain or discomfort at this time- will continue prescribed pain regimen PRN   -Lt wrist drop- Isolated radial nerve palsy due to pressure on nerve in the radial groove; wrist extension brace are advised for recovery    CV:  -Hypertension- on Lopressor and amlodipine   c/w lopressor therapy and monitor BP, add Norvasc as clinically indicated     -PVD- will resume heparin drip 6hrs post op per vascular surgery recs with PTT goal of 50-90    -CAD- c/w asa therapy in AM     -HLD- c/w statin therapy     Pulm:  -Extubated safely post op- saturating 100% on 2L ncann  -COPD-will continue PRN duoneb therapy, no need for continuous O2    ID:   -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 Prognosis guarded based on hemodynamic integrity     [facility]   FULL CODE Patient is 74 year old female w/ PMHx of Alzheimer's dementia, CAD, PVD, COPD, HLD, and HTN who presents to the ED with complaint of discoloration  toe on the right foot.  Admitted with concern for dry gangrene and PVD.  Pt underwent Left lower extremity femoral popliteal bypass 2/4/2020.  EBL 100cc.  UOP intra op 500ml.  Pt was given 2.5L of NS intra OP. Pt was safely extubated in recovery post op.  ICU consulted for post op monitoring.  On presentation pt presents afebrile and hemodynamically stable saturating 100% on 2L ncann in no acute distress.      ICU consulted for post Left lower extremity femoral popliteal bypass for critical monitoring.    Assessment:  -Lt femoral popliteal bypass POD 0  -PVD  -CAD  -COPD  -HTN  -HLD  -Alzheimers Dementia   -Lt Wrist Drop    Plan:  Neuro:  -Currently AAO x 3    -Tylenol, toradol and percocet for mild , moderate and severe pain respectively.  -Lt wrist drop- Isolated radial nerve palsy due to pressure on nerve in the radial groove; wrist extension brace are advised for recovery    CV:  -Hypertension- on Lopressor and amlodipine   c/w lopressor therapy and monitor BP, add Norvasc as clinically indicated     -PVD- s/p femoral popliteal bypass surgery            -Started heparin drip as per vascular surgery with PTT goal of 50-90            -will follow with surgery regarding frequency of neurochecks    -CAD- started asa     -HLD- c/w statin therapy     Pulm:  -Extubated safely post op- saturating 100% on 2L NC  -    Nephro:   Good Urine op  cc/hr    GI:  -started on Diet    Heme:  -no issues    Endo:  - target CBG < 180    Prophy:  On Heparin drip    [facility]   FULL CODE

## 2020-02-05 NOTE — PROGRESS NOTE ADULT - ASSESSMENT
75yFemale with dry foot gangrene to go for Right BKA vs. AKA; s/p LLE fem pop 2/4, s/p R iliac-fem bypass 1/23, angio 1/17, s/p R TMA 1/29; poor wound healing     -Hold Hep gtt at 2 AM   -NPO after midnight   -IVF while NPO  -Pre Op Labs in AM   -Type and Screen in AM   -PT/PTT/INR in AM   -Consent to be obtained

## 2020-02-05 NOTE — PROGRESS NOTE ADULT - ATTENDING COMMENTS
Seen ex'ed w PA  Comfortable. L foot pain resolved  L LE: C/D/I +bypass/DP sig. L foot isch changes/dry ulcers/necrosis stable  R LE: C/D/I +bypass/DP/PT sigs. L foot/TMA: progression of dry gang incl foot dorsum.    A/P:   PAD/gang b/l  L fem-BK pop PTFE bp yest.  s/p R EIA-AK pop PTFE bp and TMA: tissue loss appears overwhelming- not salvageable- confirmed w podiatry re poor prognosis.    Cont support  Will plan R BKA/AKA tomorrow (expedite amp to avoid infection of PTFE grafts)  Podiatry fu / L foot care  AC for graft patency (will hold periop tomorrow)  Cond, options/pprocedure/risks/benefits/implicatios dw'ed pt and HCP Silva- informed conssnt obtained.  Informed conssnet obtained

## 2020-02-05 NOTE — CHART NOTE - NSCHARTNOTEFT_GEN_A_CORE
Patient is 74 year old female w/ PMHx of Alzheimer's dementia, CAD, PVD, COPD, HLD, and HTN who presented to the ED with complaint of discoloration  toe on the right foot.  Admitted with concern for dry gangrene and PVD.  Pt underwent Left lower extremity femoral popliteal bypass 2/4/2020.  EBL 100cc.  UOP intra op 500ml.  Pt was given 2.5L of NS intra OP. Pt was safely extubated in recovery post op.  ICU was consulted for post op monitoring.  On presentation pt was afebrile and hemodynamically stable saturating 100% on 2L NC in no acute distress.  Neurovascular checks q6 for now. Pt started on regular diet.     Assessment:  -Lt femoral popliteal bypass POD 1  -PVD  -CAD  -COPD  -HTN  -HLD  -Alzheimer Dementia   -Lt Wrist Drop    Plan:  Neuro:  -Currently AAO x 3    -Tylenol, toradol and percocet for mild , moderate and severe pain respectively.  -Lt wrist drop- Isolated radial nerve palsy due to pressure on nerve in the radial groove; wrist extension brace are advised for recovery    CV:  -Hypertension- on Lopressor and amlodipine   c/w lopressor therapy and monitor BP, add Norvasc as clinically indicated     -PVD- s/p femoral popliteal bypass surgery            -Started heparin drip as per vascular surgery with PTT goal of 50-90            -Neurochecks q6     -CAD- started asa     -HLD- c/w statin therapy     Pulm:  -Extubated safely post op- saturating 100% on 2L NC  -    Nephro:   Good Urine op  cc/hr    GI:  -started on Diet    Heme:  -no issues    Endo:  - target CBG < 180    Prophy:  On Heparin drip    GOC   FULL CODE    Pt stable for downgrade to surgical floor.     THINGS TO FOLLOW:  aPTT while pt on Heparin drip  Neurovascular checks q6 Patient is 74 year old female w/ PMHx of Alzheimer's dementia, CAD, PVD, COPD, HLD, and HTN who presented to the ED with complaint of discoloration  toe on the right foot.  Admitted with concern for dry gangrene and PVD.  Pt underwent Left lower extremity femoral popliteal bypass 2/4/2020.  EBL 100cc.  UOP intra op 500ml.  Pt was given 2.5L of NS intra OP. Pt was safely extubated in recovery post op.  ICU was consulted for post op monitoring.  On presentation pt was afebrile and hemodynamically stable saturating 100% on 2L NC in no acute distress.  Neurovascular checks q6 for now. Pt started on regular diet.     Assessment:  -Lt femoral popliteal bypass POD 1  -PVD  -CAD  -COPD  -HTN  -HLD  -Alzheimer Dementia   -Lt Wrist Drop    Plan:  Neuro:  -Currently AAO x 3    -Tylenol, toradol and percocet for mild , moderate and severe pain respectively.  -Lt wrist drop- Isolated radial nerve palsy due to pressure on nerve in the radial groove; wrist extension brace are advised for recovery    CV:  -Hypertension- on Lopressor and amlodipine   c/w lopressor therapy and monitor BP, add Norvasc as clinically indicated     -PVD- s/p femoral popliteal bypass surgery            -Started heparin drip as per vascular surgery with PTT goal of 50-90            -Neurochecks q6     -CAD- started asa     -HLD- c/w statin therapy     Pulm:  -Extubated safely post op- saturating 100% on 2L NC  -    Nephro:   Good Urine op  cc/hr    GI:  -started on Diet    Heme:  -no issues    Endo:  - target CBG < 180    Prophy:  On Heparin drip    GOC   FULL CODE    Pt stable for downgrade to surgical floor. CASTILLO Garcia notified. Pt going under  service.     THINGS TO FOLLOW:  aPTT while pt on Heparin drip  Neurovascular checks q6

## 2020-02-05 NOTE — PROGRESS NOTE ADULT - ATTENDING COMMENTS
74 year old female with Alzheimer's dementia, CAD, PVD, COPD, HLD, and HTN admitted to the ICU post operatively s/p left lower extremity femoral popliteal bypass 2/4/2020.     Assessment:  1. Peripheral vascular disease s/p - Lt femoral popliteal bypass  2. CAD  3. COPD  4. HTN  5. HLD  6. Alzheimer Dementia     Plan:  Vascular surgery follow up  Cont. Heparin infusion for now  Cont. aspirin/ statin therapy   Dry gangrene to Rt foot - may need amputation   Restart home psych medications  Pain control  Oral diet  Transfer out of ICU today

## 2020-02-05 NOTE — PROGRESS NOTE ADULT - SUBJECTIVE AND OBJECTIVE BOX
Pre Op Note    Diagnosis: Dry foot gangrene   Procedure: Right BKA vs. AKA  Surgeon: Dr Izaguirre                           11.8   12.79 )-----------( 293      ( 05 Feb 2020 14:34 )             37.8     02-05    139  |  104  |  19<H>  ----------------------------<  97  4.1   |  27  |  0.70    Ca    8.6      05 Feb 2020 06:39  Phos  3.5     02-05  Mg     1.7     02-05    TPro  5.7<L>  /  Alb  2.2<L>  /  TBili  0.4  /  DBili  x   /  AST  24  /  ALT  17  /  AlkPhos  54  02-05    PT/INR - ( 05 Feb 2020 07:54 )   PT: 13.9 sec;   INR: 1.24 ratio         PTT - ( 05 Feb 2020 14:34 )  PTT:40.7 sec      RADIOLOGY & ADDITIONAL STUDIES: Pre Op Note    Diagnosis: Dry foot gangrene   Procedure: Right BKA vs. AKA  Surgeon: Dr Izaguirre                           11.8   12.79 )-----------( 293      ( 05 Feb 2020 14:34 )             37.8     02-05    139  |  104  |  19<H>  ----------------------------<  97  4.1   |  27  |  0.70    Ca    8.6      05 Feb 2020 06:39  Phos  3.5     02-05  Mg     1.7     02-05    TPro  5.7<L>  /  Alb  2.2<L>  /  TBili  0.4  /  DBili  x   /  AST  24  /  ALT  17  /  AlkPhos  54  02-05    PT/INR - ( 05 Feb 2020 07:54 )   PT: 13.9 sec;   INR: 1.24 ratio         PTT - ( 05 Feb 2020 14:34 )  PTT:40.7 sec      RADIOLOGY & ADDITIONAL STUDIES:  < from: 12 Lead ECG (01.15.20 @ 08:35) >  Ventricular Rate 104 BPM    Atrial Rate 104 BPM    P-R Interval 126 ms    QRS Duration 112 ms    Q-T Interval 362 ms    QTC Calculation(Bezet) 476 ms    P Axis 75 degrees    R Axis 99 degrees    T Axis 38 degrees    Diagnosis Line Sinus tachycardia  Biatrial enlargement  Rightward axis  Right bundle branch block  Abnormal ECG    < end of copied text >    < from: Transthoracic Echocardiogram (01.14.20 @ 07:53) >  CONCLUSIONS:  1. Dense mitral annular calcification. Probably moderate,  eccentric, anteriorly directed mitral regurgitation.  Consider SHEY for better evaluation if clinically  appropriate.  2. Normal left ventricular internal dimensions and wall  thicknesses.  3. Endocardium not well visualized; grossly normal left  ventricular systolic function.  4. Normal right ventricular size and function.    < end of copied text >    < from: Xray Chest 1 View-PORTABLE IMMEDIATE (01.22.20 @ 23:10) >  Frontal expiratory view of the chest shows the heart to be normal in size. The lungs show questionable left upper lobe infiltrate and there is no evidence of pneumothorax nor pleural effusion.    IMPRESSION:  Questionable left infiltrate. Follow-up study is recommended as clinically warranted.    Thank you for the courtesy of this referral.    < end of copied text >    < from: CT Angio Abd Aorta w/run-off w/ IV Cont (01.12.20 @ 19:40) >  IMPRESSION:   1. Right lower extremity demonstrates marked diffuse stenosis of the lower   extremity with occlusion of the mid-distal right SFA which reconstitutes distally and   with single-vessel runoff to the right foot via the right peroneal artery which   feeds the plantar artery. The right pedis dorsalis artery reconstitutes at the   level of the ankle.   2. The left lower extremity demonstrates diffuse marked stenosis with total occlusion   of the left SFA with reconstitution of attenuated left popliteal artery. There is   single-vessel runoff via the left peronealartery is the which then feeds the   left plantar artery. The pedis dorsalis artery is patent.   3. evidence of inflow disease at the level of the right external iliac artery.  4. Slight irregularity of the soft tissues of the right second toe which may be   related to underlying gangrene.    < end of copied text >

## 2020-02-05 NOTE — PROGRESS NOTE ADULT - SUBJECTIVE AND OBJECTIVE BOX
POST-OPERATIVE NOTE    Subjective:  Patient is s/p Left lower extremity fem-pop bypass POD #0 seen and examined at bed side.  . Denies nausea, vomiting, chest pain, sob, fevers chills. Pain is well controlled. . Voiding spontaneously.    Vital Signs Last 24 Hrs  T(C): 37.1 (05 Feb 2020 02:00), Max: 37.1 (05 Feb 2020 02:00)  T(F): 98.7 (05 Feb 2020 02:00), Max: 98.7 (05 Feb 2020 02:00)  HR: 99 (05 Feb 2020 04:30) (72 - 102)  BP: 80/56 (05 Feb 2020 04:30) (80/56 - 158/83)  BP(mean): 61 (05 Feb 2020 04:30) (51 - 104)  RR: 20 (05 Feb 2020 04:30) (10 - 25)  SpO2: 98% (05 Feb 2020 04:30) (93% - 100%)  I&O's Detail    04 Feb 2020 07:01  -  05 Feb 2020 04:54  --------------------------------------------------------  IN:    heparin  Infusion.: 33 mL    Lactated Ringers IV Bolus: 2500 mL    lactated ringers.: 300 mL    lactated ringers.: 500 mL  Total IN: 3333 mL    OUT:    Estimated Blood Loss: 100 mL    Indwelling Catheter - Urethral: 975 mL  Total OUT: 1075 mL    Total NET: 2258 mL          Physical Exam:  General: NAD, resting comfortably in bed  Pulmonary: Nonlabored breathing, no respiratory distress  Cardiovascular: NSR  Abdominal: soft, NT/ND,   Extremities: WWP,dressing c/d/i. Distal pules are palpable     LABS:                        11.1   11.46 )-----------( 272      ( 05 Feb 2020 00:23 )             35.4     02-05    139  |  106  |  22<H>  ----------------------------<  119<H>  4.2   |  29  |  0.80    Ca    8.5      05 Feb 2020 00:23  Phos  3.5     02-05  Mg     1.7     02-05    TPro  5.7<L>  /  Alb  2.2<L>  /  TBili  0.4  /  DBili  x   /  AST  24  /  ALT  17  /  AlkPhos  54  02-05    PT/INR - ( 05 Feb 2020 00:23 )   PT: 13.5 sec;   INR: 1.21 ratio         PTT - ( 05 Feb 2020 00:23 )  PTT:28.7 sec      Assessment:  The patient is a 75y Female who is s/p Left lower extremity fem-pop bypass POD #0     Plan:  -Neurovascular check   -Heparin  - Pain control as needed  - DVT ppx  - OOB and ambulating as tolerated  - F/u AM labs

## 2020-02-05 NOTE — PROGRESS NOTE ADULT - SUBJECTIVE AND OBJECTIVE BOX
75F w PAD and gangrene, POD1 left fem-pop bypass    Resting comfortably in bed. b/l extremities warm and well perfused. Vitals stable overnight. No nuasea, vomiting, dizziness.    Vital Signs Last 24 Hrs  T(C): 37.4 (05 Feb 2020 05:30), Max: 37.4 (05 Feb 2020 05:30)  T(F): 99.3 (05 Feb 2020 05:30), Max: 99.3 (05 Feb 2020 05:30)  HR: 90 (05 Feb 2020 06:30) (72 - 107)  BP: 128/52 (05 Feb 2020 06:30) (80/56 - 158/83)  BP(mean): 72 (05 Feb 2020 06:30) (51 - 104)  RR: 24 (05 Feb 2020 06:30) (10 - 25)  SpO2: 97% (05 Feb 2020 06:30) (93% - 100%)  NAD  Resp nonlabored  Incision sites clean dry and intact  Dopplerable signal DP and PT, augmented by graft demonstrated with compression over graft area      02-04-20 @ 07:01  -  02-05-20 @ 07:00  --------------------------------------------------------  IN: 3544 mL / OUT: 1225 mL / NET: 2319 mL                          10.3   10.44 )-----------( 289      ( 05 Feb 2020 06:39 )             33.3     02-05    139  |  104  |  19<H>  ----------------------------<  97  4.1   |  27  |  0.70    Ca    8.6      05 Feb 2020 06:39  Phos  3.5     02-05  Mg     1.7     02-05    TPro  5.7<L>  /  Alb  2.2<L>  /  TBili  0.4  /  DBili  x   /  AST  24  /  ALT  17  /  AlkPhos  54  02-05

## 2020-02-06 ENCOUNTER — RESULT REVIEW (OUTPATIENT)
Age: 75
End: 2020-02-06

## 2020-02-06 LAB
ALBUMIN SERPL ELPH-MCNC: 2.4 G/DL — LOW (ref 3.5–5)
ALP SERPL-CCNC: 51 U/L — SIGNIFICANT CHANGE UP (ref 40–120)
ALT FLD-CCNC: 17 U/L DA — SIGNIFICANT CHANGE UP (ref 10–60)
ANION GAP SERPL CALC-SCNC: 4 MMOL/L — LOW (ref 5–17)
ANION GAP SERPL CALC-SCNC: 7 MMOL/L — SIGNIFICANT CHANGE UP (ref 5–17)
APTT BLD: 49.2 SEC — HIGH (ref 27.5–36.3)
APTT BLD: 53.5 SEC — HIGH (ref 27.5–36.3)
AST SERPL-CCNC: 24 U/L — SIGNIFICANT CHANGE UP (ref 10–40)
BASOPHILS # BLD AUTO: 0.06 K/UL — SIGNIFICANT CHANGE UP (ref 0–0.2)
BASOPHILS # BLD AUTO: 0.07 K/UL — SIGNIFICANT CHANGE UP (ref 0–0.2)
BASOPHILS NFR BLD AUTO: 0.6 % — SIGNIFICANT CHANGE UP (ref 0–2)
BASOPHILS NFR BLD AUTO: 0.6 % — SIGNIFICANT CHANGE UP (ref 0–2)
BILIRUB SERPL-MCNC: 0.6 MG/DL — SIGNIFICANT CHANGE UP (ref 0.2–1.2)
BUN SERPL-MCNC: 17 MG/DL — SIGNIFICANT CHANGE UP (ref 7–18)
BUN SERPL-MCNC: 20 MG/DL — HIGH (ref 7–18)
CALCIUM SERPL-MCNC: 8.3 MG/DL — LOW (ref 8.4–10.5)
CALCIUM SERPL-MCNC: 8.6 MG/DL — SIGNIFICANT CHANGE UP (ref 8.4–10.5)
CHLORIDE SERPL-SCNC: 104 MMOL/L — SIGNIFICANT CHANGE UP (ref 96–108)
CHLORIDE SERPL-SCNC: 104 MMOL/L — SIGNIFICANT CHANGE UP (ref 96–108)
CO2 SERPL-SCNC: 29 MMOL/L — SIGNIFICANT CHANGE UP (ref 22–31)
CO2 SERPL-SCNC: 31 MMOL/L — SIGNIFICANT CHANGE UP (ref 22–31)
CREAT SERPL-MCNC: 0.7 MG/DL — SIGNIFICANT CHANGE UP (ref 0.5–1.3)
CREAT SERPL-MCNC: 0.79 MG/DL — SIGNIFICANT CHANGE UP (ref 0.5–1.3)
EOSINOPHIL # BLD AUTO: 0.08 K/UL — SIGNIFICANT CHANGE UP (ref 0–0.5)
EOSINOPHIL # BLD AUTO: 0.11 K/UL — SIGNIFICANT CHANGE UP (ref 0–0.5)
EOSINOPHIL NFR BLD AUTO: 0.7 % — SIGNIFICANT CHANGE UP (ref 0–6)
EOSINOPHIL NFR BLD AUTO: 1 % — SIGNIFICANT CHANGE UP (ref 0–6)
GLUCOSE SERPL-MCNC: 101 MG/DL — HIGH (ref 70–99)
GLUCOSE SERPL-MCNC: 110 MG/DL — HIGH (ref 70–99)
HCT VFR BLD CALC: 33.5 % — LOW (ref 34.5–45)
HCT VFR BLD CALC: 34.4 % — LOW (ref 34.5–45)
HGB BLD-MCNC: 10.5 G/DL — LOW (ref 11.5–15.5)
HGB BLD-MCNC: 10.7 G/DL — LOW (ref 11.5–15.5)
IMM GRANULOCYTES NFR BLD AUTO: 0.9 % — SIGNIFICANT CHANGE UP (ref 0–1.5)
IMM GRANULOCYTES NFR BLD AUTO: 1 % — SIGNIFICANT CHANGE UP (ref 0–1.5)
INR BLD: 1.37 RATIO — HIGH (ref 0.88–1.16)
LYMPHOCYTES # BLD AUTO: 1.16 K/UL — SIGNIFICANT CHANGE UP (ref 1–3.3)
LYMPHOCYTES # BLD AUTO: 1.49 K/UL — SIGNIFICANT CHANGE UP (ref 1–3.3)
LYMPHOCYTES # BLD AUTO: 10.7 % — LOW (ref 13–44)
LYMPHOCYTES # BLD AUTO: 13.3 % — SIGNIFICANT CHANGE UP (ref 13–44)
MAGNESIUM SERPL-MCNC: 1.8 MG/DL — SIGNIFICANT CHANGE UP (ref 1.6–2.6)
MAGNESIUM SERPL-MCNC: 1.9 MG/DL — SIGNIFICANT CHANGE UP (ref 1.6–2.6)
MCHC RBC-ENTMCNC: 31.1 GM/DL — LOW (ref 32–36)
MCHC RBC-ENTMCNC: 31.3 GM/DL — LOW (ref 32–36)
MCHC RBC-ENTMCNC: 32 PG — SIGNIFICANT CHANGE UP (ref 27–34)
MCHC RBC-ENTMCNC: 32.2 PG — SIGNIFICANT CHANGE UP (ref 27–34)
MCV RBC AUTO: 102.1 FL — HIGH (ref 80–100)
MCV RBC AUTO: 103.6 FL — HIGH (ref 80–100)
MONOCYTES # BLD AUTO: 1.46 K/UL — HIGH (ref 0–0.9)
MONOCYTES # BLD AUTO: 1.6 K/UL — HIGH (ref 0–0.9)
MONOCYTES NFR BLD AUTO: 13.4 % — SIGNIFICANT CHANGE UP (ref 2–14)
MONOCYTES NFR BLD AUTO: 14.3 % — HIGH (ref 2–14)
NEUTROPHILS # BLD AUTO: 7.85 K/UL — HIGH (ref 1.8–7.4)
NEUTROPHILS # BLD AUTO: 8 K/UL — HIGH (ref 1.8–7.4)
NEUTROPHILS NFR BLD AUTO: 69.9 % — SIGNIFICANT CHANGE UP (ref 43–77)
NEUTROPHILS NFR BLD AUTO: 73.6 % — SIGNIFICANT CHANGE UP (ref 43–77)
NRBC # BLD: 0 /100 WBCS — SIGNIFICANT CHANGE UP (ref 0–0)
NRBC # BLD: 0 /100 WBCS — SIGNIFICANT CHANGE UP (ref 0–0)
PHOSPHATE SERPL-MCNC: 2.3 MG/DL — LOW (ref 2.5–4.5)
PHOSPHATE SERPL-MCNC: 2.5 MG/DL — SIGNIFICANT CHANGE UP (ref 2.5–4.5)
PLATELET # BLD AUTO: 265 K/UL — SIGNIFICANT CHANGE UP (ref 150–400)
PLATELET # BLD AUTO: 272 K/UL — SIGNIFICANT CHANGE UP (ref 150–400)
POTASSIUM SERPL-MCNC: 3.8 MMOL/L — SIGNIFICANT CHANGE UP (ref 3.5–5.3)
POTASSIUM SERPL-MCNC: 3.9 MMOL/L — SIGNIFICANT CHANGE UP (ref 3.5–5.3)
POTASSIUM SERPL-SCNC: 3.8 MMOL/L — SIGNIFICANT CHANGE UP (ref 3.5–5.3)
POTASSIUM SERPL-SCNC: 3.9 MMOL/L — SIGNIFICANT CHANGE UP (ref 3.5–5.3)
PROT SERPL-MCNC: 6.4 G/DL — SIGNIFICANT CHANGE UP (ref 6–8.3)
PROTHROM AB SERPL-ACNC: 15.3 SEC — HIGH (ref 10–12.9)
RBC # BLD: 3.28 M/UL — LOW (ref 3.8–5.2)
RBC # BLD: 3.32 M/UL — LOW (ref 3.8–5.2)
RBC # FLD: 17.9 % — HIGH (ref 10.3–14.5)
RBC # FLD: 17.9 % — HIGH (ref 10.3–14.5)
SODIUM SERPL-SCNC: 139 MMOL/L — SIGNIFICANT CHANGE UP (ref 135–145)
SODIUM SERPL-SCNC: 140 MMOL/L — SIGNIFICANT CHANGE UP (ref 135–145)
WBC # BLD: 10.87 K/UL — HIGH (ref 3.8–10.5)
WBC # BLD: 11.22 K/UL — HIGH (ref 3.8–10.5)
WBC # FLD AUTO: 10.87 K/UL — HIGH (ref 3.8–10.5)
WBC # FLD AUTO: 11.22 K/UL — HIGH (ref 3.8–10.5)

## 2020-02-06 PROCEDURE — 88307 TISSUE EXAM BY PATHOLOGIST: CPT | Mod: 26

## 2020-02-06 PROCEDURE — 88311 DECALCIFY TISSUE: CPT | Mod: 26

## 2020-02-06 PROCEDURE — 27590 AMPUTATE LEG AT THIGH: CPT | Mod: 78

## 2020-02-06 RX ORDER — METOPROLOL TARTRATE 50 MG
25 TABLET ORAL
Refills: 0 | Status: DISCONTINUED | OUTPATIENT
Start: 2020-02-06 | End: 2020-02-11

## 2020-02-06 RX ORDER — RISPERIDONE 4 MG/1
0.5 TABLET ORAL
Refills: 0 | Status: DISCONTINUED | OUTPATIENT
Start: 2020-02-06 | End: 2020-02-11

## 2020-02-06 RX ORDER — HYDROMORPHONE HYDROCHLORIDE 2 MG/ML
0.5 INJECTION INTRAMUSCULAR; INTRAVENOUS; SUBCUTANEOUS
Refills: 0 | Status: DISCONTINUED | OUTPATIENT
Start: 2020-02-06 | End: 2020-02-06

## 2020-02-06 RX ORDER — CEFEPIME 1 G/1
1000 INJECTION, POWDER, FOR SOLUTION INTRAMUSCULAR; INTRAVENOUS EVERY 12 HOURS
Refills: 0 | Status: DISCONTINUED | OUTPATIENT
Start: 2020-02-06 | End: 2020-02-11

## 2020-02-06 RX ORDER — GABAPENTIN 400 MG/1
100 CAPSULE ORAL EVERY 12 HOURS
Refills: 0 | Status: DISCONTINUED | OUTPATIENT
Start: 2020-02-06 | End: 2020-02-07

## 2020-02-06 RX ORDER — OXYCODONE AND ACETAMINOPHEN 5; 325 MG/1; MG/1
1 TABLET ORAL EVERY 6 HOURS
Refills: 0 | Status: DISCONTINUED | OUTPATIENT
Start: 2020-02-06 | End: 2020-02-07

## 2020-02-06 RX ORDER — SODIUM CHLORIDE 9 MG/ML
1000 INJECTION, SOLUTION INTRAVENOUS
Refills: 0 | Status: DISCONTINUED | OUTPATIENT
Start: 2020-02-06 | End: 2020-02-11

## 2020-02-06 RX ORDER — SODIUM CHLORIDE 9 MG/ML
1000 INJECTION, SOLUTION INTRAVENOUS
Refills: 0 | Status: DISCONTINUED | OUTPATIENT
Start: 2020-02-06 | End: 2020-02-06

## 2020-02-06 RX ORDER — SENNA PLUS 8.6 MG/1
2 TABLET ORAL AT BEDTIME
Refills: 0 | Status: DISCONTINUED | OUTPATIENT
Start: 2020-02-06 | End: 2020-02-11

## 2020-02-06 RX ORDER — ASPIRIN/CALCIUM CARB/MAGNESIUM 324 MG
81 TABLET ORAL DAILY
Refills: 0 | Status: DISCONTINUED | OUTPATIENT
Start: 2020-02-06 | End: 2020-02-11

## 2020-02-06 RX ORDER — MORPHINE SULFATE 50 MG/1
2 CAPSULE, EXTENDED RELEASE ORAL EVERY 6 HOURS
Refills: 0 | Status: DISCONTINUED | OUTPATIENT
Start: 2020-02-06 | End: 2020-02-07

## 2020-02-06 RX ORDER — KETOROLAC TROMETHAMINE 30 MG/ML
15 SYRINGE (ML) INJECTION EVERY 6 HOURS
Refills: 0 | Status: DISCONTINUED | OUTPATIENT
Start: 2020-02-06 | End: 2020-02-07

## 2020-02-06 RX ORDER — ACETAMINOPHEN 500 MG
1000 TABLET ORAL ONCE
Refills: 0 | Status: DISCONTINUED | OUTPATIENT
Start: 2020-02-06 | End: 2020-02-07

## 2020-02-06 RX ORDER — POLYETHYLENE GLYCOL 3350 17 G/17G
17 POWDER, FOR SOLUTION ORAL DAILY
Refills: 0 | Status: DISCONTINUED | OUTPATIENT
Start: 2020-02-06 | End: 2020-02-11

## 2020-02-06 RX ORDER — ACETAMINOPHEN 500 MG
1000 TABLET ORAL ONCE
Refills: 0 | Status: COMPLETED | OUTPATIENT
Start: 2020-02-06 | End: 2020-02-06

## 2020-02-06 RX ORDER — ACETAMINOPHEN 500 MG
650 TABLET ORAL EVERY 6 HOURS
Refills: 0 | Status: DISCONTINUED | OUTPATIENT
Start: 2020-02-06 | End: 2020-02-07

## 2020-02-06 RX ORDER — HEPARIN SODIUM 5000 [USP'U]/ML
500 INJECTION INTRAVENOUS; SUBCUTANEOUS
Qty: 25000 | Refills: 0 | Status: DISCONTINUED | OUTPATIENT
Start: 2020-02-06 | End: 2020-02-10

## 2020-02-06 RX ORDER — ONDANSETRON 8 MG/1
4 TABLET, FILM COATED ORAL ONCE
Refills: 0 | Status: DISCONTINUED | OUTPATIENT
Start: 2020-02-06 | End: 2020-02-06

## 2020-02-06 RX ADMIN — HYDROMORPHONE HYDROCHLORIDE 0.5 MILLIGRAM(S): 2 INJECTION INTRAMUSCULAR; INTRAVENOUS; SUBCUTANEOUS at 12:03

## 2020-02-06 RX ADMIN — Medication 1000 MILLIGRAM(S): at 12:11

## 2020-02-06 RX ADMIN — Medication 25 MILLIGRAM(S): at 17:56

## 2020-02-06 RX ADMIN — CHLORHEXIDINE GLUCONATE 1 APPLICATION(S): 213 SOLUTION TOPICAL at 05:57

## 2020-02-06 RX ADMIN — GABAPENTIN 100 MILLIGRAM(S): 400 CAPSULE ORAL at 05:57

## 2020-02-06 RX ADMIN — Medication 25 MILLIGRAM(S): at 05:57

## 2020-02-06 RX ADMIN — HYDROMORPHONE HYDROCHLORIDE 0.5 MILLIGRAM(S): 2 INJECTION INTRAMUSCULAR; INTRAVENOUS; SUBCUTANEOUS at 12:02

## 2020-02-06 RX ADMIN — HYDROMORPHONE HYDROCHLORIDE 0.5 MILLIGRAM(S): 2 INJECTION INTRAMUSCULAR; INTRAVENOUS; SUBCUTANEOUS at 11:37

## 2020-02-06 RX ADMIN — RISPERIDONE 0.5 MILLIGRAM(S): 4 TABLET ORAL at 17:56

## 2020-02-06 RX ADMIN — RISPERIDONE 0.5 MILLIGRAM(S): 4 TABLET ORAL at 05:57

## 2020-02-06 RX ADMIN — GABAPENTIN 100 MILLIGRAM(S): 400 CAPSULE ORAL at 18:12

## 2020-02-06 RX ADMIN — CEFEPIME 100 MILLIGRAM(S): 1 INJECTION, POWDER, FOR SOLUTION INTRAMUSCULAR; INTRAVENOUS at 17:56

## 2020-02-06 RX ADMIN — Medication 400 MILLIGRAM(S): at 11:39

## 2020-02-06 RX ADMIN — HEPARIN SODIUM 800 UNIT(S)/HR: 5000 INJECTION INTRAVENOUS; SUBCUTANEOUS at 03:41

## 2020-02-06 RX ADMIN — SENNA PLUS 2 TABLET(S): 8.6 TABLET ORAL at 22:10

## 2020-02-06 RX ADMIN — HEPARIN SODIUM 500 UNIT(S)/HR: 5000 INJECTION INTRAVENOUS; SUBCUTANEOUS at 17:57

## 2020-02-06 NOTE — PROGRESS NOTE ADULT - SUBJECTIVE AND OBJECTIVE BOX
SURGERY POST-OP    Subjective:  Pt resting comfortably. No acute complaints  s/p R AKA, recovered from anesthesia without complications  denies numb/tingling or pain on RLE stump or LLE  Tolerating pain with meds  denies n/v/f/c, alicia in place    T(C): 36.6 (02-06-20 @ 17:06), Max: 37.6 (02-06-20 @ 05:19)  HR: 95 (02-06-20 @ 17:06) (91 - 101)  BP: 132/53 (02-06-20 @ 17:06) (115/85 - 136/58)  RR: 18 (02-06-20 @ 17:06) (17 - 24)  SpO2: 98% (02-06-20 @ 17:06) (94% - 100%)    I&O's Detail    05 Feb 2020 07:01  -  06 Feb 2020 07:00  --------------------------------------------------------  IN:    heparin  Infusion.: 5 mL    heparin  Infusion.: 42 mL    lactated ringers.: 100 mL    Oral Fluid: 100 mL  Total IN: 247 mL    OUT:    Indwelling Catheter - Urethral: 225 mL  Total OUT: 225 mL    Total NET: 22 mL          PHYSICAL EXAM:   General: Alert and oriented, not in acute distress  Resp: Breathing unlabored, satting well on RA  Heart: regular rate and rhythm  Abdomen: Soft ND, Dressing C/D/I  : alicia in place clear yellow urine  Extremities: R AKA stump dressing c/d/i, good femoral    75y.o. Female s/p R AKA    - cont hep gtt titrate per aPTT  - Pain control as needed  - Abx  - Incentive spirometry  - Diet  - continue alicia  - dressing to be changed on POD#3

## 2020-02-06 NOTE — PROGRESS NOTE ADULT - SUBJECTIVE AND OBJECTIVE BOX
75F w PAD and gangrene, POD2 left fem-pop bypass    Resting comfortably in bed. Patient with decreased pain of the LLE. RLE nonhealing w poor prognosis per podiatry. Patient to go to OR today for R BKA. Patient is NPO and understands what will take place. No dizziness, nausae, vomiting or shortness of breath. Heparin held for procedure.    Vital Signs Last 24 Hrs  T(C): 37.6 (06 Feb 2020 05:19), Max: 37.6 (06 Feb 2020 05:19)  T(F): 99.6 (06 Feb 2020 05:19), Max: 99.6 (06 Feb 2020 05:19)  HR: 101 (06 Feb 2020 05:19) (89 - 106)  BP: 135/52 (06 Feb 2020 05:19) (112/55 - 148/60)  BP(mean): 71 (05 Feb 2020 15:00) (65 - 99)  RR: 17 (06 Feb 2020 05:19) (14 - 21)  SpO2: 98% (06 Feb 2020 05:19) (95% - 100%)  NAD  resp nonlabored  abd nt nd  LLE warm and perfused  RLE palpable popliteal but w nonhealing right foot      139  |  104  |  17  ----------------------------<  101<H>  3.8   |  31  |  0.79    Ca    8.6      06 Feb 2020 06:12  Phos  2.5     02-06  Mg     1.9     02-06    TPro  6.4  /  Alb  2.4<L>  /  TBili  0.6  /  DBili  x   /  AST  24  /  ALT  17  /  AlkPhos  51  02-06               10.7   10.87 )-----------( 265      ( 06 Feb 2020 06:12 )             34.4

## 2020-02-06 NOTE — BRIEF OPERATIVE NOTE - NSICDXBRIEFPREOP_GEN_ALL_CORE_FT
PRE-OP DIAGNOSIS:  Gangrene of right foot 29-Jan-2020 13:44:57  Montse Son
PRE-OP DIAGNOSIS:  Critical lower limb ischemia 23-Jan-2020 21:30:59  Fredy Núñez
PRE-OP DIAGNOSIS:  Critical lower limb ischemia 23-Jan-2020 21:30:59  Fredy Núñez
PRE-OP DIAGNOSIS:  Gangrene due to arterial insufficiency 17-Jan-2020 23:03:11  Gagan Ocampo
PRE-OP DIAGNOSIS:  Gangrene due to arterial insufficiency 17-Jan-2020 23:03:11  Gagan Ocampo

## 2020-02-06 NOTE — BRIEF OPERATIVE NOTE - NSICDXBRIEFPROCEDURE_GEN_ALL_CORE_FT
PROCEDURES:  Creation of bypass from femoral to popliteal artery using expanded polytetrafluoroethylene (ePTFE) graft 23-Jan-2020 21:34:00  Fredy Núñez
PROCEDURES:  Creation of bypass from femoral to popliteal artery using expanded polytetrafluoroethylene (ePTFE) graft 23-Jan-2020 21:34:00  Fredy Núñez
PROCEDURES:  Transmetatarsal amputation, right foot 29-Jan-2020 13:44:23  Montse Son
PROCEDURES:  Angiogram, vessel, iliac and femoral, right 17-Jan-2020 23:02:52  Gagan Ocampo  Angiogram, lower extremity, left 17-Jan-2020 23:02:34  Gagan Ocampo
PROCEDURES:  Right above knee amputation 06-Feb-2020 11:21:30  Soco Balderas

## 2020-02-06 NOTE — PROGRESS NOTE ADULT - SUBJECTIVE AND OBJECTIVE BOX
PRESENTING CC:    SUBJ:       PMH -reviewed admission note, no change since admission  Heart failure: acute [ ] chronic [ ] acute or chronic [ ] diastolic [ ] systolic [ ] combined systolic and diastolic[ ]  VANESSA: ATN[ ] renal medullary necrosis [ ] CKD I [ ]CKDII [ ]CKD III [ ]CKD IV [ ]CKD V [ ]Other pathological lesions [ ]    MEDICATIONS  (STANDING):    MEDICATIONS  (PRN):  ondansetron Injectable 4 milliGRAM(s) IV Push once PRN Nausea and/or Vomiting              REVIEW OF SYSTEMS:  Constitutional: [ ] fever, [ ]weight loss,  [ ]fatigue  Eyes: [ ] visual changes  Respiratory: [ ]shortness of breath;  [ ] cough, [ ]wheezing, [ ]chills, [ ]hemoptysis  Cardiovascular: [ ] chest pain, [ ]palpitations, [ ]dizziness,  [ ]leg swelling[ ]orthopnea[ ]PND  Gastrointestinal: [ ] abdominal pain, [ ]nausea, [ ]vomiting,  [ ]diarrhea   Genitourinary: [ ] dysuria, [ ] hematuria  Neurologic: [ ] headaches [ ] tremors[ ]weakness  Skin: [ ] itching, [ ]burning, [ ] rashes  Endocrine: [ ] heat or cold intolerance  Musculoskeletal: [ ] joint pain or swelling; [ ] muscle, back, or extremity pain  Psychiatric: [ ] depression, [ ]anxiety, [ ]mood swings, or [ ]difficulty sleeping  Hematologic: [ ] easy bruising, [ ] bleeding gums    [x] All remaining systems negative except as per above.   [ ]Unable to obtain.    Vital Signs Last 24 Hrs  T(C): 37.6 (06 Feb 2020 05:19), Max: 37.6 (06 Feb 2020 05:19)  T(F): 99.6 (06 Feb 2020 05:19), Max: 99.6 (06 Feb 2020 05:19)  HR: 101 (06 Feb 2020 05:19) (89 - 106)  BP: 135/52 (06 Feb 2020 05:19) (112/55 - 146/50)  BP(mean): 71 (05 Feb 2020 15:00) (65 - 84)  RR: 17 (06 Feb 2020 05:19) (16 - 19)  SpO2: 98% (06 Feb 2020 05:19) (96% - 100%)  I&O's Summary    05 Feb 2020 07:01  -  06 Feb 2020 07:00  --------------------------------------------------------  IN: 247 mL / OUT: 225 mL / NET: 22 mL        PHYSICAL EXAM:  General: No acute distress BMI-  HEENT: EOMI, PERRL  Neck: Supple, [ ] JVD  Lungs: Equal air entry bilaterally; [ ] rales [ ] wheezing [ ] rhonchi  Heart: Regular rate and rhythm; [ ] murmur   /6 [ ] systolic [ ] diastolic [ ] radiation[ ] rubs [ ]  gallops  Abdomen: Nontender, bowel sounds present  Extremities: No clubbing, cyanosis, [ ] edema  Nervous system:  Alert & Oriented X3, no focal deficits  Psychiatric: Normal affect  Skin: No rashes or lesions    LABS:  02-06    139  |  104  |  17  ----------------------------<  101<H>  3.8   |  31  |  0.79    Ca    8.6      06 Feb 2020 06:12  Phos  2.5     02-06  Mg     1.9     02-06    TPro  6.4  /  Alb  2.4<L>  /  TBili  0.6  /  DBili  x   /  AST  24  /  ALT  17  /  AlkPhos  51  02-06    Creatinine Trend: 0.79<--, 0.70<--, 0.70<--, 0.80<--, 0.90<--, 0.92<--                        10.7   10.87 )-----------( 265      ( 06 Feb 2020 06:12 )             34.4     PT/INR - ( 06 Feb 2020 06:12 )   PT: 15.3 sec;   INR: 1.37 ratio         PTT - ( 06 Feb 2020 06:12 )  PTT:53.5 sec  Lipid Panel:   Cardiac Enzymes:           RADIOLOGY:    ECG [my interpretation]:    TELEMETRY:    ECHO:    STRESS TEST:    CATHETERIZATION:      IMPRESSION AND PLAN:

## 2020-02-06 NOTE — BRIEF OPERATIVE NOTE - NSICDXBRIEFPOSTOP_GEN_ALL_CORE_FT
POST-OP DIAGNOSIS:  Gangrene of right foot 29-Jan-2020 13:45:12  Montse Son
POST-OP DIAGNOSIS:  Angiopathy, peripheral 17-Jan-2020 23:04:00  Gagan Ocampo
POST-OP DIAGNOSIS:  Gangrene due to arterial insufficiency 06-Feb-2020 11:21:57  Soco Balderas

## 2020-02-07 DIAGNOSIS — T87.89 OTHER COMPLICATIONS OF AMPUTATION STUMP: ICD-10-CM

## 2020-02-07 LAB
ANION GAP SERPL CALC-SCNC: 6 MMOL/L — SIGNIFICANT CHANGE UP (ref 5–17)
APTT BLD: 36.3 SEC — SIGNIFICANT CHANGE UP (ref 27.5–36.3)
APTT BLD: 41.6 SEC — HIGH (ref 27.5–36.3)
APTT BLD: 52.1 SEC — HIGH (ref 27.5–36.3)
APTT BLD: 57.2 SEC — HIGH (ref 27.5–36.3)
BASOPHILS # BLD AUTO: 0.03 K/UL — SIGNIFICANT CHANGE UP (ref 0–0.2)
BASOPHILS NFR BLD AUTO: 0.3 % — SIGNIFICANT CHANGE UP (ref 0–2)
BUN SERPL-MCNC: 18 MG/DL — SIGNIFICANT CHANGE UP (ref 7–18)
CALCIUM SERPL-MCNC: 8.4 MG/DL — SIGNIFICANT CHANGE UP (ref 8.4–10.5)
CHLORIDE SERPL-SCNC: 103 MMOL/L — SIGNIFICANT CHANGE UP (ref 96–108)
CO2 SERPL-SCNC: 30 MMOL/L — SIGNIFICANT CHANGE UP (ref 22–31)
CREAT SERPL-MCNC: 0.72 MG/DL — SIGNIFICANT CHANGE UP (ref 0.5–1.3)
EOSINOPHIL # BLD AUTO: 0 K/UL — SIGNIFICANT CHANGE UP (ref 0–0.5)
EOSINOPHIL NFR BLD AUTO: 0 % — SIGNIFICANT CHANGE UP (ref 0–6)
GLUCOSE SERPL-MCNC: 178 MG/DL — HIGH (ref 70–99)
HCT VFR BLD CALC: 26.9 % — LOW (ref 34.5–45)
HCT VFR BLD CALC: 30.2 % — LOW (ref 34.5–45)
HGB BLD-MCNC: 8.6 G/DL — LOW (ref 11.5–15.5)
HGB BLD-MCNC: 9.5 G/DL — LOW (ref 11.5–15.5)
IMM GRANULOCYTES NFR BLD AUTO: 0.9 % — SIGNIFICANT CHANGE UP (ref 0–1.5)
LYMPHOCYTES # BLD AUTO: 1.06 K/UL — SIGNIFICANT CHANGE UP (ref 1–3.3)
LYMPHOCYTES # BLD AUTO: 9.3 % — LOW (ref 13–44)
MAGNESIUM SERPL-MCNC: 1.8 MG/DL — SIGNIFICANT CHANGE UP (ref 1.6–2.6)
MCHC RBC-ENTMCNC: 31.5 GM/DL — LOW (ref 32–36)
MCHC RBC-ENTMCNC: 32 GM/DL — SIGNIFICANT CHANGE UP (ref 32–36)
MCHC RBC-ENTMCNC: 32.2 PG — SIGNIFICANT CHANGE UP (ref 27–34)
MCHC RBC-ENTMCNC: 33.1 PG — SIGNIFICANT CHANGE UP (ref 27–34)
MCV RBC AUTO: 102.4 FL — HIGH (ref 80–100)
MCV RBC AUTO: 103.5 FL — HIGH (ref 80–100)
MONOCYTES # BLD AUTO: 1.36 K/UL — HIGH (ref 0–0.9)
MONOCYTES NFR BLD AUTO: 12 % — SIGNIFICANT CHANGE UP (ref 2–14)
NEUTROPHILS # BLD AUTO: 8.82 K/UL — HIGH (ref 1.8–7.4)
NEUTROPHILS NFR BLD AUTO: 77.5 % — HIGH (ref 43–77)
NRBC # BLD: 0 /100 WBCS — SIGNIFICANT CHANGE UP (ref 0–0)
NRBC # BLD: 0 /100 WBCS — SIGNIFICANT CHANGE UP (ref 0–0)
PHOSPHATE SERPL-MCNC: 2.9 MG/DL — SIGNIFICANT CHANGE UP (ref 2.5–4.5)
PLATELET # BLD AUTO: 222 K/UL — SIGNIFICANT CHANGE UP (ref 150–400)
PLATELET # BLD AUTO: 245 K/UL — SIGNIFICANT CHANGE UP (ref 150–400)
POTASSIUM SERPL-MCNC: 3.9 MMOL/L — SIGNIFICANT CHANGE UP (ref 3.5–5.3)
POTASSIUM SERPL-SCNC: 3.9 MMOL/L — SIGNIFICANT CHANGE UP (ref 3.5–5.3)
RBC # BLD: 2.6 M/UL — LOW (ref 3.8–5.2)
RBC # BLD: 2.95 M/UL — LOW (ref 3.8–5.2)
RBC # FLD: 17.2 % — HIGH (ref 10.3–14.5)
RBC # FLD: 17.2 % — HIGH (ref 10.3–14.5)
SODIUM SERPL-SCNC: 139 MMOL/L — SIGNIFICANT CHANGE UP (ref 135–145)
WBC # BLD: 11.37 K/UL — HIGH (ref 3.8–10.5)
WBC # BLD: 12.53 K/UL — HIGH (ref 3.8–10.5)
WBC # FLD AUTO: 11.37 K/UL — HIGH (ref 3.8–10.5)
WBC # FLD AUTO: 12.53 K/UL — HIGH (ref 3.8–10.5)

## 2020-02-07 PROCEDURE — 99221 1ST HOSP IP/OBS SF/LOW 40: CPT

## 2020-02-07 RX ORDER — OXYCODONE HYDROCHLORIDE 5 MG/1
5 TABLET ORAL EVERY 4 HOURS
Refills: 0 | Status: DISCONTINUED | OUTPATIENT
Start: 2020-02-07 | End: 2020-02-11

## 2020-02-07 RX ORDER — ACETAMINOPHEN 500 MG
975 TABLET ORAL EVERY 6 HOURS
Refills: 0 | Status: DISCONTINUED | OUTPATIENT
Start: 2020-02-07 | End: 2020-02-11

## 2020-02-07 RX ORDER — GABAPENTIN 400 MG/1
300 CAPSULE ORAL
Refills: 0 | Status: DISCONTINUED | OUTPATIENT
Start: 2020-02-07 | End: 2020-02-11

## 2020-02-07 RX ADMIN — SENNA PLUS 2 TABLET(S): 8.6 TABLET ORAL at 22:32

## 2020-02-07 RX ADMIN — GABAPENTIN 300 MILLIGRAM(S): 400 CAPSULE ORAL at 17:28

## 2020-02-07 RX ADMIN — CEFEPIME 100 MILLIGRAM(S): 1 INJECTION, POWDER, FOR SOLUTION INTRAMUSCULAR; INTRAVENOUS at 06:14

## 2020-02-07 RX ADMIN — OXYCODONE AND ACETAMINOPHEN 1 TABLET(S): 5; 325 TABLET ORAL at 09:43

## 2020-02-07 RX ADMIN — HEPARIN SODIUM 700 UNIT(S)/HR: 5000 INJECTION INTRAVENOUS; SUBCUTANEOUS at 01:26

## 2020-02-07 RX ADMIN — Medication 81 MILLIGRAM(S): at 12:12

## 2020-02-07 RX ADMIN — HEPARIN SODIUM 1000 UNIT(S)/HR: 5000 INJECTION INTRAVENOUS; SUBCUTANEOUS at 23:54

## 2020-02-07 RX ADMIN — CEFEPIME 100 MILLIGRAM(S): 1 INJECTION, POWDER, FOR SOLUTION INTRAMUSCULAR; INTRAVENOUS at 17:21

## 2020-02-07 RX ADMIN — HEPARIN SODIUM 900 UNIT(S)/HR: 5000 INJECTION INTRAVENOUS; SUBCUTANEOUS at 15:56

## 2020-02-07 RX ADMIN — OXYCODONE AND ACETAMINOPHEN 1 TABLET(S): 5; 325 TABLET ORAL at 01:02

## 2020-02-07 RX ADMIN — OXYCODONE AND ACETAMINOPHEN 1 TABLET(S): 5; 325 TABLET ORAL at 10:13

## 2020-02-07 RX ADMIN — RISPERIDONE 0.5 MILLIGRAM(S): 4 TABLET ORAL at 17:29

## 2020-02-07 RX ADMIN — HEPARIN SODIUM 800 UNIT(S)/HR: 5000 INJECTION INTRAVENOUS; SUBCUTANEOUS at 08:54

## 2020-02-07 RX ADMIN — RISPERIDONE 0.5 MILLIGRAM(S): 4 TABLET ORAL at 06:14

## 2020-02-07 RX ADMIN — Medication 25 MILLIGRAM(S): at 17:29

## 2020-02-07 RX ADMIN — Medication 25 MILLIGRAM(S): at 06:14

## 2020-02-07 RX ADMIN — GABAPENTIN 100 MILLIGRAM(S): 400 CAPSULE ORAL at 06:14

## 2020-02-07 NOTE — PROGRESS NOTE ADULT - SUBJECTIVE AND OBJECTIVE BOX
Patient is a 74y old  Female who presents with a chief complaint of discoloration of toe of right foot.     HPI: This 74 year old non-diabetic female presents to the ED with complaint of discoloration for a toe on the right foot. She states that she was seen by her primary care physician a few days ago and told her to go to the emergency room.  Patient states she smokes at least a pack a day of cigarettes since she was 22 years old.  Patient states she can only ambulate 10 feet and gets a cramp in her right calf for which she needs to sit down.  She states she is very minimally ambulatory.   She denies pain at rest with her feet elevated.  Patient lives alone with a cat. Patient massaging leg throughout the visit.  Patient denies n/v/d/sob/cp/f.  Patient states her blood pressure is not usually low.     Podiatry Interval HPI: Patient is s/p AKA of right leg. He left leg was less painful today.  She continues to break down from pressure points.    Patient denies n/f/d/f/sob.      PMH: COPD (chronic obstructive pulmonary disease)  Hypercholesterolemia  Myocardial infarct, old    Allergies: PC Pen VK (Rash)  penicillin (Other; Rash)  statins (Other)  sulfa drugs (Other)  sulfamethizole (Other)    Social History:  pt states she smokes 1ppd since she was 22  denies etoh or substance use (10 Angel 2020 21:52)      Medications acetaminophen   Tablet .. 650 milliGRAM(s) Oral every 6 hours PRN  aspirin  chewable 81 milliGRAM(s) Oral daily  cefepime   IVPB 1000 milliGRAM(s) IV Intermittent every 12 hours  gabapentin 100 milliGRAM(s) Oral every 12 hours  heparin  Infusion. 500 Unit(s)/Hr IV Continuous <Continuous>  ketorolac   Injectable 15 milliGRAM(s) IV Push every 6 hours PRN  lactated ringers. 1000 milliLiter(s) IV Continuous <Continuous>  metoprolol tartrate 25 milliGRAM(s) Oral two times a day  morphine  - Injectable 2 milliGRAM(s) IV Push every 6 hours PRN  oxycodone    5 mG/acetaminophen 325 mG 1 Tablet(s) Oral every 6 hours PRN  polyethylene glycol 3350 17 Gram(s) Oral daily PRN  risperiDONE   Tablet 0.5 milliGRAM(s) Oral two times a day  senna 2 Tablet(s) Oral at bedtime    FH,   PMHCOPD (chronic obstructive pulmonary disease)  Hypercholesterolemia  Myocardial infarct, old     PSHS/P CABG x 1      Labs                          8.6    11.37 )-----------( 222      ( 07 Feb 2020 07:41 )             26.9      02-07    139  |  103  |  18  ----------------------------<  178<H>  3.9   |  30  |  0.72    Ca    8.4      07 Feb 2020 07:41  Phos  2.9     02-07  Mg     1.8     02-07    TPro  6.4  /  Alb  2.4<L>  /  TBili  0.6  /  DBili  x   /  AST  24  /  ALT  17  /  AlkPhos  51  02-06     Vital Signs Last 24 Hrs  T(C): 36.7 (07 Feb 2020 06:24), Max: 37.1 (06 Feb 2020 21:10)  T(F): 98 (07 Feb 2020 06:24), Max: 98.7 (06 Feb 2020 21:10)  HR: 99 (07 Feb 2020 06:24) (81 - 99)  BP: 133/57 (07 Feb 2020 06:24) (115/85 - 136/61)  BP(mean): 80 (06 Feb 2020 12:25) (80 - 94)  RR: 18 (07 Feb 2020 06:24) (17 - 24)  SpO2: 97% (07 Feb 2020 06:24) (94% - 100%)  Sedimentation Rate, Erythrocyte: 60 mm/Hr (01-22-20 @ 06:32)  Hemoglobin A1C, Whole Blood: 5.6 % (01-11-20 @ 09:30)         C-Reactive Protein, Serum: 1.80 mg/dL (01-22-20 @ 09:57)   WBC Count: 11.37 K/uL <H> (02-07-20 @ 07:41)  WBC Count: 12.53 K/uL <H> (02-07-20 @ 00:17)           PHYSICAL EXAM  GEN: MERARY MEYER is a pleasant 74y Female in no acute distress. Patient is alert,  Vasc:  DP and PT pulses non-palpable to left foot.  No CFT to left 5th toe.    Derm: Stage 1 decubitus ulcer left posterior lateral heel.  Necrotic patch to left 5th toe.  Necrotic patch to dorsal left 3rd toe.  Discoloration to dorsal left foot.      MSK: Tenderness to palpation distal left foot.  Increasing Pain to touch dorsal left foot foot.     Imaging:   < from: Xray Foot AP + Lateral + Oblique, Right (01.10.20 @ 15:05) >    EXAM:  FOOT RIGHT (MINIMUM 3 VIEWS)                        PROCEDURE DATE:  01/10/2020    INTERPRETATION:  Right foot. 3 views. Patient has local pain.    The foot shows mild degeneration at the first MTP joint.    There is an old fracture deformity of the distal fibula.    No bone destruction or acute fracture is evident.    IMPRESSION: No acute finding.    JASKARAN MOSLEY M.D., ATTENDING RADIOLOGIST  This document has been electronically signed. Angel 10 2020  3:06PM  < end of copied text >    < from: [facility] Physiol Extremity Lower 3+ Level, BI (01.13.20 @ 13:49) >    EXAM:  US PHYSIOL LWR EXT 3+ LEV BI                        PROCEDURE DATE:  01/13/2020    INTERPRETATION:  Clinical indication: Right toe gangrene    Comparison: None    FINDINGS AND   IMPRESSION: Segmental pressures could not be obtaineddue to patient discomfort. Therefore, ABIs could not be calculated.    There are biphasic waveforms in the lower left thigh. Abnormally, monophasic flow within the remainder of the lower extremities.    RACQUEL CHA M.D., ATTENDING RADIOLOGIST  This document has been electronically signed. Jan 13 2020  2:20PM  < end of copied text >    < from: CT Angio Abd Aorta w/run-off w/ IV Cont (01.12.20 @ 19:40) >    EXAM:  CT ANGIO ABD AOR W RUN(W)AW IC                        PROCEDURE DATE:  01/12/2020    INTERPRETATION:  CT ANGIO ABDOMINAL AORTA RUNOFF WITHOUT AND OR WITH IV CONTRAST    CLINICAL INFORMATION: Atherosclerosis of the native arteries of the right and left lower extremity with right foot gangrene    PROCEDURE INFORMATION:   Exam: CTA Angiogram of the Abdominal Aorta and Bilateral Lower Extremities   (Run-off) With IV Contrast   Exam date and time: 1/12/2020 6:55 PM   Age: 74 years old   Clinical indication: Other: Pad, right 2nd toe gangrene, 3rd and 4th toes wet   early gang     TECHNIQUE:   Imaging protocol: CT angiogram of the abdominal aorta, pelvis and bilateral   lower extremities with IV iodinated contrast.   Intravenous contrast: 90 cc Omnipaque 350  3D rendering: MIP and/or 3D reconstructed images were created by the   technologist.     COMPARISON:   CR XR FOOT 3 VIEWS RIGHT 1/10/2020 2:46 PM     FINDINGS:   Aorta: Severe calcified and noncalcified atherosclerotic plaque. Aorta is   otherwise fully patent.   Celiac trunk and mesenteric arteries: No occlusion or significant stenosis.    Renal arteries: No occlusion or significant stenosis.      Right iliac arteries: Marked stenosis of the right external iliac artery distally.   Right femoral/popliteal arteries: Distal CFA stenosis. Severely and diffusely attenuated SFA. Occlusion of the mid-distal right superficial   femoral artery with reconstitution of the suprageniculate popliteal  artery. Stenotic right popliteal artery.   Right infrapopliteal arteries: Single-vessel runoff via the right peroneal   artery, diffusely attenuated, which feeds the plantar artery. The right anterior and posterior tibial   arteries are occluded. Reconstitution of the right pedis dorsalis artery at the   level of the foot, however severely attenuated.     Left iliac arteries: Moderate calcification and stenosis of the left iliac arteries. Occluded left internal iliac artery.  Left femoral/popliteal arteries: Long segmentOcclusion of the entire left superficial femoral   artery and Reconstitution of left popliteal artery, which shows multifocal disease without occlusion.  Left infrapopliteal arteries: Occlusion of the left anterior posterior tibial   arteries at the level of the ankle. Single-vessel left peroneal artery which   feeds the plantar artery. Left pedis dorsalis is attenuated but patent.    Liver: No mass.   Gallbladder and bile ducts: Unremarkable. No calcified stones. No ductal   dilation.    Pancreas: Unremarkable. No mass. No ductal dilation.    Spleen: Normal. No splenomegaly.    Adrenals: Normal. No mass.    Kidneys and ureters: Normal. No mass.      Stomach and bowel: . No obstruction. No mucosal thickening. Moderate stool   throughout the colon.    Appendix: No evidence of appendicitis.      Bladder: Unremarkable. No mass.    Reproductive: Unremarkable as visualized.      Intraperitoneal space: Unremarkable. No free air. No significant fluid   collection.    Lymph nodes: No lymphadenopathy.     Bones/joints: No acute fracture. No dislocation.    Soft tissues: Soft tissue irregularity of the second right toe presumably   related to underlying gangrene.       IMPRESSION:   1. Right lower extremity demonstrates marked diffuse stenosis of the lower   extremity with occlusion of the mid-distal right SFA which reconstitutes distally and   with single-vessel runoff to the right foot via the right peroneal artery which   feeds the plantar artery. The right pedis dorsalis artery reconstitutes at the   level of the ankle.   2. The left lower extremity demonstrates diffuse marked stenosis with total occlusion   of the left SFA with reconstitution of attenuated left popliteal artery. There is   single-vessel runoff via the left peronealartery is the which then feeds the   left plantar artery. The pedis dorsalis artery is patent.   3. evidence of inflow disease at the level of the right external iliac artery.  4. Slight irregularity of the soft tissues of the right second toe which may be   related to underlying gangrene.    GALILEA KING M.D., ATTENDING RADIOLOGIST  This document has been electronically signed. Jan 13 2020  4:12PM  < end of copied text >      A:   PVD  s/p angiogram 1/17  s/p open bypass iliac-pop RLE 1/23 with dr. 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    Continue to offload the left heel in heel offloading boot.   Will continue to monitor demarcation of left foot.  No podiatry interventions at this time  Agree with vascular recommendations and will follow recommendations  Discussed with attending Dr. Welsh

## 2020-02-07 NOTE — CONSULT NOTE ADULT - SUBJECTIVE AND OBJECTIVE BOX
Source of information: MERARY MEYER, Chart review  Patient language: English  : n/a    HPI:  Patient is 74 year old female, live by herself, ambulates with cane  has medical hx significant for dementia, cad, pvd, hld, htn presents to the ED with complaint of discoloration for a toe on the right foot.  Per pt she has black colored rt 2nd toe. pt states she notice discoloration gradually over last 3 weeks associated with swelling and erythema, pt states she bumped in to something and hurt her right big toe but dose not remember when. Today she was seen by her primary care physician a few days ago and told her to go to the emergency room. Patient states she can only ambulate 10 feet and gets a cramp in her right calf for which she needs to sit down.  She states she is very minimally ambulatory.  She denies pain at rest with her feet elevated.  Patient lives alone with a cat. Patient massaging leg throughout the visit.  Patient denies fever, cp, sob, cough, n/v/d. Pt has urinary incontinence for many years. Patient states her blood pressure is not usually low. Patient states she smokes at least a pack a day of cigarettes since she was 22 years old.    Allergy: Sulfa and penicillin    Code status: Full code (10 Angel 2020 21:52)      Patient is a 75y old  Female who presents with a chief complaint of Rt toe gangrene. Pt is s/p POD #1 Rt AKA. Pt seen and examined at bedside. Pt denies pain, reports feeling discomfort b/l LE, unable to place a numberic scale to her discomfort. States she does have pain when either her Rt stump or Left leg is moved. Denies lethargy, nausea, vomiting, constipation, itchiness. Pt states she does not like morphine IV.  Patient stated goal for pain control: to be able to take deep breaths, utilize incentive spirometer, get out of bed to chair and ambulate with tolerable pain control.     PAST MEDICAL & SURGICAL HISTORY:  Hypercholesterolemia  Myocardial infarct, old  S/P CABG x 1    Family hx: Non- contributory     Social History:  pt states she smokes 1ppd since she was 22  denies etoh or substance use (10 Angel 2020 21:52)    Allergies    influenza virus vaccine, live, trivalent (Unknown)  PC Pen VK (Rash)  penicillin (Other; Rash)  statins (Other)  sulfa drugs (Other)  sulfamethizole (Other)    MEDICATIONS  (STANDING):  aspirin  chewable 81 milliGRAM(s) Oral daily  cefepime   IVPB 1000 milliGRAM(s) IV Intermittent every 12 hours  gabapentin 100 milliGRAM(s) Oral every 12 hours  heparin  Infusion. 500 Unit(s)/Hr (5 mL/Hr) IV Continuous <Continuous>  lactated ringers. 1000 milliLiter(s) (75 mL/Hr) IV Continuous <Continuous>  metoprolol tartrate 25 milliGRAM(s) Oral two times a day  risperiDONE   Tablet 0.5 milliGRAM(s) Oral two times a day  senna 2 Tablet(s) Oral at bedtime    MEDICATIONS  (PRN):  acetaminophen   Tablet .. 650 milliGRAM(s) Oral every 6 hours PRN Mild Pain (1 - 3)  ketorolac   Injectable 15 milliGRAM(s) IV Push every 6 hours PRN Moderate Pain (4 - 6)  morphine  - Injectable 2 milliGRAM(s) IV Push every 6 hours PRN Severe Pain (7 - 10)  oxycodone    5 mG/acetaminophen 325 mG 1 Tablet(s) Oral every 6 hours PRN Severe Pain (7 - 10)  polyethylene glycol 3350 17 Gram(s) Oral daily PRN Constipation      Vital Signs Last 24 Hrs  T(C): 36.7 (07 Feb 2020 06:24), Max: 37.1 (06 Feb 2020 21:10)  T(F): 98 (07 Feb 2020 06:24), Max: 98.7 (06 Feb 2020 21:10)  HR: 99 (07 Feb 2020 06:24) (81 - 99)  BP: 133/57 (07 Feb 2020 06:24) (115/85 - 136/61)  BP(mean): 80 (06 Feb 2020 12:25) (80 - 94)  RR: 18 (07 Feb 2020 06:24) (17 - 24)  SpO2: 97% (07 Feb 2020 06:24) (94% - 100%)    LABS: Reviewed.                          8.6    11.37 )-----------( 222      ( 07 Feb 2020 07:41 )             26.9     02-07    139  |  103  |  18  ----------------------------<  178<H>  3.9   |  30  |  0.72    Ca    8.4      07 Feb 2020 07:41  Phos  2.9     02-07  Mg     1.8     02-07    TPro  6.4  /  Alb  2.4<L>  /  TBili  0.6  /  DBili  x   /  AST  24  /  ALT  17  /  AlkPhos  51  02-06    PT/INR - ( 06 Feb 2020 06:12 )   PT: 15.3 sec;   INR: 1.37 ratio         PTT - ( 07 Feb 2020 07:24 )  PTT:41.6 sec  LIVER FUNCTIONS - ( 06 Feb 2020 03:22 )  Alb: 2.4 g/dL / Pro: 6.4 g/dL / ALK PHOS: 51 U/L / ALT: 17 U/L DA / AST: 24 U/L / GGT: x           ORT Score -   Family Hx of substance abuse	Female	      Male  Alcohol 	                                           1                     3  Illegal drugs	                                   2                     3  Rx drugs                                           4 	                  4  Personal Hx of substance abuse		  Alcohol 	                                          3	                  3  Illegal drugs                                     4	                  4  Rx drugs                                            5 	                  5  Age between 16- 45 years	           1                     1  hx preadolescent sexual abuse	   3 	                  0  Psychological disease		  ADD, OCD, bipolar, schizophrenia   2	          2  Depression                                           1 	          1  Total: 0    a score of 3 or lower indicates low risk for opioid abuse		  a score of 4-7 indicates moderate risk for opioid abuse		  a score of 8 or higher indicates high risk for opioid abuse	      REVIEW OF SYSTEMS:  CONSTITUTIONAL: No fever or fatigue  RESPIRATORY: No cough, wheezing, chills or hemoptysis; No shortness of breath  CARDIOVASCULAR: No chest pain, palpitations, dizziness, or leg swelling  GASTROINTESTINAL: No abdominal or epigastric pain. No nausea, vomiting; No diarrhea or constipation.   GENITOURINARY: Urinary catheter in place  MUSCULOSKELETAL: RT AKA and left leg pain. Positive LE weakness.   NEURO: Reports numbness/ tingling in LLE  PSYCHIATRIC: No depression, anxiety, mood swings, or difficulty sleeping    PHYSICAL EXAM:  GENERAL:  Alert & Oriented X3, NAD, Good concentration  CHEST/LUNG: Clear to auscultation bilaterally; No rales, rhonchi, wheezing, or rubs  HEART: Regular rate and rhythm; No murmurs, rubs, or gallops  ABDOMEN: Soft, Nontender, Nondistended; Bowel sounds present  : Urinary catheter in place  EXTREMITIES:  Rt AKA, dressing c/d/i,  LLE tender to palpation, left 3rd toe black wound noted. LLE pulse +1, No cyanosis, or edema  MUSCULOSKELETAL: Motor Strength 1/5 lower extremities- Pt is able to move toes left LE, unable to change leg position, pt can slightly move Rt stump  SKIN: left 3rd toe black wound noted     Risk factors associated with adverse outcomes related to opioid treatment  [ ]  Concurrent benzodiazepine use  [ ]  History/ Active substance use or alcohol use disorder  [X ] Psychiatric co-morbidity  [ ] Sleep apnea  [X ] COPD  [ ] BMI> 35  [ ] Liver dysfunction  [ ] Renal dysfunction  [ ] CHF  [X ] Smoker  [X ]  Age > 60 years    [X ]  [place]  Reviewed and Copied to Chart. See below.    Plan of care and goal oriented pain management treatment options were discussed with patient and /or primary care giver; all questions and concerns were addressed and care was aligned with patient's wishes.

## 2020-02-07 NOTE — PROGRESS NOTE ADULT - SUBJECTIVE AND OBJECTIVE BOX
Time of Visit:  Patient seen and examined.     MEDICATIONS  (STANDING):  aspirin  chewable 81 milliGRAM(s) Oral daily  cefepime   IVPB 1000 milliGRAM(s) IV Intermittent every 12 hours  gabapentin 300 milliGRAM(s) Oral two times a day  heparin  Infusion. 500 Unit(s)/Hr (5 mL/Hr) IV Continuous <Continuous>  lactated ringers. 1000 milliLiter(s) (75 mL/Hr) IV Continuous <Continuous>  metoprolol tartrate 25 milliGRAM(s) Oral two times a day  risperiDONE   Tablet 0.5 milliGRAM(s) Oral two times a day  senna 2 Tablet(s) Oral at bedtime      MEDICATIONS  (PRN):  acetaminophen   Tablet .. 975 milliGRAM(s) Oral every 6 hours PRN Moderate Pain (4 - 6)  oxyCODONE    IR 5 milliGRAM(s) Oral every 4 hours PRN Severe Pain (7 - 10)  polyethylene glycol 3350 17 Gram(s) Oral daily PRN Constipation       Medications up to date at time of exam.    ROS; No fever, chills, cough, congestion.   PHYSICAL EXAMINATION:    Vital Signs Last 24 Hrs  T(C): 36.7 (07 Feb 2020 14:27), Max: 37.1 (06 Feb 2020 21:10)  T(F): 98.1 (07 Feb 2020 14:27), Max: 98.7 (06 Feb 2020 21:10)  HR: 84 (07 Feb 2020 14:27) (81 - 99)  BP: 114/49 (07 Feb 2020 14:27) (114/49 - 136/61)  BP(mean): --  RR: 18 (07 Feb 2020 14:27) (18 - 18)  SpO2: 98% (07 Feb 2020 14:27) (97% - 100%)   (if applicable)    General; Alert and oriented. Able to answer question with no SOB. No acute distress.     HEENT: Head is normocephalic and atraumatic. No nasal tenderness.  Mucous membranes are moist.     NECK: Supple, no palpable adenopathy.    LUNGS: Clear to auscultation, no wheezing, rales, or rhonchi. No use of accessory muscle.      HEART: S1 S2 Regular rate and no click / rub.     ABDOMEN: Soft, nontender, and nondistended.  No abdominal guarding. Active bowel sounds.     EXTREMITIES: s/p RLE Amputation.     NEUROLOGIC: Alert and oriented .  No tremors.      SKIN: Warm and moist. Non diaphoretic. Dry dressing to s/p Rt AKA.    LABS:                        8.6    11.37 )-----------( 222      ( 07 Feb 2020 07:41 )             26.9     02-07    139  |  103  |  18  ----------------------------<  178<H>  3.9   |  30  |  0.72    Ca    8.4      07 Feb 2020 07:41  Phos  2.9     02-07  Mg     1.8     02-07    TPro  6.4  /  Alb  2.4<L>  /  TBili  0.6  /  DBili  x   /  AST  24  /  ALT  17  /  AlkPhos  51  02-06    PT/INR - ( 06 Feb 2020 06:12 )   PT: 15.3 sec;   INR: 1.37 ratio         PTT - ( 07 Feb 2020 14:48 )  PTT:52.1 sec      RADIOLOGY & ADDITIONAL STUDIES:  EKG:   CXR: < from: Xray Chest 1 View-PORTABLE IMMEDIATE (01.22.20 @ 23:10) >  PROCEDURE DATE:  01/22/2020          INTERPRETATION:  Chest one view    HISTORY: Preop    COMPARISON STUDY: 2/5/2018    Frontal expiratory view of the chest shows the heart to be normal in size. The lungs show questionable left upper lobe infiltrate and there is no evidence of pneumothorax nor pleural effusion.    IMPRESSION:  Questionable left infiltrate. Follow-up study is recommended as clinically warranted.    IMPRESSION: 75y Female PAST MEDICAL & SURGICAL HISTORY:  Hypercholesterolemia  Myocardial infarct, old  S/P CABG x 1     76 Y/O Female ED with complaint of discoloration for a toe on the right foot.  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 She is found to have diffuse atherosclerotic disease with multilevel arterial occlusion. Marginal/poor candidate for endovascular intervention. Patient is transferred to ICU for frequent monitoring after the angiogram. Had s/p TMA. Now in medicine Unit . So far saturating good room air with no exacerbation of COPD . Had s/p TMA right foot 1/29 due to dry gangrene and on 02-06-20 with s/p AKA right leg.       Suggestion;  O2 saturation 97% room air. No need for continuous oxygen supplementation.  No exacerbation of COPD on exam.   pulmonary oral hygiene care.  DVT / GI prophylactic. Time of Visit:  Patient seen and examined.     MEDICATIONS  (STANDING):  aspirin  chewable 81 milliGRAM(s) Oral daily  cefepime   IVPB 1000 milliGRAM(s) IV Intermittent every 12 hours  gabapentin 300 milliGRAM(s) Oral two times a day  heparin  Infusion. 500 Unit(s)/Hr (5 mL/Hr) IV Continuous <Continuous>  lactated ringers. 1000 milliLiter(s) (75 mL/Hr) IV Continuous <Continuous>  metoprolol tartrate 25 milliGRAM(s) Oral two times a day  risperiDONE   Tablet 0.5 milliGRAM(s) Oral two times a day  senna 2 Tablet(s) Oral at bedtime      MEDICATIONS  (PRN):  acetaminophen   Tablet .. 975 milliGRAM(s) Oral every 6 hours PRN Moderate Pain (4 - 6)  oxyCODONE    IR 5 milliGRAM(s) Oral every 4 hours PRN Severe Pain (7 - 10)  polyethylene glycol 3350 17 Gram(s) Oral daily PRN Constipation       Medications up to date at time of exam.    ROS; No fever, chills, cough, congestion.   PHYSICAL EXAMINATION:    Vital Signs Last 24 Hrs  T(C): 36.7 (07 Feb 2020 14:27), Max: 37.1 (06 Feb 2020 21:10)  T(F): 98.1 (07 Feb 2020 14:27), Max: 98.7 (06 Feb 2020 21:10)  HR: 84 (07 Feb 2020 14:27) (81 - 99)  BP: 114/49 (07 Feb 2020 14:27) (114/49 - 136/61)  BP(mean): --  RR: 18 (07 Feb 2020 14:27) (18 - 18)  SpO2: 98% (07 Feb 2020 14:27) (97% - 100%)   (if applicable)    General; Alert and oriented. Able to answer question with no SOB. No acute distress.     HEENT: Head is normocephalic and atraumatic. No nasal tenderness.  Mucous membranes are moist.     NECK: Supple, no palpable adenopathy.    LUNGS: Clear to auscultation, no wheezing, rales, or rhonchi. No use of accessory muscle.      HEART: S1 S2 Regular rate and no click / rub.     ABDOMEN: Soft, nontender, and nondistended.  No abdominal guarding. Active bowel sounds.     EXTREMITIES: s/p RLE Amputation.     NEUROLOGIC: Alert and oriented .  No tremors.      SKIN: Warm and moist. Non diaphoretic. Dry dressing to s/p Rt AKA.    LABS:                        8.6    11.37 )-----------( 222      ( 07 Feb 2020 07:41 )             26.9     02-07    139  |  103  |  18  ----------------------------<  178<H>  3.9   |  30  |  0.72    Ca    8.4      07 Feb 2020 07:41  Phos  2.9     02-07  Mg     1.8     02-07    TPro  6.4  /  Alb  2.4<L>  /  TBili  0.6  /  DBili  x   /  AST  24  /  ALT  17  /  AlkPhos  51  02-06    PT/INR - ( 06 Feb 2020 06:12 )   PT: 15.3 sec;   INR: 1.37 ratio         PTT - ( 07 Feb 2020 14:48 )  PTT:52.1 sec      RADIOLOGY & ADDITIONAL STUDIES:  EKG:   CXR: < from: Xray Chest 1 View-PORTABLE IMMEDIATE (01.22.20 @ 23:10) >  PROCEDURE DATE:  01/22/2020          INTERPRETATION:  Chest one view    HISTORY: Preop    COMPARISON STUDY: 2/5/2018    Frontal expiratory view of the chest shows the heart to be normal in size. The lungs show questionable left upper lobe infiltrate and there is no evidence of pneumothorax nor pleural effusion.    IMPRESSION:  Questionable left infiltrate. Follow-up study is recommended as clinically warranted.    IMPRESSION: 75y Female PAST MEDICAL & SURGICAL HISTORY:  Hypercholesterolemia  Myocardial infarct, old  S/P CABG x 1     74 Y/O Female ED with complaint of discoloration for a toe on the right foot.  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 She is found to have diffuse atherosclerotic disease with multilevel arterial occlusion. Marginal/poor candidate for endovascular intervention. Patient is transferred to ICU for frequent monitoring after the angiogram. Had s/p TMA. Now in medicine Unit . So far saturating good room air with no exacerbation of COPD . Had s/p TMA right foot 1/29 due to dry gangrene and on 02-06-20 with s/p AKA right leg.       Suggestion;  O2 saturation 97% room air. No need for continuous oxygen supplementation.  No exacerbation of COPD on exam.   pulmonary oral hygiene care.  DVT / GI prophylactic.        Agree with above assessment and plan as transcribed.

## 2020-02-07 NOTE — PROGRESS NOTE ADULT - ATTENDING COMMENTS
Seen ex'ed dw'ed PA  Stable  R AKA dsg clean.  Appropriate post op pain.  L LE: incisions ok.  Foot w stable isch changes/dry.  +graft signal  Hct 26  PTT>50    A/P:   PAD/gang  s/p b/l fem-pop PTFE bypasses.  R AKA due to progression of gangrene  Post op anemia    Cont support/pain control  Incision care  POdiatry fu for L foot  FU Hct- transfuse if <24  Cont hep drip PTT 50-90. Will need long term Tx AC for graft patency

## 2020-02-07 NOTE — CONSULT NOTE ADULT - PROBLEM SELECTOR RECOMMENDATION 9
Pt with Rt stump pain which is somatic and neuropathic in nature due to s/p Rt AKA, POD #1   Mild pain   - Non pharmacological measures   - Warm/ Cool packs PRN   - Repositioning extremity, elevation, PT, imagery, relaxation.   Opioid pain recommendations   Oxycodone 5 mg PO q 4 hours PRN severe pain. Monitor for sedation/ respiratory depression.   Non-opioid pain recommendations   - Will d/c Toradol as pt is on heparin gtt  - Acetaminophen 975 mg PO q 6 hours PRN moderate pain.   - Gabapentin 300mg po q 12 hours. Monitor renal function.    Bowel Regimen  - Miralax 17G PO daily PRN constipation  - Senna 2 tablets at bedtime for constipation  Non-pharmacological pain treatment recommendations  - Physical therapy OOB if no contraindications ]  - Wound consult   Recommendations discussed with primary team and RN

## 2020-02-07 NOTE — PROGRESS NOTE ADULT - ASSESSMENT
76 y/o LLE fem pop bypass POD # 3 now s/p R AKA POD # 1    1. prn pain control   2. heparin gtt   3. PT eval   4. dressing change POD # 3

## 2020-02-07 NOTE — CHART NOTE - NSCHARTNOTEFT_GEN_A_CORE
Assessment:   Patient is a 75y old  Female who presents with a chief complaint of dry gangrene (2020 11:03). Pt s/p AKA . Discussed with Sx PA in AM, Enlive TID added back to diet order. . Pt seen, asleep. Before sx (pt was in ICU and RN reported not good intake (ongoing).      Factors impacting intake: [ ] none [ ] nausea  [ ] vomiting [ ] diarrhea [ ] constipation  [ ]chewing problems [ ] swallowing issues  [ ] other:     Diet Prescription: Diet, DASH/TLC:   Sodium & Cholesterol Restricted  Supplement Feeding Modality:  Oral  Ensure Enlive Cans or Servings Per Day:  1       Frequency:  Three Times a day (20 @ 10:52)    Intake:     Daily Height in cm: 170.18 (2020 00:00)  Height in cm: 170.18 (15 Angel 2020 04:08)  Height in cm: 170.18 (10 Angel 2020 12:41)      Daily Weight in k.8 (2020 07:00)  Weight in k.7 (2020 22:42)  Weight in k (2020 07:00)  Weight in k (2020 07:00)  Weight in k (2020 07:00)  Weight in k.7 (15 Angel 2020 14:15)    % Weight Change    Pertinent Medications: MEDICATIONS  (STANDING):  aspirin  chewable 81 milliGRAM(s) Oral daily  cefepime   IVPB 1000 milliGRAM(s) IV Intermittent every 12 hours  gabapentin 300 milliGRAM(s) Oral two times a day  heparin  Infusion. 500 Unit(s)/Hr (5 mL/Hr) IV Continuous <Continuous>  lactated ringers. 1000 milliLiter(s) (75 mL/Hr) IV Continuous <Continuous>  metoprolol tartrate 25 milliGRAM(s) Oral two times a day  risperiDONE   Tablet 0.5 milliGRAM(s) Oral two times a day  senna 2 Tablet(s) Oral at bedtime    MEDICATIONS  (PRN):  acetaminophen   Tablet .. 975 milliGRAM(s) Oral every 6 hours PRN Moderate Pain (4 - 6)  oxyCODONE    IR 5 milliGRAM(s) Oral every 4 hours PRN Severe Pain (7 - 10)  polyethylene glycol 3350 17 Gram(s) Oral daily PRN Constipation    Pertinent Labs:  Na139 mmol/L Glu 178 mg/dL<H> K+ 3.9 mmol/L Cr  0.72 mg/dL BUN 18 mg/dL  Phos 2.9 mg/dL  Alb 2.4 g/dL<L>  [id number] 5.6 %     CAPILLARY BLOOD GLUCOSE        Skin:     Estimated Needs:   [ ] no change since previous assessment  [ ] recalculated:       Previous Nutrition Diagnosis:   [ ] Altered GI function  [ ]Inadequate Oral Intake [ ] Swallowing Difficulty   [ ] Altered nutrition related labs [ ] Increased Nutrient Needs [ ] Overweight/Obesity   [ ] Unintended Weight Loss [ ] Food & Nutrition Related Knowledge Deficit [x ] Malnutrition (severe)  [ ] Other:     Nutrition Diagnosis is [x ] ongoing  [ ] resolved [ ] not applicable     New Nutrition Diagnosis: [ ] not applicable       Interventions:   Recommend  [ ] Change Diet To:  [ ] Nutrition Supplement  [x ] Nutrition Support: Enlive TID added back to diet order.  [x ] Other: MD to monitor. RD available.     Monitoring and Evaluation:   [x ] PO intake [ x ] Tolerance to diet prescription [ x ] weights [ x ] labs[ x ] follow up per protocol  [ ] other:

## 2020-02-07 NOTE — PROGRESS NOTE ADULT - SUBJECTIVE AND OBJECTIVE BOX
76 y/o LLE fem pop bypass POD # 3 now s/p R AKA POD # 1. Patient examined at bedside, no complaints.   in better spirits       T(F): 98 (02-07-20 @ 06:24), Max: 98.7 (02-06-20 @ 21:10)  HR: 99 (02-07-20 @ 06:24) (81 - 99)  BP: 133/57 (02-07-20 @ 06:24) (115/85 - 136/61)  RR: 18 (02-07-20 @ 06:24) (17 - 24)  SpO2: 97% (02-07-20 @ 06:24) (94% - 100%)  Wt(kg): --      02-06 @ 07:01  -  02-07 @ 07:00  --------------------------------------------------------  IN:  Total IN: 0 mL    OUT:    Indwelling Catheter - Urethral: 1500 mL  Total OUT: 1500 mL    Total NET: -1500 mL                          8.6    11.37 )-----------( 222      ( 07 Feb 2020 07:41 )             26.9   02-07    139  |  103  |  18  ----------------------------<  178<H>  3.9   |  30  |  0.72    Ca    8.4      07 Feb 2020 07:41  Phos  2.9     02-07  Mg     1.8     02-07    TPro  6.4  /  Alb  2.4<L>  /  TBili  0.6  /  DBili  x   /  AST  24  /  ALT  17  /  AlkPhos  51  02-06        Physical Exam  General: AAOx3, No acute distress  Skin: No jaundice, no icterus  Abdomen: soft, nontender, nondistended, no rebound tenderness  : Normal external genitalia  Extremities:  R stump dry dressing in place, L leg warm + dopplerable  PT DP pulses, incision clean and dry, toes warm

## 2020-02-07 NOTE — CONSULT NOTE ADULT - ASSESSMENT
Search Terms: Gabriella Mcgovern, 1945   Search Date: 02/07/2020 08:41:35 AM   The Drug Utilization Report below displays all of the controlled substance prescriptions, if any, that your patient has filled in the last twelve months. The information displayed on this report is compiled from pharmacy submissions to the Department, and accurately reflects the information as submitted by the pharmacies.  This report was requested by: Mallory Ponce | Reference #: [id number]

## 2020-02-08 LAB
ANION GAP SERPL CALC-SCNC: 4 MMOL/L — LOW (ref 5–17)
APTT BLD: 60.6 SEC — HIGH (ref 27.5–36.3)
APTT BLD: 76.3 SEC — HIGH (ref 27.5–36.3)
BASOPHILS # BLD AUTO: 0.06 K/UL — SIGNIFICANT CHANGE UP (ref 0–0.2)
BASOPHILS NFR BLD AUTO: 0.5 % — SIGNIFICANT CHANGE UP (ref 0–2)
BUN SERPL-MCNC: 18 MG/DL — SIGNIFICANT CHANGE UP (ref 7–18)
CALCIUM SERPL-MCNC: 8.1 MG/DL — LOW (ref 8.4–10.5)
CHLORIDE SERPL-SCNC: 105 MMOL/L — SIGNIFICANT CHANGE UP (ref 96–108)
CO2 SERPL-SCNC: 29 MMOL/L — SIGNIFICANT CHANGE UP (ref 22–31)
CREAT SERPL-MCNC: 0.72 MG/DL — SIGNIFICANT CHANGE UP (ref 0.5–1.3)
EOSINOPHIL # BLD AUTO: 0.06 K/UL — SIGNIFICANT CHANGE UP (ref 0–0.5)
EOSINOPHIL NFR BLD AUTO: 0.5 % — SIGNIFICANT CHANGE UP (ref 0–6)
GLUCOSE SERPL-MCNC: 137 MG/DL — HIGH (ref 70–99)
HCT VFR BLD CALC: 29.3 % — LOW (ref 34.5–45)
HGB BLD-MCNC: 9.2 G/DL — LOW (ref 11.5–15.5)
IMM GRANULOCYTES NFR BLD AUTO: 0.7 % — SIGNIFICANT CHANGE UP (ref 0–1.5)
LYMPHOCYTES # BLD AUTO: 1.57 K/UL — SIGNIFICANT CHANGE UP (ref 1–3.3)
LYMPHOCYTES # BLD AUTO: 14.2 % — SIGNIFICANT CHANGE UP (ref 13–44)
MAGNESIUM SERPL-MCNC: 1.7 MG/DL — SIGNIFICANT CHANGE UP (ref 1.6–2.6)
MCHC RBC-ENTMCNC: 31.4 GM/DL — LOW (ref 32–36)
MCHC RBC-ENTMCNC: 32.5 PG — SIGNIFICANT CHANGE UP (ref 27–34)
MCV RBC AUTO: 103.5 FL — HIGH (ref 80–100)
MONOCYTES # BLD AUTO: 1.17 K/UL — HIGH (ref 0–0.9)
MONOCYTES NFR BLD AUTO: 10.6 % — SIGNIFICANT CHANGE UP (ref 2–14)
NEUTROPHILS # BLD AUTO: 8.09 K/UL — HIGH (ref 1.8–7.4)
NEUTROPHILS NFR BLD AUTO: 73.5 % — SIGNIFICANT CHANGE UP (ref 43–77)
NRBC # BLD: 0 /100 WBCS — SIGNIFICANT CHANGE UP (ref 0–0)
PHOSPHATE SERPL-MCNC: 2.2 MG/DL — LOW (ref 2.5–4.5)
PLATELET # BLD AUTO: 253 K/UL — SIGNIFICANT CHANGE UP (ref 150–400)
POTASSIUM SERPL-MCNC: 3.8 MMOL/L — SIGNIFICANT CHANGE UP (ref 3.5–5.3)
POTASSIUM SERPL-SCNC: 3.8 MMOL/L — SIGNIFICANT CHANGE UP (ref 3.5–5.3)
RBC # BLD: 2.83 M/UL — LOW (ref 3.8–5.2)
RBC # FLD: 17.1 % — HIGH (ref 10.3–14.5)
SODIUM SERPL-SCNC: 138 MMOL/L — SIGNIFICANT CHANGE UP (ref 135–145)
WBC # BLD: 11.03 K/UL — HIGH (ref 3.8–10.5)
WBC # FLD AUTO: 11.03 K/UL — HIGH (ref 3.8–10.5)

## 2020-02-08 RX ADMIN — RISPERIDONE 0.5 MILLIGRAM(S): 4 TABLET ORAL at 06:55

## 2020-02-08 RX ADMIN — HEPARIN SODIUM 1000 UNIT(S)/HR: 5000 INJECTION INTRAVENOUS; SUBCUTANEOUS at 12:54

## 2020-02-08 RX ADMIN — OXYCODONE HYDROCHLORIDE 5 MILLIGRAM(S): 5 TABLET ORAL at 13:31

## 2020-02-08 RX ADMIN — GABAPENTIN 300 MILLIGRAM(S): 400 CAPSULE ORAL at 06:28

## 2020-02-08 RX ADMIN — GABAPENTIN 300 MILLIGRAM(S): 400 CAPSULE ORAL at 17:32

## 2020-02-08 RX ADMIN — SENNA PLUS 2 TABLET(S): 8.6 TABLET ORAL at 22:12

## 2020-02-08 RX ADMIN — Medication 81 MILLIGRAM(S): at 12:01

## 2020-02-08 RX ADMIN — OXYCODONE HYDROCHLORIDE 5 MILLIGRAM(S): 5 TABLET ORAL at 14:30

## 2020-02-08 RX ADMIN — CEFEPIME 100 MILLIGRAM(S): 1 INJECTION, POWDER, FOR SOLUTION INTRAMUSCULAR; INTRAVENOUS at 17:32

## 2020-02-08 RX ADMIN — RISPERIDONE 0.5 MILLIGRAM(S): 4 TABLET ORAL at 17:32

## 2020-02-08 RX ADMIN — CEFEPIME 100 MILLIGRAM(S): 1 INJECTION, POWDER, FOR SOLUTION INTRAMUSCULAR; INTRAVENOUS at 06:28

## 2020-02-08 RX ADMIN — Medication 25 MILLIGRAM(S): at 17:32

## 2020-02-08 RX ADMIN — HEPARIN SODIUM 1000 UNIT(S)/HR: 5000 INJECTION INTRAVENOUS; SUBCUTANEOUS at 06:54

## 2020-02-08 RX ADMIN — Medication 25 MILLIGRAM(S): at 06:28

## 2020-02-08 NOTE — PROGRESS NOTE ADULT - SUBJECTIVE AND OBJECTIVE BOX
INTERVAL HPI/OVERNIGHT EVENTS:  Pt resting comfortably. No overnight events.    MEDICATIONS  (STANDING):  aspirin  chewable 81 milliGRAM(s) Oral daily  cefepime   IVPB 1000 milliGRAM(s) IV Intermittent every 12 hours  gabapentin 300 milliGRAM(s) Oral two times a day  heparin  Infusion. 500 Unit(s)/Hr (5 mL/Hr) IV Continuous <Continuous>  lactated ringers. 1000 milliLiter(s) (75 mL/Hr) IV Continuous <Continuous>  metoprolol tartrate 25 milliGRAM(s) Oral two times a day  risperiDONE   Tablet 0.5 milliGRAM(s) Oral two times a day  senna 2 Tablet(s) Oral at bedtime    MEDICATIONS  (PRN):  acetaminophen   Tablet .. 975 milliGRAM(s) Oral every 6 hours PRN Moderate Pain (4 - 6)  oxyCODONE    IR 5 milliGRAM(s) Oral every 4 hours PRN Severe Pain (7 - 10)  polyethylene glycol 3350 17 Gram(s) Oral daily PRN Constipation      Vital Signs Last 24 Hrs  T(C): 36.9 (08 Feb 2020 06:22), Max: 37 (07 Feb 2020 21:21)  T(F): 98.5 (08 Feb 2020 06:22), Max: 98.6 (07 Feb 2020 21:21)  HR: 102 (08 Feb 2020 06:22) (84 - 102)  BP: 135/88 (08 Feb 2020 06:22) (107/56 - 135/88)  BP(mean): --  RR: 16 (08 Feb 2020 06:22) (16 - 18)  SpO2: 100% (08 Feb 2020 06:22) (96% - 100%)    Physical:  General: NAD.  RLE: Warm Dressing C/D/I. No skin changes proximal to dressing  LLE: Warm Dressing C/D/I. No surrounding skin changes around dressing    I&O's Detail    07 Feb 2020 07:01  -  08 Feb 2020 07:00  --------------------------------------------------------  IN:  Total IN: 0 mL    OUT:    Indwelling Catheter - Urethral: 3000 mL  Total OUT: 3000 mL    Total NET: -3000 mL    LABS:                        9.2    11.03 )-----------( 253      ( 08 Feb 2020 05:54 )             29.3             02-08    138  |  105  |  18  ----------------------------<  137<H>  3.8   |  29  |  0.72    Ca    8.1<L>      08 Feb 2020 05:54  Phos  2.2     02-08  Mg     1.7     02-08

## 2020-02-08 NOTE — PROGRESS NOTE ADULT - ASSESSMENT
75y.o. Female s/p R AKA POD#2, L fem-pop bypass POD#4    -R AKA dressing change tomorrow  -H/H stable, no transfusion indicated at present time  -con't hep gtt, with ptt goal of 50-90

## 2020-02-08 NOTE — PROGRESS NOTE ADULT - SUBJECTIVE AND OBJECTIVE BOX
Time of Visit:  Patient seen and examined.     MEDICATIONS  (STANDING):  aspirin  chewable 81 milliGRAM(s) Oral daily  cefepime   IVPB 1000 milliGRAM(s) IV Intermittent every 12 hours  gabapentin 300 milliGRAM(s) Oral two times a day  heparin  Infusion. 500 Unit(s)/Hr (5 mL/Hr) IV Continuous <Continuous>  lactated ringers. 1000 milliLiter(s) (75 mL/Hr) IV Continuous <Continuous>  metoprolol tartrate 25 milliGRAM(s) Oral two times a day  risperiDONE   Tablet 0.5 milliGRAM(s) Oral two times a day  senna 2 Tablet(s) Oral at bedtime      MEDICATIONS  (PRN):  acetaminophen   Tablet .. 975 milliGRAM(s) Oral every 6 hours PRN Moderate Pain (4 - 6)  oxyCODONE    IR 5 milliGRAM(s) Oral every 4 hours PRN Severe Pain (7 - 10)  polyethylene glycol 3350 17 Gram(s) Oral daily PRN Constipation       Medications up to date at time of exam.      PHYSICAL EXAMINATION:  Patient has no new complaints.  GENERAL: The patient is a well-developed, well-nourished, in no apparent distress.     Vital Signs Last 24 Hrs  T(C): 37.4 (08 Feb 2020 14:13), Max: 37.4 (08 Feb 2020 14:13)  T(F): 99.4 (08 Feb 2020 14:13), Max: 99.4 (08 Feb 2020 14:13)  HR: 90 (08 Feb 2020 14:13) (90 - 102)  BP: 131/42 (08 Feb 2020 14:13) (117/50 - 135/88)  BP(mean): --  RR: 16 (08 Feb 2020 14:13) (16 - 16)  SpO2: 97% (08 Feb 2020 14:13) (96% - 100%)   (if applicable)    Chest Tube (if applicable)    HEENT: Head is normocephalic and atraumatic. Extraocular muscles are intact. Mucous membranes are moist.     NECK: Supple, no palpable adenopathy.    LUNGS: Clear to auscultation, no wheezing, rales, or rhonchi.    HEART: Regular rate and rhythm without murmur.    ABDOMEN: Soft, nontender, and nondistended.  No hepatosplenomegaly is noted.    : No painful voiding, no pelvic pain    EXTREMITIES: R  AKA    NEUROLOGIC: Awake, alert, oriented, grossly intact    SKIN: Warm, dry, good turgor.      LABS:                        9.2    11.03 )-----------( 253      ( 08 Feb 2020 05:54 )             29.3     02-08    138  |  105  |  18  ----------------------------<  137<H>  3.8   |  29  |  0.72    Ca    8.1<L>      08 Feb 2020 05:54  Phos  2.2     02-08  Mg     1.7     02-08      PTT - ( 08 Feb 2020 12:40 )  PTT:76.3 sec                    MICROBIOLOGY: (if applicable)    RADIOLOGY & ADDITIONAL STUDIES:  EKG:   CXR:  ECHO:    IMPRESSION: 75y Female PAST MEDICAL & SURGICAL HISTORY:  Hypercholesterolemia  Myocardial infarct, old  S/P CABG x 1   p/w           IMP: This is a 74 year old woman , live by herself, ambulates with cane  has medical hx significant for dementia, cad, pvd, hld, htn presents to the ED with complaint of discoloration for a toe on the right foot.  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 She is found to have diffuse atherosclerotic disease with multilevel arterial occlusion. Marginal/poor candidate for endovascular intervention. Patient is transferred to ICU for frequent monitoring after the angiogram.  Vascular surgery f/u.. will need b/l  open bypass and cardiac cl. message sent to dr monge .. cards. vascular surgery pending cardiac clearance .. intermediate risk and no further cardiac testing . Vascular surgery.. for OR 1/23  . COPD stable S/P Right TMA  1/29.    2/4 Left  Femp Pop bypass. Post op extubated and tolerating NC. 2/6 R AKMUSTAPHA    Sugg:  -continue antibx  -duonebs q6h prn  -OOB to chair   -dvt/gi porphy  -wound dressing  -anticoag with hep gtt, adjut to PTT as per vascular  -wound care  -  -dvt/gi   -neuro vascular checks as per vascular

## 2020-02-09 LAB
ANION GAP SERPL CALC-SCNC: 4 MMOL/L — LOW (ref 5–17)
APTT BLD: 63.2 SEC — HIGH (ref 27.5–36.3)
BUN SERPL-MCNC: 18 MG/DL — SIGNIFICANT CHANGE UP (ref 7–18)
CALCIUM SERPL-MCNC: 8.4 MG/DL — SIGNIFICANT CHANGE UP (ref 8.4–10.5)
CHLORIDE SERPL-SCNC: 103 MMOL/L — SIGNIFICANT CHANGE UP (ref 96–108)
CO2 SERPL-SCNC: 32 MMOL/L — HIGH (ref 22–31)
CREAT SERPL-MCNC: 0.7 MG/DL — SIGNIFICANT CHANGE UP (ref 0.5–1.3)
GLUCOSE SERPL-MCNC: 149 MG/DL — HIGH (ref 70–99)
HCT VFR BLD CALC: 29.8 % — LOW (ref 34.5–45)
HGB BLD-MCNC: 9.2 G/DL — LOW (ref 11.5–15.5)
MAGNESIUM SERPL-MCNC: 1.9 MG/DL — SIGNIFICANT CHANGE UP (ref 1.6–2.6)
MCHC RBC-ENTMCNC: 30.9 GM/DL — LOW (ref 32–36)
MCHC RBC-ENTMCNC: 32.3 PG — SIGNIFICANT CHANGE UP (ref 27–34)
MCV RBC AUTO: 104.6 FL — HIGH (ref 80–100)
NRBC # BLD: 0 /100 WBCS — SIGNIFICANT CHANGE UP (ref 0–0)
PHOSPHATE SERPL-MCNC: 2.4 MG/DL — LOW (ref 2.5–4.5)
PLATELET # BLD AUTO: 256 K/UL — SIGNIFICANT CHANGE UP (ref 150–400)
POTASSIUM SERPL-MCNC: 3.7 MMOL/L — SIGNIFICANT CHANGE UP (ref 3.5–5.3)
POTASSIUM SERPL-SCNC: 3.7 MMOL/L — SIGNIFICANT CHANGE UP (ref 3.5–5.3)
RBC # BLD: 2.85 M/UL — LOW (ref 3.8–5.2)
RBC # FLD: 16.7 % — HIGH (ref 10.3–14.5)
SODIUM SERPL-SCNC: 139 MMOL/L — SIGNIFICANT CHANGE UP (ref 135–145)
WBC # BLD: 9.89 K/UL — SIGNIFICANT CHANGE UP (ref 3.8–10.5)
WBC # FLD AUTO: 9.89 K/UL — SIGNIFICANT CHANGE UP (ref 3.8–10.5)

## 2020-02-09 RX ADMIN — GABAPENTIN 300 MILLIGRAM(S): 400 CAPSULE ORAL at 05:13

## 2020-02-09 RX ADMIN — Medication 25 MILLIGRAM(S): at 05:13

## 2020-02-09 RX ADMIN — OXYCODONE HYDROCHLORIDE 5 MILLIGRAM(S): 5 TABLET ORAL at 22:00

## 2020-02-09 RX ADMIN — CEFEPIME 100 MILLIGRAM(S): 1 INJECTION, POWDER, FOR SOLUTION INTRAMUSCULAR; INTRAVENOUS at 05:13

## 2020-02-09 RX ADMIN — Medication 81 MILLIGRAM(S): at 11:19

## 2020-02-09 RX ADMIN — SENNA PLUS 2 TABLET(S): 8.6 TABLET ORAL at 22:01

## 2020-02-09 RX ADMIN — Medication 975 MILLIGRAM(S): at 11:19

## 2020-02-09 RX ADMIN — OXYCODONE HYDROCHLORIDE 5 MILLIGRAM(S): 5 TABLET ORAL at 05:52

## 2020-02-09 RX ADMIN — RISPERIDONE 0.5 MILLIGRAM(S): 4 TABLET ORAL at 05:13

## 2020-02-09 RX ADMIN — CEFEPIME 100 MILLIGRAM(S): 1 INJECTION, POWDER, FOR SOLUTION INTRAMUSCULAR; INTRAVENOUS at 17:25

## 2020-02-09 RX ADMIN — Medication 975 MILLIGRAM(S): at 12:11

## 2020-02-09 RX ADMIN — OXYCODONE HYDROCHLORIDE 5 MILLIGRAM(S): 5 TABLET ORAL at 22:30

## 2020-02-09 RX ADMIN — OXYCODONE HYDROCHLORIDE 5 MILLIGRAM(S): 5 TABLET ORAL at 05:22

## 2020-02-09 RX ADMIN — Medication 975 MILLIGRAM(S): at 17:25

## 2020-02-09 RX ADMIN — Medication 975 MILLIGRAM(S): at 18:38

## 2020-02-09 RX ADMIN — GABAPENTIN 300 MILLIGRAM(S): 400 CAPSULE ORAL at 17:25

## 2020-02-09 RX ADMIN — HEPARIN SODIUM 1000 UNIT(S)/HR: 5000 INJECTION INTRAVENOUS; SUBCUTANEOUS at 08:13

## 2020-02-09 RX ADMIN — RISPERIDONE 0.5 MILLIGRAM(S): 4 TABLET ORAL at 17:25

## 2020-02-09 RX ADMIN — Medication 25 MILLIGRAM(S): at 17:25

## 2020-02-09 NOTE — PROGRESS NOTE ADULT - SUBJECTIVE AND OBJECTIVE BOX
INTERVAL HPI/OVERNIGHT EVENTS:  Pt resting comfortably. No overnight events.   Denies numbness/tingling.    MEDICATIONS  (STANDING):  aspirin  chewable 81 milliGRAM(s) Oral daily  cefepime   IVPB 1000 milliGRAM(s) IV Intermittent every 12 hours  gabapentin 300 milliGRAM(s) Oral two times a day  heparin  Infusion. 500 Unit(s)/Hr (5 mL/Hr) IV Continuous <Continuous>  lactated ringers. 1000 milliLiter(s) (75 mL/Hr) IV Continuous <Continuous>  metoprolol tartrate 25 milliGRAM(s) Oral two times a day  risperiDONE   Tablet 0.5 milliGRAM(s) Oral two times a day  senna 2 Tablet(s) Oral at bedtime    MEDICATIONS  (PRN):  acetaminophen   Tablet .. 975 milliGRAM(s) Oral every 6 hours PRN Moderate Pain (4 - 6)  oxyCODONE    IR 5 milliGRAM(s) Oral every 4 hours PRN Severe Pain (7 - 10)  polyethylene glycol 3350 17 Gram(s) Oral daily PRN Constipation      Vital Signs Last 24 Hrs  T(C): 37.5 (09 Feb 2020 05:30), Max: 37.5 (09 Feb 2020 05:30)  T(F): 99.5 (09 Feb 2020 05:30), Max: 99.5 (09 Feb 2020 05:30)  HR: 106 (09 Feb 2020 05:30) (87 - 106)  BP: 155/71 (09 Feb 2020 05:30) (102/36 - 155/71)  BP(mean): --  RR: 16 (09 Feb 2020 05:30) (15 - 16)  SpO2: 95% (09 Feb 2020 05:30) (95% - 98%)    Physical:  General: NAD.  RLE: Warm, NVI, Stump wound clean, dry, approximated well. Staples intact.  LLE: Warm, dopplerable distal pulses. Stable ischemia of 3rd toe    I&O's Summary    08 Feb 2020 07:01  -  09 Feb 2020 07:00  --------------------------------------------------------  IN: 90 mL / OUT: 0 mL / NET: 90 mL    LABS:                        9.2    9.89  )-----------( 256      ( 09 Feb 2020 06:37 )             29.8             02-09    139  |  103  |  18  ----------------------------<  149<H>  3.7   |  32<H>  |  0.70    Ca    8.4      09 Feb 2020 06:37  Phos  2.4     02-09  Mg     1.9     02-09

## 2020-02-09 NOTE — PROGRESS NOTE ADULT - ASSESSMENT
75y.o. Female s/p R AKA POD#2, L fem-pop bypass POD#5    -Hep gtt, keep PTT titrated to 50-90  -Pain control prn  -PT eval

## 2020-02-10 LAB
ANION GAP SERPL CALC-SCNC: 4 MMOL/L — LOW (ref 5–17)
APTT BLD: 54.8 SEC — HIGH (ref 27.5–36.3)
BASOPHILS # BLD AUTO: 0.08 K/UL — SIGNIFICANT CHANGE UP (ref 0–0.2)
BASOPHILS NFR BLD AUTO: 0.9 % — SIGNIFICANT CHANGE UP (ref 0–2)
BUN SERPL-MCNC: 17 MG/DL — SIGNIFICANT CHANGE UP (ref 7–18)
CALCIUM SERPL-MCNC: 8.8 MG/DL — SIGNIFICANT CHANGE UP (ref 8.4–10.5)
CHLORIDE SERPL-SCNC: 104 MMOL/L — SIGNIFICANT CHANGE UP (ref 96–108)
CO2 SERPL-SCNC: 30 MMOL/L — SIGNIFICANT CHANGE UP (ref 22–31)
CREAT SERPL-MCNC: 0.7 MG/DL — SIGNIFICANT CHANGE UP (ref 0.5–1.3)
EOSINOPHIL # BLD AUTO: 0.27 K/UL — SIGNIFICANT CHANGE UP (ref 0–0.5)
EOSINOPHIL NFR BLD AUTO: 2.9 % — SIGNIFICANT CHANGE UP (ref 0–6)
GLUCOSE SERPL-MCNC: 94 MG/DL — SIGNIFICANT CHANGE UP (ref 70–99)
HCT VFR BLD CALC: 32.3 % — LOW (ref 34.5–45)
HGB BLD-MCNC: 9.8 G/DL — LOW (ref 11.5–15.5)
IMM GRANULOCYTES NFR BLD AUTO: 1.1 % — SIGNIFICANT CHANGE UP (ref 0–1.5)
INR BLD: 1.1 RATIO — SIGNIFICANT CHANGE UP (ref 0.88–1.16)
LYMPHOCYTES # BLD AUTO: 1.37 K/UL — SIGNIFICANT CHANGE UP (ref 1–3.3)
LYMPHOCYTES # BLD AUTO: 14.6 % — SIGNIFICANT CHANGE UP (ref 13–44)
MAGNESIUM SERPL-MCNC: 2.1 MG/DL — SIGNIFICANT CHANGE UP (ref 1.6–2.6)
MANUAL SMEAR VERIFICATION: SIGNIFICANT CHANGE UP
MCHC RBC-ENTMCNC: 30.3 GM/DL — LOW (ref 32–36)
MCHC RBC-ENTMCNC: 32.1 PG — SIGNIFICANT CHANGE UP (ref 27–34)
MCV RBC AUTO: 105.9 FL — HIGH (ref 80–100)
MONOCYTES # BLD AUTO: 1.11 K/UL — HIGH (ref 0–0.9)
MONOCYTES NFR BLD AUTO: 11.8 % — SIGNIFICANT CHANGE UP (ref 2–14)
NEUTROPHILS # BLD AUTO: 6.45 K/UL — SIGNIFICANT CHANGE UP (ref 1.8–7.4)
NEUTROPHILS NFR BLD AUTO: 68.7 % — SIGNIFICANT CHANGE UP (ref 43–77)
NRBC # BLD: 0 /100 WBCS — SIGNIFICANT CHANGE UP (ref 0–0)
PHOSPHATE SERPL-MCNC: 2.5 MG/DL — SIGNIFICANT CHANGE UP (ref 2.5–4.5)
PLAT MORPH BLD: NORMAL — SIGNIFICANT CHANGE UP
PLATELET # BLD AUTO: 252 K/UL — SIGNIFICANT CHANGE UP (ref 150–400)
POTASSIUM SERPL-MCNC: 4 MMOL/L — SIGNIFICANT CHANGE UP (ref 3.5–5.3)
POTASSIUM SERPL-SCNC: 4 MMOL/L — SIGNIFICANT CHANGE UP (ref 3.5–5.3)
PROTHROM AB SERPL-ACNC: 12.3 SEC — SIGNIFICANT CHANGE UP (ref 10–12.9)
RBC # BLD: 3.05 M/UL — LOW (ref 3.8–5.2)
RBC # FLD: 16.9 % — HIGH (ref 10.3–14.5)
RBC BLD AUTO: NORMAL — SIGNIFICANT CHANGE UP
SODIUM SERPL-SCNC: 138 MMOL/L — SIGNIFICANT CHANGE UP (ref 135–145)
WBC # BLD: 9.38 K/UL — SIGNIFICANT CHANGE UP (ref 3.8–10.5)
WBC # FLD AUTO: 9.38 K/UL — SIGNIFICANT CHANGE UP (ref 3.8–10.5)

## 2020-02-10 RX ORDER — ENOXAPARIN SODIUM 100 MG/ML
60 INJECTION SUBCUTANEOUS EVERY 12 HOURS
Refills: 0 | Status: DISCONTINUED | OUTPATIENT
Start: 2020-02-10 | End: 2020-02-11

## 2020-02-10 RX ORDER — WARFARIN SODIUM 2.5 MG/1
5 TABLET ORAL ONCE
Refills: 0 | Status: COMPLETED | OUTPATIENT
Start: 2020-02-10 | End: 2020-02-10

## 2020-02-10 RX ADMIN — RISPERIDONE 0.5 MILLIGRAM(S): 4 TABLET ORAL at 06:38

## 2020-02-10 RX ADMIN — RISPERIDONE 0.5 MILLIGRAM(S): 4 TABLET ORAL at 17:42

## 2020-02-10 RX ADMIN — GABAPENTIN 300 MILLIGRAM(S): 400 CAPSULE ORAL at 17:42

## 2020-02-10 RX ADMIN — Medication 25 MILLIGRAM(S): at 06:38

## 2020-02-10 RX ADMIN — SENNA PLUS 2 TABLET(S): 8.6 TABLET ORAL at 22:53

## 2020-02-10 RX ADMIN — Medication 975 MILLIGRAM(S): at 07:30

## 2020-02-10 RX ADMIN — Medication 25 MILLIGRAM(S): at 17:42

## 2020-02-10 RX ADMIN — CEFEPIME 100 MILLIGRAM(S): 1 INJECTION, POWDER, FOR SOLUTION INTRAMUSCULAR; INTRAVENOUS at 06:37

## 2020-02-10 RX ADMIN — Medication 975 MILLIGRAM(S): at 07:18

## 2020-02-10 RX ADMIN — Medication 975 MILLIGRAM(S): at 23:08

## 2020-02-10 RX ADMIN — OXYCODONE HYDROCHLORIDE 5 MILLIGRAM(S): 5 TABLET ORAL at 09:51

## 2020-02-10 RX ADMIN — ENOXAPARIN SODIUM 60 MILLIGRAM(S): 100 INJECTION SUBCUTANEOUS at 17:42

## 2020-02-10 RX ADMIN — Medication 975 MILLIGRAM(S): at 23:38

## 2020-02-10 RX ADMIN — OXYCODONE HYDROCHLORIDE 5 MILLIGRAM(S): 5 TABLET ORAL at 10:51

## 2020-02-10 RX ADMIN — Medication 81 MILLIGRAM(S): at 12:36

## 2020-02-10 RX ADMIN — HEPARIN SODIUM 1100 UNIT(S)/HR: 5000 INJECTION INTRAVENOUS; SUBCUTANEOUS at 09:07

## 2020-02-10 RX ADMIN — GABAPENTIN 300 MILLIGRAM(S): 400 CAPSULE ORAL at 06:38

## 2020-02-10 RX ADMIN — WARFARIN SODIUM 5 MILLIGRAM(S): 2.5 TABLET ORAL at 22:50

## 2020-02-10 RX ADMIN — CEFEPIME 100 MILLIGRAM(S): 1 INJECTION, POWDER, FOR SOLUTION INTRAMUSCULAR; INTRAVENOUS at 17:42

## 2020-02-10 NOTE — PROGRESS NOTE ADULT - SUBJECTIVE AND OBJECTIVE BOX
Time of Visit:  Patient seen and examined.     MEDICATIONS  (STANDING):  aspirin  chewable 81 milliGRAM(s) Oral daily  cefepime   IVPB 1000 milliGRAM(s) IV Intermittent every 12 hours  enoxaparin Injectable 60 milliGRAM(s) SubCutaneous every 12 hours  gabapentin 300 milliGRAM(s) Oral two times a day  lactated ringers. 1000 milliLiter(s) (75 mL/Hr) IV Continuous <Continuous>  metoprolol tartrate 25 milliGRAM(s) Oral two times a day  risperiDONE   Tablet 0.5 milliGRAM(s) Oral two times a day  senna 2 Tablet(s) Oral at bedtime  warfarin 5 milliGRAM(s) Oral once      MEDICATIONS  (PRN):  acetaminophen   Tablet .. 975 milliGRAM(s) Oral every 6 hours PRN Moderate Pain (4 - 6)  oxyCODONE    IR 5 milliGRAM(s) Oral every 4 hours PRN Severe Pain (7 - 10)  polyethylene glycol 3350 17 Gram(s) Oral daily PRN Constipation       Medications up to date at time of exam.      PHYSICAL EXAMINATION:  Patient has no new complaints.  GENERAL: The patient is a well-developed, well-nourished, in no apparent distress.     Vital Signs Last 24 Hrs  T(C): 36.8 (10 Feb 2020 14:30), Max: 37.4 (10 Feb 2020 05:50)  T(F): 98.3 (10 Feb 2020 14:30), Max: 99.3 (10 Feb 2020 05:50)  HR: 86 (10 Feb 2020 14:30) (86 - 98)  BP: 120/47 (10 Feb 2020 14:30) (120/47 - 150/62)  BP(mean): --  RR: 16 (10 Feb 2020 14:30) (16 - 16)  SpO2: 98% (10 Feb 2020 14:30) (97% - 98%)   (if applicable)    Chest Tube (if applicable)    HEENT: Head is normocephalic and atraumatic. Extraocular muscles are intact. Mucous membranes are moist.     NECK: Supple, no palpable adenopathy.    LUNGS: Clear to auscultation, no wheezing, rales, or rhonchi.    HEART: Regular rate and rhythm without murmur.    ABDOMEN: Soft, nontender, and nondistended.  No hepatosplenomegaly is noted.    : No painful voiding, no pelvic pain    EXTREMITIES: R AKA    NEUROLOGIC: Awake, alert, oriented, grossly intact    SKIN: Warm, dry, good turgor.      LABS:                        9.8    9.38  )-----------( 252      ( 10 Feb 2020 07:53 )             32.3     02-10    138  |  104  |  17  ----------------------------<  94  4.0   |  30  |  0.70    Ca    8.8      10 Feb 2020 07:53  Phos  2.5     02-10  Mg     2.1     02-10      PT/INR - ( 10 Feb 2020 07:53 )   PT: 12.3 sec;   INR: 1.10 ratio         PTT - ( 10 Feb 2020 07:53 )  PTT:54.8 sec                    MICROBIOLOGY: (if applicable)    RADIOLOGY & ADDITIONAL STUDIES:  EKG:   CXR:  ECHO:    IMPRESSION: 75y Female PAST MEDICAL & SURGICAL HISTORY:  Hypercholesterolemia  Myocardial infarct, old  S/P CABG x 1   p/w         IMP: This is a 74 year old woman , live by herself, ambulates with cane  has medical hx significant for dementia, cad, pvd, hld, htn presents to the ED with complaint of discoloration for a toe on the right foot.  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 She is found to have diffuse atherosclerotic disease with multilevel arterial occlusion. Marginal/poor candidate for endovascular intervention. Patient is transferred to ICU for frequent monitoring after the angiogram.  Vascular surgery f/u.. will need b/l  open bypass and cardiac cl. message sent to dr monge .. cards. vascular surgery pending cardiac clearance .. intermediate risk and no further cardiac testing . Vascular surgery.. for OR 1/23  . COPD stable S/P Right TMA  1/29.    2/4 Left  Femp Pop bypass. Post op extubated and tolerating NC. 2/6 R AKA    Sugg:  -continue antibx  -duonebs q6h prn  -OOB to chair   -dvt/gi porphy  -wound dressing  -anticoag with hep gtt, adjut to PTT as per vascular  -wound care  -PT eval  -dvt/gi

## 2020-02-10 NOTE — PROGRESS NOTE ADULT - ASSESSMENT
75y.o. Female s/p R AKA POD#4, L fem-pop bypass POD#6    -Hep gtt, keep PTT titrated to 50-90  -Pain control prn  - oob   - podiatry follow up   - transfer to medicine

## 2020-02-10 NOTE — PROGRESS NOTE ADULT - SUBJECTIVE AND OBJECTIVE BOX
INTERVAL HPI/OVERNIGHT EVENTS:  Pt resting comfortably. No overnight events.   Denies numbness/tingling.    MEDICATIONS  (STANDING):  aspirin  chewable 81 milliGRAM(s) Oral daily  cefepime   IVPB 1000 milliGRAM(s) IV Intermittent every 12 hours  gabapentin 300 milliGRAM(s) Oral two times a day  heparin  Infusion. 500 Unit(s)/Hr (5 mL/Hr) IV Continuous <Continuous>  lactated ringers. 1000 milliLiter(s) (75 mL/Hr) IV Continuous <Continuous>  metoprolol tartrate 25 milliGRAM(s) Oral two times a day  risperiDONE   Tablet 0.5 milliGRAM(s) Oral two times a day  senna 2 Tablet(s) Oral at bedtime    MEDICATIONS  (PRN):  acetaminophen   Tablet .. 975 milliGRAM(s) Oral every 6 hours PRN Moderate Pain (4 - 6)  oxyCODONE    IR 5 milliGRAM(s) Oral every 4 hours PRN Severe Pain (7 - 10)  polyethylene glycol 3350 17 Gram(s) Oral daily PRN Constipation      Vital Signs Last 24 Hrs  T(C): 37.4 (10 Feb 2020 05:50), Max: 37.4 (10 Feb 2020 05:50)  T(F): 99.3 (10 Feb 2020 05:50), Max: 99.3 (10 Feb 2020 05:50)  HR: 98 (10 Feb 2020 05:50) (78 - 98)  BP: 150/62 (10 Feb 2020 05:50) (119/51 - 150/62)  BP(mean): --  RR: 16 (10 Feb 2020 05:50) (16 - 17)  SpO2: 97% (10 Feb 2020 05:50) (97% - 99%)    Physical:  General: NAD.  RLE: Warm, NVI, Stump wound clean, dry, approximated well. Staples intact.  LLE: Warm, dopplerable distal pulses. Stable ischemia of 3rd toe                          9.8    9.38  )-----------( 252      ( 10 Feb 2020 07:53 )             32.3   02-10    138  |  104  |  17  ----------------------------<  94  4.0   |  30  |  0.70    Ca    8.8      10 Feb 2020 07:53  Phos  2.5     02-10  Mg     2.1     02-10

## 2020-02-10 NOTE — PROGRESS NOTE ADULT - SUBJECTIVE AND OBJECTIVE BOX
Patient is a 74y old  Female who presents with a chief complaint of discoloration of toe of right foot.     HPI: This 74 year old non-diabetic female presents to the ED with complaint of discoloration for a toe on the right foot. She states that she was seen by her primary care physician a few days ago and told her to go to the emergency room.  Patient states she smokes at least a pack a day of cigarettes since she was 22 years old.  Patient states she can only ambulate 10 feet and gets a cramp in her right calf for which she needs to sit down.  She states she is very minimally ambulatory.   She denies pain at rest with her feet elevated.  Patient lives alone with a cat. Patient massaging leg throughout the visit.  Patient denies n/v/d/sob/cp/f.  Patient states her blood pressure is not usually low.     Podiatry Interval HPI: Patient is s/p AKA of right leg. He left leg was less painful today, she remains using the offloading Z-boots. Patient denies n/f/d/f/sob. In good spirit.      PMH: COPD (chronic obstructive pulmonary disease)  Hypercholesterolemia  Myocardial infarct, old    Allergies: PC Pen VK (Rash)  penicillin (Other; Rash)  statins (Other)  sulfa drugs (Other)  sulfamethizole (Other)    Social History:  pt states she smokes 1ppd since she was 22  denies etoh or substance use (10 Angel 2020 21:52)    MEDICATIONS  (STANDING):  aspirin  chewable 81 milliGRAM(s) Oral daily  cefepime   IVPB 1000 milliGRAM(s) IV Intermittent every 12 hours  gabapentin 300 milliGRAM(s) Oral two times a day  heparin  Infusion. 500 Unit(s)/Hr (5 mL/Hr) IV Continuous <Continuous>  lactated ringers. 1000 milliLiter(s) (75 mL/Hr) IV Continuous <Continuous>  metoprolol tartrate 25 milliGRAM(s) Oral two times a day  risperiDONE   Tablet 0.5 milliGRAM(s) Oral two times a day  senna 2 Tablet(s) Oral at bedtime      FH,   PMHCOPD (chronic obstructive pulmonary disease)  Hypercholesterolemia  Myocardial infarct, old     PSHS/P CABG x 1      Labs                                      9.8    9.38  )-----------( 252      ( 10 Feb 2020 07:53 )             32.3       02-10    138  |  104  |  17  ----------------------------<  94  4.0   |  30  |  0.70    Ca    8.8      10 Feb 2020 07:53  Phos  2.5     02-10  Mg     2.1     02-10        Vital Signs Last 24 Hrs  T(C): 37.4 (10 Feb 2020 05:50), Max: 37.4 (10 Feb 2020 05:50)  T(F): 99.3 (10 Feb 2020 05:50), Max: 99.3 (10 Feb 2020 05:50)  HR: 98 (10 Feb 2020 05:50) (78 - 98)  BP: 150/62 (10 Feb 2020 05:50) (119/51 - 150/62)  BP(mean): --  RR: 16 (10 Feb 2020 05:50) (16 - 17)  SpO2: 97% (10 Feb 2020 05:50) (97% - 99%)    Sedimentation Rate, Erythrocyte: 60 mm/Hr (01-22-20 @ 06:32)  Hemoglobin A1C, Whole Blood: 5.6 % (01-11-20 @ 09:30)      C-Reactive Protein, Serum: 1.80 mg/dL (01-22-20 @ 09:57)    WBC Count: 9.38 K/uL (02-10 @ 07:53)  WBC Count: 9.89 K/uL (02-09 @ 06:37)  WBC Count: 11.03 K/uL (02-08 @ 05:54)  WBC Count: 11.37 K/uL (02-07 @ 07:41)  WBC Count: 12.53 K/uL (02-07 @ 00:17)       PHYSICAL EXAM  GEN: MERARY MEYER is a pleasant 74y Female in no acute distress. Patient is alert,  Left lower extremity focused exam  Vasc:  DP and PT pulses non-palpable to left foot. Popliteal pulse palpable, left foot warm to touch.  CFT <5 sec noted to 5th digit, disoloration persist.  Derm: Stage 1 decubitus ulcer left posterior lateral heel.  Necrotic patch to left 5th toe.  Necrotic patch to dorsal left 3rd toe, stable.  Discoloration to dorsal left foot.      MSK: Tenderness to palpation distal left foot.  Increasing Pain to touch dorsal left foot.     Imaging:   < from: Xray Foot AP + Lateral + Oblique, Right (01.10.20 @ 15:05) >    EXAM:  FOOT RIGHT (MINIMUM 3 VIEWS)                        PROCEDURE DATE:  01/10/2020    INTERPRETATION:  Right foot. 3 views. Patient has local pain.    The foot shows mild degeneration at the first MTP joint.    There is an old fracture deformity of the distal fibula.    No bone destruction or acute fracture is evident.    IMPRESSION: No acute finding.    JASKARAN MOSLEY M.D., ATTENDING RADIOLOGIST  This document has been electronically signed. Angel 10 2020  3:06PM  < end of copied text >    < from: [facility] Physiol Extremity Lower 3+ Level, BI (01.13.20 @ 13:49) >    EXAM:  US PHYSIOL LWR EXT 3+ LEV BI                        PROCEDURE DATE:  01/13/2020    INTERPRETATION:  Clinical indication: Right toe gangrene    Comparison: None    FINDINGS AND   IMPRESSION: Segmental pressures could not be obtaineddue to patient discomfort. Therefore, ABIs could not be calculated.    There are biphasic waveforms in the lower left thigh. Abnormally, monophasic flow within the remainder of the lower extremities.    RACQUEL CHA M.D., ATTENDING RADIOLOGIST  This document has been electronically signed. Jan 13 2020  2:20PM  < end of copied text >    < from: CT Angio Abd Aorta w/run-off w/ IV Cont (01.12.20 @ 19:40) >    EXAM:  CT ANGIO ABD AOR W RUN(W)AW IC                        PROCEDURE DATE:  01/12/2020    INTERPRETATION:  CT ANGIO ABDOMINAL AORTA RUNOFF WITHOUT AND OR WITH IV CONTRAST    CLINICAL INFORMATION: Atherosclerosis of the native arteries of the right and left lower extremity with right foot gangrene    PROCEDURE INFORMATION:   Exam: CTA Angiogram of the Abdominal Aorta and Bilateral Lower Extremities   (Run-off) With IV Contrast   Exam date and time: 1/12/2020 6:55 PM   Age: 74 years old   Clinical indication: Other: Pad, right 2nd toe gangrene, 3rd and 4th toes wet   early gang     TECHNIQUE:   Imaging protocol: CT angiogram of the abdominal aorta, pelvis and bilateral   lower extremities with IV iodinated contrast.   Intravenous contrast: 90 cc Omnipaque 350  3D rendering: MIP and/or 3D reconstructed images were created by the   technologist.     COMPARISON:   CR XR FOOT 3 VIEWS RIGHT 1/10/2020 2:46 PM     FINDINGS:   Aorta: Severe calcified and noncalcified atherosclerotic plaque. Aorta is   otherwise fully patent.   Celiac trunk and mesenteric arteries: No occlusion or significant stenosis.    Renal arteries: No occlusion or significant stenosis.      Right iliac arteries: Marked stenosis of the right external iliac artery distally.   Right femoral/popliteal arteries: Distal CFA stenosis. Severely and diffusely attenuated SFA. Occlusion of the mid-distal right superficial   femoral artery with reconstitution of the suprageniculate popliteal  artery. Stenotic right popliteal artery.   Right infrapopliteal arteries: Single-vessel runoff via the right peroneal   artery, diffusely attenuated, which feeds the plantar artery. The right anterior and posterior tibial   arteries are occluded. Reconstitution of the right pedis dorsalis artery at the   level of the foot, however severely attenuated.     Left iliac arteries: Moderate calcification and stenosis of the left iliac arteries. Occluded left internal iliac artery.  Left femoral/popliteal arteries: Long segmentOcclusion of the entire left superficial femoral   artery and Reconstitution of left popliteal artery, which shows multifocal disease without occlusion.  Left infrapopliteal arteries: Occlusion of the left anterior posterior tibial   arteries at the level of the ankle. Single-vessel left peroneal artery which   feeds the plantar artery. Left pedis dorsalis is attenuated but patent.    Liver: No mass.   Gallbladder and bile ducts: Unremarkable. No calcified stones. No ductal   dilation.    Pancreas: Unremarkable. No mass. No ductal dilation.    Spleen: Normal. No splenomegaly.    Adrenals: Normal. No mass.    Kidneys and ureters: Normal. No mass.      Stomach and bowel: . No obstruction. No mucosal thickening. Moderate stool   throughout the colon.    Appendix: No evidence of appendicitis.      Bladder: Unremarkable. No mass.    Reproductive: Unremarkable as visualized.      Intraperitoneal space: Unremarkable. No free air. No significant fluid   collection.    Lymph nodes: No lymphadenopathy.     Bones/joints: No acute fracture. No dislocation.    Soft tissues: Soft tissue irregularity of the second right toe presumably   related to underlying gangrene.       IMPRESSION:   1. Right lower extremity demonstrates marked diffuse stenosis of the lower   extremity with occlusion of the mid-distal right SFA which reconstitutes distally and   with single-vessel runoff to the right foot via the right peroneal artery which   feeds the plantar artery. The right pedis dorsalis artery reconstitutes at the   level of the ankle.   2. The left lower extremity demonstrates diffuse marked stenosis with total occlusion   of the left SFA with reconstitution of attenuated left popliteal artery. There is   single-vessel runoff via the left peronealartery is the which then feeds the   left plantar artery. The pedis dorsalis artery is patent.   3. evidence of inflow disease at the level of the right external iliac artery.  4. Slight irregularity of the soft tissues of the right second toe which may be   related to underlying gangrene.    GALILEA KING M.D., ATTENDING RADIOLOGIST  This document has been electronically signed. Jan 13 2020  4:12PM  < end of copied text >      A:   PVD  s/p angiogram 1/17  s/p open bypass iliac-pop RLE 1/23 with dr. 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    Continue to offload the left heel in heel offloading boot.   Will continue to monitor demarcation of left foot.  No podiatry interventions at this time  Agree with vascular recommendations and will follow recommendations  Discussed with attending Dr. Anitha Hurt

## 2020-02-11 ENCOUNTER — TRANSCRIPTION ENCOUNTER (OUTPATIENT)
Age: 75
End: 2020-02-11

## 2020-02-11 VITALS
OXYGEN SATURATION: 99 % | TEMPERATURE: 98 F | SYSTOLIC BLOOD PRESSURE: 119 MMHG | RESPIRATION RATE: 16 BRPM | DIASTOLIC BLOOD PRESSURE: 60 MMHG | HEART RATE: 90 BPM

## 2020-02-11 LAB
HCT VFR BLD CALC: 31.9 % — LOW (ref 34.5–45)
HGB BLD-MCNC: 9.8 G/DL — LOW (ref 11.5–15.5)
MCHC RBC-ENTMCNC: 30.7 GM/DL — LOW (ref 32–36)
MCHC RBC-ENTMCNC: 32.3 PG — SIGNIFICANT CHANGE UP (ref 27–34)
MCV RBC AUTO: 105.3 FL — HIGH (ref 80–100)
NRBC # BLD: 0 /100 WBCS — SIGNIFICANT CHANGE UP (ref 0–0)
PLATELET # BLD AUTO: 279 K/UL — SIGNIFICANT CHANGE UP (ref 150–400)
RBC # BLD: 3.03 M/UL — LOW (ref 3.8–5.2)
RBC # FLD: 17.2 % — HIGH (ref 10.3–14.5)
WBC # BLD: 8.7 K/UL — SIGNIFICANT CHANGE UP (ref 3.8–10.5)
WBC # FLD AUTO: 8.7 K/UL — SIGNIFICANT CHANGE UP (ref 3.8–10.5)

## 2020-02-11 PROCEDURE — 97116 GAIT TRAINING THERAPY: CPT

## 2020-02-11 PROCEDURE — 88311 DECALCIFY TISSUE: CPT

## 2020-02-11 PROCEDURE — 96374 THER/PROPH/DIAG INJ IV PUSH: CPT

## 2020-02-11 PROCEDURE — 80053 COMPREHEN METABOLIC PANEL: CPT

## 2020-02-11 PROCEDURE — 87641 MR-STAPH DNA AMP PROBE: CPT

## 2020-02-11 PROCEDURE — 84484 ASSAY OF TROPONIN QUANT: CPT

## 2020-02-11 PROCEDURE — 93005 ELECTROCARDIOGRAM TRACING: CPT

## 2020-02-11 PROCEDURE — 86803 HEPATITIS C AB TEST: CPT

## 2020-02-11 PROCEDURE — 82306 VITAMIN D 25 HYDROXY: CPT

## 2020-02-11 PROCEDURE — 86850 RBC ANTIBODY SCREEN: CPT

## 2020-02-11 PROCEDURE — 84443 ASSAY THYROID STIM HORMONE: CPT

## 2020-02-11 PROCEDURE — 83550 IRON BINDING TEST: CPT

## 2020-02-11 PROCEDURE — 97110 THERAPEUTIC EXERCISES: CPT

## 2020-02-11 PROCEDURE — 82553 CREATINE MB FRACTION: CPT

## 2020-02-11 PROCEDURE — 87640 STAPH A DNA AMP PROBE: CPT

## 2020-02-11 PROCEDURE — C1889: CPT

## 2020-02-11 PROCEDURE — C1887: CPT

## 2020-02-11 PROCEDURE — 93923 UPR/LXTR ART STDY 3+ LVLS: CPT

## 2020-02-11 PROCEDURE — 88305 TISSUE EXAM BY PATHOLOGIST: CPT

## 2020-02-11 PROCEDURE — 82746 ASSAY OF FOLIC ACID SERUM: CPT

## 2020-02-11 PROCEDURE — 75635 CT ANGIO ABDOMINAL ARTERIES: CPT

## 2020-02-11 PROCEDURE — 87040 BLOOD CULTURE FOR BACTERIA: CPT

## 2020-02-11 PROCEDURE — C1769: CPT

## 2020-02-11 PROCEDURE — 82728 ASSAY OF FERRITIN: CPT

## 2020-02-11 PROCEDURE — 99285 EMERGENCY DEPT VISIT HI MDM: CPT | Mod: 25

## 2020-02-11 PROCEDURE — 85027 COMPLETE CBC AUTOMATED: CPT

## 2020-02-11 PROCEDURE — 83540 ASSAY OF IRON: CPT

## 2020-02-11 PROCEDURE — 76000 FLUOROSCOPY <1 HR PHYS/QHP: CPT

## 2020-02-11 PROCEDURE — 94640 AIRWAY INHALATION TREATMENT: CPT

## 2020-02-11 PROCEDURE — 82550 ASSAY OF CK (CPK): CPT

## 2020-02-11 PROCEDURE — 85652 RBC SED RATE AUTOMATED: CPT

## 2020-02-11 PROCEDURE — 97530 THERAPEUTIC ACTIVITIES: CPT

## 2020-02-11 PROCEDURE — 88307 TISSUE EXAM BY PATHOLOGIST: CPT

## 2020-02-11 PROCEDURE — 86140 C-REACTIVE PROTEIN: CPT

## 2020-02-11 PROCEDURE — 82962 GLUCOSE BLOOD TEST: CPT

## 2020-02-11 PROCEDURE — 36415 COLL VENOUS BLD VENIPUNCTURE: CPT

## 2020-02-11 PROCEDURE — 86923 COMPATIBILITY TEST ELECTRIC: CPT

## 2020-02-11 PROCEDURE — 86901 BLOOD TYPING SEROLOGIC RH(D): CPT

## 2020-02-11 PROCEDURE — 80048 BASIC METABOLIC PNL TOTAL CA: CPT

## 2020-02-11 PROCEDURE — 83605 ASSAY OF LACTIC ACID: CPT

## 2020-02-11 PROCEDURE — 83036 HEMOGLOBIN GLYCOSYLATED A1C: CPT

## 2020-02-11 PROCEDURE — C1894: CPT

## 2020-02-11 PROCEDURE — 85610 PROTHROMBIN TIME: CPT

## 2020-02-11 PROCEDURE — 82607 VITAMIN B-12: CPT

## 2020-02-11 PROCEDURE — 93306 TTE W/DOPPLER COMPLETE: CPT

## 2020-02-11 PROCEDURE — 99231 SBSQ HOSP IP/OBS SF/LOW 25: CPT

## 2020-02-11 PROCEDURE — 85730 THROMBOPLASTIN TIME PARTIAL: CPT

## 2020-02-11 PROCEDURE — 73630 X-RAY EXAM OF FOOT: CPT

## 2020-02-11 PROCEDURE — 71045 X-RAY EXAM CHEST 1 VIEW: CPT

## 2020-02-11 PROCEDURE — 86900 BLOOD TYPING SEROLOGIC ABO: CPT

## 2020-02-11 PROCEDURE — C1768: CPT

## 2020-02-11 PROCEDURE — 97164 PT RE-EVAL EST PLAN CARE: CPT

## 2020-02-11 PROCEDURE — 99261: CPT

## 2020-02-11 PROCEDURE — 88304 TISSUE EXAM BY PATHOLOGIST: CPT

## 2020-02-11 PROCEDURE — 83735 ASSAY OF MAGNESIUM: CPT

## 2020-02-11 PROCEDURE — 84100 ASSAY OF PHOSPHORUS: CPT

## 2020-02-11 RX ORDER — OXYCODONE HYDROCHLORIDE 5 MG/1
0 TABLET ORAL
Qty: 0 | Refills: 0 | DISCHARGE
Start: 2020-02-11

## 2020-02-11 RX ORDER — METOPROLOL TARTRATE 50 MG
1 TABLET ORAL
Qty: 0 | Refills: 0 | DISCHARGE
Start: 2020-02-11

## 2020-02-11 RX ORDER — WARFARIN SODIUM 2.5 MG/1
1 TABLET ORAL
Qty: 0 | Refills: 0 | DISCHARGE
Start: 2020-02-11

## 2020-02-11 RX ORDER — POLYETHYLENE GLYCOL 3350 17 G/17G
17 POWDER, FOR SOLUTION ORAL
Qty: 0 | Refills: 0 | DISCHARGE
Start: 2020-02-11

## 2020-02-11 RX ORDER — POLYETHYLENE GLYCOL 3350 17 G/17G
17 POWDER, FOR SOLUTION ORAL DAILY
Refills: 0 | Status: DISCONTINUED | OUTPATIENT
Start: 2020-02-11 | End: 2020-02-11

## 2020-02-11 RX ORDER — ACETAMINOPHEN 500 MG
3 TABLET ORAL
Qty: 0 | Refills: 0 | DISCHARGE
Start: 2020-02-11

## 2020-02-11 RX ORDER — GABAPENTIN 400 MG/1
1 CAPSULE ORAL
Qty: 0 | Refills: 0 | DISCHARGE
Start: 2020-02-11

## 2020-02-11 RX ORDER — WARFARIN SODIUM 2.5 MG/1
5 TABLET ORAL ONCE
Refills: 0 | Status: DISCONTINUED | OUTPATIENT
Start: 2020-02-11 | End: 2020-02-11

## 2020-02-11 RX ORDER — OXYCODONE HYDROCHLORIDE 5 MG/1
2.5 TABLET ORAL EVERY 6 HOURS
Refills: 0 | Status: DISCONTINUED | OUTPATIENT
Start: 2020-02-11 | End: 2020-02-11

## 2020-02-11 RX ORDER — SENNA PLUS 8.6 MG/1
2 TABLET ORAL
Qty: 0 | Refills: 0 | DISCHARGE
Start: 2020-02-11

## 2020-02-11 RX ORDER — LISINOPRIL 2.5 MG/1
1 TABLET ORAL
Qty: 0 | Refills: 0 | DISCHARGE

## 2020-02-11 RX ORDER — ENOXAPARIN SODIUM 100 MG/ML
60 INJECTION SUBCUTANEOUS
Qty: 0 | Refills: 0 | DISCHARGE
Start: 2020-02-11

## 2020-02-11 RX ORDER — AMLODIPINE BESYLATE 2.5 MG/1
1 TABLET ORAL
Qty: 0 | Refills: 0 | DISCHARGE

## 2020-02-11 RX ADMIN — GABAPENTIN 300 MILLIGRAM(S): 400 CAPSULE ORAL at 17:33

## 2020-02-11 RX ADMIN — RISPERIDONE 0.5 MILLIGRAM(S): 4 TABLET ORAL at 17:33

## 2020-02-11 RX ADMIN — POLYETHYLENE GLYCOL 3350 17 GRAM(S): 17 POWDER, FOR SOLUTION ORAL at 06:05

## 2020-02-11 RX ADMIN — Medication 975 MILLIGRAM(S): at 06:36

## 2020-02-11 RX ADMIN — Medication 25 MILLIGRAM(S): at 17:33

## 2020-02-11 RX ADMIN — Medication 1 ENEMA: at 17:32

## 2020-02-11 RX ADMIN — ENOXAPARIN SODIUM 60 MILLIGRAM(S): 100 INJECTION SUBCUTANEOUS at 06:05

## 2020-02-11 RX ADMIN — RISPERIDONE 0.5 MILLIGRAM(S): 4 TABLET ORAL at 06:05

## 2020-02-11 RX ADMIN — ENOXAPARIN SODIUM 60 MILLIGRAM(S): 100 INJECTION SUBCUTANEOUS at 17:33

## 2020-02-11 RX ADMIN — Medication 81 MILLIGRAM(S): at 12:05

## 2020-02-11 RX ADMIN — GABAPENTIN 300 MILLIGRAM(S): 400 CAPSULE ORAL at 06:05

## 2020-02-11 RX ADMIN — POLYETHYLENE GLYCOL 3350 17 GRAM(S): 17 POWDER, FOR SOLUTION ORAL at 12:05

## 2020-02-11 RX ADMIN — CEFEPIME 100 MILLIGRAM(S): 1 INJECTION, POWDER, FOR SOLUTION INTRAMUSCULAR; INTRAVENOUS at 06:09

## 2020-02-11 RX ADMIN — OXYCODONE HYDROCHLORIDE 2.5 MILLIGRAM(S): 5 TABLET ORAL at 14:00

## 2020-02-11 RX ADMIN — OXYCODONE HYDROCHLORIDE 2.5 MILLIGRAM(S): 5 TABLET ORAL at 13:34

## 2020-02-11 RX ADMIN — Medication 25 MILLIGRAM(S): at 06:05

## 2020-02-11 RX ADMIN — Medication 975 MILLIGRAM(S): at 06:06

## 2020-02-11 NOTE — PHYSICAL THERAPY INITIAL EVALUATION ADULT - PHYSICAL ASSIST/NONPHYSICAL ASSIST: SIT/STAND, REHAB EVAL
1 person assist
nonverbal cues (demo/gestures)/1 person assist/verbal cues
1 person assist/verbal cues/nonverbal cues (demo/gestures)

## 2020-02-11 NOTE — PHYSICAL THERAPY INITIAL EVALUATION ADULT - IMPAIRED TRANSFERS: SIT/STAND, REHAB EVAL
decreased strength/impaired balance/decreased ROM/impaired postural control
decreased ROM/impaired postural control/narrow base of support/decreased strength/abnormal muscle tone/impaired balance

## 2020-02-11 NOTE — PHYSICAL THERAPY INITIAL EVALUATION ADULT - PLANNED THERAPY INTERVENTIONS, PT EVAL
balance training/bed mobility training/gait training/strengthening/transfer training
neuromuscular re-education/ROM/transfer training/prosthetic fitting/training/strengthening/stretching/wheelchair management/propulsion training/gait training/postural re-education/orthotic fitting/training/bed mobility training/balance training
gait training/balance training/ROM/wheelchair management/propulsion training/bed mobility training/transfer training/postural re-education/strengthening

## 2020-02-11 NOTE — PROGRESS NOTE ADULT - PROBLEM SELECTOR PROBLEM 2
Early onset Alzheimer's disease with behavioral disturbance
Occlusion of artery
CAD (coronary artery disease)
CAD (coronary artery disease)
Gangrene of toe of right foot
Toe gangrene

## 2020-02-11 NOTE — PHYSICAL THERAPY INITIAL EVALUATION ADULT - TRANSFER SAFETY CONCERNS NOTED: BED/CHAIR, REHAB EVAL
decreased weight-shifting ability/losing balance/stand pivot
stand pivot/decreased balance during turns/decreased weight-shifting ability

## 2020-02-11 NOTE — PROGRESS NOTE ADULT - REASON FOR ADMISSION
dry gangrene

## 2020-02-11 NOTE — PHYSICAL THERAPY INITIAL EVALUATION ADULT - FOLLOWS COMMANDS/ANSWERS QUESTIONS, REHAB EVAL
able to follow single-step instructions
100% of the time/able to follow single-step instructions
100% of the time/able to follow single-step instructions

## 2020-02-11 NOTE — PHYSICAL THERAPY INITIAL EVALUATION ADULT - BED MOBILITY LIMITATIONS, REHAB EVAL
decreased ability to use legs for bridging/pushing/impaired ability to control trunk for mobility/decreased ability to use arms for pushing/pulling
decreased ability to use legs for bridging/pushing/impaired ability to control trunk for mobility/decreased ability to use arms for pushing/pulling

## 2020-02-11 NOTE — PROGRESS NOTE ADULT - ASSESSMENT
Search Terms: Gabriella Mcgovern, 1945   Search Date: 02/07/2020 08:41:35 AM   The Drug Utilization Report below displays all of the controlled substance prescriptions, if any, that your patient has filled in the last twelve months. The information displayed on this report is compiled from pharmacy submissions to the Department, and accurately reflects the information as submitted by the pharmacies.  This report was requested by: Mallory Ponce | Reference #: [id number]

## 2020-02-11 NOTE — PHYSICAL THERAPY INITIAL EVALUATION ADULT - PHYSICAL ASSIST/NONPHYSICAL ASSIST: STAND/SIT, REHAB EVAL
1 person assist
1 person assist/nonverbal cues (demo/gestures)/verbal cues
1 person assist/nonverbal cues (demo/gestures)/verbal cues

## 2020-02-11 NOTE — PHYSICAL THERAPY INITIAL EVALUATION ADULT - CRITERIA FOR SKILLED THERAPEUTIC INTERVENTIONS
impairments found/rehab potential/predicted duration of therapy intervention/therapy frequency/anticipated discharge recommendation
predicted duration of therapy intervention/risk reduction/prevention/rehab potential/anticipated discharge recommendation/therapy frequency/functional limitations in following categories/impairments found
rehab potential/predicted duration of therapy intervention/functional limitations in following categories/impairments found/risk reduction/prevention/therapy frequency/anticipated discharge recommendation

## 2020-02-11 NOTE — PHYSICAL THERAPY INITIAL EVALUATION ADULT - DIAGNOSIS, PT EVAL
Declined functional status
Impaired balance, strength, endurance and ROM.
Impaired balance, strength, endurance and ROM.

## 2020-02-11 NOTE — PHYSICAL THERAPY INITIAL EVALUATION ADULT - LIVES WITH, PROFILE
alone/in an apt with an elevator. Patient has a friend who hepls her
Per IE: in an apt with an elevator, but has 4 stairs to enter. Patient has a friend who helps her/alone
alone/Per IE: in an apt with an elevator, but has 4 stairs to enter. Patient has a friend who helps her

## 2020-02-11 NOTE — PROGRESS NOTE ADULT - PROBLEM SELECTOR PLAN 1
Mild pain   - Non pharmacological measures   - Warm/ Cool packs PRN   - Repositioning extremity, elevation, PT, imagery, relaxation.   Opioid pain recommendations    Oxycodone 2.5 mg PO q 6 hours PRN severe pain. Monitor for sedation/ respiratory depression.   Non-opioid pain recommendations   - Tylenol 975mg PO q6h PRN moderate pain.  - Gabapentin 300mg po q 12 hours. Monitor renal function.   Bowel Regimen  - Miralax 17G PO daily  - Senna 2 tablets at bedtime for constipation  Non-pharmacological pain treatment recommendations  - Physical therapy OOB if no contraindications   Recommendations discussed with primary team and RN

## 2020-02-11 NOTE — PHYSICAL THERAPY INITIAL EVALUATION ADULT - IMPAIRMENTS FOUND, PT EVAL
muscle strength/gait, locomotion, and balance/aerobic capacity/endurance
aerobic capacity/endurance/gait, locomotion, and balance/posture/muscle strength/cognitive impairment/ROM
ROM/aerobic capacity/endurance/gait, locomotion, and balance/posture/muscle strength

## 2020-02-11 NOTE — PHYSICAL THERAPY INITIAL EVALUATION ADULT - BALANCE DISTURBANCE, IDENTIFIED IMPAIRMENT CONTRIBUTE, REHAB EVAL
impaired postural control/decreased strength/decreased ROM/impaired sensory feedback
decreased ROM/decreased strength/impaired postural control

## 2020-02-11 NOTE — PHYSICAL THERAPY INITIAL EVALUATION ADULT - ORIENTATION, REHAB EVAL
oriented to person, place, time and situation
person/time/place
oriented to person, place, time and situation

## 2020-02-11 NOTE — PHYSICAL THERAPY INITIAL EVALUATION ADULT - RANGE OF MOTION EXAMINATION, REHAB EVAL
AAROM of Right hip and ankle-WFL. Right knee is immobilized

## 2020-02-11 NOTE — PROGRESS NOTE ADULT - PROBLEM SELECTOR PROBLEM 1
Dry gangrene
Pain of amputation stump of right lower extremity
Peripheral vascular disease

## 2020-02-11 NOTE — PHYSICAL THERAPY INITIAL EVALUATION ADULT - IMPAIRMENTS CONTRIBUTING IMPAIRED BED MOBILITY, REHAB EVAL
decreased strength/decreased sensation/impaired sensory feedback/decreased ROM/impaired balance/impaired postural control
decreased ROM/decreased strength/impaired postural control/impaired balance

## 2020-02-11 NOTE — PHYSICAL THERAPY INITIAL EVALUATION ADULT - SKIN COLOR/CHARACTERISTICS
staples Rt. residual limb c/d/i, lt. groin dressing c/d/i, lt. lower leg Steri-strips dry drainage, Lt. foot w/ multiple open wound, and several dry scab type areas.
Rt. foot ace wrap c/d/i. Rt. knee scab. Rt. Le dressing c/d/i. Lt. for scaly, multiple scabbing

## 2020-02-11 NOTE — PHYSICAL THERAPY INITIAL EVALUATION ADULT - GENERAL OBSERVATIONS, REHAB EVAL
Re-Consult received, chart reviewed. Patient received supine in bed, NAD. Patient agreed to RE-EVALUATION from Physical Therapist.
Consult received, chart reviewed. Patient received supine in bed, NAD, +IVs Patient agreed to RE-EVALUATION from Physical Therapist.
patient received in bed with I/V, Right knee immobilizer and right foot bandage in place.

## 2020-02-11 NOTE — PROGRESS NOTE ADULT - PROVIDER SPECIALTY LIST ADULT
Cardiology
Critical Care
Infectious Disease
Internal Medicine
Pain Medicine
Palliative Care
Podiatry
Pulmonology
Surgery
Vascular Surgery
Pulmonology

## 2020-02-11 NOTE — PHYSICAL THERAPY INITIAL EVALUATION ADULT - IMPAIRED TRANSFERS: BED/CHAIR, REHAB EVAL
decreased ROM/impaired postural control/impaired balance/impaired sensory feedback/decreased strength
impaired postural control/decreased ROM/decreased strength/impaired balance

## 2020-02-11 NOTE — PHYSICAL THERAPY INITIAL EVALUATION ADULT - MANUAL MUSCLE TESTING RESULTS, REHAB EVAL
Both U.E. 3PLUS/5, Left L.E. 3/5, Right hip and knee 3minus-3/5
RUE at least 4+/5, Lt. UE at least 3+/5 (pt reports significant improvement). Rt. hip 3-/5 grossly, Lt. hip and knee 2+/5
RUE at least 4+/5, Lt. shoulder/elbow and wrist flexors 3+/5, wrist extension 1+/5, grasp 3+/5. b/l hip 2+/5, b/l knees 3/5

## 2020-02-11 NOTE — PHYSICAL THERAPY INITIAL EVALUATION ADULT - PRECAUTIONS/LIMITATIONS, REHAB EVAL
fall precautions/surgical precautions
fall precautions/surgical precautions
surgical precautions/fall precautions

## 2020-02-11 NOTE — PHYSICAL THERAPY INITIAL EVALUATION ADULT - REFERRAL TO ANOTHER SERVICE NEEDED, PT EVAL
occupational therapy/wound-care
Neurology for change in lt. arm function (improving) Pt. is lt. hand dominant

## 2020-02-11 NOTE — PHYSICAL THERAPY INITIAL EVALUATION ADULT - PERTINENT HX OF CURRENT PROBLEM, REHAB EVAL
s/p Rt. external iliac to popliteal PTFE bypass,  Rt. TMA--> Now Rt. AKA., Lt. Fem-pop.
patient was sent to ER due to dry gangrene of right foot. S/P iliac to poplitieal PTFE bypass
s/p Rt. external iliac to popliteal PTFE bypass, now POD #1 Rt. TMA

## 2020-02-11 NOTE — DISCHARGE NOTE NURSING/CASE MANAGEMENT/SOCIAL WORK - PATIENT PORTAL LINK FT
You can access the FollowMyHealth Patient Portal offered by [facility] by registering at the following website: [email or web address]. By joining [employer]’s FollowMyHealth portal, you will also be able to view your health information using other applications (apps) compatible with our system.

## 2020-02-11 NOTE — PROGRESS NOTE ADULT - SUBJECTIVE AND OBJECTIVE BOX
INTERVAL HPI/OVERNIGHT EVENTS:  Pt resting comfortably. No overnight events.   Denies numbness/tingling.    MEDICATIONS  (STANDING):  aspirin  chewable 81 milliGRAM(s) Oral daily  cefepime   IVPB 1000 milliGRAM(s) IV Intermittent every 12 hours  gabapentin 300 milliGRAM(s) Oral two times a day  heparin  Infusion. 500 Unit(s)/Hr (5 mL/Hr) IV Continuous <Continuous>  lactated ringers. 1000 milliLiter(s) (75 mL/Hr) IV Continuous <Continuous>  metoprolol tartrate 25 milliGRAM(s) Oral two times a day  risperiDONE   Tablet 0.5 milliGRAM(s) Oral two times a day  senna 2 Tablet(s) Oral at bedtime    MEDICATIONS  (PRN):  acetaminophen   Tablet .. 975 milliGRAM(s) Oral every 6 hours PRN Moderate Pain (4 - 6)  oxyCODONE    IR 5 milliGRAM(s) Oral every 4 hours PRN Severe Pain (7 - 10)  polyethylene glycol 3350 17 Gram(s) Oral daily PRN Constipation    ICU Vital Signs Last 24 Hrs  T(C): 37.2 (11 Feb 2020 05:22), Max: 37.2 (11 Feb 2020 05:22)  T(F): 99 (11 Feb 2020 05:22), Max: 99 (11 Feb 2020 05:22)  HR: 95 (11 Feb 2020 05:22) (83 - 95)  BP: 151/48 (11 Feb 2020 05:22) (106/54 - 151/48)  BP(mean): --  ABP: --  ABP(mean): --  RR: 16 (11 Feb 2020 05:22) (16 - 17)  SpO2: 97% (11 Feb 2020 05:22) (97% - 99%)      Physical:  General: NAD.  RLE: Warm, NVI, Stump wound clean, dry, approximated well. Staples intact.  LLE: Warm, dopplerable distal pulses. Stable ischemia of 3rd toe      Labs:                        9.8    8.70  )-----------( 279      ( 11 Feb 2020 07:29 )             31.9       02-10    138  |  104  |  17  ----------------------------<  94  4.0   |  30  |  0.70    Ca    8.8      10 Feb 2020 07:53  Phos  2.5     02-10  Mg     2.1     02-10

## 2020-02-11 NOTE — PROGRESS NOTE ADULT - ASSESSMENT
75y.o. Female s/p R AKA POD#5, L fem-pop bypass POD#7    -continue lovenox/coumadin  -Pain control prn  -plan for discharge to subacute rehab with Lovenox and coumadin

## 2020-02-11 NOTE — PHYSICAL THERAPY INITIAL EVALUATION ADULT - ACTIVE RANGE OF MOTION EXAMINATION, REHAB EVAL
bilateral upper extremity Active ROM was WFL (within functional limits)/Left LE Active ROM was WFL (within functional limits)
RUE WFL, MARLEEE WFL, Rt hip ~3/4 range, Lt. hip ~1/4 range and Lt. knee ~1/3 range. Lt. ankle to neutral dorsiflx
RUE WFL, LUE WFL except wrists extension, minimal motion, b/l hips ~1/5 range. Ankle to ~neutral

## 2020-02-11 NOTE — PROGRESS NOTE ADULT - SUBJECTIVE AND OBJECTIVE BOX
Source of information: MERARY MEYER, Chart review  Patient language: English  : n/a    HPI:  Patient is 74 year old female, live by herself, ambulates with cane  has medical hx significant for dementia, cad, pvd, hld, htn presents to the ED with complaint of discoloration for a toe on the right foot.  Per pt she has black colored rt 2nd toe. pt states she notice discoloration gradually over last 3 weeks associated with swelling and erythema, pt states she bumped in to something and hurt her right big toe but dose not remember when. Today she was seen by her primary care physician a few days ago and told her to go to the emergency room. Patient states she can only ambulate 10 feet and gets a cramp in her right calf for which she needs to sit down.  She states she is very minimally ambulatory.  She denies pain at rest with her feet elevated.  Patient lives alone with a cat. Patient massaging leg throughout the visit.  Patient denies fever, cp, sob, cough, n/v/d. Pt has urinary incontinence for many years. Patient states her blood pressure is not usually low. Patient states she smokes at least a pack a day of cigarettes since she was 22 years old.    Allergy: Sulfa and penicillin    Code status: Full code (10 Angel 2020 21:52)      Patient is a 75y old  Female who presents with a chief complaint of Rt toe gangrene. Pt is s/p POD #5 Rt AKA (on2/6). Pt seen and examined at bedside. Pt denies pain. Pt recently received Percocet. Appears sleepy, closes eyes during conversation. Denies nausea, vomiting, itchiness. Pt reports she does not recall when her last BM was. States she has been getting out of bed to chair with PT yesterday. Patient stated goal for pain control: to be able to take deep breaths, get out of bed to chair with tolerable pain control.     PAST MEDICAL & SURGICAL HISTORY:  Hypercholesterolemia  Myocardial infarct, old  S/P CABG x 1    Family hx: Non- contributory     Social History:  pt states she smokes 1ppd since she was 22  denies etoh or substance use (10 Angel 2020 21:52)    Allergies    influenza virus vaccine, live, trivalent (Unknown)  PC Pen VK (Rash)  penicillin (Other; Rash)  statins (Other)  sulfa drugs (Other)  sulfamethizole (Other)      MEDICATIONS  (STANDING):  aspirin  chewable 81 milliGRAM(s) Oral daily  cefepime   IVPB 1000 milliGRAM(s) IV Intermittent every 12 hours  enoxaparin Injectable 60 milliGRAM(s) SubCutaneous every 12 hours  gabapentin 300 milliGRAM(s) Oral two times a day  metoprolol tartrate 25 milliGRAM(s) Oral two times a day  polyethylene glycol 3350 17 Gram(s) Oral daily  risperiDONE   Tablet 0.5 milliGRAM(s) Oral two times a day  senna 2 Tablet(s) Oral at bedtime  warfarin 5 milliGRAM(s) Oral once    MEDICATIONS  (PRN):  acetaminophen   Tablet .. 975 milliGRAM(s) Oral every 6 hours PRN Moderate Pain (4 - 6)  oxyCODONE    IR 2.5 milliGRAM(s) Oral every 6 hours PRN Severe Pain (7 - 10)      Vital Signs Last 24 Hrs  T(C): 37.2 (11 Feb 2020 05:22), Max: 37.2 (11 Feb 2020 05:22)  T(F): 99 (11 Feb 2020 05:22), Max: 99 (11 Feb 2020 05:22)  HR: 95 (11 Feb 2020 05:22) (83 - 95)  BP: 151/48 (11 Feb 2020 05:22) (106/54 - 151/48)  BP(mean): --  RR: 16 (11 Feb 2020 05:22) (16 - 17)  SpO2: 97% (11 Feb 2020 05:22) (97% - 99%)    LABS: Reviewed                          9.8    8.70  )-----------( 279      ( 11 Feb 2020 07:29 )             31.9     02-10    138  |  104  |  17  ----------------------------<  94  4.0   |  30  |  0.70    Ca    8.8      10 Feb 2020 07:53  Phos  2.5     02-10  Mg     2.1     02-10      PT/INR - ( 10 Feb 2020 07:53 )   PT: 12.3 sec;   INR: 1.10 ratio         PTT - ( 10 Feb 2020 07:53 )  PTT:54.8 sec      REVIEW OF SYSTEMS:  CONSTITUTIONAL: Positive for sleepiness. No fever   RESPIRATORY: No cough, wheezing, chills or hemoptysis; No shortness of breath  CARDIOVASCULAR: No chest pain, palpitations, dizziness, or leg swelling  GASTROINTESTINAL: No abdominal or epigastric pain. No nausea, vomiting; No diarrhea or constipation.   MUSCULOSKELETAL: RT AKA   PSYCHIATRIC: No depression, anxiety, mood swings, or difficulty sleeping    PHYSICAL EXAM:  GENERAL:  Sleepy, Alert & Oriented X3, NAD  CHEST/LUNG: Clear to auscultation bilaterally; No rales, rhonchi, wheezing, or rubs  HEART: Regular rate and rhythm; No murmurs, rubs, or gallops  ABDOMEN: Soft, Nontender, Nondistended; Bowel sounds present  EXTREMITIES:  Rt AKA, dressing c/d/i, left 3rd toe black wound noted. LLE pulse +1, No cyanosis, or edema  SKIN: left 3rd toe black wound noted     Risk factors associated with adverse outcomes related to opioid treatment  [ ]  Concurrent benzodiazepine use  [ ]  History/ Active substance use or alcohol use disorder  [X ] Psychiatric co-morbidity  [ ] Sleep apnea  [X ] COPD  [ ] BMI> 35  [ ] Liver dysfunction  [ ] Renal dysfunction  [ ] CHF  [X ] Smoker  [X ]  Age > 60 years    [X ]  [place]  Reviewed and Copied to Chart. See below.    Plan of care and goal oriented pain management treatment options were discussed with patient and /or primary care giver; all questions and concerns were addressed and care was aligned with patient's wishes.    Educated patient on goal oriented pain management treatment options     02-11-20 @ 11:39

## 2020-02-11 NOTE — PHYSICAL THERAPY INITIAL EVALUATION ADULT - TRANSFER SAFETY CONCERNS NOTED: SIT/STAND, REHAB EVAL
decreased weight-shifting ability
decreased weight-shifting ability/decreased balance during turns/losing balance

## 2020-02-11 NOTE — PHYSICAL THERAPY INITIAL EVALUATION ADULT - PHYSICAL ASSIST/NONPHYSICAL ASSIST, REHAB EVAL
1 person assist
nonverbal cues (demo/gestures)/verbal cues/1 person assist
nonverbal cues (demo/gestures)/1 person assist/verbal cues

## 2020-02-11 NOTE — PHYSICAL THERAPY INITIAL EVALUATION ADULT - PHYSICAL ASSIST/NONPHYSICAL ASSIST: SUPINE/SIT, REHAB EVAL
1 person assist
nonverbal cues (demo/gestures)/verbal cues/1 person assist
verbal cues/nonverbal cues (demo/gestures)/1 person assist

## 2020-02-19 DIAGNOSIS — E43 UNSPECIFIED SEVERE PROTEIN-CALORIE MALNUTRITION: ICD-10-CM

## 2020-03-02 ENCOUNTER — APPOINTMENT (OUTPATIENT)
Dept: VASCULAR SURGERY | Facility: CLINIC | Age: 75
End: 2020-03-02
Payer: MEDICARE

## 2020-03-02 VITALS
HEART RATE: 68 BPM | DIASTOLIC BLOOD PRESSURE: 70 MMHG | OXYGEN SATURATION: 97 % | WEIGHT: 107 LBS | HEIGHT: 67 IN | SYSTOLIC BLOOD PRESSURE: 110 MMHG | BODY MASS INDEX: 16.79 KG/M2 | TEMPERATURE: 98 F

## 2020-03-02 PROCEDURE — 99024 POSTOP FOLLOW-UP VISIT: CPT

## 2020-06-15 NOTE — ADVANCED PRACTICE NURSE CONSULT - ASSESSMENT
Call from patient inquiring on appt with Diabetic Ed.     Attempted to transfer to scheduling to coordinate.     Patient due for a follow up DM Type 2.    Please contact patient.    This is a 74yr old female patient admitted for Gangrene, presenting with R. Foot Gangrene, to which the patient is being followed by Podiatry with a treatment plan in place to address this issue. There is no further need for wound care specialist consultation at this time. This is a 74yr old female patient admitted for Gangrene, presenting with R. Foot Gangrene, R. Knee and L. Lower Extremity Vascular Ulcers, to which the patient is being followed by Podiatry and Vascular with a treatment plan in place to address this issue. There is no further need for wound care specialist consultation at this time.

## 2021-01-01 ENCOUNTER — EMERGENCY (EMERGENCY)
Facility: HOSPITAL | Age: 76
LOS: 1 days | Discharge: HOME | End: 2021-01-01
Attending: EMERGENCY MEDICINE
Payer: MEDICARE

## 2021-01-01 ENCOUNTER — TRANSCRIPTION ENCOUNTER (OUTPATIENT)
Age: 76
End: 2021-01-01

## 2021-01-01 ENCOUNTER — INPATIENT (INPATIENT)
Facility: HOSPITAL | Age: 76
LOS: 19 days | DRG: 871 | End: 2021-07-18
Attending: INTERNAL MEDICINE | Admitting: INTERNAL MEDICINE
Payer: MEDICARE

## 2021-01-01 ENCOUNTER — NON-APPOINTMENT (OUTPATIENT)
Age: 76
End: 2021-01-01

## 2021-01-01 ENCOUNTER — INPATIENT (INPATIENT)
Facility: HOSPITAL | Age: 76
LOS: 3 days | Discharge: NURSING, REHAB OR LONG-TERM CARE | DRG: 871 | End: 2021-04-22
Attending: INTERNAL MEDICINE | Admitting: INTERNAL MEDICINE
Payer: MEDICARE

## 2021-01-01 VITALS
HEIGHT: 67 IN | WEIGHT: 199.96 LBS | DIASTOLIC BLOOD PRESSURE: 41 MMHG | TEMPERATURE: 98 F | HEART RATE: 86 BPM | OXYGEN SATURATION: 99 % | RESPIRATION RATE: 20 BRPM | SYSTOLIC BLOOD PRESSURE: 78 MMHG

## 2021-01-01 VITALS
SYSTOLIC BLOOD PRESSURE: 78 MMHG | HEIGHT: 67 IN | WEIGHT: 108.91 LBS | RESPIRATION RATE: 16 BRPM | DIASTOLIC BLOOD PRESSURE: 31 MMHG | HEART RATE: 114 BPM | OXYGEN SATURATION: 95 %

## 2021-01-01 VITALS
RESPIRATION RATE: 18 BRPM | WEIGHT: 145.06 LBS | SYSTOLIC BLOOD PRESSURE: 158 MMHG | HEIGHT: 67 IN | DIASTOLIC BLOOD PRESSURE: 77 MMHG | OXYGEN SATURATION: 97 % | HEART RATE: 65 BPM | TEMPERATURE: 98 F

## 2021-01-01 VITALS
OXYGEN SATURATION: 96 % | RESPIRATION RATE: 18 BRPM | DIASTOLIC BLOOD PRESSURE: 62 MMHG | SYSTOLIC BLOOD PRESSURE: 150 MMHG | HEART RATE: 79 BPM | TEMPERATURE: 98 F

## 2021-01-01 VITALS
SYSTOLIC BLOOD PRESSURE: 135 MMHG | TEMPERATURE: 98 F | DIASTOLIC BLOOD PRESSURE: 80 MMHG | HEART RATE: 70 BPM | OXYGEN SATURATION: 97 % | RESPIRATION RATE: 18 BRPM

## 2021-01-01 VITALS
DIASTOLIC BLOOD PRESSURE: 64 MMHG | HEART RATE: 87 BPM | SYSTOLIC BLOOD PRESSURE: 124 MMHG | RESPIRATION RATE: 18 BRPM | OXYGEN SATURATION: 98 %

## 2021-01-01 DIAGNOSIS — A41.9 SEPSIS, UNSPECIFIED ORGANISM: ICD-10-CM

## 2021-01-01 DIAGNOSIS — Z51.5 ENCOUNTER FOR PALLIATIVE CARE: ICD-10-CM

## 2021-01-01 DIAGNOSIS — K86.89 OTHER SPECIFIED DISEASES OF PANCREAS: ICD-10-CM

## 2021-01-01 DIAGNOSIS — E87.0 HYPEROSMOLALITY AND HYPERNATREMIA: ICD-10-CM

## 2021-01-01 DIAGNOSIS — Z95.1 PRESENCE OF AORTOCORONARY BYPASS GRAFT: Chronic | ICD-10-CM

## 2021-01-01 DIAGNOSIS — N39.0 URINARY TRACT INFECTION, SITE NOT SPECIFIED: ICD-10-CM

## 2021-01-01 DIAGNOSIS — S78.111A COMPLETE TRAUMATIC AMPUTATION AT LEVEL BETWEEN RIGHT HIP AND KNEE, INITIAL ENCOUNTER: Chronic | ICD-10-CM

## 2021-01-01 DIAGNOSIS — I95.9 HYPOTENSION, UNSPECIFIED: ICD-10-CM

## 2021-01-01 DIAGNOSIS — R53.2 FUNCTIONAL QUADRIPLEGIA: ICD-10-CM

## 2021-01-01 DIAGNOSIS — K81.0 ACUTE CHOLECYSTITIS: ICD-10-CM

## 2021-01-01 DIAGNOSIS — R63.0 ANOREXIA: ICD-10-CM

## 2021-01-01 DIAGNOSIS — F31.9 BIPOLAR DISORDER, UNSPECIFIED: ICD-10-CM

## 2021-01-01 DIAGNOSIS — E43 UNSPECIFIED SEVERE PROTEIN-CALORIE MALNUTRITION: ICD-10-CM

## 2021-01-01 DIAGNOSIS — J90 PLEURAL EFFUSION, NOT ELSEWHERE CLASSIFIED: ICD-10-CM

## 2021-01-01 DIAGNOSIS — F03.90 UNSPECIFIED DEMENTIA, UNSPECIFIED SEVERITY, WITHOUT BEHAVIORAL DISTURBANCE, PSYCHOTIC DISTURBANCE, MOOD DISTURBANCE, AND ANXIETY: ICD-10-CM

## 2021-01-01 DIAGNOSIS — M79.675 PAIN IN LEFT TOE(S): ICD-10-CM

## 2021-01-01 DIAGNOSIS — R79.1 ABNORMAL COAGULATION PROFILE: ICD-10-CM

## 2021-01-01 DIAGNOSIS — N17.9 ACUTE KIDNEY FAILURE, UNSPECIFIED: ICD-10-CM

## 2021-01-01 DIAGNOSIS — I73.9 PERIPHERAL VASCULAR DISEASE, UNSPECIFIED: ICD-10-CM

## 2021-01-01 DIAGNOSIS — Z29.9 ENCOUNTER FOR PROPHYLACTIC MEASURES, UNSPECIFIED: ICD-10-CM

## 2021-01-01 DIAGNOSIS — J44.9 CHRONIC OBSTRUCTIVE PULMONARY DISEASE, UNSPECIFIED: ICD-10-CM

## 2021-01-01 DIAGNOSIS — F10.20 ALCOHOL DEPENDENCE, UNCOMPLICATED: ICD-10-CM

## 2021-01-01 DIAGNOSIS — I48.0 PAROXYSMAL ATRIAL FIBRILLATION: ICD-10-CM

## 2021-01-01 DIAGNOSIS — I10 ESSENTIAL (PRIMARY) HYPERTENSION: ICD-10-CM

## 2021-01-01 DIAGNOSIS — Z71.89 OTHER SPECIFIED COUNSELING: ICD-10-CM

## 2021-01-01 DIAGNOSIS — Z02.9 ENCOUNTER FOR ADMINISTRATIVE EXAMINATIONS, UNSPECIFIED: ICD-10-CM

## 2021-01-01 DIAGNOSIS — G93.40 ENCEPHALOPATHY, UNSPECIFIED: ICD-10-CM

## 2021-01-01 LAB
-  AMIKACIN: SIGNIFICANT CHANGE UP
-  AMOXICILLIN/CLAVULANIC ACID: SIGNIFICANT CHANGE UP
-  AMPICILLIN/SULBACTAM: SIGNIFICANT CHANGE UP
-  AMPICILLIN: SIGNIFICANT CHANGE UP
-  AZTREONAM: SIGNIFICANT CHANGE UP
-  CEFAZOLIN: SIGNIFICANT CHANGE UP
-  CEFEPIME: SIGNIFICANT CHANGE UP
-  CEFOXITIN: SIGNIFICANT CHANGE UP
-  CEFTRIAXONE: SIGNIFICANT CHANGE UP
-  CIPROFLOXACIN: SIGNIFICANT CHANGE UP
-  ERTAPENEM: SIGNIFICANT CHANGE UP
-  GENTAMICIN: SIGNIFICANT CHANGE UP
-  IMIPENEM: SIGNIFICANT CHANGE UP
-  LEVOFLOXACIN: SIGNIFICANT CHANGE UP
-  MEROPENEM: SIGNIFICANT CHANGE UP
-  PIPERACILLIN/TAZOBACTAM: SIGNIFICANT CHANGE UP
-  TIGECYCLINE: SIGNIFICANT CHANGE UP
-  TOBRAMYCIN: SIGNIFICANT CHANGE UP
-  TRIMETHOPRIM/SULFAMETHOXAZOLE: SIGNIFICANT CHANGE UP
A1C WITH ESTIMATED AVERAGE GLUCOSE RESULT: 6.4 % — HIGH (ref 4–5.6)
ACANTHOCYTES BLD QL SMEAR: SLIGHT — SIGNIFICANT CHANGE UP
ACETONE SERPL-MCNC: ABNORMAL
ACETONE, GCMS SCREEN URN: SIGNIFICANT CHANGE UP
ALBUMIN SERPL ELPH-MCNC: 1.8 G/DL — LOW (ref 3.5–5)
ALBUMIN SERPL ELPH-MCNC: 2 G/DL — LOW (ref 3.5–5)
ALBUMIN SERPL ELPH-MCNC: 2.1 G/DL — LOW (ref 3.5–5)
ALBUMIN SERPL ELPH-MCNC: 2.2 G/DL — LOW (ref 3.5–5)
ALBUMIN SERPL ELPH-MCNC: 2.3 G/DL — LOW (ref 3.5–5)
ALBUMIN SERPL ELPH-MCNC: 2.4 G/DL — LOW (ref 3.5–5)
ALBUMIN SERPL ELPH-MCNC: 2.5 G/DL — LOW (ref 3.5–5)
ALBUMIN SERPL ELPH-MCNC: 2.6 G/DL — LOW (ref 3.5–5)
ALBUMIN SERPL ELPH-MCNC: 2.7 G/DL — LOW (ref 3.5–5)
ALBUMIN SERPL ELPH-MCNC: 2.7 G/DL — LOW (ref 3.5–5)
ALBUMIN SERPL ELPH-MCNC: 3.3 G/DL — LOW (ref 3.5–5)
ALBUMIN SERPL ELPH-MCNC: 3.4 G/DL — LOW (ref 3.5–5)
ALP SERPL-CCNC: 105 U/L — SIGNIFICANT CHANGE UP (ref 40–120)
ALP SERPL-CCNC: 106 U/L — SIGNIFICANT CHANGE UP (ref 40–120)
ALP SERPL-CCNC: 111 U/L — SIGNIFICANT CHANGE UP (ref 40–120)
ALP SERPL-CCNC: 113 U/L — SIGNIFICANT CHANGE UP (ref 40–120)
ALP SERPL-CCNC: 117 U/L — SIGNIFICANT CHANGE UP (ref 40–120)
ALP SERPL-CCNC: 132 U/L — HIGH (ref 40–120)
ALP SERPL-CCNC: 167 U/L — HIGH (ref 40–120)
ALP SERPL-CCNC: 173 U/L — HIGH (ref 40–120)
ALP SERPL-CCNC: 179 U/L — HIGH (ref 40–120)
ALP SERPL-CCNC: 181 U/L — HIGH (ref 40–120)
ALP SERPL-CCNC: 188 U/L — HIGH (ref 40–120)
ALP SERPL-CCNC: 205 U/L — HIGH (ref 40–120)
ALP SERPL-CCNC: 61 U/L — SIGNIFICANT CHANGE UP (ref 40–120)
ALP SERPL-CCNC: 76 U/L — SIGNIFICANT CHANGE UP (ref 40–120)
ALP SERPL-CCNC: 83 U/L — SIGNIFICANT CHANGE UP (ref 40–120)
ALP SERPL-CCNC: 88 U/L — SIGNIFICANT CHANGE UP (ref 40–120)
ALP SERPL-CCNC: 88 U/L — SIGNIFICANT CHANGE UP (ref 40–120)
ALP SERPL-CCNC: 93 U/L — SIGNIFICANT CHANGE UP (ref 40–120)
ALP SERPL-CCNC: 99 U/L — SIGNIFICANT CHANGE UP (ref 40–120)
ALT FLD-CCNC: 13 U/L DA — SIGNIFICANT CHANGE UP (ref 10–60)
ALT FLD-CCNC: 14 U/L DA — SIGNIFICANT CHANGE UP (ref 10–60)
ALT FLD-CCNC: 15 U/L DA — SIGNIFICANT CHANGE UP (ref 10–60)
ALT FLD-CCNC: 16 U/L DA — SIGNIFICANT CHANGE UP (ref 10–60)
ALT FLD-CCNC: 17 U/L DA — SIGNIFICANT CHANGE UP (ref 10–60)
ALT FLD-CCNC: 17 U/L DA — SIGNIFICANT CHANGE UP (ref 10–60)
ALT FLD-CCNC: 20 U/L DA — SIGNIFICANT CHANGE UP (ref 10–60)
ALT FLD-CCNC: 21 U/L DA — SIGNIFICANT CHANGE UP (ref 10–60)
ALT FLD-CCNC: 22 U/L DA — SIGNIFICANT CHANGE UP (ref 10–60)
ALT FLD-CCNC: 25 U/L DA — SIGNIFICANT CHANGE UP (ref 10–60)
ALT FLD-CCNC: 28 U/L DA — SIGNIFICANT CHANGE UP (ref 10–60)
ALT FLD-CCNC: 29 U/L DA — SIGNIFICANT CHANGE UP (ref 10–60)
ALT FLD-CCNC: 32 U/L DA — SIGNIFICANT CHANGE UP (ref 10–60)
ALT FLD-CCNC: 34 U/L DA — SIGNIFICANT CHANGE UP (ref 10–60)
ALT FLD-CCNC: 34 U/L DA — SIGNIFICANT CHANGE UP (ref 10–60)
ALT FLD-CCNC: 35 U/L DA — SIGNIFICANT CHANGE UP (ref 10–60)
ALT FLD-CCNC: 39 U/L DA — SIGNIFICANT CHANGE UP (ref 10–60)
ALT FLD-CCNC: 43 U/L DA — SIGNIFICANT CHANGE UP (ref 10–60)
ALT FLD-CCNC: 65 U/L DA — HIGH (ref 10–60)
AMYLASE P1 CFR SERPL: 133 U/L — HIGH (ref 25–115)
ANION GAP SERPL CALC-SCNC: 10 MMOL/L — SIGNIFICANT CHANGE UP (ref 5–17)
ANION GAP SERPL CALC-SCNC: 11 MMOL/L — SIGNIFICANT CHANGE UP (ref 5–17)
ANION GAP SERPL CALC-SCNC: 12 MMOL/L — SIGNIFICANT CHANGE UP (ref 5–17)
ANION GAP SERPL CALC-SCNC: 13 MMOL/L — SIGNIFICANT CHANGE UP (ref 5–17)
ANION GAP SERPL CALC-SCNC: 13 MMOL/L — SIGNIFICANT CHANGE UP (ref 5–17)
ANION GAP SERPL CALC-SCNC: 14 MMOL/L — SIGNIFICANT CHANGE UP (ref 5–17)
ANION GAP SERPL CALC-SCNC: 15 MMOL/L — SIGNIFICANT CHANGE UP (ref 5–17)
ANION GAP SERPL CALC-SCNC: 15 MMOL/L — SIGNIFICANT CHANGE UP (ref 5–17)
ANION GAP SERPL CALC-SCNC: 16 MMOL/L — SIGNIFICANT CHANGE UP (ref 5–17)
ANION GAP SERPL CALC-SCNC: 17 MMOL/L — SIGNIFICANT CHANGE UP (ref 5–17)
ANION GAP SERPL CALC-SCNC: 19 MMOL/L — HIGH (ref 5–17)
ANION GAP SERPL CALC-SCNC: 6 MMOL/L — SIGNIFICANT CHANGE UP (ref 5–17)
ANION GAP SERPL CALC-SCNC: 7 MMOL/L — SIGNIFICANT CHANGE UP (ref 5–17)
ANION GAP SERPL CALC-SCNC: 9 MMOL/L — SIGNIFICANT CHANGE UP (ref 5–17)
ANISOCYTOSIS BLD QL: SLIGHT — SIGNIFICANT CHANGE UP
ANISOCYTOSIS BLD QL: SLIGHT — SIGNIFICANT CHANGE UP
APAP SERPL-MCNC: <2 UG/ML — LOW (ref 10–30)
APPEARANCE UR: ABNORMAL
APPEARANCE UR: CLEAR — SIGNIFICANT CHANGE UP
APTT BLD: 36 SEC — HIGH (ref 27.5–35.5)
APTT BLD: 37.8 SEC — HIGH (ref 27.5–35.5)
APTT BLD: 38.6 SEC — HIGH (ref 27.5–35.5)
APTT BLD: 43.7 SEC — HIGH (ref 27.5–35.5)
APTT BLD: 44.8 SEC — HIGH (ref 27.5–35.5)
APTT BLD: 45.6 SEC — HIGH (ref 27.5–35.5)
APTT BLD: 52.8 SEC — HIGH (ref 27.5–35.5)
APTT BLD: 58.4 SEC — HIGH (ref 27.5–35.5)
AST SERPL-CCNC: 13 U/L — SIGNIFICANT CHANGE UP (ref 10–40)
AST SERPL-CCNC: 14 U/L — SIGNIFICANT CHANGE UP (ref 10–40)
AST SERPL-CCNC: 15 U/L — SIGNIFICANT CHANGE UP (ref 10–40)
AST SERPL-CCNC: 17 U/L — SIGNIFICANT CHANGE UP (ref 10–40)
AST SERPL-CCNC: 18 U/L — SIGNIFICANT CHANGE UP (ref 10–40)
AST SERPL-CCNC: 20 U/L — SIGNIFICANT CHANGE UP (ref 10–40)
AST SERPL-CCNC: 22 U/L — SIGNIFICANT CHANGE UP (ref 10–40)
AST SERPL-CCNC: 23 U/L — SIGNIFICANT CHANGE UP (ref 10–40)
AST SERPL-CCNC: 25 U/L — SIGNIFICANT CHANGE UP (ref 10–40)
AST SERPL-CCNC: 28 U/L — SIGNIFICANT CHANGE UP (ref 10–40)
AST SERPL-CCNC: 30 U/L — SIGNIFICANT CHANGE UP (ref 10–40)
AST SERPL-CCNC: 37 U/L — SIGNIFICANT CHANGE UP (ref 10–40)
AST SERPL-CCNC: 39 U/L — SIGNIFICANT CHANGE UP (ref 10–40)
AST SERPL-CCNC: 40 U/L — SIGNIFICANT CHANGE UP (ref 10–40)
AST SERPL-CCNC: 41 U/L — HIGH (ref 10–40)
AST SERPL-CCNC: 54 U/L — HIGH (ref 10–40)
AST SERPL-CCNC: 56 U/L — HIGH (ref 10–40)
BACTERIA # UR AUTO: ABNORMAL /HPF
BASE EXCESS BLDA CALC-SCNC: -11.3 MMOL/L — LOW (ref -2–3)
BASE EXCESS BLDA CALC-SCNC: -16.2 MMOL/L — LOW (ref -2–3)
BASE EXCESS BLDA CALC-SCNC: -2.8 MMOL/L — LOW (ref -2–3)
BASE EXCESS BLDV CALC-SCNC: -15.3 MMOL/L — SIGNIFICANT CHANGE UP
BASE EXCESS BLDV CALC-SCNC: -16.6 MMOL/L — SIGNIFICANT CHANGE UP
BASOPHILS # BLD AUTO: 0 K/UL — SIGNIFICANT CHANGE UP (ref 0–0.2)
BASOPHILS # BLD AUTO: 0.01 K/UL — SIGNIFICANT CHANGE UP (ref 0–0.2)
BASOPHILS # BLD AUTO: 0.02 K/UL — SIGNIFICANT CHANGE UP (ref 0–0.2)
BASOPHILS # BLD AUTO: 0.02 K/UL — SIGNIFICANT CHANGE UP (ref 0–0.2)
BASOPHILS # BLD AUTO: 0.03 K/UL — SIGNIFICANT CHANGE UP (ref 0–0.2)
BASOPHILS # BLD AUTO: 0.05 K/UL — SIGNIFICANT CHANGE UP (ref 0–0.2)
BASOPHILS # BLD AUTO: 0.1 K/UL — SIGNIFICANT CHANGE UP (ref 0–0.2)
BASOPHILS NFR BLD AUTO: 0 % — SIGNIFICANT CHANGE UP (ref 0–2)
BASOPHILS NFR BLD AUTO: 0.1 % — SIGNIFICANT CHANGE UP (ref 0–2)
BASOPHILS NFR BLD AUTO: 0.3 % — SIGNIFICANT CHANGE UP (ref 0–2)
BASOPHILS NFR BLD AUTO: 0.4 % — SIGNIFICANT CHANGE UP (ref 0–2)
BASOPHILS NFR BLD AUTO: 0.4 % — SIGNIFICANT CHANGE UP (ref 0–2)
BASOPHILS NFR BLD AUTO: 0.6 % — SIGNIFICANT CHANGE UP (ref 0–2)
BASOPHILS NFR BLD AUTO: 1.3 % — SIGNIFICANT CHANGE UP (ref 0–2)
BILIRUB SERPL-MCNC: 0.3 MG/DL — SIGNIFICANT CHANGE UP (ref 0.2–1.2)
BILIRUB SERPL-MCNC: 0.4 MG/DL — SIGNIFICANT CHANGE UP (ref 0.2–1.2)
BILIRUB SERPL-MCNC: 0.4 MG/DL — SIGNIFICANT CHANGE UP (ref 0.2–1.2)
BILIRUB SERPL-MCNC: 0.5 MG/DL — SIGNIFICANT CHANGE UP (ref 0.2–1.2)
BILIRUB SERPL-MCNC: 0.6 MG/DL — SIGNIFICANT CHANGE UP (ref 0.2–1.2)
BILIRUB SERPL-MCNC: 0.7 MG/DL — SIGNIFICANT CHANGE UP (ref 0.2–1.2)
BILIRUB SERPL-MCNC: 0.8 MG/DL — SIGNIFICANT CHANGE UP (ref 0.2–1.2)
BILIRUB SERPL-MCNC: 0.9 MG/DL — SIGNIFICANT CHANGE UP (ref 0.2–1.2)
BILIRUB SERPL-MCNC: 1.1 MG/DL — SIGNIFICANT CHANGE UP (ref 0.2–1.2)
BILIRUB SERPL-MCNC: 1.2 MG/DL — SIGNIFICANT CHANGE UP (ref 0.2–1.2)
BILIRUB SERPL-MCNC: 1.9 MG/DL — HIGH (ref 0.2–1.2)
BILIRUB SERPL-MCNC: 9 MG/DL — HIGH (ref 0.2–1.2)
BILIRUB UR-MCNC: NEGATIVE — SIGNIFICANT CHANGE UP
BILIRUB UR-MCNC: NEGATIVE — SIGNIFICANT CHANGE UP
BLD GP AB SCN SERPL QL: SIGNIFICANT CHANGE UP
BLOOD GAS COMMENTS ARTERIAL: SIGNIFICANT CHANGE UP
BUN SERPL-MCNC: 23 MG/DL — HIGH (ref 7–18)
BUN SERPL-MCNC: 23 MG/DL — HIGH (ref 7–18)
BUN SERPL-MCNC: 26 MG/DL — HIGH (ref 7–18)
BUN SERPL-MCNC: 26 MG/DL — HIGH (ref 7–18)
BUN SERPL-MCNC: 30 MG/DL — HIGH (ref 7–18)
BUN SERPL-MCNC: 38 MG/DL — HIGH (ref 7–18)
BUN SERPL-MCNC: 49 MG/DL — HIGH (ref 7–18)
BUN SERPL-MCNC: 51 MG/DL — HIGH (ref 7–18)
BUN SERPL-MCNC: 54 MG/DL — HIGH (ref 7–18)
BUN SERPL-MCNC: 54 MG/DL — HIGH (ref 7–18)
BUN SERPL-MCNC: 56 MG/DL — HIGH (ref 7–18)
BUN SERPL-MCNC: 56 MG/DL — HIGH (ref 7–18)
BUN SERPL-MCNC: 57 MG/DL — HIGH (ref 7–18)
BUN SERPL-MCNC: 58 MG/DL — HIGH (ref 7–18)
BUN SERPL-MCNC: 60 MG/DL — HIGH (ref 7–18)
BUN SERPL-MCNC: 61 MG/DL — HIGH (ref 7–18)
BUN SERPL-MCNC: 65 MG/DL — HIGH (ref 7–18)
BUN SERPL-MCNC: 65 MG/DL — HIGH (ref 7–18)
BUN SERPL-MCNC: 68 MG/DL — HIGH (ref 7–18)
BUN SERPL-MCNC: 70 MG/DL — HIGH (ref 7–18)
BUN SERPL-MCNC: 78 MG/DL — HIGH (ref 7–18)
CALCIUM SERPL-MCNC: 7.1 MG/DL — LOW (ref 8.4–10.5)
CALCIUM SERPL-MCNC: 7.4 MG/DL — LOW (ref 8.4–10.5)
CALCIUM SERPL-MCNC: 7.5 MG/DL — LOW (ref 8.4–10.5)
CALCIUM SERPL-MCNC: 7.6 MG/DL — LOW (ref 8.4–10.5)
CALCIUM SERPL-MCNC: 7.7 MG/DL — LOW (ref 8.4–10.5)
CALCIUM SERPL-MCNC: 7.7 MG/DL — LOW (ref 8.4–10.5)
CALCIUM SERPL-MCNC: 7.9 MG/DL — LOW (ref 8.4–10.5)
CALCIUM SERPL-MCNC: 8 MG/DL — LOW (ref 8.4–10.5)
CALCIUM SERPL-MCNC: 8.1 MG/DL — LOW (ref 8.4–10.5)
CALCIUM SERPL-MCNC: 8.4 MG/DL — SIGNIFICANT CHANGE UP (ref 8.4–10.5)
CALCIUM SERPL-MCNC: 8.4 MG/DL — SIGNIFICANT CHANGE UP (ref 8.4–10.5)
CALCIUM SERPL-MCNC: 8.6 MG/DL — SIGNIFICANT CHANGE UP (ref 8.4–10.5)
CALCIUM SERPL-MCNC: 8.7 MG/DL — SIGNIFICANT CHANGE UP (ref 8.4–10.5)
CALCIUM SERPL-MCNC: 8.8 MG/DL — SIGNIFICANT CHANGE UP (ref 8.4–10.5)
CALCIUM SERPL-MCNC: 8.8 MG/DL — SIGNIFICANT CHANGE UP (ref 8.4–10.5)
CALCIUM SERPL-MCNC: 8.9 MG/DL — SIGNIFICANT CHANGE UP (ref 8.4–10.5)
CALCIUM SERPL-MCNC: 8.9 MG/DL — SIGNIFICANT CHANGE UP (ref 8.4–10.5)
CALCIUM SERPL-MCNC: 9 MG/DL — SIGNIFICANT CHANGE UP (ref 8.4–10.5)
CALCIUM SERPL-MCNC: 9.1 MG/DL — SIGNIFICANT CHANGE UP (ref 8.4–10.5)
CALCIUM SERPL-MCNC: 9.5 MG/DL — SIGNIFICANT CHANGE UP (ref 8.4–10.5)
CANCER AG19-9 SERPL-ACNC: 5693 U/ML — HIGH
CHLORIDE SERPL-SCNC: 105 MMOL/L — SIGNIFICANT CHANGE UP (ref 96–108)
CHLORIDE SERPL-SCNC: 107 MMOL/L — SIGNIFICANT CHANGE UP (ref 96–108)
CHLORIDE SERPL-SCNC: 109 MMOL/L — HIGH (ref 96–108)
CHLORIDE SERPL-SCNC: 110 MMOL/L — HIGH (ref 96–108)
CHLORIDE SERPL-SCNC: 111 MMOL/L — HIGH (ref 96–108)
CHLORIDE SERPL-SCNC: 111 MMOL/L — HIGH (ref 96–108)
CHLORIDE SERPL-SCNC: 112 MMOL/L — HIGH (ref 96–108)
CHLORIDE SERPL-SCNC: 114 MMOL/L — HIGH (ref 96–108)
CHLORIDE SERPL-SCNC: 114 MMOL/L — HIGH (ref 96–108)
CHLORIDE SERPL-SCNC: 115 MMOL/L — HIGH (ref 96–108)
CHLORIDE SERPL-SCNC: 116 MMOL/L — HIGH (ref 96–108)
CHLORIDE SERPL-SCNC: 117 MMOL/L — HIGH (ref 96–108)
CHLORIDE SERPL-SCNC: 117 MMOL/L — HIGH (ref 96–108)
CHLORIDE SERPL-SCNC: 119 MMOL/L — HIGH (ref 96–108)
CHLORIDE SERPL-SCNC: 135 MMOL/L — HIGH (ref 96–108)
CHLORIDE UR-SCNC: 53 MMOL/L — SIGNIFICANT CHANGE UP
CHOLEST SERPL-MCNC: 140 MG/DL — SIGNIFICANT CHANGE UP
CK MB BLD-MCNC: 1.6 % — SIGNIFICANT CHANGE UP (ref 0–3.5)
CK MB CFR SERPL CALC: 3.5 NG/ML — SIGNIFICANT CHANGE UP (ref 0–3.6)
CK SERPL-CCNC: 113 U/L — SIGNIFICANT CHANGE UP (ref 21–215)
CK SERPL-CCNC: 218 U/L — HIGH (ref 21–215)
CO2 SERPL-SCNC: 11 MMOL/L — LOW (ref 22–31)
CO2 SERPL-SCNC: 11 MMOL/L — LOW (ref 22–31)
CO2 SERPL-SCNC: 12 MMOL/L — LOW (ref 22–31)
CO2 SERPL-SCNC: 15 MMOL/L — LOW (ref 22–31)
CO2 SERPL-SCNC: 16 MMOL/L — LOW (ref 22–31)
CO2 SERPL-SCNC: 17 MMOL/L — LOW (ref 22–31)
CO2 SERPL-SCNC: 19 MMOL/L — LOW (ref 22–31)
CO2 SERPL-SCNC: 20 MMOL/L — LOW (ref 22–31)
CO2 SERPL-SCNC: 21 MMOL/L — LOW (ref 22–31)
CO2 SERPL-SCNC: 23 MMOL/L — SIGNIFICANT CHANGE UP (ref 22–31)
CO2 SERPL-SCNC: 23 MMOL/L — SIGNIFICANT CHANGE UP (ref 22–31)
CO2 SERPL-SCNC: 24 MMOL/L — SIGNIFICANT CHANGE UP (ref 22–31)
CO2 SERPL-SCNC: 27 MMOL/L — SIGNIFICANT CHANGE UP (ref 22–31)
COLOR SPEC: YELLOW — SIGNIFICANT CHANGE UP
COLOR SPEC: YELLOW — SIGNIFICANT CHANGE UP
COMMENT - URINE: SIGNIFICANT CHANGE UP
COVID-19 SPIKE DOMAIN AB INTERP: POSITIVE
COVID-19 SPIKE DOMAIN AB INTERP: POSITIVE
COVID-19 SPIKE DOMAIN ANTIBODY RESULT: >250 U/ML — HIGH
COVID-19 SPIKE DOMAIN ANTIBODY RESULT: >250 U/ML — HIGH
CREAT ?TM UR-MCNC: 44 MG/DL — SIGNIFICANT CHANGE UP
CREAT ?TM UR-MCNC: 87 MG/DL — SIGNIFICANT CHANGE UP
CREAT SERPL-MCNC: 0.79 MG/DL — SIGNIFICANT CHANGE UP (ref 0.5–1.3)
CREAT SERPL-MCNC: 0.84 MG/DL — SIGNIFICANT CHANGE UP (ref 0.5–1.3)
CREAT SERPL-MCNC: 0.93 MG/DL — SIGNIFICANT CHANGE UP (ref 0.5–1.3)
CREAT SERPL-MCNC: 1.05 MG/DL — SIGNIFICANT CHANGE UP (ref 0.5–1.3)
CREAT SERPL-MCNC: 1.29 MG/DL — SIGNIFICANT CHANGE UP (ref 0.5–1.3)
CREAT SERPL-MCNC: 1.59 MG/DL — HIGH (ref 0.5–1.3)
CREAT SERPL-MCNC: 1.73 MG/DL — HIGH (ref 0.5–1.3)
CREAT SERPL-MCNC: 1.95 MG/DL — HIGH (ref 0.5–1.3)
CREAT SERPL-MCNC: 2.01 MG/DL — HIGH (ref 0.5–1.3)
CREAT SERPL-MCNC: 2.15 MG/DL — HIGH (ref 0.5–1.3)
CREAT SERPL-MCNC: 2.43 MG/DL — HIGH (ref 0.5–1.3)
CREAT SERPL-MCNC: 2.65 MG/DL — HIGH (ref 0.5–1.3)
CREAT SERPL-MCNC: 2.94 MG/DL — HIGH (ref 0.5–1.3)
CREAT SERPL-MCNC: 2.99 MG/DL — HIGH (ref 0.5–1.3)
CREAT SERPL-MCNC: 3.8 MG/DL — HIGH (ref 0.5–1.3)
CREAT SERPL-MCNC: 4.46 MG/DL — HIGH (ref 0.5–1.3)
CREAT SERPL-MCNC: 4.94 MG/DL — HIGH (ref 0.5–1.3)
CREAT SERPL-MCNC: 5.02 MG/DL — HIGH (ref 0.5–1.3)
CREAT SERPL-MCNC: 5.08 MG/DL — HIGH (ref 0.5–1.3)
CREAT SERPL-MCNC: 5.24 MG/DL — HIGH (ref 0.5–1.3)
CREAT SERPL-MCNC: 5.31 MG/DL — HIGH (ref 0.5–1.3)
CREAT SERPL-MCNC: 5.81 MG/DL — HIGH (ref 0.5–1.3)
CREAT SERPL-MCNC: 6.16 MG/DL — HIGH (ref 0.5–1.3)
CULTURE RESULTS: SIGNIFICANT CHANGE UP
DIFF PNL FLD: ABNORMAL
DIFF PNL FLD: ABNORMAL
ELLIPTOCYTES BLD QL SMEAR: SLIGHT — SIGNIFICANT CHANGE UP
EOSINOPHIL # BLD AUTO: 0 K/UL — SIGNIFICANT CHANGE UP (ref 0–0.5)
EOSINOPHIL # BLD AUTO: 0.01 K/UL — SIGNIFICANT CHANGE UP (ref 0–0.5)
EOSINOPHIL # BLD AUTO: 0.01 K/UL — SIGNIFICANT CHANGE UP (ref 0–0.5)
EOSINOPHIL # BLD AUTO: 0.04 K/UL — SIGNIFICANT CHANGE UP (ref 0–0.5)
EOSINOPHIL # BLD AUTO: 0.17 K/UL — SIGNIFICANT CHANGE UP (ref 0–0.5)
EOSINOPHIL NFR BLD AUTO: 0 % — SIGNIFICANT CHANGE UP (ref 0–6)
EOSINOPHIL NFR BLD AUTO: 0.1 % — SIGNIFICANT CHANGE UP (ref 0–6)
EOSINOPHIL NFR BLD AUTO: 0.1 % — SIGNIFICANT CHANGE UP (ref 0–6)
EOSINOPHIL NFR BLD AUTO: 0.5 % — SIGNIFICANT CHANGE UP (ref 0–6)
EOSINOPHIL NFR BLD AUTO: 2.1 % — SIGNIFICANT CHANGE UP (ref 0–6)
EPI CELLS # UR: SIGNIFICANT CHANGE UP /HPF
ESTIMATED AVERAGE GLUCOSE: 137 MG/DL — HIGH (ref 68–114)
FERRITIN SERPL-MCNC: 487 NG/ML — HIGH (ref 15–150)
FOLATE SERPL-MCNC: 17.1 NG/ML — SIGNIFICANT CHANGE UP
GABAPENTIN SERPLBLD-MCNC: 25.5 UG/ML — HIGH (ref 4–16)
GLUCOSE BLDC GLUCOMTR-MCNC: 101 MG/DL — HIGH (ref 70–99)
GLUCOSE BLDC GLUCOMTR-MCNC: 124 MG/DL — HIGH (ref 70–99)
GLUCOSE BLDC GLUCOMTR-MCNC: 125 MG/DL — HIGH (ref 70–99)
GLUCOSE BLDC GLUCOMTR-MCNC: 135 MG/DL — HIGH (ref 70–99)
GLUCOSE SERPL-MCNC: 100 MG/DL — HIGH (ref 70–99)
GLUCOSE SERPL-MCNC: 103 MG/DL — HIGH (ref 70–99)
GLUCOSE SERPL-MCNC: 105 MG/DL — HIGH (ref 70–99)
GLUCOSE SERPL-MCNC: 111 MG/DL — HIGH (ref 70–99)
GLUCOSE SERPL-MCNC: 116 MG/DL — HIGH (ref 70–99)
GLUCOSE SERPL-MCNC: 116 MG/DL — HIGH (ref 70–99)
GLUCOSE SERPL-MCNC: 131 MG/DL — HIGH (ref 70–99)
GLUCOSE SERPL-MCNC: 140 MG/DL — HIGH (ref 70–99)
GLUCOSE SERPL-MCNC: 146 MG/DL — HIGH (ref 70–99)
GLUCOSE SERPL-MCNC: 152 MG/DL — HIGH (ref 70–99)
GLUCOSE SERPL-MCNC: 155 MG/DL — HIGH (ref 70–99)
GLUCOSE SERPL-MCNC: 187 MG/DL — HIGH (ref 70–99)
GLUCOSE SERPL-MCNC: 190 MG/DL — HIGH (ref 70–99)
GLUCOSE SERPL-MCNC: 198 MG/DL — HIGH (ref 70–99)
GLUCOSE SERPL-MCNC: 214 MG/DL — HIGH (ref 70–99)
GLUCOSE SERPL-MCNC: 226 MG/DL — HIGH (ref 70–99)
GLUCOSE SERPL-MCNC: 242 MG/DL — HIGH (ref 70–99)
GLUCOSE SERPL-MCNC: 252 MG/DL — HIGH (ref 70–99)
GLUCOSE SERPL-MCNC: 280 MG/DL — HIGH (ref 70–99)
GLUCOSE SERPL-MCNC: 284 MG/DL — HIGH (ref 70–99)
GLUCOSE SERPL-MCNC: 285 MG/DL — HIGH (ref 70–99)
GLUCOSE SERPL-MCNC: 79 MG/DL — SIGNIFICANT CHANGE UP (ref 70–99)
GLUCOSE SERPL-MCNC: 98 MG/DL — SIGNIFICANT CHANGE UP (ref 70–99)
GLUCOSE UR QL: NEGATIVE — SIGNIFICANT CHANGE UP
GLUCOSE UR QL: NEGATIVE — SIGNIFICANT CHANGE UP
GRAM STN FLD: SIGNIFICANT CHANGE UP
HCO3 BLDA-SCNC: 10 MMOL/L — CRITICAL LOW (ref 21–28)
HCO3 BLDA-SCNC: 14 MMOL/L — LOW (ref 21–28)
HCO3 BLDA-SCNC: 21 MMOL/L — SIGNIFICANT CHANGE UP (ref 21–28)
HCO3 BLDV-SCNC: 11 MMOL/L — LOW (ref 22–29)
HCO3 BLDV-SCNC: 13 MMOL/L — LOW (ref 22–29)
HCT VFR BLD CALC: 26.1 % — LOW (ref 34.5–45)
HCT VFR BLD CALC: 28.2 % — LOW (ref 34.5–45)
HCT VFR BLD CALC: 28.3 % — LOW (ref 34.5–45)
HCT VFR BLD CALC: 29.8 % — LOW (ref 34.5–45)
HCT VFR BLD CALC: 30 % — LOW (ref 34.5–45)
HCT VFR BLD CALC: 30.7 % — LOW (ref 34.5–45)
HCT VFR BLD CALC: 30.9 % — LOW (ref 34.5–45)
HCT VFR BLD CALC: 31 % — LOW (ref 34.5–45)
HCT VFR BLD CALC: 31.5 % — LOW (ref 34.5–45)
HCT VFR BLD CALC: 31.5 % — LOW (ref 34.5–45)
HCT VFR BLD CALC: 32 % — LOW (ref 34.5–45)
HCT VFR BLD CALC: 32 % — LOW (ref 34.5–45)
HCT VFR BLD CALC: 32.1 % — LOW (ref 34.5–45)
HCT VFR BLD CALC: 32.3 % — LOW (ref 34.5–45)
HCT VFR BLD CALC: 33.1 % — LOW (ref 34.5–45)
HCT VFR BLD CALC: 33.2 % — LOW (ref 34.5–45)
HCT VFR BLD CALC: 33.6 % — LOW (ref 34.5–45)
HCT VFR BLD CALC: 34.4 % — LOW (ref 34.5–45)
HCT VFR BLD CALC: 38.8 % — SIGNIFICANT CHANGE UP (ref 34.5–45)
HCT VFR BLD CALC: 39.1 % — SIGNIFICANT CHANGE UP (ref 34.5–45)
HCT VFR BLD CALC: 39.2 % — SIGNIFICANT CHANGE UP (ref 34.5–45)
HCT VFR BLD CALC: 49.9 % — HIGH (ref 34.5–45)
HDLC SERPL-MCNC: 46 MG/DL — LOW
HGB BLD-MCNC: 10 G/DL — LOW (ref 11.5–15.5)
HGB BLD-MCNC: 10 G/DL — LOW (ref 11.5–15.5)
HGB BLD-MCNC: 10.1 G/DL — LOW (ref 11.5–15.5)
HGB BLD-MCNC: 10.3 G/DL — LOW (ref 11.5–15.5)
HGB BLD-MCNC: 10.4 G/DL — LOW (ref 11.5–15.5)
HGB BLD-MCNC: 10.4 G/DL — LOW (ref 11.5–15.5)
HGB BLD-MCNC: 10.5 G/DL — LOW (ref 11.5–15.5)
HGB BLD-MCNC: 10.5 G/DL — LOW (ref 11.5–15.5)
HGB BLD-MCNC: 10.6 G/DL — LOW (ref 11.5–15.5)
HGB BLD-MCNC: 10.8 G/DL — LOW (ref 11.5–15.5)
HGB BLD-MCNC: 10.9 G/DL — LOW (ref 11.5–15.5)
HGB BLD-MCNC: 11.1 G/DL — LOW (ref 11.5–15.5)
HGB BLD-MCNC: 11.2 G/DL — LOW (ref 11.5–15.5)
HGB BLD-MCNC: 12.7 G/DL — SIGNIFICANT CHANGE UP (ref 11.5–15.5)
HGB BLD-MCNC: 13 G/DL — SIGNIFICANT CHANGE UP (ref 11.5–15.5)
HGB BLD-MCNC: 13.1 G/DL — SIGNIFICANT CHANGE UP (ref 11.5–15.5)
HGB BLD-MCNC: 16.4 G/DL — HIGH (ref 11.5–15.5)
HGB BLD-MCNC: 8.4 G/DL — LOW (ref 11.5–15.5)
HGB BLD-MCNC: 9.2 G/DL — LOW (ref 11.5–15.5)
HGB BLD-MCNC: 9.3 G/DL — LOW (ref 11.5–15.5)
HOROWITZ INDEX BLDA+IHG-RTO: 21 — SIGNIFICANT CHANGE UP
HOROWITZ INDEX BLDA+IHG-RTO: 21 — SIGNIFICANT CHANGE UP
HOROWITZ INDEX BLDA+IHG-RTO: 25 — SIGNIFICANT CHANGE UP
IMM GRANULOCYTES NFR BLD AUTO: 0.3 % — SIGNIFICANT CHANGE UP (ref 0–1.5)
IMM GRANULOCYTES NFR BLD AUTO: 0.6 % — SIGNIFICANT CHANGE UP (ref 0–1.5)
IMM GRANULOCYTES NFR BLD AUTO: 0.7 % — SIGNIFICANT CHANGE UP (ref 0–1.5)
IMM GRANULOCYTES NFR BLD AUTO: 1.1 % — SIGNIFICANT CHANGE UP (ref 0–1.5)
IMM GRANULOCYTES NFR BLD AUTO: 1.3 % — SIGNIFICANT CHANGE UP (ref 0–1.5)
IMM GRANULOCYTES NFR BLD AUTO: 1.9 % — HIGH (ref 0–1.5)
INR BLD: 1.39 RATIO — HIGH (ref 0.88–1.16)
INR BLD: 1.67 RATIO — HIGH (ref 0.88–1.16)
INR BLD: 1.85 RATIO — HIGH (ref 0.88–1.16)
INR BLD: 1.9 RATIO — HIGH (ref 0.88–1.16)
INR BLD: 2.14 RATIO — HIGH (ref 0.88–1.16)
INR BLD: 3.04 RATIO — HIGH (ref 0.88–1.16)
INR BLD: 4.07 RATIO — HIGH (ref 0.88–1.16)
INR BLD: 4.16 RATIO — HIGH (ref 0.88–1.16)
IRON SATN MFR SERPL: 14 % — LOW (ref 15–50)
IRON SATN MFR SERPL: 27 UG/DL — LOW (ref 40–150)
ISOPROPANOL GCMS: SIGNIFICANT CHANGE UP MG/DL
KETONES UR-MCNC: ABNORMAL
KETONES UR-MCNC: NEGATIVE — SIGNIFICANT CHANGE UP
LACTATE SERPL-SCNC: 0.8 MMOL/L — SIGNIFICANT CHANGE UP (ref 0.7–2)
LACTATE SERPL-SCNC: 1.1 MMOL/L — SIGNIFICANT CHANGE UP (ref 0.7–2)
LACTATE SERPL-SCNC: 1.7 MMOL/L — SIGNIFICANT CHANGE UP (ref 0.7–2)
LEUKOCYTE ESTERASE UR-ACNC: ABNORMAL
LEUKOCYTE ESTERASE UR-ACNC: ABNORMAL
LIDOCAIN IGE QN: 769 U/L — HIGH (ref 73–393)
LIPID PNL WITH DIRECT LDL SERPL: 72 MG/DL — SIGNIFICANT CHANGE UP
LYMPHOCYTES # BLD AUTO: 0.31 K/UL — LOW (ref 1–3.3)
LYMPHOCYTES # BLD AUTO: 0.45 K/UL — LOW (ref 1–3.3)
LYMPHOCYTES # BLD AUTO: 0.53 K/UL — LOW (ref 1–3.3)
LYMPHOCYTES # BLD AUTO: 0.64 K/UL — LOW (ref 1–3.3)
LYMPHOCYTES # BLD AUTO: 0.69 K/UL — LOW (ref 1–3.3)
LYMPHOCYTES # BLD AUTO: 0.73 K/UL — LOW (ref 1–3.3)
LYMPHOCYTES # BLD AUTO: 1.12 K/UL — SIGNIFICANT CHANGE UP (ref 1–3.3)
LYMPHOCYTES # BLD AUTO: 10 % — LOW (ref 13–44)
LYMPHOCYTES # BLD AUTO: 12.6 % — LOW (ref 13–44)
LYMPHOCYTES # BLD AUTO: 3 % — LOW (ref 13–44)
LYMPHOCYTES # BLD AUTO: 5.2 % — LOW (ref 13–44)
LYMPHOCYTES # BLD AUTO: 7.4 % — LOW (ref 13–44)
LYMPHOCYTES # BLD AUTO: 8.7 % — LOW (ref 13–44)
LYMPHOCYTES # BLD AUTO: 9.1 % — LOW (ref 13–44)
MACROCYTES BLD QL: SLIGHT — SIGNIFICANT CHANGE UP
MACROCYTES BLD QL: SLIGHT — SIGNIFICANT CHANGE UP
MAGNESIUM SERPL-MCNC: 1.7 MG/DL — SIGNIFICANT CHANGE UP (ref 1.6–2.6)
MAGNESIUM SERPL-MCNC: 1.9 MG/DL — SIGNIFICANT CHANGE UP (ref 1.6–2.6)
MAGNESIUM SERPL-MCNC: 2 MG/DL — SIGNIFICANT CHANGE UP (ref 1.6–2.6)
MAGNESIUM SERPL-MCNC: 2.1 MG/DL — SIGNIFICANT CHANGE UP (ref 1.6–2.6)
MAGNESIUM SERPL-MCNC: 2.2 MG/DL — SIGNIFICANT CHANGE UP (ref 1.6–2.6)
MAGNESIUM SERPL-MCNC: 2.6 MG/DL — SIGNIFICANT CHANGE UP (ref 1.6–2.6)
MANUAL SMEAR VERIFICATION: SIGNIFICANT CHANGE UP
MANUAL SMEAR VERIFICATION: SIGNIFICANT CHANGE UP
MCHC RBC-ENTMCNC: 29.2 PG — SIGNIFICANT CHANGE UP (ref 27–34)
MCHC RBC-ENTMCNC: 29.2 PG — SIGNIFICANT CHANGE UP (ref 27–34)
MCHC RBC-ENTMCNC: 29.5 PG — SIGNIFICANT CHANGE UP (ref 27–34)
MCHC RBC-ENTMCNC: 29.7 PG — SIGNIFICANT CHANGE UP (ref 27–34)
MCHC RBC-ENTMCNC: 29.9 PG — SIGNIFICANT CHANGE UP (ref 27–34)
MCHC RBC-ENTMCNC: 30 PG — SIGNIFICANT CHANGE UP (ref 27–34)
MCHC RBC-ENTMCNC: 30.1 PG — SIGNIFICANT CHANGE UP (ref 27–34)
MCHC RBC-ENTMCNC: 30.3 PG — SIGNIFICANT CHANGE UP (ref 27–34)
MCHC RBC-ENTMCNC: 30.6 PG — SIGNIFICANT CHANGE UP (ref 27–34)
MCHC RBC-ENTMCNC: 30.8 PG — SIGNIFICANT CHANGE UP (ref 27–34)
MCHC RBC-ENTMCNC: 31.6 PG — SIGNIFICANT CHANGE UP (ref 27–34)
MCHC RBC-ENTMCNC: 31.7 PG — SIGNIFICANT CHANGE UP (ref 27–34)
MCHC RBC-ENTMCNC: 31.9 PG — SIGNIFICANT CHANGE UP (ref 27–34)
MCHC RBC-ENTMCNC: 32 PG — SIGNIFICANT CHANGE UP (ref 27–34)
MCHC RBC-ENTMCNC: 32 PG — SIGNIFICANT CHANGE UP (ref 27–34)
MCHC RBC-ENTMCNC: 32.1 GM/DL — SIGNIFICANT CHANGE UP (ref 32–36)
MCHC RBC-ENTMCNC: 32.2 GM/DL — SIGNIFICANT CHANGE UP (ref 32–36)
MCHC RBC-ENTMCNC: 32.5 GM/DL — SIGNIFICANT CHANGE UP (ref 32–36)
MCHC RBC-ENTMCNC: 32.6 GM/DL — SIGNIFICANT CHANGE UP (ref 32–36)
MCHC RBC-ENTMCNC: 32.6 GM/DL — SIGNIFICANT CHANGE UP (ref 32–36)
MCHC RBC-ENTMCNC: 32.7 GM/DL — SIGNIFICANT CHANGE UP (ref 32–36)
MCHC RBC-ENTMCNC: 32.7 GM/DL — SIGNIFICANT CHANGE UP (ref 32–36)
MCHC RBC-ENTMCNC: 32.8 GM/DL — SIGNIFICANT CHANGE UP (ref 32–36)
MCHC RBC-ENTMCNC: 32.9 GM/DL — SIGNIFICANT CHANGE UP (ref 32–36)
MCHC RBC-ENTMCNC: 32.9 GM/DL — SIGNIFICANT CHANGE UP (ref 32–36)
MCHC RBC-ENTMCNC: 33 GM/DL — SIGNIFICANT CHANGE UP (ref 32–36)
MCHC RBC-ENTMCNC: 33.2 GM/DL — SIGNIFICANT CHANGE UP (ref 32–36)
MCHC RBC-ENTMCNC: 33.3 GM/DL — SIGNIFICANT CHANGE UP (ref 32–36)
MCHC RBC-ENTMCNC: 33.4 GM/DL — SIGNIFICANT CHANGE UP (ref 32–36)
MCHC RBC-ENTMCNC: 33.4 GM/DL — SIGNIFICANT CHANGE UP (ref 32–36)
MCHC RBC-ENTMCNC: 33.5 GM/DL — SIGNIFICANT CHANGE UP (ref 32–36)
MCHC RBC-ENTMCNC: 33.9 GM/DL — SIGNIFICANT CHANGE UP (ref 32–36)
MCHC RBC-ENTMCNC: 34.3 GM/DL — SIGNIFICANT CHANGE UP (ref 32–36)
MCV RBC AUTO: 87.8 FL — SIGNIFICANT CHANGE UP (ref 80–100)
MCV RBC AUTO: 88.4 FL — SIGNIFICANT CHANGE UP (ref 80–100)
MCV RBC AUTO: 89.1 FL — SIGNIFICANT CHANGE UP (ref 80–100)
MCV RBC AUTO: 89.4 FL — SIGNIFICANT CHANGE UP (ref 80–100)
MCV RBC AUTO: 89.5 FL — SIGNIFICANT CHANGE UP (ref 80–100)
MCV RBC AUTO: 89.5 FL — SIGNIFICANT CHANGE UP (ref 80–100)
MCV RBC AUTO: 89.6 FL — SIGNIFICANT CHANGE UP (ref 80–100)
MCV RBC AUTO: 89.8 FL — SIGNIFICANT CHANGE UP (ref 80–100)
MCV RBC AUTO: 90.3 FL — SIGNIFICANT CHANGE UP (ref 80–100)
MCV RBC AUTO: 90.5 FL — SIGNIFICANT CHANGE UP (ref 80–100)
MCV RBC AUTO: 90.6 FL — SIGNIFICANT CHANGE UP (ref 80–100)
MCV RBC AUTO: 91 FL — SIGNIFICANT CHANGE UP (ref 80–100)
MCV RBC AUTO: 91.3 FL — SIGNIFICANT CHANGE UP (ref 80–100)
MCV RBC AUTO: 91.8 FL — SIGNIFICANT CHANGE UP (ref 80–100)
MCV RBC AUTO: 92.2 FL — SIGNIFICANT CHANGE UP (ref 80–100)
MCV RBC AUTO: 93.3 FL — SIGNIFICANT CHANGE UP (ref 80–100)
MCV RBC AUTO: 93.8 FL — SIGNIFICANT CHANGE UP (ref 80–100)
MCV RBC AUTO: 94.7 FL — SIGNIFICANT CHANGE UP (ref 80–100)
MCV RBC AUTO: 94.9 FL — SIGNIFICANT CHANGE UP (ref 80–100)
MCV RBC AUTO: 96.1 FL — SIGNIFICANT CHANGE UP (ref 80–100)
MCV RBC AUTO: 97.7 FL — SIGNIFICANT CHANGE UP (ref 80–100)
MCV RBC AUTO: 98.3 FL — SIGNIFICANT CHANGE UP (ref 80–100)
METHOD TYPE: SIGNIFICANT CHANGE UP
MICROCYTES BLD QL: SLIGHT — SIGNIFICANT CHANGE UP
MICROCYTES BLD QL: SLIGHT — SIGNIFICANT CHANGE UP
MONOCYTES # BLD AUTO: 0.19 K/UL — SIGNIFICANT CHANGE UP (ref 0–0.9)
MONOCYTES # BLD AUTO: 0.42 K/UL — SIGNIFICANT CHANGE UP (ref 0–0.9)
MONOCYTES # BLD AUTO: 0.8 K/UL — SIGNIFICANT CHANGE UP (ref 0–0.9)
MONOCYTES # BLD AUTO: 0.8 K/UL — SIGNIFICANT CHANGE UP (ref 0–0.9)
MONOCYTES # BLD AUTO: 0.97 K/UL — HIGH (ref 0–0.9)
MONOCYTES # BLD AUTO: 1.05 K/UL — HIGH (ref 0–0.9)
MONOCYTES # BLD AUTO: 1.18 K/UL — HIGH (ref 0–0.9)
MONOCYTES NFR BLD AUTO: 10 % — SIGNIFICANT CHANGE UP (ref 2–14)
MONOCYTES NFR BLD AUTO: 13.5 % — SIGNIFICANT CHANGE UP (ref 2–14)
MONOCYTES NFR BLD AUTO: 14.9 % — HIGH (ref 2–14)
MONOCYTES NFR BLD AUTO: 4.2 % — SIGNIFICANT CHANGE UP (ref 2–14)
MONOCYTES NFR BLD AUTO: 5.7 % — SIGNIFICANT CHANGE UP (ref 2–14)
MONOCYTES NFR BLD AUTO: 6 % — SIGNIFICANT CHANGE UP (ref 2–14)
MONOCYTES NFR BLD AUTO: 9 % — SIGNIFICANT CHANGE UP (ref 2–14)
MRSA PCR RESULT.: SIGNIFICANT CHANGE UP
NEUTROPHILS # BLD AUTO: 12.36 K/UL — HIGH (ref 1.8–7.4)
NEUTROPHILS # BLD AUTO: 3.8 K/UL — SIGNIFICANT CHANGE UP (ref 1.8–7.4)
NEUTROPHILS # BLD AUTO: 5.61 K/UL — SIGNIFICANT CHANGE UP (ref 1.8–7.4)
NEUTROPHILS # BLD AUTO: 5.74 K/UL — SIGNIFICANT CHANGE UP (ref 1.8–7.4)
NEUTROPHILS # BLD AUTO: 5.79 K/UL — SIGNIFICANT CHANGE UP (ref 1.8–7.4)
NEUTROPHILS # BLD AUTO: 6.83 K/UL — SIGNIFICANT CHANGE UP (ref 1.8–7.4)
NEUTROPHILS # BLD AUTO: 9.09 K/UL — HIGH (ref 1.8–7.4)
NEUTROPHILS NFR BLD AUTO: 72.4 % — SIGNIFICANT CHANGE UP (ref 43–77)
NEUTROPHILS NFR BLD AUTO: 77 % — SIGNIFICANT CHANGE UP (ref 43–77)
NEUTROPHILS NFR BLD AUTO: 77.9 % — HIGH (ref 43–77)
NEUTROPHILS NFR BLD AUTO: 82.7 % — HIGH (ref 43–77)
NEUTROPHILS NFR BLD AUTO: 84.1 % — HIGH (ref 43–77)
NEUTROPHILS NFR BLD AUTO: 87 % — HIGH (ref 43–77)
NEUTROPHILS NFR BLD AUTO: 87.8 % — HIGH (ref 43–77)
NITRITE UR-MCNC: NEGATIVE — SIGNIFICANT CHANGE UP
NITRITE UR-MCNC: NEGATIVE — SIGNIFICANT CHANGE UP
NON HDL CHOLESTEROL: 94 MG/DL — SIGNIFICANT CHANGE UP
NRBC # BLD: 0 /100 WBCS — SIGNIFICANT CHANGE UP (ref 0–0)
NRBC # BLD: 0 /100 — SIGNIFICANT CHANGE UP (ref 0–0)
NT-PROBNP SERPL-SCNC: 1189 PG/ML — HIGH (ref 0–450)
OB PNL STL: NEGATIVE — SIGNIFICANT CHANGE UP
ORGANISM # SPEC MICROSCOPIC CNT: SIGNIFICANT CHANGE UP
ORGANISM # SPEC MICROSCOPIC CNT: SIGNIFICANT CHANGE UP
OSMOLALITY UR: 256 MOS/KG — SIGNIFICANT CHANGE UP (ref 50–1200)
OSMOLALITY UR: 380 MOS/KG — SIGNIFICANT CHANGE UP (ref 50–1200)
OVALOCYTES BLD QL SMEAR: SLIGHT — SIGNIFICANT CHANGE UP
OVALOCYTES BLD QL SMEAR: SLIGHT — SIGNIFICANT CHANGE UP
PCO2 BLDA: 26 MMHG — LOW (ref 32–35)
PCO2 BLDA: 30 MMHG — LOW (ref 32–35)
PCO2 BLDA: 32 MMHG — SIGNIFICANT CHANGE UP (ref 32–35)
PCO2 BLDV: 32 MMHG — LOW (ref 39–42)
PCO2 BLDV: 38 MMHG — LOW (ref 39–42)
PH BLDA: 7.2 — CRITICAL LOW (ref 7.35–7.45)
PH BLDA: 7.28 — LOW (ref 7.35–7.45)
PH BLDA: 7.42 — SIGNIFICANT CHANGE UP (ref 7.35–7.45)
PH BLDV: 7.14 — LOW (ref 7.32–7.43)
PH BLDV: 7.15 — LOW (ref 7.32–7.43)
PH UR: 5 — SIGNIFICANT CHANGE UP (ref 5–8)
PH UR: 5 — SIGNIFICANT CHANGE UP (ref 5–8)
PHOSPHATE SERPL-MCNC: 1.7 MG/DL — LOW (ref 2.5–4.5)
PHOSPHATE SERPL-MCNC: 1.8 MG/DL — LOW (ref 2.5–4.5)
PHOSPHATE SERPL-MCNC: 2.5 MG/DL — SIGNIFICANT CHANGE UP (ref 2.5–4.5)
PHOSPHATE SERPL-MCNC: 2.5 MG/DL — SIGNIFICANT CHANGE UP (ref 2.5–4.5)
PHOSPHATE SERPL-MCNC: 2.7 MG/DL — SIGNIFICANT CHANGE UP (ref 2.5–4.5)
PHOSPHATE SERPL-MCNC: 2.8 MG/DL — SIGNIFICANT CHANGE UP (ref 2.5–4.5)
PHOSPHATE SERPL-MCNC: 2.9 MG/DL — SIGNIFICANT CHANGE UP (ref 2.5–4.5)
PHOSPHATE SERPL-MCNC: 3.1 MG/DL — SIGNIFICANT CHANGE UP (ref 2.5–4.5)
PHOSPHATE SERPL-MCNC: 4.1 MG/DL — SIGNIFICANT CHANGE UP (ref 2.5–4.5)
PHOSPHATE SERPL-MCNC: 4.6 MG/DL — HIGH (ref 2.5–4.5)
PHOSPHATE SERPL-MCNC: 4.7 MG/DL — HIGH (ref 2.5–4.5)
PHOSPHATE SERPL-MCNC: 4.8 MG/DL — HIGH (ref 2.5–4.5)
PHOSPHATE SERPL-MCNC: 5.1 MG/DL — HIGH (ref 2.5–4.5)
PHOSPHATE SERPL-MCNC: 5.2 MG/DL — HIGH (ref 2.5–4.5)
PHOSPHATE SERPL-MCNC: 5.9 MG/DL — HIGH (ref 2.5–4.5)
PHOSPHATE SERPL-MCNC: 7.2 MG/DL — HIGH (ref 2.5–4.5)
PLAT MORPH BLD: NORMAL — SIGNIFICANT CHANGE UP
PLAT MORPH BLD: NORMAL — SIGNIFICANT CHANGE UP
PLATELET # BLD AUTO: 170 K/UL — SIGNIFICANT CHANGE UP (ref 150–400)
PLATELET # BLD AUTO: 172 K/UL — SIGNIFICANT CHANGE UP (ref 150–400)
PLATELET # BLD AUTO: 174 K/UL — SIGNIFICANT CHANGE UP (ref 150–400)
PLATELET # BLD AUTO: 178 K/UL — SIGNIFICANT CHANGE UP (ref 150–400)
PLATELET # BLD AUTO: 186 K/UL — SIGNIFICANT CHANGE UP (ref 150–400)
PLATELET # BLD AUTO: 186 K/UL — SIGNIFICANT CHANGE UP (ref 150–400)
PLATELET # BLD AUTO: 187 K/UL — SIGNIFICANT CHANGE UP (ref 150–400)
PLATELET # BLD AUTO: 193 K/UL — SIGNIFICANT CHANGE UP (ref 150–400)
PLATELET # BLD AUTO: 193 K/UL — SIGNIFICANT CHANGE UP (ref 150–400)
PLATELET # BLD AUTO: 196 K/UL — SIGNIFICANT CHANGE UP (ref 150–400)
PLATELET # BLD AUTO: 198 K/UL — SIGNIFICANT CHANGE UP (ref 150–400)
PLATELET # BLD AUTO: 202 K/UL — SIGNIFICANT CHANGE UP (ref 150–400)
PLATELET # BLD AUTO: 203 K/UL — SIGNIFICANT CHANGE UP (ref 150–400)
PLATELET # BLD AUTO: 206 K/UL — SIGNIFICANT CHANGE UP (ref 150–400)
PLATELET # BLD AUTO: 211 K/UL — SIGNIFICANT CHANGE UP (ref 150–400)
PLATELET # BLD AUTO: 236 K/UL — SIGNIFICANT CHANGE UP (ref 150–400)
PLATELET # BLD AUTO: 254 K/UL — SIGNIFICANT CHANGE UP (ref 150–400)
PLATELET # BLD AUTO: 254 K/UL — SIGNIFICANT CHANGE UP (ref 150–400)
PLATELET # BLD AUTO: 257 K/UL — SIGNIFICANT CHANGE UP (ref 150–400)
PLATELET # BLD AUTO: 271 K/UL — SIGNIFICANT CHANGE UP (ref 150–400)
PLATELET # BLD AUTO: 273 K/UL — SIGNIFICANT CHANGE UP (ref 150–400)
PLATELET # BLD AUTO: 307 K/UL — SIGNIFICANT CHANGE UP (ref 150–400)
PLATELET COUNT - ESTIMATE: NORMAL — SIGNIFICANT CHANGE UP
PO2 BLDA: 118 MMHG — HIGH (ref 83–108)
PO2 BLDA: 161 MMHG — HIGH (ref 83–108)
PO2 BLDA: 91 MMHG — SIGNIFICANT CHANGE UP (ref 83–108)
PO2 BLDV: 51 MMHG — SIGNIFICANT CHANGE UP
PO2 BLDV: 61 MMHG — SIGNIFICANT CHANGE UP
POIKILOCYTOSIS BLD QL AUTO: SLIGHT — SIGNIFICANT CHANGE UP
POIKILOCYTOSIS BLD QL AUTO: SLIGHT — SIGNIFICANT CHANGE UP
POTASSIUM SERPL-MCNC: 2.4 MMOL/L — CRITICAL LOW (ref 3.5–5.3)
POTASSIUM SERPL-MCNC: 3 MMOL/L — LOW (ref 3.5–5.3)
POTASSIUM SERPL-MCNC: 3.2 MMOL/L — LOW (ref 3.5–5.3)
POTASSIUM SERPL-MCNC: 3.3 MMOL/L — LOW (ref 3.5–5.3)
POTASSIUM SERPL-MCNC: 3.4 MMOL/L — LOW (ref 3.5–5.3)
POTASSIUM SERPL-MCNC: 3.4 MMOL/L — LOW (ref 3.5–5.3)
POTASSIUM SERPL-MCNC: 3.5 MMOL/L — SIGNIFICANT CHANGE UP (ref 3.5–5.3)
POTASSIUM SERPL-MCNC: 3.6 MMOL/L — SIGNIFICANT CHANGE UP (ref 3.5–5.3)
POTASSIUM SERPL-MCNC: 3.7 MMOL/L — SIGNIFICANT CHANGE UP (ref 3.5–5.3)
POTASSIUM SERPL-MCNC: 3.8 MMOL/L — SIGNIFICANT CHANGE UP (ref 3.5–5.3)
POTASSIUM SERPL-MCNC: 3.9 MMOL/L — SIGNIFICANT CHANGE UP (ref 3.5–5.3)
POTASSIUM SERPL-MCNC: 4.2 MMOL/L — SIGNIFICANT CHANGE UP (ref 3.5–5.3)
POTASSIUM SERPL-MCNC: 4.4 MMOL/L — SIGNIFICANT CHANGE UP (ref 3.5–5.3)
POTASSIUM SERPL-MCNC: 4.4 MMOL/L — SIGNIFICANT CHANGE UP (ref 3.5–5.3)
POTASSIUM SERPL-MCNC: 4.5 MMOL/L — SIGNIFICANT CHANGE UP (ref 3.5–5.3)
POTASSIUM SERPL-SCNC: 2.4 MMOL/L — CRITICAL LOW (ref 3.5–5.3)
POTASSIUM SERPL-SCNC: 3 MMOL/L — LOW (ref 3.5–5.3)
POTASSIUM SERPL-SCNC: 3.2 MMOL/L — LOW (ref 3.5–5.3)
POTASSIUM SERPL-SCNC: 3.3 MMOL/L — LOW (ref 3.5–5.3)
POTASSIUM SERPL-SCNC: 3.4 MMOL/L — LOW (ref 3.5–5.3)
POTASSIUM SERPL-SCNC: 3.4 MMOL/L — LOW (ref 3.5–5.3)
POTASSIUM SERPL-SCNC: 3.5 MMOL/L — SIGNIFICANT CHANGE UP (ref 3.5–5.3)
POTASSIUM SERPL-SCNC: 3.6 MMOL/L — SIGNIFICANT CHANGE UP (ref 3.5–5.3)
POTASSIUM SERPL-SCNC: 3.7 MMOL/L — SIGNIFICANT CHANGE UP (ref 3.5–5.3)
POTASSIUM SERPL-SCNC: 3.8 MMOL/L — SIGNIFICANT CHANGE UP (ref 3.5–5.3)
POTASSIUM SERPL-SCNC: 3.9 MMOL/L — SIGNIFICANT CHANGE UP (ref 3.5–5.3)
POTASSIUM SERPL-SCNC: 4.2 MMOL/L — SIGNIFICANT CHANGE UP (ref 3.5–5.3)
POTASSIUM SERPL-SCNC: 4.4 MMOL/L — SIGNIFICANT CHANGE UP (ref 3.5–5.3)
POTASSIUM SERPL-SCNC: 4.4 MMOL/L — SIGNIFICANT CHANGE UP (ref 3.5–5.3)
POTASSIUM SERPL-SCNC: 4.5 MMOL/L — SIGNIFICANT CHANGE UP (ref 3.5–5.3)
PROCALCITONIN SERPL-MCNC: 0.49 NG/ML — HIGH (ref 0.02–0.1)
PROT SERPL-MCNC: 5.5 G/DL — LOW (ref 6–8.3)
PROT SERPL-MCNC: 5.6 G/DL — LOW (ref 6–8.3)
PROT SERPL-MCNC: 5.9 G/DL — LOW (ref 6–8.3)
PROT SERPL-MCNC: 6.1 G/DL — SIGNIFICANT CHANGE UP (ref 6–8.3)
PROT SERPL-MCNC: 6.2 G/DL — SIGNIFICANT CHANGE UP (ref 6–8.3)
PROT SERPL-MCNC: 6.3 G/DL — SIGNIFICANT CHANGE UP (ref 6–8.3)
PROT SERPL-MCNC: 6.4 G/DL — SIGNIFICANT CHANGE UP (ref 6–8.3)
PROT SERPL-MCNC: 6.4 G/DL — SIGNIFICANT CHANGE UP (ref 6–8.3)
PROT SERPL-MCNC: 6.5 G/DL — SIGNIFICANT CHANGE UP (ref 6–8.3)
PROT SERPL-MCNC: 6.5 G/DL — SIGNIFICANT CHANGE UP (ref 6–8.3)
PROT SERPL-MCNC: 6.7 G/DL — SIGNIFICANT CHANGE UP (ref 6–8.3)
PROT SERPL-MCNC: 6.8 G/DL — SIGNIFICANT CHANGE UP (ref 6–8.3)
PROT SERPL-MCNC: 6.8 G/DL — SIGNIFICANT CHANGE UP (ref 6–8.3)
PROT SERPL-MCNC: 7 G/DL — SIGNIFICANT CHANGE UP (ref 6–8.3)
PROT SERPL-MCNC: 7.5 G/DL — SIGNIFICANT CHANGE UP (ref 6–8.3)
PROT UR-MCNC: 100
PROT UR-MCNC: 100
PROTHROM AB SERPL-ACNC: 16.3 SEC — HIGH (ref 10.6–13.6)
PROTHROM AB SERPL-ACNC: 19.4 SEC — HIGH (ref 10.6–13.6)
PROTHROM AB SERPL-ACNC: 21.4 SEC — HIGH (ref 10.6–13.6)
PROTHROM AB SERPL-ACNC: 21.9 SEC — HIGH (ref 10.6–13.6)
PROTHROM AB SERPL-ACNC: 24.6 SEC — HIGH (ref 10.6–13.6)
PROTHROM AB SERPL-ACNC: 34.3 SEC — HIGH (ref 10.6–13.6)
PROTHROM AB SERPL-ACNC: 45.3 SEC — HIGH (ref 10.6–13.6)
PROTHROM AB SERPL-ACNC: 46.3 SEC — HIGH (ref 10.6–13.6)
RBC # BLD: 2.88 M/UL — LOW (ref 3.8–5.2)
RBC # BLD: 3.1 M/UL — LOW (ref 3.8–5.2)
RBC # BLD: 3.15 M/UL — LOW (ref 3.8–5.2)
RBC # BLD: 3.35 M/UL — LOW (ref 3.8–5.2)
RBC # BLD: 3.37 M/UL — LOW (ref 3.8–5.2)
RBC # BLD: 3.42 M/UL — LOW (ref 3.8–5.2)
RBC # BLD: 3.43 M/UL — LOW (ref 3.8–5.2)
RBC # BLD: 3.47 M/UL — LOW (ref 3.8–5.2)
RBC # BLD: 3.48 M/UL — LOW (ref 3.8–5.2)
RBC # BLD: 3.48 M/UL — LOW (ref 3.8–5.2)
RBC # BLD: 3.49 M/UL — LOW (ref 3.8–5.2)
RBC # BLD: 3.5 M/UL — LOW (ref 3.8–5.2)
RBC # BLD: 3.5 M/UL — LOW (ref 3.8–5.2)
RBC # BLD: 3.52 M/UL — LOW (ref 3.8–5.2)
RBC # BLD: 3.55 M/UL — LOW (ref 3.8–5.2)
RBC # BLD: 3.59 M/UL — LOW (ref 3.8–5.2)
RBC # BLD: 3.66 M/UL — LOW (ref 3.8–5.2)
RBC # BLD: 3.7 M/UL — LOW (ref 3.8–5.2)
RBC # BLD: 3.97 M/UL — SIGNIFICANT CHANGE UP (ref 3.8–5.2)
RBC # BLD: 4.07 M/UL — SIGNIFICANT CHANGE UP (ref 3.8–5.2)
RBC # BLD: 4.14 M/UL — SIGNIFICANT CHANGE UP (ref 3.8–5.2)
RBC # BLD: 5.32 M/UL — HIGH (ref 3.8–5.2)
RBC # FLD: 12.4 % — SIGNIFICANT CHANGE UP (ref 10.3–14.5)
RBC # FLD: 12.6 % — SIGNIFICANT CHANGE UP (ref 10.3–14.5)
RBC # FLD: 12.6 % — SIGNIFICANT CHANGE UP (ref 10.3–14.5)
RBC # FLD: 12.7 % — SIGNIFICANT CHANGE UP (ref 10.3–14.5)
RBC # FLD: 12.7 % — SIGNIFICANT CHANGE UP (ref 10.3–14.5)
RBC # FLD: 12.8 % — SIGNIFICANT CHANGE UP (ref 10.3–14.5)
RBC # FLD: 12.9 % — SIGNIFICANT CHANGE UP (ref 10.3–14.5)
RBC # FLD: 13 % — SIGNIFICANT CHANGE UP (ref 10.3–14.5)
RBC # FLD: 13.1 % — SIGNIFICANT CHANGE UP (ref 10.3–14.5)
RBC # FLD: 13.1 % — SIGNIFICANT CHANGE UP (ref 10.3–14.5)
RBC # FLD: 13.2 % — SIGNIFICANT CHANGE UP (ref 10.3–14.5)
RBC # FLD: 13.4 % — SIGNIFICANT CHANGE UP (ref 10.3–14.5)
RBC # FLD: 13.7 % — SIGNIFICANT CHANGE UP (ref 10.3–14.5)
RBC # FLD: 14 % — SIGNIFICANT CHANGE UP (ref 10.3–14.5)
RBC # FLD: 14.1 % — SIGNIFICANT CHANGE UP (ref 10.3–14.5)
RBC # FLD: 14.6 % — HIGH (ref 10.3–14.5)
RBC # FLD: 15.2 % — HIGH (ref 10.3–14.5)
RBC # FLD: 15.8 % — HIGH (ref 10.3–14.5)
RBC # FLD: 16.3 % — HIGH (ref 10.3–14.5)
RBC # FLD: 24.1 % — HIGH (ref 10.3–14.5)
RBC BLD AUTO: ABNORMAL
RBC BLD AUTO: ABNORMAL
RBC CASTS # UR COMP ASSIST: ABNORMAL /HPF (ref 0–2)
S AUREUS DNA NOSE QL NAA+PROBE: DETECTED
SALICYLATES SERPL-MCNC: 2.3 MG/DL — LOW (ref 2.8–20)
SAO2 % BLDA: 97 % — SIGNIFICANT CHANGE UP
SAO2 % BLDA: 99 % — SIGNIFICANT CHANGE UP
SAO2 % BLDA: SIGNIFICANT CHANGE UP %
SAO2 % BLDV: 84.5 % — SIGNIFICANT CHANGE UP
SAO2 % BLDV: 89 % — SIGNIFICANT CHANGE UP
SARS-COV-2 IGG+IGM SERPL QL IA: >250 U/ML — HIGH
SARS-COV-2 IGG+IGM SERPL QL IA: >250 U/ML — HIGH
SARS-COV-2 IGG+IGM SERPL QL IA: POSITIVE
SARS-COV-2 IGG+IGM SERPL QL IA: POSITIVE
SARS-COV-2 RNA SPEC QL NAA+PROBE: SIGNIFICANT CHANGE UP
SCHISTOCYTES BLD QL AUTO: SLIGHT — SIGNIFICANT CHANGE UP
SODIUM SERPL-SCNC: 136 MMOL/L — SIGNIFICANT CHANGE UP (ref 135–145)
SODIUM SERPL-SCNC: 137 MMOL/L — SIGNIFICANT CHANGE UP (ref 135–145)
SODIUM SERPL-SCNC: 139 MMOL/L — SIGNIFICANT CHANGE UP (ref 135–145)
SODIUM SERPL-SCNC: 141 MMOL/L — SIGNIFICANT CHANGE UP (ref 135–145)
SODIUM SERPL-SCNC: 141 MMOL/L — SIGNIFICANT CHANGE UP (ref 135–145)
SODIUM SERPL-SCNC: 142 MMOL/L — SIGNIFICANT CHANGE UP (ref 135–145)
SODIUM SERPL-SCNC: 143 MMOL/L — SIGNIFICANT CHANGE UP (ref 135–145)
SODIUM SERPL-SCNC: 143 MMOL/L — SIGNIFICANT CHANGE UP (ref 135–145)
SODIUM SERPL-SCNC: 144 MMOL/L — SIGNIFICANT CHANGE UP (ref 135–145)
SODIUM SERPL-SCNC: 144 MMOL/L — SIGNIFICANT CHANGE UP (ref 135–145)
SODIUM SERPL-SCNC: 145 MMOL/L — SIGNIFICANT CHANGE UP (ref 135–145)
SODIUM SERPL-SCNC: 146 MMOL/L — HIGH (ref 135–145)
SODIUM SERPL-SCNC: 147 MMOL/L — HIGH (ref 135–145)
SODIUM SERPL-SCNC: 147 MMOL/L — HIGH (ref 135–145)
SODIUM SERPL-SCNC: 148 MMOL/L — HIGH (ref 135–145)
SODIUM SERPL-SCNC: 149 MMOL/L — HIGH (ref 135–145)
SODIUM SERPL-SCNC: 150 MMOL/L — HIGH (ref 135–145)
SODIUM SERPL-SCNC: 168 MMOL/L — CRITICAL HIGH (ref 135–145)
SODIUM UR-SCNC: 31 MMOL/L — SIGNIFICANT CHANGE UP
SODIUM UR-SCNC: 60 MMOL/L — SIGNIFICANT CHANGE UP
SP GR SPEC: 1.01 — SIGNIFICANT CHANGE UP (ref 1.01–1.02)
SP GR SPEC: 1.02 — SIGNIFICANT CHANGE UP (ref 1.01–1.02)
SPECIMEN SOURCE: SIGNIFICANT CHANGE UP
TIBC SERPL-MCNC: 186 UG/DL — LOW (ref 250–450)
TRIGL SERPL-MCNC: 108 MG/DL — SIGNIFICANT CHANGE UP
TROPONIN I SERPL-MCNC: 0.03 NG/ML — SIGNIFICANT CHANGE UP (ref 0–0.04)
TROPONIN I SERPL-MCNC: 0.07 NG/ML — HIGH (ref 0–0.04)
TROPONIN I SERPL-MCNC: 0.27 NG/ML — HIGH (ref 0–0.04)
TROPONIN I SERPL-MCNC: 0.29 NG/ML — HIGH (ref 0–0.04)
TROPONIN I SERPL-MCNC: 0.32 NG/ML — HIGH (ref 0–0.04)
TROPONIN I SERPL-MCNC: <0.015 NG/ML — SIGNIFICANT CHANGE UP (ref 0–0.04)
TSH SERPL-MCNC: 0.53 UU/ML — SIGNIFICANT CHANGE UP (ref 0.34–4.82)
UIBC SERPL-MCNC: 159 UG/DL — SIGNIFICANT CHANGE UP (ref 110–370)
UROBILINOGEN FLD QL: NEGATIVE — SIGNIFICANT CHANGE UP
UROBILINOGEN FLD QL: NEGATIVE — SIGNIFICANT CHANGE UP
VIT B12 SERPL-MCNC: 1122 PG/ML — SIGNIFICANT CHANGE UP (ref 232–1245)
WBC # BLD: 10.45 K/UL — SIGNIFICANT CHANGE UP (ref 3.8–10.5)
WBC # BLD: 13.11 K/UL — HIGH (ref 3.8–10.5)
WBC # BLD: 13.98 K/UL — HIGH (ref 3.8–10.5)
WBC # BLD: 14.07 K/UL — HIGH (ref 3.8–10.5)
WBC # BLD: 4.52 K/UL — SIGNIFICANT CHANGE UP (ref 3.8–10.5)
WBC # BLD: 5.3 K/UL — SIGNIFICANT CHANGE UP (ref 3.8–10.5)
WBC # BLD: 6.16 K/UL — SIGNIFICANT CHANGE UP (ref 3.8–10.5)
WBC # BLD: 6.53 K/UL — SIGNIFICANT CHANGE UP (ref 3.8–10.5)
WBC # BLD: 6.8 K/UL — SIGNIFICANT CHANGE UP (ref 3.8–10.5)
WBC # BLD: 6.89 K/UL — SIGNIFICANT CHANGE UP (ref 3.8–10.5)
WBC # BLD: 7 K/UL — SIGNIFICANT CHANGE UP (ref 3.8–10.5)
WBC # BLD: 7.2 K/UL — SIGNIFICANT CHANGE UP (ref 3.8–10.5)
WBC # BLD: 7.27 K/UL — SIGNIFICANT CHANGE UP (ref 3.8–10.5)
WBC # BLD: 7.45 K/UL — SIGNIFICANT CHANGE UP (ref 3.8–10.5)
WBC # BLD: 7.93 K/UL — SIGNIFICANT CHANGE UP (ref 3.8–10.5)
WBC # BLD: 8.26 K/UL — SIGNIFICANT CHANGE UP (ref 3.8–10.5)
WBC # BLD: 8.33 K/UL — SIGNIFICANT CHANGE UP (ref 3.8–10.5)
WBC # BLD: 8.77 K/UL — SIGNIFICANT CHANGE UP (ref 3.8–10.5)
WBC # BLD: 8.78 K/UL — SIGNIFICANT CHANGE UP (ref 3.8–10.5)
WBC # BLD: 8.87 K/UL — SIGNIFICANT CHANGE UP (ref 3.8–10.5)
WBC # BLD: 8.9 K/UL — SIGNIFICANT CHANGE UP (ref 3.8–10.5)
WBC # BLD: 9.94 K/UL — SIGNIFICANT CHANGE UP (ref 3.8–10.5)
WBC # FLD AUTO: 10.45 K/UL — SIGNIFICANT CHANGE UP (ref 3.8–10.5)
WBC # FLD AUTO: 13.11 K/UL — HIGH (ref 3.8–10.5)
WBC # FLD AUTO: 13.98 K/UL — HIGH (ref 3.8–10.5)
WBC # FLD AUTO: 14.07 K/UL — HIGH (ref 3.8–10.5)
WBC # FLD AUTO: 4.52 K/UL — SIGNIFICANT CHANGE UP (ref 3.8–10.5)
WBC # FLD AUTO: 5.3 K/UL — SIGNIFICANT CHANGE UP (ref 3.8–10.5)
WBC # FLD AUTO: 6.16 K/UL — SIGNIFICANT CHANGE UP (ref 3.8–10.5)
WBC # FLD AUTO: 6.53 K/UL — SIGNIFICANT CHANGE UP (ref 3.8–10.5)
WBC # FLD AUTO: 6.8 K/UL — SIGNIFICANT CHANGE UP (ref 3.8–10.5)
WBC # FLD AUTO: 6.89 K/UL — SIGNIFICANT CHANGE UP (ref 3.8–10.5)
WBC # FLD AUTO: 7 K/UL — SIGNIFICANT CHANGE UP (ref 3.8–10.5)
WBC # FLD AUTO: 7.2 K/UL — SIGNIFICANT CHANGE UP (ref 3.8–10.5)
WBC # FLD AUTO: 7.27 K/UL — SIGNIFICANT CHANGE UP (ref 3.8–10.5)
WBC # FLD AUTO: 7.45 K/UL — SIGNIFICANT CHANGE UP (ref 3.8–10.5)
WBC # FLD AUTO: 7.93 K/UL — SIGNIFICANT CHANGE UP (ref 3.8–10.5)
WBC # FLD AUTO: 8.26 K/UL — SIGNIFICANT CHANGE UP (ref 3.8–10.5)
WBC # FLD AUTO: 8.33 K/UL — SIGNIFICANT CHANGE UP (ref 3.8–10.5)
WBC # FLD AUTO: 8.77 K/UL — SIGNIFICANT CHANGE UP (ref 3.8–10.5)
WBC # FLD AUTO: 8.78 K/UL — SIGNIFICANT CHANGE UP (ref 3.8–10.5)
WBC # FLD AUTO: 8.87 K/UL — SIGNIFICANT CHANGE UP (ref 3.8–10.5)
WBC # FLD AUTO: 8.9 K/UL — SIGNIFICANT CHANGE UP (ref 3.8–10.5)
WBC # FLD AUTO: 9.94 K/UL — SIGNIFICANT CHANGE UP (ref 3.8–10.5)
WBC UR QL: >50 /HPF (ref 0–5)

## 2021-01-01 PROCEDURE — 74181 MRI ABDOMEN W/O CONTRAST: CPT | Mod: 26

## 2021-01-01 PROCEDURE — 99291 CRITICAL CARE FIRST HOUR: CPT

## 2021-01-01 PROCEDURE — 83735 ASSAY OF MAGNESIUM: CPT

## 2021-01-01 PROCEDURE — 87075 CULTR BACTERIA EXCEPT BLOOD: CPT

## 2021-01-01 PROCEDURE — 86900 BLOOD TYPING SEROLOGIC ABO: CPT

## 2021-01-01 PROCEDURE — 82803 BLOOD GASES ANY COMBINATION: CPT

## 2021-01-01 PROCEDURE — 83605 ASSAY OF LACTIC ACID: CPT

## 2021-01-01 PROCEDURE — 87070 CULTURE OTHR SPECIMN AEROBIC: CPT

## 2021-01-01 PROCEDURE — 47490 INCISION OF GALLBLADDER: CPT

## 2021-01-01 PROCEDURE — 99291 CRITICAL CARE FIRST HOUR: CPT | Mod: 25

## 2021-01-01 PROCEDURE — 70450 CT HEAD/BRAIN W/O DYE: CPT | Mod: 26

## 2021-01-01 PROCEDURE — 71045 X-RAY EXAM CHEST 1 VIEW: CPT | Mod: 26

## 2021-01-01 PROCEDURE — C1729: CPT

## 2021-01-01 PROCEDURE — 94640 AIRWAY INHALATION TREATMENT: CPT

## 2021-01-01 PROCEDURE — 74176 CT ABD & PELVIS W/O CONTRAST: CPT

## 2021-01-01 PROCEDURE — 86769 SARS-COV-2 COVID-19 ANTIBODY: CPT

## 2021-01-01 PROCEDURE — 85025 COMPLETE CBC W/AUTO DIFF WBC: CPT

## 2021-01-01 PROCEDURE — C1769: CPT

## 2021-01-01 PROCEDURE — 99283 EMERGENCY DEPT VISIT LOW MDM: CPT

## 2021-01-01 PROCEDURE — 99284 EMERGENCY DEPT VISIT MOD MDM: CPT

## 2021-01-01 PROCEDURE — 84100 ASSAY OF PHOSPHORUS: CPT

## 2021-01-01 PROCEDURE — 99233 SBSQ HOSP IP/OBS HIGH 50: CPT

## 2021-01-01 PROCEDURE — 80053 COMPREHEN METABOLIC PANEL: CPT

## 2021-01-01 PROCEDURE — 93005 ELECTROCARDIOGRAM TRACING: CPT

## 2021-01-01 PROCEDURE — 83690 ASSAY OF LIPASE: CPT

## 2021-01-01 PROCEDURE — 82728 ASSAY OF FERRITIN: CPT

## 2021-01-01 PROCEDURE — 74181 MRI ABDOMEN W/O CONTRAST: CPT

## 2021-01-01 PROCEDURE — 86901 BLOOD TYPING SEROLOGIC RH(D): CPT

## 2021-01-01 PROCEDURE — 92526 ORAL FUNCTION THERAPY: CPT

## 2021-01-01 PROCEDURE — 93923 UPR/LXTR ART STDY 3+ LVLS: CPT | Mod: 26

## 2021-01-01 PROCEDURE — 86850 RBC ANTIBODY SCREEN: CPT

## 2021-01-01 PROCEDURE — 93923 UPR/LXTR ART STDY 3+ LVLS: CPT

## 2021-01-01 PROCEDURE — 71250 CT THORAX DX C-: CPT | Mod: 26,MA

## 2021-01-01 PROCEDURE — 71250 CT THORAX DX C-: CPT

## 2021-01-01 PROCEDURE — 84300 ASSAY OF URINE SODIUM: CPT

## 2021-01-01 PROCEDURE — 92610 EVALUATE SWALLOWING FUNCTION: CPT

## 2021-01-01 PROCEDURE — 82553 CREATINE MB FRACTION: CPT

## 2021-01-01 PROCEDURE — 71045 X-RAY EXAM CHEST 1 VIEW: CPT | Mod: 26,77

## 2021-01-01 PROCEDURE — 74176 CT ABD & PELVIS W/O CONTRAST: CPT | Mod: 26,MA

## 2021-01-01 PROCEDURE — 87635 SARS-COV-2 COVID-19 AMP PRB: CPT

## 2021-01-01 PROCEDURE — 87040 BLOOD CULTURE FOR BACTERIA: CPT

## 2021-01-01 PROCEDURE — 82550 ASSAY OF CK (CPK): CPT

## 2021-01-01 PROCEDURE — 81001 URINALYSIS AUTO W/SCOPE: CPT

## 2021-01-01 PROCEDURE — 82436 ASSAY OF URINE CHLORIDE: CPT

## 2021-01-01 PROCEDURE — 70450 CT HEAD/BRAIN W/O DYE: CPT

## 2021-01-01 PROCEDURE — 85730 THROMBOPLASTIN TIME PARTIAL: CPT

## 2021-01-01 PROCEDURE — 80048 BASIC METABOLIC PNL TOTAL CA: CPT

## 2021-01-01 PROCEDURE — 97163 PT EVAL HIGH COMPLEX 45 MIN: CPT

## 2021-01-01 PROCEDURE — 82272 OCCULT BLD FECES 1-3 TESTS: CPT

## 2021-01-01 PROCEDURE — 85027 COMPLETE CBC AUTOMATED: CPT

## 2021-01-01 PROCEDURE — 83880 ASSAY OF NATRIURETIC PEPTIDE: CPT

## 2021-01-01 PROCEDURE — 80171 DRUG SCREEN QUANT GABAPENTIN: CPT

## 2021-01-01 PROCEDURE — 85610 PROTHROMBIN TIME: CPT

## 2021-01-01 PROCEDURE — 71045 X-RAY EXAM CHEST 1 VIEW: CPT

## 2021-01-01 PROCEDURE — 83550 IRON BINDING TEST: CPT

## 2021-01-01 PROCEDURE — 87640 STAPH A DNA AMP PROBE: CPT

## 2021-01-01 PROCEDURE — 80061 LIPID PANEL: CPT

## 2021-01-01 PROCEDURE — 82009 KETONE BODYS QUAL: CPT

## 2021-01-01 PROCEDURE — 99284 EMERGENCY DEPT VISIT MOD MDM: CPT | Mod: 25

## 2021-01-01 PROCEDURE — 99223 1ST HOSP IP/OBS HIGH 75: CPT

## 2021-01-01 PROCEDURE — 84145 PROCALCITONIN (PCT): CPT

## 2021-01-01 PROCEDURE — 80307 DRUG TEST PRSMV CHEM ANLYZR: CPT

## 2021-01-01 PROCEDURE — 83036 HEMOGLOBIN GLYCOSYLATED A1C: CPT

## 2021-01-01 PROCEDURE — 99232 SBSQ HOSP IP/OBS MODERATE 35: CPT

## 2021-01-01 PROCEDURE — 84443 ASSAY THYROID STIM HORMONE: CPT

## 2021-01-01 PROCEDURE — 36415 COLL VENOUS BLD VENIPUNCTURE: CPT

## 2021-01-01 PROCEDURE — 82607 VITAMIN B-12: CPT

## 2021-01-01 PROCEDURE — 82570 ASSAY OF URINE CREATININE: CPT

## 2021-01-01 PROCEDURE — 87205 SMEAR GRAM STAIN: CPT

## 2021-01-01 PROCEDURE — 83935 ASSAY OF URINE OSMOLALITY: CPT

## 2021-01-01 PROCEDURE — 82962 GLUCOSE BLOOD TEST: CPT

## 2021-01-01 PROCEDURE — 84484 ASSAY OF TROPONIN QUANT: CPT

## 2021-01-01 PROCEDURE — 83540 ASSAY OF IRON: CPT

## 2021-01-01 PROCEDURE — G0480: CPT

## 2021-01-01 PROCEDURE — 82150 ASSAY OF AMYLASE: CPT

## 2021-01-01 PROCEDURE — 87641 MR-STAPH DNA AMP PROBE: CPT

## 2021-01-01 PROCEDURE — 87186 SC STD MICRODIL/AGAR DIL: CPT

## 2021-01-01 PROCEDURE — 76942 ECHO GUIDE FOR BIOPSY: CPT

## 2021-01-01 PROCEDURE — 0225U NFCT DS DNA&RNA 21 SARSCOV2: CPT

## 2021-01-01 PROCEDURE — 93306 TTE W/DOPPLER COMPLETE: CPT

## 2021-01-01 PROCEDURE — 87086 URINE CULTURE/COLONY COUNT: CPT

## 2021-01-01 PROCEDURE — 86301 IMMUNOASSAY TUMOR CA 19-9: CPT

## 2021-01-01 PROCEDURE — 96374 THER/PROPH/DIAG INJ IV PUSH: CPT

## 2021-01-01 PROCEDURE — 82746 ASSAY OF FOLIC ACID SERUM: CPT

## 2021-01-01 PROCEDURE — 96375 TX/PRO/DX INJ NEW DRUG ADDON: CPT

## 2021-01-01 RX ORDER — DEXTROSE 50 % IN WATER 50 %
15 SYRINGE (ML) INTRAVENOUS ONCE
Refills: 0 | Status: DISCONTINUED | OUTPATIENT
Start: 2021-01-01 | End: 2021-01-01

## 2021-01-01 RX ORDER — PANTOPRAZOLE SODIUM 20 MG/1
40 TABLET, DELAYED RELEASE ORAL DAILY
Refills: 0 | Status: DISCONTINUED | OUTPATIENT
Start: 2021-01-01 | End: 2021-01-01

## 2021-01-01 RX ORDER — NOREPINEPHRINE BITARTRATE/D5W 8 MG/250ML
0.35 PLASTIC BAG, INJECTION (ML) INTRAVENOUS
Qty: 16 | Refills: 0 | Status: DISCONTINUED | OUTPATIENT
Start: 2021-01-01 | End: 2021-01-01

## 2021-01-01 RX ORDER — CEFOXITIN 1 G/1
1 INJECTION, POWDER, FOR SOLUTION INTRAVENOUS
Qty: 0 | Refills: 0 | DISCHARGE
End: 2020-02-18

## 2021-01-01 RX ORDER — METOPROLOL TARTRATE 50 MG
25 TABLET ORAL
Refills: 0 | Status: DISCONTINUED | OUTPATIENT
Start: 2021-01-01 | End: 2021-01-01

## 2021-01-01 RX ORDER — HYDRALAZINE HCL 50 MG
10 TABLET ORAL THREE TIMES A DAY
Refills: 0 | Status: DISCONTINUED | OUTPATIENT
Start: 2021-01-01 | End: 2021-01-01

## 2021-01-01 RX ORDER — ACETAMINOPHEN 500 MG
650 TABLET ORAL EVERY 6 HOURS
Refills: 0 | Status: DISCONTINUED | OUTPATIENT
Start: 2021-01-01 | End: 2021-01-01

## 2021-01-01 RX ORDER — ACETAMINOPHEN 500 MG
1000 TABLET ORAL ONCE
Refills: 0 | Status: DISCONTINUED | OUTPATIENT
Start: 2021-01-01 | End: 2021-01-01

## 2021-01-01 RX ORDER — INSULIN LISPRO 100/ML
VIAL (ML) SUBCUTANEOUS
Refills: 0 | Status: DISCONTINUED | OUTPATIENT
Start: 2021-01-01 | End: 2021-01-01

## 2021-01-01 RX ORDER — FOLIC ACID 0.8 MG
1 TABLET ORAL
Qty: 0 | Refills: 0 | DISCHARGE
Start: 2021-01-01

## 2021-01-01 RX ORDER — AMLODIPINE BESYLATE 2.5 MG/1
5 TABLET ORAL DAILY
Refills: 0 | Status: DISCONTINUED | OUTPATIENT
Start: 2021-01-01 | End: 2021-01-01

## 2021-01-01 RX ORDER — SODIUM CHLORIDE 9 MG/ML
1000 INJECTION, SOLUTION INTRAVENOUS
Refills: 0 | Status: DISCONTINUED | OUTPATIENT
Start: 2021-01-01 | End: 2021-01-01

## 2021-01-01 RX ORDER — POTASSIUM CHLORIDE 20 MEQ
10 PACKET (EA) ORAL
Refills: 0 | Status: COMPLETED | OUTPATIENT
Start: 2021-01-01 | End: 2021-01-01

## 2021-01-01 RX ORDER — NOREPINEPHRINE BITARTRATE/D5W 8 MG/250ML
0.05 PLASTIC BAG, INJECTION (ML) INTRAVENOUS
Qty: 16 | Refills: 0 | Status: DISCONTINUED | OUTPATIENT
Start: 2021-01-01 | End: 2021-01-01

## 2021-01-01 RX ORDER — SENNA PLUS 8.6 MG/1
2 TABLET ORAL AT BEDTIME
Refills: 0 | Status: DISCONTINUED | OUTPATIENT
Start: 2021-01-01 | End: 2021-01-01

## 2021-01-01 RX ORDER — WARFARIN SODIUM 2.5 MG/1
7.5 TABLET ORAL ONCE
Refills: 0 | Status: COMPLETED | OUTPATIENT
Start: 2021-01-01 | End: 2021-01-01

## 2021-01-01 RX ORDER — SODIUM CHLORIDE 9 MG/ML
1000 INJECTION, SOLUTION INTRAVENOUS
Refills: 0 | Status: COMPLETED | OUTPATIENT
Start: 2021-01-01 | End: 2021-01-01

## 2021-01-01 RX ORDER — METOPROLOL TARTRATE 50 MG
1 TABLET ORAL
Qty: 0 | Refills: 0 | DISCHARGE

## 2021-01-01 RX ORDER — AMLODIPINE BESYLATE 2.5 MG/1
2.5 TABLET ORAL DAILY
Refills: 0 | Status: DISCONTINUED | OUTPATIENT
Start: 2021-01-01 | End: 2021-01-01

## 2021-01-01 RX ORDER — POTASSIUM CHLORIDE 20 MEQ
40 PACKET (EA) ORAL ONCE
Refills: 0 | Status: COMPLETED | OUTPATIENT
Start: 2021-01-01 | End: 2021-01-01

## 2021-01-01 RX ORDER — SODIUM CHLORIDE 9 MG/ML
1000 INJECTION INTRAMUSCULAR; INTRAVENOUS; SUBCUTANEOUS ONCE
Refills: 0 | Status: COMPLETED | OUTPATIENT
Start: 2021-01-01 | End: 2021-01-01

## 2021-01-01 RX ORDER — SODIUM CHLORIDE 9 MG/ML
1000 INJECTION INTRAMUSCULAR; INTRAVENOUS; SUBCUTANEOUS
Refills: 0 | Status: DISCONTINUED | OUTPATIENT
Start: 2021-01-01 | End: 2021-01-01

## 2021-01-01 RX ORDER — NOREPINEPHRINE BITARTRATE/D5W 8 MG/250ML
0.29 PLASTIC BAG, INJECTION (ML) INTRAVENOUS
Qty: 16 | Refills: 0 | Status: DISCONTINUED | OUTPATIENT
Start: 2021-01-01 | End: 2021-01-01

## 2021-01-01 RX ORDER — HYDRALAZINE HCL 50 MG
25 TABLET ORAL
Refills: 0 | Status: DISCONTINUED | OUTPATIENT
Start: 2021-01-01 | End: 2021-01-01

## 2021-01-01 RX ORDER — DEXTROSE 50 % IN WATER 50 %
12.5 SYRINGE (ML) INTRAVENOUS ONCE
Refills: 0 | Status: DISCONTINUED | OUTPATIENT
Start: 2021-01-01 | End: 2021-01-01

## 2021-01-01 RX ORDER — CEFTRIAXONE 500 MG/1
1000 INJECTION, POWDER, FOR SOLUTION INTRAMUSCULAR; INTRAVENOUS ONCE
Refills: 0 | Status: COMPLETED | OUTPATIENT
Start: 2021-01-01 | End: 2021-01-01

## 2021-01-01 RX ORDER — WARFARIN SODIUM 2.5 MG/1
10 TABLET ORAL ONCE
Refills: 0 | Status: COMPLETED | OUTPATIENT
Start: 2021-01-01 | End: 2021-01-01

## 2021-01-01 RX ORDER — METOPROLOL TARTRATE 50 MG
25 TABLET ORAL ONCE
Refills: 0 | Status: COMPLETED | OUTPATIENT
Start: 2021-01-01 | End: 2021-01-01

## 2021-01-01 RX ORDER — APIXABAN 2.5 MG/1
5 TABLET, FILM COATED ORAL EVERY 12 HOURS
Refills: 0 | Status: DISCONTINUED | OUTPATIENT
Start: 2021-01-01 | End: 2021-01-01

## 2021-01-01 RX ORDER — FUROSEMIDE 40 MG
40 TABLET ORAL ONCE
Refills: 0 | Status: COMPLETED | OUTPATIENT
Start: 2021-01-01 | End: 2021-01-01

## 2021-01-01 RX ORDER — HEPARIN SODIUM 5000 [USP'U]/ML
5000 INJECTION INTRAVENOUS; SUBCUTANEOUS EVERY 8 HOURS
Refills: 0 | Status: DISCONTINUED | OUTPATIENT
Start: 2021-01-01 | End: 2021-01-01

## 2021-01-01 RX ORDER — VANCOMYCIN HCL 1 G
1000 VIAL (EA) INTRAVENOUS ONCE
Refills: 0 | Status: COMPLETED | OUTPATIENT
Start: 2021-01-01 | End: 2021-01-01

## 2021-01-01 RX ORDER — PANTOPRAZOLE SODIUM 20 MG/1
40 TABLET, DELAYED RELEASE ORAL
Refills: 0 | Status: DISCONTINUED | OUTPATIENT
Start: 2021-01-01 | End: 2021-01-01

## 2021-01-01 RX ORDER — NOREPINEPHRINE BITARTRATE/D5W 8 MG/250ML
0.03 PLASTIC BAG, INJECTION (ML) INTRAVENOUS
Qty: 16 | Refills: 0 | Status: DISCONTINUED | OUTPATIENT
Start: 2021-01-01 | End: 2021-01-01

## 2021-01-01 RX ORDER — HYDROCORTISONE 20 MG
40 TABLET ORAL EVERY 12 HOURS
Refills: 0 | Status: DISCONTINUED | OUTPATIENT
Start: 2021-01-01 | End: 2021-01-01

## 2021-01-01 RX ORDER — LOSARTAN POTASSIUM 100 MG/1
1 TABLET, FILM COATED ORAL
Qty: 0 | Refills: 0 | DISCHARGE

## 2021-01-01 RX ORDER — GLUCAGON INJECTION, SOLUTION 0.5 MG/.1ML
1 INJECTION, SOLUTION SUBCUTANEOUS ONCE
Refills: 0 | Status: DISCONTINUED | OUTPATIENT
Start: 2021-01-01 | End: 2021-01-01

## 2021-01-01 RX ORDER — ALBUTEROL 90 UG/1
2.5 AEROSOL, METERED ORAL EVERY 6 HOURS
Refills: 0 | Status: DISCONTINUED | OUTPATIENT
Start: 2021-01-01 | End: 2021-01-01

## 2021-01-01 RX ORDER — ACETAMINOPHEN 500 MG
1000 TABLET ORAL ONCE
Refills: 0 | Status: COMPLETED | OUTPATIENT
Start: 2021-01-01 | End: 2021-01-01

## 2021-01-01 RX ORDER — GABAPENTIN 400 MG/1
300 CAPSULE ORAL
Refills: 0 | Status: DISCONTINUED | OUTPATIENT
Start: 2021-01-01 | End: 2021-01-01

## 2021-01-01 RX ORDER — THIAMINE MONONITRATE (VIT B1) 100 MG
100 TABLET ORAL DAILY
Refills: 0 | Status: DISCONTINUED | OUTPATIENT
Start: 2021-01-01 | End: 2021-01-01

## 2021-01-01 RX ORDER — ASPIRIN/CALCIUM CARB/MAGNESIUM 324 MG
81 TABLET ORAL DAILY
Refills: 0 | Status: DISCONTINUED | OUTPATIENT
Start: 2021-01-01 | End: 2021-01-01

## 2021-01-01 RX ORDER — CEFEPIME 1 G/1
1000 INJECTION, POWDER, FOR SOLUTION INTRAMUSCULAR; INTRAVENOUS EVERY 24 HOURS
Refills: 0 | Status: DISCONTINUED | OUTPATIENT
Start: 2021-01-01 | End: 2021-01-01

## 2021-01-01 RX ORDER — FOLIC ACID 0.8 MG
1 TABLET ORAL DAILY
Refills: 0 | Status: DISCONTINUED | OUTPATIENT
Start: 2021-01-01 | End: 2021-01-01

## 2021-01-01 RX ORDER — ASPIRIN/CALCIUM CARB/MAGNESIUM 324 MG
1 TABLET ORAL
Qty: 0 | Refills: 0 | DISCHARGE

## 2021-01-01 RX ORDER — ESCITALOPRAM OXALATE 10 MG/1
5 TABLET, FILM COATED ORAL DAILY
Refills: 0 | Status: DISCONTINUED | OUTPATIENT
Start: 2021-01-01 | End: 2021-01-01

## 2021-01-01 RX ORDER — HYDROMORPHONE HYDROCHLORIDE 2 MG/ML
2 INJECTION INTRAMUSCULAR; INTRAVENOUS; SUBCUTANEOUS ONCE
Refills: 0 | Status: DISCONTINUED | OUTPATIENT
Start: 2021-01-01 | End: 2021-01-01

## 2021-01-01 RX ORDER — SODIUM BICARBONATE 1 MEQ/ML
0.24 SYRINGE (ML) INTRAVENOUS
Qty: 150 | Refills: 0 | Status: DISCONTINUED | OUTPATIENT
Start: 2021-01-01 | End: 2021-01-01

## 2021-01-01 RX ORDER — APIXABAN 2.5 MG/1
1 TABLET, FILM COATED ORAL
Qty: 0 | Refills: 0 | DISCHARGE

## 2021-01-01 RX ORDER — THIAMINE MONONITRATE (VIT B1) 100 MG
1 TABLET ORAL
Qty: 0 | Refills: 0 | DISCHARGE
Start: 2021-01-01

## 2021-01-01 RX ORDER — RISPERIDONE 4 MG/1
0.5 TABLET ORAL
Refills: 0 | Status: DISCONTINUED | OUTPATIENT
Start: 2021-01-01 | End: 2021-01-01

## 2021-01-01 RX ORDER — CEFEPIME 1 G/1
INJECTION, POWDER, FOR SOLUTION INTRAMUSCULAR; INTRAVENOUS
Refills: 0 | Status: DISCONTINUED | OUTPATIENT
Start: 2021-01-01 | End: 2021-01-01

## 2021-01-01 RX ORDER — PANTOPRAZOLE SODIUM 20 MG/1
40 TABLET, DELAYED RELEASE ORAL AT BEDTIME
Refills: 0 | Status: DISCONTINUED | OUTPATIENT
Start: 2021-01-01 | End: 2021-01-01

## 2021-01-01 RX ORDER — POTASSIUM CHLORIDE 20 MEQ
20 PACKET (EA) ORAL ONCE
Refills: 0 | Status: COMPLETED | OUTPATIENT
Start: 2021-01-01 | End: 2021-01-01

## 2021-01-01 RX ORDER — POTASSIUM PHOSPHATE, MONOBASIC POTASSIUM PHOSPHATE, DIBASIC 236; 224 MG/ML; MG/ML
15 INJECTION, SOLUTION INTRAVENOUS ONCE
Refills: 0 | Status: COMPLETED | OUTPATIENT
Start: 2021-01-01 | End: 2021-01-01

## 2021-01-01 RX ORDER — CHLORHEXIDINE GLUCONATE 213 G/1000ML
1 SOLUTION TOPICAL
Refills: 0 | Status: DISCONTINUED | OUTPATIENT
Start: 2021-01-01 | End: 2021-01-01

## 2021-01-01 RX ORDER — SODIUM CHLORIDE 9 MG/ML
1550 INJECTION INTRAMUSCULAR; INTRAVENOUS; SUBCUTANEOUS ONCE
Refills: 0 | Status: COMPLETED | OUTPATIENT
Start: 2021-01-01 | End: 2021-01-01

## 2021-01-01 RX ORDER — ALBUTEROL 90 UG/1
1 AEROSOL, METERED ORAL EVERY 4 HOURS
Refills: 0 | Status: DISCONTINUED | OUTPATIENT
Start: 2021-01-01 | End: 2021-01-01

## 2021-01-01 RX ORDER — METOPROLOL TARTRATE 50 MG
5 TABLET ORAL ONCE
Refills: 0 | Status: COMPLETED | OUTPATIENT
Start: 2021-01-01 | End: 2021-01-01

## 2021-01-01 RX ORDER — SODIUM BICARBONATE 1 MEQ/ML
100 SYRINGE (ML) INTRAVENOUS ONCE
Refills: 0 | Status: COMPLETED | OUTPATIENT
Start: 2021-01-01 | End: 2021-01-01

## 2021-01-01 RX ORDER — ASCORBIC ACID 60 MG
2 TABLET,CHEWABLE ORAL
Qty: 0 | Refills: 0 | DISCHARGE

## 2021-01-01 RX ORDER — ATORVASTATIN CALCIUM 80 MG/1
1 TABLET, FILM COATED ORAL
Qty: 0 | Refills: 0 | DISCHARGE

## 2021-01-01 RX ORDER — INSULIN LISPRO 100/ML
VIAL (ML) SUBCUTANEOUS AT BEDTIME
Refills: 0 | Status: DISCONTINUED | OUTPATIENT
Start: 2021-01-01 | End: 2021-01-01

## 2021-01-01 RX ORDER — METOPROLOL TARTRATE 50 MG
50 TABLET ORAL
Refills: 0 | Status: DISCONTINUED | OUTPATIENT
Start: 2021-01-01 | End: 2021-01-01

## 2021-01-01 RX ORDER — ALBUTEROL 90 UG/1
2 AEROSOL, METERED ORAL EVERY 6 HOURS
Refills: 0 | Status: DISCONTINUED | OUTPATIENT
Start: 2021-01-01 | End: 2021-01-01

## 2021-01-01 RX ORDER — METRONIDAZOLE 500 MG
500 TABLET ORAL ONCE
Refills: 0 | Status: COMPLETED | OUTPATIENT
Start: 2021-01-01 | End: 2021-01-01

## 2021-01-01 RX ORDER — METRONIDAZOLE 500 MG
TABLET ORAL
Refills: 0 | Status: DISCONTINUED | OUTPATIENT
Start: 2021-01-01 | End: 2021-01-01

## 2021-01-01 RX ORDER — CIPROFLOXACIN LACTATE 400MG/40ML
2 VIAL (ML) INTRAVENOUS
Qty: 0 | Refills: 0 | DISCHARGE
End: 2021-01-01

## 2021-01-01 RX ORDER — CILOSTAZOL 100 MG/1
100 TABLET ORAL
Refills: 0 | Status: DISCONTINUED | OUTPATIENT
Start: 2021-01-01 | End: 2021-01-01

## 2021-01-01 RX ORDER — SODIUM CHLORIDE 9 MG/ML
500 INJECTION INTRAMUSCULAR; INTRAVENOUS; SUBCUTANEOUS ONCE
Refills: 0 | Status: DISCONTINUED | OUTPATIENT
Start: 2021-01-01 | End: 2021-01-01

## 2021-01-01 RX ORDER — SODIUM BICARBONATE 1 MEQ/ML
0.28 SYRINGE (ML) INTRAVENOUS
Qty: 150 | Refills: 0 | Status: DISCONTINUED | OUTPATIENT
Start: 2021-01-01 | End: 2021-01-01

## 2021-01-01 RX ORDER — PHENYLEPHRINE HYDROCHLORIDE 10 MG/ML
0.3 INJECTION INTRAVENOUS
Qty: 40 | Refills: 0 | Status: DISCONTINUED | OUTPATIENT
Start: 2021-01-01 | End: 2021-01-01

## 2021-01-01 RX ORDER — SODIUM CHLORIDE 9 MG/ML
1500 INJECTION INTRAMUSCULAR; INTRAVENOUS; SUBCUTANEOUS ONCE
Refills: 0 | Status: COMPLETED | OUTPATIENT
Start: 2021-01-01 | End: 2021-01-01

## 2021-01-01 RX ORDER — AMLODIPINE BESYLATE 2.5 MG/1
10 TABLET ORAL DAILY
Refills: 0 | Status: DISCONTINUED | OUTPATIENT
Start: 2021-01-01 | End: 2021-01-01

## 2021-01-01 RX ORDER — ESCITALOPRAM OXALATE 10 MG/1
1 TABLET, FILM COATED ORAL
Qty: 0 | Refills: 0 | DISCHARGE

## 2021-01-01 RX ORDER — METOPROLOL TARTRATE 50 MG
50 TABLET ORAL ONCE
Refills: 0 | Status: COMPLETED | OUTPATIENT
Start: 2021-01-01 | End: 2021-01-01

## 2021-01-01 RX ORDER — PHENYLEPHRINE HYDROCHLORIDE 10 MG/ML
0.5 INJECTION INTRAVENOUS
Qty: 160 | Refills: 0 | Status: DISCONTINUED | OUTPATIENT
Start: 2021-01-01 | End: 2021-01-01

## 2021-01-01 RX ORDER — HYDRALAZINE HCL 50 MG
10 TABLET ORAL EVERY 8 HOURS
Refills: 0 | Status: DISCONTINUED | OUTPATIENT
Start: 2021-01-01 | End: 2021-01-01

## 2021-01-01 RX ORDER — MUPIROCIN 20 MG/G
1 OINTMENT TOPICAL
Refills: 0 | Status: COMPLETED | OUTPATIENT
Start: 2021-01-01 | End: 2021-01-01

## 2021-01-01 RX ORDER — SODIUM CHLORIDE 9 MG/ML
10 INJECTION INTRAMUSCULAR; INTRAVENOUS; SUBCUTANEOUS
Refills: 0 | Status: DISCONTINUED | OUTPATIENT
Start: 2021-01-01 | End: 2021-01-01

## 2021-01-01 RX ORDER — FUROSEMIDE 40 MG
40 TABLET ORAL DAILY
Refills: 0 | Status: COMPLETED | OUTPATIENT
Start: 2021-01-01 | End: 2021-01-01

## 2021-01-01 RX ORDER — HYDRALAZINE HCL 50 MG
10 TABLET ORAL
Refills: 0 | Status: DISCONTINUED | OUTPATIENT
Start: 2021-01-01 | End: 2021-01-01

## 2021-01-01 RX ORDER — DEXTROSE 50 % IN WATER 50 %
25 SYRINGE (ML) INTRAVENOUS ONCE
Refills: 0 | Status: DISCONTINUED | OUTPATIENT
Start: 2021-01-01 | End: 2021-01-01

## 2021-01-01 RX ORDER — SODIUM CHLORIDE 9 MG/ML
2800 INJECTION INTRAMUSCULAR; INTRAVENOUS; SUBCUTANEOUS ONCE
Refills: 0 | Status: COMPLETED | OUTPATIENT
Start: 2021-01-01 | End: 2021-01-01

## 2021-01-01 RX ORDER — CIPROFLOXACIN LACTATE 400MG/40ML
400 VIAL (ML) INTRAVENOUS EVERY 12 HOURS
Refills: 0 | Status: DISCONTINUED | OUTPATIENT
Start: 2021-01-01 | End: 2021-01-01

## 2021-01-01 RX ORDER — FERROUS SULFATE 325(65) MG
325 TABLET ORAL DAILY
Refills: 0 | Status: DISCONTINUED | OUTPATIENT
Start: 2021-01-01 | End: 2021-01-01

## 2021-01-01 RX ORDER — RISPERIDONE 4 MG/1
1 TABLET ORAL
Qty: 0 | Refills: 0 | DISCHARGE

## 2021-01-01 RX ORDER — FUROSEMIDE 40 MG
20 TABLET ORAL DAILY
Refills: 0 | Status: DISCONTINUED | OUTPATIENT
Start: 2021-01-01 | End: 2021-01-01

## 2021-01-01 RX ORDER — INSULIN LISPRO 100/ML
10 VIAL (ML) SUBCUTANEOUS ONCE
Refills: 0 | Status: COMPLETED | OUTPATIENT
Start: 2021-01-01 | End: 2021-01-01

## 2021-01-01 RX ORDER — NICOTINE POLACRILEX 2 MG
1 GUM BUCCAL
Qty: 0 | Refills: 0 | DISCHARGE
End: 2020-02-18

## 2021-01-01 RX ORDER — INSULIN GLARGINE 100 [IU]/ML
6 INJECTION, SOLUTION SUBCUTANEOUS EVERY MORNING
Refills: 0 | Status: DISCONTINUED | OUTPATIENT
Start: 2021-01-01 | End: 2021-01-01

## 2021-01-01 RX ORDER — AMLODIPINE BESYLATE 2.5 MG/1
2.5 TABLET ORAL ONCE
Refills: 0 | Status: COMPLETED | OUTPATIENT
Start: 2021-01-01 | End: 2021-01-01

## 2021-01-01 RX ORDER — CEFEPIME 1 G/1
1000 INJECTION, POWDER, FOR SOLUTION INTRAMUSCULAR; INTRAVENOUS ONCE
Refills: 0 | Status: COMPLETED | OUTPATIENT
Start: 2021-01-01 | End: 2021-01-01

## 2021-01-01 RX ORDER — METRONIDAZOLE 500 MG
500 TABLET ORAL EVERY 8 HOURS
Refills: 0 | Status: DISCONTINUED | OUTPATIENT
Start: 2021-01-01 | End: 2021-01-01

## 2021-01-01 RX ORDER — CILOSTAZOL 100 MG/1
1 TABLET ORAL
Qty: 0 | Refills: 0 | DISCHARGE

## 2021-01-01 RX ORDER — OMEPRAZOLE 10 MG/1
1 CAPSULE, DELAYED RELEASE ORAL
Qty: 0 | Refills: 0 | DISCHARGE

## 2021-01-01 RX ORDER — POLYETHYLENE GLYCOL 3350 17 G/17G
17 POWDER, FOR SOLUTION ORAL DAILY
Refills: 0 | Status: DISCONTINUED | OUTPATIENT
Start: 2021-01-01 | End: 2021-01-01

## 2021-01-01 RX ADMIN — Medication 4: at 08:19

## 2021-01-01 RX ADMIN — Medication 25 MILLIGRAM(S): at 05:55

## 2021-01-01 RX ADMIN — AMLODIPINE BESYLATE 5 MILLIGRAM(S): 2.5 TABLET ORAL at 17:31

## 2021-01-01 RX ADMIN — Medication 100 MILLIEQUIVALENT(S): at 11:41

## 2021-01-01 RX ADMIN — Medication 100 MILLIGRAM(S): at 05:06

## 2021-01-01 RX ADMIN — Medication 100 MILLIEQUIVALENT(S): at 12:18

## 2021-01-01 RX ADMIN — Medication 25 MILLIGRAM(S): at 05:30

## 2021-01-01 RX ADMIN — Medication 40 MILLIGRAM(S): at 06:10

## 2021-01-01 RX ADMIN — Medication 325 MILLIGRAM(S): at 12:04

## 2021-01-01 RX ADMIN — Medication 3: at 17:16

## 2021-01-01 RX ADMIN — Medication 200 MILLIGRAM(S): at 05:51

## 2021-01-01 RX ADMIN — Medication 250 MILLIGRAM(S): at 10:43

## 2021-01-01 RX ADMIN — Medication 100 MILLIEQUIVALENT(S): at 09:31

## 2021-01-01 RX ADMIN — Medication 10 UNIT(S): at 18:07

## 2021-01-01 RX ADMIN — CEFEPIME 100 MILLIGRAM(S): 1 INJECTION, POWDER, FOR SOLUTION INTRAMUSCULAR; INTRAVENOUS at 14:58

## 2021-01-01 RX ADMIN — Medication 40 MILLIGRAM(S): at 06:34

## 2021-01-01 RX ADMIN — Medication 100 MILLIGRAM(S): at 21:53

## 2021-01-01 RX ADMIN — SODIUM CHLORIDE 75 MILLILITER(S): 9 INJECTION, SOLUTION INTRAVENOUS at 13:45

## 2021-01-01 RX ADMIN — Medication 25 MILLIGRAM(S): at 17:32

## 2021-01-01 RX ADMIN — HEPARIN SODIUM 5000 UNIT(S): 5000 INJECTION INTRAVENOUS; SUBCUTANEOUS at 05:51

## 2021-01-01 RX ADMIN — Medication 100 MILLIGRAM(S): at 07:00

## 2021-01-01 RX ADMIN — Medication 10: at 17:08

## 2021-01-01 RX ADMIN — Medication 100 MILLIGRAM(S): at 21:26

## 2021-01-01 RX ADMIN — Medication 40 MILLIGRAM(S): at 11:41

## 2021-01-01 RX ADMIN — ALBUTEROL 2.5 MILLIGRAM(S): 90 AEROSOL, METERED ORAL at 15:45

## 2021-01-01 RX ADMIN — Medication 40 MILLIEQUIVALENT(S): at 11:03

## 2021-01-01 RX ADMIN — APIXABAN 5 MILLIGRAM(S): 2.5 TABLET, FILM COATED ORAL at 05:10

## 2021-01-01 RX ADMIN — Medication 40 MILLIGRAM(S): at 05:51

## 2021-01-01 RX ADMIN — ALBUTEROL 2.5 MILLIGRAM(S): 90 AEROSOL, METERED ORAL at 20:15

## 2021-01-01 RX ADMIN — Medication 1 TABLET(S): at 13:49

## 2021-01-01 RX ADMIN — CEFEPIME 100 MILLIGRAM(S): 1 INJECTION, POWDER, FOR SOLUTION INTRAMUSCULAR; INTRAVENOUS at 17:32

## 2021-01-01 RX ADMIN — PANTOPRAZOLE SODIUM 40 MILLIGRAM(S): 20 TABLET, DELAYED RELEASE ORAL at 21:42

## 2021-01-01 RX ADMIN — Medication 50 MILLIGRAM(S): at 17:30

## 2021-01-01 RX ADMIN — Medication 4: at 08:04

## 2021-01-01 RX ADMIN — Medication 40 MILLIGRAM(S): at 06:48

## 2021-01-01 RX ADMIN — HEPARIN SODIUM 5000 UNIT(S): 5000 INJECTION INTRAVENOUS; SUBCUTANEOUS at 13:31

## 2021-01-01 RX ADMIN — Medication 100 MILLIGRAM(S): at 13:49

## 2021-01-01 RX ADMIN — Medication 25 MILLIGRAM(S): at 23:43

## 2021-01-01 RX ADMIN — APIXABAN 5 MILLIGRAM(S): 2.5 TABLET, FILM COATED ORAL at 05:22

## 2021-01-01 RX ADMIN — Medication 100 MILLIGRAM(S): at 21:56

## 2021-01-01 RX ADMIN — Medication 50 MILLIGRAM(S): at 06:48

## 2021-01-01 RX ADMIN — Medication 100 MILLIGRAM(S): at 13:16

## 2021-01-01 RX ADMIN — Medication 100 MILLIGRAM(S): at 21:41

## 2021-01-01 RX ADMIN — GABAPENTIN 300 MILLIGRAM(S): 400 CAPSULE ORAL at 07:07

## 2021-01-01 RX ADMIN — Medication 10 MILLIGRAM(S): at 14:29

## 2021-01-01 RX ADMIN — Medication 100 MILLIGRAM(S): at 14:20

## 2021-01-01 RX ADMIN — SENNA PLUS 2 TABLET(S): 8.6 TABLET ORAL at 21:23

## 2021-01-01 RX ADMIN — Medication 50 MILLIGRAM(S): at 17:31

## 2021-01-01 RX ADMIN — SODIUM CHLORIDE 1000 MILLILITER(S): 9 INJECTION INTRAMUSCULAR; INTRAVENOUS; SUBCUTANEOUS at 16:51

## 2021-01-01 RX ADMIN — APIXABAN 5 MILLIGRAM(S): 2.5 TABLET, FILM COATED ORAL at 17:32

## 2021-01-01 RX ADMIN — CEFEPIME 100 MILLIGRAM(S): 1 INJECTION, POWDER, FOR SOLUTION INTRAMUSCULAR; INTRAVENOUS at 15:25

## 2021-01-01 RX ADMIN — Medication 25 MILLIGRAM(S): at 00:01

## 2021-01-01 RX ADMIN — HEPARIN SODIUM 5000 UNIT(S): 5000 INJECTION INTRAVENOUS; SUBCUTANEOUS at 13:22

## 2021-01-01 RX ADMIN — PANTOPRAZOLE SODIUM 40 MILLIGRAM(S): 20 TABLET, DELAYED RELEASE ORAL at 21:55

## 2021-01-01 RX ADMIN — MUPIROCIN 1 APPLICATION(S): 20 OINTMENT TOPICAL at 05:51

## 2021-01-01 RX ADMIN — PANTOPRAZOLE SODIUM 40 MILLIGRAM(S): 20 TABLET, DELAYED RELEASE ORAL at 21:23

## 2021-01-01 RX ADMIN — ALBUTEROL 2.5 MILLIGRAM(S): 90 AEROSOL, METERED ORAL at 08:21

## 2021-01-01 RX ADMIN — CEFEPIME 100 MILLIGRAM(S): 1 INJECTION, POWDER, FOR SOLUTION INTRAMUSCULAR; INTRAVENOUS at 17:31

## 2021-01-01 RX ADMIN — RISPERIDONE 0.5 MILLIGRAM(S): 4 TABLET ORAL at 17:42

## 2021-01-01 RX ADMIN — CEFEPIME 100 MILLIGRAM(S): 1 INJECTION, POWDER, FOR SOLUTION INTRAMUSCULAR; INTRAVENOUS at 17:04

## 2021-01-01 RX ADMIN — Medication 6: at 09:15

## 2021-01-01 RX ADMIN — CILOSTAZOL 100 MILLIGRAM(S): 100 TABLET ORAL at 17:36

## 2021-01-01 RX ADMIN — Medication 40 MILLIEQUIVALENT(S): at 09:23

## 2021-01-01 RX ADMIN — Medication 100 MILLIGRAM(S): at 21:23

## 2021-01-01 RX ADMIN — Medication 325 MILLIGRAM(S): at 17:09

## 2021-01-01 RX ADMIN — INSULIN GLARGINE 6 UNIT(S): 100 INJECTION, SOLUTION SUBCUTANEOUS at 08:10

## 2021-01-01 RX ADMIN — Medication 25 MILLIGRAM(S): at 17:30

## 2021-01-01 RX ADMIN — PANTOPRAZOLE SODIUM 40 MILLIGRAM(S): 20 TABLET, DELAYED RELEASE ORAL at 06:56

## 2021-01-01 RX ADMIN — Medication 100 MILLIGRAM(S): at 13:47

## 2021-01-01 RX ADMIN — PANTOPRAZOLE SODIUM 40 MILLIGRAM(S): 20 TABLET, DELAYED RELEASE ORAL at 05:52

## 2021-01-01 RX ADMIN — Medication 100 MILLIGRAM(S): at 14:29

## 2021-01-01 RX ADMIN — Medication 100 MILLIEQUIVALENT(S): at 13:38

## 2021-01-01 RX ADMIN — Medication 50 MILLIGRAM(S): at 05:52

## 2021-01-01 RX ADMIN — HEPARIN SODIUM 5000 UNIT(S): 5000 INJECTION INTRAVENOUS; SUBCUTANEOUS at 21:55

## 2021-01-01 RX ADMIN — Medication 25 MILLIGRAM(S): at 14:05

## 2021-01-01 RX ADMIN — Medication 100 MILLIEQUIVALENT(S): at 16:50

## 2021-01-01 RX ADMIN — Medication 650 MILLIGRAM(S): at 09:30

## 2021-01-01 RX ADMIN — Medication 50 MILLIGRAM(S): at 05:30

## 2021-01-01 RX ADMIN — PANTOPRAZOLE SODIUM 40 MILLIGRAM(S): 20 TABLET, DELAYED RELEASE ORAL at 05:12

## 2021-01-01 RX ADMIN — Medication 100 MILLIGRAM(S): at 14:33

## 2021-01-01 RX ADMIN — Medication 40 MILLIGRAM(S): at 05:52

## 2021-01-01 RX ADMIN — Medication 25 MILLIGRAM(S): at 13:26

## 2021-01-01 RX ADMIN — Medication 81 MILLIGRAM(S): at 12:05

## 2021-01-01 RX ADMIN — Medication 40 MILLIGRAM(S): at 06:01

## 2021-01-01 RX ADMIN — Medication 100 MILLIGRAM(S): at 21:55

## 2021-01-01 RX ADMIN — Medication 10 MILLIGRAM(S): at 05:08

## 2021-01-01 RX ADMIN — Medication 325 MILLIGRAM(S): at 11:42

## 2021-01-01 RX ADMIN — Medication 200 MILLIGRAM(S): at 17:34

## 2021-01-01 RX ADMIN — HEPARIN SODIUM 5000 UNIT(S): 5000 INJECTION INTRAVENOUS; SUBCUTANEOUS at 05:13

## 2021-01-01 RX ADMIN — Medication 325 MILLIGRAM(S): at 12:18

## 2021-01-01 RX ADMIN — Medication 50 MILLIGRAM(S): at 05:44

## 2021-01-01 RX ADMIN — Medication 25 MILLIGRAM(S): at 17:13

## 2021-01-01 RX ADMIN — Medication 100 MILLIGRAM(S): at 05:52

## 2021-01-01 RX ADMIN — Medication 40 MILLIGRAM(S): at 17:26

## 2021-01-01 RX ADMIN — Medication 25 MILLIGRAM(S): at 12:19

## 2021-01-01 RX ADMIN — Medication 125 MEQ/KG/HR: at 21:14

## 2021-01-01 RX ADMIN — Medication 25 MILLIGRAM(S): at 21:27

## 2021-01-01 RX ADMIN — CEFEPIME 100 MILLIGRAM(S): 1 INJECTION, POWDER, FOR SOLUTION INTRAMUSCULAR; INTRAVENOUS at 14:21

## 2021-01-01 RX ADMIN — PHENYLEPHRINE HYDROCHLORIDE 5.56 MICROGRAM(S)/KG/MIN: 10 INJECTION INTRAVENOUS at 16:51

## 2021-01-01 RX ADMIN — INSULIN GLARGINE 6 UNIT(S): 100 INJECTION, SOLUTION SUBCUTANEOUS at 08:17

## 2021-01-01 RX ADMIN — AMLODIPINE BESYLATE 10 MILLIGRAM(S): 2.5 TABLET ORAL at 07:03

## 2021-01-01 RX ADMIN — SODIUM CHLORIDE 1500 MILLILITER(S): 9 INJECTION INTRAMUSCULAR; INTRAVENOUS; SUBCUTANEOUS at 12:09

## 2021-01-01 RX ADMIN — PANTOPRAZOLE SODIUM 40 MILLIGRAM(S): 20 TABLET, DELAYED RELEASE ORAL at 22:03

## 2021-01-01 RX ADMIN — Medication 100 MILLIGRAM(S): at 05:56

## 2021-01-01 RX ADMIN — MUPIROCIN 1 APPLICATION(S): 20 OINTMENT TOPICAL at 05:37

## 2021-01-01 RX ADMIN — Medication 50 MILLIGRAM(S): at 06:34

## 2021-01-01 RX ADMIN — Medication 25 MILLIGRAM(S): at 02:11

## 2021-01-01 RX ADMIN — Medication 325 MILLIGRAM(S): at 11:43

## 2021-01-01 RX ADMIN — HEPARIN SODIUM 5000 UNIT(S): 5000 INJECTION INTRAVENOUS; SUBCUTANEOUS at 13:49

## 2021-01-01 RX ADMIN — Medication 3: at 12:18

## 2021-01-01 RX ADMIN — Medication 650 MILLIGRAM(S): at 22:00

## 2021-01-01 RX ADMIN — ALBUTEROL 2.5 MILLIGRAM(S): 90 AEROSOL, METERED ORAL at 20:06

## 2021-01-01 RX ADMIN — Medication 100 MILLIGRAM(S): at 05:23

## 2021-01-01 RX ADMIN — Medication 325 MILLIGRAM(S): at 11:03

## 2021-01-01 RX ADMIN — SENNA PLUS 2 TABLET(S): 8.6 TABLET ORAL at 21:05

## 2021-01-01 RX ADMIN — SENNA PLUS 2 TABLET(S): 8.6 TABLET ORAL at 21:32

## 2021-01-01 RX ADMIN — CEFEPIME 100 MILLIGRAM(S): 1 INJECTION, POWDER, FOR SOLUTION INTRAMUSCULAR; INTRAVENOUS at 15:18

## 2021-01-01 RX ADMIN — Medication 325 MILLIGRAM(S): at 12:24

## 2021-01-01 RX ADMIN — Medication 200 MILLIGRAM(S): at 07:05

## 2021-01-01 RX ADMIN — Medication 10 MILLIGRAM(S): at 22:05

## 2021-01-01 RX ADMIN — Medication 50 MILLIGRAM(S): at 17:26

## 2021-01-01 RX ADMIN — Medication 2: at 21:23

## 2021-01-01 RX ADMIN — GABAPENTIN 300 MILLIGRAM(S): 400 CAPSULE ORAL at 17:42

## 2021-01-01 RX ADMIN — Medication 10 MILLIGRAM(S): at 21:26

## 2021-01-01 RX ADMIN — Medication 1 MILLIGRAM(S): at 13:49

## 2021-01-01 RX ADMIN — INSULIN GLARGINE 6 UNIT(S): 100 INJECTION, SOLUTION SUBCUTANEOUS at 09:27

## 2021-01-01 RX ADMIN — CEFEPIME 100 MILLIGRAM(S): 1 INJECTION, POWDER, FOR SOLUTION INTRAMUSCULAR; INTRAVENOUS at 15:02

## 2021-01-01 RX ADMIN — Medication 100 MILLIGRAM(S): at 23:20

## 2021-01-01 RX ADMIN — ALBUTEROL 2.5 MILLIGRAM(S): 90 AEROSOL, METERED ORAL at 15:48

## 2021-01-01 RX ADMIN — Medication 100 MILLIGRAM(S): at 05:36

## 2021-01-01 RX ADMIN — Medication 40 MILLIGRAM(S): at 05:45

## 2021-01-01 RX ADMIN — Medication 10 MILLIGRAM(S): at 05:50

## 2021-01-01 RX ADMIN — Medication 100 MILLIEQUIVALENT(S): at 05:48

## 2021-01-01 RX ADMIN — SODIUM CHLORIDE 75 MILLILITER(S): 9 INJECTION, SOLUTION INTRAVENOUS at 12:17

## 2021-01-01 RX ADMIN — PANTOPRAZOLE SODIUM 40 MILLIGRAM(S): 20 TABLET, DELAYED RELEASE ORAL at 21:26

## 2021-01-01 RX ADMIN — Medication 18.2 MICROGRAM(S)/KG/MIN: at 11:00

## 2021-01-01 RX ADMIN — Medication 100 MILLIGRAM(S): at 14:08

## 2021-01-01 RX ADMIN — MUPIROCIN 1 APPLICATION(S): 20 OINTMENT TOPICAL at 18:07

## 2021-01-01 RX ADMIN — Medication 50 MILLIGRAM(S): at 06:56

## 2021-01-01 RX ADMIN — Medication 50 MILLIGRAM(S): at 05:51

## 2021-01-01 RX ADMIN — APIXABAN 5 MILLIGRAM(S): 2.5 TABLET, FILM COATED ORAL at 17:13

## 2021-01-01 RX ADMIN — AMLODIPINE BESYLATE 10 MILLIGRAM(S): 2.5 TABLET ORAL at 06:01

## 2021-01-01 RX ADMIN — Medication 1 TABLET(S): at 12:05

## 2021-01-01 RX ADMIN — PANTOPRAZOLE SODIUM 40 MILLIGRAM(S): 20 TABLET, DELAYED RELEASE ORAL at 21:03

## 2021-01-01 RX ADMIN — SENNA PLUS 2 TABLET(S): 8.6 TABLET ORAL at 21:26

## 2021-01-01 RX ADMIN — Medication 40 MILLIGRAM(S): at 05:08

## 2021-01-01 RX ADMIN — Medication 50 MILLIGRAM(S): at 06:15

## 2021-01-01 RX ADMIN — PANTOPRAZOLE SODIUM 40 MILLIGRAM(S): 20 TABLET, DELAYED RELEASE ORAL at 22:33

## 2021-01-01 RX ADMIN — POLYETHYLENE GLYCOL 3350 17 GRAM(S): 17 POWDER, FOR SOLUTION ORAL at 12:05

## 2021-01-01 RX ADMIN — HEPARIN SODIUM 5000 UNIT(S): 5000 INJECTION INTRAVENOUS; SUBCUTANEOUS at 22:59

## 2021-01-01 RX ADMIN — Medication 50 MILLIGRAM(S): at 06:05

## 2021-01-01 RX ADMIN — HEPARIN SODIUM 5000 UNIT(S): 5000 INJECTION INTRAVENOUS; SUBCUTANEOUS at 21:35

## 2021-01-01 RX ADMIN — PANTOPRAZOLE SODIUM 40 MILLIGRAM(S): 20 TABLET, DELAYED RELEASE ORAL at 21:56

## 2021-01-01 RX ADMIN — CILOSTAZOL 100 MILLIGRAM(S): 100 TABLET ORAL at 17:42

## 2021-01-01 RX ADMIN — CEFEPIME 100 MILLIGRAM(S): 1 INJECTION, POWDER, FOR SOLUTION INTRAMUSCULAR; INTRAVENOUS at 15:26

## 2021-01-01 RX ADMIN — Medication 10 MILLIGRAM(S): at 06:35

## 2021-01-01 RX ADMIN — MUPIROCIN 1 APPLICATION(S): 20 OINTMENT TOPICAL at 17:16

## 2021-01-01 RX ADMIN — CILOSTAZOL 100 MILLIGRAM(S): 100 TABLET ORAL at 06:56

## 2021-01-01 RX ADMIN — PANTOPRAZOLE SODIUM 40 MILLIGRAM(S): 20 TABLET, DELAYED RELEASE ORAL at 21:53

## 2021-01-01 RX ADMIN — RISPERIDONE 0.5 MILLIGRAM(S): 4 TABLET ORAL at 05:12

## 2021-01-01 RX ADMIN — Medication 25 MILLIGRAM(S): at 18:15

## 2021-01-01 RX ADMIN — APIXABAN 5 MILLIGRAM(S): 2.5 TABLET, FILM COATED ORAL at 17:30

## 2021-01-01 RX ADMIN — Medication 40 MILLIGRAM(S): at 05:40

## 2021-01-01 RX ADMIN — Medication 40 MILLIGRAM(S): at 06:05

## 2021-01-01 RX ADMIN — ALBUTEROL 2.5 MILLIGRAM(S): 90 AEROSOL, METERED ORAL at 08:57

## 2021-01-01 RX ADMIN — Medication 100 MILLIGRAM(S): at 05:46

## 2021-01-01 RX ADMIN — Medication 25 MILLIGRAM(S): at 11:41

## 2021-01-01 RX ADMIN — Medication 25 MILLIGRAM(S): at 07:03

## 2021-01-01 RX ADMIN — CEFEPIME 100 MILLIGRAM(S): 1 INJECTION, POWDER, FOR SOLUTION INTRAMUSCULAR; INTRAVENOUS at 16:50

## 2021-01-01 RX ADMIN — AMLODIPINE BESYLATE 10 MILLIGRAM(S): 2.5 TABLET ORAL at 06:08

## 2021-01-01 RX ADMIN — Medication 8: at 22:14

## 2021-01-01 RX ADMIN — Medication 650 MILLIGRAM(S): at 06:15

## 2021-01-01 RX ADMIN — ESCITALOPRAM OXALATE 5 MILLIGRAM(S): 10 TABLET, FILM COATED ORAL at 12:09

## 2021-01-01 RX ADMIN — POLYETHYLENE GLYCOL 3350 17 GRAM(S): 17 POWDER, FOR SOLUTION ORAL at 13:49

## 2021-01-01 RX ADMIN — Medication 25 MILLIGRAM(S): at 12:05

## 2021-01-01 RX ADMIN — Medication 5 MILLIGRAM(S): at 06:38

## 2021-01-01 RX ADMIN — RISPERIDONE 0.5 MILLIGRAM(S): 4 TABLET ORAL at 17:36

## 2021-01-01 RX ADMIN — AMLODIPINE BESYLATE 10 MILLIGRAM(S): 2.5 TABLET ORAL at 06:48

## 2021-01-01 RX ADMIN — Medication 100 MILLIGRAM(S): at 21:12

## 2021-01-01 RX ADMIN — Medication 50 MILLIGRAM(S): at 05:46

## 2021-01-01 RX ADMIN — Medication 50 MILLIGRAM(S): at 17:32

## 2021-01-01 RX ADMIN — Medication 25 MILLIGRAM(S): at 06:32

## 2021-01-01 RX ADMIN — CILOSTAZOL 100 MILLIGRAM(S): 100 TABLET ORAL at 05:52

## 2021-01-01 RX ADMIN — RISPERIDONE 0.5 MILLIGRAM(S): 4 TABLET ORAL at 17:34

## 2021-01-01 RX ADMIN — Medication 25 MILLIGRAM(S): at 17:40

## 2021-01-01 RX ADMIN — HEPARIN SODIUM 5000 UNIT(S): 5000 INJECTION INTRAVENOUS; SUBCUTANEOUS at 05:07

## 2021-01-01 RX ADMIN — CILOSTAZOL 100 MILLIGRAM(S): 100 TABLET ORAL at 05:12

## 2021-01-01 RX ADMIN — AMLODIPINE BESYLATE 2.5 MILLIGRAM(S): 2.5 TABLET ORAL at 17:31

## 2021-01-01 RX ADMIN — SENNA PLUS 2 TABLET(S): 8.6 TABLET ORAL at 21:27

## 2021-01-01 RX ADMIN — Medication 40 MILLIGRAM(S): at 14:29

## 2021-01-01 RX ADMIN — Medication 40 MILLIGRAM(S): at 05:22

## 2021-01-01 RX ADMIN — SENNA PLUS 2 TABLET(S): 8.6 TABLET ORAL at 21:41

## 2021-01-01 RX ADMIN — Medication 4: at 17:13

## 2021-01-01 RX ADMIN — Medication 4: at 22:09

## 2021-01-01 RX ADMIN — Medication 25 MILLIGRAM(S): at 17:46

## 2021-01-01 RX ADMIN — Medication 100 MILLIGRAM(S): at 05:41

## 2021-01-01 RX ADMIN — PANTOPRAZOLE SODIUM 40 MILLIGRAM(S): 20 TABLET, DELAYED RELEASE ORAL at 21:05

## 2021-01-01 RX ADMIN — APIXABAN 5 MILLIGRAM(S): 2.5 TABLET, FILM COATED ORAL at 05:36

## 2021-01-01 RX ADMIN — Medication 2: at 17:30

## 2021-01-01 RX ADMIN — Medication 2: at 12:00

## 2021-01-01 RX ADMIN — Medication 6: at 21:56

## 2021-01-01 RX ADMIN — CILOSTAZOL 100 MILLIGRAM(S): 100 TABLET ORAL at 05:55

## 2021-01-01 RX ADMIN — Medication 100 MILLIEQUIVALENT(S): at 07:47

## 2021-01-01 RX ADMIN — CEFEPIME 100 MILLIGRAM(S): 1 INJECTION, POWDER, FOR SOLUTION INTRAMUSCULAR; INTRAVENOUS at 15:01

## 2021-01-01 RX ADMIN — Medication 40 MILLIGRAM(S): at 07:03

## 2021-01-01 RX ADMIN — Medication 40 MILLIGRAM(S): at 13:15

## 2021-01-01 RX ADMIN — AMLODIPINE BESYLATE 2.5 MILLIGRAM(S): 2.5 TABLET ORAL at 06:05

## 2021-01-01 RX ADMIN — CHLORHEXIDINE GLUCONATE 1 APPLICATION(S): 213 SOLUTION TOPICAL at 05:24

## 2021-01-01 RX ADMIN — AMLODIPINE BESYLATE 5 MILLIGRAM(S): 2.5 TABLET ORAL at 07:21

## 2021-01-01 RX ADMIN — SENNA PLUS 2 TABLET(S): 8.6 TABLET ORAL at 22:34

## 2021-01-01 RX ADMIN — Medication 40 MILLIGRAM(S): at 22:03

## 2021-01-01 RX ADMIN — AMLODIPINE BESYLATE 10 MILLIGRAM(S): 2.5 TABLET ORAL at 06:15

## 2021-01-01 RX ADMIN — Medication 2: at 08:31

## 2021-01-01 RX ADMIN — CEFTRIAXONE 100 MILLIGRAM(S): 500 INJECTION, POWDER, FOR SOLUTION INTRAMUSCULAR; INTRAVENOUS at 12:09

## 2021-01-01 RX ADMIN — SODIUM CHLORIDE 2800 MILLILITER(S): 9 INJECTION INTRAMUSCULAR; INTRAVENOUS; SUBCUTANEOUS at 15:00

## 2021-01-01 RX ADMIN — Medication 40 MILLIGRAM(S): at 10:55

## 2021-01-01 RX ADMIN — Medication 25 MILLIGRAM(S): at 00:13

## 2021-01-01 RX ADMIN — Medication 325 MILLIGRAM(S): at 11:56

## 2021-01-01 RX ADMIN — MUPIROCIN 1 APPLICATION(S): 20 OINTMENT TOPICAL at 17:14

## 2021-01-01 RX ADMIN — Medication 25 MILLIGRAM(S): at 06:06

## 2021-01-01 RX ADMIN — Medication 100 MILLIGRAM(S): at 13:15

## 2021-01-01 RX ADMIN — GABAPENTIN 300 MILLIGRAM(S): 400 CAPSULE ORAL at 17:37

## 2021-01-01 RX ADMIN — Medication 50 MILLIGRAM(S): at 18:07

## 2021-01-01 RX ADMIN — Medication 40 MILLIGRAM(S): at 13:39

## 2021-01-01 RX ADMIN — Medication 2: at 17:46

## 2021-01-01 RX ADMIN — Medication 50 MILLIGRAM(S): at 18:15

## 2021-01-01 RX ADMIN — CHLORHEXIDINE GLUCONATE 1 APPLICATION(S): 213 SOLUTION TOPICAL at 06:38

## 2021-01-01 RX ADMIN — GABAPENTIN 300 MILLIGRAM(S): 400 CAPSULE ORAL at 17:36

## 2021-01-01 RX ADMIN — PANTOPRAZOLE SODIUM 40 MILLIGRAM(S): 20 TABLET, DELAYED RELEASE ORAL at 05:55

## 2021-01-01 RX ADMIN — Medication 25 MILLIGRAM(S): at 06:08

## 2021-01-01 RX ADMIN — CEFEPIME 100 MILLIGRAM(S): 1 INJECTION, POWDER, FOR SOLUTION INTRAMUSCULAR; INTRAVENOUS at 15:00

## 2021-01-01 RX ADMIN — SODIUM CHLORIDE 1550 MILLILITER(S): 9 INJECTION INTRAMUSCULAR; INTRAVENOUS; SUBCUTANEOUS at 10:47

## 2021-01-01 RX ADMIN — GABAPENTIN 300 MILLIGRAM(S): 400 CAPSULE ORAL at 05:12

## 2021-01-01 RX ADMIN — Medication 81 MILLIGRAM(S): at 13:49

## 2021-01-01 RX ADMIN — Medication 25 MILLIGRAM(S): at 12:12

## 2021-01-01 RX ADMIN — Medication 100 MILLIGRAM(S): at 05:08

## 2021-01-01 RX ADMIN — Medication 20 MILLIEQUIVALENT(S): at 12:07

## 2021-01-01 RX ADMIN — Medication 125 MEQ/KG/HR: at 04:24

## 2021-01-01 RX ADMIN — APIXABAN 5 MILLIGRAM(S): 2.5 TABLET, FILM COATED ORAL at 06:09

## 2021-01-01 RX ADMIN — WARFARIN SODIUM 10 MILLIGRAM(S): 2.5 TABLET ORAL at 00:33

## 2021-01-01 RX ADMIN — Medication 20 MILLIGRAM(S): at 14:32

## 2021-01-01 RX ADMIN — Medication 100 MILLIGRAM(S): at 21:27

## 2021-01-01 RX ADMIN — Medication 25 MILLIGRAM(S): at 05:53

## 2021-01-01 RX ADMIN — Medication 25 MILLIGRAM(S): at 05:46

## 2021-01-01 RX ADMIN — Medication 100 MILLIGRAM(S): at 06:40

## 2021-01-01 RX ADMIN — Medication 25 MILLIGRAM(S): at 17:02

## 2021-01-01 RX ADMIN — Medication 10 MILLIGRAM(S): at 14:08

## 2021-01-01 RX ADMIN — SODIUM CHLORIDE 50 MILLILITER(S): 9 INJECTION, SOLUTION INTRAVENOUS at 21:51

## 2021-01-01 RX ADMIN — GABAPENTIN 300 MILLIGRAM(S): 400 CAPSULE ORAL at 05:52

## 2021-01-01 RX ADMIN — Medication 8: at 08:11

## 2021-01-01 RX ADMIN — Medication 100 MILLIGRAM(S): at 21:02

## 2021-01-01 RX ADMIN — Medication 1000 MILLIGRAM(S): at 22:42

## 2021-01-01 RX ADMIN — Medication 81 MILLIGRAM(S): at 11:28

## 2021-01-01 RX ADMIN — Medication 10 MILLIGRAM(S): at 11:42

## 2021-01-01 RX ADMIN — Medication 650 MILLIGRAM(S): at 05:30

## 2021-01-01 RX ADMIN — Medication 100 MILLIGRAM(S): at 14:04

## 2021-01-01 RX ADMIN — Medication 25 MILLIGRAM(S): at 23:26

## 2021-01-01 RX ADMIN — Medication 100 MILLIGRAM(S): at 22:04

## 2021-01-01 RX ADMIN — Medication 400 MILLIGRAM(S): at 22:35

## 2021-01-01 RX ADMIN — Medication 100 MILLIGRAM(S): at 21:04

## 2021-01-01 RX ADMIN — AMLODIPINE BESYLATE 5 MILLIGRAM(S): 2.5 TABLET ORAL at 05:52

## 2021-01-01 RX ADMIN — PANTOPRAZOLE SODIUM 40 MILLIGRAM(S): 20 TABLET, DELAYED RELEASE ORAL at 22:08

## 2021-01-01 RX ADMIN — APIXABAN 5 MILLIGRAM(S): 2.5 TABLET, FILM COATED ORAL at 18:07

## 2021-01-01 RX ADMIN — CEFEPIME 100 MILLIGRAM(S): 1 INJECTION, POWDER, FOR SOLUTION INTRAMUSCULAR; INTRAVENOUS at 15:36

## 2021-01-01 RX ADMIN — SODIUM CHLORIDE 75 MILLILITER(S): 9 INJECTION, SOLUTION INTRAVENOUS at 22:03

## 2021-01-01 RX ADMIN — Medication 20 MILLIEQUIVALENT(S): at 08:19

## 2021-01-01 RX ADMIN — HEPARIN SODIUM 5000 UNIT(S): 5000 INJECTION INTRAVENOUS; SUBCUTANEOUS at 06:56

## 2021-01-01 RX ADMIN — SENNA PLUS 2 TABLET(S): 8.6 TABLET ORAL at 00:35

## 2021-01-01 RX ADMIN — Medication 8: at 17:25

## 2021-01-01 RX ADMIN — SODIUM CHLORIDE 6000 MILLILITER(S): 9 INJECTION INTRAMUSCULAR; INTRAVENOUS; SUBCUTANEOUS at 18:14

## 2021-01-01 RX ADMIN — Medication 50 MILLIGRAM(S): at 05:08

## 2021-01-01 RX ADMIN — SENNA PLUS 2 TABLET(S): 8.6 TABLET ORAL at 22:59

## 2021-01-01 RX ADMIN — Medication 100 MILLIEQUIVALENT(S): at 10:38

## 2021-01-01 RX ADMIN — GABAPENTIN 300 MILLIGRAM(S): 400 CAPSULE ORAL at 05:55

## 2021-01-01 RX ADMIN — MUPIROCIN 1 APPLICATION(S): 20 OINTMENT TOPICAL at 06:10

## 2021-01-01 RX ADMIN — Medication 25 MILLIGRAM(S): at 12:09

## 2021-01-01 RX ADMIN — RISPERIDONE 0.5 MILLIGRAM(S): 4 TABLET ORAL at 06:56

## 2021-01-01 RX ADMIN — Medication 100 MILLIGRAM(S): at 06:09

## 2021-01-01 RX ADMIN — SENNA PLUS 2 TABLET(S): 8.6 TABLET ORAL at 22:04

## 2021-01-01 RX ADMIN — MUPIROCIN 1 APPLICATION(S): 20 OINTMENT TOPICAL at 05:09

## 2021-01-01 RX ADMIN — Medication 6: at 07:45

## 2021-01-01 RX ADMIN — HEPARIN SODIUM 5000 UNIT(S): 5000 INJECTION INTRAVENOUS; SUBCUTANEOUS at 05:58

## 2021-01-01 RX ADMIN — CILOSTAZOL 100 MILLIGRAM(S): 100 TABLET ORAL at 17:34

## 2021-01-01 RX ADMIN — Medication 650 MILLIGRAM(S): at 21:31

## 2021-01-01 RX ADMIN — Medication 400 MILLIGRAM(S): at 21:42

## 2021-01-01 RX ADMIN — SENNA PLUS 2 TABLET(S): 8.6 TABLET ORAL at 21:35

## 2021-01-01 RX ADMIN — Medication 50 MILLIGRAM(S): at 17:40

## 2021-01-01 RX ADMIN — Medication 200 MILLIGRAM(S): at 05:55

## 2021-01-01 RX ADMIN — Medication 40 MILLIGRAM(S): at 05:30

## 2021-01-01 RX ADMIN — Medication 4: at 08:26

## 2021-01-01 RX ADMIN — SODIUM CHLORIDE 50 MILLILITER(S): 9 INJECTION, SOLUTION INTRAVENOUS at 13:39

## 2021-01-01 RX ADMIN — Medication 4: at 12:12

## 2021-01-01 RX ADMIN — RISPERIDONE 0.5 MILLIGRAM(S): 4 TABLET ORAL at 05:55

## 2021-01-01 RX ADMIN — SODIUM CHLORIDE 75 MILLILITER(S): 9 INJECTION, SOLUTION INTRAVENOUS at 05:09

## 2021-01-01 RX ADMIN — Medication 8: at 12:39

## 2021-01-01 RX ADMIN — SODIUM CHLORIDE 60 MILLILITER(S): 9 INJECTION, SOLUTION INTRAVENOUS at 14:33

## 2021-01-01 RX ADMIN — INSULIN GLARGINE 6 UNIT(S): 100 INJECTION, SOLUTION SUBCUTANEOUS at 08:31

## 2021-01-01 RX ADMIN — APIXABAN 5 MILLIGRAM(S): 2.5 TABLET, FILM COATED ORAL at 05:09

## 2021-01-01 RX ADMIN — Medication 80 MEQ/KG/HR: at 18:14

## 2021-01-01 RX ADMIN — Medication 100 MILLIGRAM(S): at 14:27

## 2021-01-01 RX ADMIN — Medication 8: at 11:42

## 2021-01-01 RX ADMIN — Medication 25 MILLIGRAM(S): at 11:56

## 2021-01-01 RX ADMIN — MUPIROCIN 1 APPLICATION(S): 20 OINTMENT TOPICAL at 05:41

## 2021-01-01 RX ADMIN — PANTOPRAZOLE SODIUM 40 MILLIGRAM(S): 20 TABLET, DELAYED RELEASE ORAL at 21:02

## 2021-01-01 RX ADMIN — Medication 40 MILLIGRAM(S): at 21:03

## 2021-01-01 RX ADMIN — Medication 25 MILLIGRAM(S): at 22:02

## 2021-01-01 RX ADMIN — PANTOPRAZOLE SODIUM 40 MILLIGRAM(S): 20 TABLET, DELAYED RELEASE ORAL at 21:30

## 2021-01-01 RX ADMIN — SODIUM CHLORIDE 70 MILLILITER(S): 9 INJECTION INTRAMUSCULAR; INTRAVENOUS; SUBCUTANEOUS at 00:41

## 2021-01-01 RX ADMIN — Medication 100 MILLIGRAM(S): at 06:34

## 2021-01-01 RX ADMIN — SENNA PLUS 2 TABLET(S): 8.6 TABLET ORAL at 21:57

## 2021-01-01 RX ADMIN — Medication 100 MILLIGRAM(S): at 13:52

## 2021-01-01 RX ADMIN — ESCITALOPRAM OXALATE 5 MILLIGRAM(S): 10 TABLET, FILM COATED ORAL at 13:49

## 2021-01-01 RX ADMIN — Medication 40 MILLIGRAM(S): at 06:09

## 2021-01-01 RX ADMIN — AMLODIPINE BESYLATE 10 MILLIGRAM(S): 2.5 TABLET ORAL at 05:30

## 2021-01-01 RX ADMIN — Medication 25 MILLIGRAM(S): at 06:47

## 2021-01-01 RX ADMIN — HEPARIN SODIUM 5000 UNIT(S): 5000 INJECTION INTRAVENOUS; SUBCUTANEOUS at 13:47

## 2021-01-01 RX ADMIN — AMLODIPINE BESYLATE 10 MILLIGRAM(S): 2.5 TABLET ORAL at 17:46

## 2021-01-01 RX ADMIN — Medication 22 MICROGRAM(S)/KG/MIN: at 05:25

## 2021-01-01 RX ADMIN — Medication 100 MILLIGRAM(S): at 22:02

## 2021-01-01 RX ADMIN — Medication 40 MILLIGRAM(S): at 14:45

## 2021-01-01 RX ADMIN — Medication 40 MILLIGRAM(S): at 05:50

## 2021-01-01 RX ADMIN — Medication 1000 MILLIGRAM(S): at 23:00

## 2021-01-01 RX ADMIN — Medication 22 MICROGRAM(S)/KG/MIN: at 21:30

## 2021-01-01 RX ADMIN — Medication 25 MILLIGRAM(S): at 01:20

## 2021-01-01 RX ADMIN — SODIUM CHLORIDE 1550 MILLILITER(S): 9 INJECTION INTRAMUSCULAR; INTRAVENOUS; SUBCUTANEOUS at 11:47

## 2021-01-01 RX ADMIN — APIXABAN 5 MILLIGRAM(S): 2.5 TABLET, FILM COATED ORAL at 05:46

## 2021-01-01 RX ADMIN — Medication 25 MILLIGRAM(S): at 00:32

## 2021-01-01 RX ADMIN — Medication 40 MILLIGRAM(S): at 05:42

## 2021-01-01 RX ADMIN — Medication 100 MILLIGRAM(S): at 14:19

## 2021-01-01 RX ADMIN — HEPARIN SODIUM 5000 UNIT(S): 5000 INJECTION INTRAVENOUS; SUBCUTANEOUS at 00:34

## 2021-01-01 RX ADMIN — Medication 100 MILLIGRAM(S): at 13:13

## 2021-01-01 RX ADMIN — POLYETHYLENE GLYCOL 3350 17 GRAM(S): 17 POWDER, FOR SOLUTION ORAL at 11:28

## 2021-01-01 RX ADMIN — Medication 650 MILLIGRAM(S): at 15:09

## 2021-01-01 RX ADMIN — Medication 6: at 12:04

## 2021-01-01 RX ADMIN — Medication 6: at 11:56

## 2021-01-01 RX ADMIN — Medication 50 MILLIGRAM(S): at 17:47

## 2021-01-01 RX ADMIN — Medication 5 MILLIGRAM(S): at 17:34

## 2021-01-01 RX ADMIN — Medication 100 MILLIGRAM(S): at 21:57

## 2021-01-01 RX ADMIN — SENNA PLUS 2 TABLET(S): 8.6 TABLET ORAL at 22:00

## 2021-01-01 RX ADMIN — ESCITALOPRAM OXALATE 5 MILLIGRAM(S): 10 TABLET, FILM COATED ORAL at 12:05

## 2021-01-01 RX ADMIN — Medication 100 MILLIEQUIVALENT(S): at 06:47

## 2021-01-01 RX ADMIN — Medication 25 MILLIGRAM(S): at 05:22

## 2021-01-01 RX ADMIN — Medication 25 MILLIGRAM(S): at 01:13

## 2021-01-01 RX ADMIN — WARFARIN SODIUM 7.5 MILLIGRAM(S): 2.5 TABLET ORAL at 21:32

## 2021-01-01 RX ADMIN — Medication 25 MILLIGRAM(S): at 17:34

## 2021-01-01 RX ADMIN — Medication 4: at 11:41

## 2021-01-01 RX ADMIN — Medication 40 MILLIGRAM(S): at 14:49

## 2021-01-01 RX ADMIN — HEPARIN SODIUM 5000 UNIT(S): 5000 INJECTION INTRAVENOUS; SUBCUTANEOUS at 21:32

## 2021-01-01 RX ADMIN — APIXABAN 5 MILLIGRAM(S): 2.5 TABLET, FILM COATED ORAL at 06:05

## 2021-01-01 RX ADMIN — Medication 50 MILLIGRAM(S): at 06:01

## 2021-01-01 RX ADMIN — Medication 50 MILLIGRAM(S): at 17:02

## 2021-01-01 RX ADMIN — Medication 25 MILLIGRAM(S): at 05:52

## 2021-01-01 RX ADMIN — APIXABAN 5 MILLIGRAM(S): 2.5 TABLET, FILM COATED ORAL at 17:09

## 2021-01-01 RX ADMIN — INSULIN GLARGINE 6 UNIT(S): 100 INJECTION, SOLUTION SUBCUTANEOUS at 08:26

## 2021-01-01 RX ADMIN — Medication 25 MILLIGRAM(S): at 05:50

## 2021-01-01 RX ADMIN — ESCITALOPRAM OXALATE 5 MILLIGRAM(S): 10 TABLET, FILM COATED ORAL at 11:28

## 2021-01-01 RX ADMIN — Medication 1 MILLIGRAM(S): at 12:05

## 2021-01-01 RX ADMIN — SENNA PLUS 2 TABLET(S): 8.6 TABLET ORAL at 21:30

## 2021-01-01 RX ADMIN — POLYETHYLENE GLYCOL 3350 17 GRAM(S): 17 POWDER, FOR SOLUTION ORAL at 12:09

## 2021-01-01 RX ADMIN — Medication 200 MILLIGRAM(S): at 17:43

## 2021-01-01 RX ADMIN — APIXABAN 5 MILLIGRAM(S): 2.5 TABLET, FILM COATED ORAL at 05:52

## 2021-01-01 RX ADMIN — Medication 100 MILLIGRAM(S): at 16:50

## 2021-01-01 RX ADMIN — AMLODIPINE BESYLATE 2.5 MILLIGRAM(S): 2.5 TABLET ORAL at 17:47

## 2021-01-01 RX ADMIN — PANTOPRAZOLE SODIUM 40 MILLIGRAM(S): 20 TABLET, DELAYED RELEASE ORAL at 21:57

## 2021-01-01 RX ADMIN — Medication 25 MILLIGRAM(S): at 17:41

## 2021-01-01 RX ADMIN — Medication 25 MILLIGRAM(S): at 17:42

## 2021-01-01 RX ADMIN — CHLORHEXIDINE GLUCONATE 1 APPLICATION(S): 213 SOLUTION TOPICAL at 05:06

## 2021-01-01 RX ADMIN — APIXABAN 5 MILLIGRAM(S): 2.5 TABLET, FILM COATED ORAL at 17:34

## 2021-01-01 RX ADMIN — Medication 100 MILLIGRAM(S): at 15:02

## 2021-01-01 RX ADMIN — Medication 650 MILLIGRAM(S): at 14:39

## 2021-01-01 RX ADMIN — Medication 50 MILLIGRAM(S): at 07:03

## 2021-01-01 RX ADMIN — HEPARIN SODIUM 5000 UNIT(S): 5000 INJECTION INTRAVENOUS; SUBCUTANEOUS at 14:32

## 2021-01-01 RX ADMIN — APIXABAN 5 MILLIGRAM(S): 2.5 TABLET, FILM COATED ORAL at 17:15

## 2021-01-01 RX ADMIN — PANTOPRAZOLE SODIUM 40 MILLIGRAM(S): 20 TABLET, DELAYED RELEASE ORAL at 22:02

## 2021-01-01 RX ADMIN — SODIUM CHLORIDE 1500 MILLILITER(S): 9 INJECTION INTRAMUSCULAR; INTRAVENOUS; SUBCUTANEOUS at 13:09

## 2021-01-01 RX ADMIN — Medication 100 MILLIEQUIVALENT(S): at 10:59

## 2021-01-01 RX ADMIN — Medication 50 MILLIGRAM(S): at 17:15

## 2021-01-01 RX ADMIN — Medication 100 MILLIGRAM(S): at 21:03

## 2021-01-01 RX ADMIN — Medication 25 MILLIGRAM(S): at 12:37

## 2021-01-01 RX ADMIN — Medication 10 MILLIGRAM(S): at 21:53

## 2021-01-01 RX ADMIN — PANTOPRAZOLE SODIUM 40 MILLIGRAM(S): 20 TABLET, DELAYED RELEASE ORAL at 21:12

## 2021-01-01 RX ADMIN — APIXABAN 5 MILLIGRAM(S): 2.5 TABLET, FILM COATED ORAL at 05:50

## 2021-01-01 RX ADMIN — Medication 40 MILLIGRAM(S): at 21:27

## 2021-01-01 RX ADMIN — APIXABAN 5 MILLIGRAM(S): 2.5 TABLET, FILM COATED ORAL at 05:51

## 2021-01-01 RX ADMIN — PANTOPRAZOLE SODIUM 40 MILLIGRAM(S): 20 TABLET, DELAYED RELEASE ORAL at 13:00

## 2021-01-01 RX ADMIN — CHLORHEXIDINE GLUCONATE 1 APPLICATION(S): 213 SOLUTION TOPICAL at 14:20

## 2021-01-01 RX ADMIN — SODIUM CHLORIDE 75 MILLILITER(S): 9 INJECTION, SOLUTION INTRAVENOUS at 09:30

## 2021-01-01 RX ADMIN — Medication 650 MILLIGRAM(S): at 08:22

## 2021-01-01 RX ADMIN — SODIUM CHLORIDE 50 MILLILITER(S): 9 INJECTION, SOLUTION INTRAVENOUS at 09:00

## 2021-01-01 RX ADMIN — APIXABAN 5 MILLIGRAM(S): 2.5 TABLET, FILM COATED ORAL at 17:40

## 2021-01-01 RX ADMIN — Medication 3.14 MICROGRAM(S)/KG/MIN: at 19:08

## 2021-01-01 RX ADMIN — Medication 200 MILLIGRAM(S): at 17:36

## 2021-01-01 RX ADMIN — Medication 2: at 08:33

## 2021-01-01 RX ADMIN — CHLORHEXIDINE GLUCONATE 1 APPLICATION(S): 213 SOLUTION TOPICAL at 05:10

## 2021-01-01 RX ADMIN — SENNA PLUS 2 TABLET(S): 8.6 TABLET ORAL at 22:08

## 2021-01-01 RX ADMIN — MUPIROCIN 1 APPLICATION(S): 20 OINTMENT TOPICAL at 17:09

## 2021-01-01 RX ADMIN — Medication 100 MILLIGRAM(S): at 06:02

## 2021-01-01 RX ADMIN — HEPARIN SODIUM 5000 UNIT(S): 5000 INJECTION INTRAVENOUS; SUBCUTANEOUS at 13:25

## 2021-01-01 RX ADMIN — Medication 10 MILLIGRAM(S): at 13:39

## 2021-01-01 RX ADMIN — RISPERIDONE 0.5 MILLIGRAM(S): 4 TABLET ORAL at 05:52

## 2021-01-01 RX ADMIN — APIXABAN 5 MILLIGRAM(S): 2.5 TABLET, FILM COATED ORAL at 17:46

## 2021-01-01 RX ADMIN — Medication 4: at 17:34

## 2021-01-01 RX ADMIN — Medication 100 MILLIGRAM(S): at 05:50

## 2021-01-01 RX ADMIN — Medication 325 MILLIGRAM(S): at 12:12

## 2021-01-01 RX ADMIN — PANTOPRAZOLE SODIUM 40 MILLIGRAM(S): 20 TABLET, DELAYED RELEASE ORAL at 21:27

## 2021-01-01 RX ADMIN — Medication 100 MILLIGRAM(S): at 12:05

## 2021-01-01 RX ADMIN — APIXABAN 5 MILLIGRAM(S): 2.5 TABLET, FILM COATED ORAL at 17:25

## 2021-01-01 RX ADMIN — Medication 4: at 08:12

## 2021-01-01 RX ADMIN — Medication 325 MILLIGRAM(S): at 12:37

## 2021-01-01 RX ADMIN — Medication 81 MILLIGRAM(S): at 12:09

## 2021-01-01 RX ADMIN — Medication 40 MILLIEQUIVALENT(S): at 08:32

## 2021-01-01 RX ADMIN — CEFEPIME 100 MILLIGRAM(S): 1 INJECTION, POWDER, FOR SOLUTION INTRAMUSCULAR; INTRAVENOUS at 15:19

## 2021-01-01 RX ADMIN — POTASSIUM PHOSPHATE, MONOBASIC POTASSIUM PHOSPHATE, DIBASIC 62.5 MILLIMOLE(S): 236; 224 INJECTION, SOLUTION INTRAVENOUS at 11:43

## 2021-01-01 RX ADMIN — Medication 100 MILLIGRAM(S): at 06:05

## 2021-01-01 RX ADMIN — MUPIROCIN 1 APPLICATION(S): 20 OINTMENT TOPICAL at 17:34

## 2021-01-01 RX ADMIN — APIXABAN 5 MILLIGRAM(S): 2.5 TABLET, FILM COATED ORAL at 06:34

## 2021-01-01 RX ADMIN — Medication 25 MILLIGRAM(S): at 06:01

## 2021-01-01 RX ADMIN — Medication 100 MILLIGRAM(S): at 21:22

## 2021-01-01 RX ADMIN — Medication 25 MILLIGRAM(S): at 08:56

## 2021-01-01 RX ADMIN — Medication 100 MILLIEQUIVALENT(S): at 12:53

## 2021-01-10 NOTE — ED ADULT NURSE NOTE - NSIMPLEMENTINTERV_GEN_ALL_ED
Implemented All Fall Risk Interventions:  [place] to call system. Call bell, personal items and telephone within reach. Instruct patient to call for assistance. Room bathroom lighting operational. Non-slip footwear when patient is off stretcher. Physically safe environment: no spills, clutter or unnecessary equipment. Stretcher in lowest position, wheels locked, appropriate side rails in place. Provide visual cue, wrist band, yellow gown, etc. Monitor gait and stability. Monitor for mental status changes and reorient to person, place, and time. Review medications for side effects contributing to fall risk. Reinforce activity limits and safety measures with patient and family.

## 2021-01-10 NOTE — ED PROVIDER NOTE - PATIENT PORTAL LINK FT
You can access the FollowMyHealth Patient Portal offered by [facility] by registering at the following website: [email or web address]. By joining [employer]’s FollowMyHealth portal, you will also be able to view your health information using other applications (apps) compatible with our system.

## 2021-01-10 NOTE — ED PROVIDER NOTE - PHYSICAL EXAMINATION
GENERAL: well appearing, no acute distress   HEAD: atraumatic   EYES: EOMI, pink conjunctiva   ENT: moist oral mucosa   CARDIAC: RRR, no edema, distal pulses present   RESPIRATORY: lungs CTAB, no increased work of breathing   GASTROINTESTINAL: no abdominal tenderness, no rebound or guarding, bowel sounds presents  GENITOURINARY: no CVA tenderness   MUSCULOSKELETAL: no deformity; R AKA; 2nd toe without gangrene, but with very mild swelling, no ttp, nail intact   NEUROLOGICAL: AAOx3, spontaneous movement of extremities   SKIN: intact   PSYCHIATRIC: cooperative  HEME LYMPH: no lymphadenopathy

## 2021-01-10 NOTE — ED PROVIDER NOTE - NSFOLLOWUPINSTRUCTIONS_ED_ALL_ED_FT
Please start taking cilostazol 100mg twice a day. You may also restart your coumadin. Your INR was 1.39. Please follow up with Dr. Molina. Please return to the Emergency Department for worsening signs or symptoms.      Peripheral Vascular Disease    WHAT YOU NEED TO KNOW:    Peripheral vascular disease (PVD) is a condition that causes decreased blood flow to your limbs because of blocked blood vessels. The blockage is usually caused by material such as cholesterol or a blood clot that sticks to the blood vessels and makes them narrow.    DISCHARGE INSTRUCTIONS:    Call your local emergency number (911 in the ) for any of the following:   •You have any of the following signs of a heart attack: ?Squeezing, pressure, or pain in your chest      ?You may also have any of the following: ?Discomfort or pain in your back, neck, jaw, stomach, or arm      ?Shortness of breath      ?Nausea or vomiting      ?Lightheadedness or a sudden cold sweat        •You have any of the following signs of a stroke: ?Numbness or drooping on one side of your face       ?Weakness in an arm or leg      ?Confusion or difficulty speaking      ?Dizziness, a severe headache, or vision loss        Return to the emergency department if:   •Your arm or leg feels warm, tender, and painful. It may look swollen and red.      •You have leg pain that does not go away with rest.      •You have dark areas on the skin of your legs.      •You cannot see out of one or both eyes.      Call your doctor if:   •Your signs and symptoms get worse or do not get better, even after treatment.      •You have a sore or ulcer that is not healing or gets worse.      •You have questions or concerns about your condition or care.      Medicines: You may need any of the following:   •Cholesterol medicine helps decrease the amount of cholesterol in your blood.      •Antiplatelets help prevent blood clots. This medicine makes it more likely for you to bleed or bruise.       •Vasodilators help blood vessels dilate (open wider) and increase your blood flow.      •Take your medicine as directed. Contact your healthcare provider if you think your medicine is not helping or if you have side effects. Tell him or her if you are allergic to any medicine. Keep a list of the medicines, vitamins, and herbs you take. Include the amounts, and when and why you take them. Bring the list or the pill bottles to follow-up visits. Carry your medicine list with you in case of an emergency.      Manage PVD:   •Do not smoke. Nicotine and other chemicals in cigarettes and cigars can damage your blood vessels. Ask your healthcare provider for information if you currently smoke and need help to quit. E-cigarettes or smokeless tobacco still contain nicotine. Talk to your healthcare provider before you use these products.       •Exercise as directed. Your healthcare provider can help you create a safe exercise plan. He or she may recommend walking. Walking is a low-impact way to exercise and increase your blood flow. Stop and rest if you have pain in your legs.  Walking for Exercise           •Care for your feet. Look closely at your feet every day. Check for cracks or sores. Wash your feet daily with mild soap and dry them well. Do not walk barefoot in case you step on a hard or sharp object.  Diabetic Foot Care           •Change your sleep position. You may have pain in your legs or feet when you sleep. Raise the head of your bed 4 inches, or use pillows to prop your upper body higher than your legs. This may help more blood go to your feet, decreasing pain.      •Protect and cushion your feet and hands. If you have ulcers on your feet, you may need to wear bandages with heel pads. You may also wear foam rubber booties. Hand or foot warmers may decrease pain in your hands or feet.      Prevent PVD:   •Eat a variety of healthy foods. Healthy foods include fruits, vegetables, whole-grain breads, low-fat dairy products, beans, lean meats, and fish. Ask if you need to be on a special diet.      •Maintain a healthy weight. Ask your healthcare provider what a healthy weight is for you. Ask him or her to help you create a weight loss plan if you are overweight.      •Manage diabetes. Keep your blood sugar level in the range recommended by your healthcare provider. Check your blood sugar level as often as directed. Ask your provider if you should make nutrition, exercise, or medicine changes.  How to check your blood sugar      Follow up with your doctor as directed: Write down your questions so you remember to ask them during your visits.

## 2021-01-10 NOTE — ED ADULT NURSE NOTE - NSFALLRSKASSESSTYPE_ED_ALL_ED
I spoke directly to the patient to obtain the information for the JANINE discharge call note.The patient was admitted to [facility] 10/20/2017. Dx of Cholelithiasis. Dicharges to home. The patient was advised to notify PCP and seel UTC or ER eval if sx's/ /reoccur, worsen, or condition changes unexpectedly. The patient verbalized understanding.     
Initial (On Arrival)

## 2021-01-10 NOTE — CONSULT NOTE ADULT - PROBLEM SELECTOR RECOMMENDATION 2
I Crescencio Pringle MD discussed  the findings and plan with the house officer. I reviewed the resident note and agree with the findings and plan

## 2021-01-10 NOTE — CONSULT NOTE ADULT - ASSESSMENT
74 yo F pmh of PVD, R AKA s/p fem-pop bypass, COPD, bipolar, HTN, bedbound, sent in for vascular surgery consult for 2nd toe pain on L foot x 1 day.   Vascular was consulted for lt foot pain r/o Arterial Insufficiency . Admits lt foot pain but no rest pain. s/p Rt AKA with fully healed stump. Admits 50 pack years of smoking.  NICKI/PVR studies shows mild arterial insufficiency and appears chronic.     Pt on coumadin / ASA can add cilostazol 100mg bid   Advised on smoking cessation  Optimize BP control   Continue statins   f/up with Vascular -Dr Kennedy in 1 -2 wks    74 yo F pmh of PVD, R AKA s/p fem-pop bypass, COPD, bipolar, HTN, bedbound, sent in for vascular surgery consult for 2nd toe pain on L foot x 1 day.   Vascular was consulted for lt foot pain r/o Arterial Insufficiency . Admits lt foot pain but no rest pain. s/p Rt AKA with fully healed stump. Admits 50 pack years of smoking.  NICKI/PVR studies shows mild arterial insufficiency and appears chronic.     Pt on coumadin / ASA can add cilostazol 50mg bid (reduced dose)  Advised on smoking cessation  Optimize BP control   Continue statins   f/up with Vascular -Dr Kennedy in 1 -2 wks- Can call #[phone] to make an appointment   76 yo F pmh of PVD, R AKA s/p fem-pop bypass, COPD, bipolar, HTN, bedbound, sent in for vascular surgery consult for 2nd toe pain on L foot x 1 day.   Vascular was consulted for lt foot pain r/o Arterial Insufficiency . Admits lt foot pain but no rest pain. s/p Rt AKA with fully healed stump. Admits 50 pack years of smoking.  NICKI/PVR studies shows mild arterial insufficiency and appears chronic.     Pt on coumadin / ASA can add cilostazol 50mg bid (reduced dose)  Advised on smoking cessation  Optimize BP control   Continue statins   f/up with Vascular -Dr Pringle in 4-6 wks- Can call #[phone] to make an appointment

## 2021-01-10 NOTE — CONSULT NOTE ADULT - SUBJECTIVE AND OBJECTIVE BOX
VASCULAR CONSULT NOTE     chief complaint of left foot pain    HPI:  74 yo F pmh of PVD, R AKA s/p fem-pop bypass, COPD, bipolar, HTN, bedbound, sent in for vascular surgery consult for 2nd toe pain on L foot x 1 day. Wound RN told pt to come to ED. Apparently RLE dry gangrene progressed from toe pain. Papers from [facility] also request repeat INR as pt is on coumadin and had INR 3.5 recently.      Vascular was consulted for lt foot pain r/o Arterial Insufficiency . Admits lt foot pain but no rest pain. s/p Rt AKA with fully healed stump. Admits 50 pack years of smoking. No foot skin color changes , calf pain, SOB or any other complaints .     PAST MEDICAL & SURGICAL HISTORY:  Hypercholesterolemia    Myocardial infarct, old    S/P CABG x 1        Review of Systems:    I have reviewed 9 systems with the patient and the only positive findings were     MEDICATIONS  (STANDING):    MEDICATIONS  (PRN):      Allergies    influenza virus vaccine, live, trivalent (Unknown)  PC Pen VK (Rash)  penicillin (Other; Rash)  statins (Other)  sulfa drugs (Other)  sulfamethizole (Other)    Intolerances        Social History:      FAMILY HISTORY:      Vital Signs Last 24 Hrs  T(C): 36.6 (10 Angel 2021 13:20), Max: 36.6 (10 Angel 2021 13:20)  T(F): 97.8 (10 Angel 2021 13:20), Max: 97.8 (10 Angel 2021 13:20)  HR: 65 (10 Angel 2021 13:20) (65 - 65)  BP: 158/77 (10 Angel 2021 13:20) (158/77 - 158/77)  BP(mean): --  RR: 18 (10 Angel 2021 13:20) (18 - 18)  SpO2: 97% (10 Angel 2021 13:20) (97% - 97%)    Physical Exam:  Vital Signs Last 24 Hrs  T(C): 36.6 (10 Angel 2021 13:20), Max: 36.6 (10 Angel 2021 13:20)  T(F): 97.8 (10 Angel 2021 13:20), Max: 97.8 (10 Angel 2021 13:20)  HR: 65 (10 Angel 2021 13:20) (65 - 65)  BP: 158/77 (10 Angel 2021 13:20) (158/77 - 158/77)  BP(mean): --  RR: 18 (10 Angel 2021 13:20) (18 - 18)  SpO2: 97% (10 Angel 2021 13:20) (97% - 97%)    General:  A&Ox3,Appears stated age, No acute distress,  Head: NC/AT  EENT: PERRLA. EOMI. Conjunctiva and sclera clear. Pharynx clear.  Neck: Supple. No JVD  Lungs: CTA B/l. Nonlabored Respirations  CV: +S1S2, RRR  Abdomen: Soft, Nondistended,  Nontender, no guarding, no rebound  Extremities: mild poikilothermic , 1+ peripheral pulses lt foot . lt Calf soft, nontender, No pedal edema. so skin changes/r toe hairs            LABS:                        16.4   6.89  )-----------( 203      ( 10 Angel 2021 14:17 )             49.9     01-10    141  |  107  |  23<H>  ----------------------------<  131<H>  4.2   |  27  |  0.84    Ca    9.5      10 Angel 2021 14:17    TPro  7.5  /  Alb  3.4<L>  /  TBili  0.4  /  DBili  x   /  AST  56<H>  /  ALT  65<H>  /  AlkPhos  105  01-10    LIVER FUNCTIONS - ( 10 Angel 2021 14:17 )  Alb: 3.4 g/dL / Pro: 7.5 g/dL / ALK PHOS: 105 U/L / ALT: 65 U/L DA / AST: 56 U/L / GGT: x           PT/INR - ( 10 Angel 2021 14:17 )   PT: 16.3 sec;   INR: 1.39 ratio         PTT - ( 10 Angel 2021 14:17 )  PTT:44.8 sec      RADIOLOGY & ADDITIONAL STUDIES:  < from: [facility] Physiol Extremity Lower 3+ Level, BI (01.10.21 @ 15:20) >  The left ankle-brachial index is 0.83.    Segmental pressure measurements reveal no significant pressure reduction.    Pulse volume recordings demonstrate normal waveform shape with mildly diminished amplitude.    IMPRESSION: Findings suggestive of mild arterial occlusive disease without evidence of focal lesion in the left lower extremity at rest.  Status post right above-the-knee amputation.      < end of copied text >   VASCULAR CONSULT NOTE     chief complaint of left foot pain    HPI:  74 yo F pmh of PVD, R AKA s/p fem-pop bypass, COPD, bipolar, HTN, bedbound, sent in for vascular surgery consult for 2nd toe pain on L foot x 1 day. Wound RN told pt to come to ED. Apparently RLE dry gangrene progressed from toe pain. Papers from [facility] also request repeat INR as pt is on coumadin and had INR 3.5 recently.      Vascular was consulted for lt foot pain r/o Arterial Insufficiency . Admits lt foot pain but no rest pain. s/p Rt AKA with fully healed stump. Admits 50 pack years of smoking. No foot skin color changes , calf pain, SOB or any other complaints .     PAST MEDICAL & SURGICAL HISTORY:  Hypercholesterolemia    Myocardial infarct, old    S/P CABG x 1        Review of Systems:    I have reviewed 9 systems with the patient and the only positive findings were     MEDICATIONS  (STANDING):    MEDICATIONS  (PRN):      Allergies    influenza virus vaccine, live, trivalent (Unknown)  PC Pen VK (Rash)  penicillin (Other; Rash)  statins (Other)  sulfa drugs (Other)  sulfamethizole (Other)      Vital Signs Last 24 Hrs  T(C): 36.6 (10 Angel 2021 13:20), Max: 36.6 (10 Angel 2021 13:20)  T(F): 97.8 (10 Angel 2021 13:20), Max: 97.8 (10 Angel 2021 13:20)  HR: 65 (10 Angel 2021 13:20) (65 - 65)  BP: 158/77 (10 Angel 2021 13:20) (158/77 - 158/77)  BP(mean): --  RR: 18 (10 Angel 2021 13:20) (18 - 18)  SpO2: 97% (10 Angel 2021 13:20) (97% - 97%)    Physical Exam:  Vital Signs Last 24 Hrs  T(C): 36.6 (10 Angel 2021 13:20), Max: 36.6 (10 Angel 2021 13:20)  T(F): 97.8 (10 Angel 2021 13:20), Max: 97.8 (10 Angel 2021 13:20)  HR: 65 (10 Angel 2021 13:20) (65 - 65)  BP: 158/77 (10 Angel 2021 13:20) (158/77 - 158/77)  BP(mean): --  RR: 18 (10 Agnel 2021 13:20) (18 - 18)  SpO2: 97% (10 Angel 2021 13:20) (97% - 97%)    General:  A&Ox3,Appears stated age, No acute distress,  Head: NC/AT  EENT: PERRLA. EOMI. Conjunctiva and sclera clear. Pharynx clear.  Neck: Supple. No JVD  Lungs: CTA B/l. Nonlabored Respirations  CV: +S1S2, RRR  Abdomen: Soft, Nondistended,  Nontender, no guarding, no rebound  Extremities: mild poikilothermic , 1+ peripheral pulses lt foot . lt Calf soft, nontender, No pedal edema. so skin changes/r toe hairs        LABS:                        16.4   6.89  )-----------( 203      ( 10 Angel 2021 14:17 )             49.9     01-10    141  |  107  |  23<H>  ----------------------------<  131<H>  4.2   |  27  |  0.84    Ca    9.5      10 Angel 2021 14:17    TPro  7.5  /  Alb  3.4<L>  /  TBili  0.4  /  DBili  x   /  AST  56<H>  /  ALT  65<H>  /  AlkPhos  105  01-10    LIVER FUNCTIONS - ( 10 Angel 2021 14:17 )  Alb: 3.4 g/dL / Pro: 7.5 g/dL / ALK PHOS: 105 U/L / ALT: 65 U/L DA / AST: 56 U/L / GGT: x           PT/INR - ( 10 Angel 2021 14:17 )   PT: 16.3 sec;   INR: 1.39 ratio         PTT - ( 10 Angel 2021 14:17 )  PTT:44.8 sec      RADIOLOGY & ADDITIONAL STUDIES:  < from: [facility] Physiol Extremity Lower 3+ Level, BI (01.10.21 @ 15:20) >  The left ankle-brachial index is 0.83.    Segmental pressure measurements reveal no significant pressure reduction.    Pulse volume recordings demonstrate normal waveform shape with mildly diminished amplitude.    IMPRESSION: Findings suggestive of mild arterial occlusive disease without evidence of focal lesion in the left lower extremity at rest.  Status post right above-the-knee amputation.      < end of copied text >

## 2021-01-10 NOTE — ED PROVIDER NOTE - CARE PROVIDER_API CALL
Yovani Carpio  INTERNAL MEDICINE  [address], [address]  [place] [address]  Phone: [phone]  Fax: [phone]  Follow Up Time: Routine

## 2021-01-10 NOTE — ED PROVIDER NOTE - CLINICAL SUMMARY MEDICAL DECISION MAKING FREE TEXT BOX
74 yo F with toe pain. No gangrene on exam at this time. Plan - labs to check INR, vascular surgery consult given pt's hx of gangrene leading to AKA, reassess.

## 2021-01-10 NOTE — ED PROVIDER NOTE - PROGRESS NOTE DETAILS
labs - Hgb 16, INR 1.3 (instructed pt to resume coumadin) Will sign out to next team Dr Aceves to f/u surgery consult Aceves: Signed out by Dr. Ramesh. Pending surgery eval.   3:43PM: Discussed with CASTILLO Lamar and will eval patient  4:06PM: Surgery recommends cilostazol 100mg BID as per CASTILLO Lamar and Vascular attending Dr. Pringle.   4:13PM: PMD at [facility], Dr coulter updated. Will f/u with patient at NH.

## 2021-01-10 NOTE — ED ADULT NURSE NOTE - OBJECTIVE STATEMENT
Sent from [facility] for Left great toe and 2nd toe eval Sent from [facility] for Left great toe and 2nd toe eval. Right BKA no wound noted to left foot

## 2021-01-10 NOTE — ED ADULT NURSE NOTE - COVID-19 RESULT DATE/TIME
Regarding: diverticulitis flare up  ----- Message from Patricia Malhotra sent at 6/21/2020  3:29 PM CDT -----  Patient Name: Ligia Pace    Specialist or PCP Full Name:Maia Palacio     Pregnant (If Yes, how long?):na    Symptoms: diverticulitis flare up    Do you or any of your household members have the following symptoms:  Fever >100.4#F or >38.0#C: No    New or worsening cough, shortness of breath, or sore throat: No    New onset of nausea, vomiting or diarrhea: No    New onset of loss of taste or smell, chills, repeated shaking with chills, muscle pain, or headache: No    Have you, a household member, or another person you have been in contact with tested positive for COVID-19 in the last 14 days?: No    Call Back #:[phone]    Call Center Account #:[id number]    Please update the Demographics section with the patients permanent resident address     Emergent COVID-19 Symptoms requiring Nurse Triage:  Trouble breathing, Persistent pain or pressure in the chest, New confusion, Inability to wake or stay awake, Bluish lips or face       30-Dec-2020 22:56

## 2021-01-10 NOTE — ED PROVIDER NOTE - OBJECTIVE STATEMENT
76 yo F pmh of PVD, R AKA s/p fem-pop bypass, COPD, bipolar, HTN, bedbound, sent in for vascular surgery consult for 2nd toe pain on L foot x 1 day. Wound RN told pt to come to ED. Apparently RLE dry gangrene progressed from toe pain. Papers from [facility] also request repeat INR as pt is on coumadin and had INR 3.5 recently.

## 2021-01-10 NOTE — ED PROVIDER NOTE - CARE PLAN
Principal Discharge DX:	Toe pain   Principal Discharge DX:	PVD (peripheral vascular disease)  Secondary Diagnosis:	Toe pain

## 2021-01-13 NOTE — PROGRESS NOTE ADULT - ATTENDING COMMENTS
Patient refusing to go to nuclear medicine this a.m. Explained to patient that her liver enzymes are elevated and they want to check her gallbladder functioning. Patient is still refusing and states that she did not come in for that so no one is doing any testing for that this admission. As above  Stable from vasc  Incisions C/D/I  L bypass patent.  R AKA ok  Cont AC- Can change to lovenox/coumadin  DC plan   FU podiatry plans  DC ABx on DC  FU outpt

## 2021-04-18 NOTE — H&P ADULT - NSHPPHYSICALEXAM_GEN_ALL_CORE
Vital Signs Last 24 Hrs  T(C): 36.7 (18 Apr 2021 15:24), Max: 36.7 (18 Apr 2021 15:24)  T(F): 98 (18 Apr 2021 15:24), Max: 98 (18 Apr 2021 15:24)  HR: 90 (18 Apr 2021 15:24) (85 - 90)  BP: 126/48 (18 Apr 2021 15:24) (78/41 - 126/48)  BP(mean): --  RR: 17 (18 Apr 2021 15:24) (17 - 20)  SpO2: 100% (18 Apr 2021 15:24) (99% - 100%)    GENERAL: NAD, lying in bed comfortably  HEAD:  Atraumatic, Normocephalic  EYES: EOMI, PERRLA, conjunctiva and sclera clear  ENT: Moist mucous membranes  NECK: Supple, No JVD  CHEST/LUNG: Clear to auscultation bilaterally; No rales, rhonchi, wheezing, or rubs.  HEART: Regular rate and rhythm; S1+ S2+  ABDOMEN: Bowel sounds present; Soft, Nontender, Nondistended. No hepatomegaly  EXTREMITIES:  2+ Peripheral Pulses, brisk capillary refill. No clubbing, cyanosis, or edema  NERVOUS SYSTEM:  Alert & Oriented x3 , speech clear. No deficits   MSK: right aka   SKIN: No rashes or lesions

## 2021-04-18 NOTE — H&P ADULT - PROBLEM SELECTOR PLAN 1
Pt presented to ED with 70/40- > improved after IVF to 120/78  Likely in the setting of medications   Per pt her bp is always on the lower side but she is getting anti- htn medications at NH   s/p 2.8 L ivf in ED   pt noted to be on cozaar 25 mg qd and metoprolol 25 bid   Will hold all anti- htn medications   low suspicion for any infectious etiology as pt is afebrile, wbc wnl, and does not meet any other SIRs criteria.   blood cultures send in ED   will hold off of abx for now   will cw  cc for 24 hours as pt is noted to have eladio Pt presented to ED with 70/40- > improved after IVF to 120/78  Likely in the setting of medications vs dehydration   Per pt her bp is always on the lower side but she is getting anti- htn medications at NH   s/p 2.8 L ivf in ED   pt noted to be on cozaar 25 mg qd and metoprolol 25 bid   Will hold all anti- htn medications   low suspicion for any infectious etiology as pt is afebrile, wbc wnl, and does not meet any other SIRs criteria.   blood cultures send in ED   will hold off of abx for now   will cw  cc for 24 hours as pt is noted to have eladio

## 2021-04-18 NOTE — ED PROVIDER NOTE - CLINICAL SUMMARY MEDICAL DECISION MAKING FREE TEXT BOX
76 year old female with unresponsiveness and hypotension at nursing home. Consideration for sepsis, GI bleed, and ACS among other causes. Will perform sepsis workup, check cardiac enzymes, and check guaiac test.

## 2021-04-18 NOTE — H&P ADULT - ASSESSMENT
76 year old female with PMHx of PVD, right AKA (S/P fem pop bypass), COPD, bipolar disorder, hypertension and PSHx of S/P CABG x1 brought into the ED via EMS from [facility] for evaluation of unresponsiveness and hypotension.  76 year old female with PMHx of PVD, right AKA (S/P fem pop bypass), PAF, COPD, bipolar disorder, hypertension and PSHx of S/P CABG x1 brought into the ED via EMS from [facility] for evaluation of unresponsiveness and hypotension.

## 2021-04-18 NOTE — ED PROVIDER NOTE - PMH
Bedbound    Bipolar 1 disorder    COPD (chronic obstructive pulmonary disease)    HTN (hypertension)    Hypercholesterolemia    Myocardial infarct, old    Peripheral vascular disease

## 2021-04-18 NOTE — H&P ADULT - NSHPREVIEWOFSYSTEMS_GEN_ALL_CORE
Pt denies any fever, chills, abdominal pain, nausea or vomiting. Pt endorses chronic left hip and  ankle pain. Pt denies any fever, chills, abdominal pain, nausea or vomiting. Pt endorses chronic left hip and knee pain.  otherwise negative

## 2021-04-18 NOTE — H&P ADULT - PROBLEM SELECTOR PLAN 3
continue with home medications Pt with hx of PAF   pt on coumadin 7.5 qd; will hold off of on bridging   will hold metoprolol in the setting of low bp; resume as needed   will give 10 mg tonight as INR is subtherapeutic   fu am INR and dose accordingly

## 2021-04-18 NOTE — H&P ADULT - HISTORY OF PRESENT ILLNESS
76 year old female with PMHx of PVD, right AKA (S/P fem pop bypass), COPD, bipolar disorder, hypertension and PSHx of S/P CABG x1 brought into the ED via EMS from [facility] for evaluation of unresponsiveness and hypotension. Pt is alert oriented x 3 and states she doesn't know why she is in the hospital. Pt denies any fever, chills, abdominal pain, nausea or vomiting. Pt endorses chronic left hip and  ankle pain. Patient was reportedly noted to be unresponsive and hypotensive at approximately 13:00 this afternoon and received fluids after which she became more responsive. Pt denies any acute complaints or concerns  76 year old female with PMHx of PVD, right AKA (S/P fem pop bypass), PAF, COPD, bipolar disorder, hypertension and PSHx of S/P CABG x1 brought into the ED via EMS from [facility] for evaluation of unresponsiveness and hypotension. Pt is alert oriented x 3 and states she doesn't know why she is in the hospital. Pt denies any fever, chills, abdominal pain, nausea or vomiting. Pt endorses chronic left hip and  ankle pain. Patient was reportedly noted to be unresponsive and hypotensive at approximately 13:00 this afternoon and received fluids after which she became more responsive. Pt denies any acute complaints or concerns  76 year old female with PMHx of PVD, right AKA (S/P fem pop bypass), PAF, COPD, bipolar disorder, hypertension and PSHx of S/P CABG x1 brought into the ED via EMS from [facility] for evaluation of unresponsiveness and hypotension.   Pt is alert oriented x 3 and states she doesn't know why she is in the hospital. Pt denies any fever, chills, abdominal pain, nausea or vomiting. Pt endorses chronic left hip and knee pain. Patient was reportedly noted to be unresponsive and hypotensive at approximately 13:00 this afternoon and received fluids after which she became more responsive. Pt denies any acute complaints or concerns

## 2021-04-18 NOTE — H&P ADULT - ATTENDING COMMENTS
Patient seen, examined, and interviewed by me, case discussed with me, chart reviewed, agree with above H/P as reviewed.  See  full note above.  Dr. ARIK May ([phone])  (covering Dr Carpio today)     possible volume depletion with hypotension   med mgmt as above  monitor vitals post IVH  Dr Carpio to follow in AM

## 2021-04-18 NOTE — ED ADULT NURSE NOTE - NSIMPLEMENTINTERV_GEN_ALL_ED
Implemented All Fall with Harm Risk Interventions:  [place] to call system. Call bell, personal items and telephone within reach. Instruct patient to call for assistance. Room bathroom lighting operational. Non-slip footwear when patient is off stretcher. Physically safe environment: no spills, clutter or unnecessary equipment. Stretcher in lowest position, wheels locked, appropriate side rails in place. Provide visual cue, wrist band, yellow gown, etc. Monitor gait and stability. Monitor for mental status changes and reorient to person, place, and time. Review medications for side effects contributing to fall risk. Reinforce activity limits and safety measures with patient and family. Provide visual clues: red socks.

## 2021-04-18 NOTE — H&P ADULT - PROBLEM SELECTOR PLAN 6
IMPROVE VTE Individual Risk Assessment  RISK                                                         Points  [  ] Previous VTE                                      3  [  ] Thrombophilia                                   2  [  ] Lower limb paralysis                         2 (unable to hold up >15 seconds)    [  ] Current Cancer                                  2       (within 6 months)  [  ] Immobilization > 24 hrs                    1  [  ] ICU/CCU stay > 24 hrs                         1  [  ] Age > 60                                              1  cw coumadin

## 2021-04-18 NOTE — H&P ADULT - PROBLEM SELECTOR PLAN 4
Pt noted to have cr 1.59  bs wnl   fu urine lytes  cw gentle hydration continue with home medications

## 2021-04-18 NOTE — ED PROVIDER NOTE - OBJECTIVE STATEMENT
76 year old female with PMHx of PVD, right AKA (S/P fem pop bypass), COPD, bipolar disorder, hypertension and PSHx of S/P CABG x1 brought into the ED via EMS from [facility] for evaluation of unresponsiveness and hypotension. Patient was reportedly noted to be unresponsive and hypotensive at approximately 13:00 this afternoon. Patient reportedly then received fluids, after which she became hypotensive again. Patient history is otherwise limited due to need to intervene.   Allergies: PC Pen VK (rash), sulfamethizole (other), influenza vaccine (unknown), penicillin (other), statins (other), sulfa drugs (other)

## 2021-04-18 NOTE — H&P ADULT - NSICDXPASTMEDICALHX_GEN_ALL_CORE_FT
PAST MEDICAL HISTORY:  Bedbound     Bipolar 1 disorder     COPD (chronic obstructive pulmonary disease)     HTN (hypertension)     Hypercholesterolemia     Myocardial infarct, old     Peripheral vascular disease

## 2021-04-18 NOTE — H&P ADULT - PROBLEM SELECTOR PLAN 5
IMPROVE VTE Individual Risk Assessment  RISK                                                         Points  [  ] Previous VTE                                      3  [  ] Thrombophilia                                   2  [  ] Lower limb paralysis                         2 (unable to hold up >15 seconds)    [  ] Current Cancer                                  2       (within 6 months)  [  ] Immobilization > 24 hrs                    1  [  ] ICU/CCU stay > 24 hrs                         1  [  ] Age > 60                                              1  heparin tid sub q for dvt ppx Pt noted to have cr 1.59  bs wnl   fu urine lytes  cw gentle hydration

## 2021-04-19 NOTE — PROGRESS NOTE ADULT - ASSESSMENT
76 year old female with PMHx of PVD, right AKA (S/P fem pop bypass), PAF, COPD, bipolar disorder, hypertension and PSHx of S/P CABG x1 brought into the ED via EMS from [facility] for evaluation of unresponsiveness and hypotension.   CT head negative on admission, Noted with UTI, started on abx, encephalopathy and hypotension likely in setting of sepsis due to UTI. Cultures in progress

## 2021-04-19 NOTE — ADVANCED PRACTICE NURSE CONSULT - RECOMMEDATIONS
-Clean all wounds with normal saline and apply skin prep to the surrounding skin  -Apply a Foam dressing to the Coccyx area Q 72hrs PRN  -Elevate/float the patients L. heel using a heel protector and reposition the patient Q 2hrs using wedges or pillows

## 2021-04-19 NOTE — PROGRESS NOTE ADULT - SUBJECTIVE AND OBJECTIVE BOX
HPI:  76 year old female with PMHx of PVD, right AKA (S/P fem pop bypass), PAF, COPD, bipolar disorder, hypertension and PSHx of S/P CABG x1 brought into the ED via EMS from [facility] for evaluation of unresponsiveness and hypotension.   Pt is alert oriented x 3 and states she doesn't know why she is in the hospital. Pt denies any fever, chills, abdominal pain, nausea or vomiting. Pt endorses chronic left hip and knee pain. Patient was reportedly noted to be unresponsive and hypotensive at approximately 13:00 this afternoon and received fluids after which she became more responsive. Pt denies any acute complaints or concerns  (2021 18:37)      Patient is a 76y old  Female who presents with a chief complaint of unresponsiveness (2021 18:37)      INTERVAL HPI/OVERNIGHT EVENTS:  T(C): 36.9 (21 @ 05:44), Max: 37 (21 @ 20:02)  HR: 96 (21 @ 09:22) (85 - 113)  BP: 137/65 (21 @ 09:22) (78/41 - 181/70)  RR: 18 (21 @ 05:44) (17 - 20)  SpO2: 97% (21 @ 05:44) (97% - 100%)  Wt(kg): --  I&O's Summary      REVIEW OF SYSTEMS: denies fever, chills, SOB, palpitations, chest pain, abdominal pain, nausea, vomitting, diarrhea, constipation, dizziness    MEDICATIONS  (STANDING):  aspirin enteric coated 81 milliGRAM(s) Oral daily  cilostazol 100 milliGRAM(s) Oral two times a day  ciprofloxacin   IVPB 400 milliGRAM(s) IV Intermittent every 12 hours  escitalopram 5 milliGRAM(s) Oral daily  gabapentin 300 milliGRAM(s) Oral two times a day  heparin   Injectable 5000 Unit(s) SubCutaneous every 8 hours  pantoprazole    Tablet 40 milliGRAM(s) Oral before breakfast  polyethylene glycol 3350 17 Gram(s) Oral daily  risperiDONE   Tablet 0.5 milliGRAM(s) Oral two times a day  senna 2 Tablet(s) Oral at bedtime  sodium chloride 0.9%. 1000 milliLiter(s) (70 mL/Hr) IV Continuous <Continuous>    MEDICATIONS  (PRN):  acetaminophen   Tablet .. 650 milliGRAM(s) Oral every 6 hours PRN Mild Pain (1 - 3)      PHYSICAL EXAM:  GENERAL: NAD, well-groomed, well-developed  HEAD:  Atraumatic, Normocephalic  EYES: EOMI, PERRLA, conjunctiva and sclera clear  ENMT: No tonsillar erythema, exudates, or enlargement; Moist mucous membranes, Good dentition, No lesions  NECK: Supple, No JVD, Normal thyroid  NERVOUS SYSTEM:  Alert & Oriented X3, Good concentration; Motor Strength 5/5 B/L upper and lower extremities; DTRs 2+ intact and symmetric  CHEST/LUNG: Clear to percussion bilaterally; No rales, rhonchi, wheezing, or rubs  HEART: Regular rate and rhythm; No murmurs, rubs, or gallops  ABDOMEN: Soft, Nontender, Nondistended; Bowel sounds present  EXTREMITIES:  2+ Peripheral Pulses, No clubbing, cyanosis, or edema  LYMPH: No lymphadenopathy noted  SKIN: No rashes or lesions  LABS:                        12.7   7.45  )-----------( 196      ( 2021 07:40 )             38.8     04-19    142  |  111<H>  |  26<H>  ----------------------------<  79  3.8   |  20<L>  |  0.79    Ca    8.8      2021 07:40  Phos  2.5     04-19  Mg     1.9     04-19    TPro  6.8  /  Alb  3.3<L>  /  TBili  0.5  /  DBili  x   /  AST  22  /  ALT  25  /  AlkPhos  76  04-18    PT/INR - ( 2021 07:40 )   PT: 24.6 sec;   INR: 2.14 ratio         PTT - ( 2021 07:40 )  PTT:52.8 sec  Urinalysis Basic - ( 2021 10:14 )    Color: Yellow / Appearance: very cloudy / S.020 / pH: x  Gluc: x / Ketone: Negative  / Bili: Negative / Urobili: Negative   Blood: x / Protein: 100 / Nitrite: Negative   Leuk Esterase: Moderate / RBC: 10-25 /HPF / WBC >50 /HPF   Sq Epi: x / Non Sq Epi: Few /HPF / Bacteria: TNTC /HPF      CAPILLARY BLOOD GLUCOSE            Urinalysis Basic - ( 2021 10:14 )    Color: Yellow / Appearance: very cloudy / S.020 / pH: x  Gluc: x / Ketone: Negative  / Bili: Negative / Urobili: Negative   Blood: x / Protein: 100 / Nitrite: Negative   Leuk Esterase: Moderate / RBC: 10-25 /HPF / WBC >50 /HPF   Sq Epi: x / Non Sq Epi: Few /HPF / Bacteria: TNTC /HPF

## 2021-04-19 NOTE — PROGRESS NOTE ADULT - SUBJECTIVE AND OBJECTIVE BOX
Patient is a 76y old  Female who presents with a chief complaint of unresponsiveness (2021 16:28)    OVERNIGHT EVENTS: BP improving, mentation back to baseline       REVIEW OF SYSTEMS:  CONSTITUTIONAL: No fever, chills  ENMT:  No difficulty hearing, no change in vision  NECK: No pain or stiffness  RESPIRATORY: No cough, SOB  CARDIOVASCULAR: No chest pain, palpitations  GASTROINTESTINAL: No abdominal pain. No nausea, vomiting, or diarrhea  GENITOURINARY: No dysuria  NEUROLOGICAL: No HA  SKIN: No itching, burning, rashes, or lesions   MUSCULOSKELETAL: No joint pain or swelling; No muscle, back, or extremity pain    T(C): 36.3 (21 @ 13:56), Max: 37 (21 @ 20:02)  HR: 89 (21 @ 13:56) (89 - 113)  BP: 111/65 (21 @ 13:56) (111/65 - 181/70)  RR: 16 (21 @ 13:56) (16 - 18)  SpO2: 100% (21 @ 13:56) (97% - 100%)  Wt(kg): --Vital Signs Last 24 Hrs  T(C): 36.3 (2021 13:56), Max: 37 (2021 20:02)  T(F): 97.4 (2021 13:56), Max: 98.6 (2021 20:02)  HR: 89 (2021 13:56) (89 - 113)  BP: 111/65 (2021 13:56) (111/65 - 181/70)  BP(mean): --  RR: 16 (2021 13:56) (16 - 18)  SpO2: 100% (2021 13:56) (97% - 100%)    MEDICATIONS  (STANDING):  aspirin enteric coated 81 milliGRAM(s) Oral daily  cilostazol 100 milliGRAM(s) Oral two times a day  ciprofloxacin   IVPB 400 milliGRAM(s) IV Intermittent every 12 hours  escitalopram 5 milliGRAM(s) Oral daily  gabapentin 300 milliGRAM(s) Oral two times a day  heparin   Injectable 5000 Unit(s) SubCutaneous every 8 hours  pantoprazole    Tablet 40 milliGRAM(s) Oral before breakfast  polyethylene glycol 3350 17 Gram(s) Oral daily  risperiDONE   Tablet 0.5 milliGRAM(s) Oral two times a day  senna 2 Tablet(s) Oral at bedtime  sodium chloride 0.9%. 1000 milliLiter(s) (70 mL/Hr) IV Continuous <Continuous>  warfarin 7.5 milliGRAM(s) Oral once    MEDICATIONS  (PRN):  acetaminophen   Tablet .. 650 milliGRAM(s) Oral every 6 hours PRN Mild Pain (1 - 3)      PHYSICAL EXAM:  GENERAL: NAD  EYES: clear conjunctiva  ENMT: Moist mucous membranes  NECK: Supple, No JVD  CHEST/LUNG: Clear to auscultation bilaterally; No rales, rhonchi, wheezing, or rubs  HEART: S1, S2, Regular rate and rhythm  ABDOMEN: Soft, Nontender, Nondistended; Bowel sounds present  NEURO: Alert & Oriented to person and place   EXTREMITIES: R AKA,   SKIN:  Stage 1 Pressure Injury to the L. Lat Foot, L. Heel, L. Ankle, and Coccyx; as evident by non-blanchable erythema    Consultant(s) Notes Reviewed:  [x ] YES  [ ] NO  Care Discussed with Consultants/Other Providers [ x] YES  [ ] NO    LABS:                        12.7   7.45  )-----------( 196      ( 2021 07:40 )             38.8     04-19    142  |  111<H>  |  26<H>  ----------------------------<  79  3.8   |  20<L>  |  0.79    Ca    8.8      2021 07:40  Phos  2.5     04-19  Mg     1.9     -19    TPro  6.8  /  Alb  3.3<L>  /  TBili  0.5  /  DBili  x   /  AST  22  /  ALT  25  /  AlkPhos  76  04-18    PT/INR - ( 2021 07:40 )   PT: 24.6 sec;   INR: 2.14 ratio         PTT - ( 2021 07:40 )  PTT:52.8 sec  CAPILLARY BLOOD GLUCOSE    Urinalysis Basic - ( 2021 10:14 )    Color: Yellow / Appearance: very cloudy / S.020 / pH: x  Gluc: x / Ketone: Negative  / Bili: Negative / Urobili: Negative   Blood: x / Protein: 100 / Nitrite: Negative   Leuk Esterase: Moderate / RBC: 10-25 /HPF / WBC >50 /HPF   Sq Epi: x / Non Sq Epi: Few /HPF / Bacteria: TNTC /HPF    RADIOLOGY & ADDITIONAL TESTS:    t< from: CT Head No Cont (21 @ 21:31) >    EXAM:  CT BRAIN                            PROCEDURE DATE:  2021          INTERPRETATION:  CLINICAL INDICATION: Syncope.    TECHNIQUE: CT axial images of the head were obtained without intravenous contrast. Computer-reconstructed coronal and sagittal images were obtained.    COMPARISON: 2018.    FINDINGS: There is no acute intracranial hemorrhage, large cortical infarct, mass effect or midline shift. Nonspecific mild to moderate periventricular and subcortical white matter lucencies likely represent chronic microvascular ischemic changes. Mild to moderate cerebral volume loss with ventricular dilatation has minimally progressed since prior study.    There is no depressed skull fracture. Small partially calcified focus is again noted in the left parietal scalp soft tissue and may be related to a sebaceous cyst. There is sinus mucosal thickening. The tympanomastoid region is clear.    IMPRESSION:    No acute intracranial hemorrhage, large cortical infarct or mass effect. If clinically indicated, short-term follow-up or MRI may be obtained for further evaluation.              < end of copied text >      Imaging Personally Reviewed:  [ ] YES  [ ] NO

## 2021-04-19 NOTE — ADVANCED PRACTICE NURSE CONSULT - ASSESSMENT
This is a 76yr old female patient admitted for Hypotension, presenting with a Stage 1 Pressure Injury to the L. Lat Foot, L. Heel, L. Ankle, and Coccyx; as evident by non-blanchable erythema

## 2021-04-19 NOTE — PROGRESS NOTE ADULT - PROBLEM SELECTOR PLAN 1
-sent from NH for hypotension and unresponsiveness, likely due to sepsis (hypotension, tachycardia, +ve source) sec to UTI   -CT head with no acute findings   -cont IVF   -cont Cipro (allergic to PCN)   -f/u cultures

## 2021-04-19 NOTE — PROGRESS NOTE ADULT - ASSESSMENT
seen and examined vsstable afebrile physical unchanged  no cp or sob or palp.  pt was hypotensive and less responsive  911 called    pt denies cp , headch, cough, fever , dysurea, back pain   alert awake not in any distress   lungs clear  abd soft bs nml non tender, no renal angle or s/p tenderness  ua pos  inr ok   a/p hypotensive  ekg ok, troponin ok   / sec to UTI i think unlikely  blood cxs  aal to pcn  rocephine  severe PAC s/p Rt leg amputation

## 2021-04-20 NOTE — PROGRESS NOTE ADULT - PROBLEM SELECTOR PLAN 8
DVT: holding tonight's coumadin due to supratherapeutic INR  GI: Protonix per patient's HCP Silva Marquez patient was an alcoholic   will start PO thiamine, folic acid, MVI  B12 level normal  f/u folate level

## 2021-04-20 NOTE — PROGRESS NOTE ADULT - PROBLEM SELECTOR PLAN 3
-BP trending down   -restart home dose of BB if BP remains stable - INR 4.16, holding this evening's dose of coumadin  - consider NOAC when INR down-trends ~ 2  - fall precautions, bleeding precautions  - Cardiology Dr. Flanagan

## 2021-04-20 NOTE — PROGRESS NOTE ADULT - PROBLEM SELECTOR PLAN 4
-currently rate controlled   -dose Coumadin daily  -daily INR  -Cardio Dr. Flanagan - will restart beta blocker with parameters in the setting of HTN and tachycardia  - daily INR  - holding tonight's coumadin given supratherapeutic INR

## 2021-04-20 NOTE — PROGRESS NOTE ADULT - PROBLEM SELECTOR PLAN 6
-VANESSA on baseline normal renal function likely due to hypovolemia   -renal function improving   -avoid nephrotoxins - risperidone, escitalopram  - supportive care

## 2021-04-20 NOTE — PROGRESS NOTE ADULT - ASSESSMENT
seen and examined  comfortable on bed but not responding to verbal stimuli ,  reacting minimal to painful stimuli.  BP is ok other vsstable  bs ok.  pupils reactive .  lungs , heart ok.  abd soft bsnml non tender, no s/p  or renal angle tenderness.  old knee amputation.  labs noted  INR over 4.   hold coumadine.  ct head . NPO .  ABg.  if not responsive then will get ICU  as pt is full code  Pt has h/o CABG, smoker , severe PAD, bed bound for a while  poor prognosis

## 2021-04-20 NOTE — PROGRESS NOTE ADULT - SUBJECTIVE AND OBJECTIVE BOX
HPI:  76 year old female with PMHx of PVD, right AKA (S/P fem pop bypass), PAF, COPD, bipolar disorder, hypertension and PSHx of S/P CABG x1 brought into the ED via EMS from [facility] for evaluation of unresponsiveness and hypotension.   Pt is alert oriented x 3 and states she doesn't know why she is in the hospital. Pt denies any fever, chills, abdominal pain, nausea or vomiting. Pt endorses chronic left hip and knee pain. Patient was reportedly noted to be unresponsive and hypotensive at approximately 13:00 this afternoon and received fluids after which she became more responsive. Pt denies any acute complaints or concerns  (2021 18:37)      Patient is a 76y old  Female who presents with a chief complaint of unresponsiveness (2021 11:56)      INTERVAL HPI/OVERNIGHT EVENTS:  T(C): 37 (20-21 @ 13:26), Max: 37 (20-21 @ 13:26)  HR: 91 (-21 @ 13:26) (91 - 104)  BP: 113/68 (-20-21 @ 13:26) (113/68 - 180/77)  RR: 16 (-20-21 @ 13:26) (16 - 18)  SpO2: 98% (-21 @ 13:26) (98% - 100%)  Wt(kg): --  I&O's Summary    2021 07:01  -  2021 07:00  --------------------------------------------------------  IN: 0 mL / OUT: 900 mL / NET: -900 mL        REVIEW OF SYSTEMS: denies fever, chills, SOB, palpitations, chest pain, abdominal pain, nausea, vomitting, diarrhea, constipation, dizziness    MEDICATIONS  (STANDING):  aspirin enteric coated 81 milliGRAM(s) Oral daily  cilostazol 100 milliGRAM(s) Oral two times a day  ciprofloxacin   IVPB 400 milliGRAM(s) IV Intermittent every 12 hours  escitalopram 5 milliGRAM(s) Oral daily  gabapentin 300 milliGRAM(s) Oral two times a day  heparin   Injectable 5000 Unit(s) SubCutaneous every 8 hours  metoprolol tartrate 25 milliGRAM(s) Oral two times a day  pantoprazole    Tablet 40 milliGRAM(s) Oral before breakfast  polyethylene glycol 3350 17 Gram(s) Oral daily  risperiDONE   Tablet 0.5 milliGRAM(s) Oral two times a day  senna 2 Tablet(s) Oral at bedtime  sodium chloride 0.9%. 1000 milliLiter(s) (70 mL/Hr) IV Continuous <Continuous>    MEDICATIONS  (PRN):  acetaminophen   Tablet .. 650 milliGRAM(s) Oral every 6 hours PRN Mild Pain (1 - 3)      PHYSICAL EXAM:  GENERAL: NAD, well-groomed, well-developed  HEAD:  Atraumatic, Normocephalic  EYES: EOMI, PERRLA, conjunctiva and sclera clear  ENMT: No tonsillar erythema, exudates, or enlargement; Moist mucous membranes, Good dentition, No lesions  NECK: Supple, No JVD, Normal thyroid  NERVOUS SYSTEM:  Alert & Oriented X3, Good concentration; Motor Strength 5/5 B/L upper and lower extremities; DTRs 2+ intact and symmetric  CHEST/LUNG: Clear to percussion bilaterally; No rales, rhonchi, wheezing, or rubs  HEART: Regular rate and rhythm; No murmurs, rubs, or gallops  ABDOMEN: Soft, Nontender, Nondistended; Bowel sounds present  EXTREMITIES:  2+ Peripheral Pulses, No clubbing, cyanosis, or edema  LYMPH: No lymphadenopathy noted  SKIN: No rashes or lesions  LABS:                        11.1   6.80  )-----------( 193      ( 2021 07:49 )             33.2     04-20    141  |  112<H>  |  23<H>  ----------------------------<  116<H>  3.9   |  23  |  0.93    Ca    8.6      2021 07:49  Phos  2.5     04-19  Mg     1.9     04-19    TPro  6.8  /  Alb  3.3<L>  /  TBili  0.5  /  DBili  x   /  AST  22  /  ALT  25  /  AlkPhos  76  04-18    PT/INR - ( 2021 07:49 )   PT: 46.3 sec;   INR: 4.16 ratio         PTT - ( 2021 07:49 )  PTT:45.6 sec  Urinalysis Basic - ( 2021 10:14 )    Color: Yellow / Appearance: very cloudy / S.020 / pH: x  Gluc: x / Ketone: Negative  / Bili: Negative / Urobili: Negative   Blood: x / Protein: 100 / Nitrite: Negative   Leuk Esterase: Moderate / RBC: 10-25 /HPF / WBC >50 /HPF   Sq Epi: x / Non Sq Epi: Few /HPF / Bacteria: TNTC /HPF      CAPILLARY BLOOD GLUCOSE      POCT Blood Glucose.: 124 mg/dL (2021 13:39)        Urinalysis Basic - ( 2021 10:14 )    Color: Yellow / Appearance: very cloudy / S.020 / pH: x  Gluc: x / Ketone: Negative  / Bili: Negative / Urobili: Negative   Blood: x / Protein: 100 / Nitrite: Negative   Leuk Esterase: Moderate / RBC: 10-25 /HPF / WBC >50 /HPF   Sq Epi: x / Non Sq Epi: Few /HPF / Bacteria: TNTC /HPF

## 2021-04-20 NOTE — PROGRESS NOTE ADULT - ASSESSMENT
76 year old female with PMHx of PVD, right AKA (S/P fem pop bypass), PAF, COPD, bipolar disorder, hypertension and PSHx of S/P CABG x1 brought into the ED via EMS from [facility] for evaluation of unresponsiveness and hypotension.   CT head negative on admission, Noted with UTI, started on abx, encephalopathy and hypotension likely in setting of sepsis due to UTI. Cultures in progress    76 year old female from [facility] with past medical history of PVD, right AKA, paroxysmal atrial fibrillatiom COPD, bipolar d/o, HTN and CAD s/p CABG x 1 who presented to the ED with c/o altered mental status and hypotension. CT Head without acute findings, noted to have urinary tract infection - urine cultures pending. INR supratherapeutic this AM, holding tonight's dose of coumadin.

## 2021-04-20 NOTE — PROGRESS NOTE ADULT - PROBLEM SELECTOR PLAN 1
-sent from NH for hypotension and unresponsiveness, likely due to sepsis (hypotension, tachycardia, +ve source) sec to UTI   -CT head with no acute findings   -cont IVF   -cont Cipro (allergic to PCN)   -f/u cultures -Acute encephalopathy likely in the setting of urosepsis  -CT head with no acute findings   -cont IVF   -cont Cipro (allergic to PCN)   -f/u urine cultures, blood cultures NGTD

## 2021-04-20 NOTE — PROGRESS NOTE ADULT - PROBLEM SELECTOR PLAN 7
-dvt ppx- on full dose AC with Coumadin -VANESSA likely hemodynamically mediated in the setting of sepsis  - improved after fluids  -avoid nephrotoxic agents, renally dose   - trend BMP

## 2021-04-20 NOTE — PROGRESS NOTE ADULT - SUBJECTIVE AND OBJECTIVE BOX
Patient is a 76y old  Female who presents with a chief complaint of unresponsiveness (2021 16:28)    OVERNIGHT EVENTS: BP improving, mentation back to baseline       REVIEW OF SYSTEMS:  CONSTITUTIONAL: No fever, chills  ENMT:  No difficulty hearing, no change in vision  NECK: No pain or stiffness  RESPIRATORY: No cough, SOB  CARDIOVASCULAR: No chest pain, palpitations  GASTROINTESTINAL: No abdominal pain. No nausea, vomiting, or diarrhea  GENITOURINARY: No dysuria  NEUROLOGICAL: No HA  SKIN: No itching, burning, rashes, or lesions   MUSCULOSKELETAL: No joint pain or swelling; No muscle, back, or extremity pain    T(C): 36.3 (21 @ 13:56), Max: 37 (21 @ 20:02)  HR: 89 (21 @ 13:56) (89 - 113)  BP: 111/65 (21 @ 13:56) (111/65 - 181/70)  RR: 16 (21 @ 13:56) (16 - 18)  SpO2: 100% (21 @ 13:56) (97% - 100%)  Wt(kg): --Vital Signs Last 24 Hrs  T(C): 36.3 (2021 13:56), Max: 37 (2021 20:02)  T(F): 97.4 (2021 13:56), Max: 98.6 (2021 20:02)  HR: 89 (2021 13:56) (89 - 113)  BP: 111/65 (2021 13:56) (111/65 - 181/70)  BP(mean): --  RR: 16 (2021 13:56) (16 - 18)  SpO2: 100% (2021 13:56) (97% - 100%)    MEDICATIONS  (STANDING):  aspirin enteric coated 81 milliGRAM(s) Oral daily  cilostazol 100 milliGRAM(s) Oral two times a day  ciprofloxacin   IVPB 400 milliGRAM(s) IV Intermittent every 12 hours  escitalopram 5 milliGRAM(s) Oral daily  gabapentin 300 milliGRAM(s) Oral two times a day  heparin   Injectable 5000 Unit(s) SubCutaneous every 8 hours  pantoprazole    Tablet 40 milliGRAM(s) Oral before breakfast  polyethylene glycol 3350 17 Gram(s) Oral daily  risperiDONE   Tablet 0.5 milliGRAM(s) Oral two times a day  senna 2 Tablet(s) Oral at bedtime  sodium chloride 0.9%. 1000 milliLiter(s) (70 mL/Hr) IV Continuous <Continuous>  warfarin 7.5 milliGRAM(s) Oral once    MEDICATIONS  (PRN):  acetaminophen   Tablet .. 650 milliGRAM(s) Oral every 6 hours PRN Mild Pain (1 - 3)      PHYSICAL EXAM:  GENERAL: NAD  EYES: clear conjunctiva  ENMT: Moist mucous membranes  NECK: Supple, No JVD  CHEST/LUNG: Clear to auscultation bilaterally; No rales, rhonchi, wheezing, or rubs  HEART: S1, S2, Regular rate and rhythm  ABDOMEN: Soft, Nontender, Nondistended; Bowel sounds present  NEURO: Alert & Oriented to person and place   EXTREMITIES: R AKA,   SKIN:  Stage 1 Pressure Injury to the L. Lat Foot, L. Heel, L. Ankle, and Coccyx; as evident by non-blanchable erythema    Consultant(s) Notes Reviewed:  [x ] YES  [ ] NO  Care Discussed with Consultants/Other Providers [ x] YES  [ ] NO    LABS:                        12.7   7.45  )-----------( 196      ( 2021 07:40 )             38.8     04-19    142  |  111<H>  |  26<H>  ----------------------------<  79  3.8   |  20<L>  |  0.79    Ca    8.8      2021 07:40  Phos  2.5     04-19  Mg     1.9     -19    TPro  6.8  /  Alb  3.3<L>  /  TBili  0.5  /  DBili  x   /  AST  22  /  ALT  25  /  AlkPhos  76  04-18    PT/INR - ( 2021 07:40 )   PT: 24.6 sec;   INR: 2.14 ratio         PTT - ( 2021 07:40 )  PTT:52.8 sec  CAPILLARY BLOOD GLUCOSE    Urinalysis Basic - ( 2021 10:14 )    Color: Yellow / Appearance: very cloudy / S.020 / pH: x  Gluc: x / Ketone: Negative  / Bili: Negative / Urobili: Negative   Blood: x / Protein: 100 / Nitrite: Negative   Leuk Esterase: Moderate / RBC: 10-25 /HPF / WBC >50 /HPF   Sq Epi: x / Non Sq Epi: Few /HPF / Bacteria: TNTC /HPF    RADIOLOGY & ADDITIONAL TESTS:    t< from: CT Head No Cont (21 @ 21:31) >    EXAM:  CT BRAIN                            PROCEDURE DATE:  2021          INTERPRETATION:  CLINICAL INDICATION: Syncope.    TECHNIQUE: CT axial images of the head were obtained without intravenous contrast. Computer-reconstructed coronal and sagittal images were obtained.    COMPARISON: 2018.    FINDINGS: There is no acute intracranial hemorrhage, large cortical infarct, mass effect or midline shift. Nonspecific mild to moderate periventricular and subcortical white matter lucencies likely represent chronic microvascular ischemic changes. Mild to moderate cerebral volume loss with ventricular dilatation has minimally progressed since prior study.    There is no depressed skull fracture. Small partially calcified focus is again noted in the left parietal scalp soft tissue and may be related to a sebaceous cyst. There is sinus mucosal thickening. The tympanomastoid region is clear.    IMPRESSION:    No acute intracranial hemorrhage, large cortical infarct or mass effect. If clinically indicated, short-term follow-up or MRI may be obtained for further evaluation.              < end of copied text >      Imaging Personally Reviewed:  [ ] YES  [ ] NO   Patient is a 76y old  Female who presents with a chief complaint of unresponsiveness (2021 16:28)    OVERNIGHT EVENTS: HTN and tachycardia overnight -  restarting home BB, + clearing of sensorium      REVIEW OF SYSTEMS:  CONSTITUTIONAL: No fever, chills  ENMT:  No difficulty hearing, no change in vision  NECK: No pain or stiffness  RESPIRATORY: No cough, SOB  CARDIOVASCULAR: No chest pain, palpitations  GASTROINTESTINAL: No abdominal pain. No nausea, vomiting, or diarrhea  GENITOURINARY: No dysuria  NEUROLOGICAL: No HA  SKIN: No itching, burning, rashes, or lesions   MUSCULOSKELETAL: No joint pain or swelling; No muscle, back, or extremity pain    T(C): 36.3 (21 @ 13:56), Max: 37 (21 @ 20:02)  HR: 89 (21 @ 13:56) (89 - 113)  BP: 111/65 (21 @ 13:56) (111/65 - 181/70)  RR: 16 (21 @ 13:56) (16 - 18)  SpO2: 100% (21 @ 13:56) (97% - 100%)  Wt(kg): --Vital Signs Last 24 Hrs  T(C): 36.3 (2021 13:56), Max: 37 (2021 20:02)  T(F): 97.4 (2021 13:56), Max: 98.6 (2021 20:02)  HR: 89 (2021 13:56) (89 - 113)  BP: 111/65 (2021 13:56) (111/65 - 181/70)  BP(mean): --  RR: 16 (2021 13:56) (16 - 18)  SpO2: 100% (2021 13:56) (97% - 100%)    MEDICATIONS  (STANDING):  aspirin enteric coated 81 milliGRAM(s) Oral daily  cilostazol 100 milliGRAM(s) Oral two times a day  ciprofloxacin   IVPB 400 milliGRAM(s) IV Intermittent every 12 hours  escitalopram 5 milliGRAM(s) Oral daily  gabapentin 300 milliGRAM(s) Oral two times a day  heparin   Injectable 5000 Unit(s) SubCutaneous every 8 hours  pantoprazole    Tablet 40 milliGRAM(s) Oral before breakfast  polyethylene glycol 3350 17 Gram(s) Oral daily  risperiDONE   Tablet 0.5 milliGRAM(s) Oral two times a day  senna 2 Tablet(s) Oral at bedtime  sodium chloride 0.9%. 1000 milliLiter(s) (70 mL/Hr) IV Continuous <Continuous>  warfarin 7.5 milliGRAM(s) Oral once    MEDICATIONS  (PRN):  acetaminophen   Tablet .. 650 milliGRAM(s) Oral every 6 hours PRN Mild Pain (1 - 3)      PHYSICAL EXAM:  GENERAL: NAD  EYES: clear conjunctiva  ENMT: Moist mucous membranes  NECK: Supple, No JVD  CHEST/LUNG: Clear to auscultation bilaterally; No rales, rhonchi, wheezing, or rubs  HEART: S1, S2, Regular rate and rhythm  ABDOMEN: Soft, Nontender, Nondistended; Bowel sounds present  NEURO: Alert & Oriented to person and place   EXTREMITIES: right above the knee amputation   SKIN:  Stage 1 Pressure Injury to the L. Lat Foot, L. Heel, L. Ankle, and Coccyx; as evident by non-blanchable erythema    Consultant(s) Notes Reviewed:  [x ] YES  [ ] NO  Care Discussed with Consultants/Other Providers [ x] YES  [ ] NO    LABS:                        12.7   7.45  )-----------( 196      ( 2021 07:40 )             38.8     04-19    142  |  111<H>  |  26<H>  ----------------------------<  79  3.8   |  20<L>  |  0.79    Ca    8.8      2021 07:40  Phos  2.5     04-19  Mg     1.9     -19    TPro  6.8  /  Alb  3.3<L>  /  TBili  0.5  /  DBili  x   /  AST  22  /  ALT  25  /  AlkPhos  76  04-18    PT/INR - ( 2021 07:40 )   PT: 24.6 sec;   INR: 2.14 ratio         PTT - ( 2021 07:40 )  PTT:52.8 sec  CAPILLARY BLOOD GLUCOSE    Urinalysis Basic - ( 2021 10:14 )    Color: Yellow / Appearance: very cloudy / S.020 / pH: x  Gluc: x / Ketone: Negative  / Bili: Negative / Urobili: Negative   Blood: x / Protein: 100 / Nitrite: Negative   Leuk Esterase: Moderate / RBC: 10-25 /HPF / WBC >50 /HPF   Sq Epi: x / Non Sq Epi: Few /HPF / Bacteria: TNTC /HPF    RADIOLOGY & ADDITIONAL TESTS:    t< from: CT Head No Cont (21 @ 21:31) >    EXAM:  CT BRAIN                            PROCEDURE DATE:  2021          INTERPRETATION:  CLINICAL INDICATION: Syncope.    TECHNIQUE: CT axial images of the head were obtained without intravenous contrast. Computer-reconstructed coronal and sagittal images were obtained.    COMPARISON: 2018.    FINDINGS: There is no acute intracranial hemorrhage, large cortical infarct, mass effect or midline shift. Nonspecific mild to moderate periventricular and subcortical white matter lucencies likely represent chronic microvascular ischemic changes. Mild to moderate cerebral volume loss with ventricular dilatation has minimally progressed since prior study.    There is no depressed skull fracture. Small partially calcified focus is again noted in the left parietal scalp soft tissue and may be related to a sebaceous cyst. There is sinus mucosal thickening. The tympanomastoid region is clear.    IMPRESSION:    No acute intracranial hemorrhage, large cortical infarct or mass effect. If clinically indicated, short-term follow-up or MRI may be obtained for further evaluation.                    Imaging Personally Reviewed:  [x ] YES  [ ] NO

## 2021-04-20 NOTE — PROGRESS NOTE ADULT - PROBLEM SELECTOR PLAN 9
- pending normalization of INR, urine cultures   - PT consult, from [facility] DVT: holding tonight's coumadin due to supratherapeutic INR  GI: Protonix

## 2021-04-21 NOTE — PROGRESS NOTE ADULT - ASSESSMENT
seen and examined vsstable afebrile physical done  more awake  not in any distress, no headche  moving all 4 extremities.  no cp or sob   labs noted  ct head neg   blood cxs neg  urine cxs e coli S to cipro  inr over 4  a/p  altered MS second time  now better   has h/o behavioural problem on reperidone small dose  cad, severe PAD, afib  card   ecoli UTI on cipro

## 2021-04-21 NOTE — PHYSICAL THERAPY INITIAL EVALUATION ADULT - GENERAL OBSERVATIONS, REHAB EVAL
Patient received in supine; AxOx2; (+IV) line, Prima fit; (R) AKA, (L) foot w/ ankle bootie. Patient states feels alright.

## 2021-04-21 NOTE — PROGRESS NOTE ADULT - PROBLEM SELECTOR PLAN 1
-Acute encephalopathy in the setting of urosepsis, resolving  - Patient with improved sensorium this AM  -repeat CT head with no acute findings    -cont Cipro (allergic to PCN)   - urine cultures with Eschericia Coli, follow up sensitivities   - prelim blood cultures NGTD

## 2021-04-21 NOTE — PROGRESS NOTE ADULT - ASSESSMENT
76 year old female from [facility] with past medical history of PVD, right AKA, paroxysmal atrial fibrillatiom COPD, bipolar d/o, HTN and CAD s/p CABG x 1 who presented to the ED with c/o altered mental status and hypotension. CT Head without acute findings, noted to have urinary tract infection - preliminary urine cultures with Eschericia Coli. INR supratherapeutic again this AM, holding tonight's dose of coumadin.

## 2021-04-21 NOTE — PHYSICAL THERAPY INITIAL EVALUATION ADULT - DISCHARGE DISPOSITION, PT EVAL
Pt. is motivated to improve functional mobility and requires rehab. level, multidisciplinary services to meet noted goals. With [facility] services, pt. is anticipated to make continued gains in function over a longer duration care interval (secondary to activity intolerance and/or pathologic/functional/cognitive co-morbidities) with reasonable improvement expected.  Lower care levels unlikely to be as beneficial to pt. function, and discharge to home environment at this time jeopardizes attainable functional gains, and/or presents risks to pt. safety./rehabilitation facility

## 2021-04-21 NOTE — DISCHARGE NOTE PROVIDER - CARE PROVIDER_API CALL
Yovani Carpio  INTERNAL MEDICINE  [address], [address]  [place] [address]  Phone: [phone]  Fax: [phone]  Follow Up Time: 1 week

## 2021-04-21 NOTE — PHYSICAL THERAPY INITIAL EVALUATION ADULT - PREDICTED DURATION OF THERAPY (DAYS/WKS), PT EVAL
Recommending SUB-ACUTE REHAB for intensive strengthening, gait, balance and transfer training for safer transition to home.

## 2021-04-21 NOTE — PROGRESS NOTE ADULT - PROBLEM SELECTOR PLAN 8
per patient's HCP Silva Marquez patient was an alcoholic   will start PO thiamine, folic acid, MVI  B12 level normal  f/u folate level

## 2021-04-21 NOTE — PROGRESS NOTE ADULT - PROBLEM SELECTOR PLAN 10
- pending normalization of INR  - f/u urine sensitivities given PCN allergy  - PT consult  Spoke with HCP Silva Marquez- she does NOT want patient to return to [facility]
- pending normalization of INR, urine cultures   - PT consult  Spoke with HCP Silva Marquez- she does NOT want patient to return to [facility]

## 2021-04-21 NOTE — PROGRESS NOTE ADULT - SUBJECTIVE AND OBJECTIVE BOX
Patient is a 76y old  Female who presents with a chief complaint of unresponsiveness (2021 16:28)    OVERNIGHT EVENTS: HTN and tachycardia overnight -  restarting home BB, + clearing of sensorium      REVIEW OF SYSTEMS:  CONSTITUTIONAL: No fever, chills  ENMT:  No difficulty hearing, no change in vision  NECK: No pain or stiffness  RESPIRATORY: No cough, SOB  CARDIOVASCULAR: No chest pain, palpitations  GASTROINTESTINAL: No abdominal pain. No nausea, vomiting, or diarrhea  GENITOURINARY: No dysuria  NEUROLOGICAL: No HA  SKIN: No itching, burning, rashes, or lesions   MUSCULOSKELETAL: No joint pain or swelling; No muscle, back, or extremity pain    T(C): 36.3 (21 @ 13:56), Max: 37 (21 @ 20:02)  HR: 89 (21 @ 13:56) (89 - 113)  BP: 111/65 (21 @ 13:56) (111/65 - 181/70)  RR: 16 (21 @ 13:56) (16 - 18)  SpO2: 100% (21 @ 13:56) (97% - 100%)  Wt(kg): --Vital Signs Last 24 Hrs  T(C): 36.3 (2021 13:56), Max: 37 (2021 20:02)  T(F): 97.4 (2021 13:56), Max: 98.6 (2021 20:02)  HR: 89 (2021 13:56) (89 - 113)  BP: 111/65 (2021 13:56) (111/65 - 181/70)  BP(mean): --  RR: 16 (2021 13:56) (16 - 18)  SpO2: 100% (2021 13:56) (97% - 100%)    MEDICATIONS  (STANDING):  aspirin enteric coated 81 milliGRAM(s) Oral daily  cilostazol 100 milliGRAM(s) Oral two times a day  ciprofloxacin   IVPB 400 milliGRAM(s) IV Intermittent every 12 hours  escitalopram 5 milliGRAM(s) Oral daily  gabapentin 300 milliGRAM(s) Oral two times a day  heparin   Injectable 5000 Unit(s) SubCutaneous every 8 hours  pantoprazole    Tablet 40 milliGRAM(s) Oral before breakfast  polyethylene glycol 3350 17 Gram(s) Oral daily  risperiDONE   Tablet 0.5 milliGRAM(s) Oral two times a day  senna 2 Tablet(s) Oral at bedtime  sodium chloride 0.9%. 1000 milliLiter(s) (70 mL/Hr) IV Continuous <Continuous>  warfarin 7.5 milliGRAM(s) Oral once    MEDICATIONS  (PRN):  acetaminophen   Tablet .. 650 milliGRAM(s) Oral every 6 hours PRN Mild Pain (1 - 3)      PHYSICAL EXAM:  GENERAL: NAD  EYES: clear conjunctiva  ENMT: Moist mucous membranes  NECK: Supple, No JVD  CHEST/LUNG: Clear to auscultation bilaterally; No rales, rhonchi, wheezing, or rubs  HEART: S1, S2, Regular rate and rhythm  ABDOMEN: Soft, Nontender, Nondistended; Bowel sounds present  NEURO: Alert & Oriented to person and place   EXTREMITIES: right above the knee amputation   SKIN:  Stage 1 Pressure Injury to the L. Lat Foot, L. Heel, L. Ankle, and Coccyx; as evident by non-blanchable erythema    Consultant(s) Notes Reviewed:  [x ] YES  [ ] NO  Care Discussed with Consultants/Other Providers [ x] YES  [ ] NO    LABS:                        12.7   7.45  )-----------( 196      ( 2021 07:40 )             38.8     04-19    142  |  111<H>  |  26<H>  ----------------------------<  79  3.8   |  20<L>  |  0.79    Ca    8.8      2021 07:40  Phos  2.5     04-19  Mg     1.9     -19    TPro  6.8  /  Alb  3.3<L>  /  TBili  0.5  /  DBili  x   /  AST  22  /  ALT  25  /  AlkPhos  76  04-18    PT/INR - ( 2021 07:40 )   PT: 24.6 sec;   INR: 2.14 ratio         PTT - ( 2021 07:40 )  PTT:52.8 sec  CAPILLARY BLOOD GLUCOSE    Urinalysis Basic - ( 2021 10:14 )    Color: Yellow / Appearance: very cloudy / S.020 / pH: x  Gluc: x / Ketone: Negative  / Bili: Negative / Urobili: Negative   Blood: x / Protein: 100 / Nitrite: Negative   Leuk Esterase: Moderate / RBC: 10-25 /HPF / WBC >50 /HPF   Sq Epi: x / Non Sq Epi: Few /HPF / Bacteria: TNTC /HPF    RADIOLOGY & ADDITIONAL TESTS:    t< from: CT Head No Cont (21 @ 21:31) >    EXAM:  CT BRAIN                            PROCEDURE DATE:  2021          INTERPRETATION:  CLINICAL INDICATION: Syncope.    TECHNIQUE: CT axial images of the head were obtained without intravenous contrast. Computer-reconstructed coronal and sagittal images were obtained.    COMPARISON: 2018.    FINDINGS: There is no acute intracranial hemorrhage, large cortical infarct, mass effect or midline shift. Nonspecific mild to moderate periventricular and subcortical white matter lucencies likely represent chronic microvascular ischemic changes. Mild to moderate cerebral volume loss with ventricular dilatation has minimally progressed since prior study.    There is no depressed skull fracture. Small partially calcified focus is again noted in the left parietal scalp soft tissue and may be related to a sebaceous cyst. There is sinus mucosal thickening. The tympanomastoid region is clear.    IMPRESSION:    No acute intracranial hemorrhage, large cortical infarct or mass effect. If clinically indicated, short-term follow-up or MRI may be obtained for further evaluation.                    Imaging Personally Reviewed:  [x ] YES  [ ] NO

## 2021-04-21 NOTE — PROGRESS NOTE ADULT - PROBLEM SELECTOR PLAN 4
- will restart beta blocker with parameters in the setting of HTN and tachycardia  - daily INR  - holding tonight's coumadin given supratherapeutic INR

## 2021-04-21 NOTE — PROGRESS NOTE ADULT - PROBLEM SELECTOR PLAN 7
-VANESSA likely hemodynamically mediated in the setting of sepsis  - improved after fluids  -avoid nephrotoxic agents, renally dose   - trend BMP

## 2021-04-21 NOTE — DISCHARGE NOTE PROVIDER - NSDCCPCAREPLAN_GEN_ALL_CORE_FT
PRINCIPAL DISCHARGE DIAGNOSIS  Diagnosis: UTI (urinary tract infection)  Assessment and Plan of Treatment: UTI (urinary tract infection)  WHAT YOU NEED TO KNOW:  A urinary tract infection (UTI) is caused by bacteria that get inside your urinary tract. Your urinary tract includes your kidneys, ureters, bladder, and urethra. Urine is made in your kidneys, and it flows from the ureters to the bladder. Urine leaves the bladder through the urethra. A UTI is more common in your lower urinary tract, which includes your bladder and urethra.  1 Search 2 Select Titles 3 Select Documents 4 Customize (optional) 5 Print  View:  	  English   	Adds current document to the Print List.	Customize	Print documents  URINARY TRACT INFECTION IN OLDER ADULTS - Discharge Care  Urinary Tract Infection in Older Adults  WHAT YOU NEED TO KNOW:  A urinary tract infection (UTI) is caused by bacteria that get inside your urinary tract. Your urinary tract includes your kidneys, ureters, bladder, and urethra. Urine is made in your kidneys, and it flows from the ureters to the bladder. Urine leaves the bladder through the urethra. A UTI is more common in your lower urinary tract, which includes your bladder and urethra.  Kidney, Ureters, Bladder  DISCHARGE INSTRUCTIONS:  Seek care immediately if:  You are urinating very little or not at all.  You are vomiting.  You have a high fever with shaking chills.  You have side or back pain that gets worse.  Urinate when you feel the urge. Do not hold your urine. Bacteria can grow if urine stays in the bladder too long. It may be helpful to urinate at least every 3 to 4 hours.  Wear cotton underwear and clothes that are loose. Tight pants and nylon underwear can trap moisture and cause bacteria to grow.  Women should wipe front to back after urinating or having a bowel movement. This may prevent germs from getting into the urinary tract.   Please continue to take XXXX  follow up with your primary care doctor.         SECONDARY DISCHARGE DIAGNOSES  Diagnosis: Supratherapeutic INR  Assessment and Plan of Treatment: Supratherapeutic INR  We held your coumadin due to your INR being 4.   You were seen by a cardiologist who recommended starting XXXX     PRINCIPAL DISCHARGE DIAGNOSIS  Diagnosis: UTI (urinary tract infection)  Assessment and Plan of Treatment: UTI (urinary tract infection)  WHAT YOU NEED TO KNOW:  A urinary tract infection (UTI) is caused by bacteria that get inside your urinary tract. Your urinary tract includes your kidneys, ureters, bladder, and urethra. Urine is made in your kidneys, and it flows from the ureters to the bladder. Urine leaves the bladder through the urethra. A UTI is more common in your lower urinary tract, which includes your bladder and urethra.  1 Search 2 Select Titles 3 Select Documents 4 Customize (optional) 5 Print  View:  	  English   	Adds current document to the Print List.	Customize	Print documents  URINARY TRACT INFECTION IN OLDER ADULTS - Discharge Care  Urinary Tract Infection in Older Adults  WHAT YOU NEED TO KNOW:  A urinary tract infection (UTI) is caused by bacteria that get inside your urinary tract. Your urinary tract includes your kidneys, ureters, bladder, and urethra. Urine is made in your kidneys, and it flows from the ureters to the bladder. Urine leaves the bladder through the urethra. A UTI is more common in your lower urinary tract, which includes your bladder and urethra.  Kidney, Ureters, Bladder  DISCHARGE INSTRUCTIONS:  Seek care immediately if:  You are urinating very little or not at all.  You are vomiting.  You have a high fever with shaking chills.  You have side or back pain that gets worse.  Urinate when you feel the urge. Do not hold your urine. Bacteria can grow if urine stays in the bladder too long. It may be helpful to urinate at least every 3 to 4 hours.  Wear cotton underwear and clothes that are loose. Tight pants and nylon underwear can trap moisture and cause bacteria to grow.  Women should wipe front to back after urinating or having a bowel movement. This may prevent germs from getting into the urinary tract.   Please continue to take XXXX  follow up with your primary care doctor.         SECONDARY DISCHARGE DIAGNOSES  Diagnosis: Supratherapeutic INR  Assessment and Plan of Treatment: Supratherapeutic INR  We held your coumadin due to your INR being 4.   You were seen by a cardiologist who recommended starting eliquis when your INR is below 2.

## 2021-04-21 NOTE — PROGRESS NOTE ADULT - PROBLEM SELECTOR PLAN 3
- INR 4.07, holding this evening's dose of coumadin  - consider NOAC when INR down-trends ~ 2  - fall precautions, bleeding precautions  - Cardiology Dr. Flanagan

## 2021-04-21 NOTE — DISCHARGE NOTE PROVIDER - NSDCMRMEDTOKEN_GEN_ALL_CORE_FT
acetaminophen 325 mg oral tablet: 3 tab(s) orally every 6 hours, As needed, Moderate Pain (4 - 6)  aspirin 81 mg oral tablet: 1 tab(s) orally once a day  cilostazol 100 mg oral tablet: 1 tab(s) orally 2 times a day  Cozaar 25 mg oral tablet: 1 tab(s) orally once a day  gabapentin 300 mg oral capsule: 1 cap(s) orally 2 times a day  Lexapro 5 mg oral tablet: 1 tab(s) orally once a day  Lipitor 40 mg oral tablet: 1 tab(s) orally once a day  metoprolol tartrate 25 mg oral tablet: 1 tab(s) orally 2 times a day  omeprazole 20 mg oral delayed release capsule: 1 cap(s) orally once a day  polyethylene glycol 3350 oral powder for reconstitution: 17 gram(s) orally once a day  risperiDONE 0.5 mg oral tablet: 1 tab(s) orally 2 times a day  senna oral tablet: 2 tab(s) orally once a day (at bedtime)  warfarin 7.5 mg oral tablet: 1 tab(s) orally once a day   acetaminophen 325 mg oral tablet: 3 tab(s) orally every 6 hours, As needed, Moderate Pain (4 - 6)  aspirin 81 mg oral tablet: 1 tab(s) orally once a day  cilostazol 100 mg oral tablet: 1 tab(s) orally 2 times a day  ciprofloxacin 100 mg oral tablet: 2 tab(s) orally every 12 hours until 4/25/2021  Cozaar 25 mg oral tablet: 1 tab(s) orally once a day  folic acid 1 mg oral tablet: 1 tab(s) orally once a day  gabapentin 300 mg oral capsule: 1 cap(s) orally 2 times a day  Lexapro 5 mg oral tablet: 1 tab(s) orally once a day  Lipitor 40 mg oral tablet: 1 tab(s) orally once a day  metoprolol tartrate 25 mg oral tablet: 1 tab(s) orally 2 times a day  Multiple Vitamins oral tablet: 1 tab(s) orally once a day  omeprazole 20 mg oral delayed release capsule: 1 cap(s) orally once a day  polyethylene glycol 3350 oral powder for reconstitution: 17 gram(s) orally once a day  risperiDONE 0.5 mg oral tablet: 1 tab(s) orally 2 times a day  senna oral tablet: 2 tab(s) orally once a day (at bedtime)  thiamine 100 mg oral tablet: 1 tab(s) orally once a day   acetaminophen 325 mg oral tablet: 3 tab(s) orally every 6 hours, As needed, Moderate Pain (4 - 6)  aspirin 81 mg oral tablet: 1 tab(s) orally once a day  cilostazol 100 mg oral tablet: 1 tab(s) orally 2 times a day  ciprofloxacin 100 mg oral tablet: 2 tab(s) orally every 12 hours until 4/25/2021  Cozaar 25 mg oral tablet: 1 tab(s) orally once a day  folic acid 1 mg oral tablet: 1 tab(s) orally once a day  gabapentin 300 mg oral capsule: 1 cap(s) orally 2 times a day  Lab: PT/INR: 1 once a day for 3 days  Lexapro 5 mg oral tablet: 1 tab(s) orally once a day  Lipitor 40 mg oral tablet: 1 tab(s) orally once a day  metoprolol tartrate 25 mg oral tablet: 1 tab(s) orally 2 times a day  Multiple Vitamins oral tablet: 1 tab(s) orally once a day  omeprazole 20 mg oral delayed release capsule: 1 cap(s) orally once a day  polyethylene glycol 3350 oral powder for reconstitution: 17 gram(s) orally once a day  risperiDONE 0.5 mg oral tablet: 1 tab(s) orally 2 times a day  senna oral tablet: 2 tab(s) orally once a day (at bedtime)  thiamine 100 mg oral tablet: 1 tab(s) orally once a day

## 2021-04-21 NOTE — DISCHARGE NOTE PROVIDER - HOSPITAL COURSE
76 year old female from [facility] with past medical history of PVD, right AKA, paroxysmal atrial fibrillation COPD, bipolar d/o, HTN and CAD s/p CABG x 1 who presented to the ED with c/o altered mental status and hypotension. CT Head without acute findings, noted to have urinary tract infection - cultures with Eschericia Coli, sensitive to XXX. INR supratherapeutic, holding coumadin dose.     <<<incomplete>>>   76 year old female from [facility] with past medical history of PVD, right AKA, paroxysmal atrial fibrillation COPD, bipolar d/o, HTN and CAD s/p CABG x 1 who presented to the ED with c/o altered mental status and hypotension. CT Head without acute findings, noted to have urinary tract infection - cultures with Eschericia Coli, sensitive to Cipro. INR supratherapeutic, holding coumadin, patient to start Eliquis at [facility] when INR below 2.     Discussed with attending.    Patient medically stable for discharge.    For full hospital course, please see medical record.

## 2021-04-21 NOTE — PROGRESS NOTE ADULT - SUBJECTIVE AND OBJECTIVE BOX
HPI:  76 year old female with PMHx of PVD, right AKA (S/P fem pop bypass), PAF, COPD, bipolar disorder, hypertension and PSHx of S/P CABG x1 brought into the ED via EMS from [facility] for evaluation of unresponsiveness and hypotension.   Pt is alert oriented x 3 and states she doesn't know why she is in the hospital. Pt denies any fever, chills, abdominal pain, nausea or vomiting. Pt endorses chronic left hip and knee pain. Patient was reportedly noted to be unresponsive and hypotensive at approximately 13:00 this afternoon and received fluids after which she became more responsive. Pt denies any acute complaints or concerns  (18 Apr 2021 18:37)      Patient is a 76y old  Female who presents with a chief complaint of unresponsiveness (21 Apr 2021 18:23)      INTERVAL HPI/OVERNIGHT EVENTS:  T(C): 36.5 (04-21-21 @ 14:10), Max: 36.6 (04-20-21 @ 19:41)  HR: 89 (04-21-21 @ 14:10) (72 - 91)  BP: 106/60 (04-21-21 @ 14:10) (101/61 - 130/68)  RR: 20 (04-21-21 @ 14:10) (17 - 20)  SpO2: 95% (04-21-21 @ 14:10) (95% - 99%)  Wt(kg): --  I&O's Summary      REVIEW OF SYSTEMS: denies fever, chills, SOB, palpitations, chest pain, abdominal pain, nausea, vomitting, diarrhea, constipation, dizziness    MEDICATIONS  (STANDING):  aspirin enteric coated 81 milliGRAM(s) Oral daily  cilostazol 100 milliGRAM(s) Oral two times a day  ciprofloxacin   IVPB 400 milliGRAM(s) IV Intermittent every 12 hours  escitalopram 5 milliGRAM(s) Oral daily  folic acid 1 milliGRAM(s) Oral daily  gabapentin 300 milliGRAM(s) Oral two times a day  heparin   Injectable 5000 Unit(s) SubCutaneous every 8 hours  metoprolol tartrate 25 milliGRAM(s) Oral two times a day  multivitamin 1 Tablet(s) Oral daily  pantoprazole    Tablet 40 milliGRAM(s) Oral before breakfast  polyethylene glycol 3350 17 Gram(s) Oral daily  risperiDONE   Tablet 0.5 milliGRAM(s) Oral two times a day  senna 2 Tablet(s) Oral at bedtime  thiamine 100 milliGRAM(s) Oral daily    MEDICATIONS  (PRN):  acetaminophen   Tablet .. 650 milliGRAM(s) Oral every 6 hours PRN Mild Pain (1 - 3)      PHYSICAL EXAM:  GENERAL: NAD, well-groomed, well-developed  HEAD:  Atraumatic, Normocephalic  EYES: EOMI, PERRLA, conjunctiva and sclera clear  ENMT: No tonsillar erythema, exudates, or enlargement; Moist mucous membranes, Good dentition, No lesions  NECK: Supple, No JVD, Normal thyroid  NERVOUS SYSTEM:  Alert & Oriented X3, Good concentration; Motor Strength 5/5 B/L upper and lower extremities; DTRs 2+ intact and symmetric  CHEST/LUNG: Clear to percussion bilaterally; No rales, rhonchi, wheezing, or rubs  HEART: Regular rate and rhythm; No murmurs, rubs, or gallops  ABDOMEN: Soft, Nontender, Nondistended; Bowel sounds present  EXTREMITIES:  2+ Peripheral Pulses, No clubbing, cyanosis, or edema  LYMPH: No lymphadenopathy noted  SKIN: No rashes or lesions  LABS:                        11.2   5.30  )-----------( 206      ( 21 Apr 2021 10:08 )             34.4     04-21    144  |  111<H>  |  30<H>  ----------------------------<  146<H>  4.2   |  24  |  1.29    Ca    8.7      21 Apr 2021 10:08    TPro  6.5  /  Alb  2.6<L>  /  TBili  0.3  /  DBili  x   /  AST  13  /  ALT  17  /  AlkPhos  61  04-21    PT/INR - ( 21 Apr 2021 10:08 )   PT: 45.3 sec;   INR: 4.07 ratio         PTT - ( 21 Apr 2021 10:08 )  PTT:58.4 sec    CAPILLARY BLOOD GLUCOSE      POCT Blood Glucose.: 135 mg/dL (21 Apr 2021 07:57)  POCT Blood Glucose.: 125 mg/dL (20 Apr 2021 21:22)      ABG - ( 20 Apr 2021 14:06 )  pH, Arterial: 7.42  pH, Blood: x     /  pCO2: 32    /  pO2: 161   / HCO3: 21    / Base Excess: -2.8  /  SaO2: 99

## 2021-04-22 NOTE — PROGRESS NOTE ADULT - SUBJECTIVE AND OBJECTIVE BOX
HPI:  76 year old female with PMHx of PVD, right AKA (S/P fem pop bypass), PAF, COPD, bipolar disorder, hypertension and PSHx of S/P CABG x1 brought into the ED via EMS from [facility] for evaluation of unresponsiveness and hypotension.   Pt is alert oriented x 3 and states she doesn't know why she is in the hospital. Pt denies any fever, chills, abdominal pain, nausea or vomiting. Pt endorses chronic left hip and knee pain. Patient was reportedly noted to be unresponsive and hypotensive at approximately 13:00 this afternoon and received fluids after which she became more responsive. Pt denies any acute complaints or concerns  (18 Apr 2021 18:37)      Patient is a 76y old  Female who presents with a chief complaint of unresponsiveness (21 Apr 2021 19:19)      INTERVAL HPI/OVERNIGHT EVENTS:  T(C): 36.8 (04-22-21 @ 13:39), Max: 37 (04-22-21 @ 05:14)  HR: 91 (04-22-21 @ 13:39) (89 - 115)  BP: 153/77 (04-22-21 @ 13:39) (106/60 - 164/76)  RR: 17 (04-22-21 @ 13:39) (17 - 20)  SpO2: 97% (04-22-21 @ 13:39) (95% - 97%)  Wt(kg): --  I&O's Summary      REVIEW OF SYSTEMS: denies fever, chills, SOB, palpitations, chest pain, abdominal pain, nausea, vomitting, diarrhea, constipation, dizziness    MEDICATIONS  (STANDING):  aspirin enteric coated 81 milliGRAM(s) Oral daily  cilostazol 100 milliGRAM(s) Oral two times a day  ciprofloxacin   IVPB 400 milliGRAM(s) IV Intermittent every 12 hours  escitalopram 5 milliGRAM(s) Oral daily  folic acid 1 milliGRAM(s) Oral daily  gabapentin 300 milliGRAM(s) Oral two times a day  heparin   Injectable 5000 Unit(s) SubCutaneous every 8 hours  metoprolol tartrate 25 milliGRAM(s) Oral two times a day  multivitamin 1 Tablet(s) Oral daily  pantoprazole    Tablet 40 milliGRAM(s) Oral before breakfast  polyethylene glycol 3350 17 Gram(s) Oral daily  risperiDONE   Tablet 0.5 milliGRAM(s) Oral two times a day  senna 2 Tablet(s) Oral at bedtime  thiamine 100 milliGRAM(s) Oral daily    MEDICATIONS  (PRN):  acetaminophen   Tablet .. 650 milliGRAM(s) Oral every 6 hours PRN Mild Pain (1 - 3)      PHYSICAL EXAM:  GENERAL: NAD, well-groomed, well-developed  HEAD:  Atraumatic, Normocephalic  EYES: EOMI, PERRLA, conjunctiva and sclera clear  ENMT: No tonsillar erythema, exudates, or enlargement; Moist mucous membranes, Good dentition, No lesions  NECK: Supple, No JVD, Normal thyroid  NERVOUS SYSTEM:  Alert & Oriented X3, Good concentration; Motor Strength 5/5 B/L upper and lower extremities; DTRs 2+ intact and symmetric  CHEST/LUNG: Clear to percussion bilaterally; No rales, rhonchi, wheezing, or rubs  HEART: Regular rate and rhythm; No murmurs, rubs, or gallops  ABDOMEN: Soft, Nontender, Nondistended; Bowel sounds present  EXTREMITIES:  2+ Peripheral Pulses, No clubbing, cyanosis, or edema  LYMPH: No lymphadenopathy noted  SKIN: No rashes or lesions  LABS:                        13.1   6.53  )-----------( 257      ( 22 Apr 2021 06:21 )             39.2     04-22    143  |  110<H>  |  26<H>  ----------------------------<  140<H>  4.2   |  23  |  1.05    Ca    9.1      22 Apr 2021 06:21  Phos  1.8     04-22  Mg     1.7     04-22    TPro  6.5  /  Alb  2.6<L>  /  TBili  0.3  /  DBili  x   /  AST  13  /  ALT  17  /  AlkPhos  61  04-21    PT/INR - ( 22 Apr 2021 06:21 )   PT: 34.3 sec;   INR: 3.04 ratio         PTT - ( 22 Apr 2021 06:21 )  PTT:43.7 sec    CAPILLARY BLOOD GLUCOSE

## 2021-04-22 NOTE — PROGRESS NOTE ADULT - ASSESSMENT
seen and examined vsstable afebrile physical unchanged    awake alert  not in any distress.  no cp or sob or palp  bp slightly on higher side  will watch   extra water  inr over 3  when it under 3 as per card  when under 2  start NOAC  Pt is alert awake   full code  otherwise  cad, pad AKA. afib. bed bound poor prognosis

## 2021-04-22 NOTE — PROGRESS NOTE ADULT - REASON FOR ADMISSION
unresponsiveness

## 2021-04-22 NOTE — DISCHARGE NOTE NURSING/CASE MANAGEMENT/SOCIAL WORK - PATIENT PORTAL LINK FT
You can access the FollowMyHealth Patient Portal offered by [facility] by registering at the following website: [email or web address]. By joining [facility]’s FollowMyHealth portal, you will also be able to view your health information using other applications (apps) compatible with our system.

## 2021-05-19 NOTE — ED ADULT NURSE NOTE - CCCP TRG CHIEF CMPLNT
fall Debridement Text: The wound edges were debrided prior to proceeding with the closure to facilitate wound healing.

## 2021-06-28 PROBLEM — I10 ESSENTIAL (PRIMARY) HYPERTENSION: Chronic | Status: ACTIVE | Noted: 2021-01-01

## 2021-06-28 PROBLEM — F31.9 BIPOLAR DISORDER, UNSPECIFIED: Chronic | Status: ACTIVE | Noted: 2021-01-01

## 2021-06-28 PROBLEM — J44.9 CHRONIC OBSTRUCTIVE PULMONARY DISEASE, UNSPECIFIED: Chronic | Status: ACTIVE | Noted: 2021-01-01

## 2021-06-28 PROBLEM — I73.9 PERIPHERAL VASCULAR DISEASE, UNSPECIFIED: Chronic | Status: ACTIVE | Noted: 2021-01-01

## 2021-06-28 PROBLEM — Z74.01 BED CONFINEMENT STATUS: Chronic | Status: ACTIVE | Noted: 2021-01-01

## 2021-06-28 NOTE — PROCEDURE NOTE - NSPOSTPRCRAD_GEN_A_CORE
central line located in the superior vena cava/line adjusted to depth of insertion/no pneumothorax/post procedure radiography not performed/post-procedure radiography performed

## 2021-06-28 NOTE — ED PROVIDER NOTE - OBJECTIVE STATEMENT
PT brought in by ambulance as notification for low BP and ams. Patient denies complaints. poor historian

## 2021-06-28 NOTE — PROGRESS NOTE ADULT - SUBJECTIVE AND OBJECTIVE BOX
HPI:  76 year old female from [facility] with PMHx of PVD, right AKA (S/P fem pop bypass), PAF, COPD, bipolar disorder, hypertension and PSHx of S/P CABG x1 brought into the ED via EMS from [facility] for unresponsiveness and hypotension. Per NH supervisor Mr. Gaspar () Pt was found unresponsive today morning around 8 am, pt was fine last night, pt did not have fever, difficulty breathing, pt was only receptive to painful stimuli and on evaluation pt's blood pressure was low per papers Pt's bp was 62/38. Pt was sent to the hospital for further evaluation. Baseline mental status alert, oriented and follows instruction, non ambulatory at baseline.   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 Pt was given a dose of vancomycin and rocephine. Pt's blood pressure improve to 97/58, hr 109 bpm, ECG showing sinus tachy, RBBB, LAFB, Bifacicular block.    (2021 14:58)      Patient is a 76y old  Female who presents with a chief complaint of     INTERVAL HPI/OVERNIGHT EVENTS:  T(C): 35.6 (21 @ 16:29), Max: 36.6 (21 @ 10:26)  HR: 94 (21 @ 20:45) (92 - 120)  BP: 82/56 (21 @ 20:45) (69/46 - 119/43)  RR: 13 (21 @ 20:45) (13 - 21)  SpO2: 97% (21 @ 20:45) (94% - 100%)  Wt(kg): --  I&O's Summary    2021 07:01  -  2021 21:22  --------------------------------------------------------  IN: 2355.1 mL / OUT: 100 mL / NET: 2255.1 mL        REVIEW OF SYSTEMS: denies fever, chills, SOB, palpitations, chest pain, abdominal pain, nausea, vomitting, diarrhea, constipation, dizziness    MEDICATIONS  (STANDING):  cefepime   IVPB      chlorhexidine 4% Liquid 1 Application(s) Topical <User Schedule>  metroNIDAZOLE  IVPB      metroNIDAZOLE  IVPB 500 milliGRAM(s) IV Intermittent every 8 hours  norepinephrine Infusion 0.032 MICROgram(s)/kG/Min (2.01 mL/Hr) IV Continuous <Continuous>  pantoprazole  Injectable 40 milliGRAM(s) IV Push daily  sodium bicarbonate  Infusion 0.28 mEq/kG/Hr (125 mL/Hr) IV Continuous <Continuous>    MEDICATIONS  (PRN):  sodium chloride 0.9% lock flush 10 milliLiter(s) IV Push every 1 hour PRN Pre/post blood products, medications, blood draw, and to maintain line patency      PHYSICAL EXAM:  GENERAL: NAD, well-groomed, well-developed  HEAD:  Atraumatic, Normocephalic  EYES: EOMI, PERRLA, conjunctiva and sclera clear  ENMT: No tonsillar erythema, exudates, or enlargement; Moist mucous membranes, Good dentition, No lesions  NECK: Supple, No JVD, Normal thyroid  NERVOUS SYSTEM:  Alert & Oriented X3, Good concentration; Motor Strength 5/5 B/L upper and lower extremities; DTRs 2+ intact and symmetric  CHEST/LUNG: Clear to percussion bilaterally; No rales, rhonchi, wheezing, or rubs  HEART: Regular rate and rhythm; No murmurs, rubs, or gallops  ABDOMEN: Soft, Nontender, Nondistended; Bowel sounds present  EXTREMITIES:  2+ Peripheral Pulses, No clubbing, cyanosis, or edema  LYMPH: No lymphadenopathy noted  SKIN: No rashes or lesions  LABS:                        10.5   10.45 )-----------( 187      ( 2021 21:19 )             32.0         139  |  112<H>  |  70<H>  ----------------------------<  152<H>  4.4   |  12<L>  |  5.81<H>    Ca    7.6<L>      2021 15:54  Phos  7.2       Mg     2.6         TPro  6.7  /  Alb  2.6<L>  /  TBili  0.6  /  DBili  x   /  AST  40  /  ALT  34  /  AlkPhos  205<H>      PT/INR - ( 2021 10:46 )   PT: 21.9 sec;   INR: 1.90 ratio         PTT - ( 2021 10:46 )  PTT:37.8 sec  Urinalysis Basic - ( 2021 10:46 )    Color: Yellow / Appearance: Clear / S.015 / pH: x  Gluc: x / Ketone: Trace  / Bili: Negative / Urobili: Negative   Blood: x / Protein: 100 / Nitrite: Negative   Leuk Esterase: Small / RBC: 5-10 /HPF / WBC 26-50 /HPF   Sq Epi: x / Non Sq Epi: Few /HPF / Bacteria: Trace /HPF      CAPILLARY BLOOD GLUCOSE      POCT Blood Glucose.: 131 mg/dL (2021 17:10)  POCT Blood Glucose.: 164 mg/dL (2021 10:18)      ABG - ( 2021 20:24 )  pH, Arterial: 7.20  pH, Blood: x     /  pCO2: 26    /  pO2: 91    / HCO3: 10    / Base Excess: -16.2 /  SaO2: 97                Urinalysis Basic - ( 2021 10:46 )    Color: Yellow / Appearance: Clear / S.015 / pH: x  Gluc: x / Ketone: Trace  / Bili: Negative / Urobili: Negative   Blood: x / Protein: 100 / Nitrite: Negative   Leuk Esterase: Small / RBC: 5-10 /HPF / WBC 26-50 /HPF   Sq Epi: x / Non Sq Epi: Few /HPF / Bacteria: Trace /HPF

## 2021-06-28 NOTE — CONSULT NOTE ADULT - SUBJECTIVE AND OBJECTIVE BOX
HPI:   76 year old female with PMHx of PVD, right AKA (failed fem pop bypass), PAF on coumadin, COPD, bipolar disorder, hypertension and PSHx of S/P CABG x1 brought into the ED via EMS from [facility] for evaluation of unresponsiveness and hypotension. Was admitted this April with AMS/hypotension, found to have urosepsis and supratherapeutic INR from coumadin, was discharged on abx.     Pt presents with hypotension/tachycardia and AMS. Unable to obtain history.   Work up revealed VANESSA with Cr of 6, alicia inserted for UO, hypotension with systolic 90s, tachycardic at 100s. EKG pending.   CT showed bibasilar atelectasis vs consolidation, and     < from: CT Chest No Cont (21 @ 11:59) >  IMPRESSION:  Limited by lack of any exogenousoral or intravenous contrast  Patchy bibasilar dependent consolidation/atelectasis. Correlate clinically for infection.  Cholelithiasis, pericholecystic fluid concerning for cholecystitis. Correlate with ultrasound and/or HIDA scan.  Significant biliary dilatation, possible mass pancreatic head. Correlate with MRCP/abdominal MR with pancreatic protocol    < end of copied text >        REVIEW OF SYSTEMS:  Negative except stated above in HPI    PAST MEDICAL & SURGICAL HISTORY:  Myocardial infarct, old    Hypercholesterolemia    Peripheral vascular disease    COPD (chronic obstructive pulmonary disease)    Bipolar 1 disorder    HTN (hypertension)    Bedbound    S/P CABG x 1    Above knee amputation of right lower extremity        MEDICATIONS  (STANDING):    MEDICATIONS  (PRN):      Allergies    influenza virus vaccine, live, trivalent (Unknown)  PC Pen VK (Rash)  penicillin (Other; Rash)  statins (Other)  sulfa drugs (Other)  sulfamethizole (Other)    Intolerances        Vital Signs Last 24 Hrs  T(C): 36.6 (2021 10:26), Max: 36.6 (2021 10:26)  T(F): 97.9 (2021 10:26), Max: 97.9 (2021 10:26)  HR: 113 (2021 12:13) (113 - 120)  BP: 94/55 (2021 12:13) (78/31 - 95/60)  BP(mean): --  RR: 16 (2021 12:13) (16 - 18)  SpO2: 97% (2021 12:13) (95% - 97%)    PHYSCAL EXAM:   General: Alert and oriented, not in acute distress  Resp: Breathing unlabored  GI:  : No Alicia, no dysuria or hematuria  Extremities: No pedal edema      I&O's Detail      LABS:                        10.8   7.20  )-----------( 186      ( 2021 10:46 )             33.6              06-    137  |  110<H>  |  78<H>  ----------------------------<  155<H>  4.4   |  11<L>  |  6.16<H>    Ca    8.1<L>      2021 10:46    TPro  6.8  /  Alb  2.7<L>  /  TBili  0.4  /  DBili  x   /  AST  28  /  ALT  29  /  AlkPhos  188<H>  06-28            PT/INR - ( 2021 10:46 )   PT: 21.9 sec;   INR: 1.90 ratio         PTT - ( 2021 10:46 )  PTT:37.8 sec  Urinalysis Basic - ( 2021 10:46 )    Color: Yellow / Appearance: Clear / S.015 / pH: x  Gluc: x / Ketone: Trace  / Bili: Negative / Urobili: Negative   Blood: x / Protein: 100 / Nitrite: Negative   Leuk Esterase: Small / RBC: 5-10 /HPF / WBC 26-50 /HPF   Sq Epi: x / Non Sq Epi: Few /HPF / Bacteria: Trace /HPF        RADIOLOGY & ADDITIONAL STUDIES:     HPI:   76 year old female with PMHx of PVD, right AKA (failed fem pop bypass), PAF on coumadin, COPD, bipolar disorder, hypertension and PSHx of S/P CABG x1 brought into the ED via EMS from [facility] for evaluation of unresponsiveness and hypotension. Was admitted this April with AMS/hypotension, found to have urosepsis and supratherapeutic INR from coumadin, was discharged on abx.     Pt presents with hypotension/tachycardia and AMS. Unable to obtain history.   Work up revealed VANESSA with Cr of 6, alicia inserted for UO, hypotension with systolic 90s, tachycardic at 100s. EKG pending.   CT showed bibasilar atelectasis vs consolidation, and findings c/w acute cholecystitis with biliary dilation and possible pancreatic head mass.   Pt arousable to painful stimuli, seldom nods to questions. No obvious guarding but does wince on RUQ palpation.             REVIEW OF SYSTEMS:  Negative except stated above in HPI    PAST MEDICAL & SURGICAL HISTORY:  Myocardial infarct, old    Hypercholesterolemia    Peripheral vascular disease    COPD (chronic obstructive pulmonary disease)    Bipolar 1 disorder    HTN (hypertension)    Bedbound    S/P CABG x 1    Above knee amputation of right lower extremity        MEDICATIONS  (STANDING):    MEDICATIONS  (PRN):      Allergies    influenza virus vaccine, live, trivalent (Unknown)  PC Pen VK (Rash)  penicillin (Other; Rash)  statins (Other)  sulfa drugs (Other)  sulfamethizole (Other)    Intolerances        Vital Signs Last 24 Hrs  T(C): 36.6 (2021 10:26), Max: 36.6 (2021 10:26)  T(F): 97.9 (2021 10:26), Max: 97.9 (2021 10:26)  HR: 113 (2021 12:13) (113 - 120)  BP: 94/55 (2021 12:13) (78/31 - 95/60)  BP(mean): --  RR: 16 (2021 12:13) (16 - 18)  SpO2: 97% (2021 12:13) (95% - 97%)    PHYSCAL EXAM:   General: lethargic/somnolent  Resp: Breathing unlabored  GI: soft, nondistended, slight wincing on RUQ palp  : No Alicia, no dysuria or hematuria  Extremities: No pedal edema      I&O's Detail      LABS:                        10.8   7.20  )-----------( 186      ( 2021 10:46 )             33.6                  137  |  110<H>  |  78<H>  ----------------------------<  155<H>  4.4   |  11<L>  |  6.16<H>    Ca    8.1<L>      2021 10:46    TPro  6.8  /  Alb  2.7<L>  /  TBili  0.4  /  DBili  x   /  AST  28  /  ALT  29  /  AlkPhos  188<H>              PT/INR - ( 2021 10:46 )   PT: 21.9 sec;   INR: 1.90 ratio         PTT - ( 2021 10:46 )  PTT:37.8 sec  Urinalysis Basic - ( 2021 10:46 )    Color: Yellow / Appearance: Clear / S.015 / pH: x  Gluc: x / Ketone: Trace  / Bili: Negative / Urobili: Negative   Blood: x / Protein: 100 / Nitrite: Negative   Leuk Esterase: Small / RBC: 5-10 /HPF / WBC 26-50 /HPF   Sq Epi: x / Non Sq Epi: Few /HPF / Bacteria: Trace /HPF        RADIOLOGY & ADDITIONAL STUDIES:     HPI:   76 year old female with PMHx of PVD, right AKA (failed fem pop bypass), PAF on coumadin, COPD, bipolar disorder, hypertension and PSHx of S/P CABG x1 brought into the ED via EMS from [facility] for evaluation of unresponsiveness and hypotension. Was admitted this April with AMS/hypotension, found to have urosepsis and supratherapeutic INR from coumadin, was discharged on abx.     Pt presents with hypotension/tachycardia and AMS. Unable to obtain history.   Work up revealed VANESSA with Cr of 6, alicia inserted for UO, hypotension with systolic 90s, tachycardic at 100s. EKG pending.   CT showed bibasilar atelectasis vs consolidation, and findings c/w acute cholecystitis with biliary dilation and possible pancreatic head mass.   Pt arousable to painful stimuli, seldom nods to questions. No obvious guarding but does wince on RUQ palpation.             REVIEW OF SYSTEMS:  Negative except stated above in HPI    PAST MEDICAL & SURGICAL HISTORY:  Myocardial infarct, old    Hypercholesterolemia    Peripheral vascular disease    COPD (chronic obstructive pulmonary disease)    Bipolar 1 disorder    HTN (hypertension)    Bedbound    S/P CABG x 1    Above knee amputation of right lower extremity        MEDICATIONS  (STANDING):    MEDICATIONS  (PRN):      Allergies    influenza virus vaccine, live, trivalent (Unknown)  PC Pen VK (Rash)  penicillin (Other; Rash)  statins (Other)  sulfa drugs (Other)  sulfamethizole (Other)    Intolerances        Vital Signs Last 24 Hrs  T(C): 36.6 (2021 10:26), Max: 36.6 (2021 10:26)  T(F): 97.9 (2021 10:26), Max: 97.9 (2021 10:26)  HR: 113 (2021 12:13) (113 - 120)  BP: 94/55 (2021 12:13) (78/31 - 95/60)  BP(mean): --  RR: 16 (2021 12:13) (16 - 18)  SpO2: 97% (2021 12:13) (95% - 97%)    PHYSCAL EXAM:   General: lethargic/somnolent  Resp: Breathing unlabored  GI: soft, nondistended, slight wincing on RUQ palp  : No Alicia, no dysuria or hematuria  Extremities: No pedal edema      I&O's Detail      LABS:                        10.8   7.20  )-----------( 186      ( 2021 10:46 )             33.6                  137  |  110<H>  |  78<H>  ----------------------------<  155<H>  4.4   |  11<L>  |  6.16<H>    Ca    8.1<L>      2021 10:46    TPro  6.8  /  Alb  2.7<L>  /  TBili  0.4  /  DBili  x   /  AST  28  /  ALT  29  /  AlkPhos  188<H>              PT/INR - ( 2021 10:46 )   PT: 21.9 sec;   INR: 1.90 ratio         PTT - ( 2021 10:46 )  PTT:37.8 sec  Urinalysis Basic - ( 2021 10:46 )    Color: Yellow / Appearance: Clear / S.015 / pH: x  Gluc: x / Ketone: Trace  / Bili: Negative / Urobili: Negative   Blood: x / Protein: 100 / Nitrite: Negative   Leuk Esterase: Small / RBC: 5-10 /HPF / WBC 26-50 /HPF   Sq Epi: x / Non Sq Epi: Few /HPF / Bacteria: Trace /HPF        RADIOLOGY & ADDITIONAL STUDIES:    < from: CT Chest No Cont (21 @ 11:59) >  FINDINGS:  CHEST:  LUNGS AND LARGE AIRWAYS: Patent central airways. Patchy bibasilar dependent consolidation/atelectasis right greater than left. Correlate clinically for infection. biapical scarring  PLEURA: No pleural effusion.  VESSELS: Nonaneurysmal.  HEART: Mild cardiomegaly with coronary artery calcification No pericardial effusion.  MEDIASTINUM AND CLARICE: No lymphadenopathy.  CHEST WALL AND LOWER NECK: Nonspecific left breast calcification..    ABDOMEN AND PELVIS:  LIVER: Within normal limits.  BILE DUCTS: Marked dilatation common duct up to 1.7 cm mild intrahepatic biliary dilatation. The distal common duct obstructing stone and/or lesion is considered. Correlate with MR.  GALLBLADDER: Gallstones. Markedly distended gallbladder with pericholecystic fluid concerning for cholecystitis. Correlate with right upper quadrant ultrasound and/or HIDA.  SPLEEN: Within normal limits.  PANCREAS: Not well evaluated on this noncontrast study. However, there is diffuse atrophy of the body and tail with dilatation of the main pancreatic duct. Increased soft tissue density in the pancreatic head and uncinate process suspicious for a mass. Recommend  MRI  ADRENALS: Within normal limits.  KIDNEYS/URETERS: Punctate nonobstructive right renal calculus    BLADDER: Collapsed around a Alicia.  REPRODUCTIVE ORGANS: No gynecologic mass    BOWEL: No bowel obstruction. Appendix no appendicitis  PERITONEUM: No ascites.  VESSELS: Nonaneurysmal  RETROPERITONEUM/LYMPH NODES: No lymphadenopathy.  ABDOMINAL WALL: Within normal limits.  BONES: No aggressive lesions    IMPRESSION:  Limited by lack of any exogenousoral or intravenous contrast  Patchy bibasilar dependent consolidation/atelectasis. Correlate clinically for infection.  Cholelithiasis, pericholecystic fluid concerning for cholecystitis. Correlate with ultrasound and/or HIDA scan.  Significant biliary dilatation, possible mass pancreatic head. Correlate with MRCP/abdominal MR with pancreatic protocol    < end of copied text >

## 2021-06-28 NOTE — PHYSICAL THERAPY INITIAL EVALUATION ADULT - ASR WT BEARING STATUS EVAL
Patient Education     4 Steps for Eating Healthier  Changing the way you eat can improve your health. It can lower your cholesterol and blood pressure, and help you stay at a healthy weight. Your diet doesn’t have to be bland and boring to be healthy. Just watch your calories and follow these steps:    Step 1. Eat fewer unhealthy fats  · Choose more fish and lean meats instead of fatty cuts of meat.  · Skip butter and lard, and use less margarine.  · Pass on foods that have palm, coconut, or hydrogenated oils.  · Eat fewer high-fat dairy foods like cheese, ice cream, and whole milk.  · Get a heart-healthy cookbook and try some low-fat recipes.  Step 2. Go light on salt  · Keep the saltshaker off the table.  · Limit high-salt ingredients, such as soy sauce, bouillon, and garlic salt.  · Instead of adding salt when cooking, season your food with herbs and flavorings. Try lemon, garlic, and onion, or salt-free herb seasonings.  · Limit convenience foods, such as boxed or canned foods and restaurant food.  · Read food labels and choose lower-sodium options.  Step 3. Limit sugar  · Pause before you add sugars to pancakes, cereal, coffee, or tea. This includes white and brown table sugar, syrup, honey, and molasses. Cut your usual amount by half.  · Use non-sugar sweeteners. Stevia, aspartame, and sucralose can satisfy a sweet tooth without adding calories.  · Swap out sugar-filled soda and other drinks. Buy sugar-free or low-calorie beverages. Remember water is always the best choice.  · Read labels and choose foods with less added sugar. Keep in mind that dairy foods and foods with fruit will have some natural sugar.  · Cut the sugar in recipes by 1/3 to 1/2. Boost the flavor with extracts like almond, vanilla, or orange. Or add spices such as cinnamon or nutmeg.  Step 4. Eat more fiber  · Eat fresh fruits and vegetables every day.  · Boost your diet with whole grains. Go for oats, whole-grain rice, and bran.  · Add  beans and lentils to your meals.  · Drink more water to match your fiber increase to help prevent constipation.  Date Last Reviewed: 6/1/2017  © 7502-2948 [employer], [employer]. [address] [place] [address], [place] [address]. All rights reserved. This information is not intended as a substitute for professional medical care. Always follow your healthcare professional's instructions.            no weight-bearing restrictions

## 2021-06-28 NOTE — ED PROVIDER NOTE - CARE PLAN
Principal Discharge DX:	Sepsis  Secondary Diagnosis:	ARF (acute renal failure)  Secondary Diagnosis:	Hypotension  Secondary Diagnosis:	Acute cholecystitis  Secondary Diagnosis:	UTI (urinary tract infection)

## 2021-06-28 NOTE — H&P ADULT - ATTENDING COMMENTS
A provider-focused exam of reperfusion assessment was completed after fluid bolus.     T(C): 36.4 (06-28-21 @ 13:50), Max: 36.6 (06-28-21 @ 10:26)  HR: 103 (06-28-21 @ 13:50) (103 - 120)  BP: 97/58 (06-28-21 @ 13:50) (78/31 - 97/58)  RR: 18 (06-28-21 @ 13:50) (16 - 18)  SpO2: 97% (06-28-21 @ 13:50) (95% - 97%)    Resp: clear to auscultation, no rales, wheezing, or rhonchi  Cardiac: Irregular heart rate, no murmurs appreciated  Capillary refill time is less than 2 seconds.   Peripheral pulses are + 2 Radial bilaterally and + 2 DP on the left side, AKA on right   Skin: normal turgor    Lactate is normal      76 year old F Pmhx of PVD, right AKA (S/P fem pop bypass), PAF, COPD, bipolar disorder, hypertension and PSHx of S/P CABG x1 brought into the ED via EMS from [facility] for unresponsiveness and hypotension being admitted to ICU for severe sepsis likely from UTI and/ Cholecystitis and Acute renal failure    Assessment:   1. Encephalopathy   2. Severe Sepsis   3. UTI   4. Acute cholecystitis   5. Acute renal failure   6. Anion and non anion gap metabolic acidosis   7. Billary ductal dilation - also pancreatic ductal dilation  8. Paroxysmal Afib  9. Chronic Anemia     Plan  - Monitor hemodynamics  - IV fluid resuscitation  - Broad spectrum antibiotics   - Surgery and GI consult  - May need ERCP vs EUS  - Follow up cultures  - Lactate level is normal  - BP improving with fluid hydration  - Encephalopathy likely multifactorial - as patient on psych medications including gabapentin   - Obtain CT head  - Seizure and aspiration precautions  - NPO for now  - Monitor urine output  - Obtain urine studies  - No hydronephrosis on CT abdomen  - Will need work up for possible pancreatic mass   - Hold eliquis for now as make need interventions  - DVT and stress ulcer prophylaxis  - MOLST reviewed, full code  - Admit to ICU A provider-focused exam of reperfusion assessment was completed after fluid bolus.     T(C): 36.4 (06-28-21 @ 13:50), Max: 36.6 (06-28-21 @ 10:26)  HR: 103 (06-28-21 @ 13:50) (103 - 120)  BP: 97/58 (06-28-21 @ 13:50) (78/31 - 97/58)  RR: 18 (06-28-21 @ 13:50) (16 - 18)  SpO2: 97% (06-28-21 @ 13:50) (95% - 97%)    Resp: clear to auscultation, no rales, wheezing, or rhonchi  Cardiac: Irregular heart rate, no murmurs appreciated  Capillary refill time is less than 2 seconds.   Peripheral pulses are + 2 Radial bilaterally and + 2 DP on the left side, AKA on right   Skin: normal turgor    Lactate is normal      76 year old F Pmhx of PVD, right AKA (S/P fem pop bypass), PAF, COPD, bipolar disorder, hypertension and PSHx of S/P CABG x1 brought into the ED via EMS from [facility] for unresponsiveness and hypotension being admitted to ICU for severe sepsis likely from UTI and/ Cholecystitis and Acute renal failure    Assessment:   1. Encephalopathy   2. Severe Sepsis   3. UTI   4. Acute cholecystitis   5. Acute renal failure   6. Anion and non anion gap metabolic acidosis   7. Billary ductal dilation - also pancreatic ductal dilation  8. Paroxysmal Afib  9. Chronic Anemia   10. Dementia     Plan  - Monitor hemodynamics  - IV fluid resuscitation  - Broad spectrum antibiotics   - Surgery and GI consult  - May need ERCP vs EUS  - Follow up cultures  - Lactate level is normal  - BP improving with fluid hydration  - Encephalopathy likely multifactorial - as patient on psych medications including gabapentin   - Obtain CT head  - Seizure and aspiration precautions  - NPO for now  - Monitor urine output  - Obtain urine studies  - No hydronephrosis on CT abdomen  - Will need work up for possible pancreatic mass   - Hold eliquis for now as make need interventions  - DVT and stress ulcer prophylaxis  - MOLST reviewed, full code  - Admit to ICU  - Discussed with NARENDRA Marquez on the phone and updated about current clinical condition. The Niece expressed concern about declining mentation.

## 2021-06-28 NOTE — H&P ADULT - HISTORY OF PRESENT ILLNESS
76 year old female from [facility] with PMHx of PVD, right AKA (S/P fem pop bypass), PAF, COPD, bipolar disorder, hypertension and PSHx of S/P CABG x1 brought into the ED via EMS from [facility] for unresponsiveness and hypotension. Per NH supervisor Mr. Gaspar () Pt was found unresponsive today morning around 8 am, pt was fine last night, pt did not have fever, difficulty breathing, pt was only receptive to painful stimuli and on evaluation pt's blood pressure was low per papers Pt's bp was 62/38. Pt was sent to the hospital for further evaluation. Baseline mental status alert, oriented and follows instruction, non ambulatory at baseline.   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 Pt was given a dose of vancomycin and rocephine. Pt's blood pressure improve to 97/58, hr 109 bpm, ECG showing sinus tachy, RBBB, LAFB, Bifacicular block.

## 2021-06-28 NOTE — ED ADULT NURSE NOTE - NSIMPLEMENTINTERV_GEN_ALL_ED
Implemented All Fall with Harm Risk Interventions:  Ider to call system. Call bell, personal items and telephone within reach. Instruct patient to call for assistance. Room bathroom lighting operational. Non-slip footwear when patient is off stretcher. Physically safe environment: no spills, clutter or unnecessary equipment. Stretcher in lowest position, wheels locked, appropriate side rails in place. Provide visual cue, wrist band, yellow gown, etc. Monitor gait and stability. Monitor for mental status changes and reorient to person, place, and time. Review medications for side effects contributing to fall risk. Reinforce activity limits and safety measures with patient and family. Provide visual clues: red socks.

## 2021-06-28 NOTE — H&P ADULT - ASSESSMENT
Patient is 76 year old F Pmhx of PVD, right AKA (S/P fem pop bypass), PAF, COPD, bipolar disorder, hypertension and PSHx of S/P CABG x1 brought into the ED via EMS from [facility] for unresponsiveness and hypotension being admitted to ICU for sepsis likely from UTI and/ Cholecystitis and Acute renal failure.     Assessment:   Encephalopathy   Sepsis from UTI and/ Acute cholecystitis   Acute renal failure   Anion and non anion gap metabolic acidosis   Pancreatic head mass     PAF  Anemia     Plan:    =====================[CNS ]==============================  # Encephalopathy likely infectious (UTI/cholecysistis) and or metabolic (uremia)   - Alert and oriented at baseline, follows instruction   - Avoid sedatives   - Will do CT head   - will hold pysh meds for now     =====================[CVS ]===============================  # Hypotension will hold metoprolol and Cozar    # PAF holding eliquis for given Acute renal failure   # CAD s/p CABG c/w asa, lipitor     =====================[RESP ]==============================  # B/l patchy opacity with consolidation/ atelectasis, with b/l crackles R>L  - will start emepiric abx with cefepime   - f/u blood cx, MRSA swab, procalcitonin     =====================[ GI ]================================  # NPO except meds   ppi for GI ppx     # Cholecystitis w/ dilated bile duct 1.7 cm, and concern for pnacreatic head mass   - Will start on cefepime and flagyl to cover for gram negative and anaerobic coverage   - f/u blood cx  - f/u surgery consult   - will consult IR for possible cholecystostomy tube placement      ====================[ RENAL ]=============================   # Acute renal failure, BUN/Cr 78/6.16 was normal in 4/2021  - Likely prerenal in the setting of hypovolemia and sepsis   - s/p 3L IVF, will c/w IV hydration   - will repeat BMP   - monitor urine output   - I&Os  - Monitor electrolytes    # Metabolic acidosis w/ anion gap ~20  - likely from uremia in the setting of ARF   - f/u salisylate and acetaminophen level   - s/p iv hydration   - f/u repeat labs     =====================[ ID ]================================  # UTI and or cholecystitis   - s/p vanc and rocephine in the ED   - will start cefepime and flagyl for now   - f/u Bcx, Ucx and MRSA     ===================[ HEME/ONC ]===========================  # Anemia   - f/u iron panle   - Hb and Plt stable   - monitor cbc daily     =====================[ ENDO ]=============================  # No history of DM  - target CBG < 180  - FS q6 while NPO  - Start diet when possible    ==================[ PROPHYLAXIS ]==========================   # Dvt : HSQ  # Gi : Protonix     ====================[ DISPO ]==============================   - Monitor in ICU     ===================[ PROGNOSIS ]===========================  - Guarded

## 2021-06-28 NOTE — H&P ADULT - NSHPPHYSICALEXAM_GEN_ALL_CORE
PHYSICAL EXAM:  GENERAL: Malnourished, well-developed, NAD  HEAD:  Atraumatic, Normocephalic  EYES: EOMI, PERRLA, conjunctiva and sclera clear  NECK: Supple, No JVD  CHEST/LUNG: B/l lower lobe crackles R>L  HEART: Regular rate and rhythm; s1+ s2+  ABDOMEN: Soft, Nontender, Nondistended; Bowel sounds present  EXTREMITIES:, No clubbing, cyanosis, or edema, S/p R AKA stump no sings of infection   NEUROLOGY: Awake, does not follow commands   SKIN: No rashes or lesions

## 2021-06-28 NOTE — ED PROVIDER NOTE - CLINICAL SUMMARY MEDICAL DECISION MAKING FREE TEXT BOX
PT with hypotension, tachycardia. code sepsis called. labs reveal acute renal failure. Ct reveals possible cholecystitis. u/a suggestive of UTI

## 2021-06-28 NOTE — CONSULT NOTE ADULT - ASSESSMENT
76 yoF with AMS, r/o sepsis    hypotension/tachycardia with VANESSA, Cr 6; alicia with dark urine and low albumin, suspect prolonged low PO intake  CT findings c/w acute cholecystitis with dilated GB/CBD (T bili wnl but alk phos elevated) and possible pancreatic head mass. R/o sepsis from cholangitis, also ?PNA given consolidation on CT chest     - consult IR for perc cholecystostomy tube, will need AC held for procedure  - will need MRCP when appropriate, and GI consult  - continue abx, consult ID  - resuscitate with strict I/O  - no surgical intervention at this time, d/w attending who agrees

## 2021-06-28 NOTE — ED ADULT NURSE NOTE - OBJECTIVE STATEMENT
Pt. BIBA as notification for hypotension and hard to arouse at the nursing home. Pt. is arousable upon arrival but difficult to understand. Pt. has AKA right amputation.

## 2021-06-29 NOTE — ADVANCED PRACTICE NURSE CONSULT - ASSESSMENT
This is a 76yr old female patient admitted for Sepsis, presenting with a Stage 1 Pressure Injury to the Coccyx, as evident by non-blanchable erythema

## 2021-06-29 NOTE — CONSULT NOTE ADULT - SUBJECTIVE AND OBJECTIVE BOX
Time of visit:    CHIEF COMPLAINT: Patient is a 76y old  Female who presents with a chief complaint of hypotension, altered mental status (2021 14:47)      HPI:  76 year old female from [facility] with PMHx of PVD, right AKA (S/P fem pop bypass), PAF, COPD, bipolar disorder, hypertension and PSHx of S/P CABG x1 brought into the ED via EMS from [facility] for unresponsiveness and hypotension. Per NH supervisor Mr. Gaspar () Pt was found unresponsive today morning around 8 am, pt was fine last night, pt did not have fever, difficulty breathing, pt was only receptive to painful stimuli and on evaluation pt's blood pressure was low per papers Pt's bp was 62/38. Pt was sent to the hospital for further evaluation. Baseline mental status alert, oriented and follows instruction, non ambulatory at baseline.   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 Pt was given a dose of vancomycin and rocephine. Pt's blood pressure improve to 97/58, hr 109 bpm, ECG showing sinus tachy, RBBB, LAFB, Bifacicular block.    (2021 14:58)   Patient seen and examined.     PAST MEDICAL & SURGICAL HISTORY:  Myocardial infarct, old    Hypercholesterolemia    Peripheral vascular disease    COPD (chronic obstructive pulmonary disease)    Bipolar 1 disorder    HTN (hypertension)    Bedbound    S/P CABG x 1    Above knee amputation of right lower extremity        Allergies    influenza virus vaccine, live, trivalent (Unknown)  PC Pen VK (Rash)  penicillin (Other; Rash)  statins (Other)  sulfa drugs (Other)  sulfamethizole (Other)    Intolerances        MEDICATIONS  (STANDING):  cefepime   IVPB      cefepime   IVPB 1000 milliGRAM(s) IV Intermittent every 24 hours  chlorhexidine 2% Cloths 1 Application(s) Topical <User Schedule>  metroNIDAZOLE  IVPB      metroNIDAZOLE  IVPB 500 milliGRAM(s) IV Intermittent every 8 hours  norepinephrine Infusion 0.29 MICROgram(s)/kG/Min (18.2 mL/Hr) IV Continuous <Continuous>  pantoprazole  Injectable 40 milliGRAM(s) IV Push daily      MEDICATIONS  (PRN):  sodium chloride 0.9% lock flush 10 milliLiter(s) IV Push every 1 hour PRN Pre/post blood products, medications, blood draw, and to maintain line patency   Medications up to date at time of exam.    Medications up to date at time of exam.    FAMILY HISTORY:      SOCIAL HISTORY  Smoking History: [   ] smoking/smoke exposure, [   ] former smoker  Living Condition: [   ] apartment, [   ] private house  Work History:   Travel History: denies recent travel  Illicit Substance Use: denies  Alcohol Use: denies    REVIEW OF SYSTEMS:    CONSTITUTIONAL:  denies fevers, chills, sweats, weight loss    HEENT:  denies diplopia or blurred vision, sore throat or runny nose.    CARDIOVASCULAR:  denies pressure, squeezing, tightness, or heaviness about the chest; no palpitations.    RESPIRATORY:  denies SOB, cough, OLIVAS, wheezing.    GASTROINTESTINAL:  denies abdominal pain, nausea, vomiting or diarrhea.    GENITOURINARY: denies dysuria, frequency or urgency.    NEUROLOGIC:  denies numbness, tingling, seizures or weakness.    PSYCHIATRIC:  denies disorder of thought or mood.    MSK: denies swelling, redness      PHYSICAL EXAMINATION:    GENERAL: The patient is a well-developed, well-nourished, in no apparent distress.     Vital Signs Last 24 Hrs  T(C): 36.4 (2021 15:00), Max: 37.2 (2021 05:15)  T(F): 97.5 (2021 15:00), Max: 98.9 (2021 05:15)  HR: 106 (2021 16:00) (85 - 137)  BP: 159/63 (2021 16:00) (72/30 - 159/63)  BP(mean): 86 (2021 16:00) (38 - 94)  RR: 26 (2021 16:00) (11 - 26)  SpO2: 93% (2021 16:00) (90% - 100%)   (if applicable)    Chest Tube (if applicable)    HEENT: Head is normocephalic and atraumatic. Extraocular muscles are intact. Mucous membranes are moist.     NECK: Supple, no palpable adenopathy.    LUNGS: Clear to auscultation, no wheezing, rales, or rhonchi.    HEART: Regular rate and rhythm without murmur.    ABDOMEN: Soft, nontender, and nondistended.  No hepatosplenomegaly is noted.    RENAL: No difficulty voiding, no pelvic pain    EXTREMITIES: Without any cyanosis, clubbing, rash, lesions or edema.    NEUROLOGIC: Awake, alert, oriented, grossly intact    SKIN: Warm, dry, good turgor.      LABS:                        10.4   8.33  )-----------( 193      ( 2021 05:32 )             31.0         142  |  109<H>  |  60<H>  ----------------------------<  190<H>  3.6   |  20<L>  |  5.02<H>    Ca    7.4<L>      2021 11:26  Phos  4.7       Mg     2.1         TPro  6.1  /  Alb  2.4<L>  /  TBili  1.1  /  DBili  x   /  AST  41<H>  /  ALT  35  /  AlkPhos  179<H>      PT/INR - ( 2021 05:31 )   PT: 21.4 sec;   INR: 1.85 ratio         PTT - ( 2021 05:31 )  PTT:38.6 sec  Urinalysis Basic - ( 2021 10:46 )    Color: Yellow / Appearance: Clear / S.015 / pH: x  Gluc: x / Ketone: Trace  / Bili: Negative / Urobili: Negative   Blood: x / Protein: 100 / Nitrite: Negative   Leuk Esterase: Small / RBC: 5-10 /HPF / WBC 26-50 /HPF   Sq Epi: x / Non Sq Epi: Few /HPF / Bacteria: Trace /HPF      ABG - ( 2021 04:46 )  pH, Arterial: 7.28  pH, Blood: x     /  pCO2: 30    /  pO2: 118   / HCO3: 14    / Base Excess: -11.3 /  SaO2: NR                CARDIAC MARKERS ( 2021 11:26 )  0.293 ng/mL / x     / 218 U/L / x     / 3.5 ng/mL  CARDIAC MARKERS ( 2021 05:33 )  0.196 ng/mL / x     / x     / x     / x      CARDIAC MARKERS ( 2021 21:19 )  0.070 ng/mL / x     / x     / x     / x      CARDIAC MARKERS ( 2021 10:46 )  0.027 ng/mL / x     / 113 U/L / x     / x                    MICROBIOLOGY: (if applicable)    RADIOLOGY & ADDITIONAL STUDIES:  EKG:   CXR:  ECHO:    IMPRESSION: 76y Female PAST MEDICAL & SURGICAL HISTORY:  Myocardial infarct, old    Hypercholesterolemia    Peripheral vascular disease    COPD (chronic obstructive pulmonary disease)    Bipolar 1 disorder    HTN (hypertension)    Bedbound    S/P CABG x 1    Above knee amputation of right lower extremity     p/w                   RECOMMENDATIONS:   Time of visit:    CHIEF COMPLAINT: Patient is a 76y old  Female who presents with a chief complaint of hypotension, altered mental status (2021 14:47)      HPI:  76 year old female from [facility] with PMHx of PVD, right AKA (S/P fem pop bypass), PAF, COPD, bipolar disorder, hypertension and PSHx of S/P CABG x1 brought into the ED via EMS from [facility] for unresponsiveness and hypotension. Per NH supervisor Mr. Gaspar () Pt was found unresponsive today morning around 8 am, pt was fine last night, pt did not have fever, difficulty breathing, pt was only receptive to painful stimuli and on evaluation pt's blood pressure was low per papers Pt's bp was 62/38. Pt was sent to the hospital for further evaluation. Baseline mental status alert, oriented and follows instruction, non ambulatory at baseline.   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 Pt was given a dose of vancomycin and rocephine. Pt's blood pressure improve to 97/58, hr 109 bpm, ECG showing sinus tachy, RBBB, LAFB, Bifacicular block.    (2021 14:58)   Patient seen and examined.     PAST MEDICAL & SURGICAL HISTORY:  Myocardial infarct, old    Hypercholesterolemia    Peripheral vascular disease    COPD (chronic obstructive pulmonary disease)    Bipolar 1 disorder    HTN (hypertension)    Bedbound    S/P CABG x 1    Above knee amputation of right lower extremity        Allergies    influenza virus vaccine, live, trivalent (Unknown)  PC Pen VK (Rash)  penicillin (Other; Rash)  statins (Other)  sulfa drugs (Other)  sulfamethizole (Other)    Intolerances        MEDICATIONS  (STANDING):  cefepime   IVPB      cefepime   IVPB 1000 milliGRAM(s) IV Intermittent every 24 hours  chlorhexidine 2% Cloths 1 Application(s) Topical <User Schedule>  metroNIDAZOLE  IVPB      metroNIDAZOLE  IVPB 500 milliGRAM(s) IV Intermittent every 8 hours  norepinephrine Infusion 0.29 MICROgram(s)/kG/Min (18.2 mL/Hr) IV Continuous <Continuous>  pantoprazole  Injectable 40 milliGRAM(s) IV Push daily      MEDICATIONS  (PRN):  sodium chloride 0.9% lock flush 10 milliLiter(s) IV Push every 1 hour PRN Pre/post blood products, medications, blood draw, and to maintain line patency   Medications up to date at time of exam.    Medications up to date at time of exam.    FAMILY HISTORY:      SOCIAL HISTORY  Smoking History: [   ] smoking/smoke exposure, [x   ] former smoker  Living Condition: [   ] apartment, [   ] private house  Work History:   Travel History: denies recent travel  Illicit Substance Use: denies  Alcohol Use: denies    REVIEW OF SYSTEMS: unable to obtain. pat is encephalopathic     CONSTITUTIONAL:  denies fevers, chills, sweats, weight loss    HEENT:  denies diplopia or blurred vision, sore throat or runny nose.    CARDIOVASCULAR:  denies pressure, squeezing, tightness, or heaviness about the chest; no palpitations.    RESPIRATORY:  denies SOB, cough, OLIVAS, wheezing.    GASTROINTESTINAL:  denies abdominal pain, nausea, vomiting or diarrhea.    GENITOURINARY: denies dysuria, frequency or urgency.    NEUROLOGIC:  denies numbness, tingling, seizures or weakness.    PSYCHIATRIC:  denies disorder of thought or mood.    MSK: denies swelling, redness      PHYSICAL EXAMINATION:    GENERAL: The patient is a well-developed, well-nourished, in no apparent distress.     Vital Signs Last 24 Hrs  T(C): 36.4 (2021 15:00), Max: 37.2 (2021 05:15)  T(F): 97.5 (2021 15:00), Max: 98.9 (2021 05:15)  HR: 106 (2021 16:00) (85 - 137)  BP: 159/63 (2021 16:00) (72/30 - 159/63)  BP(mean): 86 (2021 16:00) (38 - 94)  RR: 26 (2021 16:00) (11 - 26)  SpO2: 93% (2021 16:00) (90% - 100%)   (if applicable)    Chest Tube (if applicable)    HEENT: Head is normocephalic and atraumatic. Extraocular muscles are intact. Mucous membranes are moist.     NECK: Supple, no palpable adenopathy.    LUNGS: Clear to auscultation, no wheezing, rales, or rhonchi.    HEART: Regular rate and rhythm without murmur.    ABDOMEN: Soft, nontender, and nondistended.  No hepatosplenomegaly is noted. + hector tube with drain     RENAL: No difficulty voiding, no pelvic pain    EXTREMITIES: R AKA    NEUROLOGIC: + encephalopathy     SKIN: Warm, dry, good turgor.      LABS:                        10.4   8.33  )-----------( 193      ( 2021 05:32 )             31.0         142  |  109<H>  |  60<H>  ----------------------------<  190<H>  3.6   |  20<L>  |  5.02<H>    Ca    7.4<L>      2021 11:26  Phos  4.7       Mg     2.1         TPro  6.1  /  Alb  2.4<L>  /  TBili  1.1  /  DBili  x   /  AST  41<H>  /  ALT  35  /  AlkPhos  179<H>      PT/INR - ( 2021 05:31 )   PT: 21.4 sec;   INR: 1.85 ratio         PTT - ( 2021 05:31 )  PTT:38.6 sec  Urinalysis Basic - ( 2021 10:46 )    Color: Yellow / Appearance: Clear / S.015 / pH: x  Gluc: x / Ketone: Trace  / Bili: Negative / Urobili: Negative   Blood: x / Protein: 100 / Nitrite: Negative   Leuk Esterase: Small / RBC: 5-10 /HPF / WBC 26-50 /HPF   Sq Epi: x / Non Sq Epi: Few /HPF / Bacteria: Trace /HPF      ABG - ( 2021 04:46 )  pH, Arterial: 7.28  pH, Blood: x     /  pCO2: 30    /  pO2: 118   / HCO3: 14    / Base Excess: -11.3 /  SaO2: NR                CARDIAC MARKERS ( 2021 11:26 )  0.293 ng/mL / x     / 218 U/L / x     / 3.5 ng/mL  CARDIAC MARKERS ( 2021 05:33 )  0.196 ng/mL / x     / x     / x     / x      CARDIAC MARKERS ( 2021 21:19 )  0.070 ng/mL / x     / x     / x     / x      CARDIAC MARKERS ( 2021 10:46 )  0.027 ng/mL / x     / 113 U/L / x     / x                    MICROBIOLOGY: (if applicable)    RADIOLOGY & ADDITIONAL STUDIES:  EKG:   CXR:< from: Xray Chest 1 View-PORTABLE IMMEDIATE (Xray Chest 1 View-PORTABLE IMMEDIATE .) (21 @ 09:44) >    EXAM:  XR CHEST PORTABLE IMMED 1V                          EXAM:  XR CHEST PORTABLE IMMED 1V                            PROCEDURE DATE:  2021          INTERPRETATION:  TECHNIQUE: A single AP view of the chest was obtained. Ordered time:   2021 6:19 PM, 2021 9:44 AM    COMPARISON: 2021    CLINICAL INFORMATION: Septic shock    FINDINGS:  2021: A right IJ catheter was placed with its tip in the SVC.  The heart is not well assessed on an AP film.  The aorta is calcified.  There is linear atelectasis at the left lung base.  There are no pleural effusions.  There is no pneumothorax.    2021: The linear atelectasis at the left lung base has improved.    IMPRESSION:    Right IJ catheter with its tip in the SVC. No pneumothorax.          JENNIFER ONEAL MD; Attending Radiologist  This document has been electronically signed.  JENNIFER ONEAL MD; Attending Radiologist  This document has been electronically signed. 2021 11:44AM    < end of copied text >    ECHO:    IMPRESSION: 76y Female PAST MEDICAL & SURGICAL HISTORY:  Myocardial infarct, old    Hypercholesterolemia    Peripheral vascular disease    COPD (chronic obstructive pulmonary disease)    Bipolar 1 disorder    HTN (hypertension)    Bedbound    S/P CABG x 1    Above knee amputation of right lower extremity     p/w             IMP: This is a  76 year old woman with  PVD, right AKA (S/P fem pop bypass), PAF, COPD, bipolar disorder, hypertension and PSHx of S/P CABG x1 brought into the ED via EMS from [facility] for unresponsiveness and hypotension being admitted to ICU for sepsis likely from UTI and/ Cholecystitis and Acute renal failure.     Assessment:   Encephalopathy   Sepsis from UTI and/ Acute cholecystitis   Acute renal failure   Anion and non anion gap metabolic acidosis   Pancreatic head mass     PAF  Anemia         Plan :  -continue antibx   -f/u cultures   -asp precaution   -hold all sedation   -ivf  -monitor renal fx  -dvt/gi prophy

## 2021-06-29 NOTE — CONSULT NOTE ADULT - ASSESSMENT
Patient is a 76y Female whom was sent to ED from the nursing home  to the hospital with hypotension and confusion.  Has H/O PVD s/p RT AKA, COPD, HTN, CABG x1. Blood pressure in the nursing home was 62/32.  In ED noted to have severe hypotension, confused  work-up revealed acute kidnye injury SCR 6 ( normal SCR at baseline).  Also with severe mixed gap and non-gap metabolic acidosis.  s/p isotonic fluid bolus and later started on NAHCO3 gtt as well as vasopressors and broad spectrum antibiotics  CT abdomen revealed acute cholecystitis, pancreatic head mass. No hydronephrosis  urine output improved .  BP better  off NAHCO3 gtt      # VANESSA sec to pre-renal azotemia from septic shock.  s/p isotonic fluid resucitation and IV pressors.  on broad spectrum ABX.  off NAHCO3 gtt  cont IV pressors support as needed to support BP  cont ABX  noted- for cholecystotomy today  improving UOP as well as renal function  normokalemia  no acute need for renal replacement therapy for now.    F/U RPT BMP    # mixed GAP/NON-GAP METABOLIC ACIDOSIS SEC TO RENAL FAILURE. BETTER S/P NAHCO3 GTT.   WILL F/U RPT LABS    Will follow closely with you    Thanks for the consult

## 2021-06-29 NOTE — PROGRESS NOTE ADULT - ASSESSMENT
Patient is 76 year old F Pmhx of PVD, right AKA (S/P fem pop bypass), PAF, COPD, bipolar disorder, hypertension and PSHx of S/P CABG x1 brought into the ED via EMS from [facility] for unresponsiveness and hypotension being admitted to ICU for sepsis likely from UTI and/ Cholecystitis and Acute renal failure.     Assessment:   Encephalopathy   Sepsis from UTI and/ Acute cholecystitis   Acute renal failure   Anion and non anion gap metabolic acidosis   Pancreatic head mass     PAF  Anemia     Plan:    =====================[CNS ]==============================  # Encephalopathy likely infectious (UTI/cholecysistis) and or metabolic (uremia)   - Alert and oriented at baseline, follows instruction   - Avoid sedatives   - Will do CT head   - will hold pysh meds for now     =====================[CVS ]===============================  # Hypotension will hold metoprolol and Cozar    # PAF holding eliquis for given Acute renal failure   # CAD s/p CABG c/w asa, lipitor     =====================[RESP ]==============================  # B/l patchy opacity with consolidation/ atelectasis, with b/l crackles R>L  - will start emepiric abx with cefepime   - f/u blood cx, MRSA swab, procalcitonin     =====================[ GI ]================================  # NPO except meds   ppi for GI ppx     # Cholecystitis w/ dilated bile duct 1.7 cm, and concern for pnacreatic head mass   - Will start on cefepime and flagyl to cover for gram negative and anaerobic coverage   - f/u blood cx  - f/u surgery consult   - will consult IR for possible cholecystostomy tube placement      ====================[ RENAL ]=============================   # Acute renal failure, BUN/Cr 78/6.16 was normal in 4/2021  - Likely prerenal in the setting of hypovolemia and sepsis   - s/p 3L IVF, will c/w IV hydration   - will repeat BMP   - monitor urine output   - I&Os  - Monitor electrolytes    # Metabolic acidosis w/ anion gap ~20  - likely from uremia in the setting of ARF   - f/u salisylate and acetaminophen level   - s/p iv hydration   - f/u repeat labs     =====================[ ID ]================================  # UTI and or cholecystitis   - s/p vanc and rocephine in the ED   - will start cefepime and flagyl for now   - f/u Bcx, Ucx and MRSA     ===================[ HEME/ONC ]===========================  # Anemia   - f/u iron panle   - Hb and Plt stable   - monitor cbc daily     =====================[ ENDO ]=============================  # No history of DM  - target CBG < 180  - FS q6 while NPO  - Start diet when possible    ==================[ PROPHYLAXIS ]==========================   # Dvt : HSQ  # Gi : Protonix     ====================[ DISPO ]==============================   - Monitor in ICU     ===================[ PROGNOSIS ]===========================  - Guarded

## 2021-06-29 NOTE — PROGRESS NOTE ADULT - ASSESSMENT
Seen ICU     Cholecystitis   Pending IR drainage today   Continue  Anbx       Biliary obstruction   Once stable suggest MRCP to further characterize mass / obstruction and need for ERCP (if needed)     I was physically present for the key portions of the evaluation and management (E/M) service provided.  The patient was personally seen and examined at bedside.  I have edited the note as appropriate.   Thank you for your consultation and allowing  me to participate in the care of your patients. If you have further questions please contact me at [phone].     Rm Hart M.D.       _________________________________________________________________________________________________  [facility]  [address] [place] [address]  Office: [phone]    Fortunato Bowman PA-C  ______________________________________________________________________

## 2021-06-29 NOTE — CONSULT NOTE ADULT - SUBJECTIVE AND OBJECTIVE BOX
[facility] : Progress Note :: [phone], (office [phone]),   Dr Noriega / Dr Nava / Dr Granados / Dr Burrell / Dr Amish LOYD / Dr Stein / Dr Ridley / Dr Calvin louie  _____________________________________________________________________________________________  Patient is a 76y Female whom was sent to ED from the nursing home  to the hospital with hypotension and confusion.  Has H/O PVD s/p RT AKA, COPD, HTN, CABG x1. Blood pressure in the nursing home was 62/32.  In ED noted to have severe hypotension, confused  work-up revealed acute kidnye injury SCR 6 ( normal SCR at baseline).  Also with severe mixed gap and non-gap metabolic acidosis.  s/p isotonic fluid bolus and later started on NAHCO3 gtt as well as vasopressors and broad spectrum antibiotics  CT abdomen revealed acute cholecystitis, pancreatic head mass. No hydronephrosis  urine output improved .  BP better  off NAHCO3 gtt      PAST MEDICAL & SURGICAL HISTORY:  Myocardial infarct, old    Hypercholesterolemia    Peripheral vascular disease    COPD (chronic obstructive pulmonary disease)    Bipolar 1 disorder    HTN (hypertension)    Bedbound    S/P CABG x 1    Above knee amputation of right lower extremity      influenza virus vaccine, live, trivalent (Unknown)  PC Pen VK (Rash)  penicillin (Other; Rash)  statins (Other)  sulfa drugs (Other)  sulfamethizole (Other)    Home Medications Reviewed  Hospital Medications:   MEDICATIONS  (STANDING):  cefepime   IVPB      cefepime   IVPB 1000 milliGRAM(s) IV Intermittent every 24 hours  chlorhexidine 2% Cloths 1 Application(s) Topical <User Schedule>  metroNIDAZOLE  IVPB      metroNIDAZOLE  IVPB 500 milliGRAM(s) IV Intermittent every 8 hours  norepinephrine Infusion 0.29 MICROgram(s)/kG/Min (18.2 mL/Hr) IV Continuous <Continuous>  pantoprazole  Injectable 40 milliGRAM(s) IV Push daily    SOCIAL HISTORY:  Denies ETOh,Smoking,   FAMILY HISTORY:      VITALS:  T(F): 98.9 (21 @ 07:15), Max: 98.9 (21 @ 05:15)  HR: 100 (21 @ 14:15)  BP: 148/49 (21 @ 14:15)  RR: 20 (21 @ 14:15)  SpO2: 92% (21 @ 14:15)  Wt(kg): --     @ 07:01  -   @ 07:00  --------------------------------------------------------  IN: 4021.6 mL / OUT: 235 mL / NET: 3786.6 mL     @ 07:01  -   @ 14:48  --------------------------------------------------------  IN: 607.8 mL / OUT: 215 mL / NET: 392.8 mL        Weight (kg): 66.9 ( @ 16:29)  PHYSICAL EXAM:  Constitutional: confused  HEENT: anicteric sclera, oropharynx clear,  Neck: No JVD  Respiratory: CTAB, no wheezes, rales or rhonchi  Cardiovascular: S1, S2, RRR  Gastrointestinal: BS+, soft, NT/ND  Extremities:  No peripheral edema  Neurological: confused  : No CVA tenderness. alicia+       LABS:      142  |  109<H>  |  60<H>  ----------------------------<  190<H>  3.6   |  20<L>  |  5.02<H>    Ca    7.4<L>      2021 11:26  Phos  4.7       Mg     2.1         TPro  6.1  /  Alb  2.4<L>  /  TBili  1.1  /  DBili      /  AST  41<H>  /  ALT  35  /  AlkPhos  179<H>      Creatinine Trend: 5.02 <--, 5.24 <--, 5.31 <--, 5.81 <--, 6.16 <--                        10.4   8.33  )-----------( 193      ( 2021 05:32 )             31.0     Urine Studies:  Urinalysis Basic - ( 2021 10:46 )    Color: Yellow / Appearance: Clear / S.015 / pH:   Gluc:  / Ketone: Trace  / Bili: Negative / Urobili: Negative   Blood:  / Protein: 100 / Nitrite: Negative   Leuk Esterase: Small / RBC: 5-10 /HPF / WBC 26-50 /HPF   Sq Epi:  / Non Sq Epi: Few /HPF / Bacteria: Trace /HPF      Osmolality, Random Urine: 256 mos/kg ( @ 00:52)  Sodium, Random Urine: 31 mmol/L ( @ 00:52)  Creatinine, Random Urine: 87 mg/dL ( @ 00:52)    RADIOLOGY & ADDITIONAL STUDIES:

## 2021-06-29 NOTE — PROGRESS NOTE ADULT - SUBJECTIVE AND OBJECTIVE BOX
EXAM:  CT CHEST                            PROCEDURE DATE:  06/28/2021          INTERPRETATION:  CLINICAL INFORMATION: Hypotension    COMPARISON: None.    CONTRAST/COMPLICATIONS:  IV Contrast: NONE  Oral Contrast: NONE  Complications: None reported at time of study completion    PROCEDURE:  CT of the Chest, Abdomen and Pelvis was performed.  Sagittal and coronal reformats were performed.    FINDINGS:  CHEST:  LUNGS AND LARGE AIRWAYS: Patent central airways. Patchy bibasilar dependent consolidation/atelectasis right greater than left. Correlate clinically for infection. biapical scarring  PLEURA: No pleural effusion.  VESSELS: Nonaneurysmal.  HEART: Mild cardiomegaly with coronary artery calcification No pericardial effusion.  MEDIASTINUM AND CLARICE: No lymphadenopathy.  CHEST WALL AND LOWER NECK: Nonspecific left breast calcification..    ABDOMEN AND PELVIS:  LIVER: Within normal limits.  BILE DUCTS: Marked dilatation common duct up to 1.7 cm mild intrahepatic biliary dilatation. The distal common duct obstructing stone and/or lesion is considered. Correlate with MR.  GALLBLADDER: Gallstones. Markedly distended gallbladder with pericholecystic fluid concerning for cholecystitis. Correlate with right upper quadrant ultrasound and/or HIDA.  SPLEEN: Within normal limits.  PANCREAS: Not well evaluated on this noncontrast study. However, there is diffuse atrophy of the body and tail with dilatation of the main pancreatic duct. Increased soft tissue density in the pancreatic head and uncinate process suspicious for a mass. Recommend  MRI  ADRENALS: Within normal limits.  KIDNEYS/URETERS: Punctate nonobstructive right renal calculus    BLADDER: Collapsed around a Ramesh.  REPRODUCTIVE ORGANS: No gynecologic mass    BOWEL: No bowel obstruction. Appendix no appendicitis  PERITONEUM: No ascites.  VESSELS: Nonaneurysmal  RETROPERITONEUM/LYMPH NODES: No lymphadenopathy.  ABDOMINAL WALL: Within normal limits.  BONES: No aggressive lesions    IMPRESSION:  Limited by lack of any exogenous oral or intravenous contrast  Patchy bibasilar dependent consolidation/atelectasis. Correlate clinically for infection.  Cholelithiasis, pericholecystic fluid concerning for cholecystitis. Correlate with ultrasound and/or HIDA scan.  Significant biliary dilatation, possible mass pancreatic head. Correlate with MRCP/abdominal MR with pancreatic protocol              DOUG SRIVASTAVA MD; Attending Radiologist  This document has been electronically signed. Jun 28 2021 12:29PM

## 2021-06-29 NOTE — CHART NOTE - NSCHARTNOTEFT_GEN_A_CORE
computer issue    pt seen and examined   was lithargic hypotensive inn NH  sudeen onset I was called transfer to hosp   was in acte renal failure ,    urine pos  and ct showed cholelithisias   going for IR Cholicystostomy and being tx for UTI

## 2021-06-29 NOTE — PROGRESS NOTE ADULT - ASSESSMENT
Patient is 76 year old F Pmhx of PVD, right AKA (S/P fem pop bypass), PAF, COPD, bipolar disorder, hypertension and PSHx of S/P CABG x1 brought into the ED via EMS from [facility] for unresponsiveness and hypotension being admitted to ICU for sepsis likely from UTI and/ Cholecystitis and Acute renal failure.     Assessment:   Encephalopathy   Sepsis from UTI and/ Acute cholecystitis   Acute renal failure   Anion and non anion gap metabolic acidosis   Pancreatic head mass     PAF  Anemia     Plan:    =====================[CNS ]==============================  # Encephalopathy likely infectious (UTI/cholecysistis) and or metabolic (uremia)   - Alert and oriented at baseline, follows instruction   - Avoid sedatives   - Will do CT head   - will hold pysh meds for now     =====================[CVS ]===============================  # Hypotension will hold metoprolol and Cozar    # PAF holding eliquis for given Acute renal failure   # CAD s/p CABG c/w asa, lipitor     =====================[RESP ]==============================  # B/l patchy opacity with consolidation/ atelectasis, with b/l crackles R>L  - will start emepiric abx with cefepime   - f/u blood cx, MRSA swab, procalcitonin     =====================[ GI ]================================  # NPO except meds   ppi for GI ppx     # Cholecystitis w/ dilated bile duct 1.7 cm, and concern for pnacreatic head mass   - Will start on cefepime and flagyl to cover for gram negative and anaerobic coverage   - f/u blood cx  - f/u surgery consult   - will consult IR for possible cholecystostomy tube placement      ====================[ RENAL ]=============================   # Acute renal failure, BUN/Cr 78/6.16 was normal in 4/2021  - Likely prerenal in the setting of hypovolemia and sepsis   - s/p 3L IVF, will c/w IV hydration   - will repeat BMP   - monitor urine output   - I&Os  - Monitor electrolytes    # Metabolic acidosis w/ anion gap ~20  - likely from uremia in the setting of ARF   - f/u salisylate and acetaminophen level   - s/p iv hydration   - f/u repeat labs     =====================[ ID ]================================  # UTI and or cholecystitis   - s/p vanc and rocephine in the ED   - will start cefepime and flagyl for now   - f/u Bcx, Ucx and MRSA     ===================[ HEME/ONC ]===========================  # Anemia   - f/u iron panle   - Hb and Plt stable   - monitor cbc daily     =====================[ ENDO ]=============================  # No history of DM  - target CBG < 180  - FS q6 while NPO  - Start diet when possible    ==================[ PROPHYLAXIS ]==========================   # Dvt : HSQ  # Gi : Protonix     ====================[ DISPO ]==============================   - Monitor in ICU     ===================[ PROGNOSIS ]===========================  - Guarded       Patient is 76 year old F Pmhx of PVD, right AKA (S/P fem pop bypass), PAF, COPD, bipolar disorder, hypertension and PSHx of S/P CABG x1 brought into the ED via EMS from [facility] for unresponsiveness and hypotension being admitted to ICU for sepsis likely from UTI and/ Cholecystitis and Acute renal failure.     Assessment:   Encephalopathy   Sepsis from UTI and/ Acute cholecystitis   Acute renal failure   Anion and non anion gap metabolic acidosis   Pancreatic head mass     PAF  Anemia     Plan:    =====================[CNS ]==============================  # Encephalopathy likely infectious (UTI/cholecysistis) vs metabolic (uremia) vs medication induced(gabapentin)   - Alert and oriented at baseline, however per  - will hold all home psych meds for now     =====================[CVS ]===============================  # Hypotension will hold metoprolol and Cozar    # PAF holding eliquis for impending IR procedure for cholecystostomy  # CAD s/p CABG c/w asa, lipitor     =====================[RESP ]==============================  # B/l patchy opacity with consolidation/ atelectasis, with b/l crackles R>L  - Started on cefepime/Flagyl  - f/u blood cx, MRSA swab, procalcitonin     =====================[ GI ]================================  # NPO except meds   ppi for GI ppx     # Cholecystitis w/ dilated bile duct 1.7 cm, and concern for pnacreatic head mass   - Will start on cefepime and flagyl to cover for gram negative and anaerobic coverage   - f/u blood cx  - To go for IR today for cholecystostomy     ====================[ RENAL ]=============================   # Acute renal failure, BUN/Cr 78/6.16 was normal in 4/2021  - FENa of 1.5, intrinsic?  Urine output improving  component of ATN  creatinine improving  - Monitor urine output    # Metabolic acidosis w/ anion gap, anion gap now improved  - likely from uremia in the setting of ARF l   - s/p iv hydration   - f/u repeat labs   - On bicarb gtt, will repeat labs and determine need for bicarb      =====================[ ID ]================================  # UTI and or cholecystitis   - s/p vanc and rocephine in the ED   - started cefepime and flagyl for now   - f/u Bcx, Ucx and MRSA   - Pending IR cholecystostomy    ===================[ HEME/ONC ]===========================  # Anemia   - f/u iron panle   - Hb and Plt stable   - monitor cbc daily     =====================[ ENDO ]=============================  # No history of DM  - target CBG < 180  - FS q6 while NPO  - Start diet when possible    ==================[ PROPHYLAXIS ]==========================   # Dvt : held due to IR procedure  # Gi : Protonix     ====================[ DISPO ]==============================   - Monitor in ICU     ===================[ PROGNOSIS ]===========================  - Guarded

## 2021-06-29 NOTE — ADVANCED PRACTICE NURSE CONSULT - RECOMMEDATIONS
-Apply a Foam dressing to the Coccyx area Q 72hrs PRN  -Elevate/float the patients L. Heel using heel protectors and reposition the patient Q 2hrs using wedges or pillows

## 2021-06-29 NOTE — PROGRESS NOTE ADULT - ATTENDING COMMENTS
76 year old F Pmhx of PVD, right AKA (S/P fem pop bypass), PAF, COPD, bipolar disorder, hypertension and PSHx of S/P CABG x1 brought into the ED via EMS from [facility] for unresponsiveness and hypotension being admitted to ICU for severe sepsis likely from UTI and/ Cholecystitis and Acute renal failure    Assessment:   1. Encephalopathy   2. Severe Sepsis   3. UTI   4. Acute cholecystitis   5. Acute renal failure   6. Anion and non anion gap metabolic acidosis   7. Billary ductal dilation - also pancreatic ductal dilation  8. Paroxysmal Afib  9. Chronic Anemia   10. Dementia     Plan  - Monitor hemodynamics, vasopressor support to maintain MAP  - Trend troponin, obtain Echo  - Cardiology consult  - Broad spectrum antibiotics   - Surgery and GI consult  - Plan for cholecystostomy tube today  - Follow up cultures  - Encephalopathy likely multifactorial - as patient on psych medications including gabapentin   - CT head noted  - Seizure and aspiration precautions  - NPO for now  - Monitor urine output, slowly improving  - D/c sodium bicarb infusion   - Repeat labs in PM  - No hydronephrosis on CT abdomen  - Will need work up for possible pancreatic mass   - Hold eliquis for now as make need interventions  - DVT and stress ulcer prophylaxis  - PT evaluation  - MOLST reviewed, full code

## 2021-06-29 NOTE — PROGRESS NOTE ADULT - SUBJECTIVE AND OBJECTIVE BOX
Patient is a 76y old  Female who presents with a chief complaint of     INTERVAL HPI/OVERNIGHT EVENTS:  No overnight events. patient remained lethargic. Continues to have vasopressor requirement.     ICU Vital Signs Last 24 Hrs  T(C): 37.2 (2021 07:15), Max: 37.2 (2021 05:15)  T(F): 98.9 (2021 07:15), Max: 98.9 (2021 05:15)  HR: 104 (2021 08:30) (85 - 137)  BP: 132/46 (2021 08:30) (69/46 - 152/46)  BP(mean): 66 (2021 08:30) (38 - 94)  ABP: --  ABP(mean): --  RR: 19 (2021 08:30) (11 - 21)  SpO2: 95% (2021 08:30) (94% - 100%)    I&O's Summary    2021 07:01  -  2021 07:00  --------------------------------------------------------  IN: 3879 mL / OUT: 215 mL / NET: 3664 mL          LABS:                        10.4   8.33  )-----------( 193      ( 2021 05:32 )             31.0     06-29    142  |  110<H>  |  65<H>  ----------------------------<  214<H>  3.3<L>   |  15<L>  |  5.24<H>    Ca    7.5<L>      2021 05:31  Phos  5.2       Mg     2.1         TPro  6.1  /  Alb  2.4<L>  /  TBili  1.1  /  DBili  x   /  AST  41<H>  /  ALT  35  /  AlkPhos  179<H>  29    PT/INR - ( 2021 05:31 )   PT: 21.4 sec;   INR: 1.85 ratio         PTT - ( 2021 05:31 )  PTT:38.6 sec  Urinalysis Basic - ( 2021 10:46 )    Color: Yellow / Appearance: Clear / S.015 / pH: x  Gluc: x / Ketone: Trace  / Bili: Negative / Urobili: Negative   Blood: x / Protein: 100 / Nitrite: Negative   Leuk Esterase: Small / RBC: 5-10 /HPF / WBC 26-50 /HPF   Sq Epi: x / Non Sq Epi: Few /HPF / Bacteria: Trace /HPF      CAPILLARY BLOOD GLUCOSE      POCT Blood Glucose.: 221 mg/dL (2021 05:13)  POCT Blood Glucose.: 131 mg/dL (2021 17:10)  POCT Blood Glucose.: 164 mg/dL (2021 10:18)    ABG - ( 2021 04:46 )  pH, Arterial: 7.28  pH, Blood: x     /  pCO2: 30    /  pO2: 118   / HCO3: 14    / Base Excess: -11.3 /  SaO2: NR                  RADIOLOGY & ADDITIONAL TESTS:    Consultant(s) Notes Reviewed:  [x ] YES  [ ] NO    MEDICATIONS  (STANDING):  cefepime   IVPB      cefepime   IVPB 1000 milliGRAM(s) IV Intermittent every 24 hours  chlorhexidine 4% Liquid 1 Application(s) Topical <User Schedule>  metroNIDAZOLE  IVPB      metroNIDAZOLE  IVPB 500 milliGRAM(s) IV Intermittent every 8 hours  norepinephrine Infusion 0.35 MICROgram(s)/kG/Min (22 mL/Hr) IV Continuous <Continuous>  pantoprazole  Injectable 40 milliGRAM(s) IV Push daily  potassium chloride  20 mEq/100 mL IVPB 20 milliEquivalent(s) IV Intermittent once  sodium bicarbonate  Infusion 0.28 mEq/kG/Hr (125 mL/Hr) IV Continuous <Continuous>    MEDICATIONS  (PRN):  sodium chloride 0.9% lock flush 10 milliLiter(s) IV Push every 1 hour PRN Pre/post blood products, medications, blood draw, and to maintain line patency      PHYSICAL EXAM:  GENERAL: well built, well nourished  HEAD:  Atraumatic, Normocephalic  EYES: EOMI, PERRLA, conjunctiva and sclera clear  ENT: No tonsillar erythema, exudates, or enlargement; Moist mucous membranes, Good dentition, No lesions  NECK: Supple, No JVD, Normal thyroid, no enlarged nodes  NERVOUS SYSTEM:  Alert & Oriented X3, Good concentration; Motor Strength 5/5 B/L upper and lower extremities; DTRs 2+ intact and symmetric, sensory intact  CHEST/LUNG: B/L good air entry; No rales, rhonchi, or wheezing  HEART: S1S2 normal, no S3, Regular rate and rhythm; No murmurs, rubs, or gallops  ABDOMEN: Soft, Nontender, Nondistended; Bowel sounds present  EXTREMITIES:  2+ Peripheral Pulses, No clubbing, cyanosis, or edema  LYMPH: No lymphadenopathy noted  SKIN: No rashes or lesions    Care Discussed with Consultants/Other Providers [ x] YES  [ ] NO Patient is a 76y old  Female who presents with a chief complaint of     INTERVAL HPI/OVERNIGHT EVENTS:  No overnight events. patient remained lethargic. Continues to have vasopressor requirement.     ICU Vital Signs Last 24 Hrs  T(C): 37.2 (2021 07:15), Max: 37.2 (2021 05:15)  T(F): 98.9 (2021 07:15), Max: 98.9 (2021 05:15)  HR: 104 (2021 08:30) (85 - 137)  BP: 132/46 (2021 08:30) (69/46 - 152/46)  BP(mean): 66 (2021 08:30) (38 - 94)  ABP: --  ABP(mean): --  RR: 19 (2021 08:30) (11 - 21)  SpO2: 95% (2021 08:30) (94% - 100%)    I&O's Summary    2021 07:01  -  2021 07:00  --------------------------------------------------------  IN: 3879 mL / OUT: 215 mL / NET: 3664 mL          LABS:                        10.4   8.33  )-----------( 193      ( 2021 05:32 )             31.0     06-29    142  |  110<H>  |  65<H>  ----------------------------<  214<H>  3.3<L>   |  15<L>  |  5.24<H>    Ca    7.5<L>      2021 05:31  Phos  5.2       Mg     2.1         TPro  6.1  /  Alb  2.4<L>  /  TBili  1.1  /  DBili  x   /  AST  41<H>  /  ALT  35  /  AlkPhos  179<H>  29    PT/INR - ( 2021 05:31 )   PT: 21.4 sec;   INR: 1.85 ratio         PTT - ( 2021 05:31 )  PTT:38.6 sec  Urinalysis Basic - ( 2021 10:46 )    Color: Yellow / Appearance: Clear / S.015 / pH: x  Gluc: x / Ketone: Trace  / Bili: Negative / Urobili: Negative   Blood: x / Protein: 100 / Nitrite: Negative   Leuk Esterase: Small / RBC: 5-10 /HPF / WBC 26-50 /HPF   Sq Epi: x / Non Sq Epi: Few /HPF / Bacteria: Trace /HPF      CAPILLARY BLOOD GLUCOSE      POCT Blood Glucose.: 221 mg/dL (2021 05:13)  POCT Blood Glucose.: 131 mg/dL (2021 17:10)  POCT Blood Glucose.: 164 mg/dL (2021 10:18)    ABG - ( 2021 04:46 )  pH, Arterial: 7.28  pH, Blood: x     /  pCO2: 30    /  pO2: 118   / HCO3: 14    / Base Excess: -11.3 /  SaO2: NR                  RADIOLOGY & ADDITIONAL TESTS:    Consultant(s) Notes Reviewed:  [x ] YES  [ ] NO    MEDICATIONS  (STANDING):  cefepime   IVPB      cefepime   IVPB 1000 milliGRAM(s) IV Intermittent every 24 hours  chlorhexidine 4% Liquid 1 Application(s) Topical <User Schedule>  metroNIDAZOLE  IVPB      metroNIDAZOLE  IVPB 500 milliGRAM(s) IV Intermittent every 8 hours  norepinephrine Infusion 0.35 MICROgram(s)/kG/Min (22 mL/Hr) IV Continuous <Continuous>  pantoprazole  Injectable 40 milliGRAM(s) IV Push daily  potassium chloride  20 mEq/100 mL IVPB 20 milliEquivalent(s) IV Intermittent once  sodium bicarbonate  Infusion 0.28 mEq/kG/Hr (125 mL/Hr) IV Continuous <Continuous>    MEDICATIONS  (PRN):  sodium chloride 0.9% lock flush 10 milliLiter(s) IV Push every 1 hour PRN Pre/post blood products, medications, blood draw, and to maintain line patency      PHYSICAL EXAM:  GENERAL: AAOx0, hard to arouse, does not respond to verbal stimuli,   HEAD:  Atraumatic, Normocephalic  EYES: EOMI, PERRLA, conjunctiva and sclera clear  NECK: Supple, No JVD  CHEST/LUNG: B/l lower lobe crackles R>L  HEART: Regular rate and rhythm; s1+ s2+  ABDOMEN: RUQ tenderness  EXTREMITIES:, left lower extremity pitting edema, S/p R AKA stump no signs of infection   NEUROLOGY: Awake, does not follow commands, groans  SKIN: No rashes or lesions    Care Discussed with Consultants/Other Providers [ x] YES  [ ] NO

## 2021-06-29 NOTE — PROGRESS NOTE ADULT - SUBJECTIVE AND OBJECTIVE BOX
INTERVAL HPI/OVERNIGHT EVENTS:       PRESSORS: [ ] YES [ ] NO  WHICH:    ANTIBIOTICS:                  DATE STARTED:  ANTIBIOTICS:                  DATE STARTED:  ANTIBIOTICS:                  DATE STARTED:    Antimicrobial:  cefepime   IVPB      cefepime   IVPB 1000 milliGRAM(s) IV Intermittent every 24 hours  metroNIDAZOLE  IVPB      metroNIDAZOLE  IVPB 500 milliGRAM(s) IV Intermittent every 8 hours    Cardiovascular:  norepinephrine Infusion 0.35 MICROgram(s)/kG/Min IV Continuous <Continuous>    Pulmonary:    Hematalogic:    Other:  chlorhexidine 4% Liquid 1 Application(s) Topical <User Schedule>  pantoprazole  Injectable 40 milliGRAM(s) IV Push daily  sodium bicarbonate  Infusion 0.28 mEq/kG/Hr IV Continuous <Continuous>  sodium chloride 0.9% lock flush 10 milliLiter(s) IV Push every 1 hour PRN    cefepime   IVPB      cefepime   IVPB 1000 milliGRAM(s) IV Intermittent every 24 hours  chlorhexidine 4% Liquid 1 Application(s) Topical <User Schedule>  metroNIDAZOLE  IVPB      metroNIDAZOLE  IVPB 500 milliGRAM(s) IV Intermittent every 8 hours  norepinephrine Infusion 0.35 MICROgram(s)/kG/Min IV Continuous <Continuous>  pantoprazole  Injectable 40 milliGRAM(s) IV Push daily  sodium bicarbonate  Infusion 0.28 mEq/kG/Hr IV Continuous <Continuous>  sodium chloride 0.9% lock flush 10 milliLiter(s) IV Push every 1 hour PRN    Drug Dosing Weight  Height (cm): 170.2 (2021 10:15)  Weight (kg): 66.9 (2021 16:29)  BMI (kg/m2): 23.1 (2021 16:29)  BSA (m2): 1.78 (2021 16:29)    CENTRAL LINE: [ ] YES [ ] NO  LOCATION:         BIANCHI: [ ] YES [ ] NO          A-LINE:  [ ] YES [ ] NO  LOCATION:             ICU Vital Signs Last 24 Hrs  T(C): 37.2 (2021 05:15), Max: 37.2 (2021 05:15)  T(F): 98.9 (2021 05:15), Max: 98.9 (2021 05:15)  HR: 96 (2021 07:00) (85 - 137)  BP: 126/50 (2021 07:00) (69/46 - 152/46)  BP(mean): 69 (2021 07:00) (38 - 94)  ABP: --  ABP(mean): --  RR: 18 (2021 07:00) (11 - 21)  SpO2: 95% (2021 07:00) (94% - 100%)      ABG - ( 2021 04:46 )  pH, Arterial: 7.28  pH, Blood: x     /  pCO2: 30    /  pO2: 118   / HCO3: 14    / Base Excess: -11.3 /  SaO2: NR                     @ 07:01  -   @ 07:00  --------------------------------------------------------  IN: 3879 mL / OUT: 215 mL / NET: 3664 mL                PHYSICAL EXAM:    GENERAL: NAD, well-groomed, well-developed  EYES: EOMI, PERRLA,   NECK: Supple, No JVD; Normal thyroid; Trachea midline  NERVOUS SYSTEM:  Alert & Oriented X3,  Motor Strength 5/5 B/L upper and lower extremities; DTRs 2+ intact and symmetric  CHEST/LUNG: No rales, rhonchi, wheezing   HEART: Regular rate and rhythm; No murmurs,   ABDOMEN: Soft, Nontender, Nondistended; Bowel sounds present  EXTREMITIES:  2+ Peripheral Pulses, No clubbing, cyanosis, or edema        LABS:  CBC Full  -  ( 2021 05:32 )  WBC Count : 8.33 K/uL  RBC Count : 3.48 M/uL  Hemoglobin : 10.4 g/dL  Hematocrit : 31.0 %  Platelet Count - Automated : 193 K/uL  Mean Cell Volume : 89.1 fl  Mean Cell Hemoglobin : 29.9 pg  Mean Cell Hemoglobin Concentration : 33.5 gm/dL  Auto Neutrophil # : x  Auto Lymphocyte # : x  Auto Monocyte # : x  Auto Eosinophil # : x  Auto Basophil # : x  Auto Neutrophil % : x  Auto Lymphocyte % : x  Auto Monocyte % : x  Auto Eosinophil % : x  Auto Basophil % : x        142  |  110<H>  |  65<H>  ----------------------------<  214<H>  3.3<L>   |  15<L>  |  5.24<H>    Ca    7.5<L>      2021 05:31  Phos  5.2       Mg     2.1         TPro  6.1  /  Alb  2.4<L>  /  TBili  1.1  /  DBili  x   /  AST  41<H>  /  ALT  35  /  AlkPhos  179<H>      PT/INR - ( 2021 05:31 )   PT: 21.4 sec;   INR: 1.85 ratio         PTT - ( 2021 05:31 )  PTT:38.6 sec  Urinalysis Basic - ( 2021 10:46 )    Color: Yellow / Appearance: Clear / S.015 / pH: x  Gluc: x / Ketone: Trace  / Bili: Negative / Urobili: Negative   Blood: x / Protein: 100 / Nitrite: Negative   Leuk Esterase: Small / RBC: 5-10 /HPF / WBC 26-50 /HPF   Sq Epi: x / Non Sq Epi: Few /HPF / Bacteria: Trace /HPF          RADIOLOGY & ADDITIONAL STUDIES REVIEWED:  ***    [ ]GOALS OF CARE DISCUSSION WITH PATIENT/FAMILY/PROXY:    CRITICAL CARE TIME SPENT: 35 minutes

## 2021-06-30 NOTE — PROGRESS NOTE ADULT - SUBJECTIVE AND OBJECTIVE BOX
HPI:  76 year old female from [facility] with PMHx of PVD, right AKA (S/P fem pop bypass), PAF, COPD, bipolar disorder, hypertension and PSHx of S/P CABG x1 brought into the ED via EMS from [facility] for unresponsiveness and hypotension. Per NH supervisor Mr. Gaspar () Pt was found unresponsive today morning around 8 am, pt was fine last night, pt did not have fever, difficulty breathing, pt was only receptive to painful stimuli and on evaluation pt's blood pressure was low per papers Pt's bp was 62/38. Pt was sent to the hospital for further evaluation. Baseline mental status alert, oriented and follows instruction, non ambulatory at baseline.   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 Pt was given a dose of vancomycin and rocephine. Pt's blood pressure improve to 97/58, hr 109 bpm, ECG showing sinus tachy, RBBB, LAFB, Bifacicular block.    (28 Jun 2021 14:58)      Patient is a 76y old  Female who presents with a chief complaint of hypotension, altered mental status (29 Jun 2021 14:47)      INTERVAL HPI/OVERNIGHT EVENTS:  T(C): 37.2 (06-30-21 @ 07:00), Max: 37.2 (06-30-21 @ 07:00)  HR: 101 (06-30-21 @ 13:00) (87 - 119)  BP: 150/54 (06-30-21 @ 13:00) (47/27 - 166/67)  RR: 15 (06-30-21 @ 13:00) (14 - 33)  SpO2: 98% (06-30-21 @ 13:00) (90% - 99%)  Wt(kg): --  I&O's Summary    29 Jun 2021 07:01  -  30 Jun 2021 07:00  --------------------------------------------------------  IN: 1312 mL / OUT: 1450 mL / NET: -138 mL    30 Jun 2021 07:01  -  30 Jun 2021 13:51  --------------------------------------------------------  IN: 200 mL / OUT: 130 mL / NET: 70 mL        REVIEW OF SYSTEMS: denies fever, chills, SOB, palpitations, chest pain, abdominal pain, nausea, vomitting, diarrhea, constipation, dizziness    MEDICATIONS  (STANDING):  cefepime   IVPB      cefepime   IVPB 1000 milliGRAM(s) IV Intermittent every 24 hours  chlorhexidine 2% Cloths 1 Application(s) Topical <User Schedule>  dextrose 5% + sodium chloride 0.9%. 1000 milliLiter(s) (50 mL/Hr) IV Continuous <Continuous>  heparin   Injectable 5000 Unit(s) SubCutaneous every 8 hours  metroNIDAZOLE  IVPB      metroNIDAZOLE  IVPB 500 milliGRAM(s) IV Intermittent every 8 hours  pantoprazole  Injectable 40 milliGRAM(s) IV Push at bedtime    MEDICATIONS  (PRN):  sodium chloride 0.9% lock flush 10 milliLiter(s) IV Push every 1 hour PRN Pre/post blood products, medications, blood draw, and to maintain line patency      PHYSICAL EXAM:  GENERAL: NAD, well-groomed, well-developed  HEAD:  Atraumatic, Normocephalic  EYES: EOMI, PERRLA, conjunctiva and sclera clear  ENMT: No tonsillar erythema, exudates, or enlargement; Moist mucous membranes, Good dentition, No lesions  NECK: Supple, No JVD, Normal thyroid  NERVOUS SYSTEM:  Alert & Oriented X3, Good concentration; Motor Strength 5/5 B/L upper and lower extremities; DTRs 2+ intact and symmetric  CHEST/LUNG: Clear to percussion bilaterally; No rales, rhonchi, wheezing, or rubs  HEART: Regular rate and rhythm; No murmurs, rubs, or gallops  ABDOMEN: Soft, Nontender, Nondistended; Bowel sounds present  EXTREMITIES:  2+ Peripheral Pulses, No clubbing, cyanosis, or edema  LYMPH: No lymphadenopathy noted  SKIN: No rashes or lesions  LABS:                        10.1   8.77  )-----------( 174      ( 30 Jun 2021 04:41 )             29.8     06-30    145  |  112<H>  |  58<H>  ----------------------------<  116<H>  3.5   |  20<L>  |  5.08<H>    Ca    7.7<L>      30 Jun 2021 04:41  Phos  5.1     06-30  Mg     2.0     06-30    TPro  6.2  /  Alb  2.2<L>  /  TBili  0.7  /  DBili  x   /  AST  39  /  ALT  39  /  AlkPhos  167<H>  06-30    PT/INR - ( 29 Jun 2021 05:31 )   PT: 21.4 sec;   INR: 1.85 ratio         PTT - ( 29 Jun 2021 05:31 )  PTT:38.6 sec    CAPILLARY BLOOD GLUCOSE      POCT Blood Glucose.: 131 mg/dL (30 Jun 2021 12:06)      ABG - ( 29 Jun 2021 04:46 )  pH, Arterial: 7.28  pH, Blood: x     /  pCO2: 30    /  pO2: 118   / HCO3: 14    / Base Excess: -11.3 /  SaO2: NR

## 2021-06-30 NOTE — DIETITIAN INITIAL EVALUATION ADULT. - PERTINENT LABORATORY DATA
06-30 Na145 mmol/L Glu 116 mg/dL<H> K+ 3.5 mmol/L Cr  5.08 mg/dL<H> BUN 58 mg/dL<H>   06-30 Phos 5.1 mg/dL<H>   06-30 Alb 2.2 g/dL<L>

## 2021-06-30 NOTE — PROGRESS NOTE ADULT - ASSESSMENT
seen and examined on bed comforatble  not in any distress  s/p cholecystostomy   vsstable afebrile  lungs, abd ok  drain is ok       labs noted  creat 5.08  k 3.5  a/p altered MS  no improvement  afib not on AC here   on cefepime  , flagyl.  nephro, GI on board

## 2021-06-30 NOTE — PROGRESS NOTE ADULT - ASSESSMENT
76 yoF with septic shock, acute cholecystitis and ?pancreatic head mass    remains somnolent, CT head neg, seizure precaution  s/p IR cholecystostomy tube, monitor output; resume dvt ppx  troponin uptrending. f/u cards  VANESSA, prerenal from sepsis, Cr at 5 and hyperphosphotemia; no RRT at this time per neph, trend bmp  not a surgical candidate, continue medical mng per ICU

## 2021-06-30 NOTE — PROGRESS NOTE ADULT - SUBJECTIVE AND OBJECTIVE BOX
[facility] : Progress Note :: [phone], (office [phone]),   Dr Noriega / Dr Nava / Dr Granados / Dr Burrell / Dr Amish LOYD / Dr Stein / Dr Ridley / Dr Calvin louie  _____________________________________________________________________________________________    s/p cholecystotomy    influenza virus vaccine, live, trivalent (Unknown)  PC Pen VK (Rash)  penicillin (Other; Rash)  statins (Other)  sulfa drugs (Other)  sulfamethizole (Other)    Hospital Medications:   MEDICATIONS  (STANDING):  cefepime   IVPB      cefepime   IVPB 1000 milliGRAM(s) IV Intermittent every 24 hours  chlorhexidine 2% Cloths 1 Application(s) Topical <User Schedule>  dextrose 5% + sodium chloride 0.9%. 1000 milliLiter(s) (50 mL/Hr) IV Continuous <Continuous>  heparin   Injectable 5000 Unit(s) SubCutaneous every 8 hours  metroNIDAZOLE  IVPB      metroNIDAZOLE  IVPB 500 milliGRAM(s) IV Intermittent every 8 hours  pantoprazole  Injectable 40 milliGRAM(s) IV Push at bedtime        VITALS:  T(F): 98.9 (21 @ 07:00), Max: 98.9 (21 @ 07:00)  HR: 103 (21 @ 11:00)  BP: 144/70 (21 @ 11:00)  RR: 19 (21 @ 11:00)  SpO2: 99% (21 @ 11:00)  Wt(kg): --     @ 07:01  -   @ 07:00  --------------------------------------------------------  IN: 1312 mL / OUT: 1450 mL / NET: -138 mL     @ 07:01  -   @ 13:01  --------------------------------------------------------  IN: 200 mL / OUT: 130 mL / NET: 70 mL        PHYSICAL EXAM:  Constitutional: NAD  HEENT: anicteric sclera, oropharynx clear.  Neck: No JVD  Respiratory: CTAB, no wheezes, rales or rhonchi  Cardiovascular: S1, S2, RRR  Gastrointestinal: BS+, soft, NT/ND. cholecystotomy tube.  Extremities: No peripheral edema  Neurological: A/O x 3, no focal deficits  : No CVA tenderness. No alicia.     LABS:      145  |  112<H>  |  58<H>  ----------------------------<  116<H>  3.5   |  20<L>  |  5.08<H>    Ca    7.7<L>      2021 04:41  Phos  5.1       Mg     2.0         TPro  6.2  /  Alb  2.2<L>  /  TBili  0.7  /  DBili      /  AST  39  /  ALT  39  /  AlkPhos  167<H>      Creatinine Trend: 5.08 <--, 4.94 <--, 5.02 <--, 5.24 <--, 5.31 <--, 5.81 <--, 6.16 <--                        10.1   8.77  )-----------( 174      ( 2021 04:41 )             29.8     Urine Studies:  Urinalysis Basic - ( 2021 10:46 )    Color: Yellow / Appearance: Clear / S.015 / pH:   Gluc:  / Ketone: Trace  / Bili: Negative / Urobili: Negative   Blood:  / Protein: 100 / Nitrite: Negative   Leuk Esterase: Small / RBC: 5-10 /HPF / WBC 26-50 /HPF   Sq Epi:  / Non Sq Epi: Few /HPF / Bacteria: Trace /HPF      Osmolality, Random Urine: 256 mos/kg ( @ 00:52)  Sodium, Random Urine: 31 mmol/L ( @ 00:52)  Creatinine, Random Urine: 87 mg/dL ( @ 00:52)    RADIOLOGY & ADDITIONAL STUDIES:

## 2021-06-30 NOTE — DIETITIAN INITIAL EVALUATION ADULT. - PERTINENT MEDS FT
MEDICATIONS  (STANDING):  cefepime   IVPB      cefepime   IVPB 1000 milliGRAM(s) IV Intermittent every 24 hours  chlorhexidine 2% Cloths 1 Application(s) Topical <User Schedule>  dextrose 5% + sodium chloride 0.9%. 1000 milliLiter(s) (50 mL/Hr) IV Continuous <Continuous>  heparin   Injectable 5000 Unit(s) SubCutaneous every 8 hours  metroNIDAZOLE  IVPB      metroNIDAZOLE  IVPB 500 milliGRAM(s) IV Intermittent every 8 hours  pantoprazole  Injectable 40 milliGRAM(s) IV Push at bedtime    MEDICATIONS  (PRN):  sodium chloride 0.9% lock flush 10 milliLiter(s) IV Push every 1 hour PRN Pre/post blood products, medications, blood draw, and to maintain line patency

## 2021-06-30 NOTE — GOALS OF CARE CONVERSATION - ADVANCED CARE PLANNING - CONVERSATION DETAILS
Called patient's HCP and niece, Silvamick Garcia and provided her an update regarding patient's situation. Silva endorsed that patient has deteriorated greatly in the past 2 years which she attributes to the nursing home's lack of care. Asked if patient had any advanced directives in which she endorsed that patient has a HCP signed and that Silva is taking over all aspects of patient's care. Asked if patient had any thoughts about end of life care in which Silva expressed that patient would have liked to have gone comfortably in her final moments. Although the decision was on Silva, she expressed that she would only like procedures if it were to help patient's quality of life. Provider explained that given patient's advanced dementia and bed bound status, CPR and intubation likely would not have a positive effect on patient's quality in which Silva agreed. Discussion witnessed by PGY-1 who witnessed updated MOLST form. Patient is to be DNR/DNI. MOSLT form updated.

## 2021-06-30 NOTE — PROGRESS NOTE ADULT - ASSESSMENT
Patient is a 76y Female whom was sent to ED from the nursing home  to the hospital with hypotension and confusion.  Has H/O PVD s/p RT AKA, COPD, HTN, CABG x1. Blood pressure in the nursing home was 62/32.  In ED noted to have severe hypotension, confused  work-up revealed acute kidnye injury SCR 6 ( normal SCR at baseline).  Also with severe mixed gap and non-gap metabolic acidosis.  s/p isotonic fluid bolus and later started on NAHCO3 gtt as well as vasopressors and broad spectrum antibiotics  CT abdomen revealed acute cholecystitis, pancreatic head mass. No hydronephrosis  urine output improved .  BP better  s/p cholecystotomy      # VANESSA sec to pre-renal azotemia from septic shock.  s/p isotonic fluid resucitation   on broad spectrum ABX.  cont ABX  IV pressors   improving UOP as well as renal function  metabolic acidosis better   normokalemia  no acute need for renal replacement therapy for now.    F/U RPT BMP

## 2021-06-30 NOTE — PROGRESS NOTE ADULT - SUBJECTIVE AND OBJECTIVE BOX
INTERVAL HPI/OVERNIGHT EVENTS:    No acute events overnight.   Pt resting comfortably      MEDICATIONS  (STANDING):  cefepime   IVPB      cefepime   IVPB 1000 milliGRAM(s) IV Intermittent every 24 hours  chlorhexidine 2% Cloths 1 Application(s) Topical <User Schedule>  dextrose 5% + sodium chloride 0.9%. 1000 milliLiter(s) (50 mL/Hr) IV Continuous <Continuous>  metroNIDAZOLE  IVPB      metroNIDAZOLE  IVPB 500 milliGRAM(s) IV Intermittent every 8 hours  norepinephrine Infusion 0.29 MICROgram(s)/kG/Min (18.2 mL/Hr) IV Continuous <Continuous>  pantoprazole  Injectable 40 milliGRAM(s) IV Push at bedtime    MEDICATIONS  (PRN):  sodium chloride 0.9% lock flush 10 milliLiter(s) IV Push every 1 hour PRN Pre/post blood products, medications, blood draw, and to maintain line patency      Vital Signs Last 24 Hrs  T(C): 36.4 (29 Jun 2021 23:45), Max: 37 (29 Jun 2021 19:51)  T(F): 97.6 (29 Jun 2021 23:45), Max: 98.6 (29 Jun 2021 19:51)  HR: 106 (30 Jun 2021 07:00) (89 - 119)  BP: 155/73 (30 Jun 2021 07:00) (111/49 - 166/53)  BP(mean): 91 (30 Jun 2021 07:00) (53 - 91)  RR: 18 (30 Jun 2021 07:00) (14 - 33)  SpO2: 96% (30 Jun 2021 07:00) (90% - 99%)      PHYSICAL EXAM  General: somnolent, not in acute distress  Resp: Breathing unlabored, on NC  Abdomen: Soft, mildly distended, cholecystotomy tube with bilious output      I&O's Detail    29 Jun 2021 07:01  -  30 Jun 2021 07:00  --------------------------------------------------------  IN:    dextrose 5% + sodium chloride 0.9%: 500 mL    IV PiggyBack: 200 mL    IV PiggyBack: 151 mL    Norepinephrine: 51.5 mL    Norepinephrine: 34.5 mL    Sodium Bicarbonate: 375 mL  Total IN: 1312 mL    OUT:    Drain (mL): 350 mL    Indwelling Catheter - Urethral (mL): 1060 mL  Total OUT: 1410 mL    Total NET: -98 mL          LABS:                        10.1   8.77  )-----------( 174      ( 30 Jun 2021 04:41 )             29.8             06-30    145  |  112<H>  |  58<H>  ----------------------------<  116<H>  3.5   |  20<L>  |  5.08<H>    Ca    7.7<L>      30 Jun 2021 04:41  Phos  5.1     06-30  Mg     2.0     06-30    TPro  6.2  /  Alb  2.2<L>  /  TBili  0.7  /  DBili  x   /  AST  39  /  ALT  39  /  AlkPhos  167<H>  06-30

## 2021-06-30 NOTE — PROGRESS NOTE ADULT - ATTENDING COMMENTS
IMP: This is a  76 year old woman with  PVD, right AKA (S/P fem pop bypass), PAF, COPD, bipolar disorder, hypertension and PSHx of S/P CABG x1 brought into the ED via EMS from [facility] for unresponsiveness and hypotension being admitted to ICU for sepsis likely from UTI and/ Cholecystitis and Acute renal failure.           Assessment:   1. Encephalopathy   2. Severe Sepsis   3. UTI   4. Acute cholecystitis   5. Acute renal failure   6. Anion and non anion gap metabolic acidosis   7. Billary ductal dilation - also pancreatic ductal dilation  8. Paroxysmal Afib  9. Chronic Anemia   10. Dementia     Plan  - Monitor hemodynamics, vasopressor support to maintain MAP  - Trend troponin, obtain Echo  - Cardiology consult  - Broad spectrum antibiotics   - Surgery and GI consult  - Plan for cholecystostomy tube today  - Follow up cultures  - Encephalopathy likely multifactorial - as patient on psych medications including gabapentin   - CT head noted  - Seizure and aspiration precautions  - NPO for now  - Monitor urine output, slowly improving  - D/c sodium bicarb infusion   - Repeat labs in PM  - No hydronephrosis on CT abdomen  - Will need work up for possible pancreatic mass   - Hold eliquis for now as make need interventions  - DVT and stress ulcer prophylaxis  - PT evaluation  - MOLST reviewed, full code .

## 2021-06-30 NOTE — PROGRESS NOTE ADULT - ASSESSMENT
Patient is 76 year old F Pmhx of PVD, right AKA (S/P fem pop bypass), PAF, COPD, bipolar disorder, hypertension and PSHx of S/P CABG x1 brought into the ED via EMS from [facility] for unresponsiveness and hypotension being admitted to ICU for sepsis likely from UTI and/ Cholecystitis and Acute renal failure.     Assessment:   Encephalopathy   Sepsis from UTI and/ Acute cholecystitis   Acute renal failure   Anion and non anion gap metabolic acidosis   Pancreatic head mass     PAF  Anemia     Plan:    =====================[CNS ]==============================  # Encephalopathy likely infectious (UTI/cholecysistis) vs metabolic (uremia) vs medication induced(gabapentin)   - Alert and oriented at baseline, however now lethargic  - will hold all home psych meds for now   - Sepsis improved, uremia not significantly elevated to cause AMS. May be due to gabapentin use in the setting of VANESSA    =====================[CVS ]===============================  # Septic shock  - Now improved, off vasopressors  - DUe to acute hector, s/p cholecystostomy 9/30  - Continue cefepime and metronidazole  - urine cultures negative  - blood and fluid cultures negative to date    # PAF   -holding eliquis, resume DVT prophylaxis  - follow up surgery on resuming anticoagulation    # CAD s/p CABG   - Holding aspirin and statin given AMS and NPO status     =====================[RESP ]==============================  # B/l patchy opacity with consolidation/ atelectasis, with b/l crackles R>L  - Started on cefepime/Flagyl    =====================[ GI ]================================  # NPO except meds   ppi for GI ppx     # Cholecystitis w/ dilated bile duct 1.7 cm, and concern for pnacreatic head mass   - Will start on cefepime and flagyl to cover for gram negative and anaerobic coverage   - f/u blood cx  - To go for IR today for cholecystostomy     ====================[ RENAL ]=============================   # Acute renal failure, BUN/Cr 78/6.16 was normal in 4/2021  - FENa of 1.5, intrinsic?  Urine output improving  component of ATN  creatinine improving  - Monitor urine output    # Metabolic acidosis w/ anion gap, anion gap now improved  - likely from uremia in the setting of ARF l   - s/p iv hydration   - f/u repeat labs   - On bicarb gtt, will repeat labs and determine need for bicarb      =====================[ ID ]================================  # UTI and or cholecystitis   - s/p vanc and rocephine in the ED   - started cefepime and flagyl for now   - f/u Bcx, Ucx and MRSA   - Pending IR cholecystostomy    ===================[ HEME/ONC ]===========================  # Anemia   - f/u iron panle   - Hb and Plt stable   - monitor cbc daily     =====================[ ENDO ]=============================  # No history of DM  - target CBG < 180  - FS q6 while NPO  - Start diet when possible    ==================[ PROPHYLAXIS ]==========================   # Dvt : held due to IR procedure  # Gi : Protonix     ====================[ DISPO ]==============================   - Monitor in ICU     ===================[ PROGNOSIS ]===========================  - Guarded       Patient is 76 year old F Pmhx of PVD, right AKA (S/P fem pop bypass), PAF, COPD, bipolar disorder, hypertension and PSHx of S/P CABG x1 brought into the ED via EMS from [facility] for unresponsiveness and hypotension being admitted to ICU for sepsis likely from UTI and/ Cholecystitis and Acute renal failure.     Assessment:   Encephalopathy   Sepsis from UTI and/ Acute cholecystitis   Acute renal failure   Anion and non anion gap metabolic acidosis   Pancreatic head mass     PAF  Anemia     Plan:    =====================[CNS ]==============================  # Encephalopathy likely infectious (UTI/cholecysistis) vs metabolic (uremia) vs medication induced(gabapentin)   - Alert and oriented at baseline, however now lethargic  - will hold all home psych meds for now   - Sepsis improved, uremia not significantly elevated to cause AMS. May be due to gabapentin use in the setting of VANESSA  - WIll hold off imaging given AMS and concern for airway protection if lays down flat on MRI    =====================[CVS ]===============================  # Septic shock  - Now improved, off vasopressors  - DUe to acute hector, s/p cholecystostomy 9/30  - Continue cefepime and metronidazole  - urine cultures negative  - blood and fluid cultures negative to date    # PAF   -holding eliquis, resume DVT prophylaxis  - follow up surgery on resuming anticoagulation    # CAD s/p CABG   - Holding aspirin and statin given AMS and NPO status       =====================[RESP ]==============================  # B/l patchy opacity with consolidation/ atelectasis, with b/l crackles R>L  - Started on cefepime/Flagyl    =====================[ GI ]================================  # NPO except meds   ppi for GI ppx   Speech and Swallow follow up    # Cholecystitis w/ dilated bile duct 1.7 cm, and concern for pancreatic head mass   - Will start on cefepime and flagyl to cover for gram negative and anaerobic coverage   - f/u blood cx  - S/P cholecystostomy drain  - Patient will require MRCP w/o contrast to further eval if patient's biliary dilatation is due to a pancreatic mass, and may benefit from ERCP afterwards.  - Given patient's altered mental status and concerns for airway protection, will hold MRI for now     ====================[ RENAL ]=============================   # Acute renal failure, BUN/Cr 78/6.16 was normal in 4/2021  - FENa of 1.5, intrinsic  Urine output improving  component of ATN  creatinine improving  - Monitor urine output  - No need for dialysis      # Metabolic acidosis w/ anion gap, anion gap now improved  - likely from uremia in the setting of VANESSA  - Improved, now discontinued off bicarb gtt      =====================[ ID ]================================  # UTI and or cholecystitis   - s/p vanc and rocephine in the ED   - started cefepime and flagyl for now   - f/u Bcx, Ucx and MRSA   - s/p cholecystostomy, all cultures negative to date    ===================[ HEME/ONC ]===========================  # Anemia   - Hb and Plt stable   - monitor cbc daily     =====================[ ENDO ]=============================  # No history of DM  - target CBG < 180  - FS q6 while NPO  - Start diet when possible- speech and swallow pending    ==================[ PROPHYLAXIS ]==========================   # Dvt : resumed heparin subq  # Gi : Protonix     ====================[ DISPO ]==============================   - Monitor in ICU     ===================[ PROGNOSIS ]===========================  - Guarded

## 2021-06-30 NOTE — PROGRESS NOTE ADULT - SUBJECTIVE AND OBJECTIVE BOX
Patient is a 76y old  Female who presents with a chief complaint of hypotension, altered mental status (2021 14:47)      INTERVAL HPI/OVERNIGHT EVENTS:  Patient admitted due to sepsis 2/2 acute hector vs UTI, urine cultures negative. Patient s/p cholecystostomy yesterday with drainage of 350 cc of bile since. Patient is now off pressors, acidosis improved, off bicarb drip. Patient remains lethargic this AM, however is responsive to physical stimuli.       ICU Vital Signs Last 24 Hrs  T(C): 37.2 (2021 07:00), Max: 37.2 (2021 07:00)  T(F): 98.9 (2021 07:00), Max: 98.9 (2021 07:00)  HR: 112 (2021 08:00) (89 - 119)  BP: 166/67 (2021 08:00) (111/49 - 166/67)  BP(mean): 91 (2021 08:00) (58 - 91)  ABP: --  ABP(mean): --  RR: 21 (2021 08:00) (14 - 33)  SpO2: 97% (2021 08:00) (90% - 99%)    I&O's Summary    2021 07:01  -  2021 07:00  --------------------------------------------------------  IN: 1312 mL / OUT: 1410 mL / NET: -98 mL          LABS:                        10.1   8.77  )-----------( 174      ( 2021 04:41 )             29.8     06-30    145  |  112<H>  |  58<H>  ----------------------------<  116<H>  3.5   |  20<L>  |  5.08<H>    Ca    7.7<L>      2021 04:41  Phos  5.1     06-30  Mg     2.0     06-30    TPro  6.2  /  Alb  2.2<L>  /  TBili  0.7  /  DBili  x   /  AST  39  /  ALT  39  /  AlkPhos  167<H>  06-30    PT/INR - ( 2021 05:31 )   PT: 21.4 sec;   INR: 1.85 ratio         PTT - ( 2021 05:31 )  PTT:38.6 sec  Urinalysis Basic - ( 2021 10:46 )    Color: Yellow / Appearance: Clear / S.015 / pH: x  Gluc: x / Ketone: Trace  / Bili: Negative / Urobili: Negative   Blood: x / Protein: 100 / Nitrite: Negative   Leuk Esterase: Small / RBC: 5-10 /HPF / WBC 26-50 /HPF   Sq Epi: x / Non Sq Epi: Few /HPF / Bacteria: Trace /HPF      CAPILLARY BLOOD GLUCOSE      POCT Blood Glucose.: 191 mg/dL (2021 11:25)    ABG - ( 2021 04:46 )  pH, Arterial: 7.28  pH, Blood: x     /  pCO2: 30    /  pO2: 118   / HCO3: 14    / Base Excess: -11.3 /  SaO2: NR                  RADIOLOGY & ADDITIONAL TESTS:    Consultant(s) Notes Reviewed:  [x ] YES  [ ] NO    MEDICATIONS  (STANDING):  cefepime   IVPB      cefepime   IVPB 1000 milliGRAM(s) IV Intermittent every 24 hours  chlorhexidine 2% Cloths 1 Application(s) Topical <User Schedule>  dextrose 5% + sodium chloride 0.9%. 1000 milliLiter(s) (50 mL/Hr) IV Continuous <Continuous>  heparin   Injectable 5000 Unit(s) SubCutaneous every 8 hours  metroNIDAZOLE  IVPB      metroNIDAZOLE  IVPB 500 milliGRAM(s) IV Intermittent every 8 hours  pantoprazole  Injectable 40 milliGRAM(s) IV Push at bedtime    MEDICATIONS  (PRN):  sodium chloride 0.9% lock flush 10 milliLiter(s) IV Push every 1 hour PRN Pre/post blood products, medications, blood draw, and to maintain line patency      PHYSICAL EXAM:  GENERAL: AAOx0, hard to arouse, does not respond to verbal stimuli,   HEAD:  Atraumatic, Normocephalic  EYES: EOMI, PERRLA, conjunctiva and sclera clear  NECK: Supple, No JVD  CHEST/LUNG: clear to auscultation  HEART: Regular rate and rhythm; s1+ s2+  ABDOMEN: RUQ tenderness, right flank drain with dark draingage  EXTREMITIES:, left lower extremity pitting edema improving, S/p R AKA stump no signs of infection   NEUROLOGY: lethargic, responds to physical stimuli  SKIN: No rashes or lesions      Care Discussed with Consultants/Other Providers [ x] YES  [ ] NO Patient is a 76y old  Female who presents with a chief complaint of hypotension, altered mental status (2021 14:47)      INTERVAL HPI/OVERNIGHT EVENTS:  Patient admitted due to sepsis 2/2 acute hector vs UTI, urine cultures negative. Patient s/p cholecystostomy yesterday with drainage of 350 cc of bile since. Patient is now off pressors, acidosis improved, off bicarb drip. Patient remains lethargic this AM, however is responsive to physical stimuli. Suspect is due to gabapentin in the setting of VANESSA      ICU Vital Signs Last 24 Hrs  T(C): 37.2 (2021 07:00), Max: 37.2 (2021 07:00)  T(F): 98.9 (2021 07:00), Max: 98.9 (2021 07:00)  HR: 112 (2021 08:00) (89 - 119)  BP: 166/67 (2021 08:00) (111/49 - 166/67)  BP(mean): 91 (2021 08:00) (58 - 91)  ABP: --  ABP(mean): --  RR: 21 (2021 08:00) (14 - 33)  SpO2: 97% (2021 08:00) (90% - 99%)    I&O's Summary    2021 07:01  -  2021 07:00  --------------------------------------------------------  IN: 1312 mL / OUT: 1410 mL / NET: -98 mL          LABS:                        10.1   8.77  )-----------( 174      ( 2021 04:41 )             29.8     06-30    145  |  112<H>  |  58<H>  ----------------------------<  116<H>  3.5   |  20<L>  |  5.08<H>    Ca    7.7<L>      2021 04:41  Phos  5.1     06-30  Mg     2.0     06-30    TPro  6.2  /  Alb  2.2<L>  /  TBili  0.7  /  DBili  x   /  AST  39  /  ALT  39  /  AlkPhos  167<H>  -30    PT/INR - ( 2021 05:31 )   PT: 21.4 sec;   INR: 1.85 ratio         PTT - ( 2021 05:31 )  PTT:38.6 sec  Urinalysis Basic - ( 2021 10:46 )    Color: Yellow / Appearance: Clear / S.015 / pH: x  Gluc: x / Ketone: Trace  / Bili: Negative / Urobili: Negative   Blood: x / Protein: 100 / Nitrite: Negative   Leuk Esterase: Small / RBC: 5-10 /HPF / WBC 26-50 /HPF   Sq Epi: x / Non Sq Epi: Few /HPF / Bacteria: Trace /HPF      CAPILLARY BLOOD GLUCOSE      POCT Blood Glucose.: 191 mg/dL (2021 11:25)    ABG - ( 2021 04:46 )  pH, Arterial: 7.28  pH, Blood: x     /  pCO2: 30    /  pO2: 118   / HCO3: 14    / Base Excess: -11.3 /  SaO2: NR                  RADIOLOGY & ADDITIONAL TESTS:    Consultant(s) Notes Reviewed:  [x ] YES  [ ] NO    MEDICATIONS  (STANDING):  cefepime   IVPB      cefepime   IVPB 1000 milliGRAM(s) IV Intermittent every 24 hours  chlorhexidine 2% Cloths 1 Application(s) Topical <User Schedule>  dextrose 5% + sodium chloride 0.9%. 1000 milliLiter(s) (50 mL/Hr) IV Continuous <Continuous>  heparin   Injectable 5000 Unit(s) SubCutaneous every 8 hours  metroNIDAZOLE  IVPB      metroNIDAZOLE  IVPB 500 milliGRAM(s) IV Intermittent every 8 hours  pantoprazole  Injectable 40 milliGRAM(s) IV Push at bedtime    MEDICATIONS  (PRN):  sodium chloride 0.9% lock flush 10 milliLiter(s) IV Push every 1 hour PRN Pre/post blood products, medications, blood draw, and to maintain line patency      PHYSICAL EXAM:  GENERAL: AAOx0, hard to arouse, does not respond to verbal stimuli,   HEAD:  Atraumatic, Normocephalic  EYES: EOMI, PERRLA, conjunctiva and sclera clear  NECK: Supple, No JVD  CHEST/LUNG: clear to auscultation  HEART: Regular rate and rhythm; s1+ s2+  ABDOMEN: RUQ tenderness, right flank drain with dark draingage  EXTREMITIES:, left lower extremity pitting edema improving, S/p R AKA stump no signs of infection   NEUROLOGY: lethargic, responds to physical stimuli  SKIN: No rashes or lesions      Care Discussed with Consultants/Other Providers [ x] YES  [ ] NO

## 2021-07-01 NOTE — PROGRESS NOTE ADULT - ASSESSMENT
Patient is a 76y Female whom was sent to ED from the nursing home  to the hospital with hypotension and confusion.  Has H/O PVD s/p RT AKA, COPD, HTN, CABG x1. Blood pressure in the nursing home was 62/32.  In ED noted to have severe hypotension, confused  work-up revealed acute kidnye injury SCR 6 ( normal SCR at baseline).  Also with severe mixed gap and non-gap metabolic acidosis.  s/p isotonic fluid bolus and later started on NAHCO3 gtt as well as vasopressors and broad spectrum antibiotics  CT abdomen revealed acute cholecystitis, pancreatic head mass. No hydronephrosis  urine output improved .  BP better  s/p cholecystotomy    HYPERNATREMIA.      # VANESSA sec to established ATN  from septic shock.  still requiring pressors off and off.   on broad spectrum ABX.  cont ABX  IV pressors   non-oliguric.  bicarb stable.  normokalemia  no acute need for renal replacement therapy     Recs:  once BP better, hypotonic fluids like D5 1/2 NS   rpt BMP daily.

## 2021-07-01 NOTE — PROGRESS NOTE ADULT - ATTENDING COMMENTS
IMP: This is a  76 year old woman with  PVD, right AKA (S/P fem pop bypass), PAF, COPD, bipolar disorder, hypertension and PSHx of S/P CABG x1 brought into the ED via EMS from [facility] for unresponsiveness and hypotension being admitted to ICU for sepsis likely from UTI and/ Cholecystitis and Acute renal failure.           Assessment:   1. Encephalopathy   2. Severe Sepsis   3. UTI   4. Acute cholecystitis   5. Acute renal failure   6. Anion and non anion gap metabolic acidosis   7. Billary ductal dilation - also pancreatic ductal dilation  8. Paroxysmal Afib  9. Chronic Anemia   10. Dementia     Plan  - Monitor hemodynamics, vasopressor support to maintain MAP  - Trend troponin, obtain Echo  - Cardiology consult  - Broad spectrum antibiotics   - Surgery and GI consult  - Plan for cholecystostomy tube today  - Follow up cultures  - Encephalopathy likely multifactorial - as patient on psych medications including gabapentin   - CT head noted  - Seizure and aspiration precautions  - NPO for now  - Monitor urine output, slowly improving  - D/c sodium bicarb infusion   - Repeat labs in PM  - No hydronephrosis on CT abdomen  - Will need work up for possible pancreatic mass   - Resume eliquis   - DVT and stress ulcer prophylaxis  - PT evaluation  - MOLST reviewed, full code .

## 2021-07-01 NOTE — PROGRESS NOTE ADULT - SUBJECTIVE AND OBJECTIVE BOX
HPI:  76 year old female from [facility] with PMHx of PVD, right AKA (S/P fem pop bypass), PAF, COPD, bipolar disorder, hypertension and PSHx of S/P CABG x1 brought into the ED via EMS from [facility] for unresponsiveness and hypotension. Per NH supervisor Mr. Gaspar () Pt was found unresponsive today morning around 8 am, pt was fine last night, pt did not have fever, difficulty breathing, pt was only receptive to painful stimuli and on evaluation pt's blood pressure was low per papers Pt's bp was 62/38. Pt was sent to the hospital for further evaluation. Baseline mental status alert, oriented and follows instruction, non ambulatory at baseline.   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 Pt was given a dose of vancomycin and rocephine. Pt's blood pressure improve to 97/58, hr 109 bpm, ECG showing sinus tachy, RBBB, LAFB, Bifacicular block.    (28 Jun 2021 14:58)      Patient is a 76y old  Female who presents with a chief complaint of hypotension, altered mental status (30 Jun 2021 14:35)      INTERVAL HPI/OVERNIGHT EVENTS:  T(C): 36.8 (07-01-21 @ 11:00), Max: 36.8 (07-01-21 @ 11:00)  HR: 99 (07-01-21 @ 11:00) (85 - 128)  BP: 138/60 (07-01-21 @ 11:00) (57/28 - 154/96)  RR: 19 (07-01-21 @ 11:00) (13 - 26)  SpO2: 94% (07-01-21 @ 11:00) (88% - 99%)  Wt(kg): --  I&O's Summary    30 Jun 2021 07:01  -  01 Jul 2021 07:00  --------------------------------------------------------  IN: 700 mL / OUT: 1470 mL / NET: -770 mL    01 Jul 2021 07:01  -  01 Jul 2021 13:15  --------------------------------------------------------  IN: 0 mL / OUT: 45 mL / NET: -45 mL        REVIEW OF SYSTEMS: denies fever, chills, SOB, palpitations, chest pain, abdominal pain, nausea, vomitting, diarrhea, constipation, dizziness    MEDICATIONS  (STANDING):  cefepime   IVPB      cefepime   IVPB 1000 milliGRAM(s) IV Intermittent every 24 hours  chlorhexidine 2% Cloths 1 Application(s) Topical <User Schedule>  dextrose 5% + sodium chloride 0.9%. 1000 milliLiter(s) (50 mL/Hr) IV Continuous <Continuous>  heparin   Injectable 5000 Unit(s) SubCutaneous every 8 hours  lactated ringers. 1000 milliLiter(s) (75 mL/Hr) IV Continuous <Continuous>  metroNIDAZOLE  IVPB      metroNIDAZOLE  IVPB 500 milliGRAM(s) IV Intermittent every 8 hours  mupirocin 2% Ointment 1 Application(s) Both Nostrils two times a day  pantoprazole  Injectable 40 milliGRAM(s) IV Push at bedtime  senna 2 Tablet(s) Oral at bedtime  sodium chloride 0.9% Bolus 500 milliLiter(s) IV Bolus once    MEDICATIONS  (PRN):  sodium chloride 0.9% lock flush 10 milliLiter(s) IV Push every 1 hour PRN Pre/post blood products, medications, blood draw, and to maintain line patency      PHYSICAL EXAM:  GENERAL: NAD, well-groomed, well-developed  HEAD:  Atraumatic, Normocephalic  EYES: EOMI, PERRLA, conjunctiva and sclera clear  ENMT: No tonsillar erythema, exudates, or enlargement; Moist mucous membranes, Good dentition, No lesions  NECK: Supple, No JVD, Normal thyroid  NERVOUS SYSTEM:  Alert & Oriented X3, Good concentration; Motor Strength 5/5 B/L upper and lower extremities; DTRs 2+ intact and symmetric  CHEST/LUNG: Clear to percussion bilaterally; No rales, rhonchi, wheezing, or rubs  HEART: Regular rate and rhythm; No murmurs, rubs, or gallops  ABDOMEN: Soft, Nontender, Nondistended; Bowel sounds present  EXTREMITIES:  2+ Peripheral Pulses, No clubbing, cyanosis, or edema  LYMPH: No lymphadenopathy noted  SKIN: No rashes or lesions  LABS:                        9.3    8.26  )-----------( 178      ( 01 Jul 2021 05:09 )             28.2     07-01    147<H>  |  115<H>  |  56<H>  ----------------------------<  103<H>  3.5   |  20<L>  |  4.46<H>    Ca    7.9<L>      01 Jul 2021 05:09  Phos  4.6     07-01  Mg     1.9     07-01    TPro  5.9<L>  /  Alb  2.0<L>  /  TBili  0.7  /  DBili  x   /  AST  25  /  ALT  28  /  AlkPhos  132<H>  07-01        CAPILLARY BLOOD GLUCOSE      POCT Blood Glucose.: 98 mg/dL (01 Jul 2021 11:23)  POCT Blood Glucose.: 134 mg/dL (30 Jun 2021 16:01)

## 2021-07-01 NOTE — PROGRESS NOTE ADULT - ASSESSMENT
Patient is 76 year old F Pmhx of PVD, right AKA (S/P fem pop bypass), PAF, COPD, bipolar disorder, hypertension and PSHx of S/P CABG x1 brought into the ED via EMS from [facility] for unresponsiveness and hypotension being admitted to ICU for sepsis likely from UTI and/ Cholecystitis and Acute renal failure.     Assessment:   Encephalopathy   Sepsis from UTI and/ Acute cholecystitis   Acute renal failure   Anion and non anion gap metabolic acidosis   Pancreatic head mass     PAF  Anemia     Plan:    =====================[CNS ]==============================  # Encephalopathy likely infectious (UTI/cholecysistis) vs metabolic (uremia) vs medication induced(gabapentin)   - Alert and oriented at baseline, however now lethargic  - will hold all home psych meds for now   - Sepsis improved, uremia not significantly elevated to cause AMS. May be due to gabapentin use in the setting of VANESSA  - WIll hold off imaging given AMS and concern for airway protection if lays down flat on MRI    =====================[CVS ]===============================  # Septic shock  - Now improved, off vasopressors  - DUe to acute hector, s/p cholecystostomy 9/30  - Continue cefepime and metronidazole  - urine cultures negative  - blood and fluid cultures negative to date    # PAF   -holding eliquis, resume DVT prophylaxis  - follow up surgery on resuming anticoagulation    # CAD s/p CABG   - Holding aspirin and statin given AMS and NPO status       =====================[RESP ]==============================  # B/l patchy opacity with consolidation/ atelectasis, with b/l crackles R>L  - Started on cefepime/Flagyl    =====================[ GI ]================================  # NPO except meds   ppi for GI ppx   Speech and Swallow follow up    # Cholecystitis w/ dilated bile duct 1.7 cm, and concern for pancreatic head mass   - Will start on cefepime and flagyl to cover for gram negative and anaerobic coverage   - f/u blood cx  - S/P cholecystostomy drain  - Patient will require MRCP w/o contrast to further eval if patient's biliary dilatation is due to a pancreatic mass, and may benefit from ERCP afterwards.  - Given patient's altered mental status and concerns for airway protection, will hold MRI for now     ====================[ RENAL ]=============================   # Acute renal failure, BUN/Cr 78/6.16 was normal in 4/2021  - FENa of 1.5, intrinsic  Urine output improving  component of ATN  creatinine improving  - Monitor urine output  - No need for dialysis      # Metabolic acidosis w/ anion gap, anion gap now improved  - likely from uremia in the setting of VANESSA  - Improved, now discontinued off bicarb gtt      =====================[ ID ]================================  # UTI and or cholecystitis   - s/p vanc and rocephine in the ED   - started cefepime and flagyl for now   - f/u Bcx, Ucx and MRSA   - s/p cholecystostomy, all cultures negative to date    ===================[ HEME/ONC ]===========================  # Anemia   - Hb and Plt stable   - monitor cbc daily     =====================[ ENDO ]=============================  # No history of DM  - target CBG < 180  - FS q6 while NPO  - Start diet when possible- speech and swallow pending    ==================[ PROPHYLAXIS ]==========================   # Dvt : resumed heparin subq  # Gi : Protonix     ====================[ DISPO ]==============================   - Monitor in ICU     ===================[ PROGNOSIS ]===========================  - Guarded       Patient is 76 year old F Pmhx of PVD, right AKA (S/P fem pop bypass), PAF, COPD, bipolar disorder, hypertension and PSHx of S/P CABG x1 brought into the ED via EMS from [facility] for unresponsiveness and hypotension being admitted to ICU for sepsis likely from UTI and/ Cholecystitis and Acute renal failure.     Assessment:   Encephalopathy   Sepsis from UTI and/ Acute cholecystitis   Acute renal failure   Anion and non anion gap metabolic acidosis   Pancreatic head mass     PAF  Anemia     Plan:    =====================[CNS ]==============================  # Encephalopathy likely infectious (UTI/cholecysistis) vs metabolic (uremia) vs medication induced(gabapentin)   - Alert and oriented at baseline, however now lethargic  - will hold all home psych meds for now   - Sepsis improved, uremia not significantly elevated to cause AMS. May be due to gabapentin use in the setting of VANESSA  - WIll hold off imaging given AMS and concern for airway protection if lays down flat on MRI    =====================[CVS ]===============================  # Septic shock  - Now improved, off vasopressors  - DUe to acute hector, s/p cholecystostomy 9/30  - Continue cefepime and metronidazole  - urine cultures negative  - blood and fluid cultures negative to date  - Add LR 75    # PAF   -holding eliquis, resume DVT prophylaxis  -eliquis restarted  - follow up surgery on resuming anticoagulation    # CAD s/p CABG   - Holding aspirin and statin given AMS and NPO status       =====================[RESP ]==============================  # B/l patchy opacity with consolidation/ atelectasis, with b/l crackles R>L  - Started on cefepime/Flagyl    =====================[ GI ]================================  # NPO except meds   ppi for GI ppx   Speech and Swallow follow up    # Cholecystitis w/ dilated bile duct 1.7 cm, and concern for pancreatic head mass   - Will start on cefepime and flagyl to cover for gram negative and anaerobic coverage   - f/u blood cx  - S/P cholecystostomy drain  - Patient will require MRCP w/o contrast to further eval if patient's biliary dilatation is due to a pancreatic mass, and may benefit from ERCP afterwards.  - Given patient's altered mental status and concerns for airway protection, will hold MRI for now     ====================[ RENAL ]=============================   # Acute renal failure, BUN/Cr 78/6.16 was normal in 4/2021  - FENa of 1.5, intrinsic  Urine output improving  component of ATN  creatinine improving  - Monitor urine output  - No need for dialysis      # Metabolic acidosis w/ anion gap, anion gap now improved  - likely from uremia in the setting of VANESSA  - Improved, now discontinued off bicarb gtt      =====================[ ID ]================================  # UTI and or cholecystitis   - s/p vanc and rocephine in the ED   - started cefepime and flagyl for now   - f/u Bcx, Ucx and MRSA   - s/p cholecystostomy, all cultures negative to date    ===================[ HEME/ONC ]===========================  # Anemia   - Hb and Plt stable   - monitor cbc daily     =====================[ ENDO ]=============================  # No history of DM  - target CBG < 180  - FS q6 while NPO  - Start diet when possible- speech and swallow pending    ==================[ PROPHYLAXIS ]==========================   # Dvt : resumed heparin subq  # Gi : Protonix     ====================[ DISPO ]==============================   - Monitor in ICU     ===================[ PROGNOSIS ]===========================  - Guarded       Patient is 76 year old F Pmhx of PVD, right AKA (S/P fem pop bypass), PAF, COPD, bipolar disorder, hypertension and PSHx of S/P CABG x1 brought into the ED via EMS from [facility] for unresponsiveness and hypotension being admitted to ICU for sepsis likely from UTI and/ Cholecystitis and Acute renal failure.     Assessment:   Encephalopathy   Sepsis from UTI and/ Acute cholecystitis   Acute renal failure   Anion and non anion gap metabolic acidosis   Pancreatic head mass     PAF  Anemia     Plan:    =====================[CNS ]==============================  # Encephalopathy likely infectious (UTI/cholecysistis) vs metabolic (uremia) vs medication induced(gabapentin)   - Alert and oriented at baseline, however now lethargic  - will hold all home psych meds for now   - Sepsis improved, uremia not significantly elevated to cause AMS. May be due to gabapentin use in the setting of VANESSA  - WIll hold off imaging given AMS and concern for airway protection if lays down flat on MRI    =====================[CVS ]===============================  # Septic shock  - Now improved, off vasopressors  - Due to acute hector, s/p cholecystostomy 9/30  - Continue cefepime and metronidazole  - urine cultures negative  - blood and fluid cultures negative to date  - Add LR 75    # PAF   -holding eliquis, resume DVT prophylaxis  -eliquis restarted  - follow up surgery on resuming anticoagulation    # CAD s/p CABG   - Holding aspirin and statin given AMS and NPO status       =====================[RESP ]==============================  # B/l patchy opacity with consolidation/ atelectasis, with b/l crackles R>L  - Started on cefepime/Flagyl    =====================[ GI ]================================  # NPO except meds   ppi for GI ppx   Speech and Swallow follow up    # Cholecystitis w/ dilated bile duct 1.7 cm, and concern for pancreatic head mass   - Will start on cefepime and flagyl to cover for gram negative and anaerobic coverage   - f/u blood cx  - S/P cholecystostomy drain  - Patient will require MRCP w/o contrast to further eval if patient's biliary dilatation is due to a pancreatic mass, and may benefit from ERCP afterwards.  - Given patient's altered mental status and concerns for airway protection, will hold MRI for now     ====================[ RENAL ]=============================   # Acute renal failure, BUN/Cr 78/6.16 was normal in 4/2021  - FENa of 1.5, intrinsic  Urine output improving  component of ATN  creatinine improving  - Monitor urine output  - No need for dialysis      # Metabolic acidosis w/ anion gap, anion gap now improved  - likely from uremia in the setting of VANESSA  - Improved, now discontinued off bicarb gtt      =====================[ ID ]================================  # UTI and or cholecystitis   - s/p vanc and rocephine in the ED   - started cefepime and flagyl for now   - f/u Bcx, Ucx and MRSA   - s/p cholecystostomy, all cultures negative to date    ===================[ HEME/ONC ]===========================  # Anemia   - Hb and Plt stable   - monitor cbc daily     =====================[ ENDO ]=============================  # No history of DM  - target CBG < 180  - FS q6 while NPO  - Start diet when possible- speech and swallow pending    ==================[ PROPHYLAXIS ]==========================   # Dvt : resumed heparin subq  # Gi : Protonix     ====================[ DISPO ]==============================   - Monitor in ICU     ===================[ PROGNOSIS ]===========================  - Guarded       Patient is 76 year old F Pmhx of PVD, right AKA (S/P fem pop bypass), PAF, COPD, bipolar disorder, hypertension and PSHx of S/P CABG x1 brought into the ED via EMS from [facility] for unresponsiveness and hypotension being admitted to ICU for sepsis likely from UTI and/ Cholecystitis and Acute renal failure.     Assessment:   Encephalopathy   Sepsis from UTI and/ Acute cholecystitis   Acute renal failure   Anion and non anion gap metabolic acidosis   Pancreatic head mass     PAF  Anemia     Plan:    =====================[CNS ]==============================  # Encephalopathy likely infectious (UTI/cholecysistis) vs metabolic (uremia) vs medication induced(gabapentin)   - Alert and oriented at baseline, however now lethargic  - will hold all home psych meds for now   - Sepsis improved, uremia not significantly elevated to cause AMS. May be due to gabapentin use in the setting of VANESSA  - WIll hold off imaging given AMS and concern for airway protection if lays down flat on MRI  - consult PT    =====================[CVS ]===============================  # Septic shock  - Now improved, off vasopressors  - Due to acute hector, s/p cholecystostomy 9/30  - Continue cefepime and metronidazole  - urine cultures negative  - blood and fluid cultures negative to date  - Add 75 ml/hour LR    # PAF   - DVT prophylaxis  - Cleared to restart eliquis, per surgery    # CAD s/p CABG   - Holding aspirin and statin given AMS and NPO status       =====================[RESP ]==============================  # B/l patchy opacity with consolidation/ atelectasis, with b/l crackles R>L  - Started on cefepime/Flagyl    =====================[ GI ]================================  # NPO except meds   ppi for GI ppx   Speech and Swallow follow up    # Cholecystitis w/ dilated bile duct 1.7 cm, and concern for pancreatic head mass   - Will start on cefepime and flagyl to cover for gram negative and anaerobic coverage   - f/u blood cx  - S/P cholecystostomy drain  - Patient will require MRCP w/o contrast to further eval if patient's biliary dilatation is due to a pancreatic mass, and may benefit from ERCP afterwards.  - Given patient's altered mental status and concerns for airway protection, will hold MRI for now     ====================[ RENAL ]=============================   # Acute renal failure, BUN/Cr 78/6.16 was normal in 4/2021  - FENa of 1.5, intrinsic  Urine output improving  component of ATN  creatinine improving  - Monitor urine output  - No need for dialysis      # Metabolic acidosis w/ anion gap, anion gap now improved  - likely from uremia in the setting of VANESSA  - Improved, now discontinued off bicarb gtt      =====================[ ID ]================================  # UTI and or cholecystitis   - s/p vanc and rocephine in the ED   - started cefepime and flagyl for now   - f/u Bcx, Ucx and MRSA   - s/p cholecystostomy, all cultures negative to date    ===================[ HEME/ONC ]===========================  # Anemia   - Hb and Plt stable   - monitor cbc daily     =====================[ ENDO ]=============================  # No history of DM  - target CBG < 180  - FS q6 while NPO  - Start diet when possible- speech and swallow pending    ==================[ PROPHYLAXIS ]==========================   # Dvt : resumed heparin subq  # Gi : Protonix     ====================[ DISPO ]==============================   - Monitor in ICU   - Transfer to AI    ===================[ PROGNOSIS ]===========================  - Guarded       Patient is 76 year old F Pmhx of PVD, right AKA (S/P fem pop bypass), PAF, COPD, bipolar disorder, hypertension and PSHx of S/P CABG x1 brought into the ED via EMS from [facility] for unresponsiveness and hypotension being admitted to ICU for sepsis likely from UTI and/ Cholecystitis and Acute renal failure.     Assessment:   Encephalopathy   Sepsis from UTI and/ Acute cholecystitis   Acute renal failure   Anion and non anion gap metabolic acidosis   Pancreatic head mass     PAF  Anemia     Plan:    =====================[CNS ]==============================  # Encephalopathy likely infectious (UTI/cholecysistis) vs metabolic (uremia) vs medication induced(gabapentin)   - Alert and oriented at baseline, however now lethargic  - will hold all home psych meds for now   - Sepsis improved, uremia not significantly elevated to cause AMS. May be due to gabapentin use in the setting of VANESSA  - WIll hold off imaging given AMS and concern for airway protection if lays down flat on MRI  - consult PT    =====================[CVS ]===============================  # Septic shock  - Now improved, off vasopressors  - Due to acute hector, s/p cholecystostomy 9/30  - Continue cefepime and metronidazole  - urine cultures negative  - blood and fluid cultures negative to date  - Add 75 ml/hour LR    # PAF   - DVT prophylaxis  - Cleared to restart eliquis, per surgery    # CAD s/p CABG   - Holding aspirin and statin given AMS and NPO status       =====================[RESP ]==============================  # B/l patchy opacity with consolidation/ atelectasis, with b/l crackles R>L  - Started on cefepime/Flagyl    =====================[ GI ]================================  # NPO except meds   ppi for GI ppx   Speech and Swallow follow up    # Cholecystitis w/ dilated bile duct 1.7 cm, and concern for pancreatic head mass   - Will start on cefepime and flagyl to cover for gram negative and anaerobic coverage   - f/u blood cx  - S/P cholecystostomy drain, no growth on culture  - Patient will require MRCP w/o contrast to further eval if patient's biliary dilatation is due to a pancreatic mass, and may benefit from ERCP afterwards.  - Given patient's altered mental status and concerns for airway protection, will hold MRI for now     ====================[ RENAL ]=============================   # Acute renal failure, BUN/Cr 78/6.16 was normal in 4/2021  - FENa of 1.5, intrinsic  Urine output improving  component of ATN  creatinine improving  - Monitor urine output  - No need for dialysis      # Metabolic acidosis w/ anion gap, anion gap now improved  - likely from uremia in the setting of VANESSA  - Improved, now discontinued off bicarb gtt      =====================[ ID ]================================  # UTI and or cholecystitis   - s/p vanc and rocephine in the ED   - started cefepime and flagyl for now   - f/u Bcx, Ucx and MRSA   - s/p cholecystostomy, all cultures negative to date    ===================[ HEME/ONC ]===========================  # Anemia   - Hb and Plt stable   - monitor cbc daily     =====================[ ENDO ]=============================  # No history of DM  - target CBG < 180  - FS q6 while NPO  - Start diet when possible- speech and swallow pending    ==================[ PROPHYLAXIS ]==========================   # Dvt : resumed heparin subq  # Gi : Protonix     ====================[ DISPO ]==============================   - Monitor in ICU   - Transfer to AI    ===================[ PROGNOSIS ]===========================  - Guarded

## 2021-07-01 NOTE — PROGRESS NOTE ADULT - SUBJECTIVE AND OBJECTIVE BOX
[facility] : Progress Note :: [phone], (office [phone]),   Dr Noriega / Dr Nava / Dr Granados / Dr Burrell / Dr Amish LOYD / Dr Stein / Dr Ridley / Dr Calvin louie  _____________________________________________________________________________________________    remains obtunded.    influenza virus vaccine, live, trivalent (Unknown)  PC Pen VK (Rash)  penicillin (Other; Rash)  statins (Other)  sulfa drugs (Other)  sulfamethizole (Other)    Hospital Medications:   MEDICATIONS  (STANDING):  cefepime   IVPB      cefepime   IVPB 1000 milliGRAM(s) IV Intermittent every 24 hours  chlorhexidine 2% Cloths 1 Application(s) Topical <User Schedule>  dextrose 5% + sodium chloride 0.9%. 1000 milliLiter(s) (50 mL/Hr) IV Continuous <Continuous>  heparin   Injectable 5000 Unit(s) SubCutaneous every 8 hours  lactated ringers. 1000 milliLiter(s) (75 mL/Hr) IV Continuous <Continuous>  metroNIDAZOLE  IVPB      metroNIDAZOLE  IVPB 500 milliGRAM(s) IV Intermittent every 8 hours  mupirocin 2% Ointment 1 Application(s) Both Nostrils two times a day  pantoprazole  Injectable 40 milliGRAM(s) IV Push at bedtime  senna 2 Tablet(s) Oral at bedtime  sodium chloride 0.9% Bolus 500 milliLiter(s) IV Bolus once        VITALS:  T(F): 98.3 (21 @ 11:00), Max: 98.3 (21 @ 11:00)  HR: 99 (21 @ 11:00)  BP: 138/60 (21 @ 11:00)  RR: 19 (21 @ 11:00)  SpO2: 94% (21 @ 11:00)  Wt(kg): --     @ : @ 07:00  --------------------------------------------------------  IN: 700 mL / OUT: 1470 mL / NET: -770 mL     @ 07: @ 15:28  --------------------------------------------------------  IN: 0 mL / OUT: 45 mL / NET: -45 mL        PHYSICAL EXAM:  Constitutional: NAD  HEENT: anicteric sclera, oropharynx clear.  Neck: No JVD  Respiratory: CTAB, no wheezes, rales or rhonchi  Cardiovascular: S1, S2, RRR  Gastrointestinal: BS+, soft, NT/ND  Extremities: No peripheral edema  Neurological: A/O x 3, no focal deficits  Psychiatric: Normal mood, normal affect  : No CVA tenderness.  alicia+       LABS:      147<H>  |  115<H>  |  56<H>  ----------------------------<  103<H>  3.5   |  20<L>  |  4.46<H>    Ca    7.9<L>      2021 05:09  Phos  4.6       Mg     1.9         TPro  5.9<L>  /  Alb  2.0<L>  /  TBili  0.7  /  DBili      /  AST  25  /  ALT  28  /  AlkPhos  132<H>      Creatinine Trend: 4.46 <--, 5.08 <--, 4.94 <--, 5.02 <--, 5.24 <--, 5.31 <--, 5.81 <--, 6.16 <--                        9.3    8.26  )-----------( 178      ( 2021 05:09 )             28.2     Urine Studies:  Urinalysis Basic - ( 2021 10:46 )    Color: Yellow / Appearance: Clear / S.015 / pH:   Gluc:  / Ketone: Trace  / Bili: Negative / Urobili: Negative   Blood:  / Protein: 100 / Nitrite: Negative   Leuk Esterase: Small / RBC: 5-10 /HPF / WBC 26-50 /HPF   Sq Epi:  / Non Sq Epi: Few /HPF / Bacteria: Trace /HPF      Osmolality, Random Urine: 256 mos/kg ( @ 00:52)  Sodium, Random Urine: 31 mmol/L ( @ 00:52)  Creatinine, Random Urine: 87 mg/dL ( @ 00:52)    RADIOLOGY & ADDITIONAL STUDIES:

## 2021-07-01 NOTE — PROGRESS NOTE ADULT - SUBJECTIVE AND OBJECTIVE BOX
INTERVAL HPI/OVERNIGHT EVENTS: ***    PRESSORS: [ ] YES [ ] NO  WHICH:      Antimicrobial:  cefepime   IVPB      cefepime   IVPB 1000 milliGRAM(s) IV Intermittent every 24 hours  metroNIDAZOLE  IVPB      metroNIDAZOLE  IVPB 500 milliGRAM(s) IV Intermittent every 8 hours    Cardiovascular:    Pulmonary:    Hematalogic:  heparin   Injectable 5000 Unit(s) SubCutaneous every 8 hours    Other:  chlorhexidine 2% Cloths 1 Application(s) Topical <User Schedule>  dextrose 5% + sodium chloride 0.9%. 1000 milliLiter(s) IV Continuous <Continuous>  mupirocin 2% Ointment 1 Application(s) Both Nostrils two times a day  pantoprazole  Injectable 40 milliGRAM(s) IV Push at bedtime  sodium chloride 0.9% Bolus 500 milliLiter(s) IV Bolus once  sodium chloride 0.9% lock flush 10 milliLiter(s) IV Push every 1 hour PRN    cefepime   IVPB      cefepime   IVPB 1000 milliGRAM(s) IV Intermittent every 24 hours  chlorhexidine 2% Cloths 1 Application(s) Topical <User Schedule>  dextrose 5% + sodium chloride 0.9%. 1000 milliLiter(s) IV Continuous <Continuous>  heparin   Injectable 5000 Unit(s) SubCutaneous every 8 hours  metroNIDAZOLE  IVPB      metroNIDAZOLE  IVPB 500 milliGRAM(s) IV Intermittent every 8 hours  mupirocin 2% Ointment 1 Application(s) Both Nostrils two times a day  pantoprazole  Injectable 40 milliGRAM(s) IV Push at bedtime  sodium chloride 0.9% Bolus 500 milliLiter(s) IV Bolus once  sodium chloride 0.9% lock flush 10 milliLiter(s) IV Push every 1 hour PRN    Drug Dosing Weight  Height (cm): 170.2 (28 Jun 2021 10:15)  Weight (kg): 66.9 (28 Jun 2021 16:29)  BMI (kg/m2): 23.1 (28 Jun 2021 16:29)  BSA (m2): 1.78 (28 Jun 2021 16:29)    CENTRAL LINE: [ ] YES [ ] NO  LOCATION:         BIANCHI: [ ] YES [ ] NO          A-LINE:  [ ] YES [ ] NO  LOCATION:             ICU Vital Signs Last 24 Hrs  T(C): 36.6 (01 Jul 2021 05:00), Max: 36.7 (30 Jun 2021 15:34)  T(F): 97.8 (01 Jul 2021 05:00), Max: 98 (30 Jun 2021 15:34)  HR: 116 (01 Jul 2021 08:00) (85 - 128)  BP: 139/87 (01 Jul 2021 08:00) (47/27 - 154/96)  BP(mean): 100 (01 Jul 2021 08:00) (31 - 111)  ABP: --  ABP(mean): --  RR: 26 (01 Jul 2021 08:00) (13 - 26)  SpO2: 96% (01 Jul 2021 08:00) (88% - 99%)            06-30 @ 07:01  -  07-01 @ 07:00  --------------------------------------------------------  IN: 700 mL / OUT: 1470 mL / NET: -770 mL            REVIEW OF SYSTEMS:    CONSTITUTIONAL: No weakness, fevers or chills  NECK: No pain or stiffness  RESPIRATORY: No cough, wheezing, hemoptysis; No shortness of breath  CARDIOVASCULAR: No chest pain or palpitations  GASTROINTESTINAL: No abdominal or epigastric pain. No nausea, vomiting, No diarrhea or constipation. No melena or hematochezia.  GENITOURINARY: No dysuria, frequency or hematuria  NEUROLOGICAL: No numbness or weakness  All other review of systems is negative unless indicated above      PHYSICAL EXAM:    GENERAL: NAD, well-groomed, well-developed  EYES: EOMI, PERRLA,   NECK: Supple, No JVD; Normal thyroid; Trachea midline  NERVOUS SYSTEM:  Alert & Oriented X3,  Motor Strength 5/5 B/L upper and lower extremities; DTRs 2+ intact and symmetric  CHEST/LUNG: No rales, rhonchi, wheezing   HEART: Regular rate and rhythm; No murmurs,   ABDOMEN: Soft, Nontender, Nondistended; Bowel sounds present  EXTREMITIES:  2+ Peripheral Pulses, No clubbing, cyanosis, or edema        LABS:  CBC Full  -  ( 01 Jul 2021 05:09 )  WBC Count : 8.26 K/uL  RBC Count : 3.15 M/uL  Hemoglobin : 9.3 g/dL  Hematocrit : 28.2 %  Platelet Count - Automated : 178 K/uL  Mean Cell Volume : 89.5 fl  Mean Cell Hemoglobin : 29.5 pg  Mean Cell Hemoglobin Concentration : 33.0 gm/dL  Auto Neutrophil # : x  Auto Lymphocyte # : x  Auto Monocyte # : x  Auto Eosinophil # : x  Auto Basophil # : x  Auto Neutrophil % : x  Auto Lymphocyte % : x  Auto Monocyte % : x  Auto Eosinophil % : x  Auto Basophil % : x    07-01    147<H>  |  115<H>  |  56<H>  ----------------------------<  103<H>  3.5   |  20<L>  |  4.46<H>    Ca    7.9<L>      01 Jul 2021 05:09  Phos  4.6     07-01  Mg     1.9     07-01    TPro  5.9<L>  /  Alb  2.0<L>  /  TBili  0.7  /  DBili  x   /  AST  25  /  ALT  28  /  AlkPhos  132<H>  07-01        Culture Results:   No growth (06-29 @ 21:59)  Culture Results:   <10,000 CFU/mL Normal Urogenital Magda (06-28 @ 18:43)  Culture Results:   No growth to date. (06-28 @ 13:03)  Culture Results:   No growth to date. (06-28 @ 13:03)      RADIOLOGY & ADDITIONAL STUDIES REVIEWED:  ***    [ ]GOALS OF CARE DISCUSSION WITH PATIENT/FAMILY/PROXY:    CRITICAL CARE TIME SPENT: 35 minutes INTERVAL HPI/OVERNIGHT EVENTS: Patient is off pressors, BP decreased to 67/28, 500 ml NS bolus given, BP stable since    PRESSORS: [ ] YES [ ] NO  WHICH:      Antimicrobial:  cefepime   IVPB      cefepime   IVPB 1000 milliGRAM(s) IV Intermittent every 24 hours  metroNIDAZOLE  IVPB      metroNIDAZOLE  IVPB 500 milliGRAM(s) IV Intermittent every 8 hours    Cardiovascular:    Pulmonary:    Hematalogic:  heparin   Injectable 5000 Unit(s) SubCutaneous every 8 hours    Other:  chlorhexidine 2% Cloths 1 Application(s) Topical <User Schedule>  dextrose 5% + sodium chloride 0.9%. 1000 milliLiter(s) IV Continuous <Continuous>  mupirocin 2% Ointment 1 Application(s) Both Nostrils two times a day  pantoprazole  Injectable 40 milliGRAM(s) IV Push at bedtime  sodium chloride 0.9% Bolus 500 milliLiter(s) IV Bolus once  sodium chloride 0.9% lock flush 10 milliLiter(s) IV Push every 1 hour PRN    cefepime   IVPB      cefepime   IVPB 1000 milliGRAM(s) IV Intermittent every 24 hours  chlorhexidine 2% Cloths 1 Application(s) Topical <User Schedule>  dextrose 5% + sodium chloride 0.9%. 1000 milliLiter(s) IV Continuous <Continuous>  heparin   Injectable 5000 Unit(s) SubCutaneous every 8 hours  metroNIDAZOLE  IVPB      metroNIDAZOLE  IVPB 500 milliGRAM(s) IV Intermittent every 8 hours  mupirocin 2% Ointment 1 Application(s) Both Nostrils two times a day  pantoprazole  Injectable 40 milliGRAM(s) IV Push at bedtime  sodium chloride 0.9% Bolus 500 milliLiter(s) IV Bolus once  sodium chloride 0.9% lock flush 10 milliLiter(s) IV Push every 1 hour PRN    Drug Dosing Weight  Height (cm): 170.2 (28 Jun 2021 10:15)  Weight (kg): 66.9 (28 Jun 2021 16:29)  BMI (kg/m2): 23.1 (28 Jun 2021 16:29)  BSA (m2): 1.78 (28 Jun 2021 16:29)    CENTRAL LINE: [ ] YES [ ] NO  LOCATION:         BIANCHI: [ ] YES [ ] NO          A-LINE:  [ ] YES [ ] NO  LOCATION:             ICU Vital Signs Last 24 Hrs  T(C): 36.6 (01 Jul 2021 05:00), Max: 36.7 (30 Jun 2021 15:34)  T(F): 97.8 (01 Jul 2021 05:00), Max: 98 (30 Jun 2021 15:34)  HR: 116 (01 Jul 2021 08:00) (85 - 128)  BP: 139/87 (01 Jul 2021 08:00) (47/27 - 154/96)  BP(mean): 100 (01 Jul 2021 08:00) (31 - 111)  ABP: --  ABP(mean): --  RR: 26 (01 Jul 2021 08:00) (13 - 26)  SpO2: 96% (01 Jul 2021 08:00) (88% - 99%)            06-30 @ 07:01  -  07-01 @ 07:00  --------------------------------------------------------  IN: 700 mL / OUT: 1470 mL / NET: -770 mL            REVIEW OF SYSTEMS:    CONSTITUTIONAL: No weakness, fevers or chills  NECK: No pain or stiffness  RESPIRATORY: No cough, wheezing, hemoptysis; No shortness of breath  CARDIOVASCULAR: No chest pain or palpitations  GASTROINTESTINAL: No abdominal or epigastric pain. No nausea, vomiting, No diarrhea or constipation. No melena or hematochezia.  GENITOURINARY: No dysuria, frequency or hematuria  NEUROLOGICAL: No numbness or weakness  All other review of systems is negative unless indicated above      PHYSICAL EXAM:    GENERAL: NAD, well-groomed, well-developed  EYES: EOMI, PERRLA,   NECK: Supple, No JVD; Normal thyroid; Trachea midline  NERVOUS SYSTEM:  Alert & Oriented X3,  Motor Strength 5/5 B/L upper and lower extremities; DTRs 2+ intact and symmetric  CHEST/LUNG: No rales, rhonchi, wheezing   HEART: Regular rate and rhythm; No murmurs,   ABDOMEN: Soft, Nontender, Nondistended; Bowel sounds present  EXTREMITIES:  2+ Peripheral Pulses, No clubbing, cyanosis, or edema        LABS:  CBC Full  -  ( 01 Jul 2021 05:09 )  WBC Count : 8.26 K/uL  RBC Count : 3.15 M/uL  Hemoglobin : 9.3 g/dL  Hematocrit : 28.2 %  Platelet Count - Automated : 178 K/uL  Mean Cell Volume : 89.5 fl  Mean Cell Hemoglobin : 29.5 pg  Mean Cell Hemoglobin Concentration : 33.0 gm/dL  Auto Neutrophil # : x  Auto Lymphocyte # : x  Auto Monocyte # : x  Auto Eosinophil # : x  Auto Basophil # : x  Auto Neutrophil % : x  Auto Lymphocyte % : x  Auto Monocyte % : x  Auto Eosinophil % : x  Auto Basophil % : x    07-01    147<H>  |  115<H>  |  56<H>  ----------------------------<  103<H>  3.5   |  20<L>  |  4.46<H>    Ca    7.9<L>      01 Jul 2021 05:09  Phos  4.6     07-01  Mg     1.9     07-01    TPro  5.9<L>  /  Alb  2.0<L>  /  TBili  0.7  /  DBili  x   /  AST  25  /  ALT  28  /  AlkPhos  132<H>  07-01        Culture Results:   No growth (06-29 @ 21:59)  Culture Results:   <10,000 CFU/mL Normal Urogenital Magda (06-28 @ 18:43)  Culture Results:   No growth to date. (06-28 @ 13:03)  Culture Results:   No growth to date. (06-28 @ 13:03)      RADIOLOGY & ADDITIONAL STUDIES REVIEWED:  ***    [ ]GOALS OF CARE DISCUSSION WITH PATIENT/FAMILY/PROXY:    CRITICAL CARE TIME SPENT: 35 minutes INTERVAL HPI/OVERNIGHT EVENTS: Patient is off pressors, BP decreased to 67/28, 500 ml NS bolus given, BP stable since, patient lethargic AOx1    PRESSORS: [ ] YES [ ] NO  WHICH:      Antimicrobial:  cefepime   IVPB      cefepime   IVPB 1000 milliGRAM(s) IV Intermittent every 24 hours  metroNIDAZOLE  IVPB      metroNIDAZOLE  IVPB 500 milliGRAM(s) IV Intermittent every 8 hours    Cardiovascular:    Pulmonary:    Hematalogic:  heparin   Injectable 5000 Unit(s) SubCutaneous every 8 hours    Other:  chlorhexidine 2% Cloths 1 Application(s) Topical <User Schedule>  dextrose 5% + sodium chloride 0.9%. 1000 milliLiter(s) IV Continuous <Continuous>  mupirocin 2% Ointment 1 Application(s) Both Nostrils two times a day  pantoprazole  Injectable 40 milliGRAM(s) IV Push at bedtime  sodium chloride 0.9% Bolus 500 milliLiter(s) IV Bolus once  sodium chloride 0.9% lock flush 10 milliLiter(s) IV Push every 1 hour PRN    cefepime   IVPB      cefepime   IVPB 1000 milliGRAM(s) IV Intermittent every 24 hours  chlorhexidine 2% Cloths 1 Application(s) Topical <User Schedule>  dextrose 5% + sodium chloride 0.9%. 1000 milliLiter(s) IV Continuous <Continuous>  heparin   Injectable 5000 Unit(s) SubCutaneous every 8 hours  metroNIDAZOLE  IVPB      metroNIDAZOLE  IVPB 500 milliGRAM(s) IV Intermittent every 8 hours  mupirocin 2% Ointment 1 Application(s) Both Nostrils two times a day  pantoprazole  Injectable 40 milliGRAM(s) IV Push at bedtime  sodium chloride 0.9% Bolus 500 milliLiter(s) IV Bolus once  sodium chloride 0.9% lock flush 10 milliLiter(s) IV Push every 1 hour PRN    Drug Dosing Weight  Height (cm): 170.2 (28 Jun 2021 10:15)  Weight (kg): 66.9 (28 Jun 2021 16:29)  BMI (kg/m2): 23.1 (28 Jun 2021 16:29)  BSA (m2): 1.78 (28 Jun 2021 16:29)    CENTRAL LINE: [ ] YES [ ] NO  LOCATION:         BIANCHI: [ ] YES [ ] NO          A-LINE:  [ ] YES [ ] NO  LOCATION:             ICU Vital Signs Last 24 Hrs  T(C): 36.6 (01 Jul 2021 05:00), Max: 36.7 (30 Jun 2021 15:34)  T(F): 97.8 (01 Jul 2021 05:00), Max: 98 (30 Jun 2021 15:34)  HR: 116 (01 Jul 2021 08:00) (85 - 128)  BP: 139/87 (01 Jul 2021 08:00) (47/27 - 154/96)  BP(mean): 100 (01 Jul 2021 08:00) (31 - 111)  ABP: --  ABP(mean): --  RR: 26 (01 Jul 2021 08:00) (13 - 26)  SpO2: 96% (01 Jul 2021 08:00) (88% - 99%)            06-30 @ 07:01  -  07-01 @ 07:00  --------------------------------------------------------  IN: 700 mL / OUT: 1470 mL / NET: -770 mL            REVIEW OF SYSTEMS:    CONSTITUTIONAL: No weakness, fevers or chills  NECK: No pain or stiffness  RESPIRATORY: No cough, wheezing, hemoptysis; No shortness of breath  CARDIOVASCULAR: No chest pain or palpitations  GASTROINTESTINAL: No abdominal or epigastric pain. No nausea, vomiting, No diarrhea or constipation. No melena or hematochezia.  GENITOURINARY: No dysuria, frequency or hematuria  NEUROLOGICAL: No numbness or weakness  All other review of systems is negative unless indicated above      PHYSICAL EXAM:    GENERAL: NAD, well-groomed, well-developed  EYES: EOMI, PERRLA,   NECK: Supple, No JVD; Normal thyroid; Trachea midline  NERVOUS SYSTEM:  Alert & Oriented X3,  Motor Strength 5/5 B/L upper and lower extremities; DTRs 2+ intact and symmetric  CHEST/LUNG: No rales, rhonchi, wheezing   HEART: Regular rate and rhythm; No murmurs,   ABDOMEN: Soft, Nontender, Nondistended; Bowel sounds present  EXTREMITIES:  2+ Peripheral Pulses, No clubbing, cyanosis, or edema        LABS:  CBC Full  -  ( 01 Jul 2021 05:09 )  WBC Count : 8.26 K/uL  RBC Count : 3.15 M/uL  Hemoglobin : 9.3 g/dL  Hematocrit : 28.2 %  Platelet Count - Automated : 178 K/uL  Mean Cell Volume : 89.5 fl  Mean Cell Hemoglobin : 29.5 pg  Mean Cell Hemoglobin Concentration : 33.0 gm/dL  Auto Neutrophil # : x  Auto Lymphocyte # : x  Auto Monocyte # : x  Auto Eosinophil # : x  Auto Basophil # : x  Auto Neutrophil % : x  Auto Lymphocyte % : x  Auto Monocyte % : x  Auto Eosinophil % : x  Auto Basophil % : x    07-01    147<H>  |  115<H>  |  56<H>  ----------------------------<  103<H>  3.5   |  20<L>  |  4.46<H>    Ca    7.9<L>      01 Jul 2021 05:09  Phos  4.6     07-01  Mg     1.9     07-01    TPro  5.9<L>  /  Alb  2.0<L>  /  TBili  0.7  /  DBili  x   /  AST  25  /  ALT  28  /  AlkPhos  132<H>  07-01        Culture Results:   No growth (06-29 @ 21:59)  Culture Results:   <10,000 CFU/mL Normal Urogenital Magda (06-28 @ 18:43)  Culture Results:   No growth to date. (06-28 @ 13:03)  Culture Results:   No growth to date. (06-28 @ 13:03)      RADIOLOGY & ADDITIONAL STUDIES REVIEWED:  ***    [ ]GOALS OF CARE DISCUSSION WITH PATIENT/FAMILY/PROXY:    CRITICAL CARE TIME SPENT: 35 minutes INTERVAL HPI/OVERNIGHT EVENTS: Patient is off pressors, BP decreased to 67/28, 500 ml NS bolus given, BP stable since, patient lethargic AOx1    PRESSORS: [ ] YES [ ] NO  WHICH:      Antimicrobial:  cefepime   IVPB      cefepime   IVPB 1000 milliGRAM(s) IV Intermittent every 24 hours  metroNIDAZOLE  IVPB      metroNIDAZOLE  IVPB 500 milliGRAM(s) IV Intermittent every 8 hours    Cardiovascular:    Pulmonary:    Hematalogic:  heparin   Injectable 5000 Unit(s) SubCutaneous every 8 hours    Other:  chlorhexidine 2% Cloths 1 Application(s) Topical <User Schedule>  dextrose 5% + sodium chloride 0.9%. 1000 milliLiter(s) IV Continuous <Continuous>  mupirocin 2% Ointment 1 Application(s) Both Nostrils two times a day  pantoprazole  Injectable 40 milliGRAM(s) IV Push at bedtime  sodium chloride 0.9% Bolus 500 milliLiter(s) IV Bolus once  sodium chloride 0.9% lock flush 10 milliLiter(s) IV Push every 1 hour PRN    cefepime   IVPB      cefepime   IVPB 1000 milliGRAM(s) IV Intermittent every 24 hours  chlorhexidine 2% Cloths 1 Application(s) Topical <User Schedule>  dextrose 5% + sodium chloride 0.9%. 1000 milliLiter(s) IV Continuous <Continuous>  heparin   Injectable 5000 Unit(s) SubCutaneous every 8 hours  metroNIDAZOLE  IVPB      metroNIDAZOLE  IVPB 500 milliGRAM(s) IV Intermittent every 8 hours  mupirocin 2% Ointment 1 Application(s) Both Nostrils two times a day  pantoprazole  Injectable 40 milliGRAM(s) IV Push at bedtime  sodium chloride 0.9% Bolus 500 milliLiter(s) IV Bolus once  sodium chloride 0.9% lock flush 10 milliLiter(s) IV Push every 1 hour PRN    Drug Dosing Weight  Height (cm): 170.2 (28 Jun 2021 10:15)  Weight (kg): 66.9 (28 Jun 2021 16:29)  BMI (kg/m2): 23.1 (28 Jun 2021 16:29)  BSA (m2): 1.78 (28 Jun 2021 16:29)    CENTRAL LINE: [ ] YES [ ] NO  LOCATION:         BIANCHI: [ ] YES [ ] NO          A-LINE:  [ ] YES [ ] NO  LOCATION:             ICU Vital Signs Last 24 Hrs  T(C): 36.6 (01 Jul 2021 05:00), Max: 36.7 (30 Jun 2021 15:34)  T(F): 97.8 (01 Jul 2021 05:00), Max: 98 (30 Jun 2021 15:34)  HR: 116 (01 Jul 2021 08:00) (85 - 128)  BP: 139/87 (01 Jul 2021 08:00) (47/27 - 154/96)  BP(mean): 100 (01 Jul 2021 08:00) (31 - 111)  ABP: --  ABP(mean): --  RR: 26 (01 Jul 2021 08:00) (13 - 26)  SpO2: 96% (01 Jul 2021 08:00) (88% - 99%)            06-30 @ 07:01  -  07-01 @ 07:00  --------------------------------------------------------  IN: 700 mL / OUT: 1470 mL / NET: -770 mL        PHYSICAL EXAM:    GENERAL: NAD, well-groomed, well-developed  EYES: EOMI, PERRLA,   NECK: Supple, No JVD; Normal thyroid; Trachea midline  NERVOUS SYSTEM:  Alert and oriented x 1, lethargic  CHEST/LUNG: No rales, rhonchi, wheezing   HEART: Regular rate and rhythm; No murmurs,   ABDOMEN: Soft, abdominal tenderness, Nondistended; Bowel sounds absent  EXTREMITIES: Right BKA,  No clubbing, cyanosis, 2+ edema on right leg        LABS:  CBC Full  -  ( 01 Jul 2021 05:09 )  WBC Count : 8.26 K/uL  RBC Count : 3.15 M/uL  Hemoglobin : 9.3 g/dL  Hematocrit : 28.2 %  Platelet Count - Automated : 178 K/uL  Mean Cell Volume : 89.5 fl  Mean Cell Hemoglobin : 29.5 pg  Mean Cell Hemoglobin Concentration : 33.0 gm/dL  Auto Neutrophil # : x  Auto Lymphocyte # : x  Auto Monocyte # : x  Auto Eosinophil # : x  Auto Basophil # : x  Auto Neutrophil % : x  Auto Lymphocyte % : x  Auto Monocyte % : x  Auto Eosinophil % : x  Auto Basophil % : x    07-01    147<H>  |  115<H>  |  56<H>  ----------------------------<  103<H>  3.5   |  20<L>  |  4.46<H>    Ca    7.9<L>      01 Jul 2021 05:09  Phos  4.6     07-01  Mg     1.9     07-01    TPro  5.9<L>  /  Alb  2.0<L>  /  TBili  0.7  /  DBili  x   /  AST  25  /  ALT  28  /  AlkPhos  132<H>  07-01        Culture Results:   No growth (06-29 @ 21:59)  Culture Results:   <10,000 CFU/mL Normal Urogenital Magda (06-28 @ 18:43)  Culture Results:   No growth to date. (06-28 @ 13:03)  Culture Results:   No growth to date. (06-28 @ 13:03)      RADIOLOGY & ADDITIONAL STUDIES REVIEWED:  < from: Xray Chest 1 View-PORTABLE IMMEDIATE (Xray Chest 1 View-PORTABLE IMMEDIATE .) (06.29.21 @ 09:44) >  IMPRESSION:    Right IJ catheter with its tip in the SVC. No pneumothorax.      [ ]GOALS OF CARE DISCUSSION WITH PATIENT/FAMILY/PROXY:    CRITICAL CARE TIME SPENT: 35 minutes

## 2021-07-01 NOTE — PROGRESS NOTE ADULT - ASSESSMENT
Seen ICU     Cholecystitis   s.p cholecystomy tube   Continue  Anbx       Biliary obstruction   Once stable suggest MRCP to further characterize mass / obstruction and need for ERCP (if needed)     I was physically present for the key portions of the evaluation and management (E/M) service provided.  The patient was personally seen and examined at bedside.  I have edited the note as appropriate.   Thank you for your consultation and allowing  me to participate in the care of your patients. If you have further questions please contact me at [phone].     Rm Hart M.D.       _________________________________________________________________________________________________  [facility]  [address] [place] [address]  Office: [phone]    Fortunato Bowman PA-C  ______________________________________________________________________

## 2021-07-01 NOTE — PROGRESS NOTE ADULT - ASSESSMENT
seen and examined  comforatble but responding to painful stimuli  vs stable afebrile  abd benign  bka noted   creat down to 4.46  hgb 9.3  pt s/p cholecystostomy   ok  surgery to follow  pt has h/o afib was on eliquis  d/w icu team

## 2021-07-02 NOTE — CHART NOTE - NSCHARTNOTEFT_GEN_A_CORE
ICU Transfer note:    76 year old female from [facility] with PMHx of PVD, right AKA (S/P fem pop bypass), PAF, COPD, bipolar disorder, hypertension and PSHx of S/P CABG x1 brought into the ED via EMS from [facility] for unresponsiveness and hypotension. Per NH supervisor Mr. Gaspar () Pt was found unresponsive today morning around 8 am, pt was fine last night, pt did not have fever, difficulty breathing, pt was only receptive to painful stimuli and on evaluation pt's blood pressure was low per papers Pt's bp was 62/38. Pt was sent to the hospital for further evaluation. Baseline mental status alert, oriented and follows instruction, non ambulatory at baseline.   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 Pt was given a dose of vancomycin and rocephine. Pt's blood pressure improve to 97/58, hr 109 bpm, ECG showing sinus tachy, RBBB, LAFB, Bifacicular block.     Patient admitted to the ICU for septic shock requiring vasopressors. Source of infection likely acute cholecystitis as demonstrated on CT abdomen vs UTI. Patient was started on levophed, noted with severe acidosis requiring bicarb drip and VANESSA, likely pre-renal. Patient was started on cefepime and flagyl for sepsis 2/2 cholecystitis. Lactate acidosis was started on bicarb gtt. VANESSA likely pre-renal obtained multiple boluses with low urine output. Patient gradually improved with resolution of hypotension, was able to taper off pressors, bicarb improved was able to dc bicarb gtt. Urine output gradually increased. on 6/29, patient underwent IR guided cholecystostomy, with drainage of bile.     Evaluated by GI after, recommended MRCP. Unable to obtain MRCP as of 6/30 due to altered mental status suspected to be due to gabapentin toxicity in the setting of VANESSA. Unstable for MRCP, will continue monitoring mental status. Seen by speech and swallow, recommends mechanical soft thin liquid diet      Patient will be transferred to . Discussed with ICU attending. Patient clinically stable for downgrade        Follow Up:    -follow-up with GI for MRCP for evaluation of possible pancreatic mass ICU Transfer note:    76 year old female from [facility] with PMHx of PVD, right AKA (S/P fem pop bypass), PAF, COPD, bipolar disorder, hypertension and PSHx of S/P CABG x1 brought into the ED via EMS from [facility] for unresponsiveness and hypotension. Per NH supervisor Mr. Gaspar () Pt was found unresponsive today morning around 8 am, pt was fine last night, pt did not have fever, difficulty breathing, pt was only receptive to painful stimuli and on evaluation pt's blood pressure was low per papers Pt's bp was 62/38. Pt was sent to the hospital for further evaluation. Baseline mental status alert, oriented and follows instruction, non ambulatory at baseline.     ED course: Ramesh was placed and pt was given 3L ivf s/p urine out put of 40cc, u/a positive for infection, CT chest showing b/l patchy infiltrates/ consolidations, concern for cholecystitis, with dilated bile duct of 1.7 cm, and concern of pancreatic head mass. Pt was given a dose of vancomycin and rocephin. Pt's blood pressure improve to 97/58, hr 109 bpm, ECG showing sinus tachy, RBBB, LAFB, Bifacicular block.     Patient admitted to the ICU for septic shock requiring vasopressors. Source of infection likely acute cholecystitis as demonstrated on CT abdomen vs UTI. Patient was started on levophed, noted with severe acidosis requiring bicarb drip and VANESSA, likely pre-renal. Patient was started on cefepime and flagyl for sepsis 2/2 cholecystitis. Lactate acidosis was started on bicarb gtt. VANESSA likely pre-renal obtained multiple boluses with low urine output. Patient gradually improved with resolution of hypotension, was able to taper off pressors, bicarb improved was able to dc bicarb gtt. Urine output gradually increased. on 6/29, patient underwent IR guided cholecystostomy, with drainage of bile.     Evaluated by GI after, recommended MRCP. Unable to obtain MRCP as of 6/30 due to altered mental status suspected to be due to gabapentin toxicity in the setting of VANESSA. Unstable for MRCP, will continue monitoring mental status. Mental status improved 7/1-7/2, pt aaox2 to self and location. Seen by speech and swallow, recommends mechanical soft thin liquid diet. Ramesh removed 1:30pm 7/2. Pt on Cefepime      Patient will be transferred to . Discussed with ICU attending. Patient clinically stable for downgrade      Follow Up:  -follow-up with GI for MRCP for evaluation of possible pancreatic mass  -TOV at 8:30PM ICU Transfer note:    76 year old female from [facility] with PMHx of PVD, right AKA (S/P fem pop bypass), PAF, COPD, bipolar disorder, hypertension and PSHx of S/P CABG x1 brought into the ED via EMS from [facility] for unresponsiveness and hypotension. Per NH supervisor Mr. Gaspar () Pt was found unresponsive today morning around 8 am, pt was fine last night, pt did not have fever, difficulty breathing, pt was only receptive to painful stimuli and on evaluation pt's blood pressure was low per papers Pt's bp was 62/38. Pt was sent to the hospital for further evaluation. Baseline mental status alert, oriented and follows instruction, non ambulatory at baseline.     ED course: Alicia was placed and pt was given 3L ivf s/p urine out put of 40cc, u/a positive for infection, CT chest showing b/l patchy infiltrates/ consolidations, concern for cholecystitis, with dilated bile duct of 1.7 cm, and concern of pancreatic head mass. Pt was given a dose of vancomycin and rocephin. Pt's blood pressure improve to 97/58, hr 109 bpm, ECG showing sinus tachy, RBBB, LAFB, Bifacicular block.     Patient admitted to the ICU for septic shock requiring vasopressors. Source of infection likely acute cholecystitis as demonstrated on CT abdomen vs UTI. Patient was started on levophed, noted with severe acidosis requiring bicarb drip and VANESSA, likely pre-renal. Patient was started on cefepime and flagyl for sepsis 2/2 cholecystitis. Lactate acidosis was started on bicarb gtt. VANESSA likely pre-renal obtained multiple boluses with low urine output. Patient gradually improved with resolution of hypotension, was able to taper off pressors, bicarb improved was able to dc bicarb gtt. Urine output gradually increased. on 6/29, patient underwent IR guided cholecystostomy, with drainage of bile.     Evaluated by GI after, recommended MRCP. Unable to obtain MRCP as of 6/30 due to altered mental status suspected to be due to gabapentin toxicity in the setting of VANESSA. Unstable for MRCP, will continue monitoring mental status. Mental status improved 7/1-7/2, pt aaox2 to self and location. Seen by speech and swallow, recommends mechanical soft thin liquid diet. Patient was retaining 500cc after alicia removal so we had to put alicia back. Pt on Cefepime      Patient will be transferred to . Discussed with ICU attending. Patient clinically stable for downgrade      Follow Up:  -follow-up with GI for MRCP for evaluation of possible pancreatic mass  -Alicia removal tomorrow for TOV

## 2021-07-02 NOTE — PROGRESS NOTE ADULT - SUBJECTIVE AND OBJECTIVE BOX
[facility] : Progress Note :: [phone], (office [phone]),   Dr Noriega / Dr Nava / Dr Granados / Dr Burrell / Dr Amish LOYD / Dr Stein / Dr Ridley / Dr Calvin louie  _____________________________________________________________________________________________  Seen earlier this morning.  AAOX3  feels better  tolerating diet    influenza virus vaccine, live, trivalent (Unknown)  PC Pen VK (Rash)  penicillin (Other; Rash)  statins (Other)  sulfa drugs (Other)  sulfamethizole (Other)    Hospital Medications:   MEDICATIONS  (STANDING):  apixaban 5 milliGRAM(s) Oral every 12 hours  cefepime   IVPB      cefepime   IVPB 1000 milliGRAM(s) IV Intermittent every 24 hours  chlorhexidine 2% Cloths 1 Application(s) Topical <User Schedule>  dextrose 5% + sodium chloride 0.9%. 1000 milliLiter(s) (50 mL/Hr) IV Continuous <Continuous>  ferrous    sulfate 325 milliGRAM(s) Oral daily  lactated ringers. 1000 milliLiter(s) (75 mL/Hr) IV Continuous <Continuous>  metroNIDAZOLE  IVPB      metroNIDAZOLE  IVPB 500 milliGRAM(s) IV Intermittent every 8 hours  mupirocin 2% Ointment 1 Application(s) Both Nostrils two times a day  pantoprazole  Injectable 40 milliGRAM(s) IV Push at bedtime  senna 2 Tablet(s) Oral at bedtime  sodium chloride 0.9% Bolus 500 milliLiter(s) IV Bolus once      VITALS:  T(F): 97.9 (21 @ 16:12), Max: 98 (21 @ 20:00)  HR: 98 (21 @ 16:00)  BP: 126/54 (21 @ 16:00)  RR: 16 (21 @ 16:00)  SpO2: 96% (21 @ 16:00)  Wt(kg): --     @ :  -   @ 07:00  --------------------------------------------------------  IN: 2033 mL / OUT: 1315 mL / NET: 718 mL     @ :  -   @ 17:03  --------------------------------------------------------  IN: 450 mL / OUT: 270 mL / NET: 180 mL        PHYSICAL EXAM:  Constitutional: NAD  HEENT: anicteric sclera, oropharynx clear, MMM  Neck: No JVD  Respiratory: CTAB, no wheezes, rales or rhonchi  Cardiovascular: S1, S2, RRR  Gastrointestinal: BS+, RUQ cholecystotomy drain  Extremities: No cyanosis or clubbing. No peripheral edema  Neurological: A/O x 3, no focal deficits  Psychiatric: Normal mood, normal affect  : No CVA tenderness.  ravin+       LABS:      148<H>  |  117<H>  |  54<H>  ----------------------------<  98  3.2<L>   |  19<L>  |  3.80<H>    Ca    8.0<L>      2021 04:28  Phos  4.1       Mg     1.9         TPro  5.6<L>  /  Alb  1.8<L>  /  TBili  0.6  /  DBili      /  AST  22  /  ALT  21  /  AlkPhos  111      Creatinine Trend: 3.80 <--, 4.46 <--, 5.08 <--, 4.94 <--, 5.02 <--, 5.24 <--, 5.31 <--, 5.81 <--, 6.16 <--                        9.2    7.27  )-----------( 198      ( 2021 13:13 )             28.3     Urine Studies:  Urinalysis Basic - ( 2021 10:46 )    Color: Yellow / Appearance: Clear / S.015 / pH:   Gluc:  / Ketone: Trace  / Bili: Negative / Urobili: Negative   Blood:  / Protein: 100 / Nitrite: Negative   Leuk Esterase: Small / RBC: 5-10 /HPF / WBC 26-50 /HPF   Sq Epi:  / Non Sq Epi: Few /HPF / Bacteria: Trace /HPF      Osmolality, Random Urine: 256 mos/kg ( @ 00:52)  Sodium, Random Urine: 31 mmol/L ( @ 00:52)  Creatinine, Random Urine: 87 mg/dL ( @ 00:52)    RADIOLOGY & ADDITIONAL STUDIES:

## 2021-07-02 NOTE — PROGRESS NOTE ADULT - ASSESSMENT
Patient is a 76y Female whom was sent to ED from the nursing home  to the hospital with hypotension and confusion.  Has H/O PVD s/p RT AKA, COPD, HTN, CABG x1. Blood pressure in the nursing home was 62/32.  In ED noted to have severe hypotension, confused  work-up revealed acute kidnye injury SCR 6 ( normal SCR at baseline).  Also with severe mixed gap and non-gap metabolic acidosis.  s/p isotonic fluid bolus and later started on NAHCO3 gtt as well as vasopressors and broad spectrum antibiotics  CT abdomen revealed acute cholecystitis, pancreatic head mass. No hydronephrosis  urine output improved .  BP better  s/p cholecystotomy  hypernatremia  awake    HYPERNATREMIA.      # VANESSA sec to established ATN  form septic   shock  clinically improved  SCR better  non-oliguric  Mental status much improved.  off pressors  anticipate improvement of hypernatremia as tolerating diet.  enocurage incr of free water intake   cont ABX    bicarb stable.  normokalemia  no acute need for renal replacement therapy     Recs:  once BP better, hypotonic fluids like D5 1/2 NS   rpt BMP daily.

## 2021-07-02 NOTE — SWALLOW BEDSIDE ASSESSMENT ADULT - SWALLOW EVAL: FUNCTIONAL LEVEL AT TIME OF EVAL
Maximal assistance required to hold cups at time of this exam due to pt's hands being under blankets 2/2 coldness; unsure of baseline

## 2021-07-02 NOTE — SWALLOW BEDSIDE ASSESSMENT ADULT - CONSISTENCIES ADMINISTERED
water x 3 teaspoons/honey thick Applesauce x 3 teaspoons/puree Applesauce w/ carlyn cracker x 3 teaspoons/mech soft carlyn cracker x 1 bite/solid ice chip x1 water x 2 oz/thin liquid

## 2021-07-02 NOTE — PROGRESS NOTE ADULT - SUBJECTIVE AND OBJECTIVE BOX
HPI:  76 year old female from [facility] with PMHx of PVD, right AKA (S/P fem pop bypass), PAF, COPD, bipolar disorder, hypertension and PSHx of S/P CABG x1 brought into the ED via EMS from [facility] for unresponsiveness and hypotension. Per NH supervisor Mr. Gaspar () Pt was found unresponsive today morning around 8 am, pt was fine last night, pt did not have fever, difficulty breathing, pt was only receptive to painful stimuli and on evaluation pt's blood pressure was low per papers Pt's bp was 62/38. Pt was sent to the hospital for further evaluation. Baseline mental status alert, oriented and follows instruction, non ambulatory at baseline.   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 Pt was given a dose of vancomycin and rocephine. Pt's blood pressure improve to 97/58, hr 109 bpm, ECG showing sinus tachy, RBBB, LAFB, Bifacicular block.    (28 Jun 2021 14:58)      Patient is a 76y old  Female who presents with a chief complaint of hypotension, altered mental status (30 Jun 2021 14:35)      INTERVAL HPI/OVERNIGHT EVENTS:  T(C): 35.7 (07-02-21 @ 11:00), Max: 36.7 (07-01-21 @ 20:00)  HR: 105 (07-02-21 @ 13:00) (75 - 116)  BP: 128/63 (07-02-21 @ 13:00) (101/49 - 169/73)  RR: 20 (07-02-21 @ 13:00) (14 - 26)  SpO2: 97% (07-02-21 @ 13:00) (92% - 99%)  Wt(kg): --  I&O's Summary    01 Jul 2021 07:01  -  02 Jul 2021 07:00  --------------------------------------------------------  IN: 2033 mL / OUT: 1315 mL / NET: 718 mL    02 Jul 2021 07:01  -  02 Jul 2021 14:13  --------------------------------------------------------  IN: 450 mL / OUT: 270 mL / NET: 180 mL        REVIEW OF SYSTEMS: denies fever, chills, SOB, palpitations, chest pain, abdominal pain, nausea, vomitting, diarrhea, constipation, dizziness    MEDICATIONS  (STANDING):  apixaban 5 milliGRAM(s) Oral every 12 hours  cefepime   IVPB      cefepime   IVPB 1000 milliGRAM(s) IV Intermittent every 24 hours  chlorhexidine 2% Cloths 1 Application(s) Topical <User Schedule>  dextrose 5% + sodium chloride 0.9%. 1000 milliLiter(s) (50 mL/Hr) IV Continuous <Continuous>  lactated ringers. 1000 milliLiter(s) (75 mL/Hr) IV Continuous <Continuous>  metroNIDAZOLE  IVPB      metroNIDAZOLE  IVPB 500 milliGRAM(s) IV Intermittent every 8 hours  mupirocin 2% Ointment 1 Application(s) Both Nostrils two times a day  pantoprazole  Injectable 40 milliGRAM(s) IV Push at bedtime  senna 2 Tablet(s) Oral at bedtime  sodium chloride 0.9% Bolus 500 milliLiter(s) IV Bolus once    MEDICATIONS  (PRN):  sodium chloride 0.9% lock flush 10 milliLiter(s) IV Push every 1 hour PRN Pre/post blood products, medications, blood draw, and to maintain line patency      PHYSICAL EXAM:  GENERAL: NAD, well-groomed, well-developed  HEAD:  Atraumatic, Normocephalic  EYES: EOMI, PERRLA, conjunctiva and sclera clear  ENMT: No tonsillar erythema, exudates, or enlargement; Moist mucous membranes, Good dentition, No lesions  NECK: Supple, No JVD, Normal thyroid  NERVOUS SYSTEM:  Alert & Oriented X3, Good concentration; Motor Strength 5/5 B/L upper and lower extremities; DTRs 2+ intact and symmetric  CHEST/LUNG: Clear to percussion bilaterally; No rales, rhonchi, wheezing, or rubs  HEART: Regular rate and rhythm; No murmurs, rubs, or gallops  ABDOMEN: Soft, Nontender, Nondistended; Bowel sounds present  EXTREMITIES:  2+ Peripheral Pulses, No clubbing, cyanosis, or edema  LYMPH: No lymphadenopathy noted  SKIN: No rashes or lesions  LABS:                        9.2    7.27  )-----------( 198      ( 02 Jul 2021 13:13 )             28.3     07-02    148<H>  |  117<H>  |  54<H>  ----------------------------<  98  3.2<L>   |  19<L>  |  3.80<H>    Ca    8.0<L>      02 Jul 2021 04:28  Phos  4.1     07-02  Mg     1.9     07-02    TPro  5.6<L>  /  Alb  1.8<L>  /  TBili  0.6  /  DBili  x   /  AST  22  /  ALT  21  /  AlkPhos  111  07-02        CAPILLARY BLOOD GLUCOSE      POCT Blood Glucose.: 82 mg/dL (02 Jul 2021 11:18)  POCT Blood Glucose.: 98 mg/dL (02 Jul 2021 04:13)  POCT Blood Glucose.: 100 mg/dL (01 Jul 2021 23:13)  POCT Blood Glucose.: 98 mg/dL (01 Jul 2021 17:52)

## 2021-07-02 NOTE — SWALLOW BEDSIDE ASSESSMENT ADULT - ORAL PREPARATORY PHASE
Prolonged bolus holding Anterior/ lateral loss of bolus during cup sips; improved w/ sips via straw./Within functional limits/Anterior loss of bolus Decreased mastication ability Within functional limits Prolonged bolus holding/Decreased mastication ability

## 2021-07-02 NOTE — SWALLOW BEDSIDE ASSESSMENT ADULT - COMMENTS
Pt received w/ HOB at 45°, elevated to 90° by SLP. AA+O x2. (+) neurogenic stutter, word finding difficulties, R-sided lingual blister.

## 2021-07-02 NOTE — PROGRESS NOTE ADULT - ASSESSMENT
seen and examined awake today  answering some questions  denies abd pain    vsstable afebrile  physical done  lungs clear  abd soft bs nml non tender, no HSplenomegaly  cns generalized weakness  some contractures   rt AKA  labs noted  hgb 9.2  Na 148, k 3.2, creat down to 3.8  speech and swallow    turning of patient lt and right  pt is high risk of developing DUs  now on apixaben  seen and examined awake today  answering some questions  denies abd pain    vsstable afebrile  physical done  lungs clear  abd soft bs nml non tender, no HSplenomegaly  cns generalized weakness  some contractures   rt AKA  labs noted  hgb 9.2  Na 148, k 3.2, creat down to 3.8  speech and swallow    turning of patient lt and right  pt is high risk of developing DUs  now on apixaben   supplement K  watch K, HGB, Na

## 2021-07-02 NOTE — SWALLOW BEDSIDE ASSESSMENT ADULT - ORAL PHASE
Within functional limits Stasis in anterior sulcus/Stasis in lateral sulci/Palatal stasis/Lingual stasis

## 2021-07-02 NOTE — SWALLOW BEDSIDE ASSESSMENT ADULT - MODE OF PRESENTATION
spoon/fed by clinician cup/spoon/straw/fed by clinician staff at residence fed by clinician peers chronically limited CVM denies SI/HI/I/P dislike of residence wearing a wig report of AH not consistent with true psychosis

## 2021-07-02 NOTE — PROGRESS NOTE ADULT - ATTENDING COMMENTS
IMP: This is a  76 year old woman with  PVD, right AKA (S/P fem pop bypass), PAF, COPD, bipolar disorder, hypertension and PSHx of S/P CABG x1 brought into the ED via EMS from [facility] for unresponsiveness and hypotension being admitted to ICU for sepsis likely from UTI and/ Cholecystitis and Acute renal failure.           Assessment:   1. Encephalopathy   2. Severe Sepsis   3. UTI   4. Acute cholecystitis   5. Acute renal failure   6. Anion and non anion gap metabolic acidosis   7. Billary ductal dilation - also pancreatic ductal dilation  8. Paroxysmal Afib  9. Chronic Anemia   10. Dementia     Plan  - Monitor hemodynamics, vasopressor support to maintain MAP  - Trend troponin, obtain Echo  - Cardiology consult  - Broad spectrum antibiotics   - Surgery and GI consult  - Plan for cholecystostomy tube today  - Follow up cultures  - Encephalopathy likely multifactorial - as patient on psych medications including gabapentin   - CT head noted  - Seizure and aspiration precautions  - NPO for now  - Monitor urine output, slowly improving  - D/c sodium bicarb infusion   - Repeat labs in PM  - No hydronephrosis on CT abdomen  - Will need work up for possible pancreatic mass   - Resume eliquis   - DVT and stress ulcer prophylaxis  - PT evaluation  - MOLST reviewed, full code . Patient/Caregiver provided printed discharge information.

## 2021-07-02 NOTE — SWALLOW BEDSIDE ASSESSMENT ADULT - PHARYNGEAL PHASE
Decreased laryngeal elevation One instance of cough following consecutive cup sips of thin liquid by straw. No overt s&s of penetration/ aspiration observed on subsequent trials./Decreased laryngeal elevation/Cough post oral intake

## 2021-07-02 NOTE — PROGRESS NOTE ADULT - ASSESSMENT
Patient is 76 year old F Pmhx of PVD, right AKA (S/P fem pop bypass), PAF, COPD, bipolar disorder, hypertension and PSHx of S/P CABG x1 brought into the ED via EMS from [facility] for unresponsiveness and hypotension being admitted to ICU for sepsis likely from UTI and/ Cholecystitis and Acute renal failure.     Assessment:   Encephalopathy   Sepsis from UTI and/ Acute cholecystitis   Acute renal failure   Anion and non anion gap metabolic acidosis   Pancreatic head mass     PAF  Anemia     Plan:    =====================[CNS ]==============================  # Encephalopathy likely infectious (UTI/cholecysistis) vs metabolic (uremia) vs medication induced(gabapentin)   - Alert and oriented at baseline, appears a little lethargic but responsive  - will hold all home psych meds for now   - Sepsis improved, uremia not significantly elevated to cause AMS. May be due to gabapentin use in the setting of VANESSA  - WIll hold off imaging given AMS and concern for airway protection if lays down flat on MRI  - consult PT  - move from bed to chair    =====================[CVS ]===============================  # Septic shock  - Now improved, off vasopressors  - Due to acute hector, s/p cholecystostomy 9/30  - Continue cefepime and metronidazole  - urine cultures negative  - blood and fluid cultures negative to date  - Add 75 ml/hour LR    # PAF   - DVT prophylaxis  - Cleared to restart eliquis, per surgery    # CAD s/p CABG   - Holding aspirin and statin given AMS and NPO status       =====================[RESP ]==============================  # B/l patchy opacity with consolidation/ atelectasis, with b/l crackles R>L  - Started on cefepime/Flagyl    =====================[ GI ]================================  - Speech and swallow consult, mechanical soft, thin liquid  - Start mechanical soft, thin liquid diet  - ppi for GI ppx       # Cholecystitis w/ dilated bile duct 1.7 cm, and concern for pancreatic head mass   - Will start on cefepime and flagyl to cover for gram negative and anaerobic coverage   - f/u blood cx  - S/P cholecystostomy drain, no growth on culture  - Patient will require MRCP w/o contrast to further eval if patient's biliary dilatation is due to a pancreatic mass, and may benefit from ERCP afterwards.  - Given patient's altered mental status and concerns for airway protection, will hold MRI for now     ====================[ RENAL ]=============================   # Acute renal failure, BUN/Cr 78/6.16 was normal in 4/2021  - FENa of 1.5, intrinsic  -Urine output improving, today +199  -component of ATN  -creatinine improving  - No need for dialysis      # Metabolic acidosis w/ anion gap, anion gap now improved  - likely from uremia in the setting of VANESSA  - Improved, now discontinued off bicarb gtt      =====================[ ID ]================================  # UTI and or cholecystitis   - s/p vanc and rocephine in the ED   - started cefepime and flagyl for now   - f/u Bcx, Ucx and MRSA   - s/p cholecystostomy, all cultures negative to date    ===================[ HEME/ONC ]===========================  # Anemia   - Hb and Plt stable   - monitor cbc daily     =====================[ ENDO ]=============================  # No history of DM  - target CBG < 180  - FS q6 while NPO  - Start diet when possible- speech and swallow pending    ==================[ PROPHYLAXIS ]==========================   # Dvt : resumed heparin subq  # Gi : Protonix     ====================[ DISPO ]==============================   - Monitor in ICU   - Transfer to AI    ===================[ PROGNOSIS ]===========================  - Guarded       Patient is 76 year old F Pmhx of PVD, right AKA (S/P fem pop bypass), PAF, COPD, bipolar disorder, hypertension and PSHx of S/P CABG x1 brought into the ED via EMS from [facility] for unresponsiveness and hypotension being admitted to ICU for sepsis likely from UTI and/ Cholecystitis and Acute renal failure.     Assessment:   Encephalopathy   Sepsis from UTI and/ Acute cholecystitis   Acute renal failure   Anion and non anion gap metabolic acidosis   Pancreatic head mass     PAF  Anemia     Plan:    =====================[CNS ]==============================  # Encephalopathy likely infectious (UTI/cholecysistis) vs metabolic (uremia) vs medication induced(gabapentin)   - Alert and oriented at baseline, appears a little lethargic but responsive  - will hold all home psych meds for now   - Sepsis improved, uremia not significantly elevated to cause AMS. May be due to gabapentin use in the setting of VANESSA  - WIll hold off imaging given AMS and concern for airway protection if lays down flat on MRI  - consult PT  - move from bed to chair    =====================[CVS ]===============================  # Septic shock  - Now improved, off vasopressors  - Due to acute hector, s/p cholecystostomy 9/30  - Continue cefepime and metronidazole  - urine cultures negative  - blood and fluid cultures negative to date  - Add 75 ml/hour LR    # PAF   - DVT prophylaxis  - Cleared to restart eliquis, per surgery    # CAD s/p CABG   - Holding aspirin and statin given AMS and NPO status       =====================[RESP ]==============================  # B/l patchy opacity with consolidation/ atelectasis, with b/l crackles R>L  - Started on cefepime/Flagyl    =====================[ GI ]================================  - Speech and swallow consult, mechanical soft, thin liquid  - Start mechanical soft, thin liquid diet  - ppi for GI ppx       # Cholecystitis w/ dilated bile duct 1.7 cm, and concern for pancreatic head mass   - Will start on cefepime and flagyl to cover for gram negative and anaerobic coverage   - bcx negative  - S/P cholecystostomy drain, no growth on culture  - Patient will require MRCP w/o contrast to further eval if patient's biliary dilatation is due to a pancreatic mass, and may benefit from ERCP afterwards.  - Given patient's altered mental status and concerns for airway protection, will hold MRI for now     ====================[ RENAL ]=============================   # Acute renal failure, BUN/Cr 78/6.16 was normal in 4/2021  - FENa of 1.5, intrinsic  -Urine output improving, today +199  -component of ATN  -creatinine improving  - No need for dialysis      # Metabolic acidosis w/ anion gap, anion gap now improved  - likely from uremia in the setting of VANESSA  - Improved, now discontinued off bicarb gtt      =====================[ ID ]================================  # UTI and or cholecystitis   - s/p vanc and rocephine in the ED   - cefepime and flagyl started 6/28  - Bcx, Ucx negative 6/28  - s/p cholecystostomy, all cultures negative to date    ===================[ HEME/ONC ]===========================  # Anemia   - Hb and Plt stable   - monitor cbc daily  - Hgb 10.2 -> 9.3 -> 8.4  - Repeat CBC  - type and cross    =====================[ ENDO ]=============================  # No history of DM  - target CBG < 180  - FS q6 while NPO  - Start diet when possible- speech and swallow pending    ==================[ PROPHYLAXIS ]==========================   # Dvt : resumed heparin subq  # Gi : Protonix     ====================[ DISPO ]==============================   - Monitor in ICU   - Transfer to AI    ===================[ PROGNOSIS ]===========================  - Guarded

## 2021-07-02 NOTE — PROGRESS NOTE ADULT - SUBJECTIVE AND OBJECTIVE BOX
INTERVAL HPI/OVERNIGHT EVENTS: no significant over night events    PRESSORS: [ ] YES [x ] NO  WHICH:      Antimicrobial:  cefepime   IVPB      cefepime   IVPB 1000 milliGRAM(s) IV Intermittent every 24 hours  metroNIDAZOLE  IVPB      metroNIDAZOLE  IVPB 500 milliGRAM(s) IV Intermittent every 8 hours    Cardiovascular:    Pulmonary:    Hematalogic:  apixaban 5 milliGRAM(s) Oral every 12 hours    Other:  acetaminophen   Tablet .. 650 milliGRAM(s) Oral every 6 hours PRN  chlorhexidine 2% Cloths 1 Application(s) Topical <User Schedule>  dextrose 5% + sodium chloride 0.9%. 1000 milliLiter(s) IV Continuous <Continuous>  ferrous    sulfate 325 milliGRAM(s) Oral daily  lactated ringers. 1000 milliLiter(s) IV Continuous <Continuous>  mupirocin 2% Ointment 1 Application(s) Both Nostrils two times a day  pantoprazole  Injectable 40 milliGRAM(s) IV Push at bedtime  senna 2 Tablet(s) Oral at bedtime  sodium chloride 0.9% Bolus 500 milliLiter(s) IV Bolus once  sodium chloride 0.9% lock flush 10 milliLiter(s) IV Push every 1 hour PRN    acetaminophen   Tablet .. 650 milliGRAM(s) Oral every 6 hours PRN  apixaban 5 milliGRAM(s) Oral every 12 hours  cefepime   IVPB      cefepime   IVPB 1000 milliGRAM(s) IV Intermittent every 24 hours  chlorhexidine 2% Cloths 1 Application(s) Topical <User Schedule>  dextrose 5% + sodium chloride 0.9%. 1000 milliLiter(s) IV Continuous <Continuous>  ferrous    sulfate 325 milliGRAM(s) Oral daily  lactated ringers. 1000 milliLiter(s) IV Continuous <Continuous>  metroNIDAZOLE  IVPB      metroNIDAZOLE  IVPB 500 milliGRAM(s) IV Intermittent every 8 hours  mupirocin 2% Ointment 1 Application(s) Both Nostrils two times a day  pantoprazole  Injectable 40 milliGRAM(s) IV Push at bedtime  senna 2 Tablet(s) Oral at bedtime  sodium chloride 0.9% Bolus 500 milliLiter(s) IV Bolus once  sodium chloride 0.9% lock flush 10 milliLiter(s) IV Push every 1 hour PRN    Drug Dosing Weight  Height (cm): 170.2 (28 Jun 2021 10:15)  Weight (kg): 66.9 (28 Jun 2021 16:29)  BMI (kg/m2): 23.1 (28 Jun 2021 16:29)  BSA (m2): 1.78 (28 Jun 2021 16:29)    CENTRAL LINE: [ ] YES [x ] NO  LOCATION:         BIANCHI: [x ] YES [ ] NO          A-LINE:  [ ] YES [x ] NO  LOCATION:             ICU Vital Signs Last 24 Hrs  T(C): 35.7 (02 Jul 2021 11:00), Max: 36.7 (01 Jul 2021 20:00)  T(F): 96.3 (02 Jul 2021 11:00), Max: 98 (01 Jul 2021 20:00)  HR: 105 (02 Jul 2021 13:00) (75 - 116)  BP: 128/63 (02 Jul 2021 13:00) (101/49 - 169/73)  BP(mean): 81 (02 Jul 2021 13:00) (61 - 102)  ABP: --  ABP(mean): --  RR: 20 (02 Jul 2021 13:00) (14 - 26)  SpO2: 97% (02 Jul 2021 13:00) (92% - 99%)            07-01 @ 07:01  -  07-02 @ 07:00  --------------------------------------------------------  IN: 2033 mL / OUT: 1315 mL / NET: 718 mL            REVIEW OF SYSTEMS:    CONSTITUTIONAL: No weakness, fevers or chills  NECK: No pain or stiffness  RESPIRATORY: No cough, wheezing, hemoptysis; No shortness of breath  CARDIOVASCULAR: No chest pain or palpitations  GASTROINTESTINAL: No abdominal or epigastric pain. No nausea, vomiting, No diarrhea or constipation.   GENITOURINARY: No dysuria, frequency or hematuria  NEUROLOGICAL: No numbness or weakness  All other review of systems is negative unless indicated above      PHYSICAL EXAM:    GENERAL: Patient laying in bed, appears a little disheveled  EYES: EOMI, PERRLA,   NECK: Supple, No JVD; Normal thyroid; Trachea midline  NERVOUS SYSTEM:  Alert & Oriented X3,    CHEST/LUNG: No rales, rhonchi, wheezing   HEART: Regular rate and rhythm; No murmurs,   ABDOMEN: Soft, Nontender, Nondistended; Bowel sounds present  EXTREMITIES:  No clubbing, cyanosis, or edema        LABS:  CBC Full  -  ( 02 Jul 2021 13:13 )  WBC Count : 7.27 K/uL  RBC Count : 3.10 M/uL  Hemoglobin : 9.2 g/dL  Hematocrit : 28.3 %  Platelet Count - Automated : 198 K/uL  Mean Cell Volume : 91.3 fl  Mean Cell Hemoglobin : 29.7 pg  Mean Cell Hemoglobin Concentration : 32.5 gm/dL  Auto Neutrophil # : x  Auto Lymphocyte # : x  Auto Monocyte # : x  Auto Eosinophil # : x  Auto Basophil # : x  Auto Neutrophil % : x  Auto Lymphocyte % : x  Auto Monocyte % : x  Auto Eosinophil % : x  Auto Basophil % : x    07-02    148<H>  |  117<H>  |  54<H>  ----------------------------<  98  3.2<L>   |  19<L>  |  3.80<H>    Ca    8.0<L>      02 Jul 2021 04:28  Phos  4.1     07-02  Mg     1.9     07-02    TPro  5.6<L>  /  Alb  1.8<L>  /  TBili  0.6  /  DBili  x   /  AST  22  /  ALT  21  /  AlkPhos  111  07-02        Culture Results:   No growth (06-29 @ 21:59)      RADIOLOGY & ADDITIONAL STUDIES REVIEWED:        [ ]GOALS OF CARE DISCUSSION WITH PATIENT/FAMILY/PROXY:    CRITICAL CARE TIME SPENT: 35 minutes INTERVAL HPI/OVERNIGHT EVENTS: no significant over night events    PRESSORS: [ ] YES [x ] NO  WHICH:      Antimicrobial:  cefepime   IVPB      cefepime   IVPB 1000 milliGRAM(s) IV Intermittent every 24 hours  metroNIDAZOLE  IVPB      metroNIDAZOLE  IVPB 500 milliGRAM(s) IV Intermittent every 8 hours    Cardiovascular:    Pulmonary:    Hematalogic:  apixaban 5 milliGRAM(s) Oral every 12 hours    Other:  acetaminophen   Tablet .. 650 milliGRAM(s) Oral every 6 hours PRN  chlorhexidine 2% Cloths 1 Application(s) Topical <User Schedule>  dextrose 5% + sodium chloride 0.9%. 1000 milliLiter(s) IV Continuous <Continuous>  ferrous    sulfate 325 milliGRAM(s) Oral daily  lactated ringers. 1000 milliLiter(s) IV Continuous <Continuous>  mupirocin 2% Ointment 1 Application(s) Both Nostrils two times a day  pantoprazole  Injectable 40 milliGRAM(s) IV Push at bedtime  senna 2 Tablet(s) Oral at bedtime  sodium chloride 0.9% Bolus 500 milliLiter(s) IV Bolus once  sodium chloride 0.9% lock flush 10 milliLiter(s) IV Push every 1 hour PRN    acetaminophen   Tablet .. 650 milliGRAM(s) Oral every 6 hours PRN  apixaban 5 milliGRAM(s) Oral every 12 hours  cefepime   IVPB      cefepime   IVPB 1000 milliGRAM(s) IV Intermittent every 24 hours  chlorhexidine 2% Cloths 1 Application(s) Topical <User Schedule>  dextrose 5% + sodium chloride 0.9%. 1000 milliLiter(s) IV Continuous <Continuous>  ferrous    sulfate 325 milliGRAM(s) Oral daily  lactated ringers. 1000 milliLiter(s) IV Continuous <Continuous>  metroNIDAZOLE  IVPB      metroNIDAZOLE  IVPB 500 milliGRAM(s) IV Intermittent every 8 hours  mupirocin 2% Ointment 1 Application(s) Both Nostrils two times a day  pantoprazole  Injectable 40 milliGRAM(s) IV Push at bedtime  senna 2 Tablet(s) Oral at bedtime  sodium chloride 0.9% Bolus 500 milliLiter(s) IV Bolus once  sodium chloride 0.9% lock flush 10 milliLiter(s) IV Push every 1 hour PRN    Drug Dosing Weight  Height (cm): 170.2 (28 Jun 2021 10:15)  Weight (kg): 66.9 (28 Jun 2021 16:29)  BMI (kg/m2): 23.1 (28 Jun 2021 16:29)  BSA (m2): 1.78 (28 Jun 2021 16:29)    CENTRAL LINE: [ ] YES [x ] NO  LOCATION:         BIANCHI: [x ] YES [ ] NO          A-LINE:  [ ] YES [x ] NO  LOCATION:             ICU Vital Signs Last 24 Hrs  T(C): 35.7 (02 Jul 2021 11:00), Max: 36.7 (01 Jul 2021 20:00)  T(F): 96.3 (02 Jul 2021 11:00), Max: 98 (01 Jul 2021 20:00)  HR: 105 (02 Jul 2021 13:00) (75 - 116)  BP: 128/63 (02 Jul 2021 13:00) (101/49 - 169/73)  BP(mean): 81 (02 Jul 2021 13:00) (61 - 102)  ABP: --  ABP(mean): --  RR: 20 (02 Jul 2021 13:00) (14 - 26)  SpO2: 97% (02 Jul 2021 13:00) (92% - 99%)            07-01 @ 07:01  -  07-02 @ 07:00  --------------------------------------------------------  IN: 2033 mL / OUT: 1315 mL / NET: 718 mL            REVIEW OF SYSTEMS:    CONSTITUTIONAL: No weakness, fevers or chills  NECK: No pain or stiffness  RESPIRATORY: No cough, wheezing, hemoptysis; No shortness of breath  CARDIOVASCULAR: No chest pain or palpitations  GASTROINTESTINAL: No abdominal or epigastric pain. No nausea, vomiting, No diarrhea or constipation.   GENITOURINARY: No dysuria, frequency or hematuria  NEUROLOGICAL: No numbness or weakness  All other review of systems is negative unless indicated above      PHYSICAL EXAM:    GENERAL: Patient laying in bed, appears a little disheveled  EYES: EOMI, PERRLA,   NECK: Supple, No JVD; Normal thyroid; Trachea midline  NERVOUS SYSTEM:  Alert & Oriented X3,    CHEST/LUNG: No rales, rhonchi, wheezing   HEART: Regular rate and rhythm; No murmurs,   ABDOMEN: Soft, Nontender, Nondistended; Bowel sounds present  EXTREMITIES:  right AKA        LABS:  CBC Full  -  ( 02 Jul 2021 13:13 )  WBC Count : 7.27 K/uL  RBC Count : 3.10 M/uL  Hemoglobin : 9.2 g/dL  Hematocrit : 28.3 %  Platelet Count - Automated : 198 K/uL  Mean Cell Volume : 91.3 fl  Mean Cell Hemoglobin : 29.7 pg  Mean Cell Hemoglobin Concentration : 32.5 gm/dL  Auto Neutrophil # : x  Auto Lymphocyte # : x  Auto Monocyte # : x  Auto Eosinophil # : x  Auto Basophil # : x  Auto Neutrophil % : x  Auto Lymphocyte % : x  Auto Monocyte % : x  Auto Eosinophil % : x  Auto Basophil % : x    07-02    148<H>  |  117<H>  |  54<H>  ----------------------------<  98  3.2<L>   |  19<L>  |  3.80<H>    Ca    8.0<L>      02 Jul 2021 04:28  Phos  4.1     07-02  Mg     1.9     07-02    TPro  5.6<L>  /  Alb  1.8<L>  /  TBili  0.6  /  DBili  x   /  AST  22  /  ALT  21  /  AlkPhos  111  07-02        Culture Results:   No growth (06-29 @ 21:59)      RADIOLOGY & ADDITIONAL STUDIES REVIEWED:        [ ]GOALS OF CARE DISCUSSION WITH PATIENT/FAMILY/PROXY:    CRITICAL CARE TIME SPENT: 35 minutes

## 2021-07-03 NOTE — PROGRESS NOTE ADULT - SUBJECTIVE AND OBJECTIVE BOX
MERARY MEYER    SCU progress note    INTERVAL HPI/OVERNIGHT EVENTS: tachy throughout night and mild wheezing    DNR [ ]   DNI  [  ] DNR/DNI    Covid - 19 PCR: negative since 6/28    The 4Ms    What Matters Most: see [facility]  Age appropriate Medications/Screen for High Risk Medication: Yes  Mentation: see CAM below  Mobility: defer to physical exam    The Confusion Assessment Method (CAM) Diagnostic Algorithm     1: Acute Onset or Fluctuating Course  - Is there evidence of an acute change in mental status from the patient’s baseline? Did the (abnormal) behavior  fluctuate during the day, that is, tend to come and go, or increase and decrease in severity?  [ ] YES [ x NO     2: Inattention  - Did the patient have difficulty focusing attention, being easily distractible, or having difficulty keeping track of what was being said?  [ ] YES [x] NO     3: Disorganized thinking  -Was the patient’s thinking disorganized or incoherent, such as rambling or irrelevant conversation, unclear or illogical flow of ideas, or unpredictable switching from subject to subject?  [ ] YES [ x NO    4: Altered Level of consciousness?  [ ] YES [x] NO    The diagnosis of delirium by CAM requires the presence of features 1 and 2 and either 3 or 4.    PRESSORS: [ ] YES [x] NO  cefepime   IVPB      cefepime   IVPB 1000 milliGRAM(s) IV Intermittent every 24 hours  metroNIDAZOLE  IVPB 500 milliGRAM(s) IV Intermittent every 8 hours  metroNIDAZOLE  IVPB        Cardiovascular:  Heart Failure  Acute   Acute on Chronic  Chronic       metoprolol tartrate 25 milliGRAM(s) Oral two times a day    Pulmonary:  ALBUTerol    0.083% 2.5 milliGRAM(s) Nebulizer every 6 hours  ALBUTerol    90 MICROgram(s) HFA Inhaler 1 Puff(s) Inhalation every 4 hours    Hematalogic:  apixaban 5 milliGRAM(s) Oral every 12 hours    Other:  acetaminophen   Tablet .. 650 milliGRAM(s) Oral every 6 hours PRN  ferrous    sulfate 325 milliGRAM(s) Oral daily  hydrocortisone sodium succinate Injectable 40 milliGRAM(s) IV Push every 12 hours  mupirocin 2% Ointment 1 Application(s) Both Nostrils two times a day  pantoprazole  Injectable 40 milliGRAM(s) IV Push at bedtime  senna 2 Tablet(s) Oral at bedtime  sodium chloride 0.9% lock flush 10 milliLiter(s) IV Push every 1 hour PRN    acetaminophen   Tablet .. 650 milliGRAM(s) Oral every 6 hours PRN  ALBUTerol    0.083% 2.5 milliGRAM(s) Nebulizer every 6 hours  ALBUTerol    90 MICROgram(s) HFA Inhaler 1 Puff(s) Inhalation every 4 hours  apixaban 5 milliGRAM(s) Oral every 12 hours  cefepime   IVPB      cefepime   IVPB 1000 milliGRAM(s) IV Intermittent every 24 hours  ferrous    sulfate 325 milliGRAM(s) Oral daily  hydrocortisone sodium succinate Injectable 40 milliGRAM(s) IV Push every 12 hours  metoprolol tartrate 25 milliGRAM(s) Oral two times a day  metroNIDAZOLE  IVPB 500 milliGRAM(s) IV Intermittent every 8 hours  metroNIDAZOLE  IVPB      mupirocin 2% Ointment 1 Application(s) Both Nostrils two times a day  pantoprazole  Injectable 40 milliGRAM(s) IV Push at bedtime  senna 2 Tablet(s) Oral at bedtime  sodium chloride 0.9% lock flush 10 milliLiter(s) IV Push every 1 hour PRN    Drug Dosing Weight  Height (cm): 170.2 (28 Jun 2021 10:15)  Weight (kg): 66.9 (28 Jun 2021 16:29)  BMI (kg/m2): 23.1 (28 Jun 2021 16:29)  BSA (m2): 1.78 (28 Jun 2021 16:29)    CENTRAL LINE: [ ] YES [x] NO  LOCATION:   DATE INSERTED:  REMOVE: [ ] YES [ ] NO  EXPLAIN:    TASH: [x] YES [ ] NO    DATE INSERTED:7/2/21  REMOVE:  [ ] YES [x] NO  EXPLAIN: failed TOV    PAST MEDICAL & SURGICAL HISTORY:  Myocardial infarct, old    Hypercholesterolemia    Peripheral vascular disease    COPD (chronic obstructive pulmonary disease)    Bipolar 1 disorder    HTN (hypertension)    Bedbound    S/P CABG x 1    Above knee amputation of right lower extremity    07-02 @ 07:01  -  07-03 @ 07:00  --------------------------------------------------------  IN: 850 mL / OUT: 1270 mL / NET: -420 mL    PHYSICAL EXAM:    GENERAL: NAD, mild wheezing,   HEAD:  Atraumatic, Normocephalic  EYES: EOMI, PERRLA, conjunctiva and sclera clear  ENMT: No tonsillar erythema, exudates, or enlargement; Moist mucous membranes, Good dentition, No lesions  NECK: Supple, No JVD, Normal thyroid  NERVOUS SYSTEM:  Alert & Oriented X2-3, good concentration; Motor Strength 5/5 Bilt upper extrimites  CHEST/LUNG: Clear to percussion bilaterally; No rales, rhonchi, wheezing, or rubs  HEART: Regular rate and rhythm; No murmurs, rubs, or gallops, tachy  ABDOMEN: Soft, Nontender, Nondistended; Bowel sounds present  EXTREMITIES no edema, R AKA, LLE weakness with boots intact  LYMPH: No lymphadenopathy noted  SKIN: No rashes or lesions      LABS:  CBC Full  -  ( 03 Jul 2021 06:48 )  WBC Count : 7.93 K/uL  RBC Count : 3.55 M/uL  Hemoglobin : 10.6 g/dL  Hematocrit : 32.3 %  Platelet Count - Automated : 236 K/uL  Mean Cell Volume : 91.0 fl  Mean Cell Hemoglobin : 29.9 pg  Mean Cell Hemoglobin Concentration : 32.8 gm/dL  Auto Neutrophil # : 5.74 K/uL  Auto Lymphocyte # : 0.69 K/uL  Auto Monocyte # : 1.18 K/uL  Auto Eosinophil # : 0.17 K/uL  Auto Basophil # : 0.10 K/uL  Auto Neutrophil % : 72.4 %  Auto Lymphocyte % : 8.7 %  Auto Monocyte % : 14.9 %  Auto Eosinophil % : 2.1 %  Auto Basophil % : 1.3 %    07-03    146<H>  |  116<H>  |  49<H>  ----------------------------<  111<H>  3.4<L>   |  16<L>  |  2.99<H>    Ca    8.9      03 Jul 2021 06:48  Phos  2.8     07-03  Mg     1.7     07-03    TPro  6.5  /  Alb  2.1<L>  /  TBili  0.7  /  DBili  x   /  AST  23  /  ALT  22  /  AlkPhos  117  07-03    [ x ]  DVT Prophylaxis, on Eliquis for afib  [  ]  Nutrition, Brand, Rate, taking po       Malnutrition       RADIOLOGY & ADDITIONAL STUDIES:    < from: Xray Chest 1 View-PORTABLE IMMEDIATE (Xray Chest 1 View-PORTABLE IMMEDIATE .) (06.29.21 @ 09:44) >  EXAM:  XR CHEST PORTABLE IMMED 1V                          EXAM:  XR CHEST PORTABLE IMMED 1V                            PROCEDURE DATE:  06/28/2021          INTERPRETATION:  TECHNIQUE: A single AP view of the chest was obtained. Ordered time:   6/28/2021 6:19 PM, 6/29/2021 9:44 AM    COMPARISON: 6/28/2021    CLINICAL INFORMATION: Septic shock    FINDINGS:  6/28/2021: A right IJ catheter was placed with its tip in the SVC.  The heart is not well assessed on an AP film.  The aorta is calcified.  There is linear atelectasis at the left lung base.  There are no pleural effusions.  There is no pneumothorax.    6/29/2021: The linear atelectasis at the left lung base has improved.    IMPRESSION:    Right IJ catheter with its tip in the SVC. No pneumothorax.          JENNIFER ONEAL MD; Attending Radiologist  This document has been electronically signed.  JENNIFER ONEAL MD; Attending Radiologist  This document has been electronically signed. Jun 29 2021 11:44AM    < end of copied text >      Goals of Care Discussion with Family/Proxy/Other   - see note from/family meeting set up for...     MERARY MEYER    SCU progress note    INTERVAL HPI/OVERNIGHT EVENTS: tachy throughout night and mild wheezing    DNR [ ]   DNI  [  ] DNR/DNI    Covid - 19 PCR: negative since 6/28    [facility] 4Ms    What Matters Most: see [facility]  Age appropriate Medications/Screen for High Risk Medication: Yes  Mentation: see CAM below  Mobility: defer to physical exam    The Confusion Assessment Method (CAM) Diagnostic Algorithm     1: Acute Onset or Fluctuating Course  - Is there evidence of an acute change in mental status from the patient’s baseline? Did the (abnormal) behavior  fluctuate during the day, that is, tend to come and go, or increase and decrease in severity?  [ ] YES [ x NO     2: Inattention  - Did the patient have difficulty focusing attention, being easily distractible, or having difficulty keeping track of what was being said?  [ ] YES [x] NO     3: Disorganized thinking  -Was the patient’s thinking disorganized or incoherent, such as rambling or irrelevant conversation, unclear or illogical flow of ideas, or unpredictable switching from subject to subject?  [ ] YES [ x NO    4: Altered Level of consciousness?  [ ] YES [x] NO    The diagnosis of delirium by CAM requires the presence of features 1 and 2 and either 3 or 4.    PRESSORS: [ ] YES [x] NO  cefepime   IVPB      cefepime   IVPB 1000 milliGRAM(s) IV Intermittent every 24 hours  metroNIDAZOLE  IVPB 500 milliGRAM(s) IV Intermittent every 8 hours  metroNIDAZOLE  IVPB        Cardiovascular:  Heart Failure  Acute   Acute on Chronic  Chronic       metoprolol tartrate 25 milliGRAM(s) Oral two times a day    Pulmonary:  ALBUTerol    0.083% 2.5 milliGRAM(s) Nebulizer every 6 hours  ALBUTerol    90 MICROgram(s) HFA Inhaler 1 Puff(s) Inhalation every 4 hours    Hematalogic:  apixaban 5 milliGRAM(s) Oral every 12 hours    Other:  acetaminophen   Tablet .. 650 milliGRAM(s) Oral every 6 hours PRN  ferrous    sulfate 325 milliGRAM(s) Oral daily  hydrocortisone sodium succinate Injectable 40 milliGRAM(s) IV Push every 12 hours  mupirocin 2% Ointment 1 Application(s) Both Nostrils two times a day  pantoprazole  Injectable 40 milliGRAM(s) IV Push at bedtime  senna 2 Tablet(s) Oral at bedtime  sodium chloride 0.9% lock flush 10 milliLiter(s) IV Push every 1 hour PRN    acetaminophen   Tablet .. 650 milliGRAM(s) Oral every 6 hours PRN  ALBUTerol    0.083% 2.5 milliGRAM(s) Nebulizer every 6 hours  ALBUTerol    90 MICROgram(s) HFA Inhaler 1 Puff(s) Inhalation every 4 hours  apixaban 5 milliGRAM(s) Oral every 12 hours  cefepime   IVPB      cefepime   IVPB 1000 milliGRAM(s) IV Intermittent every 24 hours  ferrous    sulfate 325 milliGRAM(s) Oral daily  hydrocortisone sodium succinate Injectable 40 milliGRAM(s) IV Push every 12 hours  metoprolol tartrate 25 milliGRAM(s) Oral two times a day  metroNIDAZOLE  IVPB 500 milliGRAM(s) IV Intermittent every 8 hours  metroNIDAZOLE  IVPB      mupirocin 2% Ointment 1 Application(s) Both Nostrils two times a day  pantoprazole  Injectable 40 milliGRAM(s) IV Push at bedtime  senna 2 Tablet(s) Oral at bedtime  sodium chloride 0.9% lock flush 10 milliLiter(s) IV Push every 1 hour PRN    Drug Dosing Weight  Height (cm): 170.2 (28 Jun 2021 10:15)  Weight (kg): 66.9 (28 Jun 2021 16:29)  BMI (kg/m2): 23.1 (28 Jun 2021 16:29)  BSA (m2): 1.78 (28 Jun 2021 16:29)    CENTRAL LINE: [ ] YES [x] NO  LOCATION:   DATE INSERTED:  REMOVE: [ ] YES [ ] NO  EXPLAIN:    TASH: [x] YES [ ] NO    DATE INSERTED:7/2/21  REMOVE:  [ ] YES [x] NO  EXPLAIN: failed TOV    PAST MEDICAL & SURGICAL HISTORY:  Myocardial infarct, old    Hypercholesterolemia    Peripheral vascular disease    COPD (chronic obstructive pulmonary disease)    Bipolar 1 disorder    HTN (hypertension)    Bedbound    S/P CABG x 1    Above knee amputation of right lower extremity    07-02 @ 07:01  -  07-03 @ 07:00  --------------------------------------------------------  IN: 850 mL / OUT: 1270 mL / NET: -420 mL    PHYSICAL EXAM:    GENERAL: NAD, mild wheezing,   HEAD:  Atraumatic, Normocephalic  EYES: EOMI, PERRLA, conjunctiva and sclera clear  ENMT: No tonsillar erythema, exudates, or enlargement; Moist mucous membranes, Good dentition, No lesions  NECK: Supple, No JVD, Normal thyroid  NERVOUS SYSTEM:  Alert & Oriented X2-3, good concentration; Motor Strength 5/5 Bilt upper extrimites  CHEST/LUNG: Clear to percussion bilaterally; No rales, rhonchi, wheezing, or rubs  HEART: Regular rate and rhythm; No murmurs, rubs, or gallops, tachy  ABDOMEN: Soft, Nontender, Nondistended; Bowel sounds present  EXTREMITIES no edema, R AKA, LLE weakness with boots intact  LYMPH: No lymphadenopathy noted  SKIN: No rashes or lesions      LABS:  CBC Full  -  ( 03 Jul 2021 06:48 )  WBC Count : 7.93 K/uL  RBC Count : 3.55 M/uL  Hemoglobin : 10.6 g/dL  Hematocrit : 32.3 %  Platelet Count - Automated : 236 K/uL  Mean Cell Volume : 91.0 fl  Mean Cell Hemoglobin : 29.9 pg  Mean Cell Hemoglobin Concentration : 32.8 gm/dL  Auto Neutrophil # : 5.74 K/uL  Auto Lymphocyte # : 0.69 K/uL  Auto Monocyte # : 1.18 K/uL  Auto Eosinophil # : 0.17 K/uL  Auto Basophil # : 0.10 K/uL  Auto Neutrophil % : 72.4 %  Auto Lymphocyte % : 8.7 %  Auto Monocyte % : 14.9 %  Auto Eosinophil % : 2.1 %  Auto Basophil % : 1.3 %    07-03    146<H>  |  116<H>  |  49<H>  ----------------------------<  111<H>  3.4<L>   |  16<L>  |  2.99<H>    Ca    8.9      03 Jul 2021 06:48  Phos  2.8     07-03  Mg     1.7     07-03    TPro  6.5  /  Alb  2.1<L>  /  TBili  0.7  /  DBili  x   /  AST  23  /  ALT  22  /  AlkPhos  117  07-03    [ x ]  DVT Prophylaxis, on Eliquis for afib  [  ]  Nutrition, Brand, Rate, taking po       Malnutrition       RADIOLOGY & ADDITIONAL STUDIES:    < from: Xray Chest 1 View-PORTABLE IMMEDIATE (Xray Chest 1 View-PORTABLE IMMEDIATE .) (06.29.21 @ 09:44) >  EXAM:  XR CHEST PORTABLE IMMED 1V                          EXAM:  XR CHEST PORTABLE IMMED 1V                            PROCEDURE DATE:  06/28/2021          INTERPRETATION:  TECHNIQUE: A single AP view of the chest was obtained. Ordered time:   6/28/2021 6:19 PM, 6/29/2021 9:44 AM    COMPARISON: 6/28/2021    CLINICAL INFORMATION: Septic shock    FINDINGS:  6/28/2021: A right IJ catheter was placed with its tip in the SVC.  The heart is not well assessed on an AP film.  The aorta is calcified.  There is linear atelectasis at the left lung base.  There are no pleural effusions.  There is no pneumothorax.    6/29/2021: The linear atelectasis at the left lung base has improved.    IMPRESSION:    Right IJ catheter with its tip in the SVC. No pneumothorax.          JENNIFER ONEAL MD; Attending Radiologist  This document has been electronically signed.  JENNIFER ONEAL MD; Attending Radiologist  This document has been electronically signed. Jun 29 2021 11:44AM    < end of copied text >  < from: CT Chest No Cont (06.28.21 @ 11:59) >  EXAM:  CT CHEST                            PROCEDURE DATE:  06/28/2021          INTERPRETATION:  CLINICAL INFORMATION: Hypotension    COMPARISON: None.    CONTRAST/COMPLICATIONS:  IV Contrast: NONE  Oral Contrast: NONE  Complications: None reportedat time of study completion    PROCEDURE:  CT of the Chest, Abdomen and Pelvis was performed.  Sagittal and coronal reformats were performed.    FINDINGS:  CHEST:  LUNGS AND LARGE AIRWAYS: Patent central airways. Patchy bibasilar dependent consolidation/atelectasis right greater than left. Correlate clinically for infection. biapical scarring  PLEURA: No pleural effusion.  VESSELS: Nonaneurysmal.  HEART: Mild cardiomegaly with coronary artery calcification No pericardial effusion.  MEDIASTINUM AND CLARICE: No lymphadenopathy.  CHEST WALL AND LOWER NECK: Nonspecific left breast calcification..    ABDOMEN AND PELVIS:  LIVER: Within normal limits.  BILE DUCTS: Marked dilatation common duct up to 1.7 cm mild intrahepatic biliary dilatation. The distal common duct obstructing stone and/or lesion is considered. Correlate with MR.  GALLBLADDER: Gallstones. Markedly distended gallbladder with pericholecystic fluid concerning for cholecystitis. Correlate with right upper quadrant ultrasound and/or HIDA.  SPLEEN: Within normal limits.  PANCREAS: Not well evaluated on this noncontrast study. However, there is diffuse atrophy of the body and tail with dilatation of the main pancreatic duct. Increased soft tissue density in the pancreatic head and uncinate process suspicious for a mass. Recommend  MRI  ADRENALS: Within normal limits.  KIDNEYS/URETERS: Punctate nonobstructive right renal calculus    BLADDER: Collapsed around a Ramesh.  REPRODUCTIVE ORGANS: No gynecologic mass    BOWEL: No bowel obstruction. Appendix no appendicitis  PERITONEUM: No ascites.  VESSELS: Nonaneurysmal  RETROPERITONEUM/LYMPH NODES: No lymphadenopathy.  ABDOMINAL WALL: Within normal limits.  BONES: No aggressive lesions    IMPRESSION:  Limited by lack of any exogenousoral or intravenous contrast  Patchy bibasilar dependent consolidation/atelectasis. Correlate clinically for infection.  Cholelithiasis, pericholecystic fluid concerning for cholecystitis. Correlate with ultrasound and/or HIDA scan.  Significant biliary dilatation, possible mass pancreatic head. Correlate with MRCP/abdominal MR with pancreatic protocol              DOUG SRIVASTAVA MD; Attending Radiologist  This document has been electronically signed. Jun 28 2021 12:29PM    < end of copied text >      Goals of Care Discussion with Family/Proxy/Other   - see note from/family meeting set up for for 6/30, MOL in chart     MERARY MEYER    SCU progress note    INTERVAL HPI/OVERNIGHT EVENTS: tachy throughout night and mild wheezing    DNR [ ]   DNI  [  ] DNR/DNI    Covid - 19 PCR: negative since 6/28    The 4Ms    What Matters Most: see [facility]  Age appropriate Medications/Screen for High Risk Medication: Yes  Mentation: see CAM below  Mobility: defer to physical exam    The Confusion Assessment Method (CAM) Diagnostic Algorithm     1: Acute Onset or Fluctuating Course  - Is there evidence of an acute change in mental status from the patient’s baseline? Did the (abnormal) behavior  fluctuate during the day, that is, tend to come and go, or increase and decrease in severity?  [ ] YES [ x NO     2: Inattention  - Did the patient have difficulty focusing attention, being easily distractible, or having difficulty keeping track of what was being said?  [ ] YES [x] NO     3: Disorganized thinking  -Was the patient’s thinking disorganized or incoherent, such as rambling or irrelevant conversation, unclear or illogical flow of ideas, or unpredictable switching from subject to subject?  [ ] YES [ x NO    4: Altered Level of consciousness?  [ ] YES [x] NO    The diagnosis of delirium by CAM requires the presence of features 1 and 2 and either 3 or 4.    PRESSORS: [ ] YES [x] NO  cefepime   IVPB      cefepime   IVPB 1000 milliGRAM(s) IV Intermittent every 24 hours  metroNIDAZOLE  IVPB 500 milliGRAM(s) IV Intermittent every 8 hours  metroNIDAZOLE  IVPB        Cardiovascular:  Heart Failure  Acute   Acute on Chronic  Chronic       metoprolol tartrate 25 milliGRAM(s) Oral two times a day    Pulmonary:  ALBUTerol    0.083% 2.5 milliGRAM(s) Nebulizer every 6 hours  ALBUTerol    90 MICROgram(s) HFA Inhaler 1 Puff(s) Inhalation every 4 hours    Hematalogic:  apixaban 5 milliGRAM(s) Oral every 12 hours    Other:  acetaminophen   Tablet .. 650 milliGRAM(s) Oral every 6 hours PRN  ferrous    sulfate 325 milliGRAM(s) Oral daily  hydrocortisone sodium succinate Injectable 40 milliGRAM(s) IV Push every 12 hours  mupirocin 2% Ointment 1 Application(s) Both Nostrils two times a day  pantoprazole  Injectable 40 milliGRAM(s) IV Push at bedtime  senna 2 Tablet(s) Oral at bedtime  sodium chloride 0.9% lock flush 10 milliLiter(s) IV Push every 1 hour PRN    acetaminophen   Tablet .. 650 milliGRAM(s) Oral every 6 hours PRN  ALBUTerol    0.083% 2.5 milliGRAM(s) Nebulizer every 6 hours  ALBUTerol    90 MICROgram(s) HFA Inhaler 1 Puff(s) Inhalation every 4 hours  apixaban 5 milliGRAM(s) Oral every 12 hours  cefepime   IVPB      cefepime   IVPB 1000 milliGRAM(s) IV Intermittent every 24 hours  ferrous    sulfate 325 milliGRAM(s) Oral daily  hydrocortisone sodium succinate Injectable 40 milliGRAM(s) IV Push every 12 hours  metoprolol tartrate 25 milliGRAM(s) Oral two times a day  metroNIDAZOLE  IVPB 500 milliGRAM(s) IV Intermittent every 8 hours  metroNIDAZOLE  IVPB      mupirocin 2% Ointment 1 Application(s) Both Nostrils two times a day  pantoprazole  Injectable 40 milliGRAM(s) IV Push at bedtime  senna 2 Tablet(s) Oral at bedtime  sodium chloride 0.9% lock flush 10 milliLiter(s) IV Push every 1 hour PRN    Drug Dosing Weight  Height (cm): 170.2 (28 Jun 2021 10:15)  Weight (kg): 66.9 (28 Jun 2021 16:29)  BMI (kg/m2): 23.1 (28 Jun 2021 16:29)  BSA (m2): 1.78 (28 Jun 2021 16:29)    CENTRAL LINE: [ ] YES [x] NO  LOCATION:   DATE INSERTED:  REMOVE: [ ] YES [ ] NO  EXPLAIN:    TASH: [x] YES [ ] NO    DATE INSERTED:7/2/21  REMOVE:  [ ] YES [x] NO  EXPLAIN: failed TOV    PAST MEDICAL & SURGICAL HISTORY:  Myocardial infarct, old    Hypercholesterolemia    Peripheral vascular disease    COPD (chronic obstructive pulmonary disease)    Bipolar 1 disorder    HTN (hypertension)    Bedbound    S/P CABG x 1    Above knee amputation of right lower extremity    07-02 @ 07:01  -  07-03 @ 07:00  --------------------------------------------------------  IN: 850 mL / OUT: 1270 mL / NET: -420 mL    PHYSICAL EXAM:    GENERAL: NAD, mild wheezing,   HEAD:  Atraumatic, Normocephalic  EYES: EOMI, PERRLA, conjunctiva and sclera clear  ENMT: No tonsillar erythema, exudates, or enlargement; Moist mucous membranes, Good dentition, No lesions  NECK: Supple, No JVD, Normal thyroid  NERVOUS SYSTEM:  Alert & Oriented X2-3, good concentration; Motor Strength 5/5 Bilt upper extrimites  CHEST/LUNG: Clear to percussion bilaterally; No rales, rhonchi, wheezing, or rubs  HEART: Regular rate and rhythm; No murmurs, rubs, or gallops, tachy  ABDOMEN: Soft, Nontender, Nondistended; Bowel sounds present, R bili drain with dark bile noted  EXTREMITIES no edema, R AKA, LLE weakness with boots intact  LYMPH: No lymphadenopathy noted  SKIN: No rashes or lesions      LABS:  CBC Full  -  ( 03 Jul 2021 06:48 )  WBC Count : 7.93 K/uL  RBC Count : 3.55 M/uL  Hemoglobin : 10.6 g/dL  Hematocrit : 32.3 %  Platelet Count - Automated : 236 K/uL  Mean Cell Volume : 91.0 fl  Mean Cell Hemoglobin : 29.9 pg  Mean Cell Hemoglobin Concentration : 32.8 gm/dL  Auto Neutrophil # : 5.74 K/uL  Auto Lymphocyte # : 0.69 K/uL  Auto Monocyte # : 1.18 K/uL  Auto Eosinophil # : 0.17 K/uL  Auto Basophil # : 0.10 K/uL  Auto Neutrophil % : 72.4 %  Auto Lymphocyte % : 8.7 %  Auto Monocyte % : 14.9 %  Auto Eosinophil % : 2.1 %  Auto Basophil % : 1.3 %    07-03    146<H>  |  116<H>  |  49<H>  ----------------------------<  111<H>  3.4<L>   |  16<L>  |  2.99<H>    Ca    8.9      03 Jul 2021 06:48  Phos  2.8     07-03  Mg     1.7     07-03    TPro  6.5  /  Alb  2.1<L>  /  TBili  0.7  /  DBili  x   /  AST  23  /  ALT  22  /  AlkPhos  117  07-03    [ x ]  DVT Prophylaxis, on Eliquis for afib  [  ]  Nutrition, Brand, Rate, taking po       Malnutrition       RADIOLOGY & ADDITIONAL STUDIES:    < from: Xray Chest 1 View-PORTABLE IMMEDIATE (Xray Chest 1 View-PORTABLE IMMEDIATE .) (06.29.21 @ 09:44) >  EXAM:  XR CHEST PORTABLE IMMED 1V                          EXAM:  XR CHEST PORTABLE IMMED 1V                            PROCEDURE DATE:  06/28/2021          INTERPRETATION:  TECHNIQUE: A single AP view of the chest was obtained. Ordered time:   6/28/2021 6:19 PM, 6/29/2021 9:44 AM    COMPARISON: 6/28/2021    CLINICAL INFORMATION: Septic shock    FINDINGS:  6/28/2021: A right IJ catheter was placed with its tip in the SVC.  The heart is not well assessed on an AP film.  The aorta is calcified.  There is linear atelectasis at the left lung base.  There are no pleural effusions.  There is no pneumothorax.    6/29/2021: The linear atelectasis at the left lung base has improved.    IMPRESSION:    Right IJ catheter with its tip in the SVC. No pneumothorax.          JENNIFER ONEAL MD; Attending Radiologist  This document has been electronically signed.  JENNIFER ONEAL MD; Attending Radiologist  This document has been electronically signed. Jun 29 2021 11:44AM    < end of copied text >  < from: CT Chest No Cont (06.28.21 @ 11:59) >  EXAM:  CT CHEST                            PROCEDURE DATE:  06/28/2021          INTERPRETATION:  CLINICAL INFORMATION: Hypotension    COMPARISON: None.    CONTRAST/COMPLICATIONS:  IV Contrast: NONE  Oral Contrast: NONE  Complications: None reportedat time of study completion    PROCEDURE:  CT of the Chest, Abdomen and Pelvis was performed.  Sagittal and coronal reformats were performed.    FINDINGS:  CHEST:  LUNGS AND LARGE AIRWAYS: Patent central airways. Patchy bibasilar dependent consolidation/atelectasis right greater than left. Correlate clinically for infection. biapical scarring  PLEURA: No pleural effusion.  VESSELS: Nonaneurysmal.  HEART: Mild cardiomegaly with coronary artery calcification No pericardial effusion.  MEDIASTINUM AND CLARICE: No lymphadenopathy.  CHEST WALL AND LOWER NECK: Nonspecific left breast calcification..    ABDOMEN AND PELVIS:  LIVER: Within normal limits.  BILE DUCTS: Marked dilatation common duct up to 1.7 cm mild intrahepatic biliary dilatation. The distal common duct obstructing stone and/or lesion is considered. Correlate with MR.  GALLBLADDER: Gallstones. Markedly distended gallbladder with pericholecystic fluid concerning for cholecystitis. Correlate with right upper quadrant ultrasound and/or HIDA.  SPLEEN: Within normal limits.  PANCREAS: Not well evaluated on this noncontrast study. However, there is diffuse atrophy of the body and tail with dilatation of the main pancreatic duct. Increased soft tissue density in the pancreatic head and uncinate process suspicious for a mass. Recommend  MRI  ADRENALS: Within normal limits.  KIDNEYS/URETERS: Punctate nonobstructive right renal calculus    BLADDER: Collapsed around a Ramesh.  REPRODUCTIVE ORGANS: No gynecologic mass    BOWEL: No bowel obstruction. Appendix no appendicitis  PERITONEUM: No ascites.  VESSELS: Nonaneurysmal  RETROPERITONEUM/LYMPH NODES: No lymphadenopathy.  ABDOMINAL WALL: Within normal limits.  BONES: No aggressive lesions    IMPRESSION:  Limited by lack of any exogenousoral or intravenous contrast  Patchy bibasilar dependent consolidation/atelectasis. Correlate clinically for infection.  Cholelithiasis, pericholecystic fluid concerning for cholecystitis. Correlate with ultrasound and/or HIDA scan.  Significant biliary dilatation, possible mass pancreatic head. Correlate with MRCP/abdominal MR with pancreatic protocol              DOUG SRIVASTAVA MD; Attending Radiologist  This document has been electronically signed. Jun 28 2021 12:29PM    < end of copied text >      Goals of Care Discussion with Family/Proxy/Other   - see note from/family meeting set up for for 6/30, MOLST in chart     MERARY MEYER    SCU progress note    INTERVAL HPI/OVERNIGHT EVENTS: tachy throughout night and mild wheezing    DNR [ ]   DNI  [  ] DNR/DNI    Covid - 19 PCR: negative since 6/28    The 4Ms    What Matters Most: see [facility]  Age appropriate Medications/Screen for High Risk Medication: Yes  Mentation: see CAM below  Mobility: defer to physical exam    The Confusion Assessment Method (CAM) Diagnostic Algorithm     1: Acute Onset or Fluctuating Course  - Is there evidence of an acute change in mental status from the patient’s baseline? Did the (abnormal) behavior  fluctuate during the day, that is, tend to come and go, or increase and decrease in severity?  [ ] YES [ x NO     2: Inattention  - Did the patient have difficulty focusing attention, being easily distractible, or having difficulty keeping track of what was being said?  [ ] YES [x] NO     3: Disorganized thinking  -Was the patient’s thinking disorganized or incoherent, such as rambling or irrelevant conversation, unclear or illogical flow of ideas, or unpredictable switching from subject to subject?  [ ] YES [ x NO    4: Altered Level of consciousness?  [ ] YES [x] NO    The diagnosis of delirium by CAM requires the presence of features 1 and 2 and either 3 or 4.    PRESSORS: [ ] YES [x] NO  cefepime   IVPB      cefepime   IVPB 1000 milliGRAM(s) IV Intermittent every 24 hours  metroNIDAZOLE  IVPB 500 milliGRAM(s) IV Intermittent every 8 hours  metroNIDAZOLE  IVPB        Cardiovascular:  Heart Failure  Acute   Acute on Chronic  Chronic       metoprolol tartrate 25 milliGRAM(s) Oral two times a day    Pulmonary:  ALBUTerol    0.083% 2.5 milliGRAM(s) Nebulizer every 6 hours  ALBUTerol    90 MICROgram(s) HFA Inhaler 1 Puff(s) Inhalation every 4 hours    Hematalogic:  apixaban 5 milliGRAM(s) Oral every 12 hours    Other:  acetaminophen   Tablet .. 650 milliGRAM(s) Oral every 6 hours PRN  ferrous    sulfate 325 milliGRAM(s) Oral daily  hydrocortisone sodium succinate Injectable 40 milliGRAM(s) IV Push every 12 hours  mupirocin 2% Ointment 1 Application(s) Both Nostrils two times a day  pantoprazole  Injectable 40 milliGRAM(s) IV Push at bedtime  senna 2 Tablet(s) Oral at bedtime  sodium chloride 0.9% lock flush 10 milliLiter(s) IV Push every 1 hour PRN    acetaminophen   Tablet .. 650 milliGRAM(s) Oral every 6 hours PRN  ALBUTerol    0.083% 2.5 milliGRAM(s) Nebulizer every 6 hours  ALBUTerol    90 MICROgram(s) HFA Inhaler 1 Puff(s) Inhalation every 4 hours  apixaban 5 milliGRAM(s) Oral every 12 hours  cefepime   IVPB      cefepime   IVPB 1000 milliGRAM(s) IV Intermittent every 24 hours  ferrous    sulfate 325 milliGRAM(s) Oral daily  hydrocortisone sodium succinate Injectable 40 milliGRAM(s) IV Push every 12 hours  metoprolol tartrate 25 milliGRAM(s) Oral two times a day  metroNIDAZOLE  IVPB 500 milliGRAM(s) IV Intermittent every 8 hours  metroNIDAZOLE  IVPB      mupirocin 2% Ointment 1 Application(s) Both Nostrils two times a day  pantoprazole  Injectable 40 milliGRAM(s) IV Push at bedtime  senna 2 Tablet(s) Oral at bedtime  sodium chloride 0.9% lock flush 10 milliLiter(s) IV Push every 1 hour PRN    Drug Dosing Weight  Height (cm): 170.2 (28 Jun 2021 10:15)  Weight (kg): 66.9 (28 Jun 2021 16:29)  BMI (kg/m2): 23.1 (28 Jun 2021 16:29)  BSA (m2): 1.78 (28 Jun 2021 16:29)    CENTRAL LINE: [ ] YES [x] NO  LOCATION:   DATE INSERTED:  REMOVE: [ ] YES [ ] NO  EXPLAIN:    TASH: [x] YES [ ] NO    DATE INSERTED:7/2/21  REMOVE:  [ ] YES [x] NO  EXPLAIN: failed TOV    PAST MEDICAL & SURGICAL HISTORY:  Myocardial infarct, old    Hypercholesterolemia    Peripheral vascular disease    COPD (chronic obstructive pulmonary disease)    Bipolar 1 disorder    HTN (hypertension)    Bedbound    S/P CABG x 1    Above knee amputation of right lower extremity    07-02 @ 07:01  -  07-03 @ 07:00  --------------------------------------------------------  IN: 850 mL / OUT: 1270 mL / NET: -420 mL    PHYSICAL EXAM:    GENERAL: NAD, mild wheezing,   HEAD:  Atraumatic, Normocephalic  EYES: EOMI, PERRLA, conjunctiva and sclera clear  ENMT: No tonsillar erythema, exudates, or enlargement; Moist mucous membranes, Good dentition, No lesions  NECK: Supple, No JVD, Normal thyroid  NERVOUS SYSTEM:  Alert & Oriented X2-3, good concentration; Motor Strength 5/5 Bilt upper extrimites  CHEST/LUNG: wheezing bilat upper chest auscultated  HEART: Regular rate and rhythm; No murmurs, rubs, or gallops, tachy  ABDOMEN: Soft, Nontender, Nondistended; Bowel sounds present, R bili drain with dark bile noted  EXTREMITIES no edema, R AKA, LLE weakness with boots intact  LYMPH: No lymphadenopathy noted  SKIN: No rashes or lesions      LABS:  CBC Full  -  ( 03 Jul 2021 06:48 )  WBC Count : 7.93 K/uL  RBC Count : 3.55 M/uL  Hemoglobin : 10.6 g/dL  Hematocrit : 32.3 %  Platelet Count - Automated : 236 K/uL  Mean Cell Volume : 91.0 fl  Mean Cell Hemoglobin : 29.9 pg  Mean Cell Hemoglobin Concentration : 32.8 gm/dL  Auto Neutrophil # : 5.74 K/uL  Auto Lymphocyte # : 0.69 K/uL  Auto Monocyte # : 1.18 K/uL  Auto Eosinophil # : 0.17 K/uL  Auto Basophil # : 0.10 K/uL  Auto Neutrophil % : 72.4 %  Auto Lymphocyte % : 8.7 %  Auto Monocyte % : 14.9 %  Auto Eosinophil % : 2.1 %  Auto Basophil % : 1.3 %    07-03    146<H>  |  116<H>  |  49<H>  ----------------------------<  111<H>  3.4<L>   |  16<L>  |  2.99<H>    Ca    8.9      03 Jul 2021 06:48  Phos  2.8     07-03  Mg     1.7     07-03    TPro  6.5  /  Alb  2.1<L>  /  TBili  0.7  /  DBili  x   /  AST  23  /  ALT  22  /  AlkPhos  117  07-03    [ x ]  DVT Prophylaxis, on Eliquis for afib  [  ]  Nutrition, Brand, Rate, taking po       Malnutrition       RADIOLOGY & ADDITIONAL STUDIES:    < from: Xray Chest 1 View-PORTABLE IMMEDIATE (Xray Chest 1 View-PORTABLE IMMEDIATE .) (06.29.21 @ 09:44) >  EXAM:  XR CHEST PORTABLE IMMED 1V                          EXAM:  XR CHEST PORTABLE IMMED 1V                            PROCEDURE DATE:  06/28/2021          INTERPRETATION:  TECHNIQUE: A single AP view of the chest was obtained. Ordered time:   6/28/2021 6:19 PM, 6/29/2021 9:44 AM    COMPARISON: 6/28/2021    CLINICAL INFORMATION: Septic shock    FINDINGS:  6/28/2021: A right IJ catheter was placed with its tip in the SVC.  The heart is not well assessed on an AP film.  The aorta is calcified.  There is linear atelectasis at the left lung base.  There are no pleural effusions.  There is no pneumothorax.    6/29/2021: The linear atelectasis at the left lung base has improved.    IMPRESSION:    Right IJ catheter with its tip in the SVC. No pneumothorax.          JENNIFER ONEAL MD; Attending Radiologist  This document has been electronically signed.  JENNIFER ONEAL MD; Attending Radiologist  This document has been electronically signed. Jun 29 2021 11:44AM    < end of copied text >  < from: CT Chest No Cont (06.28.21 @ 11:59) >  EXAM:  CT CHEST                            PROCEDURE DATE:  06/28/2021          INTERPRETATION:  CLINICAL INFORMATION: Hypotension    COMPARISON: None.    CONTRAST/COMPLICATIONS:  IV Contrast: NONE  Oral Contrast: NONE  Complications: None reportedat time of study completion    PROCEDURE:  CT of the Chest, Abdomen and Pelvis was performed.  Sagittal and coronal reformats were performed.    FINDINGS:  CHEST:  LUNGS AND LARGE AIRWAYS: Patent central airways. Patchy bibasilar dependent consolidation/atelectasis right greater than left. Correlate clinically for infection. biapical scarring  PLEURA: No pleural effusion.  VESSELS: Nonaneurysmal.  HEART: Mild cardiomegaly with coronary artery calcification No pericardial effusion.  MEDIASTINUM AND CLARICE: No lymphadenopathy.  CHEST WALL AND LOWER NECK: Nonspecific left breast calcification..    ABDOMEN AND PELVIS:  LIVER: Within normal limits.  BILE DUCTS: Marked dilatation common duct up to 1.7 cm mild intrahepatic biliary dilatation. The distal common duct obstructing stone and/or lesion is considered. Correlate with MR.  GALLBLADDER: Gallstones. Markedly distended gallbladder with pericholecystic fluid concerning for cholecystitis. Correlate with right upper quadrant ultrasound and/or HIDA.  SPLEEN: Within normal limits.  PANCREAS: Not well evaluated on this noncontrast study. However, there is diffuse atrophy of the body and tail with dilatation of the main pancreatic duct. Increased soft tissue density in the pancreatic head and uncinate process suspicious for a mass. Recommend  MRI  ADRENALS: Within normal limits.  KIDNEYS/URETERS: Punctate nonobstructive right renal calculus    BLADDER: Collapsed around a Ramesh.  REPRODUCTIVE ORGANS: No gynecologic mass    BOWEL: No bowel obstruction. Appendix no appendicitis  PERITONEUM: No ascites.  VESSELS: Nonaneurysmal  RETROPERITONEUM/LYMPH NODES: No lymphadenopathy.  ABDOMINAL WALL: Within normal limits.  BONES: No aggressive lesions    IMPRESSION:  Limited by lack of any exogenousoral or intravenous contrast  Patchy bibasilar dependent consolidation/atelectasis. Correlate clinically for infection.  Cholelithiasis, pericholecystic fluid concerning for cholecystitis. Correlate with ultrasound and/or HIDA scan.  Significant biliary dilatation, possible mass pancreatic head. Correlate with MRCP/abdominal MR with pancreatic protocol              DOUG SRIVASTAVA MD; Attending Radiologist  This document has been electronically signed. Jun 28 2021 12:29PM    < end of copied text >      Goals of Care Discussion with Family/Proxy/Other   - see note from/family meeting set up for for 6/30, MOLST in chart

## 2021-07-03 NOTE — PROGRESS NOTE ADULT - ATTENDING COMMENTS
76 year old female from [facility] with PMHx of PVD, right AKA (S/P fem pop bypass), PAF, COPD, bipolar disorder, hypertension and PSHx of S/P CABG x1 brought into the ED via EMS from [facility] for unresponsiveness and hypotension. Per NH supervisor Mr. Gaspar () Pt was found unresponsive today morning around 8 am, pt was fine last night, pt did not have fever, difficulty breathing, pt was only receptive to painful stimuli and on evaluation pt's blood pressure was low per papers Pt's bp was 62/38. Pt was sent to the hospital for further evaluation. Baseline mental status alert, oriented and follows instruction, non ambulatory at baseline.     ED course: Alicia was placed and pt was given 3L ivf s/p urine out put of 40cc, u/a positive for infection, CT chest showing b/l patchy infiltrates/ consolidations, concern for cholecystitis, with dilated bile duct of 1.7 cm, and concern of pancreatic head mass. Pt was given a dose of vancomycin and rocephin. Pt's blood pressure improve to 97/58, hr 109 bpm, ECG showing sinus tachy, RBBB, LAFB, Bifacicular block.     Patient admitted to the ICU for septic shock requiring vasopressors. Source of infection likely acute cholecystitis as demonstrated on CT abdomen vs UTI. Patient was started on levophed, noted with severe acidosis requiring bicarb drip and VANESSA, likely pre-renal. Patient was started on cefepime and flagyl for sepsis 2/2 cholecystitis. Lactate acidosis was started on bicarb gtt. VANESSA likely pre-renal obtained multiple boluses with low urine output. Patient gradually improved with resolution of hypotension, was able to taper off pressors, bicarb improved was able to dc bicarb gtt. Urine output gradually increased. on 6/29, patient underwent IR guided cholecystostomy, with drainage of bile. Evaluated by GI after, recommended MRCP. Unable to obtain MRCP as of 6/30 due to altered mental status suspected to be due to gabapentin toxicity in the setting of VANESSA. Unstable for MRCP, will continue monitoring mental status. Mental status improved 7/1-7/2, pt aaox2 to self and location. Seen by speech and swallow, recommends mechanical soft thin liquid diet.     7/2 pt downgrade to SCU, failed TOV, alicia reinserted            COVID-19 Negative Lab Result:  · COVID-19 Negative Lab Result	COVID-19 ruled out     Problem/Plan - 1:  ·  Problem: Acute encephalopathy.  Plan: - like infectious process UTI Vs Cholecystitis, improving   - Patchy bibasilar dependent consolidation/atelectasis on CT  - sepsis improved, s/p vasopressor  - CT scan concern for colicystitis  - s/p cholecystectomy 9/30  - urine culture negative  - blood culture negative so far  - c/w cefepime and metronidazole  - c/w supportive care.      Problem/Plan - 2:  ·  Problem: Acute cholecystitis.  Plan: - CT with acute cholecystitis with common duct up to 1.7 cm mild intrahepatic biliary dilatation. The distal common duct obstructing stone and lesion  - s/p Cholecystectomy drain, no growth on culture  - BCx negative  - will need MRCP,  unable to per form while in ICU due to ams  - GI Dr Pinon.      Problem/Plan - 3:  ·  Problem: UTI (urinary tract infection).  Plan: - S/p vanco and Rocephin in ED  - rest plan as above.      Problem/Plan - 4:  ·  Problem: COPD (chronic obstructive pulmonary disease).  Plan: - acute exacerbation, not on any medication at home  - started on albuterol MDI

## 2021-07-03 NOTE — PROGRESS NOTE ADULT - PROBLEM SELECTOR PLAN 2
- CT with acute cholecystitis with common duct up to 1.7 cm mild intrahepatic biliary dilatation. The distal common duct obstructing stone and lesion  - s/p Cholecystectomy drain, no growth on culture  - BCx negative  - will need MRCP,  unable to per form while in ICU due to ams  - GI Dr Pinon

## 2021-07-03 NOTE — PROGRESS NOTE ADULT - SUBJECTIVE AND OBJECTIVE BOX
[facility] : Progress Note :: [phone], (office [phone]),   Dr Noriega / Dr Nava / Dr Granados / Dr Burrell / Dr Amish LOYD / Dr Stein / Dr Ridley / Dr Calvin louie  _____________________________________________________________________________________________    downgraded from ICU  S/O SOB    influenza virus vaccine, live, trivalent (Unknown)  PC Pen VK (Rash)  penicillin (Other; Rash)  statins (Other)  sulfa drugs (Other)  sulfamethizole (Other)    Hospital Medications:   MEDICATIONS  (STANDING):  ALBUTerol    0.083% 2.5 milliGRAM(s) Nebulizer every 6 hours  ALBUTerol    90 MICROgram(s) HFA Inhaler 1 Puff(s) Inhalation every 4 hours  apixaban 5 milliGRAM(s) Oral every 12 hours  cefepime   IVPB      cefepime   IVPB 1000 milliGRAM(s) IV Intermittent every 24 hours  ferrous    sulfate 325 milliGRAM(s) Oral daily  methylPREDNISolone sodium succinate Injectable 40 milliGRAM(s) IV Push every 8 hours  metoprolol tartrate 25 milliGRAM(s) Oral two times a day  metroNIDAZOLE  IVPB 500 milliGRAM(s) IV Intermittent every 8 hours  metroNIDAZOLE  IVPB      mupirocin 2% Ointment 1 Application(s) Both Nostrils two times a day  pantoprazole  Injectable 40 milliGRAM(s) IV Push at bedtime  senna 2 Tablet(s) Oral at bedtime      VITALS:  T(F): 97.9 (21 @ 10:00), Max: 98.8 (21 @ 07:46)  HR: 104 (21 @ 10:00)  BP: 167/84 (21 @ 10:00)  RR: 24 (21 @ 07:46)  SpO2: 94% (07-03-21 @ 10:00)  Wt(kg): --     @ 07:01  -   @ 07:00  --------------------------------------------------------  IN: 850 mL / OUT: 1270 mL / NET: -420 mL        PHYSICAL EXAM:  Constitutional: NAD  HEENT: anicteric sclera, oropharynx clear, MMM  Neck: No JVD  Respiratory: , rale+ bilaterally  Cardiovascular: S1, S2, RRR  Gastrointestinal: BS+, soft, NT/ND  Extremities:  peripheral edema++  Neurological: A/O x 3, no focal deficits  : No CVA tenderness. alicia+    LABS:      146<H>  |  116<H>  |  49<H>  ----------------------------<  111<H>  3.4<L>   |  16<L>  |  2.99<H>    Ca    8.9      2021 06:48  Phos  2.8     07-  Mg     1.7     07-    TPro  6.5  /  Alb  2.1<L>  /  TBili  0.7  /  DBili      /  AST  23  /  ALT  22  /  AlkPhos  117  07-03    Creatinine Trend: 2.99 <--, 3.80 <--, 4.46 <--, 5.08 <--, 4.94 <--, 5.02 <--, 5.24 <--, 5.31 <--, 5.81 <--, 6.16 <--                        10.6   7.93  )-----------( 236      ( 2021 06:48 )             32.3     Urine Studies:  Urinalysis Basic - ( 2021 10:46 )    Color: Yellow / Appearance: Clear / S.015 / pH:   Gluc:  / Ketone: Trace  / Bili: Negative / Urobili: Negative   Blood:  / Protein: 100 / Nitrite: Negative   Leuk Esterase: Small / RBC: 5-10 /HPF / WBC 26-50 /HPF   Sq Epi:  / Non Sq Epi: Few /HPF / Bacteria: Trace /HPF      Osmolality, Random Urine: 256 mos/kg ( @ 00:52)  Sodium, Random Urine: 31 mmol/L ( @ 00:52)  Creatinine, Random Urine: 87 mg/dL ( @ 00:52)    RADIOLOGY & ADDITIONAL STUDIES:

## 2021-07-03 NOTE — PROGRESS NOTE ADULT - PROBLEM SELECTOR PLAN 10
- DNR/DNI, MOLTS in chart  - Needs future MRCP - DNR/DNI, MOLTS in chart  - Needs future MRCP  - f/u CXR - DNR/DNI, MOLTS in chart  - Needs future MRCP  - f/u CXR  - Pt niece Silva Marquez  [phone] is her HCP, guardianship. and POA, according to her, any decision should be discussed with her, also requesting new nursing home.   - CM to follow after long weekend.  - updated niece regarding pt's condition, intervention and plan of care, all questions answered.

## 2021-07-03 NOTE — PROGRESS NOTE ADULT - PROBLEM SELECTOR PLAN 5
- s/p vasopressor in due to septic shock, however BP trending up with tachycardia  - Pt on home medications tropol XL  - started on metoprolol titrate 25mg BID for now with parameters- discussed with Dr Carpio  - monitor BP

## 2021-07-03 NOTE — PROGRESS NOTE ADULT - PROBLEM SELECTOR PLAN 4
- acute exacerbation, not on any medication at home  - started on albuterol MDI  -  started on solumedrol today 7/3  - Pulm Dr Persuad - acute exacerbation, not on any medication at home  - started on albuterol MDI  -  started on solumedrol today 7/3  - will repeat CXR today  - Pulphylicia Beebe

## 2021-07-03 NOTE — PROGRESS NOTE ADULT - PROBLEM SELECTOR PLAN 1
- like infectious process UTI Vs Cholecystitis, improving   - Patchy bibasilar dependent consolidation/atelectasis on CT  - sepsis improved, s/p vasopressor  - CT scan concern for colicystitis  - s/p cholecystectomy 9/30  - urine culture negative  - blood culture negative so far  - c/w cefepime and metronidazole  - c/w supportive care

## 2021-07-03 NOTE — PROGRESS NOTE ADULT - SUBJECTIVE AND OBJECTIVE BOX
Patient is a 76y old  Female who presents with a chief complaint of hypotension and unresponsivness (2021 12:49)    PATIENT IS SEEN AND EXAMINED IN MEDICAL FLOOR.    RENALDO [    ]    TASH [   ]      GT [   ]    ALLERGIES:  influenza virus vaccine, live, trivalent (Unknown)  PC Pen VK (Rash)  penicillin (Other; Rash)  statins (Other)  sulfa drugs (Other)  sulfamethizole (Other)      Daily     Daily Weight in k.3 (2021 05:16)    VITALS:    Vital Signs Last 24 Hrs  T(C): 36.9 (2021 14:00), Max: 37.1 (2021 07:46)  T(F): 98.4 (2021 14:00), Max: 98.8 (2021 07:46)  HR: 99 (2021 17:00) (98 - 116)  BP: 171/88 (2021 17:00) (141/83 - 192/91)  BP(mean): 96 (2021 21:00) (96 - 99)  RR: 18 (2021 14:00) (18 - 24)  SpO2: 94% (2021 14:00) (85% - 96%)    LABS:    CBC Full  -  ( 2021 06:48 )  WBC Count : 7.93 K/uL  RBC Count : 3.55 M/uL  Hemoglobin : 10.6 g/dL  Hematocrit : 32.3 %  Platelet Count - Automated : 236 K/uL  Mean Cell Volume : 91.0 fl  Mean Cell Hemoglobin : 29.9 pg  Mean Cell Hemoglobin Concentration : 32.8 gm/dL  Auto Neutrophil # : 5.74 K/uL  Auto Lymphocyte # : 0.69 K/uL  Auto Monocyte # : 1.18 K/uL  Auto Eosinophil # : 0.17 K/uL  Auto Basophil # : 0.10 K/uL  Auto Neutrophil % : 72.4 %  Auto Lymphocyte % : 8.7 %  Auto Monocyte % : 14.9 %  Auto Eosinophil % : 2.1 %  Auto Basophil % : 1.3 %          146<H>  |  116<H>  |  49<H>  ----------------------------<  111<H>  3.4<L>   |  16<L>  |  2.99<H>    Ca    8.9      2021 06:48  Phos  2.8     07-03  Mg     1.7     07-03    TPro  6.5  /  Alb  2.1<L>  /  TBili  0.7  /  DBili  x   /  AST  23  /  ALT  22  /  AlkPhos  117  07-03    CAPILLARY BLOOD GLUCOSE      POCT Blood Glucose.: 103 mg/dL (2021 05:59)  POCT Blood Glucose.: 113 mg/dL (2021 23:54)        LIVER FUNCTIONS - ( 2021 06:48 )  Alb: 2.1 g/dL / Pro: 6.5 g/dL / ALK PHOS: 117 U/L / ALT: 22 U/L DA / AST: 23 U/L / GGT: x           Creatinine Trend: 2.99<--, 3.80<--, 4.46<--, 5.08<--, 4.94<--, 5.02<--  I&O's Summary    2021 07:01  -  2021 07:00  --------------------------------------------------------  IN: 850 mL / OUT: 1270 mL / NET: -420 mL    2021 07:01  -  2021 19:14  --------------------------------------------------------  IN: 0 mL / OUT: 440 mL / NET: -440 mL            .Body Fluid Abdominal Fluid   @ 21:59   No growth  --    No polymorphonuclear leukocytes seen  No organisms seen  by cytocentrifuge      .Urine Clean Catch (Midstream)   @ 18:43   <10,000 CFU/mL Normal Urogenital Magda  --  --      .Blood Blood-Peripheral   @ 13:03   No Growth Final  --  --      .Blood Blood   @ 22:03   No Growth Final  --  --      .Urine Clean Catch (Midstream)   @ 18:12   >100,000 CFU/ml Escherichia coli  --  Escherichia coli      .Blood Blood-Peripheral   @ 19:01   No Growth Final  --  --          MEDICATIONS:    MEDICATIONS  (STANDING):  ALBUTerol    0.083% 2.5 milliGRAM(s) Nebulizer every 6 hours  ALBUTerol    90 MICROgram(s) HFA Inhaler 1 Puff(s) Inhalation every 4 hours  apixaban 5 milliGRAM(s) Oral every 12 hours  cefepime   IVPB      cefepime   IVPB 1000 milliGRAM(s) IV Intermittent every 24 hours  ferrous    sulfate 325 milliGRAM(s) Oral daily  furosemide   Injectable 40 milliGRAM(s) IV Push daily  methylPREDNISolone sodium succinate Injectable 40 milliGRAM(s) IV Push every 8 hours  metoprolol tartrate 25 milliGRAM(s) Oral two times a day  metroNIDAZOLE  IVPB      metroNIDAZOLE  IVPB 500 milliGRAM(s) IV Intermittent every 8 hours  mupirocin 2% Ointment 1 Application(s) Both Nostrils two times a day  pantoprazole  Injectable 40 milliGRAM(s) IV Push at bedtime  senna 2 Tablet(s) Oral at bedtime      MEDICATIONS  (PRN):  acetaminophen   Tablet .. 650 milliGRAM(s) Oral every 6 hours PRN Temp greater or equal to 38C (100.4F), Mild Pain (1 - 3)  sodium chloride 0.9% lock flush 10 milliLiter(s) IV Push every 1 hour PRN Pre/post blood products, medications, blood draw, and to maintain line patency        REVIEW OF SYSTEMS:                           ALL ROS DONE [ X   ]      CONSTITUTIONAL:  LETHARGIC [   ], FEVER [   ], UNRESPONSIVE [   ]  CVS:  CP  [   ], SOB, [   ], PALPITATIONS [   ], DIZZYNESS [   ]  RS: COUGH [   ], SPUTUM [   ]  GI: ABDOMINAL PAIN [   ], NAUSEA [   ], VOMITINGS [   ], DIARRHEA [   ], CONSTIPATION [   ]  :  DYSURIA [   ], NOCTURIA [   ], INCREASED FREQUENCY [   ], DRIBLING [   ],  SKELETAL: PAINFUL JOINTS [   ], SWOLLEN JOINTS [   ], NECK ACHE [   ], LOW BACK ACHE [   ],  SKIN : ULCERS [   ], RASH [   ], ITCHING [   ]  CNS: HEAD ACHE [   ], DOUBLE VISION [   ], BLURRED VISION [   ], AMS / CONFUSION [   ], SEIZURES [   ], WEAKNESS [   ],TINGLING / NUMBNESS [   ]      PHYSICAL EXAMINATION:    GENERAL APPEARANCE: NO DISTRESS  HEENT:  NO PALLOR, NO  JVD,  NO   NODES, NECK SUPPLE  CVS: S1 +, S2 +,   RS: AEEB,  OCCASIONAL  RALES +,   NO RONCHI  ABD: SOFT, NT, NO, BS +                                    BIANCHI ++  EXT: NO PE  SKIN: WARM,   SKELETAL:  ROM ACCEPTABLE                            RIGHT AKA +  CNS:  AAO X 1   ,  NO DEFICITS        RADIOLOGY :    < from: Xray Chest 1 View-PORTABLE IMMEDIATE (Xray Chest 1 View-PORTABLE IMMEDIATE .) (21 @ 09:44) >  2021: The linear atelectasis at the left lung base has improved.    IMPRESSION:    Right IJ catheter with its tip in the SVC. No pneumothorax.    < end of copied text >  < from: CT Chest No Cont (21 @ 11:59) >  IMPRESSION:  Limited by lack of any exogenousoral or intravenous contrast  Patchy bibasilar dependent consolidation/atelectasis. Correlate clinically for infection.  Cholelithiasis, pericholecystic fluid concerning for cholecystitis. Correlate with ultrasound and/or HIDA scan.  Significant biliary dilatation, possible mass pancreatic head. Correlate with MRCP/abdominal MR with pancreatic protocol    < end of copied text >        ASSESSMENT :     Sepsis    Yes    Myocardial infarct, old    Hypercholesterolemia    COPD (chronic obstructive pulmonary disease)    Peripheral vascular disease    COPD (chronic obstructive pulmonary disease)    Bipolar 1 disorder    HTN (hypertension)    Bedbound    S/P CABG x 1    Above knee amputation of right lower extremity        PLAN:  HPI:  76 year old female from [facility] with PMHx of PVD, right AKA (S/P fem pop bypass), PAF, COPD, bipolar disorder, hypertension and PSHx of S/P CABG x1 brought into the ED via EMS from [facility] for unresponsiveness and hypotension. Per NH supervisor Mr. Gaspar () Pt was found unresponsive today morning around 8 am, pt was fine last night, pt did not have fever, difficulty breathing, pt was only receptive to painful stimuli and on evaluation pt's blood pressure was low per papers Pt's bp was 62/38. Pt was sent to the hospital for further evaluation. Baseline mental status alert, oriented and follows instruction, non ambulatory at baseline.   Allergy: Influenza vaccine and penicillin   Code status: Full code     ED course: Bianchi was placed and pt was given 3L ivf s/p urine out put of 40cc, u/a positive for infection, CT chest showing b/l patchy infiltrates/ consolidations, concern for cholecystitis, with dilated bile duct of 1.7 cm, and concern of pancreatic head mass. Pt was given a dose of vancomycin and rocephine. Pt's blood pressure improve to 97/58, hr 109 bpm, ECG showing sinus tachy, RBBB, LAFB, Bifacicular block.    (2021 14:58)    -  SEPSIS , UTI , SUSPECT ASPIRATION PNEUMONIA, ACUTE CHOLECYSTITIS - ON IV. CEFEPIME, METRONIDAZOLE, S/P IR GUIDED CHOLECYSTECTOMY. PATIENT WAS FOLLOWED IN ICU, S/P IV. PRESSORS. NOW PATIENT IS OFF PRESSORS  -  ACUTE METABOLIC ENCEPHALOPATHY , ADVANCING VASCULAR DEMENTIA - STILL CONFUSED   -  ATN / ARF WITH CKD - IMPROVING RENAL FUNCTION - RENAL EVALUATION IS IN PROGRESS   -  A.FIB  / FLUID OVER LOAD ON IV. LASIX - F/UP BMP, CONTINUE ELIQUIS  -  NORMOCYTIC ANEMIA   -  PAD, OLD RIGHT AKA   -  GI PROPHYLAXIS    - DR. ANITA REYES ( COVERING DR. WALKER  )

## 2021-07-03 NOTE — PROGRESS NOTE ADULT - ASSESSMENT
76 year old female from [facility] with PMHx of PVD, right AKA (S/P fem pop bypass), PAF, COPD, bipolar disorder, hypertension and PSHx of S/P CABG x1 brought into the ED via EMS from [facility] for unresponsiveness and hypotension. Per NH supervisor Mr. Gaspar () Pt was found unresponsive today morning around 8 am, pt was fine last night, pt did not have fever, difficulty breathing, pt was only receptive to painful stimuli and on evaluation pt's blood pressure was low per papers Pt's bp was 62/38. Pt was sent to the hospital for further evaluation. Baseline mental status alert, oriented and follows instruction, non ambulatory at baseline.     ED course: Alicia was placed and pt was given 3L ivf s/p urine out put of 40cc, u/a positive for infection, CT chest showing b/l patchy infiltrates/ consolidations, concern for cholecystitis, with dilated bile duct of 1.7 cm, and concern of pancreatic head mass. Pt was given a dose of vancomycin and rocephin. Pt's blood pressure improve to 97/58, hr 109 bpm, ECG showing sinus tachy, RBBB, LAFB, Bifacicular block.     Patient admitted to the ICU for septic shock requiring vasopressors. Source of infection likely acute cholecystitis as demonstrated on CT abdomen vs UTI. Patient was started on levophed, noted with severe acidosis requiring bicarb drip and VANESSA, likely pre-renal. Patient was started on cefepime and flagyl for sepsis 2/2 cholecystitis. Lactate acidosis was started on bicarb gtt. VANESSA likely pre-renal obtained multiple boluses with low urine output. Patient gradually improved with resolution of hypotension, was able to taper off pressors, bicarb improved was able to dc bicarb gtt. Urine output gradually increased. on 6/29, patient underwent IR guided cholecystostomy, with drainage of bile. Evaluated by GI after, recommended MRCP. Unable to obtain MRCP as of 6/30 due to altered mental status suspected to be due to gabapentin toxicity in the setting of VANESSA. Unstable for MRCP, will continue monitoring mental status. Mental status improved 7/1-7/2, pt aaox2 to self and location. Seen by speech and swallow, recommends mechanical soft thin liquid diet.     7/2 pt downgrade to SCU, failed TOV, alicia reinserted

## 2021-07-03 NOTE — PROGRESS NOTE ADULT - ASSESSMENT
Patient is a 76y Female whom was sent to ED from the nursing home  to the hospital with hypotension and confusion.  Has H/O PVD s/p RT AKA, COPD, HTN, CABG x1. Blood pressure in the nursing home was 62/32.  In ED noted to have severe hypotension, confused  work-up revealed acute kidnye injury SCR 6 ( normal SCR at baseline).  Also with severe mixed gap and non-gap metabolic acidosis.  s/p isotonic fluid bolus and later started on NAHCO3 gtt as well as vasopressors and broad spectrum antibiotics  CT abdomen revealed acute cholecystitis, pancreatic head mass. No hydronephrosis  urine output improved .  BP better  s/p cholecystotomy  hypernatremia -better    # VANESSA sec to established ATN  from  septic shock ( septic shock now resolved)  clinically improved  SCR better  non-oliguric  Mental status much improved.  hypernatremia improving   enocurage incr of free water intake   cont ABX    #bicarb low but with hold on NAHCO3 as fluid overloaded    #hypokalemia repleted    hypernatremia- better    # hypervolemia- lasix 40mg x 2 days

## 2021-07-04 NOTE — PROGRESS NOTE ADULT - SUBJECTIVE AND OBJECTIVE BOX
MERARY MEYER    SCU progress note    INTERVAL HPI/OVERNIGHT EVENTS: no new complaints     DNR [ ]   DNI  [  ] DNR/ DNI    Covid - 19 PCR: negative     The 4Ms    What Matters Most: see GOC  Age appropriate Medications/Screen for High Risk Medication: Yes  Mentation: see CAM below  Mobility: defer to physical exam    The Confusion Assessment Method (CAM) Diagnostic Algorithm     1: Acute Onset or Fluctuating Course  - Is there evidence of an acute change in mental status from the patient’s baseline? Did the (abnormal) behavior  fluctuate during the day, that is, tend to come and go, or increase and decrease in severity?  [ ] YES [x ] NO     2: Inattention  - Did the patient have difficulty focusing attention, being easily distractible, or having difficulty keeping track of what was being said?  [ ] YES [x ] NO     3: Disorganized thinking  -Was the patient’s thinking disorganized or incoherent, such as rambling or irrelevant conversation, unclear or illogical flow of ideas, or unpredictable switching from subject to subject?  [ ] YES [ x] NO    4: Altered Level of consciousness?  [ ] YES [x ] NO    The diagnosis of delirium by CAM requires the presence of features 1 and 2 and either 3 or 4.    PRESSORS: [ ] YES [ ] NO  cefepime   IVPB      cefepime   IVPB 1000 milliGRAM(s) IV Intermittent every 24 hours  metroNIDAZOLE  IVPB      metroNIDAZOLE  IVPB 500 milliGRAM(s) IV Intermittent every 8 hours    Cardiovascular:  Heart Failure  Acute   Acute on Chronic  Chronic       furosemide   Injectable 40 milliGRAM(s) IV Push daily  metoprolol tartrate 50 milliGRAM(s) Oral two times a day    Pulmonary:  ALBUTerol    0.083% 2.5 milliGRAM(s) Nebulizer every 6 hours  ALBUTerol    90 MICROgram(s) HFA Inhaler 1 Puff(s) Inhalation every 4 hours    Hematalogic:  apixaban 5 milliGRAM(s) Oral every 12 hours    Other:  acetaminophen   Tablet .. 650 milliGRAM(s) Oral every 6 hours PRN  ferrous    sulfate 325 milliGRAM(s) Oral daily  methylPREDNISolone sodium succinate Injectable 40 milliGRAM(s) IV Push every 8 hours  mupirocin 2% Ointment 1 Application(s) Both Nostrils two times a day  pantoprazole  Injectable 40 milliGRAM(s) IV Push at bedtime  potassium chloride   Powder 40 milliEquivalent(s) Oral once  senna 2 Tablet(s) Oral at bedtime  sodium chloride 0.9% lock flush 10 milliLiter(s) IV Push every 1 hour PRN    acetaminophen   Tablet .. 650 milliGRAM(s) Oral every 6 hours PRN  ALBUTerol    0.083% 2.5 milliGRAM(s) Nebulizer every 6 hours  ALBUTerol    90 MICROgram(s) HFA Inhaler 1 Puff(s) Inhalation every 4 hours  apixaban 5 milliGRAM(s) Oral every 12 hours  cefepime   IVPB      cefepime   IVPB 1000 milliGRAM(s) IV Intermittent every 24 hours  ferrous    sulfate 325 milliGRAM(s) Oral daily  furosemide   Injectable 40 milliGRAM(s) IV Push daily  methylPREDNISolone sodium succinate Injectable 40 milliGRAM(s) IV Push every 8 hours  metoprolol tartrate 50 milliGRAM(s) Oral two times a day  metroNIDAZOLE  IVPB      metroNIDAZOLE  IVPB 500 milliGRAM(s) IV Intermittent every 8 hours  mupirocin 2% Ointment 1 Application(s) Both Nostrils two times a day  pantoprazole  Injectable 40 milliGRAM(s) IV Push at bedtime  potassium chloride   Powder 40 milliEquivalent(s) Oral once  senna 2 Tablet(s) Oral at bedtime  sodium chloride 0.9% lock flush 10 milliLiter(s) IV Push every 1 hour PRN    Drug Dosing Weight  Height (cm): 170.2 (28 Jun 2021 10:15)  Weight (kg): 66.9 (28 Jun 2021 16:29)  BMI (kg/m2): 23.1 (28 Jun 2021 16:29)  BSA (m2): 1.78 (28 Jun 2021 16:29)    CENTRAL LINE: [ ] YES [ ] NO  LOCATION:   DATE INSERTED:  REMOVE: [ ] YES [ ] NO  EXPLAIN:    TASH: [ ] YES [ ] NO    DATE INSERTED:  REMOVE:  [ ] YES [ ] NO  EXPLAIN:    PAST MEDICAL & SURGICAL HISTORY:  Myocardial infarct, old    Hypercholesterolemia    Peripheral vascular disease    COPD (chronic obstructive pulmonary disease)    Bipolar 1 disorder    HTN (hypertension)    Bedbound    S/P CABG x 1    Above knee amputation of right lower extremity      07-03 @ 07:01  -  07-04 @ 07:00  --------------------------------------------------------  IN: 0 mL / OUT: 2260 mL / NET: -2260 mL    PHYSICAL EXAM:    GENERAL: NAD, sleepy today, arousable, conversing well  HEAD:  Atraumatic, Normocephalic  EYES: EOMI, PERRLA, conjunctiva and sclera clear  ENMT: No tonsillar erythema, exudates, or enlargement; Moist mucous membranes, Good dentition, No lesions  NECK: Supple, No JVD, Normal thyroid  NERVOUS SYSTEM:  Alert & Oriented X2, moderate Motor Strength BUE, weak lower extremitatis.  CHEST/LUNG: Clear to percussion bilaterally; No rales, rhonchi, wheezing, or rubs  HEART: Regular rate and rhythm; No murmurs, rubs, or gallops  ABDOMEN: Soft, Nontender, Nondistended; Bowel sounds present  EXTREMITIES:  2+ Peripheral Pulses LLE, R AKA,  No clubbing, cyanosis, or edema  LYMPH: No lymphadenopathy noted  SKIN: No rashes or lesions      LABS:  CBC Full  -  ( 04 Jul 2021 06:13 )  WBC Count : 4.52 K/uL  RBC Count : 3.49 M/uL  Hemoglobin : 10.3 g/dL  Hematocrit : 31.5 %  Platelet Count - Automated : 271 K/uL  Mean Cell Volume : 90.3 fl  Mean Cell Hemoglobin : 29.5 pg  Mean Cell Hemoglobin Concentration : 32.7 gm/dL  Auto Neutrophil # : 3.80 K/uL  Auto Lymphocyte # : 0.45 K/uL  Auto Monocyte # : 0.19 K/uL  Auto Eosinophil # : 0.00 K/uL  Auto Basophil # : 0.02 K/uL  Auto Neutrophil % : 84.1 %  Auto Lymphocyte % : 10.0 %  Auto Monocyte % : 4.2 %  Auto Eosinophil % : 0.0 %  Auto Basophil % : 0.4 %    07-04    147<H>  |  116<H>  |  51<H>  ----------------------------<  198<H>  3.4<L>   |  17<L>  |  2.94<H>    Ca    8.8      04 Jul 2021 06:13  Phos  2.9     07-04  Mg     1.7     07-04    TPro  6.7  /  Alb  2.3<L>  /  TBili  0.7  /  DBili  x   /  AST  15  /  ALT  20  /  AlkPhos  113  07-04    [ x ]  DVT Prophylaxis  [  ]  Nutrition, Brand, Rate takes PO            RADIOLOGY & ADDITIONAL STUDIES:   < from: Xray Chest 1 View- PORTABLE-Routine (Xray Chest 1 View- PORTABLE-Routine .) (07.03.21 @ 11:50) >  EXAM:  XR CHEST PORTABLE ROUTINE 1V                            PROCEDURE DATE:  07/03/2021          INTERPRETATION:  Portable chest radiograph    CLINICAL INFORMATION: Dyspnea, shortness of breath.    TECHNIQUE:  Portable  AP view of the chest was obtained.    COMPARISON: 6/29/2021 chest available for review.    FINDINGS:    The lungs show increasing bilateral effusions and/or LEFT greater than RIGHT basilar airspace consolidations obscuring diaphragmatic contours.  There is of a diffuse vascular congestion and/or mild bilateral diffuse airspace disease.  .  . No pneumothorax.    The heart and mediastinum size and configuration are within normal limits.    Visualized osseous structures are intact.    IMPRESSION: Bilateral pleural effusions and/or LEFT greater than RIGHT basilar airspace consolidation.        JASKARAN SYKES MD; Attending Radiologist  This document has been electronically signed. Jul 4 2021  9:26AM    < end of copied text >  < from: Xray Chest 1 View-PORTABLE IMMEDIATE (Xray Chest 1 View-PORTABLE IMMEDIATE .) (06.29.21 @ 09:44) >  EXAM:  XR CHEST PORTABLE IMMED 1V                          EXAM:  XR CHEST PORTABLE IMMED 1V                            PROCEDURE DATE:  06/28/2021          INTERPRETATION:  TECHNIQUE: A single AP view of the chest was obtained. Ordered time:   6/28/2021 6:19 PM, 6/29/2021 9:44 AM    COMPARISON: 6/28/2021    CLINICAL INFORMATION: Septic shock    FINDINGS:  6/28/2021: A right IJ catheter was placed with its tip in the SVC.  The heart is not well assessed on an AP film.  The aorta is calcified.  There is linear atelectasis at the left lung base.  There are no pleural effusions.  There is no pneumothorax.    6/29/2021: The linear atelectasis at the left lung base has improved.    IMPRESSION:    Right IJ catheter with its tip in the SVC. No pneumothorax.      JENNIFER ONEAL MD; Attending Radiologist  This document has been electronically signed.  JENNIFER ONEAL MD; Attending Radiologist  This document has been electronically signed. Jun 29 2021 11:44AM    < end of copied text >      Goals of Care Discussion with Family/Proxy/Other   - see note from/family meeting set up for 6/30

## 2021-07-04 NOTE — DISCHARGE NOTE PROVIDER - CARE PROVIDER_API CALL
Yovani Carpio  INTERNAL MEDICINE  [address] [facility], [address]  [place] [address]  Phone: [phone]  Fax: [phone]  Follow Up Time: 2 weeks    Rm Nolan  Gastroenterology  [address] [place] [address]  Phone: [phone]  Fax: [phone]  Follow Up Time: 2 weeks    Christ Lee)  Medicine  Dept Director  [address] [place] [address], [address]  [place] [address]  Phone: [phone]  Fax: [phone]  Follow Up Time: 2 weeks

## 2021-07-04 NOTE — PROGRESS NOTE ADULT - PROBLEM SELECTOR PLAN 9
2/2 to established ATN  form septic shock, clinically improved  - S/p alicia reinsertion on 7/2, failed TOV  - encourage oral free water intake   - s/p bicarb gtt, stable now  - no acute need for renal replacement therapy- per nephro  - trend BMP  - Nephro Dr Noriega

## 2021-07-04 NOTE — DISCHARGE NOTE PROVIDER - CARE PROVIDERS DIRECT ADDRESSES
,DirectAddress_Unknown,[email or web address],DirectAddress_Unknown

## 2021-07-04 NOTE — PROGRESS NOTE ADULT - SUBJECTIVE AND OBJECTIVE BOX
Patient is a 76y old  Female who presents with a chief complaint of SEPSIS (03 Jul 2021 19:14)    PATIENT IS SEEN AND EXAMINED IN MEDICAL FLOOR.    RENALDO [    ]    TASH [   ]      GT [   ]    ALLERGIES:  influenza virus vaccine, live, trivalent (Unknown)  PC Pen VK (Rash)  penicillin (Other; Rash)  statins (Other)  sulfa drugs (Other)  sulfamethizole (Other)      Daily     Daily     VITALS:    Vital Signs Last 24 Hrs  T(C): 36.9 (04 Jul 2021 05:00), Max: 37.1 (03 Jul 2021 20:17)  T(F): 98.5 (04 Jul 2021 05:00), Max: 98.7 (03 Jul 2021 20:17)  HR: 100 (04 Jul 2021 09:24) (93 - 107)  BP: 184/96 (04 Jul 2021 05:10) (160/83 - 192/91)  BP(mean): --  RR: 19 (04 Jul 2021 05:10) (18 - 19)  SpO2: 96% (04 Jul 2021 09:24) (94% - 96%)    LABS:    CBC Full  -  ( 04 Jul 2021 06:13 )  WBC Count : 4.52 K/uL  RBC Count : 3.49 M/uL  Hemoglobin : 10.3 g/dL  Hematocrit : 31.5 %  Platelet Count - Automated : 271 K/uL  Mean Cell Volume : 90.3 fl  Mean Cell Hemoglobin : 29.5 pg  Mean Cell Hemoglobin Concentration : 32.7 gm/dL  Auto Neutrophil # : 3.80 K/uL  Auto Lymphocyte # : 0.45 K/uL  Auto Monocyte # : 0.19 K/uL  Auto Eosinophil # : 0.00 K/uL  Auto Basophil # : 0.02 K/uL  Auto Neutrophil % : 84.1 %  Auto Lymphocyte % : 10.0 %  Auto Monocyte % : 4.2 %  Auto Eosinophil % : 0.0 %  Auto Basophil % : 0.4 %      07-04    147<H>  |  116<H>  |  51<H>  ----------------------------<  198<H>  3.4<L>   |  17<L>  |  2.94<H>    Ca    8.8      04 Jul 2021 06:13  Phos  2.9     07-04  Mg     1.7     07-04    TPro  6.7  /  Alb  2.3<L>  /  TBili  0.7  /  DBili  x   /  AST  15  /  ALT  20  /  AlkPhos  113  07-04    CAPILLARY BLOOD GLUCOSE            LIVER FUNCTIONS - ( 04 Jul 2021 06:13 )  Alb: 2.3 g/dL / Pro: 6.7 g/dL / ALK PHOS: 113 U/L / ALT: 20 U/L DA / AST: 15 U/L / GGT: x           Creatinine Trend: 2.94<--, 2.99<--, 3.80<--, 4.46<--, 5.08<--, 4.94<--  I&O's Summary    03 Jul 2021 07:01  -  04 Jul 2021 07:00  --------------------------------------------------------  IN: 0 mL / OUT: 2260 mL / NET: -2260 mL            .Body Fluid Abdominal Fluid  06-29 @ 21:59   No growth  --    No polymorphonuclear leukocytes seen  No organisms seen  by cytocentrifuge      .Urine Clean Catch (Midstream)  06-28 @ 18:43   <10,000 CFU/mL Normal Urogenital Magda  --  --      .Blood Blood-Peripheral  06-28 @ 13:03   No Growth Final  --  --      .Blood Blood  04-19 @ 22:03   No Growth Final  --  --      .Urine Clean Catch (Midstream)  04-19 @ 18:12   >100,000 CFU/ml Escherichia coli  --  Escherichia coli      .Blood Blood-Peripheral  04-18 @ 19:01   No Growth Final  --  --          MEDICATIONS:    MEDICATIONS  (STANDING):  ALBUTerol    0.083% 2.5 milliGRAM(s) Nebulizer every 6 hours  ALBUTerol    90 MICROgram(s) HFA Inhaler 1 Puff(s) Inhalation every 4 hours  apixaban 5 milliGRAM(s) Oral every 12 hours  cefepime   IVPB      cefepime   IVPB 1000 milliGRAM(s) IV Intermittent every 24 hours  ferrous    sulfate 325 milliGRAM(s) Oral daily  furosemide   Injectable 40 milliGRAM(s) IV Push daily  methylPREDNISolone sodium succinate Injectable 40 milliGRAM(s) IV Push every 8 hours  metoprolol tartrate 50 milliGRAM(s) Oral two times a day  metroNIDAZOLE  IVPB      metroNIDAZOLE  IVPB 500 milliGRAM(s) IV Intermittent every 8 hours  mupirocin 2% Ointment 1 Application(s) Both Nostrils two times a day  pantoprazole  Injectable 40 milliGRAM(s) IV Push at bedtime  potassium chloride   Powder 40 milliEquivalent(s) Oral once  senna 2 Tablet(s) Oral at bedtime      MEDICATIONS  (PRN):  acetaminophen   Tablet .. 650 milliGRAM(s) Oral every 6 hours PRN Temp greater or equal to 38C (100.4F), Mild Pain (1 - 3)  sodium chloride 0.9% lock flush 10 milliLiter(s) IV Push every 1 hour PRN Pre/post blood products, medications, blood draw, and to maintain line patency        REVIEW OF SYSTEMS:                           ALL ROS DONE [ X   ]      CONSTITUTIONAL:  LETHARGIC [   ], FEVER [   ], UNRESPONSIVE [   ]  CVS:  CP  [   ], SOB, [   ], PALPITATIONS [   ], DIZZYNESS [   ]  RS: COUGH [   ], SPUTUM [   ]  GI: ABDOMINAL PAIN [   ], NAUSEA [   ], VOMITINGS [   ], DIARRHEA [   ], CONSTIPATION [   ]  :  DYSURIA [   ], NOCTURIA [   ], INCREASED FREQUENCY [   ], DRIBLING [   ],  SKELETAL: PAINFUL JOINTS [   ], SWOLLEN JOINTS [   ], NECK ACHE [   ], LOW BACK ACHE [   ],  SKIN : ULCERS [   ], RASH [   ], ITCHING [   ]  CNS: HEAD ACHE [   ], DOUBLE VISION [   ], BLURRED VISION [   ], AMS / CONFUSION [   ], SEIZURES [   ], WEAKNESS [   ],TINGLING / NUMBNESS [   ]        PHYSICAL EXAMINATION:    GENERAL APPEARANCE: NO DISTRESS  HEENT:  NO PALLOR, NO  JVD,  NO   NODES, NECK SUPPLE  CVS: S1 +, S2 +,   RS: AEEB,  OCCASIONAL  RALES +,   NO RONCHI  ABD: SOFT, NT, NO, BS +                                    BIANCHI ++  EXT: NO PE  SKIN: WARM,   SKELETAL:  ROM ACCEPTABLE                            RIGHT AKA +  CNS:  AAO X 1   ,  NO DEFICITS        RADIOLOGY :    < from: Xray Chest 1 View-PORTABLE IMMEDIATE (Xray Chest 1 View-PORTABLE IMMEDIATE .) (06.29.21 @ 09:44) >  6/29/2021: The linear atelectasis at the left lung base has improved.    IMPRESSION:    Right IJ catheter with its tip in the SVC. No pneumothorax.    < end of copied text >  < from: CT Chest No Cont (06.28.21 @ 11:59) >  IMPRESSION:  Limited by lack of any exogenousoral or intravenous contrast  Patchy bibasilar dependent consolidation/atelectasis. Correlate clinically for infection.  Cholelithiasis, pericholecystic fluid concerning for cholecystitis. Correlate with ultrasound and/or HIDA scan.  Significant biliary dilatation, possible mass pancreatic head. Correlate with MRCP/abdominal MR with pancreatic protocol    < end of copied text >        ASSESSMENT :     Sepsis    Yes    Myocardial infarct, old    Hypercholesterolemia    COPD (chronic obstructive pulmonary disease)    Peripheral vascular disease    COPD (chronic obstructive pulmonary disease)    Bipolar 1 disorder    HTN (hypertension)    Bedbound    S/P CABG x 1    Above knee amputation of right lower extremity        PLAN:  HPI:  76 year old female from [facility] with PMHx of PVD, right AKA (S/P fem pop bypass), PAF, COPD, bipolar disorder, hypertension and PSHx of S/P CABG x1 brought into the ED via EMS from [facility] for unresponsiveness and hypotension. Per NH supervisor Mr. Gaspar () Pt was found unresponsive today morning around 8 am, pt was fine last night, pt did not have fever, difficulty breathing, pt was only receptive to painful stimuli and on evaluation pt's blood pressure was low per papers Pt's bp was 62/38. Pt was sent to the hospital for further evaluation. Baseline mental status alert, oriented and follows instruction, non ambulatory at baseline.   Allergy: Influenza vaccine and penicillin   Code status: Full code     ED course: Bianchi was placed and pt was given 3L ivf s/p urine out put of 40cc, u/a positive for infection, CT chest showing b/l patchy infiltrates/ consolidations, concern for cholecystitis, with dilated bile duct of 1.7 cm, and concern of pancreatic head mass. Pt was given a dose of vancomycin and rocephine. Pt's blood pressure improve to 97/58, hr 109 bpm, ECG showing sinus tachy, RBBB, LAFB, Bifacicular block.    (28 Jun 2021 14:58)    -  SEPSIS , UTI , SUSPECT ASPIRATION PNEUMONIA, ACUTE CHOLECYSTITIS - ON IV. CEFEPIME, METRONIDAZOLE, S/P IR GUIDED CHOLECYSTECTOMY. PATIENT WAS FOLLOWED IN ICU, S/P IV. PRESSORS. NOW PATIENT IS OFF PRESSORS  -  ACUTE METABOLIC ENCEPHALOPATHY , ADVANCING VASCULAR DEMENTIA - STILL CONFUSED   -  ATN / ARF WITH CKD - IMPROVING RENAL FUNCTION - RENAL EVALUATION IS IN PROGRESS   -  A.FIB  / FLUID OVER LOAD ON IV. LASIX - F/UP BMP, CONTINUE ELIQUIS  -  NORMOCYTIC ANEMIA   -  PAD, OLD RIGHT AKA   -  GI PROPHYLAXIS    - DR. ANITA REYES ( COVERING DR. WALKER  )  Patient is a 76y old  Female who presents with a chief complaint of SEPSIS (03 Jul 2021 19:14)    PATIENT IS SEEN AND EXAMINED IN MEDICAL FLOOR.    RENALDO [    ]    TASH [   ]      GT [   ]    ALLERGIES:  influenza virus vaccine, live, trivalent (Unknown)  PC Pen VK (Rash)  penicillin (Other; Rash)  statins (Other)  sulfa drugs (Other)  sulfamethizole (Other)      Daily     Daily     VITALS:    Vital Signs Last 24 Hrs  T(C): 36.9 (04 Jul 2021 05:00), Max: 37.1 (03 Jul 2021 20:17)  T(F): 98.5 (04 Jul 2021 05:00), Max: 98.7 (03 Jul 2021 20:17)  HR: 100 (04 Jul 2021 09:24) (93 - 107)  BP: 184/96 (04 Jul 2021 05:10) (160/83 - 192/91)  BP(mean): --  RR: 19 (04 Jul 2021 05:10) (18 - 19)  SpO2: 96% (04 Jul 2021 09:24) (94% - 96%)    LABS:    CBC Full  -  ( 04 Jul 2021 06:13 )  WBC Count : 4.52 K/uL  RBC Count : 3.49 M/uL  Hemoglobin : 10.3 g/dL  Hematocrit : 31.5 %  Platelet Count - Automated : 271 K/uL  Mean Cell Volume : 90.3 fl  Mean Cell Hemoglobin : 29.5 pg  Mean Cell Hemoglobin Concentration : 32.7 gm/dL  Auto Neutrophil # : 3.80 K/uL  Auto Lymphocyte # : 0.45 K/uL  Auto Monocyte # : 0.19 K/uL  Auto Eosinophil # : 0.00 K/uL  Auto Basophil # : 0.02 K/uL  Auto Neutrophil % : 84.1 %  Auto Lymphocyte % : 10.0 %  Auto Monocyte % : 4.2 %  Auto Eosinophil % : 0.0 %  Auto Basophil % : 0.4 %      07-04    147<H>  |  116<H>  |  51<H>  ----------------------------<  198<H>  3.4<L>   |  17<L>  |  2.94<H>    Ca    8.8      04 Jul 2021 06:13  Phos  2.9     07-04  Mg     1.7     07-04    TPro  6.7  /  Alb  2.3<L>  /  TBili  0.7  /  DBili  x   /  AST  15  /  ALT  20  /  AlkPhos  113  07-04    CAPILLARY BLOOD GLUCOSE            LIVER FUNCTIONS - ( 04 Jul 2021 06:13 )  Alb: 2.3 g/dL / Pro: 6.7 g/dL / ALK PHOS: 113 U/L / ALT: 20 U/L DA / AST: 15 U/L / GGT: x           Creatinine Trend: 2.94<--, 2.99<--, 3.80<--, 4.46<--, 5.08<--, 4.94<--  I&O's Summary    03 Jul 2021 07:01  -  04 Jul 2021 07:00  --------------------------------------------------------  IN: 0 mL / OUT: 2260 mL / NET: -2260 mL            .Body Fluid Abdominal Fluid  06-29 @ 21:59   No growth  --    No polymorphonuclear leukocytes seen  No organisms seen  by cytocentrifuge      .Urine Clean Catch (Midstream)  06-28 @ 18:43   <10,000 CFU/mL Normal Urogenital Magda  --  --      .Blood Blood-Peripheral  06-28 @ 13:03   No Growth Final  --  --      .Blood Blood  04-19 @ 22:03   No Growth Final  --  --      .Urine Clean Catch (Midstream)  04-19 @ 18:12   >100,000 CFU/ml Escherichia coli  --  Escherichia coli      .Blood Blood-Peripheral  04-18 @ 19:01   No Growth Final  --  --          MEDICATIONS:    MEDICATIONS  (STANDING):  ALBUTerol    0.083% 2.5 milliGRAM(s) Nebulizer every 6 hours  ALBUTerol    90 MICROgram(s) HFA Inhaler 1 Puff(s) Inhalation every 4 hours  apixaban 5 milliGRAM(s) Oral every 12 hours  cefepime   IVPB      cefepime   IVPB 1000 milliGRAM(s) IV Intermittent every 24 hours  ferrous    sulfate 325 milliGRAM(s) Oral daily  furosemide   Injectable 40 milliGRAM(s) IV Push daily  methylPREDNISolone sodium succinate Injectable 40 milliGRAM(s) IV Push every 8 hours  metoprolol tartrate 50 milliGRAM(s) Oral two times a day  metroNIDAZOLE  IVPB      metroNIDAZOLE  IVPB 500 milliGRAM(s) IV Intermittent every 8 hours  mupirocin 2% Ointment 1 Application(s) Both Nostrils two times a day  pantoprazole  Injectable 40 milliGRAM(s) IV Push at bedtime  potassium chloride   Powder 40 milliEquivalent(s) Oral once  senna 2 Tablet(s) Oral at bedtime      MEDICATIONS  (PRN):  acetaminophen   Tablet .. 650 milliGRAM(s) Oral every 6 hours PRN Temp greater or equal to 38C (100.4F), Mild Pain (1 - 3)  sodium chloride 0.9% lock flush 10 milliLiter(s) IV Push every 1 hour PRN Pre/post blood products, medications, blood draw, and to maintain line patency        REVIEW OF SYSTEMS:                           ALL ROS DONE [ X   ]      CONSTITUTIONAL:  LETHARGIC [   ], FEVER [   ], UNRESPONSIVE [   ]  CVS:  CP  [   ], SOB, [   ], PALPITATIONS [   ], DIZZYNESS [   ]  RS: COUGH [   ], SPUTUM [   ]  GI: ABDOMINAL PAIN [   ], NAUSEA [   ], VOMITINGS [   ], DIARRHEA [   ], CONSTIPATION [   ]  :  DYSURIA [   ], NOCTURIA [   ], INCREASED FREQUENCY [   ], DRIBLING [   ],  SKELETAL: PAINFUL JOINTS [   ], SWOLLEN JOINTS [   ], NECK ACHE [   ], LOW BACK ACHE [   ],  SKIN : ULCERS [   ], RASH [   ], ITCHING [   ]  CNS: HEAD ACHE [   ], DOUBLE VISION [   ], BLURRED VISION [   ], AMS / CONFUSION [   ], SEIZURES [   ], WEAKNESS [   ],TINGLING / NUMBNESS [   ]        PHYSICAL EXAMINATION:    GENERAL APPEARANCE: NO DISTRESS  HEENT:  NO PALLOR, NO  JVD,  NO   NODES, NECK SUPPLE  CVS: S1 +, S2 +,   RS: AEEB,  OCCASIONAL  RALES +,   NO RONCHI  ABD: SOFT, NT, NO, BS +                                    BIANCHI ++  EXT: NO PE  SKIN: WARM,   SKELETAL:  ROM ACCEPTABLE                            RIGHT AKA +  CNS:  AAO X 1   ,  NO DEFICITS        RADIOLOGY :    < from: Xray Chest 1 View-PORTABLE IMMEDIATE (Xray Chest 1 View-PORTABLE IMMEDIATE .) (06.29.21 @ 09:44) >  6/29/2021: The linear atelectasis at the left lung base has improved.    IMPRESSION:    Right IJ catheter with its tip in the SVC. No pneumothorax.    < end of copied text >  < from: CT Chest No Cont (06.28.21 @ 11:59) >  IMPRESSION:  Limited by lack of any exogenousoral or intravenous contrast  Patchy bibasilar dependent consolidation/atelectasis. Correlate clinically for infection.  Cholelithiasis, pericholecystic fluid concerning for cholecystitis. Correlate with ultrasound and/or HIDA scan.  Significant biliary dilatation, possible mass pancreatic head. Correlate with MRCP/abdominal MR with pancreatic protocol    < end of copied text >        ASSESSMENT :     Sepsis    Yes    Myocardial infarct, old    Hypercholesterolemia    COPD (chronic obstructive pulmonary disease)    Peripheral vascular disease    COPD (chronic obstructive pulmonary disease)    Bipolar 1 disorder    HTN (hypertension)    Bedbound    S/P CABG x 1    Above knee amputation of right lower extremity        PLAN:  HPI:  76 year old female from [facility] with PMHx of PVD, right AKA (S/P fem pop bypass), PAF, COPD, bipolar disorder, hypertension and PSHx of S/P CABG x1 brought into the ED via EMS from [facility] for unresponsiveness and hypotension. Per NH supervisor Mr. Gaspar () Pt was found unresponsive today morning around 8 am, pt was fine last night, pt did not have fever, difficulty breathing, pt was only receptive to painful stimuli and on evaluation pt's blood pressure was low per papers Pt's bp was 62/38. Pt was sent to the hospital for further evaluation. Baseline mental status alert, oriented and follows instruction, non ambulatory at baseline.   Allergy: Influenza vaccine and penicillin   Code status: Full code     ED course: Bianchi was placed and pt was given 3L ivf s/p urine out put of 40cc, u/a positive for infection, CT chest showing b/l patchy infiltrates/ consolidations, concern for cholecystitis, with dilated bile duct of 1.7 cm, and concern of pancreatic head mass. Pt was given a dose of vancomycin and rocephine. Pt's blood pressure improve to 97/58, hr 109 bpm, ECG showing sinus tachy, RBBB, LAFB, Bifacicular block.    (28 Jun 2021 14:58)    -  SEPSIS , UTI , SUSPECT ASPIRATION PNEUMONIA, ACUTE CHOLECYSTITIS - ON IV. CEFEPIME, METRONIDAZOLE, S/P IR GUIDED CHOLECYSTECTOMY. PATIENT WAS FOLLOWED IN ICU, S/P IV. PRESSORS. NOW PATIENT IS OFF PRESSORS  -  ACUTE METABOLIC ENCEPHALOPATHY , ADVANCED VASCULAR DEMENTIA    -  ATN / ARF WITH CKD - IMPROVING RENAL FUNCTION - RENAL EVALUATION IS IN PROGRESS   -  FAILURE TO THRIVE , POOR PO INTAKE - OBTAIN PALLIATIVE CARE CONSULT TO D/W FAMILY REGARDING PEG / NO PEG   -  A.FIB  / FLUID OVER LOAD ON IV. LASIX - F/UP BMP, CONTINUE ELIQUIS  -  NORMOCYTIC ANEMIA   -  PAD, OLD RIGHT AKA   -  GI PROPHYLAXIS    - DR. ANITA REYES ( COVERING DR. WALKER  )

## 2021-07-04 NOTE — CHART NOTE - NSCHARTNOTEFT_GEN_A_CORE
EVENT: BP: 184/96 (04 Jul 2021 05:10) (160/83 - 192/91), BP trend noted.    BRIEF HPI:76 year old female from [facility] with PMHx of PVD, right AKA (S/P fem pop bypass), PAF, COPD, bipolar disorder, hypertension and PSHx of S/P CABG x1 brought into the ED via EMS from [facility] for unresponsiveness and hypotension. Admittd for sepsis due to UTI Vs Asp PNA Vs acute cholecystitis.  Now BP trending elevated.      OBJECTIVE:  Vital Signs Last 24 Hrs  T(C): 36.9 (04 Jul 2021 05:00), Max: 37.1 (03 Jul 2021 07:46)  T(F): 98.5 (04 Jul 2021 05:00), Max: 98.8 (03 Jul 2021 07:46)  HR: 107 (04 Jul 2021 05:10) (93 - 110)  BP: 184/96 (04 Jul 2021 05:10) (160/83 - 192/91)  BP(mean): --  RR: 19 (04 Jul 2021 05:10) (18 - 24)  SpO2: 94% (04 Jul 2021 05:10) (85% - 96%)    FOCUSED PHYSICAL EXAM:  CV: S1 S2, irregular  RESP: Even, unlabored    LABS:                        10.6   7.93  )-----------( 236      ( 03 Jul 2021 06:48 )             32.3     07-03    146<H>  |  116<H>  |  49<H>  ----------------------------<  111<H>  3.4<L>   |  16<L>  |  2.99<H>    Ca    8.9      03 Jul 2021 06:48  Phos  2.8     07-03  Mg     1.7     07-03    TPro  6.5  /  Alb  2.1<L>  /  TBili  0.7  /  DBili  x   /  AST  23  /  ALT  22  /  AlkPhos  117  07-03     PROBLEM: Elevated BP   PLAN:   1. Cont  metoprolol tartrate 25 emanuel GRAM(s) Oral two times a day  2. Cont furosemide   Injectable 40 emanuel GRAM(s) IV Push daily    FOLLOW UP / RESULT: Monitor BP trend EVENT: BP: 184/96 (04 Jul 2021 05:10) (160/83 - 192/91), BP trend noted.    BRIEF HPI:76 year old female from [facility] with PMHx of PVD, right AKA (S/P fem pop bypass), PAF, COPD, bipolar disorder, hypertension and PSHx of S/P CABG x1 brought into the ED via EMS from [facility] for unresponsiveness and hypotension. Admittd for sepsis due to UTI Vs Asp PNA Vs acute cholecystitis.  Now BP trending elevated.      OBJECTIVE:  Vital Signs Last 24 Hrs  T(C): 36.9 (04 Jul 2021 05:00), Max: 37.1 (03 Jul 2021 07:46)  T(F): 98.5 (04 Jul 2021 05:00), Max: 98.8 (03 Jul 2021 07:46)  HR: 107 (04 Jul 2021 05:10) (93 - 110)  BP: 184/96 (04 Jul 2021 05:10) (160/83 - 192/91)  BP(mean): --  RR: 19 (04 Jul 2021 05:10) (18 - 24)  SpO2: 94% (04 Jul 2021 05:10) (85% - 96%)    FOCUSED PHYSICAL EXAM:  CV: S1 S2, irregular  RESP: Even, unlabored    LABS:                        10.6   7.93  )-----------( 236      ( 03 Jul 2021 06:48 )             32.3     07-03    146<H>  |  116<H>  |  49<H>  ----------------------------<  111<H>  3.4<L>   |  16<L>  |  2.99<H>    Ca    8.9      03 Jul 2021 06:48  Phos  2.8     07-03  Mg     1.7     07-03    TPro  6.5  /  Alb  2.1<L>  /  TBili  0.7  /  DBili  x   /  AST  23  /  ALT  22  /  AlkPhos  117  07-03     PROBLEM: Elevated BP   PLAN:   1. Cont  metoprolol tartrate increased to 50 emanuel GRAM(s) Oral two times a day  2. Cont furosemide   Injectable 40 emanuel GRAM(s) IV Push daily    FOLLOW UP / RESULT: Monitor BP trend

## 2021-07-04 NOTE — PROGRESS NOTE ADULT - ATTENDING COMMENTS
Problem/Plan - 1:  ·  Problem: Acute encephalopathy.  Plan: - like infectious process UTI Vs Cholecystitis, improving   - Patchy bibasilar dependent consolidation/atelectasis on CT  - sepsis improved, s/p vasopressor  - CT scan concern for colicystitis  - s/p cholecystectomy 9/30  - urine culture negative  - blood culture negative so far  - c/w cefepime and metronidazole  - c/w supportive care.      Problem/Plan - 2:  ·  Problem: Pleural effusion.  Plan: - CXR Bilateral pleural effusions and/or LEFT greater than RIGHT basilar airspace consolidation.  - C/w lasix- nephro rec  - C/w abx  - trend BMP as pt is eladio.      Problem/Plan - 3:  ·  Problem: Acute cholecystitis.  Plan: - CT with acute cholecystitis with common duct up to 1.7 cm mild intrahepatic biliary dilatation. The distal common duct obstructing stone and lesion  - s/p Cholecystectomy drain, no growth on culture  - BCx negative  - Once stable suggest MRCP to further characterize mass / obstruction and need for ERCP (if needed)- Gi recs  - GI Dr Pinon.      Problem/Plan - 4:  ·  Problem: UTI (urinary tract infection).  Plan: - S/p vanco and Rocephin in ED  - rest plan as above.      Problem/Plan - 5:  ·  Problem: COPD (chronic obstructive pulmonary disease).  Plan: - acute exacerbation, not on any medication at home  - started on albuterol MDI  -  started on solumedrol today 7/3  - Monitor FS with SS while on steriods  - will repeat CXR today  - Pulm Dr Beebe.

## 2021-07-04 NOTE — PROGRESS NOTE ADULT - ASSESSMENT
76 year old female from [facility] with PMHx of PVD, right AKA (S/P fem pop bypass), PAF, COPD, bipolar disorder, hypertension and PSHx of S/P CABG x1 brought into the ED via EMS from [facility] for unresponsiveness and hypotension. Per NH supervisor Mr. Gaspar () Pt was found unresponsive today morning around 8 am, pt was fine last night, pt did not have fever, difficulty breathing, pt was only receptive to painful stimuli and on evaluation pt's blood pressure was low per papers Pt's bp was 62/38. Pt was sent to the hospital for further evaluation. Baseline mental status alert, oriented and follows instruction, non ambulatory at baseline.     ED course: Alicia was placed and pt was given 3L ivf s/p urine out put of 40cc, u/a positive for infection, CT chest showing b/l patchy infiltrates/ consolidations, concern for cholecystitis, with dilated bile duct of 1.7 cm, and concern of pancreatic head mass. Pt was given a dose of vancomycin and rocephin. Pt's blood pressure improve to 97/58, hr 109 bpm, ECG showing sinus tachy, RBBB, LAFB, Bifacicular block.     Patient admitted to the ICU for septic shock requiring vasopressors. Source of infection likely acute cholecystitis as demonstrated on CT abdomen vs UTI. Patient was started on levophed, noted with severe acidosis requiring bicarb drip and VANESSA, likely pre-renal. Patient was started on cefepime and flagyl for sepsis 2/2 cholecystitis. Lactate acidosis was started on bicarb gtt. VANESSA likely pre-renal obtained multiple boluses with low urine output. Patient gradually improved with resolution of hypotension, was able to taper off pressors, bicarb improved was able to dc bicarb gtt. Urine output gradually increased. on 6/29, patient underwent IR guided cholecystostomy, with drainage of bile. Evaluated by GI after, recommended MRCP. Unable to obtain MRCP as of 6/30 due to altered mental status suspected to be due to gabapentin toxicity in the setting of VANESSA. Unstable for MRCP, will continue monitoring mental status. Mental status improved 7/1-7/2, pt aaox2 to self and location. Seen by speech and swallow, recommends mechanical soft thin liquid diet.     7/2 pt downgrade to SCU, failed TOV, alicia reinserted

## 2021-07-04 NOTE — DISCHARGE NOTE PROVIDER - NSDCMRMEDTOKEN_GEN_ALL_CORE_FT
acetaminophen 325 mg oral tablet: 3 tab(s) orally every 6 hours, As needed, Moderate Pain (4 - 6)  aspirin 81 mg oral tablet: 1 tab(s) orally once a day  cilostazol 100 mg oral tablet: 1 tab(s) orally 2 times a day  Cozaar 25 mg oral tablet: 1 tab(s) orally once a day  Eliquis 5 mg oral tablet: 1 tab(s) orally 2 times a day  folic acid 1 mg oral tablet: 1 tab(s) orally once a day  gabapentin 300 mg oral capsule: 1 cap(s) orally 2 times a day  Lexapro 5 mg oral tablet: 1 tab(s) orally once a day  Lipitor 40 mg oral tablet: 1 tab(s) orally once a day  metoprolol succinate 25 mg oral tablet, extended release: 1 tab(s) orally once a day  Multiple Vitamins oral tablet: 1 tab(s) orally once a day  omeprazole 20 mg oral delayed release capsule: 1 cap(s) orally once a day  polyethylene glycol 3350 oral powder for reconstitution: 17 gram(s) orally once a day  risperiDONE 0.5 mg oral tablet: 1 tab(s) orally 2 times a day  senna oral tablet: 2 tab(s) orally once a day (at bedtime)  thiamine 100 mg oral tablet: 1 tab(s) orally once a day  Vitamin C 250 mg oral tablet: 2 tab(s) orally 2 times a day

## 2021-07-04 NOTE — PROGRESS NOTE ADULT - PROBLEM SELECTOR PLAN 2
- CXR Bilateral pleural effusions and/or LEFT greater than RIGHT basilar airspace consolidation.  - C/w lasix- nephro rec  - C/w abx  - trend BMP as pt is eladio

## 2021-07-04 NOTE — PROGRESS NOTE ADULT - PROBLEM SELECTOR PLAN 3
- CT with acute cholecystitis with common duct up to 1.7 cm mild intrahepatic biliary dilatation. The distal common duct obstructing stone and lesion  - s/p Cholecystectomy drain, no growth on culture  - BCx negative  - Once stable suggest MRCP to further characterize mass / obstruction and need for ERCP (if needed)- Gi recs  - GI Dr Pinon

## 2021-07-04 NOTE — CHART NOTE - NSCHARTNOTEFT_GEN_A_CORE
- Pt alida Tucker, updated regarding pt's status, intervention and plan of care. all questions answered.

## 2021-07-04 NOTE — DISCHARGE NOTE PROVIDER - PROVIDER TOKENS
PROVIDER:[TOKEN:[[id number]],FOLLOWUP:[2 weeks]],PROVIDER:[TOKEN:[[id number]],FOLLOWUP:[2 weeks]],PROVIDER:[TOKEN:[[id number]],FOLLOWUP:[2 weeks]]

## 2021-07-04 NOTE — DISCHARGE NOTE PROVIDER - HOSPITAL COURSE
76 year old female from [facility] with PMHx of PVD, right AKA (S/P fem pop bypass), PAF, COPD, bipolar disorder, hypertension and PSHx of S/P CABG x1 brought into the ED via EMS from [facility] for unresponsiveness and hypotension. Per NH supervisor Mr. Gaspar () Pt was found unresponsive today morning around 8 am, pt was fine last night, pt did not have fever, difficulty breathing, pt was only receptive to painful stimuli and on evaluation pt's blood pressure was low per papers Pt's bp was 62/38. Pt was sent to the hospital for further evaluation. Baseline mental status alert, oriented and follows instruction, non ambulatory at baseline.     ED course: Alicia was placed and pt was given 3L ivf s/p urine out put of 40cc, u/a positive for infection, CT chest showing b/l patchy infiltrates/ consolidations, concern for cholecystitis, with dilated bile duct of 1.7 cm, and concern of pancreatic head mass. Pt was given a dose of vancomycin and rocephin. Pt's blood pressure improve to 97/58, hr 109 bpm, ECG showing sinus tachy, RBBB, LAFB, Bifacicular block.     Patient admitted to the ICU for septic shock requiring vasopressors. Source of infection likely acute cholecystitis as demonstrated on CT abdomen vs UTI. Patient was started on levophed, noted with severe acidosis requiring bicarb drip and VANESSA, likely pre-renal. Patient was started on cefepime and flagyl for sepsis 2/2 cholecystitis. Lactate acidosis was started on bicarb gtt. VANESSA likely pre-renal obtained multiple boluses with low urine output. Patient gradually improved with resolution of hypotension, was able to taper off pressors, bicarb improved was able to dc bicarb gtt. Urine output gradually increased. on 6/29, patient underwent IR guided cholecystostomy, with drainage of bile. Evaluated by GI after, recommended MRCP. Unable to obtain MRCP as of 6/30 due to altered mental status suspected to be due to gabapentin toxicity in the setting of VANESSA. Unstable for MRCP, will continue monitoring mental status. Mental status improved 7/1-7/2, pt aaox2 to self and location. Seen by speech and swallow, recommends mechanical soft thin liquid diet.     7/2 pt downgrade to SCU, failed TOV, alicia reinserted        76 year old female from [facility] with PMHx of PVD, right AKA (S/P fem pop bypass), PAF, COPD, bipolar disorder, hypertension and PSHx of S/P CABG x1 brought into the ED via EMS from [facility] for unresponsiveness and hypotension. Per NH supervisor Mr. Gaspar () Pt was found unresponsive today morning around 8 am, pt was fine last night, pt did not have fever, difficulty breathing, pt was only receptive to painful stimuli and on evaluation pt's blood pressure was low per papers Pt's bp was 62/38. Pt was sent to the hospital for further evaluation. Baseline mental status alert, oriented and follows instruction, non ambulatory at baseline.     ED course: Alicia was placed and pt was given 3L ivf s/p urine out put of 40cc, u/a positive for infection, CT chest showing b/l patchy infiltrates/ consolidations, concern for cholecystitis, with dilated bile duct of 1.7 cm, and concern of pancreatic head mass. Pt was given a dose of vancomycin and rocephin. Pt's blood pressure improve to 97/58, hr 109 bpm, ECG showing sinus tachy, RBBB, LAFB, Bifacicular block.     Patient admitted to the ICU for septic shock requiring vasopressors. Source of infection likely acute cholecystitis as demonstrated on CT abdomen vs UTI. Patient was started on levophed, noted with severe acidosis requiring bicarb drip and VANESSA, likely pre-renal. Patient was started on cefepime and flagyl for sepsis 2/2 cholecystitis. Lactate acidosis was started on bicarb gtt. VANESSA likely pre-renal obtained multiple boluses with low urine output. Patient gradually improved with resolution of hypotension, was able to taper off pressors, bicarb improved was able to dc bicarb gtt. Urine output gradually increased. on 6/29, patient underwent IR guided cholecystostomy, with drainage of bile. Evaluated by GI after, recommended MRCP. Unable to obtain MRCP as of 6/30 due to altered mental status suspected to be due to gabapentin toxicity in the setting of VANESSA. Unstable for MRCP, will continue monitoring mental status. Mental status improved 7/1-7/2, pt aaox2 to self and location. Seen by speech and swallow, recommends mechanical soft thin liquid diet.     7/2 pt downgrade to SCU, failed TOV, alicia reinserted     for a full account of hospital course please refer to actual medical records for this is a brief summery

## 2021-07-04 NOTE — PROGRESS NOTE ADULT - PROBLEM SELECTOR PLAN 5
- acute exacerbation, not on any medication at home  - started on albuterol MDI  -  started on solumedrol today 7/3  - will repeat CXR today  - Pulphylicia Beebe - acute exacerbation, not on any medication at home  - started on albuterol MDI  -  started on solumedrol today 7/3  - Monitor FS with SS while on steriods  - will repeat CXR today  - Pulm Dr Persuad

## 2021-07-04 NOTE — PROGRESS NOTE ADULT - PROBLEM SELECTOR PLAN 10
- DNR/DNI, MOLTS in chart  - Needs future MRCP when pt stable  - Pt niece Silva Marquez  [phone] is her HCP, guardianship. and POA, according to her, any decision should be discussed with her, also requesting new nursing home.   - CM to follow after long weekend.    - DVT PPX Pt on Eliquis  - GI ppx: Protonix

## 2021-07-04 NOTE — DISCHARGE NOTE PROVIDER - NSDCCPCAREPLAN_GEN_ALL_CORE_FT
PRINCIPAL DISCHARGE DIAGNOSIS  Diagnosis: Acute encephalopathy  Assessment and Plan of Treatment: YOu presented with altered mental status likely due to  infectious process UTI Vs Cholecystitis   CT scan showed that  you have  Patchy bibasilar dependent consolidation/atelectasis and cholecytitis. You had  cholecystectomy 9/30. urine culture negative and blood culture were negative so far. you were given IV antibioctics cefepime and metronidazole. Your metation improved as your infections cleared.      SECONDARY DISCHARGE DIAGNOSES  Diagnosis: Pleural effusion  Assessment and Plan of Treatment: CXR Bilateral pleural effusion. you were given IV lasix, you were follwed by pulmpnologist. you were given medicaiton to releive extra fliuds from your lungs.       Diagnosis: COPD (chronic obstructive pulmonary disease)  Assessment and Plan of Treatment: YOu had shortness of breath while you were here likely due to COPd VS fluid overload. YOu were given medications to help you with your breathing.  Call your Health Care provider upon arrival home to make a follow up appointment within one week.  Take all inhalers as prescribed by your Health Care Provider.  Take steroids as prescribed by your Health Care Provider.  If your cough increases infrequency and severity and/or you have shortness of breath or increased shortness of breath call your Health Care Provider.  If you develop fever, chills, night sweats, malaise, and/or change in mental status call your Health care Provider.  Nutrition is very important.  Eat small frequent meals.  Increase your activity as tolerated.  Do not stay in bed all day      Diagnosis: PAF (paroxysmal atrial fibrillation)  Assessment and Plan of Treatment: Atrial fibrillation is the most common heart rhythm problem & has the risk of stroke & heart attack  It helps if you control your blood pressure, not drink more than 1-2 alcohol drinks per day, cut down on caffeine, getting treatment for over active thyroid gland, & getting exercise  Call your doctor if you feel your heart racing or beating unusually, chest tightness or pain, lightheaded, faint, shortness of breath especially with exercise  It is important to take your heart medication as prescribed  You may be on anticoagulation which is very important to take as directed - you may need blood work to monitor drug levels      Diagnosis: Pancreatic mass  Assessment and Plan of Treatment: CT scan showed that you have pancreatic mass. You were seen by gastroentorologist and recommeded MRCP-------------------------------------------------------------------------------------------------------------------------------------------------------------------    Diagnosis: Acute cholecystitis  Assessment and Plan of Treatment: Acute cholecystitis.  Plan: - CT with acute cholecystitis with common duct up to 1.7 cm mild intrahepatic biliary dilatation. The distal common duct obstructing stone and lesion  - s/p Cholecystectomy drain, no growth on culture  - BCx negative  - Once stable suggest MRCP to further characterize mass / obstruction and need for ERCP (if needed)- Gi recs  - GI Dr Pinon.

## 2021-07-04 NOTE — CHART NOTE - NSCHARTNOTEFT_GEN_A_CORE
EVENT: Pt groaning when moved, BP increased, non verbal pain scale    BRIEF HPI:    OBJECTIVE:  Vital Signs Last 24 Hrs  T(C): 36.6 (04 Jul 2021 21:03), Max: 36.9 (04 Jul 2021 05:00)  T(F): 97.9 (04 Jul 2021 21:03), Max: 98.5 (04 Jul 2021 05:00)  HR: 96 (04 Jul 2021 21:03) (96 - 107)  BP: 187/90 (04 Jul 2021 21:03) (164/81 - 187/90)  BP(mean): --  RR: 18 (04 Jul 2021 21:03) (18 - 20)  SpO2: 98% (04 Jul 2021 21:03) (94% - 98%)    FOCUSED PHYSICAL EXAM:    LABS:                        10.3   4.52  )-----------( 271      ( 04 Jul 2021 06:13 )             31.5     07-04    147<H>  |  116<H>  |  51<H>  ----------------------------<  198<H>  3.4<L>   |  17<L>  |  2.94<H>    Ca    8.8      04 Jul 2021 06:13  Phos  2.9     07-04  Mg     1.7     07-04    TPro  6.7  /  Alb  2.3<L>  /  TBili  0.7  /  DBili  x   /  AST  15  /  ALT  20  /  AlkPhos  113  07-04      EKG:   IMGAGING:    ASSESSMENT:  HPI:  76 year old female from [facility] with PMHx of PVD, right AKA (S/P fem pop bypass), PAF, COPD, bipolar disorder, hypertension and PSHx of S/P CABG x1 brought into the ED via EMS from [facility] for unresponsiveness and hypotension. Per NH supervisor Mr. Gaspar () Pt was found unresponsive today morning around 8 am, pt was fine last night, pt did not have fever, difficulty breathing, pt was only receptive to painful stimuli and on evaluation pt's blood pressure was low per papers Pt's bp was 62/38. Pt was sent to the hospital for further evaluation. Baseline mental status alert, oriented and follows instruction, non ambulatory at baseline.   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 Pt was given a dose of vancomycin and rocephine. Pt's blood pressure improve to 97/58, hr 109 bpm, ECG showing sinus tachy, RBBB, LAFB, Bifacicular block.    (28 Jun 2021 14:58)      PLAN:   MEDICATIONS  (STANDING):  ALBUTerol    0.083% 2.5 milliGRAM(s) Nebulizer every 6 hours  ALBUTerol    90 MICROgram(s) HFA Inhaler 1 Puff(s) Inhalation every 4 hours  apixaban 5 milliGRAM(s) Oral every 12 hours  cefepime   IVPB      cefepime   IVPB 1000 milliGRAM(s) IV Intermittent every 24 hours  dextrose 40% Gel 15 Gram(s) Oral once  dextrose 5%. 1000 milliLiter(s) (50 mL/Hr) IV Continuous <Continuous>  dextrose 5%. 1000 milliLiter(s) (100 mL/Hr) IV Continuous <Continuous>  dextrose 50% Injectable 25 Gram(s) IV Push once  dextrose 50% Injectable 12.5 Gram(s) IV Push once  dextrose 50% Injectable 25 Gram(s) IV Push once  ferrous    sulfate 325 milliGRAM(s) Oral daily  furosemide   Injectable 40 milliGRAM(s) IV Push daily  glucagon  Injectable 1 milliGRAM(s) IntraMuscular once  insulin lispro (ADMELOG) corrective regimen sliding scale   SubCutaneous three times a day before meals  methylPREDNISolone sodium succinate Injectable 40 milliGRAM(s) IV Push every 8 hours  metoprolol tartrate 50 milliGRAM(s) Oral two times a day  metroNIDAZOLE  IVPB 500 milliGRAM(s) IV Intermittent every 8 hours  metroNIDAZOLE  IVPB      mupirocin 2% Ointment 1 Application(s) Both Nostrils two times a day  pantoprazole  Injectable 40 milliGRAM(s) IV Push at bedtime  senna 2 Tablet(s) Oral at bedtime    MEDICATIONS  (PRN):  acetaminophen   Tablet .. 650 milliGRAM(s) Oral every 6 hours PRN Temp greater or equal to 38C (100.4F), Mild Pain (1 - 3)  acetaminophen  IVPB .. 1000 milliGRAM(s) IV Intermittent once PRN Mild Pain (1 - 3), Moderate Pain (4 - 6), Severe Pain (7 - 10)  sodium chloride 0.9% lock flush 10 milliLiter(s) IV Push every 1 hour PRN Pre/post blood products, medications, blood draw, and to maintain line patency    FOLLOW UP / RESULT: EVENT: Pt groaning when moved, BP increased, non verbal pain scale, moderate. Nurse reported that pt pocketing food.    BRIEF HPI:76 year old female from [facility] with PMHx of PVD, right AKA (S/P fem pop bypass), PAF, COPD, bipolar disorder, hypertension and PSHx of S/P CABG x1 brought into the ED via EMS from [facility] for unresponsiveness and hypotension. Admitted to the ICU for septic shock requiring vasopressors. Previously seen by speech and swallow, recommends mechanical soft thin liquid diet. 7/2 pt downgrade to SCU, failed TOV, alicia reinserted . For possible  MRCP to further characterize mass / obstruction and need for ERCP 7/5.    OBJECTIVE:  Vital Signs Last 24 Hrs  T(C): 36.6 (04 Jul 2021 21:03), Max: 36.9 (04 Jul 2021 05:00)  T(F): 97.9 (04 Jul 2021 21:03), Max: 98.5 (04 Jul 2021 05:00)  HR: 96 (04 Jul 2021 21:03) (96 - 107)  BP: 187/90 (04 Jul 2021 21:03) (164/81 - 187/90)  BP(mean): --  RR: 18 (04 Jul 2021 21:03) (18 - 20)  SpO2: 98% (04 Jul 2021 21:03) (94% - 98%)    FOCUSED PHYSICAL EXAM:  NEURO: Non interactive  RESP: Even, unlabored  CV: S1 S2, regular    LABS:                        10.3   4.52  )-----------( 271      ( 04 Jul 2021 06:13 )             31.5     07-04    147<H>  |  116<H>  |  51<H>  ----------------------------<  198<H>  3.4<L>   |  17<L>  |  2.94<H>    Ca    8.8      04 Jul 2021 06:13  Phos  2.9     07-04  Mg     1.7     07-04    TPro  6.7  /  Alb  2.3<L>  /  TBili  0.7  /  DBili  x   /  AST  15  /  ALT  20  /  AlkPhos  113  07-04    PROBLEM: Pain and dysphagia probably due to chronic disease  PLAN:   1. Acetaminophen  IVPB 1000 emanuel GRAM(s) IV Intermittent once PRN Mild Pain (1 - 3), Moderate Pain (4 - 6), Severe Pain (7 - 10)  2. Speech and swallow reeval  3. NPO for possible MRCP 7/5    FOLLOW UP / RESULT: Effectiveness of pain med

## 2021-07-05 NOTE — PROGRESS NOTE ADULT - PROBLEM SELECTOR PLAN 1
- like infectious process UTI Vs Cholecystitis, was improving   - 7/5, more lethargic and worsening ams, likely correlate with hyper natremia  - D5w started per nephro recs  - Patchy bibasilar dependent consolidation/atelectasis on CT  - sepsis improved, s/p vasopressor  - CT scan concern for colicystitis  - s/p cholecystectomy 9/30  - urine culture negative  - blood culture negative so far  - c/w cefepime and metronidazole  - c/w supportive care - like infectious process UTI Vs Cholecystitis, was improving   - 7/5, more lethargic and worsening ams, likely correlate with hypernatremia  - D5w started per nephro recs  - Patchy bibasilar dependent consolidation/atelectasis on CT  - sepsis improved, s/p vasopressor  - CT scan concern for colicystitis  - s/p cholecystectomy 9/30  - urine culture negative  - blood culture negative so far  - c/w cefepime and metronidazole  - c/w supportive care - like infectious process UTI Vs Cholecystitis, was improving   - 7/5, more lethargic and worsening ams, likely correlate with hypernatremia 2/2 to decreased PO intake  - D5w started per nephro recs  - Patchy bibasilar dependent consolidation/atelectasis on CT  - sepsis improved, s/p vasopressor  - CT scan concern for colicystitis  - s/p cholecystectomy 9/30  - urine culture negative  - blood culture negative so far  - c/w cefepime and metronidazole  - c/w supportive care

## 2021-07-05 NOTE — PROGRESS NOTE ADULT - SUBJECTIVE AND OBJECTIVE BOX
Time of Visit:  Patient seen and examined.     MEDICATIONS  (STANDING):  ALBUTerol    90 MICROgram(s) HFA Inhaler 1 Puff(s) Inhalation every 4 hours  apixaban 5 milliGRAM(s) Oral every 12 hours  cefepime   IVPB      cefepime   IVPB 1000 milliGRAM(s) IV Intermittent every 24 hours  dextrose 40% Gel 15 Gram(s) Oral once  dextrose 5%. 1000 milliLiter(s) (50 mL/Hr) IV Continuous <Continuous>  dextrose 5%. 1000 milliLiter(s) (75 mL/Hr) IV Continuous <Continuous>  dextrose 5%. 1000 milliLiter(s) (100 mL/Hr) IV Continuous <Continuous>  dextrose 50% Injectable 25 Gram(s) IV Push once  dextrose 50% Injectable 12.5 Gram(s) IV Push once  dextrose 50% Injectable 25 Gram(s) IV Push once  ferrous    sulfate 325 milliGRAM(s) Oral daily  glucagon  Injectable 1 milliGRAM(s) IntraMuscular once  hydrALAZINE Injectable 10 milliGRAM(s) IV Push every 8 hours  insulin lispro (ADMELOG) corrective regimen sliding scale   SubCutaneous three times a day before meals  methylPREDNISolone sodium succinate Injectable 40 milliGRAM(s) IV Push every 8 hours  metoprolol tartrate 50 milliGRAM(s) Oral two times a day  metroNIDAZOLE  IVPB      metroNIDAZOLE  IVPB 500 milliGRAM(s) IV Intermittent every 8 hours  mupirocin 2% Ointment 1 Application(s) Both Nostrils two times a day  pantoprazole  Injectable 40 milliGRAM(s) IV Push at bedtime  senna 2 Tablet(s) Oral at bedtime      MEDICATIONS  (PRN):  acetaminophen   Tablet .. 650 milliGRAM(s) Oral every 6 hours PRN Temp greater or equal to 38C (100.4F), Mild Pain (1 - 3)  ALBUTerol    90 MICROgram(s) HFA Inhaler 2 Puff(s) Inhalation every 6 hours PRN Wheezing  sodium chloride 0.9% lock flush 10 milliLiter(s) IV Push every 1 hour PRN Pre/post blood products, medications, blood draw, and to maintain line patency       Medications up to date at time of exam.    ROS; No fever, chills, cough, congestion on exam.   PHYSICAL EXAMINATION:    Vital Signs Last 24 Hrs  T(C): 36.3 (05 Jul 2021 13:12), Max: 36.8 (05 Jul 2021 05:00)  T(F): 97.4 (05 Jul 2021 13:12), Max: 98.3 (05 Jul 2021 05:00)  HR: 98 (05 Jul 2021 13:13) (82 - 98)  BP: 189/90 (05 Jul 2021 13:13) (169/78 - 189/90)  BP(mean): --  RR: 18 (05 Jul 2021 13:12) (18 - 19)  SpO2: 96% (05 Jul 2021 13:12) (96% - 99%)   (if applicable)    General: Non verbal on exam but baseline Alert and oriented x 1-2 . No acute distress.     HEENT: Head is normocephalic and atraumatic. No nasal tenderness . Mucous membranes are moist.     NECK: Supple, no palpable adenopathy.    LUNGS: Poor inspiratory effort , otherwise clear to auscultation bilaterally with no wheezing, rales, or rhonchi. No use of accessory muscle.     HEART: S1 S2 Regular rate and no click/rub .    ABDOMEN: Soft  and nondistended. Active bowel sounds.     NEUROLOGIC: Alert and oriented, forgetful  .      SKIN: Warm and moist. Non diaphoretic.       LABS:                        10.6   7.00  )-----------( 273      ( 05 Jul 2021 07:03 )             32.1     07-05    150<H>  |  117<H>  |  56<H>  ----------------------------<  226<H>  3.3<L>   |  19<L>  |  2.65<H>    Ca    9.0      05 Jul 2021 07:03  Phos  2.7     07-05  Mg     1.7     07-05    TPro  6.4  /  Alb  2.2<L>  /  TBili  0.6  /  DBili  x   /  AST  17  /  ALT  14  /  AlkPhos  106  07-05                        MICROBIOLOGY: (if applicable)    RADIOLOGY & ADDITIONAL STUDIES:  EKG:   CXR:  ECHO:    IMPRESSION: 76y Female PAST MEDICAL & SURGICAL HISTORY:  Myocardial infarct, old    Hypercholesterolemia    Peripheral vascular disease    COPD (chronic obstructive pulmonary disease)    Bipolar 1 disorder    HTN (hypertension)    Bedbound    S/P CABG x 1    Above knee amputation of right lower extremity     Impression; 77 Y/O Female from [facility] presented to ED with unresponsiveness and Hypotension. Was admitted to ICU for Septic Shock and received  vasopressor. Source of infection likely due to Acute Cholecystitis as found on CT chest with bilateral infiltrates/ consolidations. Patient underwent on 06-29-21 IR guided Cholecystectomy with drainage Bile. Patient gradually improved Hypotension . 06-28-21 Negative for Covid 19 PCR and Negative Blood Cx x 2.  07-03-21 CXR with Bilateral Pleural Effusions , Left > Rt basilar airspace consolidation and patient received Lasix .        Suggestion:  O2 saturation 94% room air. Continue Oxygen supplementation 2L NC to keep O2 saturation >90%.   Continue Albuterol 1 Puff but Q 6 Hours.  Discontinue PRN Albuterol 2 puffs Q 6 Hours.   Pulmonary oral hygiene care especially every after oral inhaler used.   Solumedrol 40 mg Q 8 hours x 5 days ( started on 06-03-21 ) .  DVT/ GI prophylactic.

## 2021-07-05 NOTE — SWALLOW BEDSIDE ASSESSMENT ADULT - SWALLOW EVAL: DIAGNOSIS
Oropharyngeal dysphagia
Pt p/w s&s of oropharyngeal dysphagia c/b decreased mastication, prolonged bolus holding, & significantly reduced hyolaryngeal elevation. One instance of overt s&s of penetration/ aspiration e/b cough following consecutive cup sips of thin liquid by straw.

## 2021-07-05 NOTE — SWALLOW BEDSIDE ASSESSMENT ADULT - SLP PERTINENT HISTORY OF CURRENT PROBLEM
76F from [facility]. PMHx PVD, right AKA (S/P fem pop bypass), PAF, COPD, bipolar disorder, HTN; PSHx of S/P CABG x1. Reportedly brought to ED via EMS from NH for unresponsiveness & hypotension. Reportedly per NH supervisor, pt found unresponsive 6/28 around 8 am, was fine night before (no fever, difficulty breathing). On evaluation, pt's BP reportedly low (62/38) & pt only receptive to painful stimuli; sent to hospital for further evaluation. Reportedly pt's baseline mental status is alert, oriented & able to follow instruction; non ambulatory at baseline.
76 year old female from [facility] with PMHx of PVD, right AKA (S/P fem pop bypass), PAF, COPD, bipolar disorder, hypertension and PSHx of S/P CABG x1 brought into the ED via EMS from [facility] for unresponsiveness and hypotension. Admitted to the ICU for septic shock requiring vasopressors. Previously seen by speech and swallow, recommended mechanical soft thin liquid diet on 7/2/21. Rerefered to speech and swallow for bolus pocketing.

## 2021-07-05 NOTE — SWALLOW BEDSIDE ASSESSMENT ADULT - ASR SWALLOW ASPIRATION MONITOR
change of breathing pattern/oral hygiene/position upright (90Y)/cough/fever/pneumonia
change of breathing pattern/oral hygiene/position upright (90Y)/cough/gurgly voice/fever/pneumonia/throat clearing/upper respiratory infection

## 2021-07-05 NOTE — PROGRESS NOTE ADULT - PROBLEM SELECTOR PLAN 10
- DNR/DNI, MOLTS in chart  - Needs future MRCP when pt stable  - Pt niece Silva Marquez  [phone] is her HCP, guardianship. and POA, according to her, any decision should be discussed with her, also requesting new nursing home.   - CM to follow after long weekend.    - DVT PPX Pt on Eliquis  - GI ppx: Protonix - DNR/DNI, MOLTS in chart  -Need MRCP, consent taken from neice and faxed screening  to MRI on 7/5 1230. confirmation in chart  - Pt niece Silva Marquez  [phone] is her HCP, guardianship. and POA, according to her, any decision should be discussed with her, also requesting new nursing home.   - CM to follow after long weekend.    - DVT PPX Pt on Eliquis  - GI ppx: Protonix

## 2021-07-05 NOTE — CHART NOTE - NSCHARTNOTEFT_GEN_A_CORE
- called andrae Wagoner, HCP, discussed regarding pt's status, intervention, and plan of care. all questions answered

## 2021-07-05 NOTE — PROGRESS NOTE ADULT - ASSESSMENT
76 year old female from [facility] with PMHx of PVD, right AKA (S/P fem pop bypass), PAF, COPD, bipolar disorder, hypertension and PSHx of S/P CABG x1 brought into the ED via EMS from [facility] for unresponsiveness and hypotension. Per NH supervisor Mr. Gaspar () Pt was found unresponsive today morning around 8 am, pt was fine last night, pt did not have fever, difficulty breathing, pt was only receptive to painful stimuli and on evaluation pt's blood pressure was low per papers Pt's bp was 62/38. Pt was sent to the hospital for further evaluation. Baseline mental status alert, oriented and follows instruction, non ambulatory at baseline.     ED course: Alicia was placed and pt was given 3L ivf s/p urine out put of 40cc, u/a positive for infection, CT chest showing b/l patchy infiltrates/ consolidations, concern for cholecystitis, with dilated bile duct of 1.7 cm, and concern of pancreatic head mass. Pt was given a dose of vancomycin and rocephin. Pt's blood pressure improve to 97/58, hr 109 bpm, ECG showing sinus tachy, RBBB, LAFB, Bifacicular block.     Patient admitted to the ICU for septic shock requiring vasopressors. Source of infection likely acute cholecystitis as demonstrated on CT abdomen vs UTI. Patient was started on levophed, noted with severe acidosis requiring bicarb drip and VANESSA, likely pre-renal. Patient was started on cefepime and flagyl for sepsis 2/2 cholecystitis. Lactate acidosis was started on bicarb gtt. VANESSA likely pre-renal obtained multiple boluses with low urine output. Patient gradually improved with resolution of hypotension, was able to taper off pressors, bicarb improved was able to dc bicarb gtt. Urine output gradually increased. on 6/29, patient underwent IR guided cholecystostomy, with drainage of bile. Evaluated by GI after, recommended MRCP. Unable to obtain MRCP as of 6/30 due to altered mental status suspected to be due to gabapentin toxicity in the setting of VANESSA. Unstable for MRCP, will continue monitoring mental status. Mental status improved 7/1-7/2, pt aaox2 to self and location. Seen by speech and swallow, recommends mechanical soft thin liquid diet.     7/2 pt downgrade to SCU, failed TOV, alicia reinserted   7/5. Pending  MRCP ordered. Hypernatremia getting worse, started on D5W per nephro recs       76 year old female from [facility] with PMHx of PVD, right AKA (S/P fem pop bypass), PAF, COPD, bipolar disorder, hypertension and PSHx of S/P CABG x1 brought into the ED via EMS from [facility] for unresponsiveness and hypotension. Per NH supervisor Mr. Gaspar () Pt was found unresponsive today morning around 8 am, pt was fine last night, pt did not have fever, difficulty breathing, pt was only receptive to painful stimuli and on evaluation pt's blood pressure was low per papers Pt's bp was 62/38. Pt was sent to the hospital for further evaluation. Baseline mental status alert, oriented and follows instruction, non ambulatory at baseline.     ED course: Alicia was placed and pt was given 3L ivf s/p urine out put of 40cc, u/a positive for infection, CT chest showing b/l patchy infiltrates/ consolidations, concern for cholecystitis, with dilated bile duct of 1.7 cm, and concern of pancreatic head mass. Pt was given a dose of vancomycin and rocephin. Pt's blood pressure improve to 97/58, hr 109 bpm, ECG showing sinus tachy, RBBB, LAFB, Bifacicular block.     Patient admitted to the ICU for septic shock requiring vasopressors. Source of infection likely acute cholecystitis as demonstrated on CT abdomen vs UTI. Patient was started on levophed, noted with severe acidosis requiring bicarb drip and VANESSA, likely pre-renal. Patient was started on cefepime and flagyl for sepsis 2/2 cholecystitis. Lactate acidosis was started on bicarb gtt. VANESSA likely pre-renal obtained multiple boluses with low urine output. Patient gradually improved with resolution of hypotension, was able to taper off pressors, bicarb improved was able to dc bicarb gtt. Urine output gradually increased. on 6/29, patient underwent IR guided cholecystostomy, with drainage of bile. Evaluated by GI after, recommended MRCP. Unable to obtain MRCP as of 6/30 due to altered mental status suspected to be due to gabapentin toxicity in the setting of VANESSA. Unstable for MRCP, will continue monitoring mental status. Mental status improved 7/1-7/2, pt aaox2 to self and location. Seen by speech and swallow, recommends mechanical soft thin liquid diet.     7/2 pt downgrade to SCU, failed TOV, alicia reinserted   7/5. Pending  MRCP ordered. Hypernatremia worsening likely due to poor po intake, started on D5W per nephro recs

## 2021-07-05 NOTE — PROGRESS NOTE ADULT - ATTENDING COMMENTS
Problem/Plan - 1:  ·  Problem: Acute encephalopathy.  Plan: - like infectious process UTI Vs Cholecystitis, improving   - Patchy bibasilar dependent consolidation/atelectasis on CT  - sepsis improved, s/p vasopressor  - CT scan concern for colicystitis  - s/p cholecystectomy 9/30  - urine culture negative  - blood culture negative so far  - c/w cefepime and metronidazole  - c/w supportive care.      Problem/Plan - 2:  ·  Problem: Pleural effusion.  Plan: - CXR Bilateral pleural effusions and/or LEFT greater than RIGHT basilar airspace consolidation.  - C/w lasix- nephro rec  - C/w abx  - trend BMP as pt is eladio.      Problem/Plan - 3:  ·  Problem: Acute cholecystitis.  Plan: - CT with acute cholecystitis with common duct up to 1.7 cm mild intrahepatic biliary dilatation. The distal common duct obstructing stone and lesion  - s/p Cholecystectomy drain, no growth on culture  - BCx negative  - Once stable suggest MRCP to further characterize mass / obstruction and need for ERCP (if needed)- Gi recs  - GI Dr Pinon.      Problem/Plan - 4:  ·  Problem: UTI (urinary tract infection).  Plan: - S/p vanco and Rocephin in ED  - rest plan as above.      Problem/Plan - 5:  ·  Problem: COPD (chronic obstructive pulmonary disease).  Plan: - acute exacerbation, not on any medication at home  - started on albuterol MDI  -  started on solumedrol today 7/3  - Monitor FS with SS while on steriods  - will repeat CXR today  - Pulm Dr Beebe. Worsening hypernatremia  Hold diuresis  D5W infusion   Monitor sodium  Nephrology follow up  Awaiting MRCP today   Cont. antibiotics

## 2021-07-05 NOTE — PROGRESS NOTE ADULT - SUBJECTIVE AND OBJECTIVE BOX
MERARY MEYER    SCU progress note    INTERVAL HPI/OVERNIGHT EVENTS:  no new complaints. lethargic today, arousable    DNR [ ]   DNI  [  ] DNR/DNI    Covid - 19 PCR: negative    The 4Ms    What Matters Most: see GOC  Age appropriate Medications/Screen for High Risk Medication: Yes  Mentation: see CAM below  Mobility: defer to physical exam    The Confusion Assessment Method (CAM) Diagnostic Algorithm     1: Acute Onset or Fluctuating Course  - Is there evidence of an acute change in mental status from the patient’s baseline? Did the (abnormal) behavior  fluctuate during the day, that is, tend to come and go, or increase and decrease in severity?  [ ] YES [x ] NO     2: Inattention  - Did the patient have difficulty focusing attention, being easily distractible, or having difficulty keeping track of what was being said?  [ ] YES [ ] NO     3: Disorganized thinking  -Was the patient’s thinking disorganized or incoherent, such as rambling or irrelevant conversation, unclear or illogical flow of ideas, or unpredictable switching from subject to subject?  [ ] YES [x ] NO    4: Altered Level of consciousness?  [ ] YES [x ] NO    The diagnosis of delirium by CAM requires the presence of features 1 and 2 and either 3 or 4.    PRESSORS: [ ] YES [ x] NO  cefepime   IVPB      cefepime   IVPB 1000 milliGRAM(s) IV Intermittent every 24 hours  metroNIDAZOLE  IVPB      metroNIDAZOLE  IVPB 500 milliGRAM(s) IV Intermittent every 8 hours    Cardiovascular:  Heart Failure  Acute   Acute on Chronic  Chronic       metoprolol tartrate 50 milliGRAM(s) Oral two times a day    Pulmonary:  ALBUTerol    90 MICROgram(s) HFA Inhaler 1 Puff(s) Inhalation every 4 hours  ALBUTerol    90 MICROgram(s) HFA Inhaler 2 Puff(s) Inhalation every 6 hours PRN    Hematalogic:  apixaban 5 milliGRAM(s) Oral every 12 hours    Other:  acetaminophen   Tablet .. 650 milliGRAM(s) Oral every 6 hours PRN  dextrose 40% Gel 15 Gram(s) Oral once  dextrose 5%. 1000 milliLiter(s) IV Continuous <Continuous>  dextrose 5%. 1000 milliLiter(s) IV Continuous <Continuous>  dextrose 5%. 1000 milliLiter(s) IV Continuous <Continuous>  dextrose 50% Injectable 25 Gram(s) IV Push once  dextrose 50% Injectable 12.5 Gram(s) IV Push once  dextrose 50% Injectable 25 Gram(s) IV Push once  ferrous    sulfate 325 milliGRAM(s) Oral daily  glucagon  Injectable 1 milliGRAM(s) IntraMuscular once  insulin lispro (ADMELOG) corrective regimen sliding scale   SubCutaneous three times a day before meals  methylPREDNISolone sodium succinate Injectable 40 milliGRAM(s) IV Push every 8 hours  mupirocin 2% Ointment 1 Application(s) Both Nostrils two times a day  pantoprazole  Injectable 40 milliGRAM(s) IV Push at bedtime  senna 2 Tablet(s) Oral at bedtime  sodium chloride 0.9% lock flush 10 milliLiter(s) IV Push every 1 hour PRN    acetaminophen   Tablet .. 650 milliGRAM(s) Oral every 6 hours PRN  ALBUTerol    90 MICROgram(s) HFA Inhaler 1 Puff(s) Inhalation every 4 hours  ALBUTerol    90 MICROgram(s) HFA Inhaler 2 Puff(s) Inhalation every 6 hours PRN  apixaban 5 milliGRAM(s) Oral every 12 hours  cefepime   IVPB      cefepime   IVPB 1000 milliGRAM(s) IV Intermittent every 24 hours  dextrose 40% Gel 15 Gram(s) Oral once  dextrose 5%. 1000 milliLiter(s) IV Continuous <Continuous>  dextrose 5%. 1000 milliLiter(s) IV Continuous <Continuous>  dextrose 5%. 1000 milliLiter(s) IV Continuous <Continuous>  dextrose 50% Injectable 25 Gram(s) IV Push once  dextrose 50% Injectable 12.5 Gram(s) IV Push once  dextrose 50% Injectable 25 Gram(s) IV Push once  ferrous    sulfate 325 milliGRAM(s) Oral daily  glucagon  Injectable 1 milliGRAM(s) IntraMuscular once  insulin lispro (ADMELOG) corrective regimen sliding scale   SubCutaneous three times a day before meals  methylPREDNISolone sodium succinate Injectable 40 milliGRAM(s) IV Push every 8 hours  metoprolol tartrate 50 milliGRAM(s) Oral two times a day  metroNIDAZOLE  IVPB 500 milliGRAM(s) IV Intermittent every 8 hours  metroNIDAZOLE  IVPB      mupirocin 2% Ointment 1 Application(s) Both Nostrils two times a day  pantoprazole  Injectable 40 milliGRAM(s) IV Push at bedtime  senna 2 Tablet(s) Oral at bedtime  sodium chloride 0.9% lock flush 10 milliLiter(s) IV Push every 1 hour PRN    Drug Dosing Weight  Height (cm): 170.2 (28 Jun 2021 10:15)  Weight (kg): 66.9 (28 Jun 2021 16:29)  BMI (kg/m2): 23.1 (28 Jun 2021 16:29)  BSA (m2): 1.78 (28 Jun 2021 16:29)    CENTRAL LINE: [ ] YES [ ] NO  LOCATION:   DATE INSERTED:  REMOVE: [ ] YES [ ] NO  EXPLAIN:    BIANCHI: [ ] YES [ ] NO    DATE INSERTED:  REMOVE:  [ ] YES [ ] NO  EXPLAIN:    PAST MEDICAL & SURGICAL HISTORY:  Myocardial infarct, old    Hypercholesterolemia    Peripheral vascular disease    COPD (chronic obstructive pulmonary disease)    Bipolar 1 disorder    HTN (hypertension)    Bedbound    S/P CABG x 1    Above knee amputation of right lower extremity    07-04 @ 07:01  -  07-05 @ 07:00  --------------------------------------------------------  IN: 0 mL / OUT: 2180 mL / NET: -2180 mL    PHYSICAL EXAM:    GENERAL: NAD, lethargic. answers questions  HEAD:  Atraumatic, Normocephalic  EYES: EOMI, PERRLA, conjunctiva and sclera clear  ENMT: No tonsillar erythema, exudates, or enlargement; Moist mucous membranes, Good dentition, No lesions  NECK: Supple, No JVD, Normal thyroid  NERVOUS SYSTEM:  Alert & Oriented to self only, BUE moderate strength  CHEST/LUNG: Clear to percussion bilaterally; No rales, rhonchi, wheezing, or rubs  HEART: Regular rate and rhythm; No murmurs, rubs, or gallops  ABDOMEN: Soft, Nontender, Nondistended; Bowel sounds present. R abd area bili drain   EXTREMITIES: PPP+ on LLE with boots, R AKA intact  LYMPH: No lymphadenopathy noted  SKIN: No rashes or lesions      LABS:  CBC Full  -  ( 05 Jul 2021 07:03 )  WBC Count : 7.00 K/uL  RBC Count : 3.59 M/uL  Hemoglobin : 10.6 g/dL  Hematocrit : 32.1 %  Platelet Count - Automated : 273 K/uL  Mean Cell Volume : 89.4 fl  Mean Cell Hemoglobin : 29.5 pg  Mean Cell Hemoglobin Concentration : 33.0 gm/dL  Auto Neutrophil # : x  Auto Lymphocyte # : x  Auto Monocyte # : x  Auto Eosinophil # : x  Auto Basophil # : x  Auto Neutrophil % : x  Auto Lymphocyte % : x  Auto Monocyte % : x  Auto Eosinophil % : x  Auto Basophil % : x    07-05    150<H>  |  117<H>  |  56<H>  ----------------------------<  226<H>  3.3<L>   |  19<L>  |  2.65<H>    Ca    9.0      05 Jul 2021 07:03  Phos  2.7     07-05  Mg     1.7     07-05    TPro  6.4  /  Alb  2.2<L>  /  TBili  0.6  /  DBili  x   /  AST  17  /  ALT  14  /  AlkPhos  106  07-05    [  x]  DVT Prophylaxis  [  ]  Nutrition, Brand, Rate, taking PO       Malnutrition      RADIOLOGY & ADDITIONAL STUDIES:    < from: Xray Chest 1 View- PORTABLE-Routine (Xray Chest 1 View- PORTABLE-Routine .) (07.03.21 @ 11:50) >  EXAM:  XR CHEST PORTABLE ROUTINE 1V                            PROCEDURE DATE:  07/03/2021          INTERPRETATION:  Portable chest radiograph    CLINICAL INFORMATION: Dyspnea, shortness of breath.    TECHNIQUE:  Portable  AP view of the chest was obtained.    COMPARISON: 6/29/2021 chest available for review.    FINDINGS:    The lungs show increasing bilateral effusions and/or LEFT greater than RIGHT basilar airspace consolidations obscuring diaphragmatic contours.  There is of a diffuse vascular congestion and/or mild bilateral diffuse airspace disease.  .  . No pneumothorax.    The heart and mediastinum size and configuration are within normal limits.    Visualized osseous structures are intact.    IMPRESSION: Bilateral pleural effusions and/or LEFT greater than RIGHT basilar airspace consolidation.            JASKARAN SYKES MD; Attending Radiologist  This document has been electronically signed. Jul 4 2021  9:26AM    < end of copied text >  < from: Xray Chest 1 View-PORTABLE IMMEDIATE (Xray Chest 1 View-PORTABLE IMMEDIATE .) (06.29.21 @ 09:44) >    EXAM:  XR CHEST PORTABLE IMMED 1V                          EXAM:  XR CHEST PORTABLE IMMED 1V                            PROCEDURE DATE:  06/28/2021          INTERPRETATION:  TECHNIQUE: A single AP view of the chest was obtained. Ordered time:   6/28/2021 6:19 PM, 6/29/2021 9:44 AM    COMPARISON: 6/28/2021    CLINICAL INFORMATION: Septic shock    FINDINGS:  6/28/2021: A right IJ catheter was placed with its tip in the SVC.  The heart is not well assessed on an AP film.  The aorta is calcified.  There is linear atelectasis at the left lung base.  There are no pleural effusions.  There is no pneumothorax.    6/29/2021: The linear atelectasis at the left lung base has improved.    IMPRESSION:    Right IJ catheter with its tip in the SVC. No pneumothorax.    MAGALIS ONEAL MD; Attending Radiologist  This document has been electronically signed.  JENNIFER ONEAL MD; Attending Radiologist  This document has been electronically signed. Jun 29 2021 11:44AM    < end of copied text >      Goals of Care Discussion with Family/Proxy/Other   - see note from/family meeting set up for 6/30, [facility] in chart     MERARY MEYER    SCU progress note    INTERVAL HPI/OVERNIGHT EVENTS:  no new complaints. lethargic today, arousable    DNR [ ]   DNI  [  ] DNR/DNI    Covid - 19 PCR: negative    The 4Ms    What Matters Most: see GOC  Age appropriate Medications/Screen for High Risk Medication: Yes  Mentation: see CAM below  Mobility: defer to physical exam    The Confusion Assessment Method (CAM) Diagnostic Algorithm     1: Acute Onset or Fluctuating Course  - Is there evidence of an acute change in mental status from the patient’s baseline? Did the (abnormal) behavior  fluctuate during the day, that is, tend to come and go, or increase and decrease in severity?  [ ] YES [x ] NO     2: Inattention  - Did the patient have difficulty focusing attention, being easily distractible, or having difficulty keeping track of what was being said?  [ ] YES [ ] NO     3: Disorganized thinking  -Was the patient’s thinking disorganized or incoherent, such as rambling or irrelevant conversation, unclear or illogical flow of ideas, or unpredictable switching from subject to subject?  [ ] YES [x ] NO    4: Altered Level of consciousness?  [ ] YES [x ] NO    The diagnosis of delirium by CAM requires the presence of features 1 and 2 and either 3 or 4.    PRESSORS: [ ] YES [ x] NO  cefepime   IVPB      cefepime   IVPB 1000 milliGRAM(s) IV Intermittent every 24 hours  metroNIDAZOLE  IVPB      metroNIDAZOLE  IVPB 500 milliGRAM(s) IV Intermittent every 8 hours    Cardiovascular:  Heart Failure  Acute   Acute on Chronic  Chronic       metoprolol tartrate 50 milliGRAM(s) Oral two times a day    Pulmonary:  ALBUTerol    90 MICROgram(s) HFA Inhaler 1 Puff(s) Inhalation every 4 hours  ALBUTerol    90 MICROgram(s) HFA Inhaler 2 Puff(s) Inhalation every 6 hours PRN    Hematalogic:  apixaban 5 milliGRAM(s) Oral every 12 hours    Other:  acetaminophen   Tablet .. 650 milliGRAM(s) Oral every 6 hours PRN  dextrose 40% Gel 15 Gram(s) Oral once  dextrose 5%. 1000 milliLiter(s) IV Continuous <Continuous>  dextrose 5%. 1000 milliLiter(s) IV Continuous <Continuous>  dextrose 5%. 1000 milliLiter(s) IV Continuous <Continuous>  dextrose 50% Injectable 25 Gram(s) IV Push once  dextrose 50% Injectable 12.5 Gram(s) IV Push once  dextrose 50% Injectable 25 Gram(s) IV Push once  ferrous    sulfate 325 milliGRAM(s) Oral daily  glucagon  Injectable 1 milliGRAM(s) IntraMuscular once  insulin lispro (ADMELOG) corrective regimen sliding scale   SubCutaneous three times a day before meals  methylPREDNISolone sodium succinate Injectable 40 milliGRAM(s) IV Push every 8 hours  mupirocin 2% Ointment 1 Application(s) Both Nostrils two times a day  pantoprazole  Injectable 40 milliGRAM(s) IV Push at bedtime  senna 2 Tablet(s) Oral at bedtime  sodium chloride 0.9% lock flush 10 milliLiter(s) IV Push every 1 hour PRN    acetaminophen   Tablet .. 650 milliGRAM(s) Oral every 6 hours PRN  ALBUTerol    90 MICROgram(s) HFA Inhaler 1 Puff(s) Inhalation every 4 hours  ALBUTerol    90 MICROgram(s) HFA Inhaler 2 Puff(s) Inhalation every 6 hours PRN  apixaban 5 milliGRAM(s) Oral every 12 hours  cefepime   IVPB      cefepime   IVPB 1000 milliGRAM(s) IV Intermittent every 24 hours  dextrose 40% Gel 15 Gram(s) Oral once  dextrose 5%. 1000 milliLiter(s) IV Continuous <Continuous>  dextrose 5%. 1000 milliLiter(s) IV Continuous <Continuous>  dextrose 5%. 1000 milliLiter(s) IV Continuous <Continuous>  dextrose 50% Injectable 25 Gram(s) IV Push once  dextrose 50% Injectable 12.5 Gram(s) IV Push once  dextrose 50% Injectable 25 Gram(s) IV Push once  ferrous    sulfate 325 milliGRAM(s) Oral daily  glucagon  Injectable 1 milliGRAM(s) IntraMuscular once  insulin lispro (ADMELOG) corrective regimen sliding scale   SubCutaneous three times a day before meals  methylPREDNISolone sodium succinate Injectable 40 milliGRAM(s) IV Push every 8 hours  metoprolol tartrate 50 milliGRAM(s) Oral two times a day  metroNIDAZOLE  IVPB 500 milliGRAM(s) IV Intermittent every 8 hours  metroNIDAZOLE  IVPB      mupirocin 2% Ointment 1 Application(s) Both Nostrils two times a day  pantoprazole  Injectable 40 milliGRAM(s) IV Push at bedtime  senna 2 Tablet(s) Oral at bedtime  sodium chloride 0.9% lock flush 10 milliLiter(s) IV Push every 1 hour PRN    Drug Dosing Weight  Height (cm): 170.2 (28 Jun 2021 10:15)  Weight (kg): 66.9 (28 Jun 2021 16:29)  BMI (kg/m2): 23.1 (28 Jun 2021 16:29)  BSA (m2): 1.78 (28 Jun 2021 16:29)    CENTRAL LINE: [ ] YES [ x] NO  LOCATION:   DATE INSERTED:  REMOVE: [ ] YES [ ] NO  EXPLAIN:    BIANCHI: [x ] YES [ ] NO    DATE INSERTED:  REMOVE:  [ ] YES [x ] NO  EXPLAIN: failed TOV    PAST MEDICAL & SURGICAL HISTORY:  Myocardial infarct, old    Hypercholesterolemia    Peripheral vascular disease    COPD (chronic obstructive pulmonary disease)    Bipolar 1 disorder    HTN (hypertension)    Bedbound    S/P CABG x 1    Above knee amputation of right lower extremity    07-04 @ 07:01  -  07-05 @ 07:00  --------------------------------------------------------  IN: 0 mL / OUT: 2180 mL / NET: -2180 mL    PHYSICAL EXAM:    GENERAL: NAD, lethargic. answers questions  HEAD:  Atraumatic, Normocephalic  EYES: EOMI, PERRLA, conjunctiva and sclera clear  ENMT: No tonsillar erythema, exudates, or enlargement; Moist mucous membranes, Good dentition, No lesions  NECK: Supple, No JVD, Normal thyroid  NERVOUS SYSTEM:  Alert & Oriented to self only, BUE moderate strength  CHEST/LUNG: Clear to percussion bilaterally; No rales, rhonchi, wheezing, or rubs  HEART: Regular rate and rhythm; No murmurs, rubs, or gallops  ABDOMEN: Soft, Nontender, Nondistended; Bowel sounds present. R abd area bili drain   EXTREMITIES: PPP+ on LLE with boots, R AKA intact  LYMPH: No lymphadenopathy noted  SKIN: No rashes or lesions  : bianchi with cleat yellow urine      LABS:  CBC Full  -  ( 05 Jul 2021 07:03 )  WBC Count : 7.00 K/uL  RBC Count : 3.59 M/uL  Hemoglobin : 10.6 g/dL  Hematocrit : 32.1 %  Platelet Count - Automated : 273 K/uL  Mean Cell Volume : 89.4 fl  Mean Cell Hemoglobin : 29.5 pg  Mean Cell Hemoglobin Concentration : 33.0 gm/dL  Auto Neutrophil # : x  Auto Lymphocyte # : x  Auto Monocyte # : x  Auto Eosinophil # : x  Auto Basophil # : x  Auto Neutrophil % : x  Auto Lymphocyte % : x  Auto Monocyte % : x  Auto Eosinophil % : x  Auto Basophil % : x    07-05    150<H>  |  117<H>  |  56<H>  ----------------------------<  226<H>  3.3<L>   |  19<L>  |  2.65<H>    Ca    9.0      05 Jul 2021 07:03  Phos  2.7     07-05  Mg     1.7     07-05    TPro  6.4  /  Alb  2.2<L>  /  TBili  0.6  /  DBili  x   /  AST  17  /  ALT  14  /  AlkPhos  106  07-05    [  x]  DVT Prophylaxis  [  ]  Nutrition, Brand, Rate, taking PO       Malnutrition      RADIOLOGY & ADDITIONAL STUDIES:    < from: Xray Chest 1 View- PORTABLE-Routine (Xray Chest 1 View- PORTABLE-Routine .) (07.03.21 @ 11:50) >  EXAM:  XR CHEST PORTABLE ROUTINE 1V                            PROCEDURE DATE:  07/03/2021          INTERPRETATION:  Portable chest radiograph    CLINICAL INFORMATION: Dyspnea, shortness of breath.    TECHNIQUE:  Portable  AP view of the chest was obtained.    COMPARISON: 6/29/2021 chest available for review.    FINDINGS:    The lungs show increasing bilateral effusions and/or LEFT greater than RIGHT basilar airspace consolidations obscuring diaphragmatic contours.  There is of a diffuse vascular congestion and/or mild bilateral diffuse airspace disease.  .  . No pneumothorax.    The heart and mediastinum size and configuration are within normal limits.    Visualized osseous structures are intact.    IMPRESSION: Bilateral pleural effusions and/or LEFT greater than RIGHT basilar airspace consolidation.        JASKARAN SYKES MD; Attending Radiologist  This document has been electronically signed. Jul 4 2021  9:26AM    < end of copied text >  < from: Xray Chest 1 View-PORTABLE IMMEDIATE (Xray Chest 1 View-PORTABLE IMMEDIATE .) (06.29.21 @ 09:44) >    EXAM:  XR CHEST PORTABLE IMMED 1V                          EXAM:  XR CHEST PORTABLE IMMED 1V                            PROCEDURE DATE:  06/28/2021          INTERPRETATION:  TECHNIQUE: A single AP view of the chest was obtained. Ordered time:   6/28/2021 6:19 PM, 6/29/2021 9:44 AM    COMPARISON: 6/28/2021    CLINICAL INFORMATION: Septic shock    FINDINGS:  6/28/2021: A right IJ catheter was placed with its tip in the SVC.  The heart is not well assessed on an AP film.  The aorta is calcified.  There is linear atelectasis at the left lung base.  There are no pleural effusions.  There is no pneumothorax.    6/29/2021: The linear atelectasis at the left lung base has improved.    IMPRESSION:    Right IJ catheter with its tip in the SVC. No pneumothorax.    MAGALIS ONEAL MD; Attending Radiologist  This document has been electronically signed.  JENNIFER ONEAL MD; Attending Radiologist  This document has been electronically signed. Jun 29 2021 11:44AM    < end of copied text >      Goals of Care Discussion with Family/Proxy/Other   - see note from/family meeting set up for 6/30HARSH in chart     MERARY MEYER    SCU progress note    INTERVAL HPI/OVERNIGHT EVENTS:  no new complaints. lethargic today, arousable    DNR [ ]   DNI  [  ] DNR/DNI    Covid - 19 PCR: negative    The 4Ms    What Matters Most: see GOC  Age appropriate Medications/Screen for High Risk Medication: Yes  Mentation: see CAM below  Mobility: defer to physical exam    The Confusion Assessment Method (CAM) Diagnostic Algorithm     1: Acute Onset or Fluctuating Course  - Is there evidence of an acute change in mental status from the patient’s baseline? Did the (abnormal) behavior  fluctuate during the day, that is, tend to come and go, or increase and decrease in severity?  [ ] YES [x ] NO     2: Inattention  - Did the patient have difficulty focusing attention, being easily distractible, or having difficulty keeping track of what was being said?  [ ] YES [ ] NO     3: Disorganized thinking  -Was the patient’s thinking disorganized or incoherent, such as rambling or irrelevant conversation, unclear or illogical flow of ideas, or unpredictable switching from subject to subject?  [ ] YES [x ] NO    4: Altered Level of consciousness?  [ ] YES [x ] NO    The diagnosis of delirium by CAM requires the presence of features 1 and 2 and either 3 or 4.    PRESSORS: [ ] YES [ x] NO  cefepime   IVPB      cefepime   IVPB 1000 milliGRAM(s) IV Intermittent every 24 hours  metroNIDAZOLE  IVPB      metroNIDAZOLE  IVPB 500 milliGRAM(s) IV Intermittent every 8 hours    Cardiovascular:  Heart Failure  Acute   Acute on Chronic  Chronic       metoprolol tartrate 50 milliGRAM(s) Oral two times a day    Pulmonary:  ALBUTerol    90 MICROgram(s) HFA Inhaler 1 Puff(s) Inhalation every 4 hours  ALBUTerol    90 MICROgram(s) HFA Inhaler 2 Puff(s) Inhalation every 6 hours PRN    Hematalogic:  apixaban 5 milliGRAM(s) Oral every 12 hours    Other:  acetaminophen   Tablet .. 650 milliGRAM(s) Oral every 6 hours PRN  dextrose 40% Gel 15 Gram(s) Oral once  dextrose 5%. 1000 milliLiter(s) IV Continuous <Continuous>  dextrose 5%. 1000 milliLiter(s) IV Continuous <Continuous>  dextrose 5%. 1000 milliLiter(s) IV Continuous <Continuous>  dextrose 50% Injectable 25 Gram(s) IV Push once  dextrose 50% Injectable 12.5 Gram(s) IV Push once  dextrose 50% Injectable 25 Gram(s) IV Push once  ferrous    sulfate 325 milliGRAM(s) Oral daily  glucagon  Injectable 1 milliGRAM(s) IntraMuscular once  insulin lispro (ADMELOG) corrective regimen sliding scale   SubCutaneous three times a day before meals  methylPREDNISolone sodium succinate Injectable 40 milliGRAM(s) IV Push every 8 hours  mupirocin 2% Ointment 1 Application(s) Both Nostrils two times a day  pantoprazole  Injectable 40 milliGRAM(s) IV Push at bedtime  senna 2 Tablet(s) Oral at bedtime  sodium chloride 0.9% lock flush 10 milliLiter(s) IV Push every 1 hour PRN    acetaminophen   Tablet .. 650 milliGRAM(s) Oral every 6 hours PRN  ALBUTerol    90 MICROgram(s) HFA Inhaler 1 Puff(s) Inhalation every 4 hours  ALBUTerol    90 MICROgram(s) HFA Inhaler 2 Puff(s) Inhalation every 6 hours PRN  apixaban 5 milliGRAM(s) Oral every 12 hours  cefepime   IVPB      cefepime   IVPB 1000 milliGRAM(s) IV Intermittent every 24 hours  dextrose 40% Gel 15 Gram(s) Oral once  dextrose 5%. 1000 milliLiter(s) IV Continuous <Continuous>  dextrose 5%. 1000 milliLiter(s) IV Continuous <Continuous>  dextrose 5%. 1000 milliLiter(s) IV Continuous <Continuous>  dextrose 50% Injectable 25 Gram(s) IV Push once  dextrose 50% Injectable 12.5 Gram(s) IV Push once  dextrose 50% Injectable 25 Gram(s) IV Push once  ferrous    sulfate 325 milliGRAM(s) Oral daily  glucagon  Injectable 1 milliGRAM(s) IntraMuscular once  insulin lispro (ADMELOG) corrective regimen sliding scale   SubCutaneous three times a day before meals  methylPREDNISolone sodium succinate Injectable 40 milliGRAM(s) IV Push every 8 hours  metoprolol tartrate 50 milliGRAM(s) Oral two times a day  metroNIDAZOLE  IVPB 500 milliGRAM(s) IV Intermittent every 8 hours  metroNIDAZOLE  IVPB      mupirocin 2% Ointment 1 Application(s) Both Nostrils two times a day  pantoprazole  Injectable 40 milliGRAM(s) IV Push at bedtime  senna 2 Tablet(s) Oral at bedtime  sodium chloride 0.9% lock flush 10 milliLiter(s) IV Push every 1 hour PRN    Drug Dosing Weight  Height (cm): 170.2 (28 Jun 2021 10:15)  Weight (kg): 66.9 (28 Jun 2021 16:29)  BMI (kg/m2): 23.1 (28 Jun 2021 16:29)  BSA (m2): 1.78 (28 Jun 2021 16:29)    CENTRAL LINE: [ ] YES [ x] NO  LOCATION:   DATE INSERTED:  REMOVE: [ ] YES [ ] NO  EXPLAIN:    BIANCHI: [x ] YES [ ] NO    DATE INSERTED:  REMOVE:  [ ] YES [x ] NO  EXPLAIN: failed TOV    PAST MEDICAL & SURGICAL HISTORY:  Myocardial infarct, old    Hypercholesterolemia    Peripheral vascular disease    COPD (chronic obstructive pulmonary disease)    Bipolar 1 disorder    HTN (hypertension)    Bedbound    S/P CABG x 1    Above knee amputation of right lower extremity    07-04 @ 07:01  -  07-05 @ 07:00  --------------------------------------------------------  IN: 0 mL / OUT: 2180 mL / NET: -2180 mL    PHYSICAL EXAM:    GENERAL: NAD, lethargic. answers questions  HEAD:  Atraumatic, Normocephalic  EYES: EOMI, PERRLA, conjunctiva and sclera clear  ENMT: No tonsillar erythema, exudates, or enlargement; Moist mucous membranes, Good dentition, No lesions  NECK: Supple, No JVD, Normal thyroid  NERVOUS SYSTEM:  Alert & Oriented to self only, BUE moderate strength  CHEST/LUNG: Clear to percussion bilaterally; No rales, rhonchi, wheezing, or rubs  HEART: Regular rate and rhythm; No murmurs, rubs, or gallops  ABDOMEN: Soft, Nontender, Nondistended; Bowel sounds present. R abd area bili drain   EXTREMITIES: PPP+ on LLE with boots, R AKA intact  LYMPH: No lymphadenopathy noted  SKIN: No rashes or lesions  : bianchi with clear yellow urine      LABS:  CBC Full  -  ( 05 Jul 2021 07:03 )  WBC Count : 7.00 K/uL  RBC Count : 3.59 M/uL  Hemoglobin : 10.6 g/dL  Hematocrit : 32.1 %  Platelet Count - Automated : 273 K/uL  Mean Cell Volume : 89.4 fl  Mean Cell Hemoglobin : 29.5 pg  Mean Cell Hemoglobin Concentration : 33.0 gm/dL  Auto Neutrophil # : x  Auto Lymphocyte # : x  Auto Monocyte # : x  Auto Eosinophil # : x  Auto Basophil # : x  Auto Neutrophil % : x  Auto Lymphocyte % : x  Auto Monocyte % : x  Auto Eosinophil % : x  Auto Basophil % : x    07-05    150<H>  |  117<H>  |  56<H>  ----------------------------<  226<H>  3.3<L>   |  19<L>  |  2.65<H>    Ca    9.0      05 Jul 2021 07:03  Phos  2.7     07-05  Mg     1.7     07-05    TPro  6.4  /  Alb  2.2<L>  /  TBili  0.6  /  DBili  x   /  AST  17  /  ALT  14  /  AlkPhos  106  07-05    [  x]  DVT Prophylaxis  [  ]  Nutrition, Brand, Rate, taking PO       Malnutrition      RADIOLOGY & ADDITIONAL STUDIES:    < from: Xray Chest 1 View- PORTABLE-Routine (Xray Chest 1 View- PORTABLE-Routine .) (07.03.21 @ 11:50) >  EXAM:  XR CHEST PORTABLE ROUTINE 1V                            PROCEDURE DATE:  07/03/2021          INTERPRETATION:  Portable chest radiograph    CLINICAL INFORMATION: Dyspnea, shortness of breath.    TECHNIQUE:  Portable  AP view of the chest was obtained.    COMPARISON: 6/29/2021 chest available for review.    FINDINGS:    The lungs show increasing bilateral effusions and/or LEFT greater than RIGHT basilar airspace consolidations obscuring diaphragmatic contours.  There is of a diffuse vascular congestion and/or mild bilateral diffuse airspace disease.  .  . No pneumothorax.    The heart and mediastinum size and configuration are within normal limits.    Visualized osseous structures are intact.    IMPRESSION: Bilateral pleural effusions and/or LEFT greater than RIGHT basilar airspace consolidation.        JASKARAN SYKES MD; Attending Radiologist  This document has been electronically signed. Jul 4 2021  9:26AM    < end of copied text >  < from: Xray Chest 1 View-PORTABLE IMMEDIATE (Xray Chest 1 View-PORTABLE IMMEDIATE .) (06.29.21 @ 09:44) >    EXAM:  XR CHEST PORTABLE IMMED 1V                          EXAM:  XR CHEST PORTABLE IMMED 1V                            PROCEDURE DATE:  06/28/2021          INTERPRETATION:  TECHNIQUE: A single AP view of the chest was obtained. Ordered time:   6/28/2021 6:19 PM, 6/29/2021 9:44 AM    COMPARISON: 6/28/2021    CLINICAL INFORMATION: Septic shock    FINDINGS:  6/28/2021: A right IJ catheter was placed with its tip in the SVC.  The heart is not well assessed on an AP film.  The aorta is calcified.  There is linear atelectasis at the left lung base.  There are no pleural effusions.  There is no pneumothorax.    6/29/2021: The linear atelectasis at the left lung base has improved.    IMPRESSION:    Right IJ catheter with its tip in the SVC. No pneumothorax.    MAGALIS ONEAL MD; Attending Radiologist  This document has been electronically signed.  JENNIFER ONEAL MD; Attending Radiologist  This document has been electronically signed. Jun 29 2021 11:44AM    < end of copied text >      Goals of Care Discussion with Family/Proxy/Other   - see note from/family meeting set up for 6/30HARSH in chart

## 2021-07-05 NOTE — PROGRESS NOTE ADULT - SUBJECTIVE AND OBJECTIVE BOX
[facility] : Progress Note :: [phone], (office [phone]),   Dr Noriega / Dr Nava / Dr Granados / Dr Burrell / Dr Amish LOYD / Dr Stein / Dr Ridley / Dr Calvin louie  _____________________________________________________________________________________________    More confused.  poor oral intake.  s/p 2 doses of lasix          influenza virus vaccine, live, trivalent (Unknown)  PC Pen VK (Rash)  penicillin (Other; Rash)  statins (Other)  sulfa drugs (Other)  sulfamethizole (Other)    Hospital Medications:   MEDICATIONS  (STANDING):  ALBUTerol    90 MICROgram(s) HFA Inhaler 1 Puff(s) Inhalation every 4 hours  apixaban 5 milliGRAM(s) Oral every 12 hours  cefepime   IVPB      cefepime   IVPB 1000 milliGRAM(s) IV Intermittent every 24 hours  dextrose 40% Gel 15 Gram(s) Oral once  dextrose 5%. 1000 milliLiter(s) (50 mL/Hr) IV Continuous <Continuous>  dextrose 5%. 1000 milliLiter(s) (100 mL/Hr) IV Continuous <Continuous>  dextrose 5%. 1000 milliLiter(s) (75 mL/Hr) IV Continuous <Continuous>  dextrose 50% Injectable 25 Gram(s) IV Push once  dextrose 50% Injectable 12.5 Gram(s) IV Push once  dextrose 50% Injectable 25 Gram(s) IV Push once  ferrous    sulfate 325 milliGRAM(s) Oral daily  glucagon  Injectable 1 milliGRAM(s) IntraMuscular once  hydrALAZINE Injectable 10 milliGRAM(s) IV Push every 8 hours  insulin lispro (ADMELOG) corrective regimen sliding scale   SubCutaneous three times a day before meals  methylPREDNISolone sodium succinate Injectable 40 milliGRAM(s) IV Push every 8 hours  metoprolol tartrate 50 milliGRAM(s) Oral two times a day  metroNIDAZOLE  IVPB      metroNIDAZOLE  IVPB 500 milliGRAM(s) IV Intermittent every 8 hours  mupirocin 2% Ointment 1 Application(s) Both Nostrils two times a day  pantoprazole  Injectable 40 milliGRAM(s) IV Push at bedtime  senna 2 Tablet(s) Oral at bedtime      VITALS:  T(F): 97.4 (07-05-21 @ 13:12), Max: 98.3 (07-05-21 @ 05:00)  HR: 98 (07-05-21 @ 13:13)  BP: 189/90 (07-05-21 @ 13:13)  RR: 18 (07-05-21 @ 13:12)  SpO2: 96% (07-05-21 @ 13:12)  Wt(kg): --    07-04 @ 07:01  -  07-05 @ 07:00  --------------------------------------------------------  IN: 0 mL / OUT: 2180 mL / NET: -2180 mL        PHYSICAL EXAM:  Constitutional: NAD  HEENT: anicteric sclera, oropharynx clear, MMM  Neck: No JVD  Respiratory: CTAB, no wheezes, rales or rhonchi  Cardiovascular: S1, S2, RRR  Gastrointestinal: BS+, soft, NT/ND  Extremities: . No peripheral edema  Neurological: confused.  Psychiatric: Normal mood, normal affect  : No CVA tenderness. alicia+      LABS:  07-05    150<H>  |  117<H>  |  56<H>  ----------------------------<  226<H>  3.3<L>   |  19<L>  |  2.65<H>    Ca    9.0      05 Jul 2021 07:03  Phos  2.7     07-05  Mg     1.7     07-05    TPro  6.4  /  Alb  2.2<L>  /  TBili  0.6  /  DBili      /  AST  17  /  ALT  14  /  AlkPhos  106  07-05    Creatinine Trend: 2.65 <--, 2.94 <--, 2.99 <--, 3.80 <--, 4.46 <--, 5.08 <--, 4.94 <--, 5.02 <--, 5.24 <--, 5.31 <--, 5.81 <--                        10.6   7.00  )-----------( 273      ( 05 Jul 2021 07:03 )             32.1     Urine Studies:    Osmolality, Random Urine: 256 mos/kg (06-29 @ 00:52)  Sodium, Random Urine: 31 mmol/L (06-29 @ 00:52)  Creatinine, Random Urine: 87 mg/dL (06-29 @ 00:52)    RADIOLOGY & ADDITIONAL STUDIES:

## 2021-07-05 NOTE — CHART NOTE - NSCHARTNOTEFT_GEN_A_CORE
EVENT:BP: 184/94 (05 Jul 2021 05:13) (164/81 - 187/90)    Vital Signs Last 24 Hrs  T(C): 36.8 (05 Jul 2021 05:00), Max: 36.9 (04 Jul 2021 12:34)  T(F): 98.3 (05 Jul 2021 05:00), Max: 98.4 (04 Jul 2021 12:34)  HR: 97 (05 Jul 2021 05:13) (82 - 100)  BP: 184/94 (05 Jul 2021 05:13) (164/81 - 187/90)  BP(mean): --  RR: 19 (05 Jul 2021 05:13) (18 - 20)  SpO2: 96% (05 Jul 2021 05:13) (94% - 99%)    PLAN  MEDICATIONS  (STANDING):  ALBUTerol    0.083% 2.5 milliGRAM(s) Nebulizer every 6 hours  ALBUTerol    90 MICROgram(s) HFA Inhaler 1 Puff(s) Inhalation every 4 hours  apixaban 5 milliGRAM(s) Oral every 12 hours  cefepime   IVPB      cefepime   IVPB 1000 milliGRAM(s) IV Intermittent every 24 hours  dextrose 40% Gel 15 Gram(s) Oral once  dextrose 5%. 1000 milliLiter(s) (50 mL/Hr) IV Continuous <Continuous>  dextrose 5%. 1000 milliLiter(s) (100 mL/Hr) IV Continuous <Continuous>  dextrose 50% Injectable 25 Gram(s) IV Push once  dextrose 50% Injectable 12.5 Gram(s) IV Push once  dextrose 50% Injectable 25 Gram(s) IV Push once  ferrous    sulfate 325 milliGRAM(s) Oral daily  glucagon  Injectable 1 milliGRAM(s) IntraMuscular once  insulin lispro (ADMELOG) corrective regimen sliding scale   SubCutaneous three times a day before meals  methylPREDNISolone sodium succinate Injectable 40 milliGRAM(s) IV Push every 8 hours  metoprolol tartrate 50 milliGRAM(s) Oral two times a day  metoprolol tartrate Injectable 5 milliGRAM(s) IV Push once  metroNIDAZOLE  IVPB      metroNIDAZOLE  IVPB 500 milliGRAM(s) IV Intermittent every 8 hours  mupirocin 2% Ointment 1 Application(s) Both Nostrils two times a day  pantoprazole  Injectable 40 milliGRAM(s) IV Push at bedtime  senna 2 Tablet(s) Oral at bedtime    MEDICATIONS  (PRN):  acetaminophen   Tablet .. 650 milliGRAM(s) Oral every 6 hours PRN Temp greater or equal to 38C (100.4F), Mild Pain (1 - 3)  sodium chloride 0.9% lock flush 10 milliLiter(s) IV Push every 1 hour PRN Pre/post blood products, medications, blood draw, and to maintain line patency EVENT: BP: 184/94 (05 Jul 2021 05:13) (164/81 - 187/90)    Vital Signs Last 24 Hrs  T(C): 36.8 (05 Jul 2021 05:00), Max: 36.9 (04 Jul 2021 12:34)  T(F): 98.3 (05 Jul 2021 05:00), Max: 98.4 (04 Jul 2021 12:34)  HR: 97 (05 Jul 2021 05:13) (82 - 100)  BP: 184/94 (05 Jul 2021 05:13) (164/81 - 187/90)  BP(mean): --  RR: 19 (05 Jul 2021 05:13) (18 - 20)  SpO2: 96% (05 Jul 2021 05:13) (94% - 99%)    PLAN  1. Cont metoprolol tartrate 50 emanuel GRAM(s) Oral two times a day  2. Metoprolol tartrate Injectable 5 emanuel GRAM(s) IV Push once now    FOLLOW UP: BP

## 2021-07-05 NOTE — SWALLOW BEDSIDE ASSESSMENT ADULT - COMMENTS
Consult received, EMR, labs, radiology reviewed. HOB elevated to 90°. Alert and responsive, nonverbal during encounter. P/w oropharyngeal dysphagia - prolonged bolus transport, benefits from tactile cues for swallow trigger, suspected mildly delayed swallow trigger, mildly diminished hyolaryngeal excursion upon palpation, noted with mildly increased WOB post swallow. Rec to continue on puree consistency and thin liquids with strict aspiration precautions. noted with mildly increased WOB post swallow Noted with bolus holding d/t prolonged a-p transport; benefits from alternation of liquids and solids for swallow trigger.

## 2021-07-05 NOTE — PROGRESS NOTE ADULT - PROBLEM SELECTOR PLAN 5
- acute exacerbation, not on any medication at home  - started on albuterol MDI  -  started on solumedrol today 7/3  - Monitor FS with SS while on steriods  - Repeat chest Xray noted  - Pulm Dr Persuad

## 2021-07-05 NOTE — PROGRESS NOTE ADULT - ASSESSMENT
Patient is a 76y Female whom was sent to ED from the nursing home  to the hospital with hypotension and confusion.  Has H/O PVD s/p RT AKA, COPD, HTN, CABG x1. Blood pressure in the nursing home was 62/32.  In ED noted to have severe hypotension, confused  work-up revealed acute kidnye injury SCR 6 ( normal SCR at baseline).  Also with severe mixed gap and non-gap metabolic acidosis.  s/p isotonic fluid bolus and later started on NAHCO3 gtt as well as vasopressors and broad spectrum antibiotics  CT abdomen revealed acute cholecystitis, pancreatic head mass. No hydronephrosis  urine output improved .  BP better  s/p cholecystotomy  hypernatremia     # VANESSA sec to established ATN  from  septic shock ( septic shock now resolved)  SCR improving   cont ABX      #hypokalemia repleted    hypernatremia- poor oral intake. started on D5W this morning  can repeat BMP tonight
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
## 2021-07-05 NOTE — PROGRESS NOTE ADULT - PROBLEM SELECTOR PLAN 9
2/2 to established ATN  form septic shock, clinically improved  - S/p alicia reinsertion on 7/2, failed TOV  - encourage oral free water intake   - s/p bicarb gtt  - no acute need for renal replacement therapy- per nephro  - started on IVF D5w today  - trend BMP  - Nephro Dr Noriega 2/2 to established ATN  form septic shock, clinically improved  - S/p alicia reinsertion on 7/2, failed TOV  - encourage oral free water intake   - s/p bicarb gtt  - no acute need for renal replacement therapy- per nephro  - started on IVF D5w today due to hypernatremia  - trend BMP  - Nephro Dr Noriega

## 2021-07-05 NOTE — SWALLOW BEDSIDE ASSESSMENT ADULT - ORAL PHASE
Within functional limits Decreased anterior-posterior movement of the bolus/Delayed oral transit time Decreased anterior-posterior movement of the bolus

## 2021-07-05 NOTE — SWALLOW BEDSIDE ASSESSMENT ADULT - ASR SWALLOW LINGUAL MOBILITY
impaired protrusion/impaired anterior elevation/impaired left lateral movement/impaired right lateral movement
impaired protrusion

## 2021-07-05 NOTE — PROGRESS NOTE ADULT - PROBLEM SELECTOR PLAN 6
- s/p vasopressor in due to septic shock, however BP trending up with tachycardia  - Pt on home medications tropol XL  - started on metoprolol titrate 25mg BID for now with parameters- discussed with Dr Carpio  - monitor BP - s/p vasopressor in due to septic shock, however BP trending up with tachycardia  - Pt on home medications tropol XL  -7/2 started on metoprolol titrate 25mg BID for now with parameters- discussed with Dr Carpio  - 7/4 increased BB to 5omg  - 7/5, added hydralazine TID  - monitor BP

## 2021-07-05 NOTE — PROGRESS NOTE ADULT - PROBLEM SELECTOR PLAN 7
- CT with pancreatic head mass  - will need MRCP when stable, pt is not stable yet, worsening ams today  - GI Zi - CT with pancreatic head mass  - Pending MRCP, consent taken from alida and faxed screening  to MRI on 7/5 3090. confirmation in chart  - ROXIE Pinon

## 2021-07-05 NOTE — PROGRESS NOTE ADULT - SUBJECTIVE AND OBJECTIVE BOX
Patient is a 76y old  Female who presents with a chief complaint of SEPSIS (2021 10:14)    PATIENT IS SEEN AND EXAMINED IN MEDICAL FLOOR.    RENALDO [    ]    TASH [   ]      GT [   ]    ALLERGIES:  influenza virus vaccine, live, trivalent (Unknown)  PC Pen VK (Rash)  penicillin (Other; Rash)  statins (Other)  sulfa drugs (Other)  sulfamethizole (Other)      Daily     Daily Weight in k.3 (2021 05:13)    VITALS:    Vital Signs Last 24 Hrs  T(C): 36.3 (2021 13:12), Max: 36.8 (2021 05:00)  T(F): 97.4 (2021 13:12), Max: 98.3 (2021 05:00)  HR: 98 (2021 13:13) (82 - 98)  BP: 189/90 (2021 13:13) (169/78 - 189/90)  BP(mean): --  RR: 18 (2021 13:12) (18 - 19)  SpO2: 96% (2021 13:12) (96% - 99%)    LABS:    CBC Full  -  ( 2021 07:03 )  WBC Count : 7.00 K/uL  RBC Count : 3.59 M/uL  Hemoglobin : 10.6 g/dL  Hematocrit : 32.1 %  Platelet Count - Automated : 273 K/uL  Mean Cell Volume : 89.4 fl  Mean Cell Hemoglobin : 29.5 pg  Mean Cell Hemoglobin Concentration : 33.0 gm/dL  Auto Neutrophil # : 5.79 K/uL  Auto Lymphocyte # : 0.64 K/uL  Auto Monocyte # : 0.42 K/uL  Auto Eosinophil # : 0.00 K/uL  Auto Basophil # : 0.02 K/uL  Auto Neutrophil % : 82.7 %  Auto Lymphocyte % : 9.1 %  Auto Monocyte % : 6.0 %  Auto Eosinophil % : 0.0 %  Auto Basophil % : 0.3 %          150<H>  |  117<H>  |  56<H>  ----------------------------<  226<H>  3.3<L>   |  19<L>  |  2.65<H>    Ca    9.0      2021 07:03  Phos  2.7     07-05  Mg     1.7     07-05    TPro  6.4  /  Alb  2.2<L>  /  TBili  0.6  /  DBili  x   /  AST  17  /  ALT  14  /  AlkPhos  106  07-05    CAPILLARY BLOOD GLUCOSE      POCT Blood Glucose.: 275 mg/dL (2021 11:24)  POCT Blood Glucose.: 213 mg/dL (2021 07:55)  POCT Blood Glucose.: 233 mg/dL (2021 20:51)  POCT Blood Glucose.: 253 mg/dL (2021 17:14)        LIVER FUNCTIONS - ( 2021 07:03 )  Alb: 2.2 g/dL / Pro: 6.4 g/dL / ALK PHOS: 106 U/L / ALT: 14 U/L DA / AST: 17 U/L / GGT: x           Creatinine Trend: 2.65<--, 2.94<--, 2.99<--, 3.80<--, 4.46<--, 5.08<--  I&O's Summary    2021 07:01  -  2021 07:00  --------------------------------------------------------  IN: 0 mL / OUT: 2180 mL / NET: -2180 mL            .Body Fluid Abdominal Fluid   @ 21:59   No growth at 5 days  --    No polymorphonuclear leukocytes seen  No organisms seen  by cytocentrifuge      .Urine Clean Catch (Midstream)   @ 18:43   <10,000 CFU/mL Normal Urogenital Magda  --  --      .Blood Blood-Peripheral   @ 13:03   No Growth Final  --  --      .Blood Blood   @ 22:03   No Growth Final  --  --      .Urine Clean Catch (Midstream)   @ 18:12   >100,000 CFU/ml Escherichia coli  --  Escherichia coli      .Blood Blood-Peripheral   @ 19:01   No Growth Final  --  --          MEDICATIONS:    MEDICATIONS  (STANDING):  ALBUTerol    90 MICROgram(s) HFA Inhaler 1 Puff(s) Inhalation every 4 hours  apixaban 5 milliGRAM(s) Oral every 12 hours  cefepime   IVPB      cefepime   IVPB 1000 milliGRAM(s) IV Intermittent every 24 hours  dextrose 40% Gel 15 Gram(s) Oral once  dextrose 5%. 1000 milliLiter(s) (50 mL/Hr) IV Continuous <Continuous>  dextrose 5%. 1000 milliLiter(s) (100 mL/Hr) IV Continuous <Continuous>  dextrose 5%. 1000 milliLiter(s) (75 mL/Hr) IV Continuous <Continuous>  dextrose 50% Injectable 25 Gram(s) IV Push once  dextrose 50% Injectable 12.5 Gram(s) IV Push once  dextrose 50% Injectable 25 Gram(s) IV Push once  ferrous    sulfate 325 milliGRAM(s) Oral daily  glucagon  Injectable 1 milliGRAM(s) IntraMuscular once  hydrALAZINE Injectable 10 milliGRAM(s) IV Push every 8 hours  insulin lispro (ADMELOG) corrective regimen sliding scale   SubCutaneous three times a day before meals  methylPREDNISolone sodium succinate Injectable 40 milliGRAM(s) IV Push every 8 hours  metoprolol tartrate 50 milliGRAM(s) Oral two times a day  metroNIDAZOLE  IVPB      metroNIDAZOLE  IVPB 500 milliGRAM(s) IV Intermittent every 8 hours  mupirocin 2% Ointment 1 Application(s) Both Nostrils two times a day  pantoprazole  Injectable 40 milliGRAM(s) IV Push at bedtime  senna 2 Tablet(s) Oral at bedtime      MEDICATIONS  (PRN):  acetaminophen   Tablet .. 650 milliGRAM(s) Oral every 6 hours PRN Temp greater or equal to 38C (100.4F), Mild Pain (1 - 3)  ALBUTerol    90 MICROgram(s) HFA Inhaler 2 Puff(s) Inhalation every 6 hours PRN Wheezing  sodium chloride 0.9% lock flush 10 milliLiter(s) IV Push every 1 hour PRN Pre/post blood products, medications, blood draw, and to maintain line patency        REVIEW OF SYSTEMS:                           ALL ROS DONE [ X   ]      CONSTITUTIONAL:  LETHARGIC [   ], FEVER [   ], UNRESPONSIVE [   ]  CVS:  CP  [   ], SOB, [   ], PALPITATIONS [   ], DIZZYNESS [   ]  RS: COUGH [   ], SPUTUM [   ]  GI: ABDOMINAL PAIN [   ], NAUSEA [   ], VOMITINGS [   ], DIARRHEA [   ], CONSTIPATION [   ]  :  DYSURIA [   ], NOCTURIA [   ], INCREASED FREQUENCY [   ], DRIBLING [   ],  SKELETAL: PAINFUL JOINTS [   ], SWOLLEN JOINTS [   ], NECK ACHE [   ], LOW BACK ACHE [   ],  SKIN : ULCERS [   ], RASH [   ], ITCHING [   ]  CNS: HEAD ACHE [   ], DOUBLE VISION [   ], BLURRED VISION [   ], AMS / CONFUSION [   ], SEIZURES [   ], WEAKNESS [   ],TINGLING / NUMBNESS [   ]      PHYSICAL EXAMINATION:    GENERAL APPEARANCE: NO DISTRESS  HEENT:  NO PALLOR, NO  JVD,  NO   NODES, NECK SUPPLE  CVS: S1 +, S2 +,   RS: AEEB,  OCCASIONAL  RALES +,   NO RONCHI  ABD: SOFT, NT, NO, BS +                                    BIANCHI ++  EXT: NO PE  SKIN: WARM,   SKELETAL:  ROM ACCEPTABLE                            RIGHT AKA +  CNS:  AAO X 1   ,  NO DEFICITS        RADIOLOGY :    < from: Xray Chest 1 View-PORTABLE IMMEDIATE (Xray Chest 1 View-PORTABLE IMMEDIATE .) (21 @ 09:44) >  2021: The linear atelectasis at the left lung base has improved.    IMPRESSION:    Right IJ catheter with its tip in the SVC. No pneumothorax.    < end of copied text >  < from: CT Chest No Cont (21 @ 11:59) >  IMPRESSION:  Limited by lack of any exogenousoral or intravenous contrast  Patchy bibasilar dependent consolidation/atelectasis. Correlate clinically for infection.  Cholelithiasis, pericholecystic fluid concerning for cholecystitis. Correlate with ultrasound and/or HIDA scan.  Significant biliary dilatation, possible mass pancreatic head. Correlate with MRCP/abdominal MR with pancreatic protocol    < end of copied text >        ASSESSMENT :     Sepsis    Yes    Myocardial infarct, old    Hypercholesterolemia    COPD (chronic obstructive pulmonary disease)    Peripheral vascular disease    COPD (chronic obstructive pulmonary disease)    Bipolar 1 disorder    HTN (hypertension)    Bedbound    S/P CABG x 1    Above knee amputation of right lower extremity        PLAN:  HPI:  76 year old female from [facility] with PMHx of PVD, right AKA (S/P fem pop bypass), PAF, COPD, bipolar disorder, hypertension and PSHx of S/P CABG x1 brought into the ED via EMS from [facility] for unresponsiveness and hypotension. Per NH supervisor Jeferson Gaspar () Pt was found unresponsive today morning around 8 am, pt was fine last night, pt did not have fever, difficulty breathing, pt was only receptive to painful stimuli and on evaluation pt's blood pressure was low per papers Pt's bp was 62/38. Pt was sent to the hospital for further evaluation. Baseline mental status alert, oriented and follows instruction, non ambulatory at baseline.   Allergy: Influenza vaccine and penicillin   Code status: Full code     ED course: Bianchi was placed and pt was given 3L ivf s/p urine out put of 40cc, u/a positive for infection, CT chest showing b/l patchy infiltrates/ consolidations, concern for cholecystitis, with dilated bile duct of 1.7 cm, and concern of pancreatic head mass. Pt was given a dose of vancomycin and rocephine. Pt's blood pressure improve to 97/58, hr 109 bpm, ECG showing sinus tachy, RBBB, LAFB, Bifacicular block.    (2021 14:58)      #  D/W PATIENT'S NIECE ( GEOR[place]IA HERNANDEZ - HCP / GUARDIAN )  AT LENGTH - PATIENT IS DNR / DNI / NO PEG TUBE / COMFORT CARE  - WANTS PATIENT TO GO TO DIFFERENT NURSING HOME - AND NOT [facility]. OBTAIN PALLIATIVE CARE TO FILL MOLST. NIECE HAS HCP PAPERS AND SHE WILL EMAIL TO THE HOSPITAL / OR         -  SEPSIS , UTI , SUSPECT ASPIRATION PNEUMONIA, ACUTE CHOLECYSTITIS - ON IV. CEFEPIME, METRONIDAZOLE, S/P IR GUIDED CHOLECYSTECTOMY. PATIENT WAS FOLLOWED IN ICU, S/P IV. PRESSORS. NOW PATIENT IS OFF PRESSORS  -  ACUTE METABOLIC ENCEPHALOPATHY , ADVANCED VASCULAR DEMENTIA    -  ATN / ARF WITH CKD - IMPROVING RENAL FUNCTION - RENAL EVALUATION IS IN PROGRESS   -  FAILURE TO THRIVE , POOR PO INTAKE - OBTAIN PALLIATIVE CARE CONSULT TO D/W FAMILY REGARDING PEG / NO PEG   -  A.FIB  / FLUID OVER LOAD ON IV. LASIX - F/UP BMP, CONTINUE ELIQUIS  -  NORMOCYTIC ANEMIA   -  PAD, OLD RIGHT AKA   -  GI PROPHYLAXIS    - DR. ANITA REYES ( COVERING DR. WALKER  )

## 2021-07-06 NOTE — PROGRESS NOTE ADULT - ASSESSMENT
Seen ICU     Cholecystitis   s.p cholecystomy tube   Continue  Anbx       Biliary obstruction   MRCP to further characterize mass I was physically present for the key portions of the evaluation and management (E/M) service provided.  The patient was personally seen and examined at bedside.  I have edited the note as appropriate.   Thank you for your consultation and allowing  me to participate in the care of your patients. If you have further questions please contact me at [phone].     Rm Hart M.D.       _________________________________________________________________________________________________  [facility]  [address] [place] [address]  Office: [phone]    Fortunato Bowman PA-C  ______________________________________________________________________

## 2021-07-06 NOTE — PROGRESS NOTE ADULT - PROBLEM SELECTOR PLAN 5
Continue oxygen support.  Continue bronchodilator. Use spacer with inhalers.  May need to add budesonide.  Decrease solumedrol to every 12 hours.  CT and CXR noted above.

## 2021-07-06 NOTE — PROGRESS NOTE ADULT - ASSESSMENT
76 year old female from [facility] with PMHx of PVD, right AKA (S/P fem pop bypass), PAF, COPD, bipolar disorder, hypertension and PSHx of S/P CABG x1 brought into the ED via EMS from [facility] for unresponsiveness and hypotension. Per NH supervisor Mr. Gaspar () Pt was found unresponsive today morning around 8 am, pt was fine last night, pt did not have fever, difficulty breathing, pt was only receptive to painful stimuli and on evaluation pt's blood pressure was low per papers Pt's bp was 62/38. Pt was sent to the hospital for further evaluation. Baseline mental status alert, oriented and follows instruction, non ambulatory at baseline.     ED course: Alicia was placed and pt was given 3L ivf s/p urine out put of 40cc, u/a positive for infection, CT chest showing b/l patchy infiltrates/ consolidations, concern for cholecystitis, with dilated bile duct of 1.7 cm, and concern of pancreatic head mass. Pt was given a dose of vancomycin and rocephin. Pt's blood pressure improve to 97/58, hr 109 bpm, ECG showing sinus tachy, RBBB, LAFB, Bifacicular block.     Patient admitted to the ICU for septic shock requiring vasopressors. Source of infection likely acute cholecystitis as demonstrated on CT abdomen vs UTI. Patient was started on levophed, noted with severe acidosis requiring bicarb drip and VANESSA, likely pre-renal. Patient was started on cefepime and flagyl for sepsis 2/2 cholecystitis. Lactate acidosis was started on bicarb gtt. VANESSA likely pre-renal obtained multiple boluses with low urine output. Patient gradually improved with resolution of hypotension, was able to taper off pressors, bicarb improved was able to dc bicarb gtt. Urine output gradually increased. on 6/29, patient underwent IR guided cholecystostomy, with drainage of bile. Evaluated by GI after, recommended MRCP. Unable to obtain MRCP as of 6/30 due to altered mental status suspected to be due to gabapentin toxicity in the setting of VANESSA. Unstable for MRCP, will continue monitoring mental status. Mental status improved 7/1-7/2, pt aaox2 to self and location. Seen by speech and swallow, recommends mechanical soft thin liquid diet.     7/2 pt downgrade to SCU, failed TOV, alicia reinserted   7/5. Pending  MRCP ordered. Hypernatremia worsening likely due to poor po intake, started on D5W per nephro recs

## 2021-07-06 NOTE — PROGRESS NOTE ADULT - PROBLEM SELECTOR PLAN 1
Likely secondary from Infection vs Cholecystitis vs electrolyte imbalance.  Scheduled for MRCP today.  NPO. IVF D5W at 50ml  F/U cultures

## 2021-07-06 NOTE — PROGRESS NOTE ADULT - ATTENDING COMMENTS
Awaiting MRCP  COnt. D5W for now   Monitor fingerstick glucose and sodium level  Supplement potassium NPO for now  Cont. IV antibiotics

## 2021-07-06 NOTE — PROGRESS NOTE ADULT - SUBJECTIVE AND OBJECTIVE BOX
[facility] : Progress Note :: [phone], (office [phone]),   Dr Noriega / Dr Nava / Dr Granados / Dr Burrell / Dr Amish LOYD / Dr Stein / Dr Ridley / Dr Calvin louie  _____________________________________________________________________________________________    no events overnight      influenza virus vaccine, live, trivalent (Unknown)  PC Pen VK (Rash)  penicillin (Other; Rash)  statins (Other)  sulfa drugs (Other)  sulfamethizole (Other)    Hospital Medications:   MEDICATIONS  (STANDING):  ALBUTerol    90 MICROgram(s) HFA Inhaler 1 Puff(s) Inhalation every 4 hours  apixaban 5 milliGRAM(s) Oral every 12 hours  cefepime   IVPB 1000 milliGRAM(s) IV Intermittent every 24 hours  cefepime   IVPB      dextrose 40% Gel 15 Gram(s) Oral once  dextrose 5%. 1000 milliLiter(s) (50 mL/Hr) IV Continuous <Continuous>  dextrose 5%. 1000 milliLiter(s) (50 mL/Hr) IV Continuous <Continuous>  dextrose 5%. 1000 milliLiter(s) (100 mL/Hr) IV Continuous <Continuous>  dextrose 50% Injectable 12.5 Gram(s) IV Push once  dextrose 50% Injectable 25 Gram(s) IV Push once  dextrose 50% Injectable 25 Gram(s) IV Push once  ferrous    sulfate 325 milliGRAM(s) Oral daily  glucagon  Injectable 1 milliGRAM(s) IntraMuscular once  hydrALAZINE Injectable 10 milliGRAM(s) IV Push every 8 hours  insulin lispro (ADMELOG) corrective regimen sliding scale   SubCutaneous three times a day before meals  insulin lispro (ADMELOG) corrective regimen sliding scale   SubCutaneous at bedtime  methylPREDNISolone sodium succinate Injectable 40 milliGRAM(s) IV Push every 12 hours  metoprolol tartrate 50 milliGRAM(s) Oral two times a day  metroNIDAZOLE  IVPB      metroNIDAZOLE  IVPB 500 milliGRAM(s) IV Intermittent every 8 hours  pantoprazole  Injectable 40 milliGRAM(s) IV Push at bedtime  senna 2 Tablet(s) Oral at bedtime        VITALS:  T(F): 97.9 (07-06-21 @ 12:56), Max: 98.1 (07-06-21 @ 05:09)  HR: 89 (07-06-21 @ 14:00)  BP: 168/80 (07-06-21 @ 14:00)  RR: 19 (07-06-21 @ 12:58)  SpO2: 94% (07-06-21 @ 12:58)  Wt(kg): --    07-05 @ 07:01  -  07-06 @ 07:00  --------------------------------------------------------  IN: 750 mL / OUT: 1175 mL / NET: -425 mL    07-06 @ 07:01  -  07-06 @ 16:31  --------------------------------------------------------  IN: 0 mL / OUT: 300 mL / NET: -300 mL        PHYSICAL EXAM:  Constitutional: NAD  HEENT: anicteric sclera, oropharynx clear.  Neck: No JVD  Respiratory: CTAB, no wheezes, rales or rhonchi  Cardiovascular: S1, S2, RRR  Gastrointestinal: BS+, soft, NT/ND LUQ drain  Extremities: No peripheral edema  Neurological: A/O x 3, no focal deficits  : No CVA tenderness.  alicia+       LABS:  07-06    149<H>  |  116<H>  |  60<H>  ----------------------------<  284<H>  3.0<L>   |  21<L>  |  2.43<H>    Ca    8.6      06 Jul 2021 07:20  Phos  2.7     07-05  Mg     1.7     07-05    TPro  6.3  /  Alb  2.2<L>  /  TBili  0.6  /  DBili      /  AST  14  /  ALT  13  /  AlkPhos  93  07-06    Creatinine Trend: 2.43 <--, 2.65 <--, 2.94 <--, 2.99 <--, 3.80 <--, 4.46 <--, 5.08 <--, 4.94 <--                        10.0   8.90  )-----------( 254      ( 06 Jul 2021 07:20 )             30.0     Urine Studies:      RADIOLOGY & ADDITIONAL STUDIES:

## 2021-07-06 NOTE — PROGRESS NOTE ADULT - PROBLEM SELECTOR PLAN 2
CT with acute cholecystitis with common duct up to 1.7 cm mild intrahepatic biliary dilatation. The distal common duct obstructing stone and lesion  S/P Cholecystectomy drain, no growth on culture  BCx negative  Scheduled for MRCP to further characterize mass / obstruction and need for ERCP (if needed)- Gi recs  GI Dr Pinon.

## 2021-07-06 NOTE — PROGRESS NOTE ADULT - SUBJECTIVE AND OBJECTIVE BOX
MERARY MEYER    SCU progress note    INTERVAL HPI/OVERNIGHT EVENTS: ***No overnight events. Scheduled for MRCP today.    DNR [x ]   DNI  [ x ]    Covid - 19 PCR: Negative 6/28    The 4Ms    What Matters Most: see GO  Age appropriate Medications/Screen for High Risk Medication: Yes  Mentation: see CAM below  Mobility: defer to physical exam    The Confusion Assessment Method (CAM) Diagnostic Algorithm     1: Acute Onset or Fluctuating Course  - Is there evidence of an acute change in mental status from the patient’s baseline? Did the (abnormal) behavior  fluctuate during the day, that is, tend to come and go, or increase and decrease in severity?  [ ] YES [x ] NO     2: Inattention  - Did the patient have difficulty focusing attention, being easily distractible, or having difficulty keeping track of what was being said?  [ ] YES [ ] NO  Unable to access     3: Disorganized thinking  -Was the patient’s thinking disorganized or incoherent, such as rambling or irrelevant conversation, unclear or illogical flow of ideas, or unpredictable switching from subject to subject?  [ ] YES [ ] NO    Unable to access    4: Altered Level of consciousness?  [ ] YES [x ] NO    The diagnosis of delirium by CAM requires the presence of features 1 and 2 and either 3 or 4.    PRESSORS: [ ] YES [ ] NO  cefepime   IVPB      cefepime   IVPB 1000 milliGRAM(s) IV Intermittent every 24 hours  metroNIDAZOLE  IVPB 500 milliGRAM(s) IV Intermittent every 8 hours  metroNIDAZOLE  IVPB        Cardiovascular:  Heart Failure  Acute   Acute on Chronic  Chronic       hydrALAZINE Injectable 10 milliGRAM(s) IV Push every 8 hours  metoprolol tartrate 50 milliGRAM(s) Oral two times a day    Pulmonary:  ALBUTerol    90 MICROgram(s) HFA Inhaler 1 Puff(s) Inhalation every 4 hours  ALBUTerol    90 MICROgram(s) HFA Inhaler 2 Puff(s) Inhalation every 6 hours PRN    Hematalogic:  apixaban 5 milliGRAM(s) Oral every 12 hours    Other:  acetaminophen   Tablet .. 650 milliGRAM(s) Oral every 6 hours PRN  dextrose 40% Gel 15 Gram(s) Oral once  dextrose 5%. 1000 milliLiter(s) IV Continuous <Continuous>  dextrose 5%. 1000 milliLiter(s) IV Continuous <Continuous>  dextrose 5%. 1000 milliLiter(s) IV Continuous <Continuous>  dextrose 50% Injectable 25 Gram(s) IV Push once  dextrose 50% Injectable 25 Gram(s) IV Push once  dextrose 50% Injectable 12.5 Gram(s) IV Push once  ferrous    sulfate 325 milliGRAM(s) Oral daily  glucagon  Injectable 1 milliGRAM(s) IntraMuscular once  insulin lispro (ADMELOG) corrective regimen sliding scale   SubCutaneous three times a day before meals  insulin lispro (ADMELOG) corrective regimen sliding scale   SubCutaneous at bedtime  methylPREDNISolone sodium succinate Injectable 40 milliGRAM(s) IV Push every 8 hours  pantoprazole  Injectable 40 milliGRAM(s) IV Push at bedtime  potassium chloride  10 mEq/100 mL IVPB 10 milliEquivalent(s) IV Intermittent every 1 hour  senna 2 Tablet(s) Oral at bedtime  sodium chloride 0.9% lock flush 10 milliLiter(s) IV Push every 1 hour PRN    acetaminophen   Tablet .. 650 milliGRAM(s) Oral every 6 hours PRN  ALBUTerol    90 MICROgram(s) HFA Inhaler 1 Puff(s) Inhalation every 4 hours  ALBUTerol    90 MICROgram(s) HFA Inhaler 2 Puff(s) Inhalation every 6 hours PRN  apixaban 5 milliGRAM(s) Oral every 12 hours  cefepime   IVPB      cefepime   IVPB 1000 milliGRAM(s) IV Intermittent every 24 hours  dextrose 40% Gel 15 Gram(s) Oral once  dextrose 5%. 1000 milliLiter(s) IV Continuous <Continuous>  dextrose 5%. 1000 milliLiter(s) IV Continuous <Continuous>  dextrose 5%. 1000 milliLiter(s) IV Continuous <Continuous>  dextrose 50% Injectable 25 Gram(s) IV Push once  dextrose 50% Injectable 25 Gram(s) IV Push once  dextrose 50% Injectable 12.5 Gram(s) IV Push once  ferrous    sulfate 325 milliGRAM(s) Oral daily  glucagon  Injectable 1 milliGRAM(s) IntraMuscular once  hydrALAZINE Injectable 10 milliGRAM(s) IV Push every 8 hours  insulin lispro (ADMELOG) corrective regimen sliding scale   SubCutaneous three times a day before meals  insulin lispro (ADMELOG) corrective regimen sliding scale   SubCutaneous at bedtime  methylPREDNISolone sodium succinate Injectable 40 milliGRAM(s) IV Push every 8 hours  metoprolol tartrate 50 milliGRAM(s) Oral two times a day  metroNIDAZOLE  IVPB      metroNIDAZOLE  IVPB 500 milliGRAM(s) IV Intermittent every 8 hours  pantoprazole  Injectable 40 milliGRAM(s) IV Push at bedtime  potassium chloride  10 mEq/100 mL IVPB 10 milliEquivalent(s) IV Intermittent every 1 hour  senna 2 Tablet(s) Oral at bedtime  sodium chloride 0.9% lock flush 10 milliLiter(s) IV Push every 1 hour PRN    Drug Dosing Weight  Height (cm): 170.2 (28 Jun 2021 10:15)  Weight (kg): 66.9 (28 Jun 2021 16:29)  BMI (kg/m2): 23.1 (28 Jun 2021 16:29)  BSA (m2): 1.78 (28 Jun 2021 16:29)    CENTRAL LINE: [ ] YES [x ] NO  LOCATION:   DATE INSERTED:  REMOVE: [ ] YES [ ] NO  EXPLAIN:    BIANCHI: [x ] YES [ ] NO    DATE INSERTED:  REMOVE:  [ ] YES [x ] NO  EXPLAIN:  Retention     PAST MEDICAL & SURGICAL HISTORY:  Myocardial infarct, old    Hypercholesterolemia    Peripheral vascular disease    COPD (chronic obstructive pulmonary disease)    Bipolar 1 disorder    HTN (hypertension)    Bedbound    S/P CABG x 1    Above knee amputation of right lower extremity                07-05 @ 07:01  -  07-06 @ 07:00  --------------------------------------------------------  IN: 750 mL / OUT: 1175 mL / NET: -425 mL            PHYSICAL EXAM:    GENERAL: NAD, lethargic.  HEAD:  Atraumatic, Normocephalic  EYES: EOMI, PERRLA, conjunctiva and sclera clear  ENMT: No tonsillar erythema, exudates  NECK: Supple, No JVD  NERVOUS SYSTEM:  Awake but lethargic. Intermittently follows commands/talks. Moving all extremities  CHEST/LUNG: Clear to percussion bilaterally; No rales, rhonchi, wheezing, or rubs  HEART: Irregular rate and rhythm; No murmurs, rubs, or gallops  ABDOMEN: Soft, Nontender, Nondistended; Bowel sounds present. Right abdominal bili drain  EXTREMITIES:  Right AKA. Left +pulses  LYMPH: No lymphadenopathy noted  SKIN: No rashes or lesions      LABS:  CBC Full  -  ( 06 Jul 2021 07:20 )  WBC Count : 8.90 K/uL  RBC Count : 3.35 M/uL  Hemoglobin : 10.0 g/dL  Hematocrit : 30.0 %  Platelet Count - Automated : 254 K/uL  Mean Cell Volume : 89.6 fl  Mean Cell Hemoglobin : 29.9 pg  Mean Cell Hemoglobin Concentration : 33.3 gm/dL  Auto Neutrophil # : x  Auto Lymphocyte # : x  Auto Monocyte # : x  Auto Eosinophil # : x  Auto Basophil # : x  Auto Neutrophil % : x  Auto Lymphocyte % : x  Auto Monocyte % : x  Auto Eosinophil % : x  Auto Basophil % : x    07-06    149<H>  |  116<H>  |  60<H>  ----------------------------<  284<H>  3.0<L>   |  21<L>  |  2.43<H>    Ca    8.6      06 Jul 2021 07:20  Phos  2.7     07-05  Mg     1.7     07-05    TPro  6.3  /  Alb  2.2<L>  /  TBili  0.6  /  DBili  x   /  AST  14  /  ALT  13  /  AlkPhos  93  07-06              [  ]  DVT Prophylaxis  [  ]  Nutrition, Brand, Rate         Goal Rate        Abnormal Nutritional Status -  Malnutrition   Cachexia      Morbid Obesity BMI >/=40    RADIOLOGY & ADDITIONAL STUDIES:  ***    Goals of Care Discussion with Family/Proxy/Other   - see note from/family meeting set up for...     MERARY MEYER    SCU progress note    INTERVAL HPI/OVERNIGHT EVENTS: ***No overnight events. Scheduled for MRCP today.    DNR [x ]   DNI  [ x ]    Covid - 19 PCR: Negative 6/28    The 4Ms    What Matters Most: see GO  Age appropriate Medications/Screen for High Risk Medication: Yes  Mentation: see CAM below  Mobility: defer to physical exam    The Confusion Assessment Method (CAM) Diagnostic Algorithm     1: Acute Onset or Fluctuating Course  - Is there evidence of an acute change in mental status from the patient’s baseline? Did the (abnormal) behavior  fluctuate during the day, that is, tend to come and go, or increase and decrease in severity?  [ ] YES [x ] NO     2: Inattention  - Did the patient have difficulty focusing attention, being easily distractible, or having difficulty keeping track of what was being said?  [ ] YES [ ] NO  Unable to access     3: Disorganized thinking  -Was the patient’s thinking disorganized or incoherent, such as rambling or irrelevant conversation, unclear or illogical flow of ideas, or unpredictable switching from subject to subject?  [ ] YES [ ] NO    Unable to access    4: Altered Level of consciousness?  [ ] YES [x ] NO    The diagnosis of delirium by CAM requires the presence of features 1 and 2 and either 3 or 4.    PRESSORS: [ ] YES [ ] NO  cefepime   IVPB      cefepime   IVPB 1000 milliGRAM(s) IV Intermittent every 24 hours  metroNIDAZOLE  IVPB 500 milliGRAM(s) IV Intermittent every 8 hours  metroNIDAZOLE  IVPB        Cardiovascular:  Heart Failure  Acute   Acute on Chronic  Chronic       hydrALAZINE Injectable 10 milliGRAM(s) IV Push every 8 hours  metoprolol tartrate 50 milliGRAM(s) Oral two times a day    Pulmonary:  ALBUTerol    90 MICROgram(s) HFA Inhaler 1 Puff(s) Inhalation every 4 hours  ALBUTerol    90 MICROgram(s) HFA Inhaler 2 Puff(s) Inhalation every 6 hours PRN    Hematalogic:  apixaban 5 milliGRAM(s) Oral every 12 hours    Other:  acetaminophen   Tablet .. 650 milliGRAM(s) Oral every 6 hours PRN  dextrose 40% Gel 15 Gram(s) Oral once  dextrose 5%. 1000 milliLiter(s) IV Continuous <Continuous>  dextrose 5%. 1000 milliLiter(s) IV Continuous <Continuous>  dextrose 5%. 1000 milliLiter(s) IV Continuous <Continuous>  dextrose 50% Injectable 25 Gram(s) IV Push once  dextrose 50% Injectable 25 Gram(s) IV Push once  dextrose 50% Injectable 12.5 Gram(s) IV Push once  ferrous    sulfate 325 milliGRAM(s) Oral daily  glucagon  Injectable 1 milliGRAM(s) IntraMuscular once  insulin lispro (ADMELOG) corrective regimen sliding scale   SubCutaneous three times a day before meals  insulin lispro (ADMELOG) corrective regimen sliding scale   SubCutaneous at bedtime  methylPREDNISolone sodium succinate Injectable 40 milliGRAM(s) IV Push every 8 hours  pantoprazole  Injectable 40 milliGRAM(s) IV Push at bedtime  potassium chloride  10 mEq/100 mL IVPB 10 milliEquivalent(s) IV Intermittent every 1 hour  senna 2 Tablet(s) Oral at bedtime  sodium chloride 0.9% lock flush 10 milliLiter(s) IV Push every 1 hour PRN    acetaminophen   Tablet .. 650 milliGRAM(s) Oral every 6 hours PRN  ALBUTerol    90 MICROgram(s) HFA Inhaler 1 Puff(s) Inhalation every 4 hours  ALBUTerol    90 MICROgram(s) HFA Inhaler 2 Puff(s) Inhalation every 6 hours PRN  apixaban 5 milliGRAM(s) Oral every 12 hours  cefepime   IVPB      cefepime   IVPB 1000 milliGRAM(s) IV Intermittent every 24 hours  dextrose 40% Gel 15 Gram(s) Oral once  dextrose 5%. 1000 milliLiter(s) IV Continuous <Continuous>  dextrose 5%. 1000 milliLiter(s) IV Continuous <Continuous>  dextrose 5%. 1000 milliLiter(s) IV Continuous <Continuous>  dextrose 50% Injectable 25 Gram(s) IV Push once  dextrose 50% Injectable 25 Gram(s) IV Push once  dextrose 50% Injectable 12.5 Gram(s) IV Push once  ferrous    sulfate 325 milliGRAM(s) Oral daily  glucagon  Injectable 1 milliGRAM(s) IntraMuscular once  hydrALAZINE Injectable 10 milliGRAM(s) IV Push every 8 hours  insulin lispro (ADMELOG) corrective regimen sliding scale   SubCutaneous three times a day before meals  insulin lispro (ADMELOG) corrective regimen sliding scale   SubCutaneous at bedtime  methylPREDNISolone sodium succinate Injectable 40 milliGRAM(s) IV Push every 8 hours  metoprolol tartrate 50 milliGRAM(s) Oral two times a day  metroNIDAZOLE  IVPB      metroNIDAZOLE  IVPB 500 milliGRAM(s) IV Intermittent every 8 hours  pantoprazole  Injectable 40 milliGRAM(s) IV Push at bedtime  potassium chloride  10 mEq/100 mL IVPB 10 milliEquivalent(s) IV Intermittent every 1 hour  senna 2 Tablet(s) Oral at bedtime  sodium chloride 0.9% lock flush 10 milliLiter(s) IV Push every 1 hour PRN    Drug Dosing Weight  Height (cm): 170.2 (28 Jun 2021 10:15)  Weight (kg): 66.9 (28 Jun 2021 16:29)  BMI (kg/m2): 23.1 (28 Jun 2021 16:29)  BSA (m2): 1.78 (28 Jun 2021 16:29)    CENTRAL LINE: [ ] YES [x ] NO  LOCATION:   DATE INSERTED:  REMOVE: [ ] YES [ ] NO  EXPLAIN:    BIANCHI: [x ] YES [ ] NO    DATE INSERTED:  REMOVE:  [ ] YES [x ] NO  EXPLAIN:  Retention     PAST MEDICAL & SURGICAL HISTORY:  Myocardial infarct, old    Hypercholesterolemia    Peripheral vascular disease    COPD (chronic obstructive pulmonary disease)    Bipolar 1 disorder    HTN (hypertension)    Bedbound    S/P CABG x 1    Above knee amputation of right lower extremity                07-05 @ 07:01  -  07-06 @ 07:00  --------------------------------------------------------  IN: 750 mL / OUT: 1175 mL / NET: -425 mL            PHYSICAL EXAM:    GENERAL: NAD, lethargic.  HEAD:  Atraumatic, Normocephalic  EYES: EOMI, PERRLA, conjunctiva and sclera clear  ENMT: No tonsillar erythema, exudates  NECK: Supple, No JVD  NERVOUS SYSTEM:  Awake but lethargic. Intermittently follows commands/talks. Moving all extremities  CHEST/LUNG: Diminished breath sounds bilateral bases  HEART: Irregular rate and rhythm; No murmurs, rubs, or gallops  ABDOMEN: Soft, Nontender, Nondistended; Bowel sounds present. Right abdominal bili drain  EXTREMITIES:  Right AKA. Left +pulses  LYMPH: No lymphadenopathy noted  SKIN: No rashes or lesions      LABS:  CBC Full  -  ( 06 Jul 2021 07:20 )  WBC Count : 8.90 K/uL  RBC Count : 3.35 M/uL  Hemoglobin : 10.0 g/dL  Hematocrit : 30.0 %  Platelet Count - Automated : 254 K/uL  Mean Cell Volume : 89.6 fl  Mean Cell Hemoglobin : 29.9 pg  Mean Cell Hemoglobin Concentration : 33.3 gm/dL  Auto Neutrophil # : x  Auto Lymphocyte # : x  Auto Monocyte # : x  Auto Eosinophil # : x  Auto Basophil # : x  Auto Neutrophil % : x  Auto Lymphocyte % : x  Auto Monocyte % : x  Auto Eosinophil % : x  Auto Basophil % : x    07-06    149<H>  |  116<H>  |  60<H>  ----------------------------<  284<H>  3.0<L>   |  21<L>  |  2.43<H>    Ca    8.6      06 Jul 2021 07:20  Phos  2.7     07-05  Mg     1.7     07-05    TPro  6.3  /  Alb  2.2<L>  /  TBili  0.6  /  DBili  x   /  AST  14  /  ALT  13  /  AlkPhos  93  07-06              [  ]  DVT Prophylaxis  [  ]  Nutrition, Brand, Rate         Goal Rate        Abnormal Nutritional Status -  Malnutrition   Cachexia      Morbid Obesity BMI >/=40    RADIOLOGY & ADDITIONAL STUDIES:  ***  < from: CT Chest No Cont (06.28.21 @ 11:59) >  CHEST:  LUNGS AND LARGE AIRWAYS: Patent central airways. Patchy bibasilar dependent consolidation/atelectasis right greater than left. Correlate clinically for infection. biapical scarring  PLEURA: No pleural effusion.  VESSELS: Nonaneurysmal.  HEART: Mild cardiomegaly with coronary artery calcification No pericardial effusion.  MEDIASTINUM AND CLARICE: No lymphadenopathy.  CHEST WALL AND LOWER NECK: Nonspecific left breast calcification..    ABDOMEN AND PELVIS:  LIVER: Within normal limits.  BILE DUCTS: Marked dilatation common duct up to 1.7 cm mild intrahepatic biliary dilatation. The distal common duct obstructing stone and/or lesion is considered. Correlate with MR.  GALLBLADDER: Gallstones. Markedly distended gallbladder with pericholecystic fluid concerning for cholecystitis. Correlate with right upper quadrant ultrasound and/or HIDA.  SPLEEN: Within normal limits.  PANCREAS: Not well evaluated on this noncontrast study. However, there is diffuse atrophy of the body and tail with dilatation of the main pancreatic duct. Increased soft tissue density in the pancreatic head and uncinate process suspicious for a mass. Recommend  MRI  ADRENALS: Within normal limits.  KIDNEYS/URETERS: Punctate nonobstructive right renal calculus    BLADDER: Collapsed around a Bianchi.  REPRODUCTIVE ORGANS: No gynecologic mass    BOWEL: No bowel obstruction. Appendix no appendicitis  PERITONEUM: No ascites.  VESSELS: Nonaneurysmal  RETROPERITONEUM/LYMPH NODES: No lymphadenopathy.  ABDOMINAL WALL: Within normal limits.  BONES: No aggressive lesions    IMPRESSION:  Limited by lack of any exogenousoral or intravenous contrast  Patchy bibasilar dependent consolidation/atelectasis. Correlate clinically for infection.  Cholelithiasis, pericholecystic fluid concerning for cholecystitis. Correlate with ultrasound and/or HIDA scan.  Significant biliary dilatation, possible mass pancreatic head. Correlate with MRCP/abdominal MR with pancreatic protocol        < end of copied text >  < from: Xray Chest 1 View- PORTABLE-Routine (Xray Chest 1 View- PORTABLE-Routine .) (07.03.21 @ 11:50) >  The lungs show increasing bilateral effusions and/or LEFT greater than RIGHT basilar airspace consolidations obscuring diaphragmatic contours.  There is of a diffuse vascular congestion and/or mild bilateral diffuse airspace disease.  .  . No pneumothorax.    The heart and mediastinum size and configuration are within normal limits.    Visualized osseous structures are intact.    IMPRESSION: Bilateral pleural effusions and/or LEFT greater than RIGHT basilar airspace consolidation.    < end of copied text >    Goals of Care Discussion with Family/Proxy/Other   - see note from/family meeting set up for...

## 2021-07-06 NOTE — PROGRESS NOTE ADULT - ASSESSMENT
Patient is a 76y Female whom was sent to ED from the nursing home  to the hospital with hypotension and confusion.  Has H/O PVD s/p RT AKA, COPD, HTN, CABG x1. Blood pressure in the nursing home was 62/32.  In ED noted to have severe hypotension, confused  work-up revealed acute kidnye injury SCR 6 ( normal SCR at baseline).  Also with severe mixed gap and non-gap metabolic acidosis.  s/p isotonic fluid bolus and later started on NAHCO3 gtt as well as vasopressors and broad spectrum antibiotics  CT abdomen revealed acute cholecystitis, pancreatic head mass. No hydronephrosis  s/p cholecystotomy  hypernatremia improving     # VANESSA sec to established ATN  from  septic shock ( septic shock now resolved)  SCR improving   cont ABX  #hypokalemia repleted    hypernatremia- cont D5W  encourage incr  free water intake

## 2021-07-06 NOTE — PROGRESS NOTE ADULT - PROBLEM SELECTOR PLAN 10
DVT and GI prophylaxis.  DNR/DNI MOLST in chart  F/U results of MRCP  Pt niece Silva Marquez  [phone] is her HCP, guardianship. and POA, according to her, any decision should be discussed with her, also requesting new nursing home.   - CM to follow after long weekend.

## 2021-07-06 NOTE — PROGRESS NOTE ADULT - ASSESSMENT
seen and examined awake but lithargic  holding hands  doesnt talk.  not in any distress.  not in any distress  vsstable afebrile  physical done ok  lungs clear  abd soft bs nml, has cholecystostomy tube  labs noted  , k 3.0  creat 2.43  hgb 10  a/p altered Ms  acute renal failure  uti, cjolecystitis  still on cefepime and flagyl  ID to f/u  afib, severe PAD  cont same  acute renal failure  renal functions better    supplement K    watch Na  nephro on board  poor prognosis

## 2021-07-06 NOTE — PROGRESS NOTE ADULT - SUBJECTIVE AND OBJECTIVE BOX
HPI:  76 year old female from [facility] with PMHx of PVD, right AKA (S/P fem pop bypass), PAF, COPD, bipolar disorder, hypertension and PSHx of S/P CABG x1 brought into the ED via EMS from [facility] for unresponsiveness and hypotension. Per NH supervisor Mr. Gaspar () Pt was found unresponsive today morning around 8 am, pt was fine last night, pt did not have fever, difficulty breathing, pt was only receptive to painful stimuli and on evaluation pt's blood pressure was low per papers Pt's bp was 62/38. Pt was sent to the hospital for further evaluation. Baseline mental status alert, oriented and follows instruction, non ambulatory at baseline.   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 Pt was given a dose of vancomycin and rocephine. Pt's blood pressure improve to 97/58, hr 109 bpm, ECG showing sinus tachy, RBBB, LAFB, Bifacicular block.    (28 Jun 2021 14:58)      Patient is a 76y old  Female who presents with a chief complaint of AMS (06 Jul 2021 09:53)      INTERVAL HPI/OVERNIGHT EVENTS:  T(C): 36.6 (07-06-21 @ 12:56), Max: 36.7 (07-06-21 @ 05:09)  HR: 95 (07-06-21 @ 12:58) (79 - 99)  BP: 183/83 (07-06-21 @ 12:58) (152/66 - 191/88)  RR: 19 (07-06-21 @ 12:58) (17 - 20)  SpO2: 94% (07-06-21 @ 12:58) (94% - 97%)  Wt(kg): --  I&O's Summary    05 Jul 2021 07:01  -  06 Jul 2021 07:00  --------------------------------------------------------  IN: 750 mL / OUT: 1175 mL / NET: -425 mL        REVIEW OF SYSTEMS: denies fever, chills, SOB, palpitations, chest pain, abdominal pain, nausea, vomitting, diarrhea, constipation, dizziness    MEDICATIONS  (STANDING):  ALBUTerol    90 MICROgram(s) HFA Inhaler 1 Puff(s) Inhalation every 4 hours  apixaban 5 milliGRAM(s) Oral every 12 hours  cefepime   IVPB      cefepime   IVPB 1000 milliGRAM(s) IV Intermittent every 24 hours  dextrose 40% Gel 15 Gram(s) Oral once  dextrose 5%. 1000 milliLiter(s) (50 mL/Hr) IV Continuous <Continuous>  dextrose 5%. 1000 milliLiter(s) (50 mL/Hr) IV Continuous <Continuous>  dextrose 5%. 1000 milliLiter(s) (100 mL/Hr) IV Continuous <Continuous>  dextrose 50% Injectable 25 Gram(s) IV Push once  dextrose 50% Injectable 12.5 Gram(s) IV Push once  dextrose 50% Injectable 25 Gram(s) IV Push once  ferrous    sulfate 325 milliGRAM(s) Oral daily  glucagon  Injectable 1 milliGRAM(s) IntraMuscular once  hydrALAZINE Injectable 10 milliGRAM(s) IV Push every 8 hours  insulin lispro (ADMELOG) corrective regimen sliding scale   SubCutaneous three times a day before meals  insulin lispro (ADMELOG) corrective regimen sliding scale   SubCutaneous at bedtime  methylPREDNISolone sodium succinate Injectable 40 milliGRAM(s) IV Push every 8 hours  metoprolol tartrate 50 milliGRAM(s) Oral two times a day  metroNIDAZOLE  IVPB 500 milliGRAM(s) IV Intermittent every 8 hours  metroNIDAZOLE  IVPB      pantoprazole  Injectable 40 milliGRAM(s) IV Push at bedtime  potassium chloride  10 mEq/100 mL IVPB 10 milliEquivalent(s) IV Intermittent every 1 hour  senna 2 Tablet(s) Oral at bedtime    MEDICATIONS  (PRN):  acetaminophen   Tablet .. 650 milliGRAM(s) Oral every 6 hours PRN Temp greater or equal to 38C (100.4F), Mild Pain (1 - 3)  ALBUTerol    90 MICROgram(s) HFA Inhaler 2 Puff(s) Inhalation every 6 hours PRN Wheezing  sodium chloride 0.9% lock flush 10 milliLiter(s) IV Push every 1 hour PRN Pre/post blood products, medications, blood draw, and to maintain line patency      PHYSICAL EXAM:  GENERAL: NAD, well-groomed, well-developed  HEAD:  Atraumatic, Normocephalic  EYES: EOMI, PERRLA, conjunctiva and sclera clear  ENMT: No tonsillar erythema, exudates, or enlargement; Moist mucous membranes, Good dentition, No lesions  NECK: Supple, No JVD, Normal thyroid  NERVOUS SYSTEM:  Alert & Oriented X3, Good concentration; Motor Strength 5/5 B/L upper and lower extremities; DTRs 2+ intact and symmetric  CHEST/LUNG: Clear to percussion bilaterally; No rales, rhonchi, wheezing, or rubs  HEART: Regular rate and rhythm; No murmurs, rubs, or gallops  ABDOMEN: Soft, Nontender, Nondistended; Bowel sounds present  EXTREMITIES:  2+ Peripheral Pulses, No clubbing, cyanosis, or edema  LYMPH: No lymphadenopathy noted  SKIN: No rashes or lesions  LABS:                        10.0   8.90  )-----------( 254      ( 06 Jul 2021 07:20 )             30.0     07-06    149<H>  |  116<H>  |  60<H>  ----------------------------<  284<H>  3.0<L>   |  21<L>  |  2.43<H>    Ca    8.6      06 Jul 2021 07:20  Phos  2.7     07-05  Mg     1.7     07-05    TPro  6.3  /  Alb  2.2<L>  /  TBili  0.6  /  DBili  x   /  AST  14  /  ALT  13  /  AlkPhos  93  07-06        CAPILLARY BLOOD GLUCOSE      POCT Blood Glucose.: 245 mg/dL (06 Jul 2021 11:13)  POCT Blood Glucose.: 256 mg/dL (06 Jul 2021 08:19)  POCT Blood Glucose.: 422 mg/dL (05 Jul 2021 21:32)  POCT Blood Glucose.: 411 mg/dL (05 Jul 2021 17:12)

## 2021-07-07 NOTE — PROGRESS NOTE ADULT - SUBJECTIVE AND OBJECTIVE BOX
[facility] : Progress Note :: [phone], (office [phone]),   Dr Noriega / Dr Nava / Dr Granados / Dr Burrell / Dr Amish LOYD / Dr Stein / Dr Ridley / Dr Calvin louie  _____________________________________________________________________________________________   Awake. offers no complains    influenza virus vaccine, live, trivalent (Unknown)  PC Pen VK (Rash)  penicillin (Other; Rash)  statins (Other)  sulfa drugs (Other)  sulfamethizole (Other)    Hospital Medications:   MEDICATIONS  (STANDING):  ALBUTerol    90 MICROgram(s) HFA Inhaler 1 Puff(s) Inhalation every 4 hours  apixaban 5 milliGRAM(s) Oral every 12 hours  cefepime   IVPB      cefepime   IVPB 1000 milliGRAM(s) IV Intermittent every 24 hours  dextrose 40% Gel 15 Gram(s) Oral once  dextrose 5%. 1000 milliLiter(s) (50 mL/Hr) IV Continuous <Continuous>  dextrose 5%. 1000 milliLiter(s) (100 mL/Hr) IV Continuous <Continuous>  dextrose 5%. 1000 milliLiter(s) (50 mL/Hr) IV Continuous <Continuous>  dextrose 50% Injectable 25 Gram(s) IV Push once  dextrose 50% Injectable 12.5 Gram(s) IV Push once  dextrose 50% Injectable 25 Gram(s) IV Push once  ferrous    sulfate 325 milliGRAM(s) Oral daily  glucagon  Injectable 1 milliGRAM(s) IntraMuscular once  hydrALAZINE Injectable 10 milliGRAM(s) IV Push every 8 hours  insulin lispro (ADMELOG) corrective regimen sliding scale   SubCutaneous three times a day before meals  insulin lispro (ADMELOG) corrective regimen sliding scale   SubCutaneous at bedtime  methylPREDNISolone sodium succinate Injectable 40 milliGRAM(s) IV Push every 12 hours  metoprolol tartrate 50 milliGRAM(s) Oral two times a day  metroNIDAZOLE  IVPB      metroNIDAZOLE  IVPB 500 milliGRAM(s) IV Intermittent every 8 hours  pantoprazole  Injectable 40 milliGRAM(s) IV Push at bedtime  potassium phosphate IVPB 15 milliMole(s) IV Intermittent once  senna 2 Tablet(s) Oral at bedtime      VITALS:  T(F): 98.2 (07-07-21 @ 05:43), Max: 98.2 (07-07-21 @ 05:43)  HR: 104 (07-07-21 @ 05:43)  BP: 157/84 (07-07-21 @ 05:43)  RR: 16 (07-07-21 @ 05:43)  SpO2: 94% (07-07-21 @ 05:43)  Wt(kg): --    07-06 @ 07:01  -  07-07 @ 07:00  --------------------------------------------------------  IN: 0 mL / OUT: 950 mL / NET: -950 mL        PHYSICAL EXAM:  Constitutional: NAD  HEENT: anicteric sclera, oropharynx clear.  Neck: No JVD  Respiratory: CTAB, no wheezes, rales or rhonchi  Cardiovascular: S1, S2, RRR  Gastrointestinal: BS+, soft, NT/ND. RUQ drain   Extremities:  No peripheral edema  Neurological: A/O x 3, no focal deficits  : No CVA tenderness.  alicia+       LABS:  07-07    144  |  114<H>  |  54<H>  ----------------------------<  252<H>  3.7   |  20<L>  |  2.15<H>    Ca    8.9      07 Jul 2021 07:15  Phos  1.7     07-07  Mg     1.7     07-07    TPro  7.0  /  Alb  2.7<L>  /  TBili  0.9  /  DBili      /  AST  20  /  ALT  17  /  AlkPhos  99  07-07    Creatinine Trend: 2.15 <--, 2.43 <--, 2.65 <--, 2.94 <--, 2.99 <--, 3.80 <--, 4.46 <--                        10.9   13.11 )-----------( 307      ( 07 Jul 2021 07:15 )             33.1     Urine Studies:      RADIOLOGY & ADDITIONAL STUDIES:

## 2021-07-07 NOTE — PROGRESS NOTE ADULT - PROBLEM SELECTOR PLAN 6
Continue oxygen support.  Continue bronchodilator. Use spacer with inhalers.  May need to add budesonide.  Continue solumedrol every 12 hours. Will switch to prednisone tomorrow.  CT and CXR noted above.

## 2021-07-07 NOTE — PROGRESS NOTE ADULT - PROBLEM SELECTOR PLAN 10
UTI: Continue antibiotics.  DVT and GI prophylaxis.  DNR/DNI MOLST in chart  F/U results of MRCP  Pt niece Silva Marquez  [phone] is her HCP, guardianship. and POA, according to her, any decision should be discussed with her, also requesting new nursing home.   GI follow up

## 2021-07-07 NOTE — PROGRESS NOTE ADULT - ASSESSMENT
seen and examined vs stable afebrile physical done  not in any distresss  eyes open  but not talking  can focus eyes  lungs heart abd unchanged   old BKA   abd drain ok    labs noted creat 2.4 to 2.15 hgb 10.9  wbc up to 13  no fever no cough   a/p wbc high  without fever  if spikes temp or wbc keep goes high pancultuures  and cholecystostomy to be looke at   pts renal functions has improved  also neuro function but not the way inn NH ( pt was alert awake ) repeat ct scan of head)

## 2021-07-07 NOTE — PROGRESS NOTE ADULT - SUBJECTIVE AND OBJECTIVE BOX
MERARY MEYER    SCU progress note    INTERVAL HPI/OVERNIGHT EVENTS: ***S/P MRCP:< from: MR MRCP No Cont (07.06.21 @ 20:34) > IMPRESSION:  1. Mass in the head of the pancreas is most concerning for adenocarcinoma.  2. Gallstones with evidence of cholecystitis. Biliary ductal dilatation, most  likely secondary to the mass in the pancreas.  3. Moderately large bilateral pleural effusions. Air space consolidation  adjacent to the effusions may represent compressive atelectasis, pneumonia or a  combination of both.    < end of copied text > Preliminary Report      DNR [x ]   DNI  [x  ]    Covid - 19 PCR: Negative 6/28    The 4Ms    What Matters Most: see GOC  Age appropriate Medications/Screen for High Risk Medication: Yes  Mentation: see CAM below  Mobility: defer to physical exam    The Confusion Assessment Method (CAM) Diagnostic Algorithm     1: Acute Onset or Fluctuating Course  - Is there evidence of an acute change in mental status from the patient’s baseline? Did the (abnormal) behavior  fluctuate during the day, that is, tend to come and go, or increase and decrease in severity?  [ ] YES [x ] NO     2: Inattention  - Did the patient have difficulty focusing attention, being easily distractible, or having difficulty keeping track of what was being said?  [ ] YES [ ] NO   Unable to access     3: Disorganized thinking  -Was the patient’s thinking disorganized or incoherent, such as rambling or irrelevant conversation, unclear or illogical flow of ideas, or unpredictable switching from subject to subject?  [ ] YES [ ] NO  Unable to access    4: Altered Level of consciousness?  [ ] YES [ ] NO    The diagnosis of delirium by CAM requires the presence of features 1 and 2 and either 3 or 4.    PRESSORS: [ ] YES [x ] NO  cefepime   IVPB      cefepime   IVPB 1000 milliGRAM(s) IV Intermittent every 24 hours  metroNIDAZOLE  IVPB 500 milliGRAM(s) IV Intermittent every 8 hours  metroNIDAZOLE  IVPB        Cardiovascular:  Heart Failure  Acute   Acute on Chronic  Chronic       hydrALAZINE Injectable 10 milliGRAM(s) IV Push every 8 hours  metoprolol tartrate 50 milliGRAM(s) Oral two times a day    Pulmonary:  ALBUTerol    90 MICROgram(s) HFA Inhaler 1 Puff(s) Inhalation every 4 hours  ALBUTerol    90 MICROgram(s) HFA Inhaler 2 Puff(s) Inhalation every 6 hours PRN    Hematalogic:  apixaban 5 milliGRAM(s) Oral every 12 hours    Other:  acetaminophen   Tablet .. 650 milliGRAM(s) Oral every 6 hours PRN  dextrose 40% Gel 15 Gram(s) Oral once  dextrose 5%. 1000 milliLiter(s) IV Continuous <Continuous>  dextrose 5%. 1000 milliLiter(s) IV Continuous <Continuous>  dextrose 5%. 1000 milliLiter(s) IV Continuous <Continuous>  dextrose 50% Injectable 25 Gram(s) IV Push once  dextrose 50% Injectable 25 Gram(s) IV Push once  dextrose 50% Injectable 12.5 Gram(s) IV Push once  ferrous    sulfate 325 milliGRAM(s) Oral daily  glucagon  Injectable 1 milliGRAM(s) IntraMuscular once  insulin lispro (ADMELOG) corrective regimen sliding scale   SubCutaneous three times a day before meals  insulin lispro (ADMELOG) corrective regimen sliding scale   SubCutaneous at bedtime  methylPREDNISolone sodium succinate Injectable 40 milliGRAM(s) IV Push every 12 hours  pantoprazole  Injectable 40 milliGRAM(s) IV Push at bedtime  potassium phosphate IVPB 15 milliMole(s) IV Intermittent once  senna 2 Tablet(s) Oral at bedtime  sodium chloride 0.9% lock flush 10 milliLiter(s) IV Push every 1 hour PRN    acetaminophen   Tablet .. 650 milliGRAM(s) Oral every 6 hours PRN  ALBUTerol    90 MICROgram(s) HFA Inhaler 1 Puff(s) Inhalation every 4 hours  ALBUTerol    90 MICROgram(s) HFA Inhaler 2 Puff(s) Inhalation every 6 hours PRN  apixaban 5 milliGRAM(s) Oral every 12 hours  cefepime   IVPB      cefepime   IVPB 1000 milliGRAM(s) IV Intermittent every 24 hours  dextrose 40% Gel 15 Gram(s) Oral once  dextrose 5%. 1000 milliLiter(s) IV Continuous <Continuous>  dextrose 5%. 1000 milliLiter(s) IV Continuous <Continuous>  dextrose 5%. 1000 milliLiter(s) IV Continuous <Continuous>  dextrose 50% Injectable 25 Gram(s) IV Push once  dextrose 50% Injectable 25 Gram(s) IV Push once  dextrose 50% Injectable 12.5 Gram(s) IV Push once  ferrous    sulfate 325 milliGRAM(s) Oral daily  glucagon  Injectable 1 milliGRAM(s) IntraMuscular once  hydrALAZINE Injectable 10 milliGRAM(s) IV Push every 8 hours  insulin lispro (ADMELOG) corrective regimen sliding scale   SubCutaneous three times a day before meals  insulin lispro (ADMELOG) corrective regimen sliding scale   SubCutaneous at bedtime  methylPREDNISolone sodium succinate Injectable 40 milliGRAM(s) IV Push every 12 hours  metoprolol tartrate 50 milliGRAM(s) Oral two times a day  metroNIDAZOLE  IVPB 500 milliGRAM(s) IV Intermittent every 8 hours  metroNIDAZOLE  IVPB      pantoprazole  Injectable 40 milliGRAM(s) IV Push at bedtime  potassium phosphate IVPB 15 milliMole(s) IV Intermittent once  senna 2 Tablet(s) Oral at bedtime  sodium chloride 0.9% lock flush 10 milliLiter(s) IV Push every 1 hour PRN    Drug Dosing Weight  Height (cm): 170.2 (28 Jun 2021 10:15)  Weight (kg): 66.9 (28 Jun 2021 16:29)  BMI (kg/m2): 23.1 (28 Jun 2021 16:29)  BSA (m2): 1.78 (28 Jun 2021 16:29)    CENTRAL LINE: [ ] YES [x ] NO  LOCATION:   DATE INSERTED:  REMOVE: [ ] YES [ ] NO  EXPLAIN:    BIANCHI: [x ] YES [ ] NO    DATE INSERTED:  REMOVE:  [ ] YES [ x ] NO  EXPLAIN:  Urinary obstrustion    PAST MEDICAL & SURGICAL HISTORY:  Myocardial infarct, old    Hypercholesterolemia    Peripheral vascular disease    COPD (chronic obstructive pulmonary disease)    Bipolar 1 disorder    HTN (hypertension)    Bedbound    S/P CABG x 1    Above knee amputation of right lower extremity                07-06 @ 07:01  -  07-07 @ 07:00  --------------------------------------------------------  IN: 0 mL / OUT: 950 mL / NET: -950 mL            PHYSICAL EXAM:    GENERAL: NAD, more alert today. Can answer simple questions.  HEAD:  Atraumatic, Normocephalic  EYES: EOMI, PERRLA, conjunctiva and sclera clear  ENMT: No tonsillar erythema, exudates  NECK: Supple, No JVD  NERVOUS SYSTEM:  Awake and alert. Not orientated. Does not follow commands.  CHEST/LUNG: Diminished breath sounds bilateral bases  HEART: Irregular rate and rhythm. No murmurs or gallops noted.  ABDOMEN: Soft, Nontender, Nondistended; Bowel sounds present. Right abdominal bili drain  EXTREMITIES: Right AKA. Left pulses no edema  LYMPH: No lymphadenopathy noted  SKIN: No rashes or lesions      LABS:  CBC Full  -  ( 07 Jul 2021 07:15 )  WBC Count : 13.11 K/uL  RBC Count : 3.70 M/uL  Hemoglobin : 10.9 g/dL  Hematocrit : 33.1 %  Platelet Count - Automated : 307 K/uL  Mean Cell Volume : 89.5 fl  Mean Cell Hemoglobin : 29.5 pg  Mean Cell Hemoglobin Concentration : 32.9 gm/dL  Auto Neutrophil # : x  Auto Lymphocyte # : x  Auto Monocyte # : x  Auto Eosinophil # : x  Auto Basophil # : x  Auto Neutrophil % : x  Auto Lymphocyte % : x  Auto Monocyte % : x  Auto Eosinophil % : x  Auto Basophil % : x    07-07    144  |  114<H>  |  54<H>  ----------------------------<  252<H>  3.7   |  20<L>  |  2.15<H>    Ca    8.9      07 Jul 2021 07:15  Phos  1.7     07-07  Mg     1.7     07-07    TPro  7.0  /  Alb  2.7<L>  /  TBili  0.9  /  DBili  x   /  AST  20  /  ALT  17  /  AlkPhos  99  07-07              [  ]  DVT Prophylaxis  [  ]  Nutrition, Brand, Rate         Goal Rate        Abnormal Nutritional Status -  Malnutrition   Cachexia      Morbid Obesity BMI >/=40    RADIOLOGY & ADDITIONAL STUDIES:  ***  < from: MR MRCP No Cont (07.06.21 @ 20:34) >  FINDINGS:  Lungs: Moderately large pleural effusions. Consolidation of the adjacent  pulmonary parenchyma.    Liver: No mass.  Mild biliary ductaldilatation.  Gallbladder and bile ducts: Few small gallstones. The gallbladder wall is  thickened measuring up to 5 mm. Mild biliary ductal dilatation, measuring 8 mm.  Pancreas: Mass in the head of the pancreas measuring 2.5 x 2.2 x 2.7 cm. The  mass is best seen on series 4, image 16, 17, and 18. It is T1 hypointense to  the remaining pancreas which is atrophic.  Spleen: Unremarkable. No splenomegaly.  Adrenal glands: Unremarkable. No mass.  Kidneys and ureters: 12 mm simple cyst in the lower pole right kidney. Tiny  subcentimeter cyst in the midpole left kidney. No stones or hydronephrosis.  Stomach and bowel: Visualized stomach and intestines are unremarkable.  Intraperitoneal space: No free fluid.  Arteries: No abdominal aortic aneurysm.  Bones/joints:  Unremarkable.  Soft tissues: Moderate body wall edema.    IMPRESSION:  1. Mass in the head of the pancreas is most concerning for adenocarcinoma.  2. Gallstones with evidence of cholecystitis. Biliary ductal dilatation, most  likely secondary to the mass in the pancreas.  3. Moderately large bilateral pleural effusions. Air space consolidation  adjacent to the effusions may represent compressive atelectasis, pneumonia or a  combination of both.        ******PRELIMINARY REPORT******      < end of copied text >  < from: CT Head No Cont (06.28.21 @ 16:04) >  FINDINGS:  No acute transcortical infarction or acute intracranial hemorrhage.    White matter hypoattenuating foci are noted, compatible with age-indeterminate small vessel disease.    No hydrocephalus. No extra-axial fluid collections.    The visualized intraorbital contents are unremarkable. The imaged portions of the paranasal sinuses are clear. The mastoid air cells are clear. The visualized soft tissues and osseous structures appear normal.    IMPRESSION:    -No acute intracranial findings.  -Chronic small vessel disease.    < end of copied text >  < from: CT Chest No Cont (06.28.21 @ 11:59) >  FINDINGS:  CHEST:  LUNGS AND LARGE AIRWAYS: Patent central airways. Patchy bibasilar dependent consolidation/atelectasis right greater than left. Correlate clinically for infection. biapical scarring  PLEURA: No pleural effusion.  VESSELS: Nonaneurysmal.  HEART: Mild cardiomegaly with coronary artery calcification No pericardial effusion.  MEDIASTINUM AND CLARICE: No lymphadenopathy.  CHEST WALL AND LOWER NECK: Nonspecific left breast calcification..    ABDOMEN AND PELVIS:  LIVER: Within normal limits.  BILE DUCTS: Marked dilatation common duct up to 1.7 cm mild intrahepatic biliary dilatation. The distal common duct obstructing stone and/or lesion is considered. Correlate with MR.  GALLBLADDER: Gallstones. Markedly distended gallbladder with pericholecystic fluid concerning for cholecystitis. Correlate with right upper quadrant ultrasound and/or HIDA.  SPLEEN: Within normal limits.  PANCREAS: Not well evaluated on this noncontrast study. However, there is diffuse atrophy of the body and tail with dilatation of the main pancreatic duct. Increased soft tissue density in the pancreatic head and uncinate process suspicious for a mass. Recommend  MRI  ADRENALS: Within normal limits.  KIDNEYS/URETERS: Punctate nonobstructive right renal calculus    BLADDER: Collapsed around a Bianchi.  REPRODUCTIVE ORGANS: No gynecologic mass    BOWEL: No bowel obstruction. Appendix no appendicitis  PERITONEUM: No ascites.  VESSELS: Nonaneurysmal  RETROPERITONEUM/LYMPH NODES: No lymphadenopathy.  ABDOMINAL WALL: Within normal limits.  BONES: No aggressive lesions    IMPRESSION:  Limited by lack of any exogenousoral or intravenous contrast  Patchy bibasilar dependent consolidation/atelectasis. Correlate clinically for infection.  Cholelithiasis, pericholecystic fluid concerning for cholecystitis. Correlate with ultrasound and/or HIDA scan.  Significant biliary dilatation, possible mass pancreatic head. Correlate with MRCP/abdominal MR with pancreatic protocol        < end of copied text >    Goals of Care Discussion with Family/Proxy/Other   -

## 2021-07-07 NOTE — PROGRESS NOTE ADULT - ASSESSMENT
76 year old female from [facility] with PMHx of PVD, right AKA (S/P fem pop bypass), PAF, COPD, bipolar disorder, hypertension and PSHx of S/P CABG x1 brought into the ED via EMS from [facility] for unresponsiveness and hypotension. Per NH supervisor Mr. Gaspar () Pt was found unresponsive today morning around 8 am, pt was fine last night, pt did not have fever, difficulty breathing, pt was only receptive to painful stimuli and on evaluation pt's blood pressure was low per papers Pt's bp was 62/38. Pt was sent to the hospital for further evaluation. Baseline mental status alert, oriented and follows instruction, non ambulatory at baseline.     ED course: Alicia was placed and pt was given 3L ivf s/p urine out put of 40cc, u/a positive for infection, CT chest showing b/l patchy infiltrates/ consolidations, concern for cholecystitis, with dilated bile duct of 1.7 cm, and concern of pancreatic head mass. Pt was given a dose of vancomycin and rocephin. Pt's blood pressure improve to 97/58, hr 109 bpm, ECG showing sinus tachy, RBBB, LAFB, Bifacicular block.     Patient admitted to the ICU for septic shock requiring vasopressors. Source of infection likely acute cholecystitis as demonstrated on CT abdomen vs UTI. Patient was started on levophed, noted with severe acidosis requiring bicarb drip and VANESSA, likely pre-renal. Patient was started on cefepime and flagyl for sepsis 2/2 cholecystitis. Lactate acidosis was started on bicarb gtt. VANESSA likely pre-renal obtained multiple boluses with low urine output. Patient gradually improved with resolution of hypotension, was able to taper off pressors, bicarb improved was able to dc bicarb gtt. Urine output gradually increased. on 6/29, patient underwent IR guided cholecystostomy, with drainage of bile. Evaluated by GI after, recommended MRCP. Unable to obtain MRCP as of 6/30 due to altered mental status suspected to be due to gabapentin toxicity in the setting of VANESSA. Unstable for MRCP, will continue monitoring mental status. Mental status improved 7/1-7/2, pt aaox2 to self and location. Seen by speech and swallow, recommends mechanical soft thin liquid diet.     7/2 pt downgrade to SCU, failed TOV, alicia reinserted   7/5. Pending  MRCP ordered. Hypernatremia worsening likely due to poor po intake, started on D5W per nephro recs

## 2021-07-07 NOTE — PROGRESS NOTE ADULT - ASSESSMENT
Patient is a 76y Female whom was sent to ED from the nursing home  to the hospital with hypotension and confusion.  Has H/O PVD s/p RT AKA, COPD, HTN, CABG x1. Blood pressure in the nursing home was 62/32.  In ED noted to have severe hypotension, confused  work-up revealed acute kidnye injury SCR 6 ( normal SCR at baseline).  Also with severe mixed gap and non-gap metabolic acidosis.  s/p isotonic fluid bolus and later started on NAHCO3 gtt as well as vasopressors and broad spectrum antibiotics  CT abdomen revealed acute cholecystitis, pancreatic head mass. No hydronephrosis  s/p cholecystotomy  hypernatremia improving     # VANESSA sec to established ATN  from  septic shock ( septic shock now resolved)  SCR improving   cont ABX  #hypokalemia IMPROVED    hypernatremia-better  cont D5W  encourage incr  free water intake

## 2021-07-07 NOTE — PROGRESS NOTE ADULT - PROBLEM SELECTOR PLAN 2
CT with acute cholecystitis with common duct up to 1.7 cm mild intrahepatic biliary dilatation. The distal common duct obstructing stone and lesion  S/P Cholecystectomy drain, no growth on culture  BCx negative  Continue antibiotics  S/P MRCP yesterday  GI Dr Pinon following.

## 2021-07-07 NOTE — PROGRESS NOTE ADULT - ATTENDING COMMENTS
MRCP with possible pancreatic mass  Awaiting GI recs   Complete antibiotics   Oral diet  PT evaluation   D/c alicia

## 2021-07-07 NOTE — PROGRESS NOTE ADULT - SUBJECTIVE AND OBJECTIVE BOX
HPI:  76 year old female from [facility] with PMHx of PVD, right AKA (S/P fem pop bypass), PAF, COPD, bipolar disorder, hypertension and PSHx of S/P CABG x1 brought into the ED via EMS from [facility] for unresponsiveness and hypotension. Per NH supervisor Mr. Gaspar () Pt was found unresponsive today morning around 8 am, pt was fine last night, pt did not have fever, difficulty breathing, pt was only receptive to painful stimuli and on evaluation pt's blood pressure was low per papers Pt's bp was 62/38. Pt was sent to the hospital for further evaluation. Baseline mental status alert, oriented and follows instruction, non ambulatory at baseline.   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 Pt was given a dose of vancomycin and rocephine. Pt's blood pressure improve to 97/58, hr 109 bpm, ECG showing sinus tachy, RBBB, LAFB, Bifacicular block.    (28 Jun 2021 14:58)      Patient is a 76y old  Female who presents with a chief complaint of AMS (07 Jul 2021 11:16)      INTERVAL HPI/OVERNIGHT EVENTS:  T(C): 36.8 (07-07-21 @ 05:43), Max: 36.8 (07-07-21 @ 05:43)  HR: 104 (07-07-21 @ 05:43) (87 - 104)  BP: 157/84 (07-07-21 @ 05:43) (157/84 - 172/91)  RR: 16 (07-07-21 @ 05:43) (16 - 16)  SpO2: 94% (07-07-21 @ 05:43) (94% - 97%)  Wt(kg): --  I&O's Summary    06 Jul 2021 07:01  -  07 Jul 2021 07:00  --------------------------------------------------------  IN: 0 mL / OUT: 950 mL / NET: -950 mL        REVIEW OF SYSTEMS: denies fever, chills, SOB, palpitations, chest pain, abdominal pain, nausea, vomitting, diarrhea, constipation, dizziness    MEDICATIONS  (STANDING):  ALBUTerol    90 MICROgram(s) HFA Inhaler 1 Puff(s) Inhalation every 4 hours  apixaban 5 milliGRAM(s) Oral every 12 hours  cefepime   IVPB      cefepime   IVPB 1000 milliGRAM(s) IV Intermittent every 24 hours  dextrose 40% Gel 15 Gram(s) Oral once  dextrose 5%. 1000 milliLiter(s) (50 mL/Hr) IV Continuous <Continuous>  dextrose 5%. 1000 milliLiter(s) (100 mL/Hr) IV Continuous <Continuous>  dextrose 5%. 1000 milliLiter(s) (50 mL/Hr) IV Continuous <Continuous>  dextrose 50% Injectable 25 Gram(s) IV Push once  dextrose 50% Injectable 12.5 Gram(s) IV Push once  dextrose 50% Injectable 25 Gram(s) IV Push once  ferrous    sulfate 325 milliGRAM(s) Oral daily  glucagon  Injectable 1 milliGRAM(s) IntraMuscular once  hydrALAZINE Injectable 10 milliGRAM(s) IV Push every 8 hours  insulin lispro (ADMELOG) corrective regimen sliding scale   SubCutaneous three times a day before meals  insulin lispro (ADMELOG) corrective regimen sliding scale   SubCutaneous at bedtime  methylPREDNISolone sodium succinate Injectable 40 milliGRAM(s) IV Push every 12 hours  metoprolol tartrate 50 milliGRAM(s) Oral two times a day  metroNIDAZOLE  IVPB      metroNIDAZOLE  IVPB 500 milliGRAM(s) IV Intermittent every 8 hours  pantoprazole  Injectable 40 milliGRAM(s) IV Push at bedtime  senna 2 Tablet(s) Oral at bedtime    MEDICATIONS  (PRN):  acetaminophen   Tablet .. 650 milliGRAM(s) Oral every 6 hours PRN Temp greater or equal to 38C (100.4F), Mild Pain (1 - 3)  ALBUTerol    90 MICROgram(s) HFA Inhaler 2 Puff(s) Inhalation every 6 hours PRN Wheezing  sodium chloride 0.9% lock flush 10 milliLiter(s) IV Push every 1 hour PRN Pre/post blood products, medications, blood draw, and to maintain line patency      PHYSICAL EXAM:  GENERAL: NAD, well-groomed, well-developed  HEAD:  Atraumatic, Normocephalic  EYES: EOMI, PERRLA, conjunctiva and sclera clear  ENMT: No tonsillar erythema, exudates, or enlargement; Moist mucous membranes, Good dentition, No lesions  NECK: Supple, No JVD, Normal thyroid  NERVOUS SYSTEM:  Alert & Oriented X3, Good concentration; Motor Strength 5/5 B/L upper and lower extremities; DTRs 2+ intact and symmetric  CHEST/LUNG: Clear to percussion bilaterally; No rales, rhonchi, wheezing, or rubs  HEART: Regular rate and rhythm; No murmurs, rubs, or gallops  ABDOMEN: Soft, Nontender, Nondistended; Bowel sounds present  EXTREMITIES:  2+ Peripheral Pulses, No clubbing, cyanosis, or edema  LYMPH: No lymphadenopathy noted  SKIN: No rashes or lesions  LABS:                        10.9   13.11 )-----------( 307      ( 07 Jul 2021 07:15 )             33.1     07-07    144  |  114<H>  |  54<H>  ----------------------------<  252<H>  3.7   |  20<L>  |  2.15<H>    Ca    8.9      07 Jul 2021 07:15  Phos  1.7     07-07  Mg     1.7     07-07    TPro  7.0  /  Alb  2.7<L>  /  TBili  0.9  /  DBili  x   /  AST  20  /  ALT  17  /  AlkPhos  99  07-07        CAPILLARY BLOOD GLUCOSE      POCT Blood Glucose.: 330 mg/dL (07 Jul 2021 11:33)  POCT Blood Glucose.: 246 mg/dL (07 Jul 2021 07:47)  POCT Blood Glucose.: 204 mg/dL (06 Jul 2021 22:47)  POCT Blood Glucose.: 241 mg/dL (06 Jul 2021 16:53)

## 2021-07-07 NOTE — PROGRESS NOTE ADULT - PROBLEM SELECTOR PLAN 1
Likely secondary from Infection vs Cholecystitis vs electrolyte imbalance.  S/P MRCP yesterday  Resume diet.

## 2021-07-08 NOTE — CHART NOTE - NSCHARTNOTEFT_GEN_A_CORE
MRCP report noted and discussed with pt's HCP Silva Esquivel at  at length in explicit details.  She is aware of MRCP finding suggestive of possible adenocarcinoma and updated her of ongoing surgical evaluation with current recommendations to obtain further diagnostic studies such as CT scan.  Mrs. Esquivel clearly stated that she does not want for her aunt to undergo further evaluation for her possible adenocarcinoma since her aunt is not likely to return to her previous normal neurological state.  She is also aware that pt may not improve and may continue to worsen given trajectory of current condition.  Mrs. Esquivel is agreeing to have palliative on board.      Dr. Carpio informed of my discussed with pt's HCP.  Will sign out to ongoing provider to request palliative c/s.

## 2021-07-08 NOTE — PROGRESS NOTE ADULT - ASSESSMENT
seen and examine d   vstsbal pt is awake but aphasic in rehab she was bed bount but was alert awake   holding hands  but now developing contractures    physical done  vsstable  BP on higher side  htn increas hydralazine and wtch  altered MS  even bun creat almost back to nml  will get ct head  d/w ICu attendng  labs noted  wbc 13 to 9.9  na 149  creat down to 1.95

## 2021-07-08 NOTE — CHART NOTE - NSCHARTNOTEFT_GEN_A_CORE
Assessment:   76yFemalePatient is a 76y old  Female who presents with a chief complaint of Sepsis . Has COPD (08 Jul 2021 11:12)  Pt visited.  Observed Post Lunch meal ( Pureed ) Meal. Po intake  Poor, but drank Glucerna shakes ~90 % .   Pt DG from ICU to 4 N on 7/2.  S/P SLP eval on 7/5- Pureed w Thin liquids. Labs noted .  Pt with H/O  R  AKA.      Factors impacting intake: [ ] none [ ] nausea  [ ] vomiting [ ] diarrhea [ ] constipation  [ ]chewing problems [ ] swallowing issues  [ ] other:     Diet Prescription: Diet, Dysphagia 2 Mechanical Soft-Thin Liquids:   Supplement Feeding Modality:  Oral  Ensure Enlive  Servings Per Day:  1       Frequency:  Three Times a day (07-05-21 @ 07:47)    Intake: Poor     Current Weight:   % Weight Change Bed scale 161 lb with out scd device    Pertinent Medications: MEDICATIONS  (STANDING):  ALBUTerol    90 MICROgram(s) HFA Inhaler 1 Puff(s) Inhalation every 4 hours  apixaban 5 milliGRAM(s) Oral every 12 hours  cefepime   IVPB      cefepime   IVPB 1000 milliGRAM(s) IV Intermittent every 24 hours  dextrose 40% Gel 15 Gram(s) Oral once  dextrose 5%. 1000 milliLiter(s) (50 mL/Hr) IV Continuous <Continuous>  dextrose 5%. 1000 milliLiter(s) (100 mL/Hr) IV Continuous <Continuous>  dextrose 5%. 1000 milliLiter(s) (50 mL/Hr) IV Continuous <Continuous>  dextrose 50% Injectable 25 Gram(s) IV Push once  dextrose 50% Injectable 12.5 Gram(s) IV Push once  dextrose 50% Injectable 25 Gram(s) IV Push once  ferrous    sulfate 325 milliGRAM(s) Oral daily  glucagon  Injectable 1 milliGRAM(s) IntraMuscular once  hydrALAZINE 25 milliGRAM(s) Oral four times a day  insulin lispro (ADMELOG) corrective regimen sliding scale   SubCutaneous three times a day before meals  insulin lispro (ADMELOG) corrective regimen sliding scale   SubCutaneous at bedtime  metoprolol tartrate 50 milliGRAM(s) Oral two times a day  metroNIDAZOLE  IVPB      metroNIDAZOLE  IVPB 500 milliGRAM(s) IV Intermittent every 8 hours  pantoprazole  Injectable 40 milliGRAM(s) IV Push at bedtime  senna 2 Tablet(s) Oral at bedtime    MEDICATIONS  (PRN):  acetaminophen   Tablet .. 650 milliGRAM(s) Oral every 6 hours PRN Temp greater or equal to 38C (100.4F), Mild Pain (1 - 3)  ALBUTerol    90 MICROgram(s) HFA Inhaler 2 Puff(s) Inhalation every 6 hours PRN Wheezing  sodium chloride 0.9% lock flush 10 milliLiter(s) IV Push every 1 hour PRN Pre/post blood products, medications, blood draw, and to maintain line patency    Pertinent Labs: 07-08 Na149 mmol/L<H> Glu 280 mg/dL<H> K+ 3.5 mmol/L Cr  1.95 mg/dL<H> BUN 60 mg/dL<H> 07-08 Phos 3.1 mg/dL 07-08 Alb 2.6 g/dL<L>     CAPILLARY BLOOD GLUCOSE      POCT Blood Glucose.: 309 mg/dL (08 Jul 2021 11:59)  POCT Blood Glucose.: 251 mg/dL (08 Jul 2021 07:34)  POCT Blood Glucose.: 312 mg/dL (07 Jul 2021 21:46)  POCT Blood Glucose.: 313 mg/dL (07 Jul 2021 17:02)    Skin: stage 1 @ L Lateral Foot    Estimated Needs:   [ ] no change since previous assessment  [ ] recalculated:     Previous Nutrition Diagnosis:   [x ] Inadequate Energy Intake [ ]Inadequate Oral Intake [ ] Excessive Energy Intake   [ ] Underweight [ ] Increased Nutrient Needs [ ] Overweight/Obesity   [ ] Altered GI Function [ ] Unintended Weight Loss [ ] Food & Nutrition Related Knowledge Deficit [ ] Malnutrition     Nutrition Diagnosis is [x ] ongoing  [ ] resolved [ ] not applicable     New Nutrition Diagnosis: [ ] not applicable       Interventions:   Recommend  [ ] Change Diet To:  [ x] Nutrition Supplement Glucerna shakes TID  [ ] Nutrition Support  [ ] Other:     Monitoring and Evaluation:   [ ] PO intake [ x ] Tolerance to diet prescription [ x ] weights [ x ] labs[ x ] follow up per protocol  [ ] other:

## 2021-07-08 NOTE — PROGRESS NOTE ADULT - ASSESSMENT
76 year old female from [facility] with PMHx of PVD, right AKA (S/P fem pop bypass), PAF, COPD, bipolar disorder, hypertension and PSHx of S/P CABG x1 brought into the ED via EMS from [facility] for unresponsiveness and hypotension. Per NH supervisor Mr. Gaspar () Pt was found unresponsive today morning around 8 am, pt was fine last night, pt did not have fever, difficulty breathing, pt was only receptive to painful stimuli and on evaluation pt's blood pressure was low per papers Pt's bp was 62/38. Pt was sent to the hospital for further evaluation. Baseline mental status alert, oriented and follows instruction, non ambulatory at baseline.     ED course: Alicia was placed and pt was given 3L ivf s/p urine out put of 40cc, u/a positive for infection, CT chest showing b/l patchy infiltrates/ consolidations, concern for cholecystitis, with dilated bile duct of 1.7 cm, and concern of pancreatic head mass. Pt was given a dose of vancomycin and rocephin. Pt's blood pressure improve to 97/58, hr 109 bpm, ECG showing sinus tachy, RBBB, LAFB, Bifacicular block.     Patient admitted to the ICU for septic shock requiring vasopressors. Source of infection likely acute cholecystitis as demonstrated on CT abdomen vs UTI. Patient was started on levophed, noted with severe acidosis requiring bicarb drip and VANESSA, likely pre-renal. Patient was started on cefepime and flagyl for sepsis 2/2 cholecystitis. Lactate acidosis was started on bicarb gtt. VANESSA likely pre-renal obtained multiple boluses with low urine output. Patient gradually improved with resolution of hypotension, was able to taper off pressors, bicarb improved was able to dc bicarb gtt. Urine output gradually increased. on 6/29, patient underwent IR guided cholecystostomy, with drainage of bile. Evaluated by GI after, recommended MRCP. Unable to obtain MRCP as of 6/30 due to altered mental status suspected to be due to gabapentin toxicity in the setting of VANESSA. Unstable for MRCP, will continue monitoring mental status. Mental status improved 7/1-7/2, pt aaox2 to self and location. Seen by speech and swallow, recommends mechanical soft thin liquid diet.     7/2 pt downgrade to SCU, failed TOV, alicia reinserted   7/5. Pending  MRCP ordered. Hypernatremia worsening likely due to poor po intake, started on D5W per nephro recs  7/8- MRCP report noted with mass in the head of the pancreas most concerning for adenocarcinoma.  GI following. B/l lungs fields w/ rales on exam and LLE w/ 2+ edema.  BP remained 160- 170- hydralazine increased to QID, will give one dose of Lasix 40mg for effusion.          76 year old female from [facility] with PMHx of PVD, right AKA (S/P fem pop bypass), PAF, COPD, bipolar disorder, hypertension and PSHx of S/P CABG x1 brought into the ED via EMS from [facility] for unresponsiveness and hypotension. Per NH supervisor Mr. Gaspar () Pt was found unresponsive today morning around 8 am, pt was fine last night, pt did not have fever, difficulty breathing, pt was only receptive to painful stimuli and on evaluation pt's blood pressure was low per papers Pt's bp was 62/38. Pt was sent to the hospital for further evaluation. Baseline mental status alert, oriented and follows instruction, non ambulatory at baseline.     ED course: Alicia was placed and pt was given 3L ivf s/p urine out put of 40cc, u/a positive for infection, CT chest showing b/l patchy infiltrates/ consolidations, concern for cholecystitis, with dilated bile duct of 1.7 cm, and concern of pancreatic head mass. Pt was given a dose of vancomycin and rocephin. Pt's blood pressure improve to 97/58, hr 109 bpm, ECG showing sinus tachy, RBBB, LAFB, Bifacicular block.     Patient admitted to the ICU for septic shock requiring vasopressors. Source of infection likely acute cholecystitis as demonstrated on CT abdomen vs UTI. Patient was started on levophed, noted with severe acidosis requiring bicarb drip and VANESSA, likely pre-renal. Patient was started on cefepime and flagyl for sepsis 2/2 cholecystitis. Lactate acidosis was started on bicarb gtt. VANESSA likely pre-renal obtained multiple boluses with low urine output. Patient gradually improved with resolution of hypotension, was able to taper off pressors, bicarb improved was able to dc bicarb gtt. Urine output gradually increased. on 6/29, patient underwent IR guided cholecystostomy, with drainage of bile. Evaluated by GI after, recommended MRCP. Unable to obtain MRCP as of 6/30 due to altered mental status suspected to be due to gabapentin toxicity in the setting of VANESSA. Unstable for MRCP, will continue monitoring mental status. Mental status improved 7/1-7/2, pt aaox2 to self and location. Seen by speech and swallow, recommends mechanical soft thin liquid diet.     7/2 pt downgrade to SCU, failed TOV, alicia reinserted   7/5. Pending  MRCP ordered. Hypernatremia worsening likely due to poor po intake, started on D5W per nephro recs  7/8- MRCP report noted with mass in the head of the pancreas most concerning for adenocarcinoma.  GI following. B/l lungs fields w/ rales on exam and LLE w/ 2+ edema.  BP remained 160- 170- hydralazine increased to QID, will give one dose of Lasix 40mg for effusion. Will request for sx and GI to comment on MRCP findings.

## 2021-07-08 NOTE — PROGRESS NOTE ADULT - SUBJECTIVE AND OBJECTIVE BOX
Time of Visit:  Patient seen and examined.     MEDICATIONS  (STANDING):  ALBUTerol    90 MICROgram(s) HFA Inhaler 1 Puff(s) Inhalation every 4 hours  apixaban 5 milliGRAM(s) Oral every 12 hours  cefepime   IVPB      cefepime   IVPB 1000 milliGRAM(s) IV Intermittent every 24 hours  dextrose 40% Gel 15 Gram(s) Oral once  dextrose 5%. 1000 milliLiter(s) (50 mL/Hr) IV Continuous <Continuous>  dextrose 5%. 1000 milliLiter(s) (100 mL/Hr) IV Continuous <Continuous>  dextrose 5%. 1000 milliLiter(s) (50 mL/Hr) IV Continuous <Continuous>  dextrose 50% Injectable 25 Gram(s) IV Push once  dextrose 50% Injectable 12.5 Gram(s) IV Push once  dextrose 50% Injectable 25 Gram(s) IV Push once  ferrous    sulfate 325 milliGRAM(s) Oral daily  glucagon  Injectable 1 milliGRAM(s) IntraMuscular once  hydrALAZINE 10 milliGRAM(s) Oral four times a day  insulin lispro (ADMELOG) corrective regimen sliding scale   SubCutaneous three times a day before meals  insulin lispro (ADMELOG) corrective regimen sliding scale   SubCutaneous at bedtime  metoprolol tartrate 50 milliGRAM(s) Oral two times a day  metroNIDAZOLE  IVPB      metroNIDAZOLE  IVPB 500 milliGRAM(s) IV Intermittent every 8 hours  pantoprazole  Injectable 40 milliGRAM(s) IV Push at bedtime  senna 2 Tablet(s) Oral at bedtime      MEDICATIONS  (PRN):  acetaminophen   Tablet .. 650 milliGRAM(s) Oral every 6 hours PRN Temp greater or equal to 38C (100.4F), Mild Pain (1 - 3)  ALBUTerol    90 MICROgram(s) HFA Inhaler 2 Puff(s) Inhalation every 6 hours PRN Wheezing  sodium chloride 0.9% lock flush 10 milliLiter(s) IV Push every 1 hour PRN Pre/post blood products, medications, blood draw, and to maintain line patency       Medications up to date at time of exam.    ROS; No fever, chills, cough, congestion on exam.   PHYSICAL EXAMINATION:  Vital Signs Last 24 Hrs  T(C): 36.8 (08 Jul 2021 11:23), Max: 37 (07 Jul 2021 22:00)  T(F): 98.3 (08 Jul 2021 11:23), Max: 98.6 (07 Jul 2021 22:00)  HR: 82 (08 Jul 2021 11:23) (82 - 102)  BP: 178/95 (08 Jul 2021 11:23) (162/67 - 178/95)  BP(mean): --  RR: 18 (08 Jul 2021 11:23) (18 - 19)  SpO2: 97% (08 Jul 2021 11:23) (97% - 98%)   (if applicable)  General: Non verbal on exam  . No acute distress.     HEENT: Head is normocephalic and atraumatic. No nasal tenderness . Has eye tracking . Mucous membranes are moist.     NECK: Supple, no palpable adenopathy.    LUNGS: Poor inspiratory effort, Non labored . No wheezing. No use of accessory muscle.     HEART: S1 S2 Regular rate and no click/rub .    ABDOMEN: Soft  and nondistended. Active bowel sounds.     NEUROLOGIC: Forgetful  .  Non verbal on exam.     SKIN: Warm and moist. Non diaphoretic.     LABS:                        10.0   9.94  )-----------( 254      ( 08 Jul 2021 07:19 )             30.7     07-08    149<H>  |  119<H>  |  60<H>  ----------------------------<  280<H>  3.5   |  20<L>  |  1.95<H>    Ca    8.6      08 Jul 2021 07:19  Phos  3.1     07-08  Mg     1.7     07-08    TPro  6.7  /  Alb  2.6<L>  /  TBili  1.2  /  DBili  x   /  AST  18  /  ALT  15  /  AlkPhos  88  07-08        MICROBIOLOGY: (if applicable)    RADIOLOGY & ADDITIONAL STUDIES:  EKG:   CXR:  ECHO:    IMPRESSION: 76y Female PAST MEDICAL & SURGICAL HISTORY:  Myocardial infarct, old    Hypercholesterolemia    Peripheral vascular disease    COPD (chronic obstructive pulmonary disease)    Bipolar 1 disorder    HTN (hypertension)    Bedbound    S/P CABG x 1    Above knee amputation of right lower extremity     Impression; 75 Y/O Female from [facility] presented to ED with unresponsiveness and Hypotension. Was admitted to ICU for Septic Shock and received  vasopressor. Source of infection likely due to Acute Cholecystitis as found on CT chest with bilateral infiltrates/ consolidations. Patient underwent on 06-29-21 IR guided Cholecystectomy with drainage Bile. Patient gradually improved Hypotension . 06-28-21 Negative for Covid 19 PCR and Negative Blood Cx x 2.  07-03-21 CXR with Bilateral Pleural Effusions , Left > Rt basilar airspace consolidation and patient received Lasix .  Has COPD and on Oxygen dependent.       Suggestion:  O2 saturation 93% room air. Continue Oxygen supplementation 2L NC to keep O2 saturation >90%.   Continue Albuterol 1 Puff but Q 6 Hours.  Discontinue PRN Albuterol 2 puffs Q 6 Hours.   Pulmonary oral hygiene care especially every after oral inhaler used.   Please start Prednisone 40 mg oral Daily x 3 Days then 30 mg oral daily x 3 Days then 20 mg oral Daily x 3 days  .   Please start Budesonide  0.5 mg via nebulization Q 12 Hours.   Aspiration precautions especially during meal times.   DVT/ GI prophylactic.

## 2021-07-08 NOTE — PROGRESS NOTE ADULT - PROBLEM SELECTOR PLAN 1
Likely secondary from Infection vs Cholecystitis vs electrolyte imbalance.  MRCP w/ pancreatic mass concerning for adenocarcinoma- GI following  Resume diet.

## 2021-07-08 NOTE — PROGRESS NOTE ADULT - PROBLEM SELECTOR PLAN 10
UTI: Continue antibiotics.  DVT and GI prophylaxis.  DNR/DNI MOLST in chart  F/U results of MRCP  Pt niece Silva Marquez  [phone] is her HCP, guardianship. and POA, according to her, any decision should be discussed with her, also requesting new nursing home.   GI follow up

## 2021-07-08 NOTE — PROGRESS NOTE ADULT - PROBLEM SELECTOR PLAN 6
Continue oxygen support.  Continue bronchodilator. Use spacer with inhalers.  May need to add budesonide.  Solumedrol every 12 hours. Switched to prednisone today.  CT and CXR noted above.

## 2021-07-08 NOTE — CONSULT NOTE ADULT - ASSESSMENT
A/P: 76F with multiple medical comorbidities admitted in septic shock and acute cholecystitis, s/p placement of cholecystostomy tube and imaging findings concerning for pancreatic adenocarcinoma  -please obtain dedicated pancreatic protocol CT to further delineate mass and its relationship with adjacent vascular structures  -metastatic workup with CT chest/abdomen/pelvis with IV contrast  -please send Ca19-9 tumor marker  -please obtain amylase and lipase level A/P: 76F with multiple medical comorbidities admitted in septic shock and acute cholecystitis, s/p placement of cholecystostomy tube and imaging findings concerning for pancreatic adenocarcinoma  -please obtain dedicated pancreatic protocol CT to further delineate mass and its relationship with adjacent vascular structures  -metastatic workup with CT abdomen/pelvis with IV contrast  -please send Ca19-9 tumor marker  -please obtain amylase and lipase level A/P: 76F with multiple medical comorbidities admitted in septic shock and acute cholecystitis, s/p placement of cholecystostomy tube and imaging findings concerning for pancreatic adenocarcinoma  -please obtain dedicated pancreatic protocol CT to further delineate mass and its relationship with adjacent vascular structures  -metastatic workup with CT abdomen/pelvis with IV contrast  -please send Ca19-9 tumor marker  -please obtain amylase and lipase level    UPDATE 7/9 7:57AM  Chart reviewed and conversation between primary team and HCP reviewed.  Currently HCP does not want further work up for patient for pancreatic mass.  Will defer to primary team for further conversations with HCP.  Please call surgical oncology as needed

## 2021-07-08 NOTE — CONSULT NOTE ADULT - SUBJECTIVE AND OBJECTIVE BOX
Surgical Oncology:    76F admitted 6/28 from nursing home after being found altered and hypotensive. Initially admitted to ICU for resuscitation and vasopressor support. Imaging and labwork upon admission demonstrated urinary tract infection and acute cholecystitis with dilated CBD to 1.7 cm, elevated alk phos but otherwise normal LFTs, and a concern for a pancreatic head mass. Surgery and GI were consulted. GI recommended MRCP when patient was stable to further characterize mass, and surgical recs at the time recommended cholecystostomy tube, antibiotics, and no acute surgical intervention for the cholecystitis at this time.    Patient was started on cefepime/flagyl, and underwent successful placement of cholecystostomy tube with IR on 6/29. Patient eventually stabilized and was downgraded from ICU and underwent MRCP on 7/6. MRCP demonstrated pancreatic head mass 3 x 2.2 cm and concerning for adenocarcinoma and likely the cause of pancreatic duct and CBD dilation. However, MRCP could not definitively delineate vascular involvement of mass. Surgery was contacted to give further updated recommendations following MRCP.    Patient seen and examined at bedside, awake, but not able to answer questions at this time.    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, R AKA, CABG  Allergies: flu vaccine, PCN      Physical:  ICU Vital Signs Last 24 Hrs  T(C): 36.8 (08 Jul 2021 11:23), Max: 37 (07 Jul 2021 22:00)  T(F): 98.3 (08 Jul 2021 11:23), Max: 98.6 (07 Jul 2021 22:00)  HR: 82 (08 Jul 2021 11:23) (82 - 100)  BP: 178/95 (08 Jul 2021 11:23) (162/67 - 178/95)  BP(mean): --  ABP: --  ABP(mean): --  RR: 18 (08 Jul 2021 11:23) (18 - 19)  SpO2: 97% (08 Jul 2021 11:23) (97% - 97%)    General: NAD, awake, but altered mental status and not answering questions at this time  Cardio: RRR  Resp: nonlabored breathing on 3L nasal cannula  Abd: soft, NT, ND, cholecystostomy tube in place with bilious output, dressing c/d/i, no evidence of hernias  Skin: no evidence of jaundice                          10.0   9.94  )-----------( 254      ( 08 Jul 2021 07:19 )             30.7     07-08    149<H>  |  119<H>  |  60<H>  ----------------------------<  280<H>  3.5   |  20<L>  |  1.95<H>    Ca    8.6      08 Jul 2021 07:19  Phos  3.1     07-08  Mg     1.7     07-08    TPro  6.7  /  Alb  2.6<L>  /  TBili  1.2  /  DBili  x   /  AST  18  /  ALT  15  /  AlkPhos  88  07-08    Imaging:  CT abdomen (6/28): demonstrated evidence of acute cholecystitis and possible pancreatic head mass, limited by lack of oral or IV contrast  MRCP (7/6): demonstrated pancreatic head mass 3 x 2.2 cm, could not comment on vascular involvement Surgical Oncology:    76F admitted 6/28 from nursing home after being found altered and hypotensive. Initially admitted to ICU for resuscitation and vasopressor support. Imaging and labwork upon admission demonstrated urinary tract infection and acute cholecystitis with dilated CBD to 1.7 cm, elevated alk phos but otherwise normal LFTs, and a concern for a pancreatic head mass. Surgery and GI were consulted. GI recommended MRCP when patient was stable to further characterize mass, and surgical recs at the time recommended cholecystostomy tube, antibiotics, and no acute surgical intervention for the cholecystitis at this time.    Patient was started on cefepime/flagyl, and underwent successful placement of cholecystostomy tube with IR on 6/29. Patient eventually stabilized and was downgraded from ICU and underwent MRCP on 7/6. MRCP demonstrated pancreatic head mass 3 x 2.2 cm and concerning for adenocarcinoma and likely the cause of pancreatic duct and CBD dilation. However, MRCP could not definitively delineate vascular involvement of mass. Surgery was contacted to give further updated recommendations following MRCP.    Patient seen and examined at bedside, awake, but not able to answer questions at this time.    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, R AKA, CABG  Allergies: flu vaccine, PCN      Physical:  ICU Vital Signs Last 24 Hrs  T(C): 36.8 (08 Jul 2021 11:23), Max: 37 (07 Jul 2021 22:00)  T(F): 98.3 (08 Jul 2021 11:23), Max: 98.6 (07 Jul 2021 22:00)  HR: 82 (08 Jul 2021 11:23) (82 - 100)  BP: 178/95 (08 Jul 2021 11:23) (162/67 - 178/95)  BP(mean): --  ABP: --  ABP(mean): --  RR: 18 (08 Jul 2021 11:23) (18 - 19)  SpO2: 97% (08 Jul 2021 11:23) (97% - 97%)    General: NAD, awake, but altered mental status and not answering questions at this time  Cardio: RRR  Resp: nonlabored breathing on 3L nasal cannula  Abd: soft, NT, ND, cholecystostomy tube in place with bilious output, dressing c/d/i, no evidence of periumbilical or left supraclavicular lymphadenopathy  Skin: no evidence of jaundice                          10.0   9.94  )-----------( 254      ( 08 Jul 2021 07:19 )             30.7     07-08    149<H>  |  119<H>  |  60<H>  ----------------------------<  280<H>  3.5   |  20<L>  |  1.95<H>    Ca    8.6      08 Jul 2021 07:19  Phos  3.1     07-08  Mg     1.7     07-08    TPro  6.7  /  Alb  2.6<L>  /  TBili  1.2  /  DBili  x   /  AST  18  /  ALT  15  /  AlkPhos  88  07-08    Imaging:  CT abdomen (6/28): demonstrated evidence of acute cholecystitis and possible pancreatic head mass, limited by lack of oral or IV contrast  MRCP (7/6): demonstrated pancreatic head mass 3 x 2.2 cm, could not comment on vascular involvement

## 2021-07-08 NOTE — PROGRESS NOTE ADULT - SUBJECTIVE AND OBJECTIVE BOX
HPI:  76 year old female from [facility] with PMHx of PVD, right AKA (S/P fem pop bypass), PAF, COPD, bipolar disorder, hypertension and PSHx of S/P CABG x1 brought into the ED via EMS from [facility] for unresponsiveness and hypotension. Per NH supervisor Mr. Gaspar () Pt was found unresponsive today morning around 8 am, pt was fine last night, pt did not have fever, difficulty breathing, pt was only receptive to painful stimuli and on evaluation pt's blood pressure was low per papers Pt's bp was 62/38. Pt was sent to the hospital for further evaluation. Baseline mental status alert, oriented and follows instruction, non ambulatory at baseline.   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 Pt was given a dose of vancomycin and rocephine. Pt's blood pressure improve to 97/58, hr 109 bpm, ECG showing sinus tachy, RBBB, LAFB, Bifacicular block.    (28 Jun 2021 14:58)      Patient is a 76y old  Female who presents with a chief complaint of Sepsis . Has COPD (08 Jul 2021 11:12)      INTERVAL HPI/OVERNIGHT EVENTS:  T(C): 36.8 (07-08-21 @ 11:23), Max: 37 (07-07-21 @ 22:00)  HR: 82 (07-08-21 @ 11:23) (82 - 100)  BP: 178/95 (07-08-21 @ 11:23) (162/67 - 178/95)  RR: 18 (07-08-21 @ 11:23) (18 - 19)  SpO2: 97% (07-08-21 @ 11:23) (97% - 97%)  Wt(kg): --  I&O's Summary    07 Jul 2021 07:01  -  08 Jul 2021 07:00  --------------------------------------------------------  IN: 0 mL / OUT: 450 mL / NET: -450 mL        REVIEW OF SYSTEMS: denies fever, chills, SOB, palpitations, chest pain, abdominal pain, nausea, vomitting, diarrhea, constipation, dizziness    MEDICATIONS  (STANDING):  ALBUTerol    90 MICROgram(s) HFA Inhaler 1 Puff(s) Inhalation every 4 hours  apixaban 5 milliGRAM(s) Oral every 12 hours  cefepime   IVPB      cefepime   IVPB 1000 milliGRAM(s) IV Intermittent every 24 hours  dextrose 40% Gel 15 Gram(s) Oral once  dextrose 5%. 1000 milliLiter(s) (50 mL/Hr) IV Continuous <Continuous>  dextrose 5%. 1000 milliLiter(s) (100 mL/Hr) IV Continuous <Continuous>  dextrose 5%. 1000 milliLiter(s) (50 mL/Hr) IV Continuous <Continuous>  dextrose 50% Injectable 25 Gram(s) IV Push once  dextrose 50% Injectable 12.5 Gram(s) IV Push once  dextrose 50% Injectable 25 Gram(s) IV Push once  ferrous    sulfate 325 milliGRAM(s) Oral daily  glucagon  Injectable 1 milliGRAM(s) IntraMuscular once  hydrALAZINE 25 milliGRAM(s) Oral four times a day  insulin lispro (ADMELOG) corrective regimen sliding scale   SubCutaneous three times a day before meals  insulin lispro (ADMELOG) corrective regimen sliding scale   SubCutaneous at bedtime  metoprolol tartrate 50 milliGRAM(s) Oral two times a day  metroNIDAZOLE  IVPB      metroNIDAZOLE  IVPB 500 milliGRAM(s) IV Intermittent every 8 hours  pantoprazole  Injectable 40 milliGRAM(s) IV Push at bedtime  senna 2 Tablet(s) Oral at bedtime    MEDICATIONS  (PRN):  acetaminophen   Tablet .. 650 milliGRAM(s) Oral every 6 hours PRN Temp greater or equal to 38C (100.4F), Mild Pain (1 - 3)  ALBUTerol    90 MICROgram(s) HFA Inhaler 2 Puff(s) Inhalation every 6 hours PRN Wheezing  sodium chloride 0.9% lock flush 10 milliLiter(s) IV Push every 1 hour PRN Pre/post blood products, medications, blood draw, and to maintain line patency      PHYSICAL EXAM:  GENERAL: NAD, well-groomed, well-developed  HEAD:  Atraumatic, Normocephalic  EYES: EOMI, PERRLA, conjunctiva and sclera clear  ENMT: No tonsillar erythema, exudates, or enlargement; Moist mucous membranes, Good dentition, No lesions  NECK: Supple, No JVD, Normal thyroid  NERVOUS SYSTEM:  Alert & Oriented X3, Good concentration; Motor Strength 5/5 B/L upper and lower extremities; DTRs 2+ intact and symmetric  CHEST/LUNG: Clear to percussion bilaterally; No rales, rhonchi, wheezing, or rubs  HEART: Regular rate and rhythm; No murmurs, rubs, or gallops  ABDOMEN: Soft, Nontender, Nondistended; Bowel sounds present  EXTREMITIES:  2+ Peripheral Pulses, No clubbing, cyanosis, or edema  LYMPH: No lymphadenopathy noted  SKIN: No rashes or lesions  LABS:                        10.0   9.94  )-----------( 254      ( 08 Jul 2021 07:19 )             30.7     07-08    149<H>  |  119<H>  |  60<H>  ----------------------------<  280<H>  3.5   |  20<L>  |  1.95<H>    Ca    8.6      08 Jul 2021 07:19  Phos  3.1     07-08  Mg     1.7     07-08    TPro  6.7  /  Alb  2.6<L>  /  TBili  1.2  /  DBili  x   /  AST  18  /  ALT  15  /  AlkPhos  88  07-08        CAPILLARY BLOOD GLUCOSE      POCT Blood Glucose.: 309 mg/dL (08 Jul 2021 11:59)  POCT Blood Glucose.: 251 mg/dL (08 Jul 2021 07:34)  POCT Blood Glucose.: 312 mg/dL (07 Jul 2021 21:46)  POCT Blood Glucose.: 313 mg/dL (07 Jul 2021 17:02)

## 2021-07-08 NOTE — PROGRESS NOTE ADULT - ASSESSMENT
Seen ICU     Cholecystitis   s.p cholecystomy tube   Continue  Anbx       Biliary obstruction   mrcp reviewed   goc versus transfer for eus FNA    I was physically present for the key portions of the evaluation and management (E/M) service provided.  The patient was personally seen and examined at bedside.  I have edited the note as appropriate.   Thank you for your consultation and allowing  me to participate in the care of your patients. If you have further questions please contact me at [phone].     Rm Hart M.D.       _________________________________________________________________________________________________  [facility]  [address] [place] [address]  Office: [phone]    Fortunato Bowman PA-C  ______________________________________________________________________

## 2021-07-08 NOTE — PROGRESS NOTE ADULT - SUBJECTIVE AND OBJECTIVE BOX
MERARY MEYER    SCU progress note    INTERVAL HPI/OVERNIGHT EVENTS:  no acute events over night.  MRCP noted with mass in the head of the pancreas most concerning for adenocarcinoma.  GI following.    DNR [x ]   DNI  [x  ]    Covid - 19 PCR: Negative 6/28    The 4Ms    What Matters Most: see GOC  Age appropriate Medications/Screen for High Risk Medication: Yes  Mentation: see CAM below  Mobility: defer to physical exam    The Confusion Assessment Method (CAM) Diagnostic Algorithm     1: Acute Onset or Fluctuating Course  - Is there evidence of an acute change in mental status from the patient’s baseline? Did the (abnormal) behavior  fluctuate during the day, that is, tend to come and go, or increase and decrease in severity?  [ ] YES [x ] NO     2: Inattention  - Did the patient have difficulty focusing attention, being easily distractible, or having difficulty keeping track of what was being said?  [ ] YES [ ] NO   Unable to access     3: Disorganized thinking  -Was the patient’s thinking disorganized or incoherent, such as rambling or irrelevant conversation, unclear or illogical flow of ideas, or unpredictable switching from subject to subject?  [ ] YES [ ] NO  Unable to access    4: Altered Level of consciousness?  [ ] YES [ ] NO    The diagnosis of delirium by CAM requires the presence of features 1 and 2 and either 3 or 4.    PRESSORS: [ ] YES [x ] NO  cefepime   IVPB      cefepime   IVPB 1000 milliGRAM(s) IV Intermittent every 24 hours  metroNIDAZOLE  IVPB 500 milliGRAM(s) IV Intermittent every 8 hours  metroNIDAZOLE  IVPB        Cardiovascular:  Heart Failure  Acute   Acute on Chronic  Chronic       hydrALAZINE Injectable 10 milliGRAM(s) IV Push every 8 hours  metoprolol tartrate 50 milliGRAM(s) Oral two times a day    Pulmonary:  ALBUTerol    90 MICROgram(s) HFA Inhaler 1 Puff(s) Inhalation every 4 hours  ALBUTerol    90 MICROgram(s) HFA Inhaler 2 Puff(s) Inhalation every 6 hours PRN    Hematalogic:  apixaban 5 milliGRAM(s) Oral every 12 hours    Other:  acetaminophen   Tablet .. 650 milliGRAM(s) Oral every 6 hours PRN  dextrose 40% Gel 15 Gram(s) Oral once  dextrose 5%. 1000 milliLiter(s) IV Continuous <Continuous>  dextrose 5%. 1000 milliLiter(s) IV Continuous <Continuous>  dextrose 5%. 1000 milliLiter(s) IV Continuous <Continuous>  dextrose 50% Injectable 25 Gram(s) IV Push once  dextrose 50% Injectable 25 Gram(s) IV Push once  dextrose 50% Injectable 12.5 Gram(s) IV Push once  ferrous    sulfate 325 milliGRAM(s) Oral daily  glucagon  Injectable 1 milliGRAM(s) IntraMuscular once  insulin lispro (ADMELOG) corrective regimen sliding scale   SubCutaneous three times a day before meals  insulin lispro (ADMELOG) corrective regimen sliding scale   SubCutaneous at bedtime  methylPREDNISolone sodium succinate Injectable 40 milliGRAM(s) IV Push every 12 hours  pantoprazole  Injectable 40 milliGRAM(s) IV Push at bedtime  potassium phosphate IVPB 15 milliMole(s) IV Intermittent once  senna 2 Tablet(s) Oral at bedtime  sodium chloride 0.9% lock flush 10 milliLiter(s) IV Push every 1 hour PRN    acetaminophen   Tablet .. 650 milliGRAM(s) Oral every 6 hours PRN  ALBUTerol    90 MICROgram(s) HFA Inhaler 1 Puff(s) Inhalation every 4 hours  ALBUTerol    90 MICROgram(s) HFA Inhaler 2 Puff(s) Inhalation every 6 hours PRN  apixaban 5 milliGRAM(s) Oral every 12 hours  cefepime   IVPB      cefepime   IVPB 1000 milliGRAM(s) IV Intermittent every 24 hours  dextrose 40% Gel 15 Gram(s) Oral once  dextrose 5%. 1000 milliLiter(s) IV Continuous <Continuous>  dextrose 5%. 1000 milliLiter(s) IV Continuous <Continuous>  dextrose 5%. 1000 milliLiter(s) IV Continuous <Continuous>  dextrose 50% Injectable 25 Gram(s) IV Push once  dextrose 50% Injectable 25 Gram(s) IV Push once  dextrose 50% Injectable 12.5 Gram(s) IV Push once  ferrous    sulfate 325 milliGRAM(s) Oral daily  glucagon  Injectable 1 milliGRAM(s) IntraMuscular once  hydrALAZINE Injectable 10 milliGRAM(s) IV Push every 8 hours  insulin lispro (ADMELOG) corrective regimen sliding scale   SubCutaneous three times a day before meals  insulin lispro (ADMELOG) corrective regimen sliding scale   SubCutaneous at bedtime  methylPREDNISolone sodium succinate Injectable 40 milliGRAM(s) IV Push every 12 hours  metoprolol tartrate 50 milliGRAM(s) Oral two times a day  metroNIDAZOLE  IVPB 500 milliGRAM(s) IV Intermittent every 8 hours  metroNIDAZOLE  IVPB      pantoprazole  Injectable 40 milliGRAM(s) IV Push at bedtime  potassium phosphate IVPB 15 milliMole(s) IV Intermittent once  senna 2 Tablet(s) Oral at bedtime  sodium chloride 0.9% lock flush 10 milliLiter(s) IV Push every 1 hour PRN    Drug Dosing Weight  Height (cm): 170.2 (28 Jun 2021 10:15)  Weight (kg): 66.9 (28 Jun 2021 16:29)  BMI (kg/m2): 23.1 (28 Jun 2021 16:29)  BSA (m2): 1.78 (28 Jun 2021 16:29)    CENTRAL LINE: [ ] YES [x ] NO  LOCATION:   DATE INSERTED:  REMOVE: [ ] YES [ ] NO  EXPLAIN:    BIANCHI: [x ] YES [ ] NO    DATE INSERTED:  REMOVE:  [ ] YES [ x ] NO  EXPLAIN:  Urinary obstrustion    PAST MEDICAL & SURGICAL HISTORY:  Myocardial infarct, old    Hypercholesterolemia    Peripheral vascular disease    COPD (chronic obstructive pulmonary disease)    Bipolar 1 disorder    HTN (hypertension)    Bedbound    S/P CABG x 1    Above knee amputation of right lower extremity    I&O's Summary    07 Jul 2021 07:01  -  08 Jul 2021 07:00  --------------------------------------------------------  IN: 0 mL / OUT: 450 mL / NET: -450 mL      PHYSICAL EXAM:    GENERAL: appears comfortable in bed, does not follow instructions,    HEAD:  Atraumatic, Normocephalic  EYES: PERRL, conjunctiva and sclera clear  ENMT: No tonsillar erythema, exudates  NECK: Supple, No JVD  NERVOUS SYSTEM:  Awake and alert. Not orientated. Does not follow commands.  CHEST/LUNG: Diminished breath sounds bilateral bases w/ rales  HEART: Irregular rate and rhythm. No murmurs or gallops noted.  ABDOMEN: Soft, Nontender, Nondistended; Bowel sounds present. Right abdominal bili drain  EXTREMITIES: Right AKA. LLE +pedal pulses w/ 2+ edema,   LYMPH: No lymphadenopathy noted  SKIN: No rashes or lesions      LABS:  CBC Full  -  ( 08 Jul 2021 07:19 )  WBC Count : 9.94 K/uL  RBC Count : 3.42 M/uL  Hemoglobin : 10.0 g/dL  Hematocrit : 30.7 %  Platelet Count - Automated : 254 K/uL  Mean Cell Volume : 89.8 fl  Mean Cell Hemoglobin : 29.2 pg  Mean Cell Hemoglobin Concentration : 32.6 gm/dL  Auto Neutrophil # : x  Auto Lymphocyte # : x  Auto Monocyte # : x  Auto Eosinophil # : x  Auto Basophil # : x  Auto Neutrophil % : x  Auto Lymphocyte % : x  Auto Monocyte % : x  Auto Eosinophil % : x  Auto Basophil % : x    07-08    149<H>  |  119<H>  |  60<H>  ----------------------------<  280<H>  3.5   |  20<L>  |  1.95<H>    Ca    8.6      08 Jul 2021 07:19  Phos  3.1     07-08  Mg     1.7     07-08    TPro  6.7  /  Alb  2.6<L>  /  TBili  1.2  /  DBili  x   /  AST  18  /  ALT  15  /  AlkPhos  88  07-08      [  ]  DVT Prophylaxis  [  ]  Nutrition, Brand, Rate         Goal Rate        Abnormal Nutritional Status -  Malnutrition   Cachexia      Morbid Obesity BMI >/=40    RADIOLOGY & ADDITIONAL STUDIES:  ***  < from: MR MRCP No Cont (07.06.21 @ 20:34) >  FINDINGS:  Lungs: Moderately large pleural effusions. Consolidation of the adjacent  pulmonary parenchyma.    Liver: No mass.  Mild biliary ductaldilatation.  Gallbladder and bile ducts: Few small gallstones. The gallbladder wall is  thickened measuring up to 5 mm. Mild biliary ductal dilatation, measuring 8 mm.  Pancreas: Mass in the head of the pancreas measuring 2.5 x 2.2 x 2.7 cm. The  mass is best seen on series 4, image 16, 17, and 18. It is T1 hypointense to  the remaining pancreas which is atrophic.  Spleen: Unremarkable. No splenomegaly.  Adrenal glands: Unremarkable. No mass.  Kidneys and ureters: 12 mm simple cyst in the lower pole right kidney. Tiny  subcentimeter cyst in the midpole left kidney. No stones or hydronephrosis.  Stomach and bowel: Visualized stomach and intestines are unremarkable.  Intraperitoneal space: No free fluid.  Arteries: No abdominal aortic aneurysm.  Bones/joints:  Unremarkable.  Soft tissues: Moderate body wall edema.    IMPRESSION:  1. Mass in the head of the pancreas is most concerning for adenocarcinoma.  2. Gallstones with evidence of cholecystitis. Biliary ductal dilatation, most  likely secondary to the mass in the pancreas.  3. Moderately large bilateral pleural effusions. Air space consolidation  adjacent to the effusions may represent compressive atelectasis, pneumonia or a  combination of both.        ******PRELIMINARY REPORT******      < end of copied text >  < from: CT Head No Cont (06.28.21 @ 16:04) >  FINDINGS:  No acute transcortical infarction or acute intracranial hemorrhage.    White matter hypoattenuating foci are noted, compatible with age-indeterminate small vessel disease.    No hydrocephalus. No extra-axial fluid collections.    The visualized intraorbital contents are unremarkable. The imaged portions of the paranasal sinuses are clear. The mastoid air cells are clear. The visualized soft tissues and osseous structures appear normal.    IMPRESSION:    -No acute intracranial findings.  -Chronic small vessel disease.    < end of copied text >  < from: CT Chest No Cont (06.28.21 @ 11:59) >  FINDINGS:  CHEST:  LUNGS AND LARGE AIRWAYS: Patent central airways. Patchy bibasilar dependent consolidation/atelectasis right greater than left. Correlate clinically for infection. biapical scarring  PLEURA: No pleural effusion.  VESSELS: Nonaneurysmal.  HEART: Mild cardiomegaly with coronary artery calcification No pericardial effusion.  MEDIASTINUM AND CLARICE: No lymphadenopathy.  CHEST WALL AND LOWER NECK: Nonspecific left breast calcification..    ABDOMEN AND PELVIS:  LIVER: Within normal limits.  BILE DUCTS: Marked dilatation common duct up to 1.7 cm mild intrahepatic biliary dilatation. The distal common duct obstructing stone and/or lesion is considered. Correlate with MR.  GALLBLADDER: Gallstones. Markedly distended gallbladder with pericholecystic fluid concerning for cholecystitis. Correlate with right upper quadrant ultrasound and/or HIDA.  SPLEEN: Within normal limits.  PANCREAS: Not well evaluated on this noncontrast study. However, there is diffuse atrophy of the body and tail with dilatation of the main pancreatic duct. Increased soft tissue density in the pancreatic head and uncinate process suspicious for a mass. Recommend  MRI  ADRENALS: Within normal limits.  KIDNEYS/URETERS: Punctate nonobstructive right renal calculus    BLADDER: Collapsed around a Bianchi.  REPRODUCTIVE ORGANS: No gynecologic mass    BOWEL: No bowel obstruction. Appendix no appendicitis  PERITONEUM: No ascites.  VESSELS: Nonaneurysmal  RETROPERITONEUM/LYMPH NODES: No lymphadenopathy.  ABDOMINAL WALL: Within normal limits.  BONES: No aggressive lesions    IMPRESSION:  Limited by lack of any exogenousoral or intravenous contrast  Patchy bibasilar dependent consolidation/atelectasis. Correlate clinically for infection.  Cholelithiasis, pericholecystic fluid concerning for cholecystitis. Correlate with ultrasound and/or HIDA scan.  Significant biliary dilatation, possible mass pancreatic head. Correlate with MRCP/abdominal MR with pancreatic protocol        < end of copied text >    Goals of Care Discussion with Family/Proxy/Other   -

## 2021-07-08 NOTE — PROGRESS NOTE ADULT - PROBLEM SELECTOR PLAN 2
CT with acute cholecystitis with common duct up to 1.7 cm mild intrahepatic biliary dilatation. The distal common duct obstructing stone and lesion  S/P Cholecystectomy drain, no growth on culture  BCx negative  Continue antibiotics  MRCP- showed pancreatic mass   GI Dr Pinon following.

## 2021-07-09 NOTE — PROGRESS NOTE ADULT - SUBJECTIVE AND OBJECTIVE BOX
MERARY MEYER    SCU progress note    INTERVAL HPI/OVERNIGHT EVENTS: ***MRCP shows pancreatic mass concerning for adenocarcinoma . Seen by Surgery and recommends metastatic workup with CT abdomen/pelvis with IV contrast . Amylase and Lipase elevated, Ca 19-9 in progress.  Pt seen and examined this morning. Pt awake/alert, nonverbal at this time, does not follow commands. Blood sugars elevated. Will add low dose lantus. Continue to monitor BG. BP improved to 155/66 this morning. s/p lasix yesterday.  NAD.     DNR [ x]   DNI  [ x ]     Covid - 19 PCR: negative 6/28    The 4Ms    What Matters Most: see GOC  Age appropriate Medications/Screen for High Risk Medication: Yes  Mentation: see CAM below  Mobility: defer to physical exam    The Confusion Assessment Method (CAM) Diagnostic Algorithm     1: Acute Onset or Fluctuating Course  - Is there evidence of an acute change in mental status from the patient’s baseline? Did the (abnormal) behavior  fluctuate during the day, that is, tend to come and go, or increase and decrease in severity?  [ ] YES [x ] NO     2: Inattention  - Did the patient have difficulty focusing attention, being easily distractible, or having difficulty keeping track of what was being said?  [ ] YES [ ] NO unable to assess (x)      3: Disorganized thinking  -Was the patient’s thinking disorganized or incoherent, such as rambling or irrelevant conversation, unclear or illogical flow of ideas, or unpredictable switching from subject to subject?  [ ] YES [ ] NO  unable to assess (x)     4: Altered Level of consciousness?  [ ] YES [x ] NO    The diagnosis of delirium by CAM requires the presence of features 1 and 2 and either 3 or 4.    PRESSORS: [ ] YES [x ] NO  cefepime   IVPB      cefepime   IVPB 1000 milliGRAM(s) IV Intermittent every 24 hours  metroNIDAZOLE  IVPB      metroNIDAZOLE  IVPB 500 milliGRAM(s) IV Intermittent every 8 hours    Cardiovascular:   Echo 6/30: EF > 55%, RV systolic pressure is 59 mm Hg. Moderate pulmonary  hypertension.      hydrALAZINE 25 milliGRAM(s) Oral four times a day  metoprolol tartrate 50 milliGRAM(s) Oral two times a day    Pulmonary:  ALBUTerol    90 MICROgram(s) HFA Inhaler 1 Puff(s) Inhalation every 4 hours  ALBUTerol    90 MICROgram(s) HFA Inhaler 2 Puff(s) Inhalation every 6 hours PRN    Hematalogic:  apixaban 5 milliGRAM(s) Oral every 12 hours    Other:  acetaminophen   Tablet .. 650 milliGRAM(s) Oral every 6 hours PRN  dextrose 40% Gel 15 Gram(s) Oral once  dextrose 5%. 1000 milliLiter(s) IV Continuous <Continuous>  dextrose 5%. 1000 milliLiter(s) IV Continuous <Continuous>  dextrose 5%. 1000 milliLiter(s) IV Continuous <Continuous>  dextrose 50% Injectable 25 Gram(s) IV Push once  dextrose 50% Injectable 12.5 Gram(s) IV Push once  dextrose 50% Injectable 25 Gram(s) IV Push once  ferrous    sulfate 325 milliGRAM(s) Oral daily  glucagon  Injectable 1 milliGRAM(s) IntraMuscular once  insulin glargine Injectable (LANTUS) 6 Unit(s) SubCutaneous every morning  insulin lispro (ADMELOG) corrective regimen sliding scale   SubCutaneous three times a day before meals  insulin lispro (ADMELOG) corrective regimen sliding scale   SubCutaneous at bedtime  pantoprazole  Injectable 40 milliGRAM(s) IV Push at bedtime  potassium chloride   Powder 20 milliEquivalent(s) Oral once  predniSONE   Tablet 40 milliGRAM(s) Oral daily  senna 2 Tablet(s) Oral at bedtime  sodium chloride 0.9% lock flush 10 milliLiter(s) IV Push every 1 hour PRN    acetaminophen   Tablet .. 650 milliGRAM(s) Oral every 6 hours PRN  ALBUTerol    90 MICROgram(s) HFA Inhaler 1 Puff(s) Inhalation every 4 hours  ALBUTerol    90 MICROgram(s) HFA Inhaler 2 Puff(s) Inhalation every 6 hours PRN  apixaban 5 milliGRAM(s) Oral every 12 hours  cefepime   IVPB      cefepime   IVPB 1000 milliGRAM(s) IV Intermittent every 24 hours  dextrose 40% Gel 15 Gram(s) Oral once  dextrose 5%. 1000 milliLiter(s) IV Continuous <Continuous>  dextrose 5%. 1000 milliLiter(s) IV Continuous <Continuous>  dextrose 5%. 1000 milliLiter(s) IV Continuous <Continuous>  dextrose 50% Injectable 25 Gram(s) IV Push once  dextrose 50% Injectable 12.5 Gram(s) IV Push once  dextrose 50% Injectable 25 Gram(s) IV Push once  ferrous    sulfate 325 milliGRAM(s) Oral daily  glucagon  Injectable 1 milliGRAM(s) IntraMuscular once  hydrALAZINE 25 milliGRAM(s) Oral four times a day  insulin glargine Injectable (LANTUS) 6 Unit(s) SubCutaneous every morning  insulin lispro (ADMELOG) corrective regimen sliding scale   SubCutaneous three times a day before meals  insulin lispro (ADMELOG) corrective regimen sliding scale   SubCutaneous at bedtime  metoprolol tartrate 50 milliGRAM(s) Oral two times a day  metroNIDAZOLE  IVPB      metroNIDAZOLE  IVPB 500 milliGRAM(s) IV Intermittent every 8 hours  pantoprazole  Injectable 40 milliGRAM(s) IV Push at bedtime  potassium chloride   Powder 20 milliEquivalent(s) Oral once  predniSONE   Tablet 40 milliGRAM(s) Oral daily  senna 2 Tablet(s) Oral at bedtime  sodium chloride 0.9% lock flush 10 milliLiter(s) IV Push every 1 hour PRN    Drug Dosing Weight  Height (cm): 170.2 (28 Jun 2021 10:15)  Weight (kg): 66.9 (28 Jun 2021 16:29)  BMI (kg/m2): 23.1 (28 Jun 2021 16:29)  BSA (m2): 1.78 (28 Jun 2021 16:29)    CENTRAL LINE: [ ] YES [ x] NO  LOCATION:   DATE INSERTED:  REMOVE: [ ] YES [ ] NO  EXPLAIN:    TASH: [ ] YES [x ] NO    DATE INSERTED:  REMOVE:  [ ] YES [ ] NO  EXPLAIN:    PAST MEDICAL & SURGICAL HISTORY:  Myocardial infarct, old    Hypercholesterolemia    Peripheral vascular disease    COPD (chronic obstructive pulmonary disease)    Bipolar 1 disorder    HTN (hypertension)    Bedbound    S/P CABG x 1    Above knee amputation of right lower extremity            07-08 @ 07:01  -  07-09 @ 07:00  --------------------------------------------------------  IN: 50 mL / OUT: 45 mL / NET: 5 mL        PHYSICAL EXAM:    GENERAL: NAD  HEAD:  Atraumatic, Normocephalic  EYES: EOMI, PERRLA, conjunctiva and sclera clear  ENMT: No tonsillar erythema, exudates, or enlargement;   NERVOUS SYSTEM: Awake/ Alert. Nonverbal at this time. Does not follow commands. Motor strength 5/5 bilat upper extremities, 3-4/5 LLE. Right AKA.  CHEST/LUNG:Unlabored.  Clear to percussion bilaterally; No rales, rhonchi, wheezing, or rubs  HEART: Regular rate and rhythm; No murmurs, rubs, or gallops  ABDOMEN: Soft, Nontender, Nondistended; Bowel sounds present. Right quad with choley tube, draining dark brown/black fluid.   EXTREMITIES: Right AKA. LLE no edema,  Peripheral Pulses palp. No clubbing, cyanosis  LYMPH: No lymphadenopathy noted  SKIN: coccyx Stage 1      LABS:  CBC Full  -  ( 09 Jul 2021 07:16 )  WBC Count : 13.98 K/uL  RBC Count : 3.48 M/uL  Hemoglobin : 10.4 g/dL  Hematocrit : 31.5 %  Platelet Count - Automated : 211 K/uL  Mean Cell Volume : 90.5 fl  Mean Cell Hemoglobin : 29.9 pg  Mean Cell Hemoglobin Concentration : 33.0 gm/dL  Auto Neutrophil # : x  Auto Lymphocyte # : x  Auto Monocyte # : x  Auto Eosinophil # : x  Auto Basophil # : x  Auto Neutrophil % : x  Auto Lymphocyte % : x  Auto Monocyte % : x  Auto Eosinophil % : x  Auto Basophil % : x    07-09    149<H>  |  114<H>  |  57<H>  ----------------------------<  242<H>  3.3<L>   |  20<L>  |  1.73<H>    Ca    8.4      09 Jul 2021 07:16  Phos  2.5     07-09  Mg     1.7     07-09    TPro  6.4  /  Alb  2.5<L>  /  TBili  1.9<H>  /  DBili  x   /  AST  22  /  ALT  16  /  AlkPhos  83  07-09              [  ]  DVT Prophylaxis  [  ]  Abnormal Nutritional Status -  Malnutrition   Cachexia        Diet, Dysphagia 2 Mechanical Soft-Thin Liquids:   Consistent Carbohydrate No Snacks  Supplement Feeding Modality:  Oral  Nepro Cans or Servings Per Day:  1       Frequency:  Three Times a day (07-09-21 @ 09:25) [Active]  Diet, Pureed:   Consistent Carbohydrate Evening Snacks  Supplement Feeding Modality:  Oral  Glucerna Shake Cans or Servings Per Day:  1       Frequency:  Three Times a day (07-08-21 @ 12:53) [Pending Verification By Attending]              RADIOLOGY & ADDITIONAL STUDIES:  ***      < from: CT Head No Cont (07.08.21 @ 19:30) >  IMPRESSION:    No evidence of acute intracranial abnormality.  No evidence of hemorrhage.    Chronic changes as above.    < end of copied text >  < from: MR MRCP No Cont (07.06.21 @ 20:34) >  IMPRESSION:  Mass in the head of the pancreas is most concerning for adenocarcinoma. Endoscopic ultrasound may be pursued for further evaluation. Its relationship with its adjacent vascular structures is difficult to evaluate without IV contrast. Dilated common bile duct and main pancreatic duct, likely due to obstruction by the pancreatic head mass.    Cholelithiasis with thickened gallbladder wall may represent acute cholecystitis. If clinically indicated, HIDA scan may be pursued for further evaluation.    1.7 cm right renal cyst as described above may be complex. Abdominal MR without and with IV contrast may be pursued to rule out malignant renal neoplasm.    Other findings as above.    A preliminary report was provided by [employer].    < end of copied text >  < from: Xray Chest 1 View- PORTABLE-Routine (Xray Chest 1 View- PORTABLE-Routine .) (07.03.21 @ 11:50) >  IMPRESSION: Bilateral pleural effusions and/or LEFT greater than RIGHT basilar airspace consolidation.    < end of copied text >      < end of copied text >  < from: CT Chest No Cont (06.28.21 @ 11:59) >  IMPRESSION:  Limited by lack of any exogenousoral or intravenous contrast  Patchy bibasilar dependent consolidation/atelectasis. Correlate clinically for infection.  Cholelithiasis, pericholecystic fluid concerning for cholecystitis. Correlate with ultrasound and/or HIDA scan.  Significant biliary dilatation, possible mass pancreatic head. Correlate with MRCP/abdominal MR with pancreatic protocol    < end of copied text >    < from: CT Abdomen and Pelvis No Cont (06.28.21 @ 11:59) >    INTERPRETATION:  Impression: Combined report    < end of copied text >      Goals of Care Discussion with Family/Proxy/Other   - see HARSH  from 6/30

## 2021-07-09 NOTE — PROGRESS NOTE ADULT - PROBLEM SELECTOR PLAN 3
CT as above  choley tube in place   MRCP report above  Metastatic workup   Appreciate Surgery input  hyperglycemic, start Lantus 6 units qam. Continue to monitor BG and cover with moderate scale Admelog. CT as above  choley tube in place   MRCP report above  Metastatic workup .   CT abd/pelvis with IV contrast:  D/w Nephro. Consider on Monday if SCr improves (goal <1.5)   Appreciate Surgery input  hyperglycemic, start Lantus 6 units qam. Continue to monitor BG and cover with moderate scale Admelog.

## 2021-07-09 NOTE — CONSULT NOTE ADULT - SUBJECTIVE AND OBJECTIVE BOX
HPI:  76 year old female from [facility] with PMHx of PVD, right AKA (S/P fem pop bypass), PAF, COPD, bipolar disorder, hypertension and PSHx of S/P CABG x1 brought into the ED via EMS from [facility] for unresponsiveness and hypotension. Per NH supervisor Mr. Gaspar () Pt was found unresponsive today morning around 8 am, pt was fine last night, pt did not have fever, difficulty breathing, pt was only receptive to painful stimuli and on evaluation pt's blood pressure was low per papers Pt's bp was 62/38. Pt was sent to the hospital for further evaluation. Baseline mental status alert, oriented and follows instruction, non ambulatory at baseline.   Allergy: Influenza vaccine and penicillin     Interval hx:  Pt seen and examined at the bedside, AOX0 non verbal.         PAST MEDICAL & SURGICAL HISTORY:  Myocardial infarct, old    Hypercholesterolemia    Peripheral vascular disease    COPD (chronic obstructive pulmonary disease)    Bipolar 1 disorder    HTN (hypertension)    Bedbound    S/P CABG x 1    Above knee amputation of right lower extremity        SOCIAL HISTORY:    Admitted from:  [facility]    Voodoo: Episcopalian                                    Preferred Language: English    Surrogate/HCP/Guardian: Silva Marquez           Phone#:  [phone]    FAMILY HISTORY:  Pt is unable to participate  Baseline ADLs (prior to admission): unknown    Allergies    influenza virus vaccine, live, trivalent (Unknown)  PC Pen VK (Rash)  penicillin (Other; Rash)  statins (Other)  sulfa drugs (Other)  sulfamethizole (Other)    Intolerances      Present Symptoms:   Unable to obtain due to poor mentation    MEDICATIONS  (STANDING):  ALBUTerol    90 MICROgram(s) HFA Inhaler 1 Puff(s) Inhalation every 4 hours  amLODIPine   Tablet 2.5 milliGRAM(s) Oral daily  apixaban 5 milliGRAM(s) Oral every 12 hours  cefepime   IVPB      cefepime   IVPB 1000 milliGRAM(s) IV Intermittent every 24 hours  dextrose 40% Gel 15 Gram(s) Oral once  dextrose 5%. 1000 milliLiter(s) (100 mL/Hr) IV Continuous <Continuous>  dextrose 5%. 1000 milliLiter(s) (50 mL/Hr) IV Continuous <Continuous>  dextrose 5%. 1000 milliLiter(s) (60 mL/Hr) IV Continuous <Continuous>  dextrose 50% Injectable 25 Gram(s) IV Push once  dextrose 50% Injectable 12.5 Gram(s) IV Push once  dextrose 50% Injectable 25 Gram(s) IV Push once  ferrous    sulfate 325 milliGRAM(s) Oral daily  furosemide   Injectable 20 milliGRAM(s) IV Push daily  glucagon  Injectable 1 milliGRAM(s) IntraMuscular once  hydrALAZINE 25 milliGRAM(s) Oral four times a day  insulin glargine Injectable (LANTUS) 6 Unit(s) SubCutaneous every morning  insulin lispro (ADMELOG) corrective regimen sliding scale   SubCutaneous three times a day before meals  insulin lispro (ADMELOG) corrective regimen sliding scale   SubCutaneous at bedtime  metoprolol tartrate 50 milliGRAM(s) Oral two times a day  metroNIDAZOLE  IVPB      metroNIDAZOLE  IVPB 500 milliGRAM(s) IV Intermittent every 8 hours  pantoprazole  Injectable 40 milliGRAM(s) IV Push at bedtime  predniSONE   Tablet 40 milliGRAM(s) Oral daily  senna 2 Tablet(s) Oral at bedtime    MEDICATIONS  (PRN):  acetaminophen   Tablet .. 650 milliGRAM(s) Oral every 6 hours PRN Temp greater or equal to 38C (100.4F), Mild Pain (1 - 3)  ALBUTerol    90 MICROgram(s) HFA Inhaler 2 Puff(s) Inhalation every 6 hours PRN Wheezing  sodium chloride 0.9% lock flush 10 milliLiter(s) IV Push every 1 hour PRN Pre/post blood products, medications, blood draw, and to maintain line patency      PHYSICAL EXAM:    Vital Signs Last 24 Hrs  T(C): 36.2 (09 Jul 2021 11:29), Max: 36.5 (09 Jul 2021 00:00)  T(F): 97.1 (09 Jul 2021 11:29), Max: 97.7 (09 Jul 2021 00:00)  HR: 87 (09 Jul 2021 11:29) (85 - 102)  BP: 178/87 (09 Jul 2021 11:29) (155/66 - 184/92)  BP(mean): --  RR: 16 (09 Jul 2021 11:29) (16 - 20)  SpO2: 96% (09 Jul 2021 11:29) (96% - 99%)    General: Elderly woman, AOX0.  No verbal.  NAD.   Karnofsky Performance Score/Palliative Performance Status Version2: 40    %    HEENT: temporal wasting, poor dentition, moist mucous membrane  Lungs: unlabored on NC  CV: RRR  GI: soft, non distended,  + Right quad with choley tube, draining dark brown/black fluid.   :   alicia  Musculoskeletal: Rt AKA, bedbound.  No edema  Skin: no rash or lesions noted  Neuro: no deficits cognitive impairment dsyphagia/dysarthria paresis: other:  Oral intake ability: unable/only mouth care [minimal moderate full capability]  Diet: [NPO]    LABS:                        10.4   13.98 )-----------( 211      ( 09 Jul 2021 07:16 )             31.5     07-09    149<H>  |  114<H>  |  57<H>  ----------------------------<  242<H>  3.3<L>   |  20<L>  |  1.73<H>    Ca    8.4      09 Jul 2021 07:16  Phos  2.5     07-09  Mg     1.7     07-09    TPro  6.4  /  Alb  2.5<L>  /  TBili  1.9<H>  /  DBili  x   /  AST  22  /  ALT  16  /  AlkPhos  83  07-09        RADIOLOGY & ADDITIONAL STUDIES:    ADVANCE DIRECTIVES:    HPI:  76 year old female from [facility] with PMHx of PVD, right AKA (S/P fem pop bypass), PAF, COPD, bipolar disorder, hypertension and PSHx of S/P CABG x1 brought into the ED via EMS from [facility] for unresponsiveness and hypotension. Per NH supervisor Mr. Gaspar () Pt was found unresponsive today morning around 8 am, pt was fine last night, pt did not have fever, difficulty breathing, pt was only receptive to painful stimuli and on evaluation pt's blood pressure was low per papers Pt's bp was 62/38. Pt was sent to the hospital for further evaluation. Baseline mental status alert, oriented and follows instruction, non ambulatory at baseline.   Allergy: Influenza vaccine and penicillin     Interval hx:  Pt seen and examined at the bedside, AOX0. Non verbal.   HCP is interested in LTC with hospice.      Hospital course:    CT chest showing b/l patchy infiltrates/ consolidations, concern for cholecystitis, with dilated bile duct of 1.7 cm, and concern of pancreatic head mass. Pt was given a dose of vancomycin and rocephin. Pt's blood pressure improve to 97/58, hr 109 bpm, ECG showing sinus tachy, RBBB, LAFB, Bifacicular block.     Patient admitted to the ICU for septic shock requiring vasopressors. Source of infection likely acute cholecystitis as demonstrated on CT abdomen vs UTI. Patient was started on levophed, noted with severe acidosis requiring bicarb drip and VANESSA, likely pre-renal.   6/29, patient underwent IR guided cholecystostomy, with drainage of bile. Evaluated by GI after, recommended MRCP. Unable to obtain MRCP as of 6/30 due to altered mental status suspected to be due to gabapentin toxicity in the setting of VANESSA. Unstable for MRCP, will continue monitoring mental status. Mental status improved 7/1-7/2, pt aaox2 to self and location. Seen by speech and swallow, recommends mechanical soft thin liquid diet.   7/2 pt downgrade to SCU, failed TOV, alicia reinserted   7/8- MRCP report noted with mass in the head of the pancreas most concerning for adenocarcinoma.  GI following.       PAST MEDICAL & SURGICAL HISTORY:  Myocardial infarct, old    Hypercholesterolemia    Peripheral vascular disease    COPD (chronic obstructive pulmonary disease)    Bipolar 1 disorder    HTN (hypertension)    Bedbound    S/P CABG x 1    Above knee amputation of right lower extremity        SOCIAL HISTORY:    Admitted from:  [facility]  Pt's niece Silva is her HCP.  Sikh: Latter day                                    Preferred Language: English    Surrogate/HCP/Guardian: Silva Marquez           Phone#:  [phone]    FAMILY HISTORY:  Pt is unable to participate  Baseline ADLs (prior to admission): unknown    Allergies    influenza virus vaccine, live, trivalent (Unknown)  PC Pen VK (Rash)  penicillin (Other; Rash)  statins (Other)  sulfa drugs (Other)  sulfamethizole (Other)    Intolerances      Present Symptoms:   Unable to obtain due to poor mentation    MEDICATIONS  (STANDING):  ALBUTerol    90 MICROgram(s) HFA Inhaler 1 Puff(s) Inhalation every 4 hours  amLODIPine   Tablet 2.5 milliGRAM(s) Oral daily  apixaban 5 milliGRAM(s) Oral every 12 hours  cefepime   IVPB      cefepime   IVPB 1000 milliGRAM(s) IV Intermittent every 24 hours  dextrose 40% Gel 15 Gram(s) Oral once  dextrose 5%. 1000 milliLiter(s) (100 mL/Hr) IV Continuous <Continuous>  dextrose 5%. 1000 milliLiter(s) (50 mL/Hr) IV Continuous <Continuous>  dextrose 5%. 1000 milliLiter(s) (60 mL/Hr) IV Continuous <Continuous>  dextrose 50% Injectable 25 Gram(s) IV Push once  dextrose 50% Injectable 12.5 Gram(s) IV Push once  dextrose 50% Injectable 25 Gram(s) IV Push once  ferrous    sulfate 325 milliGRAM(s) Oral daily  furosemide   Injectable 20 milliGRAM(s) IV Push daily  glucagon  Injectable 1 milliGRAM(s) IntraMuscular once  hydrALAZINE 25 milliGRAM(s) Oral four times a day  insulin glargine Injectable (LANTUS) 6 Unit(s) SubCutaneous every morning  insulin lispro (ADMELOG) corrective regimen sliding scale   SubCutaneous three times a day before meals  insulin lispro (ADMELOG) corrective regimen sliding scale   SubCutaneous at bedtime  metoprolol tartrate 50 milliGRAM(s) Oral two times a day  metroNIDAZOLE  IVPB      metroNIDAZOLE  IVPB 500 milliGRAM(s) IV Intermittent every 8 hours  pantoprazole  Injectable 40 milliGRAM(s) IV Push at bedtime  predniSONE   Tablet 40 milliGRAM(s) Oral daily  senna 2 Tablet(s) Oral at bedtime    MEDICATIONS  (PRN):  acetaminophen   Tablet .. 650 milliGRAM(s) Oral every 6 hours PRN Temp greater or equal to 38C (100.4F), Mild Pain (1 - 3)  ALBUTerol    90 MICROgram(s) HFA Inhaler 2 Puff(s) Inhalation every 6 hours PRN Wheezing  sodium chloride 0.9% lock flush 10 milliLiter(s) IV Push every 1 hour PRN Pre/post blood products, medications, blood draw, and to maintain line patency      PHYSICAL EXAM:    Vital Signs Last 24 Hrs  T(C): 36.2 (09 Jul 2021 11:29), Max: 36.5 (09 Jul 2021 00:00)  T(F): 97.1 (09 Jul 2021 11:29), Max: 97.7 (09 Jul 2021 00:00)  HR: 87 (09 Jul 2021 11:29) (85 - 102)  BP: 178/87 (09 Jul 2021 11:29) (155/66 - 184/92)  BP(mean): --  RR: 16 (09 Jul 2021 11:29) (16 - 20)  SpO2: 96% (09 Jul 2021 11:29) (96% - 99%)    General: Elderly woman, AOX0.  No verbal.  NAD.   Karnofsky Performance Score/Palliative Performance Status Version2: 40    %    HEENT: temporal wasting, poor dentition, moist mucous membrane  Lungs: unlabored on NC  CV: RRR  GI: soft, non distended,  + Right quad with choley tube, draining dark brown/black fluid.   :   alicia  Musculoskeletal: Rt AKA, bedbound.  b/l JENNY  Skin: no rash or lesions noted  Neuro: unable to follow commands  Oral intake ability: poor po intake    LABS:                        10.4   13.98 )-----------( 211      ( 09 Jul 2021 07:16 )             31.5     07-09    149<H>  |  114<H>  |  57<H>  ----------------------------<  242<H>  3.3<L>   |  20<L>  |  1.73<H>    Ca    8.4      09 Jul 2021 07:16  Phos  2.5     07-09  Mg     1.7     07-09    TPro  6.4  /  Alb  2.5<L>  /  TBili  1.9<H>  /  DBili  x   /  AST  22  /  ALT  16  /  AlkPhos  83  07-09      < from: CT Head No Cont (07.08.21 @ 19:30) >  EXAM:  CT BRAIN                            PROCEDURE DATE:  07/08/2021          INTERPRETATION:  Exam Date: 7/8/2021 7:30 PM    CT head without IV contrast    CLINICAL INFORMATION: r/o cva    TECHNIQUE: Contiguous axial sections were obtained through the head.   Coronal and sagittal reformats were obtained.    COMPARISON: CT head 6/28/2021    FINDINGS:  The ventricles and sulci are prominent, compatible with age-related generalized cerebral volume loss.   There is no CT evidence for acute cerebral cortical infarct. There is no evidence of hemorrhage. There is periventricular and subcortical white matter hypoattenuation,  most compatible with chronic microvascular ischemic changes.   No mass effect is found in the brain.  There is no midlineshift or herniation pattern.      The visualized portions of the paranasal sinuses and mastoid air cells are clear.    IMPRESSION:    No evidence of acute intracranial abnormality.  No evidence of hemorrhage.    Chronic changes as above.    < end of copied text >    RADIOLOGY & ADDITIONAL STUDIES: Reviewed    ADVANCE DIRECTIVES: DNR/DNI

## 2021-07-09 NOTE — CONSULT NOTE ADULT - CONVERSATION DETAILS
Spoke with the pt's niece Silva who identified herself as her HCP.  Discussed the pt's current clinical condition and further goals of care. She expressed being overwhelmed and feeling very sad about her aunt's declining health.  She stated her aunt was very clear as to what her wishes were for EOL care.  She wants to keep her aunt comfortable, no medical intervention to preserve her life.     Educated her about hospice philosophy and locations of care.  She stated she would like her aunt to go to LTC with hospice.  She does not want her aunt to return to [facility].  SW referral made.  MOLST in chart; DNR/DNI no feeding tube.  No further labs, no IVF, comfort feeds as tolerated, and continue abx.    Chaplaincy offered and accepted.    All questions answered.  Support provided.   Plan discussed with primary team.

## 2021-07-09 NOTE — PROGRESS NOTE ADULT - ASSESSMENT
Seen ICU     Cholecystitis   s.p cholecystomy tube   Continue  Anbx       Biliary obstruction   Pain and palliative conversation reviewed   No further intervention at this time       I was physically present for the key portions of the evaluation and management (E/M) service provided.  The patient was personally seen and examined at bedside.  I have edited the note as appropriate.   Thank you for your consultation and allowing  me to participate in the care of your patients. If you have further questions please contact me at [phone].     Rm Hart M.D.       _________________________________________________________________________________________________  [facility]  [address] [place] [address]  Office: [phone]    Fortunato Bowman PA-C  ______________________________________________________________________

## 2021-07-09 NOTE — PROGRESS NOTE ADULT - SUBJECTIVE AND OBJECTIVE BOX
HPI:  76 year old female from [facility] with PMHx of PVD, right AKA (S/P fem pop bypass), PAF, COPD, bipolar disorder, hypertension and PSHx of S/P CABG x1 brought into the ED via EMS from [facility] for unresponsiveness and hypotension. Per NH supervisor Mr. Gaspar () Pt was found unresponsive today morning around 8 am, pt was fine last night, pt did not have fever, difficulty breathing, pt was only receptive to painful stimuli and on evaluation pt's blood pressure was low per papers Pt's bp was 62/38. Pt was sent to the hospital for further evaluation. Baseline mental status alert, oriented and follows instruction, non ambulatory at baseline.   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 Pt was given a dose of vancomycin and rocephine. Pt's blood pressure improve to 97/58, hr 109 bpm, ECG showing sinus tachy, RBBB, LAFB, Bifacicular block.    (28 Jun 2021 14:58)      Patient is a 76y old  Female who presents with a chief complaint of Sepsis (09 Jul 2021 11:10)      INTERVAL HPI/OVERNIGHT EVENTS:  T(C): 36.2 (07-09-21 @ 11:29), Max: 36.5 (07-09-21 @ 00:00)  HR: 87 (07-09-21 @ 11:29) (85 - 102)  BP: 178/87 (07-09-21 @ 11:29) (155/66 - 184/92)  RR: 16 (07-09-21 @ 11:29) (16 - 20)  SpO2: 96% (07-09-21 @ 11:29) (96% - 99%)  Wt(kg): --  I&O's Summary    08 Jul 2021 07:01  -  09 Jul 2021 07:00  --------------------------------------------------------  IN: 50 mL / OUT: 45 mL / NET: 5 mL        REVIEW OF SYSTEMS: denies fever, chills, SOB, palpitations, chest pain, abdominal pain, nausea, vomitting, diarrhea, constipation, dizziness    MEDICATIONS  (STANDING):  ALBUTerol    90 MICROgram(s) HFA Inhaler 1 Puff(s) Inhalation every 4 hours  amLODIPine   Tablet 2.5 milliGRAM(s) Oral daily  apixaban 5 milliGRAM(s) Oral every 12 hours  cefepime   IVPB      cefepime   IVPB 1000 milliGRAM(s) IV Intermittent every 24 hours  dextrose 40% Gel 15 Gram(s) Oral once  dextrose 5%. 1000 milliLiter(s) (100 mL/Hr) IV Continuous <Continuous>  dextrose 5%. 1000 milliLiter(s) (50 mL/Hr) IV Continuous <Continuous>  dextrose 50% Injectable 25 Gram(s) IV Push once  dextrose 50% Injectable 12.5 Gram(s) IV Push once  dextrose 50% Injectable 25 Gram(s) IV Push once  ferrous    sulfate 325 milliGRAM(s) Oral daily  glucagon  Injectable 1 milliGRAM(s) IntraMuscular once  hydrALAZINE 25 milliGRAM(s) Oral four times a day  insulin glargine Injectable (LANTUS) 6 Unit(s) SubCutaneous every morning  insulin lispro (ADMELOG) corrective regimen sliding scale   SubCutaneous three times a day before meals  insulin lispro (ADMELOG) corrective regimen sliding scale   SubCutaneous at bedtime  metoprolol tartrate 50 milliGRAM(s) Oral two times a day  metroNIDAZOLE  IVPB      metroNIDAZOLE  IVPB 500 milliGRAM(s) IV Intermittent every 8 hours  pantoprazole  Injectable 40 milliGRAM(s) IV Push at bedtime  predniSONE   Tablet 40 milliGRAM(s) Oral daily  senna 2 Tablet(s) Oral at bedtime    MEDICATIONS  (PRN):  acetaminophen   Tablet .. 650 milliGRAM(s) Oral every 6 hours PRN Temp greater or equal to 38C (100.4F), Mild Pain (1 - 3)  ALBUTerol    90 MICROgram(s) HFA Inhaler 2 Puff(s) Inhalation every 6 hours PRN Wheezing  sodium chloride 0.9% lock flush 10 milliLiter(s) IV Push every 1 hour PRN Pre/post blood products, medications, blood draw, and to maintain line patency      PHYSICAL EXAM:  GENERAL: NAD, well-groomed, well-developed  HEAD:  Atraumatic, Normocephalic  EYES: EOMI, PERRLA, conjunctiva and sclera clear  ENMT: No tonsillar erythema, exudates, or enlargement; Moist mucous membranes, Good dentition, No lesions  NECK: Supple, No JVD, Normal thyroid  NERVOUS SYSTEM:  Alert & Oriented X3, Good concentration; Motor Strength 5/5 B/L upper and lower extremities; DTRs 2+ intact and symmetric  CHEST/LUNG: Clear to percussion bilaterally; No rales, rhonchi, wheezing, or rubs  HEART: Regular rate and rhythm; No murmurs, rubs, or gallops  ABDOMEN: Soft, Nontender, Nondistended; Bowel sounds present  EXTREMITIES:  2+ Peripheral Pulses, No clubbing, cyanosis, or edema  LYMPH: No lymphadenopathy noted  SKIN: No rashes or lesions  LABS:                        10.4   13.98 )-----------( 211      ( 09 Jul 2021 07:16 )             31.5     07-09    149<H>  |  114<H>  |  57<H>  ----------------------------<  242<H>  3.3<L>   |  20<L>  |  1.73<H>    Ca    8.4      09 Jul 2021 07:16  Phos  2.5     07-09  Mg     1.7     07-09    TPro  6.4  /  Alb  2.5<L>  /  TBili  1.9<H>  /  DBili  x   /  AST  22  /  ALT  16  /  AlkPhos  83  07-09        CAPILLARY BLOOD GLUCOSE      POCT Blood Glucose.: 259 mg/dL (09 Jul 2021 11:20)  POCT Blood Glucose.: 248 mg/dL (09 Jul 2021 07:45)  POCT Blood Glucose.: 378 mg/dL (08 Jul 2021 21:26)  POCT Blood Glucose.: 377 mg/dL (08 Jul 2021 16:50)

## 2021-07-09 NOTE — CHART NOTE - NSCHARTNOTEFT_GEN_A_CORE
Surgical Oncology update:    Please see note from yesterday with updated recommendations, in light of recent discussions between primary team and health care proxy.

## 2021-07-09 NOTE — PROGRESS NOTE ADULT - ASSESSMENT
seen and examined  awake but aphasic  not in any distress.    vsstable except BP high  afrbrile  physical done  awake but aphasic,  upper extrem contracture  rt aka   abd cholecystostomy bag there  some drain   labs noted  wbc 13 but afebrile  Na149, k 3.3   a/p s/p acute renal failure sepsis  renal numbers better    but mental status is little better but not to where it was  repaeated ct head neg   wbc 13 no fever  on antibx     Na high  iv fluid change  k supplement    as pr HCP no furthur testing  pt is DNR comfort care   seen and examined  awake but aphasic  not in any distress.    vsstable except BP high  afrbrile  physical done  awake but aphasic,  upper extrem contracture  rt aka   abd cholecystostomy bag there  some drain   labs noted  wbc 13 but afebrile  Na149, k 3.3   a/p s/p acute renal failure sepsis  renal numbers better    but mental status is little better but not to where it was  repaeated ct head neg   wbc 13 no fever  on antibx     Na high  iv fluid change  k supplement    as pr HCP no furthur testing  pt is DNR comfort care  htn add amlodipine and watch  cont other meds  cholycystostomy  surgical follow up

## 2021-07-09 NOTE — CONSULT NOTE ADULT - CONSULT REASON
acute cholecystitis
copd
Discuss complex medical decisions making related to GOC.
Pancreatic head mass concerning for adenocarcinoma
elevated SCR

## 2021-07-09 NOTE — CONSULT NOTE ADULT - PROBLEM SELECTOR RECOMMENDATION 3
2/2 septic shock, poor po intake and dehydration. Elevated Scr.  Nephrology following.    Avoid NSAIDs.

## 2021-07-09 NOTE — PROGRESS NOTE ADULT - PROBLEM SELECTOR PLAN 9
Continue gentle hydration with D5W  Monitor sodium  Hyperglycemia likely due to steroids and D5W. A1C 6.4, start Lantus 6 units qam. Continue to monitor BG and cover with moderate scale Admelog. Continue gentle hydration with D5W  Continue Lasix 20mg IV daily   Monitor sodium  Appreciate Nephro input.   Hyperglycemia likely due to steroids and D5W. A1C 6.4, start Lantus 6 units qam. Continue to monitor BG and cover with moderate scale Admelog.

## 2021-07-09 NOTE — PROGRESS NOTE ADULT - PROBLEM SELECTOR PLAN 1
Likely secondary from Infection vs Cholecystitis vs electrolyte imbalance.  Head CT shows no acute neurologic pathology.  MRCP w/ pancreatic mass concerning for adenocarcinoma  Metastatic workup with CT abd/pelvis with IV contrast (will re-eval in setting of VANESSA)  Amylase and lipase elevated.  Ca 19-9 in progress.   - GI following  Appreciate Surgery input  Resume diet. Likely secondary from Infection vs Cholecystitis vs electrolyte imbalance.  Head CT shows no acute neurologic pathology.  MRCP w/ pancreatic mass concerning for adenocarcinoma  Metastatic workup with CT abd/pelvis with IV contrast (will re-eval  in setting of VANESSA . D/w Nephro. Consider on Monday if SCr improves (goal <1.5)   Amylase and lipase elevated.  Ca 19-9 in progress.   - GI following  Appreciate Surgery input  Resume diet.

## 2021-07-09 NOTE — CONSULT NOTE ADULT - PROBLEM SELECTOR RECOMMENDATION 2
MRCP shows pancreatic mass concerning for adenocarcinoma.    HCP does not want any diagnostic work up or surgery.    Pt is comfort measures only.

## 2021-07-09 NOTE — PROGRESS NOTE ADULT - ATTENDING COMMENTS
Problem/Plan - 1:  ·  Problem: Acute encephalopathy.  Plan: Likely secondary from Infection vs Cholecystitis vs electrolyte imbalance.  Head CT shows no acute neurologic pathology.  MRCP w/ pancreatic mass concerning for adenocarcinoma  Metastatic workup with CT abd/pelvis with IV contrast (will re-eval  in setting of VANESSA . D/w Nephro. Consider on Monday if SCr improves (goal <1.5)   Amylase and lipase elevated.  Ca 19-9 in progress.   - GI following  Appreciate Surgery input  Resume diet.      Problem/Plan - 2:  ·  Problem: Acute cholecystitis.  Plan: CT with acute cholecystitis with common duct up to 1.7 cm mild intrahepatic biliary dilatation. The distal common duct obstructing stone and lesion  S/P Cholecystectomy drain, no growth on culture  BCx negative  Continue antibiotics  MRCP- showed pancreatic mass   GI Dr Pinon following.      Problem/Plan - 3:  ·  Problem: Pancreatic mass.  Plan: CT as above  choley tube in place   MRCP report above  Metastatic workup .   CT abd/pelvis with IV contrast:  D/w Nephro. Consider on Monday if SCr improves (goal <1.5)   Appreciate Surgery input  hyperglycemic, start Lantus 6 units qam. Continue to monitor BG and cover with moderate scale Admelog.      Problem/Plan - 4:  ·  Problem: PAF (paroxysmal atrial fibrillation).  Plan: Ventricular rate controlled.  Continue apixaban and metoprolol.      Problem/Plan - 5:  ·  Problem: HTN (hypertension).  Plan: Continue hydralazine and metoprolol.      Problem/Plan - 6:  Problem: COPD (chronic obstructive pulmonary disease). Plan: Continue oxygen support.  Continue bronchodilator. Use spacer with inhalers.  May need to add budesonide.  Continue steroids  CT and CXR noted above.  Monitor oxygenation.     Problem/Plan - 7:  ·  Problem: Pleural effusion.  Plan: Bilateral pleural effusions  L>R  CXR and CT noted.  s/p IV lasix.

## 2021-07-09 NOTE — CONSULT NOTE ADULT - PROBLEM SELECTOR RECOMMENDATION 5
2/2 to advanced dementia and Rt AKA.  High risk for skin failure.  Skin care per protocol.  Frequent positioning    Comfort measures only.

## 2021-07-09 NOTE — CONSULT NOTE ADULT - PROBLEM SELECTOR RECOMMENDATION 4
2/2 to advanced dementia.  Pt refusing eat.  Albumin 2.5. High risk for skin failure.   Pleasure feeds as tolerated.      No feeding tube.      Nutrition following

## 2021-07-09 NOTE — PROGRESS NOTE ADULT - ASSESSMENT
Patient is a 76y Female whom was sent to ED from the nursing home  to the hospital with hypotension and confusion.  Has H/O PVD s/p RT AKA, COPD, HTN, CABG x1. Blood pressure in the nursing home was 62/32.  In ED noted to have severe hypotension, confused  work-up revealed acute kidnye injury SCR 6 ( normal SCR at baseline).  Also with severe mixed gap and non-gap metabolic acidosis.  s/p isotonic fluid bolus and later started on NAHCO3 gtt as well as vasopressors and broad spectrum antibiotics  CT abdomen revealed acute cholecystitis, pancreatic head mass. No hydronephrosis  s/p cholecystotomy  hypernatremia    # VANESSA sec to established ATN from  septic shock ( septic shock now resolved)  SCR downtrending  cont ABX  #hypokalemia repleted  # hypernatremia  # pleura leffusion  # pancreatic mass and RT renal complex cyst    recs:  suggest lasix 20mg daily as well as low dose D5W as with poor oral intake.  will re-assess renal function over the weekend and consider imaging with contrast to evaluate for metastatic disease  daily BMP

## 2021-07-09 NOTE — CONSULT NOTE ADULT - PROBLEM SELECTOR RECOMMENDATION 9
Pt is AOX0.  Non verbal.  Dependent.  FAST 7d.  No behavior issues currently.  Pt is hospice appropriate.  Discussed hospice philosophy and locations of care.      Pt is DNR/DNI/no feeding tube/no IVF/comfort measures only/no labs/continue abx    Pt is for LTC with hospice.  SW referral made.

## 2021-07-09 NOTE — PROGRESS NOTE ADULT - SUBJECTIVE AND OBJECTIVE BOX
Time of Visit:  Patient seen and examined.     MEDICATIONS  (STANDING):  ALBUTerol    90 MICROgram(s) HFA Inhaler 1 Puff(s) Inhalation every 4 hours  apixaban 5 milliGRAM(s) Oral every 12 hours  cefepime   IVPB      cefepime   IVPB 1000 milliGRAM(s) IV Intermittent every 24 hours  dextrose 40% Gel 15 Gram(s) Oral once  dextrose 5%. 1000 milliLiter(s) (100 mL/Hr) IV Continuous <Continuous>  dextrose 5%. 1000 milliLiter(s) (50 mL/Hr) IV Continuous <Continuous>  dextrose 5%. 1000 milliLiter(s) (50 mL/Hr) IV Continuous <Continuous>  dextrose 50% Injectable 25 Gram(s) IV Push once  dextrose 50% Injectable 12.5 Gram(s) IV Push once  dextrose 50% Injectable 25 Gram(s) IV Push once  ferrous    sulfate 325 milliGRAM(s) Oral daily  glucagon  Injectable 1 milliGRAM(s) IntraMuscular once  hydrALAZINE 25 milliGRAM(s) Oral four times a day  insulin glargine Injectable (LANTUS) 6 Unit(s) SubCutaneous every morning  insulin lispro (ADMELOG) corrective regimen sliding scale   SubCutaneous three times a day before meals  insulin lispro (ADMELOG) corrective regimen sliding scale   SubCutaneous at bedtime  metoprolol tartrate 50 milliGRAM(s) Oral two times a day  metroNIDAZOLE  IVPB      metroNIDAZOLE  IVPB 500 milliGRAM(s) IV Intermittent every 8 hours  pantoprazole  Injectable 40 milliGRAM(s) IV Push at bedtime  predniSONE   Tablet 40 milliGRAM(s) Oral daily  senna 2 Tablet(s) Oral at bedtime      MEDICATIONS  (PRN):  acetaminophen   Tablet .. 650 milliGRAM(s) Oral every 6 hours PRN Temp greater or equal to 38C (100.4F), Mild Pain (1 - 3)  ALBUTerol    90 MICROgram(s) HFA Inhaler 2 Puff(s) Inhalation every 6 hours PRN Wheezing  sodium chloride 0.9% lock flush 10 milliLiter(s) IV Push every 1 hour PRN Pre/post blood products, medications, blood draw, and to maintain line patency       Medications up to date at time of exam.    ROS; No fever, chills, cough, congestion on exam.   PHYSICAL EXAMINATION:  Vital Signs Last 24 Hrs  T(C): 36.2 (09 Jul 2021 11:29), Max: 36.5 (09 Jul 2021 00:00)  T(F): 97.1 (09 Jul 2021 11:29), Max: 97.7 (09 Jul 2021 00:00)  HR: 87 (09 Jul 2021 11:29) (85 - 102)  BP: 178/87 (09 Jul 2021 11:29) (155/66 - 184/92)  BP(mean): --  RR: 16 (09 Jul 2021 11:29) (16 - 20)  SpO2: 96% (09 Jul 2021 11:29) (96% - 99%)   (if applicable)    General: Non verbal on exam  . No acute distress.     HEENT: Head is normocephalic and atraumatic. No nasal tenderness . Has eye tracking . Moist mucosa .     NECK: Supple, no palpable adenopathy.    LUNGS: Clear to auscultation bilaterally with no wheezing, rales. No use of accessory muscle.     HEART: S1 S2 Regular rate and no click/rub .    ABDOMEN: Soft  and nondistended. Active bowel sounds.     NEUROLOGIC: Forgetful  .  Non verbal on exam.     SKIN: Warm and moist. Non diaphoretic.       LABS:                        10.4   13.98 )-----------( 211      ( 09 Jul 2021 07:16 )             31.5     07-09    149<H>  |  114<H>  |  57<H>  ----------------------------<  242<H>  3.3<L>   |  20<L>  |  1.73<H>    Ca    8.4      09 Jul 2021 07:16  Phos  2.5     07-09  Mg     1.7     07-09    TPro  6.4  /  Alb  2.5<L>  /  TBili  1.9<H>  /  DBili  x   /  AST  22  /  ALT  16  /  AlkPhos  83  07-09    MICROBIOLOGY: (if applicable)    RADIOLOGY & ADDITIONAL STUDIES:  EKG:   CXR:  ECHO:    IMPRESSION: 76y Female PAST MEDICAL & SURGICAL HISTORY:  Myocardial infarct, old    Hypercholesterolemia    Peripheral vascular disease    COPD (chronic obstructive pulmonary disease)    Bipolar 1 disorder    HTN (hypertension)    Bedbound    S/P CABG x 1    Above knee amputation of right lower extremity     Impression; 77 Y/O Female from [facility] presented to ED with unresponsiveness and Hypotension. Was admitted to ICU for Septic Shock and received  vasopressor. Source of infection likely due to Acute Cholecystitis as found on CT chest with bilateral infiltrates/ consolidations. Patient underwent on 06-29-21 IR guided Cholecystectomy with drainage Bile. Patient gradually improved Hypotension . 06-28-21 Negative for Covid 19 PCR and Negative Blood Cx x 2.  07-03-21 CXR with Bilateral Pleural Effusions , Left > Rt basilar airspace consolidation and patient received Lasix .  Has COPD and on Oxygen dependent.       Suggestion:  O2 saturation 92% room air with O2 supplement 2L NC with O2 saturation 96% . Continue Oxygen supplementation 2L NC to keep O2 saturation >90%.   Continue Albuterol 1 Puff but Q 6 Hours.  Discontinue PRN Albuterol 2 puffs Q 6 Hours.   Pulmonary oral hygiene care especially every after oral inhaler used.   Continue Prednisone 40 mg oral Daily x 3 Days then 30 mg oral daily x 3 Days then 20 mg oral Daily x 3 days  .   Aspiration precautions especially during meal times.   DVT/ GI prophylactic.     Time of Visit:  Patient seen and examined.     MEDICATIONS  (STANDING):  ALBUTerol    90 MICROgram(s) HFA Inhaler 1 Puff(s) Inhalation every 4 hours  apixaban 5 milliGRAM(s) Oral every 12 hours  cefepime   IVPB      cefepime   IVPB 1000 milliGRAM(s) IV Intermittent every 24 hours  dextrose 40% Gel 15 Gram(s) Oral once  dextrose 5%. 1000 milliLiter(s) (100 mL/Hr) IV Continuous <Continuous>  dextrose 5%. 1000 milliLiter(s) (50 mL/Hr) IV Continuous <Continuous>  dextrose 5%. 1000 milliLiter(s) (50 mL/Hr) IV Continuous <Continuous>  dextrose 50% Injectable 25 Gram(s) IV Push once  dextrose 50% Injectable 12.5 Gram(s) IV Push once  dextrose 50% Injectable 25 Gram(s) IV Push once  ferrous    sulfate 325 milliGRAM(s) Oral daily  glucagon  Injectable 1 milliGRAM(s) IntraMuscular once  hydrALAZINE 25 milliGRAM(s) Oral four times a day  insulin glargine Injectable (LANTUS) 6 Unit(s) SubCutaneous every morning  insulin lispro (ADMELOG) corrective regimen sliding scale   SubCutaneous three times a day before meals  insulin lispro (ADMELOG) corrective regimen sliding scale   SubCutaneous at bedtime  metoprolol tartrate 50 milliGRAM(s) Oral two times a day  metroNIDAZOLE  IVPB      metroNIDAZOLE  IVPB 500 milliGRAM(s) IV Intermittent every 8 hours  pantoprazole  Injectable 40 milliGRAM(s) IV Push at bedtime  predniSONE   Tablet 40 milliGRAM(s) Oral daily  senna 2 Tablet(s) Oral at bedtime      MEDICATIONS  (PRN):  acetaminophen   Tablet .. 650 milliGRAM(s) Oral every 6 hours PRN Temp greater or equal to 38C (100.4F), Mild Pain (1 - 3)  ALBUTerol    90 MICROgram(s) HFA Inhaler 2 Puff(s) Inhalation every 6 hours PRN Wheezing  sodium chloride 0.9% lock flush 10 milliLiter(s) IV Push every 1 hour PRN Pre/post blood products, medications, blood draw, and to maintain line patency       Medications up to date at time of exam.    ROS; No fever, chills, cough, congestion on exam.   PHYSICAL EXAMINATION:  Vital Signs Last 24 Hrs  T(C): 36.2 (09 Jul 2021 11:29), Max: 36.5 (09 Jul 2021 00:00)  T(F): 97.1 (09 Jul 2021 11:29), Max: 97.7 (09 Jul 2021 00:00)  HR: 87 (09 Jul 2021 11:29) (85 - 102)  BP: 178/87 (09 Jul 2021 11:29) (155/66 - 184/92)  BP(mean): --  RR: 16 (09 Jul 2021 11:29) (16 - 20)  SpO2: 96% (09 Jul 2021 11:29) (96% - 99%)   (if applicable)    General: Non verbal on exam  . No acute distress.     HEENT: Head is normocephalic and atraumatic. No nasal tenderness . Has eye tracking . Moist mucosa .     NECK: Supple, no palpable adenopathy.    LUNGS: Clear to auscultation bilaterally with no wheezing, rales. No use of accessory muscle.     HEART: S1 S2 Regular rate and no click/rub .    ABDOMEN: Soft  and nondistended. Active bowel sounds.     NEUROLOGIC: Forgetful  .  Non verbal on exam.     SKIN: Warm and moist. Non diaphoretic.       LABS:                        10.4   13.98 )-----------( 211      ( 09 Jul 2021 07:16 )             31.5     07-09    149<H>  |  114<H>  |  57<H>  ----------------------------<  242<H>  3.3<L>   |  20<L>  |  1.73<H>    Ca    8.4      09 Jul 2021 07:16  Phos  2.5     07-09  Mg     1.7     07-09    TPro  6.4  /  Alb  2.5<L>  /  TBili  1.9<H>  /  DBili  x   /  AST  22  /  ALT  16  /  AlkPhos  83  07-09    MICROBIOLOGY: (if applicable)    RADIOLOGY & ADDITIONAL STUDIES:  EKG:   CXR:  ECHO:    IMPRESSION: 76y Female PAST MEDICAL & SURGICAL HISTORY:  Myocardial infarct, old    Hypercholesterolemia    Peripheral vascular disease    COPD (chronic obstructive pulmonary disease)    Bipolar 1 disorder    HTN (hypertension)    Bedbound    S/P CABG x 1    Above knee amputation of right lower extremity     Impression; 75 Y/O Female from [facility] presented to ED with unresponsiveness and Hypotension. Was admitted to ICU for Septic Shock and received  vasopressor. Source of infection likely due to Acute Cholecystitis as found on CT chest with bilateral infiltrates/ consolidations. Patient underwent on 06-29-21 IR guided Cholecystectomy with drainage Bile. Patient gradually improved Hypotension . 06-28-21 Negative for Covid 19 PCR and Negative Blood Cx x 2.  07-03-21 CXR with Bilateral Pleural Effusions , Left > Rt basilar airspace consolidation and patient received Lasix .  Has COPD and on Oxygen dependent.       Suggestion:  O2 saturation 92% room air with O2 supplement 2L NC with O2 saturation 96% . Continue Oxygen supplementation 2L NC to keep O2 saturation >90%.   Continue Albuterol 1 Puff but Q 6 Hours.  Discontinue PRN Albuterol 2 puffs Q 6 Hours.   Pulmonary oral hygiene care especially every after oral inhaler used.   Continue Prednisone 40 mg oral Daily x 3 Days then 30 mg oral daily x 3 Days then 20 mg oral Daily x 3 days  .   Aspiration precautions especially during meal times.   metastatic work up   DVT/ GI prophylactic.        Agree with above assessment and plan as transcribed.

## 2021-07-09 NOTE — PROGRESS NOTE ADULT - PROBLEM SELECTOR PLAN 6
Continue oxygen support.  Continue bronchodilator. Use spacer with inhalers.  May need to add budesonide.  Continue steroids  CT and CXR noted above.  Monitor oxygenation.

## 2021-07-09 NOTE — PROGRESS NOTE ADULT - ASSESSMENT
76 year old female from [facility] with PMHx of PVD, right AKA (S/P fem pop bypass), PAF, COPD, bipolar disorder, hypertension and PSHx of S/P CABG x1 brought into the ED via EMS from [facility] for unresponsiveness and hypotension. Per NH supervisor Mr. Gaspar () Pt was found unresponsive today morning around 8 am, pt was fine last night, pt did not have fever, difficulty breathing, pt was only receptive to painful stimuli and on evaluation pt's blood pressure was low per papers Pt's bp was 62/38. Pt was sent to the hospital for further evaluation. Baseline mental status alert, oriented and follows instruction, non ambulatory at baseline.     ED course: Alicia was placed and pt was given 3L ivf s/p urine out put of 40cc, u/a positive for infection, CT chest showing b/l patchy infiltrates/ consolidations, concern for cholecystitis, with dilated bile duct of 1.7 cm, and concern of pancreatic head mass. Pt was given a dose of vancomycin and rocephin. Pt's blood pressure improve to 97/58, hr 109 bpm, ECG showing sinus tachy, RBBB, LAFB, Bifacicular block.     Patient admitted to the ICU for septic shock requiring vasopressors. Source of infection likely acute cholecystitis as demonstrated on CT abdomen vs UTI. Patient was started on levophed, noted with severe acidosis requiring bicarb drip and VANESSA, likely pre-renal. Patient was started on cefepime and flagyl for sepsis 2/2 cholecystitis. Lactate acidosis was started on bicarb gtt. VANESSA likely pre-renal obtained multiple boluses with low urine output. Patient gradually improved with resolution of hypotension, was able to taper off pressors, bicarb improved was able to dc bicarb gtt. Urine output gradually increased. on 6/29, patient underwent IR guided cholecystostomy, with drainage of bile. Evaluated by GI after, recommended MRCP. Unable to obtain MRCP as of 6/30 due to altered mental status suspected to be due to gabapentin toxicity in the setting of VANESSA. Unstable for MRCP, will continue monitoring mental status. Mental status improved 7/1-7/2, pt aaox2 to self and location. Seen by speech and swallow, recommends mechanical soft thin liquid diet.     7/2 pt downgrade to SCU, failed TOV, alicia reinserted   7/5. Pending  MRCP ordered. Hypernatremia worsening likely due to poor po intake, started on D5W per nephro recs  7/8- MRCP report noted with mass in the head of the pancreas most concerning for adenocarcinoma.  GI following. B/l lungs fields w/ rales on exam and LLE w/ 2+ edema.  BP remained 160- 170- hydralazine increased to QID, will give one dose of Lasix 40mg for effusion. Will request for sx and GI to comment on MRCP findings.             76 year old female from [facility] with PMHx of PVD, right AKA (S/P fem pop bypass), PAF, COPD, bipolar disorder, hypertension and PSHx of S/P CABG x1 brought into the ED via EMS from [facility] for unresponsiveness and hypotension. Per NH supervisor Mr. Gaspar () Pt was found unresponsive today morning around 8 am, pt was fine last night, pt did not have fever, difficulty breathing, pt was only receptive to painful stimuli and on evaluation pt's blood pressure was low per papers Pt's bp was 62/38. Pt was sent to the hospital for further evaluation. Baseline mental status alert, oriented and follows instruction, non ambulatory at baseline.     ED course: Alicia was placed and pt was given 3L ivf s/p urine out put of 40cc, u/a positive for infection, CT chest showing b/l patchy infiltrates/ consolidations, concern for cholecystitis, with dilated bile duct of 1.7 cm, and concern of pancreatic head mass. Pt was given a dose of vancomycin and rocephin. Pt's blood pressure improve to 97/58, hr 109 bpm, ECG showing sinus tachy, RBBB, LAFB, Bifacicular block.     Patient admitted to the ICU for septic shock requiring vasopressors. Source of infection likely acute cholecystitis as demonstrated on CT abdomen vs UTI. Patient was started on levophed, noted with severe acidosis requiring bicarb drip and VANESSA, likely pre-renal. Patient was started on cefepime and flagyl for sepsis 2/2 cholecystitis. Lactate acidosis was started on bicarb gtt. VANESSA likely pre-renal obtained multiple boluses with low urine output. Patient gradually improved with resolution of hypotension, was able to taper off pressors, bicarb improved was able to dc bicarb gtt. Urine output gradually increased. on 6/29, patient underwent IR guided cholecystostomy, with drainage of bile. Evaluated by GI after, recommended MRCP. Unable to obtain MRCP as of 6/30 due to altered mental status suspected to be due to gabapentin toxicity in the setting of VANESSA. Unstable for MRCP, will continue monitoring mental status. Mental status improved 7/1-7/2, pt aaox2 to self and location. Seen by speech and swallow, recommends mechanical soft thin liquid diet.     7/2 pt downgrade to SCU, failed TOV, alicia reinserted   7/5. Pending  MRCP ordered. Hypernatremia worsening likely due to poor po intake, started on D5W per nephro recs  7/8- MRCP report noted with mass in the head of the pancreas most concerning for adenocarcinoma.  GI following. B/l lungs fields w/ rales on exam and LLE w/ 2+ edema.  BP remained 160- 170- hydralazine increased to QID, will give one dose of Lasix 40mg for effusion. Will request for sx and GI to comment on MRCP findings.   7/9 Remains hypernatremic, Na+ 149. Continue D5W at 60ml/hr , daily Lasix 20mg IV. Surgery recs metastatic w/u with CT abd/pelvis with IV contrast. D/w Nephro Dr. Noriega. Monitor SCr , and consider CT with IV contrast on Monday if SCr improved (goal <1.5).

## 2021-07-09 NOTE — CONSULT NOTE ADULT - ATTENDING COMMENTS
Patient is s/p perc cholecystostomy today, overall improving.   Plan to continue supportive care, MRCP when stable, GI follow up
78 y F with multiple comorbidities incl advanced dementia, adm for septic shock req pressor support, VANESSA, acute cholecystitis. MRCP showed evidence of pancreatic mass, suspected malignancy. s/p cholecystostomy tube for drainage. Nonverbal, bedbound, does not appear distressed. DNR/DNI. HCP/niece requesting LTC w hospice.

## 2021-07-09 NOTE — PROGRESS NOTE ADULT - PROBLEM SELECTOR PLAN 10
UTI: Continue antibiotics.  DVT and GI prophylaxis.  DNR/DNI MOLST in chart  Pt niece Silva Marquez  [phone] is her HCP, guardianship. and POA, according to her, any decision should be discussed with her, also requesting new nursing home.   GI follow up  f/u Metastatic workup UTI: Continue antibiotics.  DVT and GI prophylaxis.  DNR/DNI MOLST in chart  Pt niSilva Milton  [phone] is her HCP, guardianship. and POA, according to her, any decision should be discussed with her, also requesting new nursing home.   GI follow up  f/u Metastatic workup with CT abd/pelvis with IV contrast.  D/w Nephro. Consider on Monday if SCr improves (goal <1.5) UTI: Continue antibiotics.  DVT and GI prophylaxis.  DNR/DNI MOLST in chart  Pt Silva Sanabria  [phone] is her HCP, guardianship. and POA, according to her, any decision should be discussed with her, also requesting new nursing home.   GI follow up  f/u Metastatic workup with CT abd/pelvis with IV contrast.  D/w Nephro. Consider on Monday if SCr improves (goal <1.5)  Above discussed with Niece/HCP Silva Marquez. Silva does not want further evaluation for metastasis, no CT scan. Silva requests hospice and  not send back to [facility]. No tube feeding. MOLST revised. New form completed.  Palliative consulted.

## 2021-07-09 NOTE — CHART NOTE - NSCHARTNOTEFT_GEN_A_CORE
Received call from Palliative. Per niece/HCP request: 1)stop IVF, 2) continue antibiotics, 3) no further labs, 4) continue comfort feeds.     Yaquelin Painting NP Medicine /SCU Received call from Palliative. Per niece/HCP request: 1)stop IVF, 2) continue antibiotics, 3) no further labs, 4) continue comfort feeds.   Will discontinue IV lasix as this medication was part of plan along with D5W to help lower sodium level.     Yaquelin Painting NP Medicine /SCU

## 2021-07-09 NOTE — PROGRESS NOTE ADULT - SUBJECTIVE AND OBJECTIVE BOX
EXAM:  CT CHEST                            PROCEDURE DATE:  06/28/2021          INTERPRETATION:  CLINICAL INFORMATION: Hypotension    COMPARISON: None.    CONTRAST/COMPLICATIONS:  IV Contrast: NONE  Oral Contrast: NONE  Complications: None reported at time of study completion    PROCEDURE:  CT of the Chest, Abdomen and Pelvis was performed.  Sagittal and coronal reformats were performed.    FINDINGS:  CHEST:  LUNGS AND LARGE AIRWAYS: Patent central airways. Patchy bibasilar dependent consolidation/atelectasis right greater than left. Correlate clinically for infection. biapical scarring  PLEURA: No pleural effusion.  VESSELS: Nonaneurysmal.  HEART: Mild cardiomegaly with coronary artery calcification No pericardial effusion.  MEDIASTINUM AND CLARICE: No lymphadenopathy.  CHEST WALL AND LOWER NECK: Nonspecific left breast calcification..    ABDOMEN AND PELVIS:  LIVER: Within normal limits.  BILE DUCTS: Marked dilatation common duct up to 1.7 cm mild intrahepatic biliary dilatation. The distal common duct obstructing stone and/or lesion is considered. Correlate with MR.  GALLBLADDER: Gallstones. Markedly distended gallbladder with pericholecystic fluid concerning for cholecystitis. Correlate with right upper quadrant ultrasound and/or HIDA.  SPLEEN: Within normal limits.  PANCREAS: Not well evaluated on this noncontrast study. However, there is diffuse atrophy of the body and tail with dilatation of the main pancreatic duct. Increased soft tissue density in the pancreatic head and uncinate process suspicious for a mass. Recommend  MRI  ADRENALS: Within normal limits.  KIDNEYS/URETERS: Punctate nonobstructive right renal calculus    BLADDER: Collapsed around a Ramesh.  REPRODUCTIVE ORGANS: No gynecologic mass    BOWEL: No bowel obstruction. Appendix no appendicitis  PERITONEUM: No ascites.  VESSELS: Nonaneurysmal  RETROPERITONEUM/LYMPH NODES: No lymphadenopathy.  ABDOMINAL WALL: Within normal limits.  BONES: No aggressive lesions    IMPRESSION:  Limited by lack of any exogenous oral or intravenous contrast  Patchy bibasilar dependent consolidation/atelectasis. Correlate clinically for infection.  Cholelithiasis, pericholecystic fluid concerning for cholecystitis. Correlate with ultrasound and/or HIDA scan.  Significant biliary dilatation, possible mass pancreatic head. Correlate with MRCP/abdominal MR with pancreatic protocol              DOUG SIRVASTAVA MD; Attending Radiologist  This document has been electronically signed. Jun 28 2021 12:29PM

## 2021-07-10 NOTE — PROGRESS NOTE ADULT - PROBLEM SELECTOR PLAN 1
Likely secondary from Infection vs Cholecystitis vs electrolyte imbalance.  Head CT shows no acute neurologic pathology.  MRCP w/ pancreatic mass concerning for adenocarcinoma  Metastatic workup with CT abd/pelvis with IV contrast (will re-eval  in setting of VANESSA .    Amylase and lipase elevated.  Ca 19-9 5693  - GI following  Appreciate Surgery input  Resume diet.   Family has elected for comfort care. MEWS suspended.

## 2021-07-10 NOTE — PROGRESS NOTE ADULT - SUBJECTIVE AND OBJECTIVE BOX
MERARY MEYER    SCU progress note    INTERVAL HPI/OVERNIGHT EVENTS: ***B/P remains elevated. Will increase amlodipine to 5mg daily.    DNR [x ]   DNI  [x  ]    Covid - 19 PCR: Negative 6/28    The 4Ms    What Matters Most: see [facility]  Age appropriate Medications/Screen for High Risk Medication: Yes  Mentation: see CAM below  Mobility: defer to physical exam    The Confusion Assessment Method (CAM) Diagnostic Algorithm     1: Acute Onset or Fluctuating Course  - Is there evidence of an acute change in mental status from the patient’s baseline? Did the (abnormal) behavior  fluctuate during the day, that is, tend to come and go, or increase and decrease in severity?  [ ] YES [x ] NO     2: Inattention  - Did the patient have difficulty focusing attention, being easily distractible, or having difficulty keeping track of what was being said?  [ ] YES [ ] NO   Unable to access     3: Disorganized thinking  -Was the patient’s thinking disorganized or incoherent, such as rambling or irrelevant conversation, unclear or illogical flow of ideas, or unpredictable switching from subject to subject?  [ ] YES [ ] NO   Unable to access    4: Altered Level of consciousness?  [ ] YES [ ] NO    The diagnosis of delirium by CAM requires the presence of features 1 and 2 and either 3 or 4.    PRESSORS: [ ] YES [x ] NO  cefepime   IVPB      cefepime   IVPB 1000 milliGRAM(s) IV Intermittent every 24 hours  metroNIDAZOLE  IVPB      metroNIDAZOLE  IVPB 500 milliGRAM(s) IV Intermittent every 8 hours    Cardiovascular:  Heart Failure  Acute   Acute on Chronic  Chronic       amLODIPine   Tablet 2.5 milliGRAM(s) Oral daily  hydrALAZINE 25 milliGRAM(s) Oral four times a day  metoprolol tartrate 50 milliGRAM(s) Oral two times a day    Pulmonary:  ALBUTerol    90 MICROgram(s) HFA Inhaler 1 Puff(s) Inhalation every 4 hours  ALBUTerol    90 MICROgram(s) HFA Inhaler 2 Puff(s) Inhalation every 6 hours PRN    Hematalogic:  apixaban 5 milliGRAM(s) Oral every 12 hours    Other:  acetaminophen   Tablet .. 650 milliGRAM(s) Oral every 6 hours PRN  dextrose 40% Gel 15 Gram(s) Oral once  dextrose 5%. 1000 milliLiter(s) IV Continuous <Continuous>  dextrose 5%. 1000 milliLiter(s) IV Continuous <Continuous>  dextrose 50% Injectable 25 Gram(s) IV Push once  dextrose 50% Injectable 25 Gram(s) IV Push once  dextrose 50% Injectable 12.5 Gram(s) IV Push once  ferrous    sulfate 325 milliGRAM(s) Oral daily  glucagon  Injectable 1 milliGRAM(s) IntraMuscular once  insulin glargine Injectable (LANTUS) 6 Unit(s) SubCutaneous every morning  insulin lispro (ADMELOG) corrective regimen sliding scale   SubCutaneous three times a day before meals  insulin lispro (ADMELOG) corrective regimen sliding scale   SubCutaneous at bedtime  pantoprazole  Injectable 40 milliGRAM(s) IV Push at bedtime  predniSONE   Tablet 40 milliGRAM(s) Oral daily  senna 2 Tablet(s) Oral at bedtime  sodium chloride 0.9% lock flush 10 milliLiter(s) IV Push every 1 hour PRN    acetaminophen   Tablet .. 650 milliGRAM(s) Oral every 6 hours PRN  ALBUTerol    90 MICROgram(s) HFA Inhaler 1 Puff(s) Inhalation every 4 hours  ALBUTerol    90 MICROgram(s) HFA Inhaler 2 Puff(s) Inhalation every 6 hours PRN  amLODIPine   Tablet 2.5 milliGRAM(s) Oral daily  apixaban 5 milliGRAM(s) Oral every 12 hours  cefepime   IVPB      cefepime   IVPB 1000 milliGRAM(s) IV Intermittent every 24 hours  dextrose 40% Gel 15 Gram(s) Oral once  dextrose 5%. 1000 milliLiter(s) IV Continuous <Continuous>  dextrose 5%. 1000 milliLiter(s) IV Continuous <Continuous>  dextrose 50% Injectable 25 Gram(s) IV Push once  dextrose 50% Injectable 25 Gram(s) IV Push once  dextrose 50% Injectable 12.5 Gram(s) IV Push once  ferrous    sulfate 325 milliGRAM(s) Oral daily  glucagon  Injectable 1 milliGRAM(s) IntraMuscular once  hydrALAZINE 25 milliGRAM(s) Oral four times a day  insulin glargine Injectable (LANTUS) 6 Unit(s) SubCutaneous every morning  insulin lispro (ADMELOG) corrective regimen sliding scale   SubCutaneous three times a day before meals  insulin lispro (ADMELOG) corrective regimen sliding scale   SubCutaneous at bedtime  metoprolol tartrate 50 milliGRAM(s) Oral two times a day  metroNIDAZOLE  IVPB      metroNIDAZOLE  IVPB 500 milliGRAM(s) IV Intermittent every 8 hours  pantoprazole  Injectable 40 milliGRAM(s) IV Push at bedtime  predniSONE   Tablet 40 milliGRAM(s) Oral daily  senna 2 Tablet(s) Oral at bedtime  sodium chloride 0.9% lock flush 10 milliLiter(s) IV Push every 1 hour PRN    Drug Dosing Weight  Height (cm): 170.2 (28 Jun 2021 10:15)  Weight (kg): 66.9 (28 Jun 2021 16:29)  BMI (kg/m2): 23.1 (28 Jun 2021 16:29)  BSA (m2): 1.78 (28 Jun 2021 16:29)    CENTRAL LINE: [ ] YES [x ] NO  LOCATION:   DATE INSERTED:  REMOVE: [ ] YES [ ] NO  EXPLAIN:    BIANCHI: [x ] YES [ ] NO    DATE INSERTED:  REMOVE:  [ ] YES [x ] NO  EXPLAIN:  Chronic    PAST MEDICAL & SURGICAL HISTORY:  Myocardial infarct, old    Hypercholesterolemia    Peripheral vascular disease    COPD (chronic obstructive pulmonary disease)    Bipolar 1 disorder    HTN (hypertension)    Bedbound    S/P CABG x 1    Above knee amputation of right lower extremity                07-09 @ 07:01  -  07-10 @ 07:00  --------------------------------------------------------  IN: 0 mL / OUT: 55 mL / NET: -55 mL            PHYSICAL EXAM:    GENERAL: NAD, well-groomed, well-developed  HEAD:  Atraumatic, Normocephalic  EYES: EOMI, PERRLA, conjunctiva and sclera clear  ENMT: No tonsillar erythema, exudates  NECK: Supple, No JVD  NERVOUS SYSTEM:  Awake and alert. Mostly nonverbal. Not orientated. Does not follow commands.  CHEST/LUNG: Diminished breath sounds bilateral bases  HEART: Regular rate and rhythm; No murmurs, rubs, or gallops  ABDOMEN: Soft, Nontender, Nondistended; Bowel sounds present, right drain draining dark fluid  EXTREMITIES: Right AKA.  2+ Peripheral Pulses, No clubbing, cyanosis, or edema  LYMPH: No lymphadenopathy noted  SKIN: Stage 1 coccyx      LABS:  CBC Full  -  ( 09 Jul 2021 07:16 )  WBC Count : 13.98 K/uL  RBC Count : 3.48 M/uL  Hemoglobin : 10.4 g/dL  Hematocrit : 31.5 %  Platelet Count - Automated : 211 K/uL  Mean Cell Volume : 90.5 fl  Mean Cell Hemoglobin : 29.9 pg  Mean Cell Hemoglobin Concentration : 33.0 gm/dL  Auto Neutrophil # : x  Auto Lymphocyte # : x  Auto Monocyte # : x  Auto Eosinophil # : x  Auto Basophil # : x  Auto Neutrophil % : x  Auto Lymphocyte % : x  Auto Monocyte % : x  Auto Eosinophil % : x  Auto Basophil % : x    07-09    149<H>  |  114<H>  |  57<H>  ----------------------------<  242<H>  3.3<L>   |  20<L>  |  1.73<H>    Ca    8.4      09 Jul 2021 07:16  Phos  2.5     07-09  Mg     1.7     07-09    TPro  6.4  /  Alb  2.5<L>  /  TBili  1.9<H>  /  DBili  x   /  AST  22  /  ALT  16  /  AlkPhos  83  07-09              [  ]  DVT Prophylaxis  [  ]  Nutrition, Brand, Rate         Goal Rate        Abnormal Nutritional Status -  Malnutrition   Cachexia      Morbid Obesity BMI >/=40    RADIOLOGY & ADDITIONAL STUDIES:  ***  < from: CT Head No Cont (07.08.21 @ 19:30) >  The ventricles and sulci are prominent, compatible with age-related generalized cerebral volume loss.   There is no CT evidence for acute cerebral cortical infarct. There is no evidence of hemorrhage. There is periventricular and subcortical white matter hypoattenuation,  most compatible with chronic microvascular ischemic changes.   No mass effect is found in the brain.  There is no midlineshift or herniation pattern.      The visualized portions of the paranasal sinuses and mastoid air cells are clear.    IMPRESSION:    No evidence of acute intracranial abnormality.  No evidence of hemorrhage.    Chronic changes as above.    < end of copied text >  < from: MR MRCP No Cont (07.06.21 @ 20:34) >  Visualized lower chest: Mild bilateral pleural effusions. Consolidation/atelectasis of the adjacent pulmonary parenchyma.    Liver: No mass. The liver is diffusely dark on T2-weighted images. In addition, there is signal attenuation on in-phase T1-weighted gradient echo images. Findings are suggestive of hemachromatosis or hemosiderosis.  Gallbladder and bile ducts: A few small gallstones. The gallbladder wall is thickened measuring up to 5 mm. Mild biliary ductal dilatation, measuring 9 mm. No evidence for choledocholithiasis.  Pancreas: Mass in the head of the pancreas measuring 3.0 x 2.2 cm. The mass is best seen on series 4, image 17, and 18. The remaining pancreas is atrophic. The main pancreatic duct is dilated upstream to the pancreatic mass.  Spleen: Unremarkable. No splenomegaly.  Adrenal glands: Unremarkable. No mass.  Kidneys and ureters: Small brightly T2 hyperintense lesions in the kidneys bilaterally, representing probable cysts. One of the right renalcysts measures 1.7 cm (image 14 series 3) appears to have a small T2 hypointense component. No hydronephrosis.  Stomach and bowel: Visualized stomach and intestines are unremarkable.  Intraperitoneal space: No free fluid.  Arteries: No abdominal aortic aneurysm.  Bones/joints:  Unremarkable.  Soft tissues: Mild body wall edema.    IMPRESSION:  Mass in the head of the pancreas is most concerning for adenocarcinoma. Endoscopic ultrasound may be pursued for further evaluation. Its relationship with its adjacent vascular structures is difficult to evaluate without IV contrast. Dilated common bile duct and main pancreatic duct, likely due to obstruction by the pancreatic head mass.    Cholelithiasis with thickened gallbladder wall may represent acute cholecystitis. If clinically indicated, HIDA scan may be pursued for further evaluation.    1.7 cm right renal cyst as described above may be complex. Abdominal MR without and with IV contrast may be pursued to rule out malignant renal neoplasm.    Other findings as above.    < end of copied text >    Goals of Care Discussion with Family/Proxy/Other   - see note from 7/09

## 2021-07-10 NOTE — PROGRESS NOTE ADULT - SUBJECTIVE AND OBJECTIVE BOX
Patient is a 76y Female with   HIGH SERUM  SODIUM     IS AWAKE-  APHASIC   DOESNT APPEAR IN  ANY  ACUTE DISTRESS   NO ACUTE EVENTS OVERNIGHT    influenza virus vaccine, live, trivalent (Unknown)  PC Pen VK (Rash)  penicillin (Other; Rash)  statins (Other)  sulfa drugs (Other)  sulfamethizole (Other)    Hospital Medications:   MEDICATIONS  (STANDING):  ALBUTerol    90 MICROgram(s) HFA Inhaler 1 Puff(s) Inhalation every 4 hours  amLODIPine   Tablet 2.5 milliGRAM(s) Oral daily  apixaban 5 milliGRAM(s) Oral every 12 hours  cefepime   IVPB      cefepime   IVPB 1000 milliGRAM(s) IV Intermittent every 24 hours  dextrose 40% Gel 15 Gram(s) Oral once  dextrose 5%. 1000 milliLiter(s) (50 mL/Hr) IV Continuous <Continuous>  dextrose 5%. 1000 milliLiter(s) (100 mL/Hr) IV Continuous <Continuous>  dextrose 50% Injectable 25 Gram(s) IV Push once  dextrose 50% Injectable 25 Gram(s) IV Push once  dextrose 50% Injectable 12.5 Gram(s) IV Push once  ferrous    sulfate 325 milliGRAM(s) Oral daily  glucagon  Injectable 1 milliGRAM(s) IntraMuscular once  hydrALAZINE 25 milliGRAM(s) Oral four times a day  insulin glargine Injectable (LANTUS) 6 Unit(s) SubCutaneous every morning  insulin lispro (ADMELOG) corrective regimen sliding scale   SubCutaneous three times a day before meals  insulin lispro (ADMELOG) corrective regimen sliding scale   SubCutaneous at bedtime  metoprolol tartrate 50 milliGRAM(s) Oral two times a day  metroNIDAZOLE  IVPB      metroNIDAZOLE  IVPB 500 milliGRAM(s) IV Intermittent every 8 hours  pantoprazole  Injectable 40 milliGRAM(s) IV Push at bedtime  predniSONE   Tablet 40 milliGRAM(s) Oral daily  senna 2 Tablet(s) Oral at bedtime    REVIEW OF SYSTEMS:  COULD NOT BE PERFORMED  PT APHASIC    VITALS:  T(F): 97.6 (07-10-21 @ 05:30), Max: 98.8 (07-09-21 @ 20:00)  HR: 99 (07-10-21 @ 05:30)  BP: 183/88 (07-10-21 @ 05:30)  RR: 20 (07-10-21 @ 05:30)  SpO2: 99% (07-10-21 @ 05:30)  Wt(kg): --    07-09 @ 07:01  -  07-10 @ 07:00  --------------------------------------------------------  IN: 0 mL / OUT: 55 mL / NET: -55 mL        PHYSICAL EXAM:  Constitutional: NAD  HEENT: CONJ PINK  Neck: No JVD  Respiratory: CTAB, no wheezes, rales or rhonchi  Cardiovascular: S1, S2, RRR  Gastrointestinal: BS+, soft, NT/ND  Extremities:  No peripheral edema L   R- AKA  Neurological: PT ALERT,  NOT ANSWERING QUESTIONS  :  No alicia.         LABS:  07-09    149<H>  |  114<H>  |  57<H>  ----------------------------<  242<H>  3.3<L>   |  20<L>  |  1.73<H>    Ca    8.4      09 Jul 2021 07:16  Phos  2.5     07-09  Mg     1.7     07-09    TPro  6.4  /  Alb  2.5<L>  /  TBili  1.9<H>  /  DBili      /  AST  22  /  ALT  16  /  AlkPhos  83  07-09    Creatinine Trend: 1.73 <--, 1.95 <--, 2.15 <--, 2.43 <--, 2.65 <--, 2.94 <--                        10.4   13.98 )-----------( 211      ( 09 Jul 2021 07:16 )             31.5     Urine Studies:      RADIOLOGY & ADDITIONAL STUDIES:

## 2021-07-10 NOTE — PROGRESS NOTE ADULT - PROBLEM SELECTOR PLAN 9
MEWS suspended.  No further lab work.  No IVF. Continue antibiotics.  See [facility] 7/9  HCP requesting Hospice. Does not want patient to return to [facility]. CM and SW aware.

## 2021-07-10 NOTE — PROGRESS NOTE ADULT - ASSESSMENT
seen and examined vsstable  afebrile physical done unchanged    awake aphasic   no new labs   pancreatic mass ,  severe pad, cad cabg , bka, poor prognosis  dnr dni comfort care

## 2021-07-10 NOTE — PROGRESS NOTE ADULT - ATTENDING COMMENTS
Problem/Plan - 1:  ·  Problem: Acute encephalopathy.  Plan: Likely secondary from Infection vs Cholecystitis vs electrolyte imbalance.  Head CT shows no acute neurologic pathology.  MRCP w/ pancreatic mass concerning for adenocarcinoma  Metastatic workup with CT abd/pelvis with IV contrast (will re-eval  in setting of VANESSA .    Amylase and lipase elevated.  Ca 19-9 5693  - GI following  Appreciate Surgery input  Resume diet.   Family has elected for comfort care. MEWS suspended.      Problem/Plan - 2:  ·  Problem: Acute cholecystitis.  Plan: CT with acute cholecystitis with common duct up to 1.7 cm mild intrahepatic biliary dilatation. The distal common duct obstructing stone and lesion  S/P Cholecystectomy drain, no growth on culture  BCx negative  Continue antibiotics  MRCP- showed pancreatic mass   GI Dr Pinon following.   Family wants no further work up.      Problem/Plan - 3:  ·  Problem: Pancreatic mass.  Plan: CT as above  choley tube in place   MRCP report above  Metastatic workup .   CT abd/pelvis with IV contrast:  D/w Nephro. Consider on Monday if SCr improves (goal <1.5)   Appreciate Surgery input  hyperglycemic, start Lantus 6 units qam. Continue to monitor BG and cover with moderate scale Admelog.   No further work up as per HCP

## 2021-07-10 NOTE — PROGRESS NOTE ADULT - ASSESSMENT
A/P   PT  WITH HYPERNATREMIA AND LOW K    NOT GETTING IV FLUIDS   IS NOT EATING WELL AS  WELL, TRIED FEEDING HER   START D5W @ 60CC/HR  X 24 HRS  AND FOLLOW PULMONARY STATUS   NO  NEW LABS FROM  TODAY YESTERDAY  SODIUM  149  AND K 3.3,  K  WAS REPLETED YESTERDAY  HAS PANCREATIC MASS,   PT IS DNR ,  DNI   ALSO  HAS  RENAL  CYST R

## 2021-07-10 NOTE — PROGRESS NOTE ADULT - PROBLEM SELECTOR PLAN 2
CT with acute cholecystitis with common duct up to 1.7 cm mild intrahepatic biliary dilatation. The distal common duct obstructing stone and lesion  S/P Cholecystectomy drain, no growth on culture  BCx negative  Continue antibiotics  MRCP- showed pancreatic mass   GI Dr Pinon following.   Family wants no further work up.

## 2021-07-10 NOTE — PROGRESS NOTE ADULT - ASSESSMENT
76 year old female from [facility] with PMHx of PVD, right AKA (S/P fem pop bypass), PAF, COPD, bipolar disorder, hypertension and PSHx of S/P CABG x1 brought into the ED via EMS from [facility] for unresponsiveness and hypotension. Per NH supervisor Mr. Gaspar () Pt was found unresponsive today morning around 8 am, pt was fine last night, pt did not have fever, difficulty breathing, pt was only receptive to painful stimuli and on evaluation pt's blood pressure was low per papers Pt's bp was 62/38. Pt was sent to the hospital for further evaluation. Baseline mental status alert, oriented and follows instruction, non ambulatory at baseline.     ED course: Alicia was placed and pt was given 3L ivf s/p urine out put of 40cc, u/a positive for infection, CT chest showing b/l patchy infiltrates/ consolidations, concern for cholecystitis, with dilated bile duct of 1.7 cm, and concern of pancreatic head mass. Pt was given a dose of vancomycin and rocephin. Pt's blood pressure improve to 97/58, hr 109 bpm, ECG showing sinus tachy, RBBB, LAFB, Bifacicular block.     Patient admitted to the ICU for septic shock requiring vasopressors. Source of infection likely acute cholecystitis as demonstrated on CT abdomen vs UTI. Patient was started on levophed, noted with severe acidosis requiring bicarb drip and VANESSA, likely pre-renal. Patient was started on cefepime and flagyl for sepsis 2/2 cholecystitis. Lactate acidosis was started on bicarb gtt. VANESSA likely pre-renal obtained multiple boluses with low urine output. Patient gradually improved with resolution of hypotension, was able to taper off pressors, bicarb improved was able to dc bicarb gtt. Urine output gradually increased. on 6/29, patient underwent IR guided cholecystostomy, with drainage of bile. Evaluated by GI after, recommended MRCP. Unable to obtain MRCP as of 6/30 due to altered mental status suspected to be due to gabapentin toxicity in the setting of VANESSA. Unstable for MRCP, will continue monitoring mental status. Mental status improved 7/1-7/2, pt aaox2 to self and location. Seen by speech and swallow, recommends mechanical soft thin liquid diet.     7/2 pt downgrade to SCU, failed TOV, alicia reinserted   7/5. Pending  MRCP ordered. Hypernatremia worsening likely due to poor po intake, started on D5W per nephro recs  7/8- MRCP report noted with mass in the head of the pancreas most concerning for adenocarcinoma.  GI following. B/l lungs fields w/ rales on exam and LLE w/ 2+ edema.  BP remained 160- 170- hydralazine increased to QID, will give one dose of Lasix 40mg for effusion. Will request for sx and GI to comment on MRCP findings.   7/9 Remains hypernatremic, Na+ 149. Continue D5W at 60ml/hr , daily Lasix 20mg IV. Surgery recs metastatic w/u with CT abd/pelvis with IV contrast. D/w Nephro Dr. Noriega. Monitor SCr , and consider CT with IV contrast on Monday if SCr improved (goal <1.5).   7/9 Family has decided on comfort care. No more labs.

## 2021-07-10 NOTE — PROGRESS NOTE ADULT - PROBLEM SELECTOR PLAN 10
UTI: Continue antibiotics.  DVT and GI prophylaxis.  DNR/DNI MOLST in chart  MEWS suspended.  Continue antibiotics  Comfort Care only.  Overall prognosis is extremely poor.  HCP requesting hospice.

## 2021-07-10 NOTE — PROGRESS NOTE ADULT - SUBJECTIVE AND OBJECTIVE BOX
HPI:  76 year old female from [facility] with PMHx of PVD, right AKA (S/P fem pop bypass), PAF, COPD, bipolar disorder, hypertension and PSHx of S/P CABG x1 brought into the ED via EMS from [facility] for unresponsiveness and hypotension. Per NH supervisor Mr. Gaspar () Pt was found unresponsive today morning around 8 am, pt was fine last night, pt did not have fever, difficulty breathing, pt was only receptive to painful stimuli and on evaluation pt's blood pressure was low per papers Pt's bp was 62/38. Pt was sent to the hospital for further evaluation. Baseline mental status alert, oriented and follows instruction, non ambulatory at baseline.   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 Pt was given a dose of vancomycin and rocephine. Pt's blood pressure improve to 97/58, hr 109 bpm, ECG showing sinus tachy, RBBB, LAFB, Bifacicular block.    (28 Jun 2021 14:58)      Patient is a 76y old  Female who presents with a chief complaint of Sepsis (10 Jul 2021 08:17)      INTERVAL HPI/OVERNIGHT EVENTS:  T(C): 36.4 (07-10-21 @ 05:30), Max: 37.1 (07-09-21 @ 20:00)  HR: 99 (07-10-21 @ 05:30) (85 - 99)  BP: 183/88 (07-10-21 @ 05:30) (181/93 - 183/88)  RR: 20 (07-10-21 @ 05:30) (17 - 20)  SpO2: 99% (07-10-21 @ 05:30) (95% - 99%)  Wt(kg): --  I&O's Summary    09 Jul 2021 07:01  -  10 Jul 2021 07:00  --------------------------------------------------------  IN: 0 mL / OUT: 55 mL / NET: -55 mL        REVIEW OF SYSTEMS: denies fever, chills, SOB, palpitations, chest pain, abdominal pain, nausea, vomitting, diarrhea, constipation, dizziness    MEDICATIONS  (STANDING):  ALBUTerol    90 MICROgram(s) HFA Inhaler 1 Puff(s) Inhalation every 4 hours  amLODIPine   Tablet 2.5 milliGRAM(s) Oral daily  apixaban 5 milliGRAM(s) Oral every 12 hours  cefepime   IVPB      cefepime   IVPB 1000 milliGRAM(s) IV Intermittent every 24 hours  dextrose 40% Gel 15 Gram(s) Oral once  dextrose 5%. 1000 milliLiter(s) (50 mL/Hr) IV Continuous <Continuous>  dextrose 5%. 1000 milliLiter(s) (100 mL/Hr) IV Continuous <Continuous>  dextrose 50% Injectable 25 Gram(s) IV Push once  dextrose 50% Injectable 25 Gram(s) IV Push once  dextrose 50% Injectable 12.5 Gram(s) IV Push once  ferrous    sulfate 325 milliGRAM(s) Oral daily  glucagon  Injectable 1 milliGRAM(s) IntraMuscular once  hydrALAZINE 25 milliGRAM(s) Oral four times a day  insulin glargine Injectable (LANTUS) 6 Unit(s) SubCutaneous every morning  insulin lispro (ADMELOG) corrective regimen sliding scale   SubCutaneous three times a day before meals  insulin lispro (ADMELOG) corrective regimen sliding scale   SubCutaneous at bedtime  metoprolol tartrate 50 milliGRAM(s) Oral two times a day  metroNIDAZOLE  IVPB      metroNIDAZOLE  IVPB 500 milliGRAM(s) IV Intermittent every 8 hours  pantoprazole  Injectable 40 milliGRAM(s) IV Push at bedtime  predniSONE   Tablet 40 milliGRAM(s) Oral daily  senna 2 Tablet(s) Oral at bedtime    MEDICATIONS  (PRN):  acetaminophen   Tablet .. 650 milliGRAM(s) Oral every 6 hours PRN Temp greater or equal to 38C (100.4F), Mild Pain (1 - 3)  ALBUTerol    90 MICROgram(s) HFA Inhaler 2 Puff(s) Inhalation every 6 hours PRN Wheezing  sodium chloride 0.9% lock flush 10 milliLiter(s) IV Push every 1 hour PRN Pre/post blood products, medications, blood draw, and to maintain line patency      PHYSICAL EXAM:  GENERAL: NAD, well-groomed, well-developed  HEAD:  Atraumatic, Normocephalic  EYES: EOMI, PERRLA, conjunctiva and sclera clear  ENMT: No tonsillar erythema, exudates, or enlargement; Moist mucous membranes, Good dentition, No lesions  NECK: Supple, No JVD, Normal thyroid  NERVOUS SYSTEM:  Alert & Oriented X3, Good concentration; Motor Strength 5/5 B/L upper and lower extremities; DTRs 2+ intact and symmetric  CHEST/LUNG: Clear to percussion bilaterally; No rales, rhonchi, wheezing, or rubs  HEART: Regular rate and rhythm; No murmurs, rubs, or gallops  ABDOMEN: Soft, Nontender, Nondistended; Bowel sounds present  EXTREMITIES:  2+ Peripheral Pulses, No clubbing, cyanosis, or edema  LYMPH: No lymphadenopathy noted  SKIN: No rashes or lesions  LABS:                        10.4   13.98 )-----------( 211      ( 09 Jul 2021 07:16 )             31.5     07-09    149<H>  |  114<H>  |  57<H>  ----------------------------<  242<H>  3.3<L>   |  20<L>  |  1.73<H>    Ca    8.4      09 Jul 2021 07:16  Phos  2.5     07-09  Mg     1.7     07-09    TPro  6.4  /  Alb  2.5<L>  /  TBili  1.9<H>  /  DBili  x   /  AST  22  /  ALT  16  /  AlkPhos  83  07-09        CAPILLARY BLOOD GLUCOSE      POCT Blood Glucose.: 186 mg/dL (10 Jul 2021 11:25)  POCT Blood Glucose.: 215 mg/dL (10 Jul 2021 07:47)  POCT Blood Glucose.: 242 mg/dL (09 Jul 2021 20:58)  POCT Blood Glucose.: 220 mg/dL (09 Jul 2021 16:47)

## 2021-07-10 NOTE — PROGRESS NOTE ADULT - PROBLEM SELECTOR PLAN 3
CT as above  choley tube in place   MRCP report above  Metastatic workup .   CT abd/pelvis with IV contrast:  D/w Nephro. Consider on Monday if SCr improves (goal <1.5)   Appreciate Surgery input  hyperglycemic, start Lantus 6 units qam. Continue to monitor BG and cover with moderate scale Admelog.   No further work up as per HCp.

## 2021-07-11 NOTE — PROGRESS NOTE ADULT - ASSESSMENT
A/P  PT  WITH  HYPERNATREMIA  WAS STARTED ON   IV  FLUIDS  HAD VANESSA, CR  WAS  5  ON  ADMISSION,  DOWN TO  1.7 LAST TEST  AS  PER  FAMILY'S WISHES  NO  LABS  HAS PANCREATIC  MASS AS  WELL   IS  DNR/DNI  WILL SIGN OFF  THE CASE AS  FAMILY IS REQUESTING  COMFORT  CARE

## 2021-07-11 NOTE — PROGRESS NOTE ADULT - SUBJECTIVE AND OBJECTIVE BOX
MERARY MEYER    SCU progress note    INTERVAL HPI/OVERNIGHT EVENTS:  No acute events, appears comfortable, VS reviewed.     DNR [x ]   DNI  [x  ]- Comfort Measures    Covid - 19 PCR: Negative 6/28    The 4Ms    What Matters Most: see [facility]  Age appropriate Medications/Screen for High Risk Medication: Yes  Mentation: see CAM below  Mobility: defer to physical exam    The Confusion Assessment Method (CAM) Diagnostic Algorithm     1: Acute Onset or Fluctuating Course  - Is there evidence of an acute change in mental status from the patient’s baseline? Did the (abnormal) behavior  fluctuate during the day, that is, tend to come and go, or increase and decrease in severity?  [ ] YES [x ] NO     2: Inattention  - Did the patient have difficulty focusing attention, being easily distractible, or having difficulty keeping track of what was being said?  [ ] YES [ ] NO   Unable to access     3: Disorganized thinking  -Was the patient’s thinking disorganized or incoherent, such as rambling or irrelevant conversation, unclear or illogical flow of ideas, or unpredictable switching from subject to subject?  [ ] YES [ ] NO   Unable to access    4: Altered Level of consciousness?  [ ] YES [ ] NO    The diagnosis of delirium by CAM requires the presence of features 1 and 2 and either 3 or 4.    PRESSORS: [ ] YES [x ] NO  cefepime   IVPB      cefepime   IVPB 1000 milliGRAM(s) IV Intermittent every 24 hours  metroNIDAZOLE  IVPB      metroNIDAZOLE  IVPB 500 milliGRAM(s) IV Intermittent every 8 hours    Cardiovascular:  Heart Failure  Acute   Acute on Chronic  Chronic       amLODIPine   Tablet 2.5 milliGRAM(s) Oral daily  hydrALAZINE 25 milliGRAM(s) Oral four times a day  metoprolol tartrate 50 milliGRAM(s) Oral two times a day    Pulmonary:  ALBUTerol    90 MICROgram(s) HFA Inhaler 1 Puff(s) Inhalation every 4 hours  ALBUTerol    90 MICROgram(s) HFA Inhaler 2 Puff(s) Inhalation every 6 hours PRN    Hematalogic:  apixaban 5 milliGRAM(s) Oral every 12 hours    Other:  acetaminophen   Tablet .. 650 milliGRAM(s) Oral every 6 hours PRN  dextrose 40% Gel 15 Gram(s) Oral once  dextrose 5%. 1000 milliLiter(s) IV Continuous <Continuous>  dextrose 5%. 1000 milliLiter(s) IV Continuous <Continuous>  dextrose 50% Injectable 25 Gram(s) IV Push once  dextrose 50% Injectable 25 Gram(s) IV Push once  dextrose 50% Injectable 12.5 Gram(s) IV Push once  ferrous    sulfate 325 milliGRAM(s) Oral daily  glucagon  Injectable 1 milliGRAM(s) IntraMuscular once  insulin glargine Injectable (LANTUS) 6 Unit(s) SubCutaneous every morning  insulin lispro (ADMELOG) corrective regimen sliding scale   SubCutaneous three times a day before meals  insulin lispro (ADMELOG) corrective regimen sliding scale   SubCutaneous at bedtime  pantoprazole  Injectable 40 milliGRAM(s) IV Push at bedtime  predniSONE   Tablet 40 milliGRAM(s) Oral daily  senna 2 Tablet(s) Oral at bedtime  sodium chloride 0.9% lock flush 10 milliLiter(s) IV Push every 1 hour PRN    acetaminophen   Tablet .. 650 milliGRAM(s) Oral every 6 hours PRN  ALBUTerol    90 MICROgram(s) HFA Inhaler 1 Puff(s) Inhalation every 4 hours  ALBUTerol    90 MICROgram(s) HFA Inhaler 2 Puff(s) Inhalation every 6 hours PRN  amLODIPine   Tablet 2.5 milliGRAM(s) Oral daily  apixaban 5 milliGRAM(s) Oral every 12 hours  cefepime   IVPB      cefepime   IVPB 1000 milliGRAM(s) IV Intermittent every 24 hours  dextrose 40% Gel 15 Gram(s) Oral once  dextrose 5%. 1000 milliLiter(s) IV Continuous <Continuous>  dextrose 5%. 1000 milliLiter(s) IV Continuous <Continuous>  dextrose 50% Injectable 25 Gram(s) IV Push once  dextrose 50% Injectable 25 Gram(s) IV Push once  dextrose 50% Injectable 12.5 Gram(s) IV Push once  ferrous    sulfate 325 milliGRAM(s) Oral daily  glucagon  Injectable 1 milliGRAM(s) IntraMuscular once  hydrALAZINE 25 milliGRAM(s) Oral four times a day  insulin glargine Injectable (LANTUS) 6 Unit(s) SubCutaneous every morning  insulin lispro (ADMELOG) corrective regimen sliding scale   SubCutaneous three times a day before meals  insulin lispro (ADMELOG) corrective regimen sliding scale   SubCutaneous at bedtime  metoprolol tartrate 50 milliGRAM(s) Oral two times a day  metroNIDAZOLE  IVPB      metroNIDAZOLE  IVPB 500 milliGRAM(s) IV Intermittent every 8 hours  pantoprazole  Injectable 40 milliGRAM(s) IV Push at bedtime  predniSONE   Tablet 40 milliGRAM(s) Oral daily  senna 2 Tablet(s) Oral at bedtime  sodium chloride 0.9% lock flush 10 milliLiter(s) IV Push every 1 hour PRN    Drug Dosing Weight  Height (cm): 170.2 (28 Jun 2021 10:15)  Weight (kg): 66.9 (28 Jun 2021 16:29)  BMI (kg/m2): 23.1 (28 Jun 2021 16:29)  BSA (m2): 1.78 (28 Jun 2021 16:29)    CENTRAL LINE: [ ] YES [x ] NO  LOCATION:   DATE INSERTED:  REMOVE: [ ] YES [ ] NO  EXPLAIN:    BIANCHI: [x ] YES [ ] NO    DATE INSERTED:  REMOVE:  [ ] YES [x ] NO  EXPLAIN:  Chronic    PAST MEDICAL & SURGICAL HISTORY:  Myocardial infarct, old    Hypercholesterolemia    Peripheral vascular disease    COPD (chronic obstructive pulmonary disease)    Bipolar 1 disorder    HTN (hypertension)    Bedbound    S/P CABG x 1    Above knee amputation of right lower extremity        10 Jul 2021 07:01  -  11 Jul 2021 07:00  --------------------------------------------------------  IN: 60 mL / OUT: 70 mL / NET: -10 mL            PHYSICAL EXAM:    GENERAL: NAD, resting in bed, appears comfortable,   HEAD:  Atraumatic, Normocephalic  EYES: PERRL, conjunctiva and sclera clear  ENMT: No tonsillar erythema, exudates  NECK: Supple, No JVD  NERVOUS SYSTEM:  Awake and alert, nonverbal, Not orientated. Does not follow commands.  CHEST/LUNG: B/L lungs w/ crackles at the bases, non labored respiration   HEART: Regular rate and rhythm; No murmurs, rubs, or gallops  ABDOMEN: Soft, Nontender, Nondistended; Bowel sounds present, right biliary drain intact w/ dark bilious fluid  EXTREMITIES: Right AKA, left foot, 1+ edema, + pedal pulse,   LYMPH: No lymphadenopathy noted  SKIN: Stage 1 coccyx      LABS:  CBC Full  -  ( 09 Jul 2021 07:16 )  WBC Count : 13.98 K/uL  RBC Count : 3.48 M/uL  Hemoglobin : 10.4 g/dL  Hematocrit : 31.5 %  Platelet Count - Automated : 211 K/uL  Mean Cell Volume : 90.5 fl  Mean Cell Hemoglobin : 29.9 pg  Mean Cell Hemoglobin Concentration : 33.0 gm/dL  Auto Neutrophil # : x  Auto Lymphocyte # : x  Auto Monocyte # : x  Auto Eosinophil # : x  Auto Basophil # : x  Auto Neutrophil % : x  Auto Lymphocyte % : x  Auto Monocyte % : x  Auto Eosinophil % : x  Auto Basophil % : x    07-09    149<H>  |  114<H>  |  57<H>  ----------------------------<  242<H>  3.3<L>   |  20<L>  |  1.73<H>    Ca    8.4      09 Jul 2021 07:16  Phos  2.5     07-09  Mg     1.7     07-09    TPro  6.4  /  Alb  2.5<L>  /  TBili  1.9<H>  /  DBili  x   /  AST  22  /  ALT  16  /  AlkPhos  83  07-09              [  ]  DVT Prophylaxis  [  ]  Nutrition, Brand, Rate         Goal Rate        Abnormal Nutritional Status -  Malnutrition   Cachexia      Morbid Obesity BMI >/=40    RADIOLOGY & ADDITIONAL STUDIES:  ***  < from: CT Head No Cont (07.08.21 @ 19:30) >  The ventricles and sulci are prominent, compatible with age-related generalized cerebral volume loss.   There is no CT evidence for acute cerebral cortical infarct. There is no evidence of hemorrhage. There is periventricular and subcortical white matter hypoattenuation,  most compatible with chronic microvascular ischemic changes.   No mass effect is found in the brain.  There is no midlineshift or herniation pattern.      The visualized portions of the paranasal sinuses and mastoid air cells are clear.    IMPRESSION:    No evidence of acute intracranial abnormality.  No evidence of hemorrhage.    Chronic changes as above.    < end of copied text >  < from: MR MRCP No Cont (07.06.21 @ 20:34) >  Visualized lower chest: Mild bilateral pleural effusions. Consolidation/atelectasis of the adjacent pulmonary parenchyma.    Liver: No mass. The liver is diffusely dark on T2-weighted images. In addition, there is signal attenuation on in-phase T1-weighted gradient echo images. Findings are suggestive of hemachromatosis or hemosiderosis.  Gallbladder and bile ducts: A few small gallstones. The gallbladder wall is thickened measuring up to 5 mm. Mild biliary ductal dilatation, measuring 9 mm. No evidence for choledocholithiasis.  Pancreas: Mass in the head of the pancreas measuring 3.0 x 2.2 cm. The mass is best seen on series 4, image 17, and 18. The remaining pancreas is atrophic. The main pancreatic duct is dilated upstream to the pancreatic mass.  Spleen: Unremarkable. No splenomegaly.  Adrenal glands: Unremarkable. No mass.  Kidneys and ureters: Small brightly T2 hyperintense lesions in the kidneys bilaterally, representing probable cysts. One of the right renalcysts measures 1.7 cm (image 14 series 3) appears to have a small T2 hypointense component. No hydronephrosis.  Stomach and bowel: Visualized stomach and intestines are unremarkable.  Intraperitoneal space: No free fluid.  Arteries: No abdominal aortic aneurysm.  Bones/joints:  Unremarkable.  Soft tissues: Mild body wall edema.    IMPRESSION:  Mass in the head of the pancreas is most concerning for adenocarcinoma. Endoscopic ultrasound may be pursued for further evaluation. Its relationship with its adjacent vascular structures is difficult to evaluate without IV contrast. Dilated common bile duct and main pancreatic duct, likely due to obstruction by the pancreatic head mass.    Cholelithiasis with thickened gallbladder wall may represent acute cholecystitis. If clinically indicated, HIDA scan may be pursued for further evaluation.    1.7 cm right renal cyst as described above may be complex. Abdominal MR without and with IV contrast may be pursued to rule out malignant renal neoplasm.    Other findings as above.    < end of copied text >    Goals of Care Discussion with Family/Proxy/Other   - see note from 7/09     MERARY MEYER    SCU progress note    INTERVAL HPI/OVERNIGHT EVENTS:  No acute events, appears comfortable, VS reviewed.     DNR [x ]   DNI  [x  ]- Comfort Measures.    Covid - 19 PCR: Negative 6/28    The 4Ms    What Matters Most: see [facility]  Age appropriate Medications/Screen for High Risk Medication: Yes  Mentation: see CAM below  Mobility: defer to physical exam    The Confusion Assessment Method (CAM) Diagnostic Algorithm     1: Acute Onset or Fluctuating Course  - Is there evidence of an acute change in mental status from the patient’s baseline? Did the (abnormal) behavior  fluctuate during the day, that is, tend to come and go, or increase and decrease in severity?  [ ] YES [x ] NO     2: Inattention  - Did the patient have difficulty focusing attention, being easily distractible, or having difficulty keeping track of what was being said?  [ ] YES [ ] NO   Unable to access     3: Disorganized thinking  -Was the patient’s thinking disorganized or incoherent, such as rambling or irrelevant conversation, unclear or illogical flow of ideas, or unpredictable switching from subject to subject?  [ ] YES [ ] NO   Unable to access    4: Altered Level of consciousness?  [ ] YES [ ] NO    The diagnosis of delirium by CAM requires the presence of features 1 and 2 and either 3 or 4.    PRESSORS: [ ] YES [x ] NO  cefepime   IVPB      cefepime   IVPB 1000 milliGRAM(s) IV Intermittent every 24 hours  metroNIDAZOLE  IVPB      metroNIDAZOLE  IVPB 500 milliGRAM(s) IV Intermittent every 8 hours    Cardiovascular:  Heart Failure  Acute   Acute on Chronic  Chronic       amLODIPine   Tablet 2.5 milliGRAM(s) Oral daily  hydrALAZINE 25 milliGRAM(s) Oral four times a day  metoprolol tartrate 50 milliGRAM(s) Oral two times a day    Pulmonary:  ALBUTerol    90 MICROgram(s) HFA Inhaler 1 Puff(s) Inhalation every 4 hours  ALBUTerol    90 MICROgram(s) HFA Inhaler 2 Puff(s) Inhalation every 6 hours PRN    Hematalogic:  apixaban 5 milliGRAM(s) Oral every 12 hours    Other:  acetaminophen   Tablet .. 650 milliGRAM(s) Oral every 6 hours PRN  dextrose 40% Gel 15 Gram(s) Oral once  dextrose 5%. 1000 milliLiter(s) IV Continuous <Continuous>  dextrose 5%. 1000 milliLiter(s) IV Continuous <Continuous>  dextrose 50% Injectable 25 Gram(s) IV Push once  dextrose 50% Injectable 25 Gram(s) IV Push once  dextrose 50% Injectable 12.5 Gram(s) IV Push once  ferrous    sulfate 325 milliGRAM(s) Oral daily  glucagon  Injectable 1 milliGRAM(s) IntraMuscular once  insulin glargine Injectable (LANTUS) 6 Unit(s) SubCutaneous every morning  insulin lispro (ADMELOG) corrective regimen sliding scale   SubCutaneous three times a day before meals  insulin lispro (ADMELOG) corrective regimen sliding scale   SubCutaneous at bedtime  pantoprazole  Injectable 40 milliGRAM(s) IV Push at bedtime  predniSONE   Tablet 40 milliGRAM(s) Oral daily  senna 2 Tablet(s) Oral at bedtime  sodium chloride 0.9% lock flush 10 milliLiter(s) IV Push every 1 hour PRN    acetaminophen   Tablet .. 650 milliGRAM(s) Oral every 6 hours PRN  ALBUTerol    90 MICROgram(s) HFA Inhaler 1 Puff(s) Inhalation every 4 hours  ALBUTerol    90 MICROgram(s) HFA Inhaler 2 Puff(s) Inhalation every 6 hours PRN  amLODIPine   Tablet 2.5 milliGRAM(s) Oral daily  apixaban 5 milliGRAM(s) Oral every 12 hours  cefepime   IVPB      cefepime   IVPB 1000 milliGRAM(s) IV Intermittent every 24 hours  dextrose 40% Gel 15 Gram(s) Oral once  dextrose 5%. 1000 milliLiter(s) IV Continuous <Continuous>  dextrose 5%. 1000 milliLiter(s) IV Continuous <Continuous>  dextrose 50% Injectable 25 Gram(s) IV Push once  dextrose 50% Injectable 25 Gram(s) IV Push once  dextrose 50% Injectable 12.5 Gram(s) IV Push once  ferrous    sulfate 325 milliGRAM(s) Oral daily  glucagon  Injectable 1 milliGRAM(s) IntraMuscular once  hydrALAZINE 25 milliGRAM(s) Oral four times a day  insulin glargine Injectable (LANTUS) 6 Unit(s) SubCutaneous every morning  insulin lispro (ADMELOG) corrective regimen sliding scale   SubCutaneous three times a day before meals  insulin lispro (ADMELOG) corrective regimen sliding scale   SubCutaneous at bedtime  metoprolol tartrate 50 milliGRAM(s) Oral two times a day  metroNIDAZOLE  IVPB      metroNIDAZOLE  IVPB 500 milliGRAM(s) IV Intermittent every 8 hours  pantoprazole  Injectable 40 milliGRAM(s) IV Push at bedtime  predniSONE   Tablet 40 milliGRAM(s) Oral daily  senna 2 Tablet(s) Oral at bedtime  sodium chloride 0.9% lock flush 10 milliLiter(s) IV Push every 1 hour PRN    Drug Dosing Weight  Height (cm): 170.2 (28 Jun 2021 10:15)  Weight (kg): 66.9 (28 Jun 2021 16:29)  BMI (kg/m2): 23.1 (28 Jun 2021 16:29)  BSA (m2): 1.78 (28 Jun 2021 16:29)    CENTRAL LINE: [ ] YES [x ] NO  LOCATION:   DATE INSERTED:  REMOVE: [ ] YES [ ] NO  EXPLAIN:    BIANCHI: [x ] YES [ ] NO    DATE INSERTED:  REMOVE:  [ ] YES [x ] NO  EXPLAIN:  Chronic    PAST MEDICAL & SURGICAL HISTORY:  Myocardial infarct, old    Hypercholesterolemia    Peripheral vascular disease    COPD (chronic obstructive pulmonary disease)    Bipolar 1 disorder    HTN (hypertension)    Bedbound    S/P CABG x 1    Above knee amputation of right lower extremity        10 Jul 2021 07:01  -  11 Jul 2021 07:00  --------------------------------------------------------  IN: 60 mL / OUT: 70 mL / NET: -10 mL            PHYSICAL EXAM:    GENERAL: NAD, resting in bed, appears comfortable,   HEAD:  Atraumatic, Normocephalic  EYES: PERRL, conjunctiva and sclera clear  ENMT: No tonsillar erythema, exudates  NECK: Supple, No JVD  NERVOUS SYSTEM:  Awake and alert, nonverbal, Not orientated. Does not follow commands.  CHEST/LUNG: B/L lungs w/ crackles at the bases, non labored respiration   HEART: Regular rate and rhythm; No murmurs, rubs, or gallops  ABDOMEN: Soft, Nontender, Nondistended; Bowel sounds present, right biliary drain intact w/ dark bilious fluid  EXTREMITIES: Right AKA, left foot, 1+ edema, + pedal pulse,   LYMPH: No lymphadenopathy noted  SKIN: Stage 1 coccyx      LABS:  CBC Full  -  ( 09 Jul 2021 07:16 )  WBC Count : 13.98 K/uL  RBC Count : 3.48 M/uL  Hemoglobin : 10.4 g/dL  Hematocrit : 31.5 %  Platelet Count - Automated : 211 K/uL  Mean Cell Volume : 90.5 fl  Mean Cell Hemoglobin : 29.9 pg  Mean Cell Hemoglobin Concentration : 33.0 gm/dL  Auto Neutrophil # : x  Auto Lymphocyte # : x  Auto Monocyte # : x  Auto Eosinophil # : x  Auto Basophil # : x  Auto Neutrophil % : x  Auto Lymphocyte % : x  Auto Monocyte % : x  Auto Eosinophil % : x  Auto Basophil % : x    07-09    149<H>  |  114<H>  |  57<H>  ----------------------------<  242<H>  3.3<L>   |  20<L>  |  1.73<H>    Ca    8.4      09 Jul 2021 07:16  Phos  2.5     07-09  Mg     1.7     07-09    TPro  6.4  /  Alb  2.5<L>  /  TBili  1.9<H>  /  DBili  x   /  AST  22  /  ALT  16  /  AlkPhos  83  07-09              [  ]  DVT Prophylaxis  [  ]  Nutrition, Brand, Rate         Goal Rate        Abnormal Nutritional Status -  Malnutrition   Cachexia      Morbid Obesity BMI >/=40    RADIOLOGY & ADDITIONAL STUDIES:  ***  < from: CT Head No Cont (07.08.21 @ 19:30) >  The ventricles and sulci are prominent, compatible with age-related generalized cerebral volume loss.   There is no CT evidence for acute cerebral cortical infarct. There is no evidence of hemorrhage. There is periventricular and subcortical white matter hypoattenuation,  most compatible with chronic microvascular ischemic changes.   No mass effect is found in the brain.  There is no midlineshift or herniation pattern.      The visualized portions of the paranasal sinuses and mastoid air cells are clear.    IMPRESSION:    No evidence of acute intracranial abnormality.  No evidence of hemorrhage.    Chronic changes as above.    < end of copied text >  < from: MR MRCP No Cont (07.06.21 @ 20:34) >  Visualized lower chest: Mild bilateral pleural effusions. Consolidation/atelectasis of the adjacent pulmonary parenchyma.    Liver: No mass. The liver is diffusely dark on T2-weighted images. In addition, there is signal attenuation on in-phase T1-weighted gradient echo images. Findings are suggestive of hemachromatosis or hemosiderosis.  Gallbladder and bile ducts: A few small gallstones. The gallbladder wall is thickened measuring up to 5 mm. Mild biliary ductal dilatation, measuring 9 mm. No evidence for choledocholithiasis.  Pancreas: Mass in the head of the pancreas measuring 3.0 x 2.2 cm. The mass is best seen on series 4, image 17, and 18. The remaining pancreas is atrophic. The main pancreatic duct is dilated upstream to the pancreatic mass.  Spleen: Unremarkable. No splenomegaly.  Adrenal glands: Unremarkable. No mass.  Kidneys and ureters: Small brightly T2 hyperintense lesions in the kidneys bilaterally, representing probable cysts. One of the right renalcysts measures 1.7 cm (image 14 series 3) appears to have a small T2 hypointense component. No hydronephrosis.  Stomach and bowel: Visualized stomach and intestines are unremarkable.  Intraperitoneal space: No free fluid.  Arteries: No abdominal aortic aneurysm.  Bones/joints:  Unremarkable.  Soft tissues: Mild body wall edema.    IMPRESSION:  Mass in the head of the pancreas is most concerning for adenocarcinoma. Endoscopic ultrasound may be pursued for further evaluation. Its relationship with its adjacent vascular structures is difficult to evaluate without IV contrast. Dilated common bile duct and main pancreatic duct, likely due to obstruction by the pancreatic head mass.    Cholelithiasis with thickened gallbladder wall may represent acute cholecystitis. If clinically indicated, HIDA scan may be pursued for further evaluation.    1.7 cm right renal cyst as described above may be complex. Abdominal MR without and with IV contrast may be pursued to rule out malignant renal neoplasm.    Other findings as above.    < end of copied text >    Goals of Care Discussion with Family/Proxy/Other   - see note from 7/09

## 2021-07-11 NOTE — CHART NOTE - NSCHARTNOTEFT_GEN_A_CORE
Pt's HCP Silvamick Marquez was called at  and provided her update regarding pt's current medical condition.  Plan of care discussed and all questions answered.

## 2021-07-11 NOTE — PROGRESS NOTE ADULT - ASSESSMENT
76 year old female from [facility] with PMHx of PVD, right AKA (S/P fem pop bypass), PAF, COPD, bipolar disorder, hypertension and PSHx of S/P CABG x1 brought into the ED via EMS from [facility] for unresponsiveness and hypotension. Per NH supervisor Mr. Gaspar () Pt was found unresponsive today morning around 8 am, pt was fine last night, pt did not have fever, difficulty breathing, pt was only receptive to painful stimuli and on evaluation pt's blood pressure was low per papers Pt's bp was 62/38. Pt was sent to the hospital for further evaluation. Baseline mental status alert, oriented and follows instruction, non ambulatory at baseline.     ED course: Alicia was placed and pt was given 3L ivf s/p urine out put of 40cc, u/a positive for infection, CT chest showing b/l patchy infiltrates/ consolidations, concern for cholecystitis, with dilated bile duct of 1.7 cm, and concern of pancreatic head mass. Pt was given a dose of vancomycin and rocephin. Pt's blood pressure improve to 97/58, hr 109 bpm, ECG showing sinus tachy, RBBB, LAFB, Bifacicular block.     Patient admitted to the ICU for septic shock requiring vasopressors. Source of infection likely acute cholecystitis as demonstrated on CT abdomen vs UTI. Patient was started on levophed, noted with severe acidosis requiring bicarb drip and VANESSA, likely pre-renal. Patient was started on cefepime and flagyl for sepsis 2/2 cholecystitis. Lactate acidosis was started on bicarb gtt. VANESSA likely pre-renal obtained multiple boluses with low urine output. Patient gradually improved with resolution of hypotension, was able to taper off pressors, bicarb improved was able to dc bicarb gtt. Urine output gradually increased. on 6/29, patient underwent IR guided cholecystostomy, with drainage of bile. Evaluated by GI after, recommended MRCP. Unable to obtain MRCP as of 6/30 due to altered mental status suspected to be due to gabapentin toxicity in the setting of VANESSA. Unstable for MRCP, will continue monitoring mental status. Mental status improved 7/1-7/2, pt aaox2 to self and location. Seen by speech and swallow, recommends mechanical soft thin liquid diet.     7/2 pt downgrade to SCU, failed TOV, alicia reinserted   7/5. Pending  MRCP ordered. Hypernatremia worsening likely due to poor po intake, started on D5W per nephro recs  7/8- MRCP report noted with mass in the head of the pancreas most concerning for adenocarcinoma.  GI following. B/l lungs fields w/ rales on exam and LLE w/ 2+ edema.  BP remained 160- 170- hydralazine increased to QID, will give one dose of Lasix 40mg for effusion. Will request for sx and GI to comment on MRCP findings.   7/9 Remains hypernatremic, Na+ 149. Continue D5W at 60ml/hr , daily Lasix 20mg IV. Surgery recs metastatic w/u with CT abd/pelvis with IV contrast. D/w Nephro Dr. Noriega. Monitor SCr , and consider CT with IV contrast on Monday if SCr improved (goal <1.5).   7/9 Family has decided on comfort care. No more labs.  7/11- Resting comfortable w/o visible signs of respiration distress or pain. Will continue to monitor.

## 2021-07-11 NOTE — PROGRESS NOTE ADULT - SUBJECTIVE AND OBJECTIVE BOX
Patient is a 76y Female with  HYPERNATREMIA  STARTED ON  IV FLUIDS AGAIN  YESTERDAY  NO  ACUTE EVENTS OVERNIGHT  RESTING COMFORTABLY  NON  VERBAL    influenza virus vaccine, live, trivalent (Unknown)  PC Pen VK (Rash)  penicillin (Other; Rash)  statins (Other)  sulfa drugs (Other)  sulfamethizole (Other)    Hospital Medications:   MEDICATIONS  (STANDING):  ALBUTerol    90 MICROgram(s) HFA Inhaler 1 Puff(s) Inhalation every 4 hours  amLODIPine   Tablet 5 milliGRAM(s) Oral daily  apixaban 5 milliGRAM(s) Oral every 12 hours  cefepime   IVPB      cefepime   IVPB 1000 milliGRAM(s) IV Intermittent every 24 hours  dextrose 40% Gel 15 Gram(s) Oral once  dextrose 5%. 1000 milliLiter(s) (100 mL/Hr) IV Continuous <Continuous>  dextrose 5%. 1000 milliLiter(s) (50 mL/Hr) IV Continuous <Continuous>  dextrose 50% Injectable 25 Gram(s) IV Push once  dextrose 50% Injectable 12.5 Gram(s) IV Push once  dextrose 50% Injectable 25 Gram(s) IV Push once  ferrous    sulfate 325 milliGRAM(s) Oral daily  glucagon  Injectable 1 milliGRAM(s) IntraMuscular once  hydrALAZINE 25 milliGRAM(s) Oral four times a day  insulin glargine Injectable (LANTUS) 6 Unit(s) SubCutaneous every morning  insulin lispro (ADMELOG) corrective regimen sliding scale   SubCutaneous three times a day before meals  insulin lispro (ADMELOG) corrective regimen sliding scale   SubCutaneous at bedtime  metoprolol tartrate 50 milliGRAM(s) Oral two times a day  metroNIDAZOLE  IVPB      metroNIDAZOLE  IVPB 500 milliGRAM(s) IV Intermittent every 8 hours  pantoprazole  Injectable 40 milliGRAM(s) IV Push at bedtime  predniSONE   Tablet 40 milliGRAM(s) Oral daily  senna 2 Tablet(s) Oral at bedtime    REVIEW OF SYSTEMS:  DOESNT APPEAR SOB   HAS NO FEVER/CHILLS    NOT EATING WELL   HAS  DRAIN IN PLACE R UPPER QUADRANT     VITALS:  T(F): 98.5 (07-11-21 @ 05:57), Max: 98.5 (07-11-21 @ 05:57)  HR: 61 (07-11-21 @ 05:57)  BP: 186/96 (07-11-21 @ 05:57)  RR: 18 (07-11-21 @ 05:57)  SpO2: 95% (07-11-21 @ 05:57)  Wt(kg): --    07-10 @ 07:01  -  07-11 @ 07:00  --------------------------------------------------------  IN: 60 mL / OUT: 70 mL / NET: -10 mL        PHYSICAL EXAM:  Constitutional: NAD  HEENT: CONJ PINK  Neck: No JVD  Respiratory: CTAB, no wheezes, rales or rhonchi  Cardiovascular: S1, S2, RRR  Gastrointestinal: BS+, soft, NT/ND   DRAIN IN PLACE R UPPER QUADRANT  Extremities:  No peripheral edema L,  R -- AKA  Neurological: ALERT,  NON  COMMUNICATIVE  :  No alicia.         LABS:        Creatinine Trend: 1.73 <--, 1.95 <--, 2.15 <--, 2.43 <--, 2.65 <--    Urine Studies:      RADIOLOGY & ADDITIONAL STUDIES:

## 2021-07-11 NOTE — PROGRESS NOTE ADULT - PROBLEM SELECTOR PLAN 9
MEWS suspended.  No further lab work.  No IVF. Continue antibiotics.  See [facility] 7/9  HCP requesting Hospice. Does not want patient to return to [facility]. CM and SW aware.

## 2021-07-12 NOTE — PROGRESS NOTE ADULT - PROBLEM SELECTOR PLAN 3
: CT as above  choley tube in place   MRCP report above  Metastatic workup .   CT abd/pelvis with IV contrast:  D/w Nephro. Consider on Monday if SCr improves (goal <1.5)   Appreciate Surgery input  hyperglycemic, start Lantus 6 units qam. Continue to monitor BG and cover with moderate scale Admelog.   No further work up as per HCp.

## 2021-07-12 NOTE — PROGRESS NOTE ADULT - PROBLEM SELECTOR PLAN 9
MEWS suspended.  No further lab work.  No IVF. Continue antibiotics.  See [facility] 7/9  HCP requesting Hospice. Does not want patient to return to [facility]. CM and SW aware.

## 2021-07-12 NOTE — PROGRESS NOTE ADULT - PROBLEM SELECTOR PLAN 10
Pleural Effusion: Left greater than right. CXR and CT noted. S/P lasix  VANESSA:  Likely secondary to ATN from septic shock.  Patient now comfort care as per family wishes.  UTI: Continue antibiotics.  DVT and GI prophylaxis.  DNR/DNI MOLST in chart  MEWS suspended.  Continue antibiotics  Comfort Care only.  Overall prognosis is extremely poor.  HCP requesting hospice.

## 2021-07-12 NOTE — PROGRESS NOTE ADULT - ASSESSMENT
seen and examined  vsstable except BP high afebrile physical done  awake  trying to focus eyes but no words   not in any distress     physical done    no new labs   increase amlodipine to 10 daily   cholecystostomy  surgery to f/u pt  pancraetic mass poor prognosis  cad s/p cabg  rt knee amutation sec to PAD  dnr dni

## 2021-07-12 NOTE — PROGRESS NOTE ADULT - PROBLEM SELECTOR PLAN 2
CT with acute cholecystitis with common duct up to 1.7 cm mild intrahepatic biliary dilatation. The distal common duct obstructing stone and lesion  S/P Cholecystectomy drain, no growth on culture  BCx negative  Continue antibiotics  MRCP- showed pancreatic mass   GI Dr Pinon following.   Family wants no further work up.   F/U on when hector tube can be removed

## 2021-07-12 NOTE — CHART NOTE - NSCHARTNOTEFT_GEN_A_CORE
Assessment:   76yFemalePatient is a 76y old  Female who presents with a chief complaint of Septic Shock (12 Jul 2021 11:12). Pt visited. Pt asleep. Pt DG from ICU to 4 N on 7/2. Pt with Poor appetite. Pt w Pancreatic mass.  Palliative care Consult noted.  Pt on comfort measures. No further labs. MEWS suspended.  Pt is on Nepro TID. Intake Minimum.      Factors impacting intake: [ ] none [ ] nausea  [ ] vomiting [ ] diarrhea [ ] constipation  [ ]chewing problems [ ] swallowing issues  [ ] other:     Diet Prescription : Diet, Dysphagia 2 Mechanical Soft-Thin Liquids:   Consistent Carbohydrate {No Snacks}  Supplement Feeding Modality:  Oral  Nepro Cans or Servings Per Day:  1       Frequency:  Three Times a day (07-09-21 @ 09:25)    Intake: 0 %     Current Weight:   % Weight Change    Pertinent Medications: MEDICATIONS  (STANDING):  ALBUTerol    90 MICROgram(s) HFA Inhaler 1 Puff(s) Inhalation every 4 hours  amLODIPine   Tablet 10 milliGRAM(s) Oral daily  apixaban 5 milliGRAM(s) Oral every 12 hours  cefepime   IVPB      cefepime   IVPB 1000 milliGRAM(s) IV Intermittent every 24 hours  dextrose 40% Gel 15 Gram(s) Oral once  dextrose 5%. 1000 milliLiter(s) (50 mL/Hr) IV Continuous <Continuous>  dextrose 5%. 1000 milliLiter(s) (100 mL/Hr) IV Continuous <Continuous>  dextrose 50% Injectable 25 Gram(s) IV Push once  dextrose 50% Injectable 12.5 Gram(s) IV Push once  dextrose 50% Injectable 25 Gram(s) IV Push once  ferrous    sulfate 325 milliGRAM(s) Oral daily  glucagon  Injectable 1 milliGRAM(s) IntraMuscular once  hydrALAZINE 25 milliGRAM(s) Oral four times a day  insulin glargine Injectable (LANTUS) 6 Unit(s) SubCutaneous every morning  insulin lispro (ADMELOG) corrective regimen sliding scale   SubCutaneous three times a day before meals  insulin lispro (ADMELOG) corrective regimen sliding scale   SubCutaneous at bedtime  metoprolol tartrate 50 milliGRAM(s) Oral two times a day  metroNIDAZOLE  IVPB 500 milliGRAM(s) IV Intermittent every 8 hours  metroNIDAZOLE  IVPB      pantoprazole  Injectable 40 milliGRAM(s) IV Push at bedtime  predniSONE   Tablet 40 milliGRAM(s) Oral daily  senna 2 Tablet(s) Oral at bedtime    MEDICATIONS  (PRN):  acetaminophen   Tablet .. 650 milliGRAM(s) Oral every 6 hours PRN Temp greater or equal to 38C (100.4F), Mild Pain (1 - 3)  ALBUTerol    90 MICROgram(s) HFA Inhaler 2 Puff(s) Inhalation every 6 hours PRN Wheezing  sodium chloride 0.9% lock flush 10 milliLiter(s) IV Push every 1 hour PRN Pre/post blood products, medications, blood draw, and to maintain line patency    Pertinent Labs:  07-09 Phos 2.5 mg/dL 07-09 Alb 2.5 g/dL<L>     CAPILLARY BLOOD GLUCOSE      POCT Blood Glucose.: 172 mg/dL (12 Jul 2021 17:00)  POCT Blood Glucose.: 249 mg/dL (12 Jul 2021 11:38)  POCT Blood Glucose.: 230 mg/dL (12 Jul 2021 08:03)  POCT Blood Glucose.: 299 mg/dL (11 Jul 2021 20:56)    Skin:     Estimated Needs:   [ ] no change since previous assessment  [ ] recalculated:     Previous Nutrition Diagnosis:   [ ] Inadequate Energy Intake [ ]Inadequate Oral Intake [ ] Excessive Energy Intake   [ ] Underweight [ ] Increased Nutrient Needs [ ] Overweight/Obesity   [ ] Altered GI Function [ ] Unintended Weight Loss [ ] Food & Nutrition Related Knowledge Deficit [ ] Malnutrition     Nutrition Diagnosis is [ ] ongoing  [ ] resolved [ ] not applicable     New Nutrition Diagnosis: [ ] not applicable       Interventions:   Recommend  [ ] Change Diet To:  [ ] Nutrition Supplement  [ ] Nutrition Support  [ x] Other:  Continue with current  Diet  Rx.     Monitoring and Evaluation:   [ ] PO intake [ x ] Tolerance to diet prescription [ x ] weights [ x ] labs[ x ] follow up per protocol  [ ] other: Assessment:   76yFemalePatient is a 76y old  Female who presents with a chief complaint of Septic Shock (12 Jul 2021 11:12). Pt visited. Pt asleep. Pt DG from ICU to 4 N on 7/2. Pt with Poor appetite. Pt w Pancreatic mass.  Palliative care Consult noted.  Pt on comfort measures. No further labs. MEWS suspended.  Pt is on Nepro TID. Intake Minimum.      Factors impacting intake: [ ] none [ ] nausea  [ ] vomiting [ ] diarrhea [ ] constipation  [ ]chewing problems [ ] swallowing issues  [ ] other:     Diet Prescription : Diet, Dysphagia 2 Mechanical Soft-Thin Liquids:   Consistent Carbohydrate {No Snacks}  Supplement Feeding Modality:  Oral  Nepro Cans or Servings Per Day:  1       Frequency:  Three Times a day (07-09-21 @ 09:25)    Intake: 0 %     Current Weight:   % Weight Change    Pertinent Medications: MEDICATIONS  (STANDING):  ALBUTerol    90 MICROgram(s) HFA Inhaler 1 Puff(s) Inhalation every 4 hours  amLODIPine   Tablet 10 milliGRAM(s) Oral daily  apixaban 5 milliGRAM(s) Oral every 12 hours  cefepime   IVPB      cefepime   IVPB 1000 milliGRAM(s) IV Intermittent every 24 hours  dextrose 40% Gel 15 Gram(s) Oral once  dextrose 5%. 1000 milliLiter(s) (50 mL/Hr) IV Continuous <Continuous>  dextrose 5%. 1000 milliLiter(s) (100 mL/Hr) IV Continuous <Continuous>  dextrose 50% Injectable 25 Gram(s) IV Push once  dextrose 50% Injectable 12.5 Gram(s) IV Push once  dextrose 50% Injectable 25 Gram(s) IV Push once  ferrous    sulfate 325 milliGRAM(s) Oral daily  glucagon  Injectable 1 milliGRAM(s) IntraMuscular once  hydrALAZINE 25 milliGRAM(s) Oral four times a day  insulin glargine Injectable (LANTUS) 6 Unit(s) SubCutaneous every morning  insulin lispro (ADMELOG) corrective regimen sliding scale   SubCutaneous three times a day before meals  insulin lispro (ADMELOG) corrective regimen sliding scale   SubCutaneous at bedtime  metoprolol tartrate 50 milliGRAM(s) Oral two times a day  metroNIDAZOLE  IVPB 500 milliGRAM(s) IV Intermittent every 8 hours  metroNIDAZOLE  IVPB      pantoprazole  Injectable 40 milliGRAM(s) IV Push at bedtime  predniSONE   Tablet 40 milliGRAM(s) Oral daily  senna 2 Tablet(s) Oral at bedtime    MEDICATIONS  (PRN):  acetaminophen   Tablet .. 650 milliGRAM(s) Oral every 6 hours PRN Temp greater or equal to 38C (100.4F), Mild Pain (1 - 3)  ALBUTerol    90 MICROgram(s) HFA Inhaler 2 Puff(s) Inhalation every 6 hours PRN Wheezing  sodium chloride 0.9% lock flush 10 milliLiter(s) IV Push every 1 hour PRN Pre/post blood products, medications, blood draw, and to maintain line patency    Pertinent Labs:  07-09 Phos 2.5 mg/dL 07-09 Alb 2.5 g/dL<L>     CAPILLARY BLOOD GLUCOSE      POCT Blood Glucose.: 172 mg/dL (12 Jul 2021 17:00)  POCT Blood Glucose.: 249 mg/dL (12 Jul 2021 11:38)  POCT Blood Glucose.: 230 mg/dL (12 Jul 2021 08:03)  POCT Blood Glucose.: 299 mg/dL (11 Jul 2021 20:56)    Skin:     Estimated Needs:   [ ] no change since previous assessment  [ ] recalculated:     Previous Nutrition Diagnosis:   [x Inadequate Energy Intake [x ]Inadequate Oral Intake [ ] Excessive Energy Intake   [ ] Underweight [ ] Increased Nutrient Needs [ ] Overweight/Obesity   [ ] Altered GI Function [ ] Unintended Weight Loss [ ] Food & Nutrition Related Knowledge Deficit [ ] Malnutrition     Nutrition Diagnosis is [ ] ongoing  [ ] resolved [ ] not applicable     New Nutrition Diagnosis: [ ] not applicable       Interventions:   Recommend  [ ] Change Diet To:  [ ] Nutrition Supplement  [ ] Nutrition Support  [ x] Other:  Continue with current  Diet  Rx.     Monitoring and Evaluation:   [ ] PO intake [ x ] Tolerance to diet prescription [ x ] weights [ x ] labs[ x ] follow up per protocol  [ ] other:

## 2021-07-12 NOTE — PROGRESS NOTE ADULT - ASSESSMENT
Cholecystitis   s.p cholecystomy tube   Continue  Anbx       Biliary obstruction   Pain and palliative conversation reviewed   No further intervention at this time       I was physically present for the key portions of the evaluation and management (E/M) service provided.  The patient was personally seen and examined at bedside.  I have edited the note as appropriate.   Thank you for your consultation and allowing  me to participate in the care of your patients. If you have further questions please contact me at [phone].     Rm Hart M.D.       _________________________________________________________________________________________________  [facility]  [address] [place] [address]  Office: [phone]    Fortunato Bowman PA-C  ______________________________________________________________________

## 2021-07-12 NOTE — PROGRESS NOTE ADULT - SUBJECTIVE AND OBJECTIVE BOX
HPI:  76 year old female from [facility] with PMHx of PVD, right AKA (S/P fem pop bypass), PAF, COPD, bipolar disorder, hypertension and PSHx of S/P CABG x1 brought into the ED via EMS from [facility] for unresponsiveness and hypotension. Per NH supervisor Mr. Gaspar () Pt was found unresponsive today morning around 8 am, pt was fine last night, pt did not have fever, difficulty breathing, pt was only receptive to painful stimuli and on evaluation pt's blood pressure was low per papers Pt's bp was 62/38. Pt was sent to the hospital for further evaluation. Baseline mental status alert, oriented and follows instruction, non ambulatory at baseline.   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 Pt was given a dose of vancomycin and rocephine. Pt's blood pressure improve to 97/58, hr 109 bpm, ECG showing sinus tachy, RBBB, LAFB, Bifacicular block.    (28 Jun 2021 14:58)      Patient is a 76y old  Female who presents with a chief complaint of Septic Shock (12 Jul 2021 11:12)      INTERVAL HPI/OVERNIGHT EVENTS:  T(C): 36.8 (07-12-21 @ 06:16), Max: 37.1 (07-11-21 @ 20:44)  HR: 104 (07-12-21 @ 06:16) (89 - 106)  BP: 164/92 (07-12-21 @ 06:16) (153/81 - 164/92)  RR: 20 (07-12-21 @ 06:16) (18 - 20)  SpO2: 95% (07-12-21 @ 06:16) (93% - 95%)  Wt(kg): --  I&O's Summary    11 Jul 2021 07:01  -  12 Jul 2021 07:00  --------------------------------------------------------  IN: 100 mL / OUT: 0 mL / NET: 100 mL        REVIEW OF SYSTEMS: denies fever, chills, SOB, palpitations, chest pain, abdominal pain, nausea, vomitting, diarrhea, constipation, dizziness    MEDICATIONS  (STANDING):  ALBUTerol    90 MICROgram(s) HFA Inhaler 1 Puff(s) Inhalation every 4 hours  amLODIPine   Tablet 10 milliGRAM(s) Oral daily  apixaban 5 milliGRAM(s) Oral every 12 hours  cefepime   IVPB      cefepime   IVPB 1000 milliGRAM(s) IV Intermittent every 24 hours  dextrose 40% Gel 15 Gram(s) Oral once  dextrose 5%. 1000 milliLiter(s) (50 mL/Hr) IV Continuous <Continuous>  dextrose 5%. 1000 milliLiter(s) (100 mL/Hr) IV Continuous <Continuous>  dextrose 50% Injectable 25 Gram(s) IV Push once  dextrose 50% Injectable 12.5 Gram(s) IV Push once  dextrose 50% Injectable 25 Gram(s) IV Push once  ferrous    sulfate 325 milliGRAM(s) Oral daily  glucagon  Injectable 1 milliGRAM(s) IntraMuscular once  hydrALAZINE 25 milliGRAM(s) Oral four times a day  insulin glargine Injectable (LANTUS) 6 Unit(s) SubCutaneous every morning  insulin lispro (ADMELOG) corrective regimen sliding scale   SubCutaneous three times a day before meals  insulin lispro (ADMELOG) corrective regimen sliding scale   SubCutaneous at bedtime  metoprolol tartrate 50 milliGRAM(s) Oral two times a day  metroNIDAZOLE  IVPB      metroNIDAZOLE  IVPB 500 milliGRAM(s) IV Intermittent every 8 hours  pantoprazole  Injectable 40 milliGRAM(s) IV Push at bedtime  predniSONE   Tablet 40 milliGRAM(s) Oral daily  senna 2 Tablet(s) Oral at bedtime    MEDICATIONS  (PRN):  acetaminophen   Tablet .. 650 milliGRAM(s) Oral every 6 hours PRN Temp greater or equal to 38C (100.4F), Mild Pain (1 - 3)  ALBUTerol    90 MICROgram(s) HFA Inhaler 2 Puff(s) Inhalation every 6 hours PRN Wheezing  sodium chloride 0.9% lock flush 10 milliLiter(s) IV Push every 1 hour PRN Pre/post blood products, medications, blood draw, and to maintain line patency      PHYSICAL EXAM:  GENERAL: NAD, well-groomed, well-developed  HEAD:  Atraumatic, Normocephalic  EYES: EOMI, PERRLA, conjunctiva and sclera clear  ENMT: No tonsillar erythema, exudates, or enlargement; Moist mucous membranes, Good dentition, No lesions  NECK: Supple, No JVD, Normal thyroid  NERVOUS SYSTEM:  Alert & Oriented X3, Good concentration; Motor Strength 5/5 B/L upper and lower extremities; DTRs 2+ intact and symmetric  CHEST/LUNG: Clear to percussion bilaterally; No rales, rhonchi, wheezing, or rubs  HEART: Regular rate and rhythm; No murmurs, rubs, or gallops  ABDOMEN: Soft, Nontender, Nondistended; Bowel sounds present  EXTREMITIES:  2+ Peripheral Pulses, No clubbing, cyanosis, or edema  LYMPH: No lymphadenopathy noted  SKIN: No rashes or lesions  LABS:              CAPILLARY BLOOD GLUCOSE      POCT Blood Glucose.: 249 mg/dL (12 Jul 2021 11:38)  POCT Blood Glucose.: 230 mg/dL (12 Jul 2021 08:03)  POCT Blood Glucose.: 299 mg/dL (11 Jul 2021 20:56)  POCT Blood Glucose.: 144 mg/dL (11 Jul 2021 16:55)

## 2021-07-12 NOTE — PROGRESS NOTE ADULT - SUBJECTIVE AND OBJECTIVE BOX
MERARY MEYER    SCU progress note    INTERVAL HPI/OVERNIGHT EVENTS: ***No overnight events.    DNR [x ]   DNI  [x  ]   MEWS SUSPENDED    Covid - 19 PCR: Negative 6/28    The 4Ms    What Matters Most: see [facility]  Age appropriate Medications/Screen for High Risk Medication: Yes  Mentation: see CAM below  Mobility: defer to physical exam    The Confusion Assessment Method (CAM) Diagnostic Algorithm     1: Acute Onset or Fluctuating Course  - Is there evidence of an acute change in mental status from the patient’s baseline? Did the (abnormal) behavior  fluctuate during the day, that is, tend to come and go, or increase and decrease in severity?  [ ] YES [x ] NO     2: Inattention  - Did the patient have difficulty focusing attention, being easily distractible, or having difficulty keeping track of what was being said?  [ ] YES [ ] NO   Unable to access     3: Disorganized thinking  -Was the patient’s thinking disorganized or incoherent, such as rambling or irrelevant conversation, unclear or illogical flow of ideas, or unpredictable switching from subject to subject?  [ ] YES [ ] NO   Unable to access    4: Altered Level of consciousness?  [ ] YES [ ] NO    The diagnosis of delirium by CAM requires the presence of features 1 and 2 and either 3 or 4.    PRESSORS: [ ] YES [x ] NO  cefepime   IVPB      cefepime   IVPB 1000 milliGRAM(s) IV Intermittent every 24 hours  metroNIDAZOLE  IVPB      metroNIDAZOLE  IVPB 500 milliGRAM(s) IV Intermittent every 8 hours    Cardiovascular:  Heart Failure  Acute   Acute on Chronic  Chronic       amLODIPine   Tablet 5 milliGRAM(s) Oral daily  hydrALAZINE 25 milliGRAM(s) Oral four times a day  metoprolol tartrate 50 milliGRAM(s) Oral two times a day    Pulmonary:  ALBUTerol    90 MICROgram(s) HFA Inhaler 2 Puff(s) Inhalation every 6 hours PRN  ALBUTerol    90 MICROgram(s) HFA Inhaler 1 Puff(s) Inhalation every 4 hours    Hematalogic:  apixaban 5 milliGRAM(s) Oral every 12 hours    Other:  acetaminophen   Tablet .. 650 milliGRAM(s) Oral every 6 hours PRN  dextrose 40% Gel 15 Gram(s) Oral once  dextrose 5%. 1000 milliLiter(s) IV Continuous <Continuous>  dextrose 5%. 1000 milliLiter(s) IV Continuous <Continuous>  dextrose 50% Injectable 25 Gram(s) IV Push once  dextrose 50% Injectable 12.5 Gram(s) IV Push once  dextrose 50% Injectable 25 Gram(s) IV Push once  ferrous    sulfate 325 milliGRAM(s) Oral daily  glucagon  Injectable 1 milliGRAM(s) IntraMuscular once  insulin glargine Injectable (LANTUS) 6 Unit(s) SubCutaneous every morning  insulin lispro (ADMELOG) corrective regimen sliding scale   SubCutaneous three times a day before meals  insulin lispro (ADMELOG) corrective regimen sliding scale   SubCutaneous at bedtime  pantoprazole  Injectable 40 milliGRAM(s) IV Push at bedtime  predniSONE   Tablet 40 milliGRAM(s) Oral daily  senna 2 Tablet(s) Oral at bedtime  sodium chloride 0.9% lock flush 10 milliLiter(s) IV Push every 1 hour PRN    acetaminophen   Tablet .. 650 milliGRAM(s) Oral every 6 hours PRN  ALBUTerol    90 MICROgram(s) HFA Inhaler 2 Puff(s) Inhalation every 6 hours PRN  ALBUTerol    90 MICROgram(s) HFA Inhaler 1 Puff(s) Inhalation every 4 hours  amLODIPine   Tablet 5 milliGRAM(s) Oral daily  apixaban 5 milliGRAM(s) Oral every 12 hours  cefepime   IVPB      cefepime   IVPB 1000 milliGRAM(s) IV Intermittent every 24 hours  dextrose 40% Gel 15 Gram(s) Oral once  dextrose 5%. 1000 milliLiter(s) IV Continuous <Continuous>  dextrose 5%. 1000 milliLiter(s) IV Continuous <Continuous>  dextrose 50% Injectable 25 Gram(s) IV Push once  dextrose 50% Injectable 12.5 Gram(s) IV Push once  dextrose 50% Injectable 25 Gram(s) IV Push once  ferrous    sulfate 325 milliGRAM(s) Oral daily  glucagon  Injectable 1 milliGRAM(s) IntraMuscular once  hydrALAZINE 25 milliGRAM(s) Oral four times a day  insulin glargine Injectable (LANTUS) 6 Unit(s) SubCutaneous every morning  insulin lispro (ADMELOG) corrective regimen sliding scale   SubCutaneous three times a day before meals  insulin lispro (ADMELOG) corrective regimen sliding scale   SubCutaneous at bedtime  metoprolol tartrate 50 milliGRAM(s) Oral two times a day  metroNIDAZOLE  IVPB      metroNIDAZOLE  IVPB 500 milliGRAM(s) IV Intermittent every 8 hours  pantoprazole  Injectable 40 milliGRAM(s) IV Push at bedtime  predniSONE   Tablet 40 milliGRAM(s) Oral daily  senna 2 Tablet(s) Oral at bedtime  sodium chloride 0.9% lock flush 10 milliLiter(s) IV Push every 1 hour PRN    Drug Dosing Weight  Height (cm): 170.2 (28 Jun 2021 10:15)  Weight (kg): 66.9 (28 Jun 2021 16:29)  BMI (kg/m2): 23.1 (28 Jun 2021 16:29)  BSA (m2): 1.78 (28 Jun 2021 16:29)    CENTRAL LINE: [ ] YES [x ] NO  LOCATION:   DATE INSERTED:  REMOVE: [ ] YES [ ] NO  EXPLAIN:    BIANCHI: [x ] YES [ ] NO    DATE INSERTED:  REMOVE:  [ ] YES [x ] NO  EXPLAIN:  Chronic    PAST MEDICAL & SURGICAL HISTORY:  Myocardial infarct, old    Hypercholesterolemia    Peripheral vascular disease    COPD (chronic obstructive pulmonary disease)    Bipolar 1 disorder    HTN (hypertension)    Bedbound    S/P CABG x 1    Above knee amputation of right lower extremity                07-11 @ 07:01  -  07-12 @ 07:00  --------------------------------------------------------  IN: 100 mL / OUT: 0 mL / NET: 100 mL            PHYSICAL EXAM:    GENERAL: NAD, well-groomed, well-developed  HEAD:  Atraumatic, Normocephalic  EYES: EOMI, PERRLA, conjunctiva and sclera clear  ENMT: No tonsillar erythema, exudates  NECK: Supple, No JVD  NERVOUS SYSTEM:  Awake and alert. Orientated X 1. Does not follow commands  CHEST/LUNG: Diminished breath sounds bilateral bases with crackles   HEART: Regular rate and rhythm; No murmurs, rubs, or gallops  ABDOMEN: Soft, Nontender, Nondistended; Bowel sounds present; Right biliary drain intact draining dark bilious fluid.  EXTREMITIES: +1 left pedal edema.  R AKA  2+ Peripheral Pulses, No clubbing, cyanosis  LYMPH: No lymphadenopathy noted  SKIN: Stage 1 coccyx      LABS:                    [  ]  DVT Prophylaxis  [  ]  Nutrition, Brand, Rate         Goal Rate        Abnormal Nutritional Status -  Malnutrition   Cachexia          RADIOLOGY & ADDITIONAL STUDIES:  ***  < from: Xray Chest 1 View- PORTABLE-Routine (Xray Chest 1 View- PORTABLE-Routine .) (07.03.21 @ 11:50) >  The lungs show increasing bilateral effusions and/or LEFT greater than RIGHT basilar airspace consolidations obscuring diaphragmatic contours.  There is of a diffuse vascular congestion and/or mild bilateral diffuse airspace disease.  .  . No pneumothorax.    The heart and mediastinum size and configuration are within normal limits.    Visualized osseous structures are intact.    IMPRESSION: Bilateral pleural effusions and/or LEFT greater than RIGHT basilar airspace consolidation.    < end of copied text >  `< from: CT Head No Cont (07.08.21 @ 19:30) >  The ventricles and sulci are prominent, compatible with age-related generalized cerebral volume loss.   There is no CT evidence for acute cerebral cortical infarct. There is no evidence of hemorrhage. There is periventricular and subcortical white matter hypoattenuation,  most compatible with chronic microvascular ischemic changes.   No mass effect is found in the brain.  There is no midlineshift or herniation pattern.      The visualized portions of the paranasal sinuses and mastoid air cells are clear.    IMPRESSION:    No evidence of acute intracranial abnormality.  No evidence of hemorrhage.    Chronic changes as above.    < end of copied text >  < from: CT Chest No Cont (06.28.21 @ 11:59) >  CT of the Chest, Abdomen and Pelvis was performed.  Sagittal and coronal reformats were performed.    FINDINGS:  CHEST:  LUNGS AND LARGE AIRWAYS: Patent central airways. Patchy bibasilar dependent consolidation/atelectasis right greater than left. Correlate clinically for infection. biapical scarring  PLEURA: No pleural effusion.  VESSELS: Nonaneurysmal.  HEART: Mild cardiomegaly with coronary artery calcification No pericardial effusion.  MEDIASTINUM AND CLARICE: No lymphadenopathy.  CHEST WALL AND LOWER NECK: Nonspecific left breast calcification..    ABDOMEN AND PELVIS:  LIVER: Within normal limits.  BILE DUCTS: Marked dilatation common duct up to 1.7 cm mild intrahepatic biliary dilatation. The distal common duct obstructing stone and/or lesion is considered. Correlate with MR.  GALLBLADDER: Gallstones. Markedly distended gallbladder with pericholecystic fluid concerning for cholecystitis. Correlate with right upper quadrant ultrasound and/or HIDA.  SPLEEN: Within normal limits.  PANCREAS: Not well evaluated on this noncontrast study. However, there is diffuse atrophy of the body and tail with dilatation of the main pancreatic duct. Increased soft tissue density in the pancreatic head and uncinate process suspicious for a mass. Recommend  MRI  ADRENALS: Within normal limits.  KIDNEYS/URETERS: Punctate nonobstructive right renal calculus    BLADDER: Collapsed around a Bianchi.  REPRODUCTIVE ORGANS: No gynecologic mass    BOWEL: No bowel obstruction. Appendix no appendicitis  PERITONEUM: No ascites.  VESSELS: Nonaneurysmal  RETROPERITONEUM/LYMPH NODES: No lymphadenopathy.  ABDOMINAL WALL: Within normal limits.  BONES: No aggressive lesions    IMPRESSION:  Limited by lack of any exogenousoral or intravenous contrast  Patchy bibasilar dependent consolidation/atelectasis. Correlate clinically for infection.  Cholelithiasis, pericholecystic fluid concerning for cholecystitis. Correlate with ultrasound and/or HIDA scan.  Significant biliary dilatation, possible mass pancreatic head. Correlate with MRCP/abdominal MR with pancreatic protocol            < end of copied text >    Goals of Care Discussion with Family/Proxy/Other   - see note from 7/09

## 2021-07-12 NOTE — PROGRESS NOTE ADULT - SUBJECTIVE AND OBJECTIVE BOX
Time of Visit:  Patient seen and examined.     MEDICATIONS  (STANDING):  ALBUTerol    90 MICROgram(s) HFA Inhaler 1 Puff(s) Inhalation every 4 hours  amLODIPine   Tablet 5 milliGRAM(s) Oral daily  apixaban 5 milliGRAM(s) Oral every 12 hours  cefepime   IVPB      cefepime   IVPB 1000 milliGRAM(s) IV Intermittent every 24 hours  dextrose 40% Gel 15 Gram(s) Oral once  dextrose 5%. 1000 milliLiter(s) (50 mL/Hr) IV Continuous <Continuous>  dextrose 5%. 1000 milliLiter(s) (100 mL/Hr) IV Continuous <Continuous>  dextrose 50% Injectable 25 Gram(s) IV Push once  dextrose 50% Injectable 12.5 Gram(s) IV Push once  dextrose 50% Injectable 25 Gram(s) IV Push once  ferrous    sulfate 325 milliGRAM(s) Oral daily  glucagon  Injectable 1 milliGRAM(s) IntraMuscular once  hydrALAZINE 25 milliGRAM(s) Oral four times a day  insulin glargine Injectable (LANTUS) 6 Unit(s) SubCutaneous every morning  insulin lispro (ADMELOG) corrective regimen sliding scale   SubCutaneous three times a day before meals  insulin lispro (ADMELOG) corrective regimen sliding scale   SubCutaneous at bedtime  metoprolol tartrate 50 milliGRAM(s) Oral two times a day  metroNIDAZOLE  IVPB      metroNIDAZOLE  IVPB 500 milliGRAM(s) IV Intermittent every 8 hours  pantoprazole  Injectable 40 milliGRAM(s) IV Push at bedtime  predniSONE   Tablet 40 milliGRAM(s) Oral daily  senna 2 Tablet(s) Oral at bedtime      MEDICATIONS  (PRN):  acetaminophen   Tablet .. 650 milliGRAM(s) Oral every 6 hours PRN Temp greater or equal to 38C (100.4F), Mild Pain (1 - 3)  ALBUTerol    90 MICROgram(s) HFA Inhaler 2 Puff(s) Inhalation every 6 hours PRN Wheezing  sodium chloride 0.9% lock flush 10 milliLiter(s) IV Push every 1 hour PRN Pre/post blood products, medications, blood draw, and to maintain line patency       Medications up to date at time of exam.      PHYSICAL EXAMINATION:    Vital Signs Last 24 Hrs  T(C): 36.8 (12 Jul 2021 06:16), Max: 37.1 (11 Jul 2021 20:44)  T(F): 98.2 (12 Jul 2021 06:16), Max: 98.7 (11 Jul 2021 20:44)  HR: 104 (12 Jul 2021 06:16) (89 - 106)  BP: 164/92 (12 Jul 2021 06:16) (153/81 - 164/92)  BP(mean): --  RR: 20 (12 Jul 2021 06:16) (18 - 20)  SpO2: 95% (12 Jul 2021 06:16) (93% - 95%)   (if applicable)  General: Non verbal on exam  . No acute distress.     HEENT: Head is normocephalic and atraumatic. No nasal tenderness . Has eye tracking . Moist mucosa .     NECK: Supple, no palpable adenopathy.    LUNGS: Decreased breath sounds , non labored. No use of accessory muscle.     HEART: S1 S2 Regular rate and no click/rub .    ABDOMEN: Soft  and nondistended. Active bowel sounds.     NEUROLOGIC: Forgetful  .  Non verbal on exam.     SKIN: Warm and moist. Non diaphoretic.     LABS:    MICROBIOLOGY: (if applicable)    RADIOLOGY & ADDITIONAL STUDIES:  EKG:   CXR:  ECHO:    IMPRESSION: 76y Female PAST MEDICAL & SURGICAL HISTORY:  Myocardial infarct, old    Hypercholesterolemia    Peripheral vascular disease    COPD (chronic obstructive pulmonary disease)    Bipolar 1 disorder    HTN (hypertension)    Bedbound    S/P CABG x 1    Above knee amputation of right lower extremity     mpression; 77 Y/O Female from [facility] presented to ED with unresponsiveness and Hypotension. Was admitted to ICU for Septic Shock and received  vasopressor. Source of infection likely due to Acute Cholecystitis as found on CT chest with bilateral infiltrates/ consolidations. Patient underwent on 06-29-21 IR guided Cholecystectomy with drainage Bile. Patient gradually improved Hypotension . 06-28-21 Negative for Covid 19 PCR and Negative Blood Cx x 2.  07-03-21 CXR with Bilateral Pleural Effusions , Left > Rt basilar airspace consolidation and patient received Lasix .  Has COPD and on Oxygen dependent.       Suggestion:  O2 saturation 95% room air with O2 supplement 2L NC. Continue Oxygen supplementation 2L NC to keep O2 saturation >90%.   Continue Albuterol 1 Puff but Q 6 Hours.  Discontinue PRN Albuterol 2 puffs Q 6 Hours.   Pulmonary oral hygiene care especially every after oral inhaler used.   Continue Prednisone 40 mg oral Daily  .   Aspiration precautions especially during meal times.   DVT/ GI prophylactic.

## 2021-07-12 NOTE — PROGRESS NOTE ADULT - ASSESSMENT
76 year old female from [facility] with PMHx of PVD, right AKA (S/P fem pop bypass), PAF, COPD, bipolar disorder, hypertension and PSHx of S/P CABG x1 brought into the ED via EMS from [facility] for unresponsiveness and hypotension. Per NH supervisor Mr. Gaspar () Pt was found unresponsive today morning around 8 am, pt was fine last night, pt did not have fever, difficulty breathing, pt was only receptive to painful stimuli and on evaluation pt's blood pressure was low per papers Pt's bp was 62/38. Pt was sent to the hospital for further evaluation. Baseline mental status alert, oriented and follows instruction, non ambulatory at baseline.     ED course: Alicia was placed and pt was given 3L ivf s/p urine out put of 40cc, u/a positive for infection, CT chest showing b/l patchy infiltrates/ consolidations, concern for cholecystitis, with dilated bile duct of 1.7 cm, and concern of pancreatic head mass. Pt was given a dose of vancomycin and rocephin. Pt's blood pressure improve to 97/58, hr 109 bpm, ECG showing sinus tachy, RBBB, LAFB, Bifacicular block.     Patient admitted to the ICU for septic shock requiring vasopressors. Source of infection likely acute cholecystitis as demonstrated on CT abdomen vs UTI. Patient was started on levophed, noted with severe acidosis requiring bicarb drip and VANESSA, likely pre-renal. Patient was started on cefepime and flagyl for sepsis 2/2 cholecystitis. Lactate acidosis was started on bicarb gtt. VANESSA likely pre-renal obtained multiple boluses with low urine output. Patient gradually improved with resolution of hypotension, was able to taper off pressors, bicarb improved was able to dc bicarb gtt. Urine output gradually increased. on 6/29, patient underwent IR guided cholecystostomy, with drainage of bile. Evaluated by GI after, recommended MRCP. Unable to obtain MRCP as of 6/30 due to altered mental status suspected to be due to gabapentin toxicity in the setting of VANESSA. Unstable for MRCP, will continue monitoring mental status. Mental status improved 7/1-7/2, pt aaox2 to self and location. Seen by speech and swallow, recommends mechanical soft thin liquid diet.     7/2 pt downgrade to SCU, failed TOV, alicia reinserted   7/5. Pending  MRCP ordered. Hypernatremia worsening likely due to poor po intake, started on D5W per nephro recs  7/8- MRCP report noted with mass in the head of the pancreas most concerning for adenocarcinoma.  GI following. B/l lungs fields w/ rales on exam and LLE w/ 2+ edema.  BP remained 160- 170- hydralazine increased to QID, will give one dose of Lasix 40mg for effusion. Will request for sx and GI to comment on MRCP findings.   7/9 Remains hypernatremic, Na+ 149. Continue D5W at 60ml/hr , daily Lasix 20mg IV. Surgery recs metastatic w/u with CT abd/pelvis with IV contrast. D/w Nephro Dr. Noriega. Monitor SCr , and consider CT with IV contrast on Monday if SCr improved (goal <1.5).   7/9 Family has decided on comfort care. No more labs.  7/11- Resting comfortable w/o visible signs of respiration distress or pain. Will continue to monitor.

## 2021-07-13 NOTE — PROGRESS NOTE ADULT - ASSESSMENT
seen and examined on bed comfortable  being fed by RN  little bit drinks  not in any distress   vss atble afebrile   physical done  lungs clear  abd soft bs nml non tender  cholecystostomy bag there is some drain  no new labs   AMS  s/p ARF, Cholicystitis s/p drain  afebrile  MS not changed to nml  had ctscan   on hospice  seen and examined on bed comfortable  being fed by RN  little bit drinks  not in any distress   vss atble afebrile   physical done  lungs clear  abd soft bs nml non tender  cholecystostomy bag there is some drain  no new labs   AMS  s/p ARF, Cholicystitis s/p drain  afebrile  MS not changed to nml  had ctscan   pancraetic mass as per HCP no intervention  no tests   on hospice

## 2021-07-13 NOTE — PROGRESS NOTE ADULT - PROBLEM SELECTOR PLAN 2
CT with acute cholecystitis with common duct up to 1.7 cm mild intrahepatic biliary dilatation. The distal common duct obstructing stone and lesion  S/P Cholecystectomy drain, no growth on culture  BCx negative  Continue antibiotics  MRCP- showed pancreatic mass   GI Dr Pinon following.   Family wants no further work up.   F/U on when hector tube can be removed.

## 2021-07-13 NOTE — PROGRESS NOTE ADULT - SUBJECTIVE AND OBJECTIVE BOX
HPI:  76 year old female from [facility] with PMHx of PVD, right AKA (S/P fem pop bypass), PAF, COPD, bipolar disorder, hypertension and PSHx of S/P CABG x1 brought into the ED via EMS from [facility] for unresponsiveness and hypotension. Per NH supervisor Mr. Gaspar () Pt was found unresponsive today morning around 8 am, pt was fine last night, pt did not have fever, difficulty breathing, pt was only receptive to painful stimuli and on evaluation pt's blood pressure was low per papers Pt's bp was 62/38. Pt was sent to the hospital for further evaluation. Baseline mental status alert, oriented and follows instruction, non ambulatory at baseline.   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 Pt was given a dose of vancomycin and rocephine. Pt's blood pressure improve to 97/58, hr 109 bpm, ECG showing sinus tachy, RBBB, LAFB, Bifacicular block.    (28 Jun 2021 14:58)      Patient is a 76y old  Female who presents with a chief complaint of Sepsis (13 Jul 2021 08:06)      INTERVAL HPI/OVERNIGHT EVENTS:  T(C): 37.1 (07-13-21 @ 14:00), Max: 37.6 (07-12-21 @ 14:28)  HR: 88 (07-13-21 @ 14:00) (77 - 95)  BP: 152/76 (07-13-21 @ 14:00) (144/73 - 170/79)  RR: 20 (07-13-21 @ 14:00) (18 - 20)  SpO2: 98% (07-13-21 @ 14:00) (95% - 98%)  Wt(kg): --  I&O's Summary    12 Jul 2021 07:01  -  13 Jul 2021 07:00  --------------------------------------------------------  IN: 0 mL / OUT: 110 mL / NET: -110 mL        REVIEW OF SYSTEMS: denies fever, chills, SOB, palpitations, chest pain, abdominal pain, nausea, vomitting, diarrhea, constipation, dizziness    MEDICATIONS  (STANDING):  ALBUTerol    90 MICROgram(s) HFA Inhaler 1 Puff(s) Inhalation every 4 hours  amLODIPine   Tablet 10 milliGRAM(s) Oral daily  cefepime   IVPB      cefepime   IVPB 1000 milliGRAM(s) IV Intermittent every 24 hours  hydrALAZINE 25 milliGRAM(s) Oral four times a day  metoprolol tartrate 50 milliGRAM(s) Oral two times a day  metroNIDAZOLE  IVPB 500 milliGRAM(s) IV Intermittent every 8 hours  metroNIDAZOLE  IVPB      pantoprazole  Injectable 40 milliGRAM(s) IV Push at bedtime  predniSONE   Tablet 40 milliGRAM(s) Oral daily  senna 2 Tablet(s) Oral at bedtime    MEDICATIONS  (PRN):  acetaminophen   Tablet .. 650 milliGRAM(s) Oral every 6 hours PRN Temp greater or equal to 38C (100.4F), Mild Pain (1 - 3)  ALBUTerol    90 MICROgram(s) HFA Inhaler 2 Puff(s) Inhalation every 6 hours PRN Wheezing  sodium chloride 0.9% lock flush 10 milliLiter(s) IV Push every 1 hour PRN Pre/post blood products, medications, blood draw, and to maintain line patency      PHYSICAL EXAM:  GENERAL: NAD, well-groomed, well-developed  HEAD:  Atraumatic, Normocephalic  EYES: EOMI, PERRLA, conjunctiva and sclera clear  ENMT: No tonsillar erythema, exudates, or enlargement; Moist mucous membranes, Good dentition, No lesions  NECK: Supple, No JVD, Normal thyroid  NERVOUS SYSTEM:  Alert & Oriented X3, Good concentration; Motor Strength 5/5 B/L upper and lower extremities; DTRs 2+ intact and symmetric  CHEST/LUNG: Clear to percussion bilaterally; No rales, rhonchi, wheezing, or rubs  HEART: Regular rate and rhythm; No murmurs, rubs, or gallops  ABDOMEN: Soft, Nontender, Nondistended; Bowel sounds present  EXTREMITIES:  2+ Peripheral Pulses, No clubbing, cyanosis, or edema  LYMPH: No lymphadenopathy noted  SKIN: No rashes or lesions  LABS:              CAPILLARY BLOOD GLUCOSE      POCT Blood Glucose.: 280 mg/dL (13 Jul 2021 12:05)  POCT Blood Glucose.: 167 mg/dL (13 Jul 2021 07:39)  POCT Blood Glucose.: 104 mg/dL (12 Jul 2021 21:56)  POCT Blood Glucose.: 172 mg/dL (12 Jul 2021 17:00)

## 2021-07-13 NOTE — PROGRESS NOTE ADULT - PROBLEM SELECTOR PLAN 3
CT as above  choley tube in place   MRCP report above  Metastatic workup .   No further work up as per HCP no

## 2021-07-13 NOTE — PROGRESS NOTE ADULT - SUBJECTIVE AND OBJECTIVE BOX
MERARY MEYER    SCU progress note    INTERVAL HPI/OVERNIGHT EVENTS: ***No overnight events.    DNR [x ]   DNI  [x  ]   MEWS SUSPENDED    Covid - 19 PCR: Negative 6/28    The 4Ms    What Matters Most: see [facility]  Age appropriate Medications/Screen for High Risk Medication: Yes  Mentation: see CAM below  Mobility: defer to physical exam    The Confusion Assessment Method (CAM) Diagnostic Algorithm     1: Acute Onset or Fluctuating Course  - Is there evidence of an acute change in mental status from the patient’s baseline? Did the (abnormal) behavior  fluctuate during the day, that is, tend to come and go, or increase and decrease in severity?  [ ] YES [x ] NO     2: Inattention  - Did the patient have difficulty focusing attention, being easily distractible, or having difficulty keeping track of what was being said?  [ ] YES [ ] NO   Unable to access     3: Disorganized thinking  -Was the patient’s thinking disorganized or incoherent, such as rambling or irrelevant conversation, unclear or illogical flow of ideas, or unpredictable switching from subject to subject?  [ ] YES [ ] NO   Unable to access    4: Altered Level of consciousness?  [ ] YES [ ] NO    The diagnosis of delirium by CAM requires the presence of features 1 and 2 and either 3 or 4.    PRESSORS: [ ] YES [ ] NO  cefepime   IVPB      cefepime   IVPB 1000 milliGRAM(s) IV Intermittent every 24 hours  metroNIDAZOLE  IVPB 500 milliGRAM(s) IV Intermittent every 8 hours  metroNIDAZOLE  IVPB        Cardiovascular:  Heart Failure  Acute   Acute on Chronic  Chronic       amLODIPine   Tablet 10 milliGRAM(s) Oral daily  hydrALAZINE 25 milliGRAM(s) Oral four times a day  metoprolol tartrate 50 milliGRAM(s) Oral two times a day    Pulmonary:  ALBUTerol    90 MICROgram(s) HFA Inhaler 1 Puff(s) Inhalation every 4 hours  ALBUTerol    90 MICROgram(s) HFA Inhaler 2 Puff(s) Inhalation every 6 hours PRN    Hematalogic:  apixaban 5 milliGRAM(s) Oral every 12 hours    Other:  acetaminophen   Tablet .. 650 milliGRAM(s) Oral every 6 hours PRN  dextrose 40% Gel 15 Gram(s) Oral once  dextrose 5%. 1000 milliLiter(s) IV Continuous <Continuous>  dextrose 5%. 1000 milliLiter(s) IV Continuous <Continuous>  dextrose 50% Injectable 25 Gram(s) IV Push once  dextrose 50% Injectable 12.5 Gram(s) IV Push once  dextrose 50% Injectable 25 Gram(s) IV Push once  ferrous    sulfate 325 milliGRAM(s) Oral daily  glucagon  Injectable 1 milliGRAM(s) IntraMuscular once  insulin glargine Injectable (LANTUS) 6 Unit(s) SubCutaneous every morning  insulin lispro (ADMELOG) corrective regimen sliding scale   SubCutaneous three times a day before meals  insulin lispro (ADMELOG) corrective regimen sliding scale   SubCutaneous at bedtime  pantoprazole  Injectable 40 milliGRAM(s) IV Push at bedtime  predniSONE   Tablet 40 milliGRAM(s) Oral daily  senna 2 Tablet(s) Oral at bedtime  sodium chloride 0.9% lock flush 10 milliLiter(s) IV Push every 1 hour PRN    acetaminophen   Tablet .. 650 milliGRAM(s) Oral every 6 hours PRN  ALBUTerol    90 MICROgram(s) HFA Inhaler 1 Puff(s) Inhalation every 4 hours  ALBUTerol    90 MICROgram(s) HFA Inhaler 2 Puff(s) Inhalation every 6 hours PRN  amLODIPine   Tablet 10 milliGRAM(s) Oral daily  apixaban 5 milliGRAM(s) Oral every 12 hours  cefepime   IVPB      cefepime   IVPB 1000 milliGRAM(s) IV Intermittent every 24 hours  dextrose 40% Gel 15 Gram(s) Oral once  dextrose 5%. 1000 milliLiter(s) IV Continuous <Continuous>  dextrose 5%. 1000 milliLiter(s) IV Continuous <Continuous>  dextrose 50% Injectable 25 Gram(s) IV Push once  dextrose 50% Injectable 12.5 Gram(s) IV Push once  dextrose 50% Injectable 25 Gram(s) IV Push once  ferrous    sulfate 325 milliGRAM(s) Oral daily  glucagon  Injectable 1 milliGRAM(s) IntraMuscular once  hydrALAZINE 25 milliGRAM(s) Oral four times a day  insulin glargine Injectable (LANTUS) 6 Unit(s) SubCutaneous every morning  insulin lispro (ADMELOG) corrective regimen sliding scale   SubCutaneous three times a day before meals  insulin lispro (ADMELOG) corrective regimen sliding scale   SubCutaneous at bedtime  metoprolol tartrate 50 milliGRAM(s) Oral two times a day  metroNIDAZOLE  IVPB      metroNIDAZOLE  IVPB 500 milliGRAM(s) IV Intermittent every 8 hours  pantoprazole  Injectable 40 milliGRAM(s) IV Push at bedtime  predniSONE   Tablet 40 milliGRAM(s) Oral daily  senna 2 Tablet(s) Oral at bedtime  sodium chloride 0.9% lock flush 10 milliLiter(s) IV Push every 1 hour PRN    Drug Dosing Weight  Height (cm): 170.2 (28 Jun 2021 10:15)  Weight (kg): 66.9 (28 Jun 2021 16:29)  BMI (kg/m2): 23.1 (28 Jun 2021 16:29)  BSA (m2): 1.78 (28 Jun 2021 16:29)    CENTRAL LINE: [ ] YES [x ] NO  LOCATION:   DATE INSERTED:  REMOVE: [ ] YES [ ] NO  EXPLAIN:    BIANCHI: [x ] YES [ ] NO    DATE INSERTED:  REMOVE:  [ ] YES [x ] NO  EXPLAIN:   Chronic    PAST MEDICAL & SURGICAL HISTORY:  Myocardial infarct, old    Hypercholesterolemia    Peripheral vascular disease    COPD (chronic obstructive pulmonary disease)    Bipolar 1 disorder    HTN (hypertension)    Bedbound    S/P CABG x 1    Above knee amputation of right lower extremity                07-12 @ 07:01  -  07-13 @ 07:00  --------------------------------------------------------  IN: 0 mL / OUT: 110 mL / NET: -110 mL            PHYSICAL EXAM:    GENERAL: NAD, well-groomed, well-developed  HEAD:  Atraumatic, Normocephalic  EYES: EOMI, PERRLA, conjunctiva and sclera clear  ENMT: No tonsillar erythema, exudates  NECK: Supple, No JVD  NERVOUS SYSTEM:  Awake and alert. Orientated X 1. Does not follow commands  CHEST/LUNG: Diminished breath sounds bilaterally with bibasilar crackles  HEART: Regular rate and rhythm; No murmurs, rubs, or gallops  ABDOMEN: Soft, Nontender, Nondistended; Bowel sounds present; Right biliary drain intact draining dark bilious fluid.  EXTREMITIES: Right AKA  2+ Peripheral Pulses, No clubbing, cyanosis, or edema  LYMPH: No lymphadenopathy noted  SKIN: Stage 1 coccyx      LABS:                    [  ]  DVT Prophylaxis  [  ]  Nutrition, Brand, Rate         Goal Rate        Abnormal Nutritional Status -  Malnutrition   Cachexia          RADIOLOGY & ADDITIONAL STUDIES:  ***  < from: Xray Chest 1 View- PORTABLE-Routine (Xray Chest 1 View- PORTABLE-Routine .) (07.03.21 @ 11:50) >  The lungs show increasing bilateral effusions and/or LEFT greater than RIGHT basilar airspace consolidations obscuring diaphragmatic contours.  There is of a diffuse vascular congestion and/or mild bilateral diffuse airspace disease.  .  . No pneumothorax.    The heart and mediastinum size and configuration are within normal limits.    Visualized osseous structures are intact.    IMPRESSION: Bilateral pleural effusions and/or LEFT greater than RIGHT basilar airspace consolidation.    < end of copied text >  < from: CT Head No Cont (07.08.21 @ 19:30) >  The ventricles and sulci are prominent, compatible with age-related generalized cerebral volume loss.   There is no CT evidence for acute cerebral cortical infarct. There is no evidence of hemorrhage. There is periventricular and subcortical white matter hypoattenuation,  most compatible with chronic microvascular ischemic changes.   No mass effect is found in the brain.  There is no midlineshift or herniation pattern.      The visualized portions of the paranasal sinuses and mastoid air cells are clear.    IMPRESSION:    No evidence of acute intracranial abnormality.  No evidence of hemorrhage.      < end of copied text >  < from: CT Chest No Cont (06.28.21 @ 11:59) >  CHEST:  LUNGS AND LARGE AIRWAYS: Patent central airways. Patchy bibasilar dependent consolidation/atelectasis right greater than left. Correlate clinically for infection. biapical scarring  PLEURA: No pleural effusion.  VESSELS: Nonaneurysmal.  HEART: Mild cardiomegaly with coronary artery calcification No pericardial effusion.  MEDIASTINUM AND CLARICE: No lymphadenopathy.  CHEST WALL AND LOWER NECK: Nonspecific left breast calcification..    ABDOMEN AND PELVIS:  LIVER: Within normal limits.  BILE DUCTS: Marked dilatation common duct up to 1.7 cm mild intrahepatic biliary dilatation. The distal common duct obstructing stone and/or lesion is considered. Correlate with MR.  GALLBLADDER: Gallstones. Markedly distended gallbladder with pericholecystic fluid concerning for cholecystitis. Correlate with right upper quadrant ultrasound and/or HIDA.  SPLEEN: Within normal limits.  PANCREAS: Not well evaluated on this noncontrast study. However, there is diffuse atrophy of the body and tail with dilatation of the main pancreatic duct. Increased soft tissue density in the pancreatic head and uncinate process suspicious for a mass. Recommend  MRI  ADRENALS: Within normal limits.  KIDNEYS/URETERS: Punctate nonobstructive right renal calculus    BLADDER: Collapsed around a Bianchi.  REPRODUCTIVE ORGANS: No gynecologic mass    BOWEL: No bowel obstruction. Appendix no appendicitis  PERITONEUM: No ascites.  VESSELS: Nonaneurysmal  RETROPERITONEUM/LYMPH NODES: No lymphadenopathy.  ABDOMINAL WALL: Within normal limits.  BONES: No aggressive lesions    IMPRESSION:  Limited by lack of any exogenousoral or intravenous contrast  Patchy bibasilar dependent consolidation/atelectasis. Correlate clinically for infection.  Cholelithiasis, pericholecystic fluid concerning for cholecystitis. Correlate with ultrasound and/or HIDA scan.  Significant biliary dilatation, possible mass pancreatic head. Correlate with MRCP/abdominal MR with pancreatic protocol        < end of copied text >    Goals of Care Discussion with Family/Proxy/Other   - see note from 7/9

## 2021-07-13 NOTE — PROGRESS NOTE ADULT - PROBLEM SELECTOR PLAN 9
MEWS suspended.  No further lab work.  No IVF. Continue antibiotics.  See [facility] 7/9  HCP requesting Hospice. Does not want patient to return to [facility]. CM and SW aware.

## 2021-07-13 NOTE — PROGRESS NOTE ADULT - ATTENDING COMMENTS
-pat is DNR/ DNI  -family refused metastatic work up  -placement pending family choice on facility   -today they requested to stop all meds except antibx

## 2021-07-13 NOTE — PROGRESS NOTE ADULT - ASSESSMENT
76 year old female from [facility] with PMHx of PVD, right AKA (S/P fem pop bypass), PAF, COPD, bipolar disorder, hypertension and PSHx of S/P CABG x1 brought into the ED via EMS from [facility] for unresponsiveness and hypotension. Per NH supervisor Mr. Gaspar () Pt was found unresponsive today morning around 8 am, pt was fine last night, pt did not have fever, difficulty breathing, pt was only receptive to painful stimuli and on evaluation pt's blood pressure was low per papers Pt's bp was 62/38. Pt was sent to the hospital for further evaluation. Baseline mental status alert, oriented and follows instruction, non ambulatory at baseline.     ED course: Alicia was placed and pt was given 3L ivf s/p urine out put of 40cc, u/a positive for infection, CT chest showing b/l patchy infiltrates/ consolidations, concern for cholecystitis, with dilated bile duct of 1.7 cm, and concern of pancreatic head mass. Pt was given a dose of vancomycin and rocephin. Pt's blood pressure improve to 97/58, hr 109 bpm, ECG showing sinus tachy, RBBB, LAFB, Bifacicular block.     Patient admitted to the ICU for septic shock requiring vasopressors. Source of infection likely acute cholecystitis as demonstrated on CT abdomen vs UTI. Patient was started on levophed, noted with severe acidosis requiring bicarb drip and VANESSA, likely pre-renal. Patient was started on cefepime and flagyl for sepsis 2/2 cholecystitis. Lactate acidosis was started on bicarb gtt. VANESSA likely pre-renal obtained multiple boluses with low urine output. Patient gradually improved with resolution of hypotension, was able to taper off pressors, bicarb improved was able to dc bicarb gtt. Urine output gradually increased. on 6/29, patient underwent IR guided cholecystostomy, with drainage of bile. Evaluated by GI after, recommended MRCP. Unable to obtain MRCP as of 6/30 due to altered mental status suspected to be due to gabapentin toxicity in the setting of VANESSA. Unstable for MRCP, will continue monitoring mental status. Mental status improved 7/1-7/2, pt aaox2 to self and location. Seen by speech and swallow, recommends mechanical soft thin liquid diet.     7/2 pt downgrade to SCU, failed TOV, alicia reinserted   7/5. Pending  MRCP ordered. Hypernatremia worsening likely due to poor po intake, started on D5W per nephro recs  7/8- MRCP report noted with mass in the head of the pancreas most concerning for adenocarcinoma.  GI following. B/l lungs fields w/ rales on exam and LLE w/ 2+ edema.  BP remained 160- 170- hydralazine increased to QID, will give one dose of Lasix 40mg for effusion. Will request for sx and GI to comment on MRCP findings.   7/9 Remains hypernatremic, Na+ 149. Continue D5W at 60ml/hr , daily Lasix 20mg IV. Surgery recs metastatic w/u with CT abd/pelvis with IV contrast. D/w Nephro Dr. Noriega. Monitor SCr , and consider CT with IV contrast on Monday if SCr improved (goal <1.5).   7/9 Family has decided on comfort care. No more labs.  7/11- Resting comfortable w/o visible signs of respiration distress or pain. Will continue to monitor.

## 2021-07-14 NOTE — PROGRESS NOTE ADULT - ASSESSMENT
76 year old female from [facility] with PMHx of PVD, right AKA (S/P fem pop bypass), PAF, COPD, bipolar disorder, hypertension and PSHx of S/P CABG x1 brought into the ED via EMS from [facility] for unresponsiveness and hypotension. Per NH supervisor Mr. Gaspar () Pt was found unresponsive today morning around 8 am, pt was fine last night, pt did not have fever, difficulty breathing, pt was only receptive to painful stimuli and on evaluation pt's blood pressure was low per papers Pt's bp was 62/38. Pt was sent to the hospital for further evaluation. Baseline mental status alert, oriented and follows instruction, non ambulatory at baseline.     ED course: Alicia was placed and pt was given 3L ivf s/p urine out put of 40cc, u/a positive for infection, CT chest showing b/l patchy infiltrates/ consolidations, concern for cholecystitis, with dilated bile duct of 1.7 cm, and concern of pancreatic head mass. Pt was given a dose of vancomycin and rocephin. Pt's blood pressure improve to 97/58, hr 109 bpm, ECG showing sinus tachy, RBBB, LAFB, Bifacicular block.     Patient admitted to the ICU for septic shock requiring vasopressors. Source of infection likely acute cholecystitis as demonstrated on CT abdomen vs UTI. Patient was started on levophed, noted with severe acidosis requiring bicarb drip and VANESSA, likely pre-renal. Patient was started on cefepime and flagyl for sepsis 2/2 cholecystitis. Lactate acidosis was started on bicarb gtt. VANESSA likely pre-renal obtained multiple boluses with low urine output. Patient gradually improved with resolution of hypotension, was able to taper off pressors, bicarb improved was able to dc bicarb gtt. Urine output gradually increased. on 6/29, patient underwent IR guided cholecystostomy, with drainage of bile. Evaluated by GI after, recommended MRCP. Unable to obtain MRCP as of 6/30 due to altered mental status suspected to be due to gabapentin toxicity in the setting of VANESSA. Unstable for MRCP, will continue monitoring mental status. Mental status improved 7/1-7/2, pt aaox2 to self and location. Seen by speech and swallow, recommends mechanical soft thin liquid diet.     7/2 pt downgrade to SCU, failed TOV, alicia reinserted   7/5. Pending  MRCP ordered. Hypernatremia worsening likely due to poor po intake, started on D5W per nephro recs  7/8- MRCP report noted with mass in the head of the pancreas most concerning for adenocarcinoma.  GI following. B/l lungs fields w/ rales on exam and LLE w/ 2+ edema.  BP remained 160- 170- hydralazine increased to QID, will give one dose of Lasix 40mg for effusion. Will request for sx and GI to comment on MRCP findings.   7/9 Remains hypernatremic, Na+ 149. Continue D5W at 60ml/hr , daily Lasix 20mg IV. Surgery recs metastatic w/u with CT abd/pelvis with IV contrast. D/w Nephro Dr. Noriega. Monitor SCr , and consider CT with IV contrast on Monday if SCr improved (goal <1.5).   7/9 Family has decided on comfort care. No more labs.  7/11- Resting comfortable w/o visible signs of respiration distress or pain. Will continue to monitor.

## 2021-07-14 NOTE — PROGRESS NOTE ADULT - SUBJECTIVE AND OBJECTIVE BOX
INTERVAL /OVERNIGHT EVENTS:  No new overnight events     PRESSORS: [ ] YES [x ] NO  WHICH:    ANTIBIOTICS:   Cefepime                DATE STARTED:  6/28 stopped 7/14  ANTIBIOTICS:   Metronidazole        DATE STARTED:  6/28 stopped 7/14  ANTIBIOTICS:                                DATE STARTED:    Antimicrobial:    Cardiovascular:  amLODIPine   Tablet 10 milliGRAM(s) Oral daily  hydrALAZINE 25 milliGRAM(s) Oral four times a day  metoprolol tartrate 50 milliGRAM(s) Oral two times a day    Pulmonary:  ALBUTerol    90 MICROgram(s) HFA Inhaler 1 Puff(s) Inhalation every 4 hours  ALBUTerol    90 MICROgram(s) HFA Inhaler 2 Puff(s) Inhalation every 6 hours PRN    Hematalogic:    Other:  acetaminophen   Tablet .. 650 milliGRAM(s) Oral every 6 hours PRN  pantoprazole  Injectable 40 milliGRAM(s) IV Push at bedtime  predniSONE   Tablet 40 milliGRAM(s) Oral daily  senna 2 Tablet(s) Oral at bedtime  sodium chloride 0.9% lock flush 10 milliLiter(s) IV Push every 1 hour PRN    acetaminophen   Tablet .. 650 milliGRAM(s) Oral every 6 hours PRN  ALBUTerol    90 MICROgram(s) HFA Inhaler 1 Puff(s) Inhalation every 4 hours  ALBUTerol    90 MICROgram(s) HFA Inhaler 2 Puff(s) Inhalation every 6 hours PRN  amLODIPine   Tablet 10 milliGRAM(s) Oral daily  hydrALAZINE 25 milliGRAM(s) Oral four times a day  metoprolol tartrate 50 milliGRAM(s) Oral two times a day  pantoprazole  Injectable 40 milliGRAM(s) IV Push at bedtime  predniSONE   Tablet 40 milliGRAM(s) Oral daily  senna 2 Tablet(s) Oral at bedtime  sodium chloride 0.9% lock flush 10 milliLiter(s) IV Push every 1 hour PRN    Drug Dosing Weight  Height (cm): 170.2 (28 Jun 2021 10:15)  Weight (kg): 66.9 (28 Jun 2021 16:29)  BMI (kg/m2): 23.1 (28 Jun 2021 16:29)  BSA (m2): 1.78 (28 Jun 2021 16:29)    CENTRAL LINE: [ ] YES [ ] NO  LOCATION:   DATE INSERTED:  REMOVE: [ ] YES [ ] NO  EXPLAIN:    ALICIA: [ ] YES [ ] NO    DATE INSERTED:  REMOVE:  [ ] YES [ ] NO  EXPLAIN:    A-LINE:  [ ] YES [ ] NO  LOCATION:   DATE INSERTED:  REMOVE:  [ ] YES [ ] NO  EXPLAIN:    PMH -reviewed admission note, no change since admission  Heart faliure: acute [ ] chronic [ ] acute or chronic [ ] diastolic [ ] systolic [ ] combied systolic and diastolic[ ]  VANESSA: ATN[ ] renal medullary necrosis [ ] CKD I [ ]CKDII [ ]CKD III [ ]CKD IV [ ]CKD V [ ]Other pathological lesions [ ]  Abdominal Nutrition Status: malnutrition [ ] cachexia [ ] morbid obesity/BMI=40 [ ] Supplement ordered [___________]     ICU Vital Signs Last 24 Hrs  T(C): 37.2 (14 Jul 2021 05:31), Max: 37.2 (14 Jul 2021 05:31)  T(F): 98.9 (14 Jul 2021 05:31), Max: 98.9 (14 Jul 2021 05:31)  HR: 86 (14 Jul 2021 05:31) (85 - 88)  BP: 168/71 (14 Jul 2021 05:31) (152/76 - 172/76)  BP(mean): --  ABP: --  ABP(mean): --  RR: 18 (14 Jul 2021 05:31) (18 - 30)  SpO2: 98% (14 Jul 2021 05:31) (98% - 99%)                PHYSICAL EXAM:    GENERAL:   HEAD: atraumatic, normocephalic   EYES: PERRLA, white sclera.   ENMT: nasal mucosa- Oral cavity-   NECK: supple, JVD/ no JVD, thyroid-   LYMPH: no palpable lymph nodes     SKIN: warm, dry   CHEST/LUNG: ET tube: size        cm at lip. No Chest deformity , no chest tenderness. bilateral breath sounds,no  adventitious sounds  HEART: RRR, no m/r/g   ABDOMEN: soft, nontender, nondistended; bowel sounds.  :alicia catheter.  EXTREMITIES: +1 non-pitting edema, no cyanosis, no clubbing.  NEURO: AA0X , mood/ affect-, no focal neuro deficits        LABS:                  RADIOLOGY & ADDITIONAL STUDIES REVIEWED:     [ ]GOALS OF CARE DISCUSSION WITH PATIENT/FAMILY/PROXY:    CRITICAL CARE TIME SPENT: 35 minutes INTERVAL /OVERNIGHT EVENTS:  No new overnight events     PRESSORS: [ ] YES [x ] NO  WHICH:    ANTIBIOTICS:   Cefepime                DATE STARTED:  6/28 stopped 7/14  ANTIBIOTICS:   Metronidazole        DATE STARTED:  6/28 stopped 7/14  ANTIBIOTICS:                                DATE STARTED:    Antimicrobial:    Cardiovascular:  amLODIPine   Tablet 10 milliGRAM(s) Oral daily  hydrALAZINE 25 milliGRAM(s) Oral four times a day  metoprolol tartrate 50 milliGRAM(s) Oral two times a day    Pulmonary:  ALBUTerol    90 MICROgram(s) HFA Inhaler 1 Puff(s) Inhalation every 4 hours  ALBUTerol    90 MICROgram(s) HFA Inhaler 2 Puff(s) Inhalation every 6 hours PRN    Hematalogic:    Other:  acetaminophen   Tablet .. 650 milliGRAM(s) Oral every 6 hours PRN  pantoprazole  Injectable 40 milliGRAM(s) IV Push at bedtime  predniSONE   Tablet 40 milliGRAM(s) Oral daily  senna 2 Tablet(s) Oral at bedtime  sodium chloride 0.9% lock flush 10 milliLiter(s) IV Push every 1 hour PRN    acetaminophen   Tablet .. 650 milliGRAM(s) Oral every 6 hours PRN  ALBUTerol    90 MICROgram(s) HFA Inhaler 1 Puff(s) Inhalation every 4 hours  ALBUTerol    90 MICROgram(s) HFA Inhaler 2 Puff(s) Inhalation every 6 hours PRN  amLODIPine   Tablet 10 milliGRAM(s) Oral daily  hydrALAZINE 25 milliGRAM(s) Oral four times a day  metoprolol tartrate 50 milliGRAM(s) Oral two times a day  pantoprazole  Injectable 40 milliGRAM(s) IV Push at bedtime  predniSONE   Tablet 40 milliGRAM(s) Oral daily  senna 2 Tablet(s) Oral at bedtime  sodium chloride 0.9% lock flush 10 milliLiter(s) IV Push every 1 hour PRN    Drug Dosing Weight  Height (cm): 170.2 (28 Jun 2021 10:15)  Weight (kg): 66.9 (28 Jun 2021 16:29)  BMI (kg/m2): 23.1 (28 Jun 2021 16:29)  BSA (m2): 1.78 (28 Jun 2021 16:29)    CENTRAL LINE: [ ] YES [x ] NO  LOCATION:   DATE INSERTED:  REMOVE: [ ] YES [ ] NO  EXPLAIN:    ALICIA: [ x] YES [ ] NO    DATE INSERTED:  REMOVE:  [ ] YES [ x] NO  EXPLAIN:    A-LINE:  [ ] YES [ x] NO  LOCATION:   DATE INSERTED:  REMOVE:  [ ] YES [ ] NO  EXPLAIN:    PMH -reviewed admission note, no change since admission  Heart faliure: acute [ ] chronic [ ] acute or chronic [ ] diastolic [ ] systolic [ ] combied systolic and diastolic[ ]  VANESSA: ATN[ ] renal medullary necrosis [ ] CKD I [ ]CKDII [ ]CKD III [ ]CKD IV [ ]CKD V [ ]Other pathological lesions [ ]  Abdominal Nutrition Status: malnutrition [ ] cachexia [ ] morbid obesity/BMI=40 [ ] Supplement ordered [___________]     ICU Vital Signs Last 24 Hrs  T(C): 37.2 (14 Jul 2021 05:31), Max: 37.2 (14 Jul 2021 05:31)  T(F): 98.9 (14 Jul 2021 05:31), Max: 98.9 (14 Jul 2021 05:31)  HR: 86 (14 Jul 2021 05:31) (85 - 88)  BP: 168/71 (14 Jul 2021 05:31) (152/76 - 172/76)  BP(mean): --  ABP: --  ABP(mean): --  RR: 18 (14 Jul 2021 05:31) (18 - 30)  SpO2: 98% (14 Jul 2021 05:31) (98% - 99%)                PHYSICAL EXAM:    GENERAL:   HEAD: atraumatic, normocephalic   EYES: PERRLA, white sclera.   ENMT: nasal mucosa- Oral cavity-   NECK: supple, JVD/ no JVD, thyroid-   LYMPH: no palpable lymph nodes     SKIN: warm, dry   CHEST/LUNG:  No Chest deformity , no chest tenderness. bilateral breath sounds,no  adventitious sounds  HEART: RRR, no m/r/g   ABDOMEN: soft, nontender, nondistended; bowel sounds.  :alicia catheter.  EXTREMITIES: +1 non-pitting edema, no cyanosis, no clubbing.  NEURO: AA0X0 , unresponsive, no new focal neuro deficits        LABS:                  RADIOLOGY & ADDITIONAL STUDIES REVIEWED:     < from: CT Head No Cont (07.08.21 @ 19:30) >  IMPRESSION:    No evidence of acute intracranial abnormality.  No evidence of hemorrhage.    < end of copied text >    < from: CT Chest No Cont (06.28.21 @ 11:59) >  IMPRESSION:  Limited by lack of any exogenousoral or intravenous contrast  Patchy bibasilar dependent consolidation/atelectasis. Correlate clinically for infection.  Cholelithiasis, pericholecystic fluid concerning for cholecystitis. Correlate with ultrasound and/or HIDA scan.  Significant biliary dilatation, possible mass pancreatic head. Correlate with MRCP/abdominal MR with pancreatic protocol    < end of copied text >    < from: MR MRCP No Cont (07.06.21 @ 20:34) >  FINDINGS: The examination limited by respiratory motion artifact and lack of IV contrast.    Visualized lower chest: Mild bilateral pleural effusions. Consolidation/atelectasis of the adjacent pulmonary parenchyma.    Liver: No mass. The liver is diffusely dark on T2-weighted images. In addition, there is signal attenuation on in-phase T1-weighted gradient echo images. Findings are suggestive of hemachromatosis or hemosiderosis.  Gallbladder and bile ducts: A few small gallstones. The gallbladder wall is thickened measuring up to 5 mm. Mild biliary ductal dilatation, measuring 9 mm. No evidence for choledocholithiasis.  Pancreas: Mass in the head of the pancreas measuring 3.0 x 2.2 cm. The mass is best seen on series 4, image 17, and 18. The remaining pancreas is atrophic. The main pancreatic duct is dilated upstream to the pancreatic mass.  Spleen: Unremarkable. No splenomegaly.  Adrenal glands: Unremarkable. No mass.  Kidneys and ureters: Small brightly T2 hyperintense lesions in the kidneys bilaterally, representing probable cysts. One of the right renalcysts measures 1.7 cm (image 14 series 3) appears to have a small T2 hypointense component. No hydronephrosis.  Stomach and bowel: Visualized stomach and intestines are unremarkable.  Intraperitoneal space: No free fluid.  Arteries: No abdominal aortic aneurysm.  Bones/joints:  Unremarkable.  Soft tissues: Mild body wall edema.    IMPRESSION:  Mass in the head of the pancreas is most concerning for adenocarcinoma. Endoscopic ultrasound may be pursued for further evaluation. Its relationship with its adjacent vascular structures is difficult to evaluate without IV contrast. Dilated common bile duct and main pancreatic duct, likely due to obstruction by the pancreatic head mass.    Cholelithiasis with thickened gallbladder wall may represent acute cholecystitis. If clinically indicated, HIDA scan may be pursued for further evaluation.    1.7 cm right renal cyst as described above may be complex. Abdominal MR without and with IV contrast may be pursued to rule out malignant renal neoplasm.    < end of copied text >    [ ]GOALS OF CARE DISCUSSION WITH PATIENT/FAMILY/PROXY:    CRITICAL CARE TIME SPENT: 35 minutes MERARY MEYER    SCU progress note    INTERVAL HPI/OVERNIGHT EVENTS: *** No new overnight events     DNR [X ]   DNI  [X  ]  FULL CODE [  ] Mews Suspended    Covid - 19 PCR:  Negative 7/13    [facility] 4Ms    What Matters Most: see [facility]  Age appropriate Medications/Screen for High Risk Medication: Yes  Mentation: see CAM below  Mobility: defer to physical exam    The Confusion Assessment Method (CAM) Diagnostic Algorithm     1: Acute Onset or Fluctuating Course  - Is there evidence of an acute change in mental status from the patient’s baseline? Did the (abnormal) behavior  fluctuate during the day, that is, tend to come and go, or increase and decrease in severity?  [ ] YES [x ] NO     2: Inattention  - Did the patient have difficulty focusing attention, being easily distractible, or having difficulty keeping track of what was being said?  [ ] YES [ x] NO     3: Disorganized thinking  -Was the patient’s thinking disorganized or incoherent, such as rambling or irrelevant conversation, unclear or illogical flow of ideas, or unpredictable switching from subject to subject?  [ ] YES [x ] NO    4: Altered Level of consciousness?  [ ] YES [x ] NO    The diagnosis of delirium by CAM requires the presence of features 1 and 2 and either 3 or 4.    PRESSORS: [ ] YES [x ] NO    Cardiovascular:  Heart Failure  Acute   Acute on Chronic  Chronic       amLODIPine   Tablet 10 milliGRAM(s) Oral daily  hydrALAZINE 25 milliGRAM(s) Oral four times a day  metoprolol tartrate 50 milliGRAM(s) Oral two times a day    Pulmonary:  ALBUTerol    90 MICROgram(s) HFA Inhaler 1 Puff(s) Inhalation every 4 hours  ALBUTerol    90 MICROgram(s) HFA Inhaler 2 Puff(s) Inhalation every 6 hours PRN    Hematalogic:    Other:  acetaminophen   Tablet .. 650 milliGRAM(s) Oral every 6 hours PRN  pantoprazole  Injectable 40 milliGRAM(s) IV Push at bedtime  predniSONE   Tablet 40 milliGRAM(s) Oral daily  senna 2 Tablet(s) Oral at bedtime  sodium chloride 0.9% lock flush 10 milliLiter(s) IV Push every 1 hour PRN    acetaminophen   Tablet .. 650 milliGRAM(s) Oral every 6 hours PRN  ALBUTerol    90 MICROgram(s) HFA Inhaler 1 Puff(s) Inhalation every 4 hours  ALBUTerol    90 MICROgram(s) HFA Inhaler 2 Puff(s) Inhalation every 6 hours PRN  amLODIPine   Tablet 10 milliGRAM(s) Oral daily  hydrALAZINE 25 milliGRAM(s) Oral four times a day  metoprolol tartrate 50 milliGRAM(s) Oral two times a day  pantoprazole  Injectable 40 milliGRAM(s) IV Push at bedtime  predniSONE   Tablet 40 milliGRAM(s) Oral daily  senna 2 Tablet(s) Oral at bedtime  sodium chloride 0.9% lock flush 10 milliLiter(s) IV Push every 1 hour PRN    Drug Dosing Weight  Height (cm): 170.2 (28 Jun 2021 10:15)  Weight (kg): 66.9 (28 Jun 2021 16:29)  BMI (kg/m2): 23.1 (28 Jun 2021 16:29)  BSA (m2): 1.78 (28 Jun 2021 16:29)    CENTRAL LINE: [ ] YES [ x] NO  LOCATION:   DATE INSERTED:  REMOVE: [ ] YES [ ] NO  EXPLAIN:    BIANCHI: [ x] YES [ ] NO    DATE INSERTED:  REMOVE:  [ ] YES [ x] NO  EXPLAIN:    PAST MEDICAL & SURGICAL HISTORY:  Myocardial infarct, old    Hypercholesterolemia    Peripheral vascular disease    COPD (chronic obstructive pulmonary disease)    Bipolar 1 disorder    HTN (hypertension)    Bedbound    S/P CABG x 1    Above knee amputation of right lower extremity                      PHYSICAL EXAM:    GENERAL: NAD, well-groomed, well-developed  HEAD:  Atraumatic, Normocephalic  EYES: EOMI, PERRLA, conjunctiva and sclera clear  ENMT: No tonsillar erythema, exudates, or enlargement; Moist mucous membranes, Good dentition, No lesions  NECK: Supple, No JVD, Normal thyroid  NERVOUS SYSTEM:  Alert & Oriented X3, Good concentration; Motor Strength 5/5 B/L upper and lower extremities; DTRs 2+ intact and symmetric  CHEST/LUNG: Clear to auscultation bilaterally; No rales, rhonchi, wheezing, or rubs  HEART: Regular rate and rhythm; No murmurs, rubs, or gallops  ABDOMEN: Soft, Nontender, Nondistended; Bowel sounds present  EXTREMITIES:  2+ Peripheral Pulses, No clubbing, cyanosis, or edema  LYMPH: No lymphadenopathy noted  SKIN: No rashes or lesions      LABS:                    [  ]  DVT Prophylaxis  [  ]  Nutrition, Brand, Rate         Goal Rate        Abnormal Nutritional Status -  Malnutrition   Cachexia      Morbid Obesity BMI >/=40    RADIOLOGY & ADDITIONAL STUDIES REVIEWED:     < from: CT Head No Cont (07.08.21 @ 19:30) >  IMPRESSION:    No evidence of acute intracranial abnormality.  No evidence of hemorrhage.    < end of copied text >    < from: CT Chest No Cont (06.28.21 @ 11:59) >  IMPRESSION:  Limited by lack of any exogenousoral or intravenous contrast  Patchy bibasilar dependent consolidation/atelectasis. Correlate clinically for infection.  Cholelithiasis, pericholecystic fluid concerning for cholecystitis. Correlate with ultrasound and/or HIDA scan.  Significant biliary dilatation, possible mass pancreatic head. Correlate with MRCP/abdominal MR with pancreatic protocol    < end of copied text >    < from: MR MRCP No Cont (07.06.21 @ 20:34) >  FINDINGS: The examination limited by respiratory motion artifact and lack of IV contrast.    Visualized lower chest: Mild bilateral pleural effusions. Consolidation/atelectasis of the adjacent pulmonary parenchyma.    Liver: No mass. The liver is diffusely dark on T2-weighted images. In addition, there is signal attenuation on in-phase T1-weighted gradient echo images. Findings are suggestive of hemachromatosis or hemosiderosis.  Gallbladder and bile ducts: A few small gallstones. The gallbladder wall is thickened measuring up to 5 mm. Mild biliary ductal dilatation, measuring 9 mm. No evidence for choledocholithiasis.  Pancreas: Mass in the head of the pancreas measuring 3.0 x 2.2 cm. The mass is best seen on series 4, image 17, and 18. The remaining pancreas is atrophic. The main pancreatic duct is dilated upstream to the pancreatic mass.  Spleen: Unremarkable. No splenomegaly.  Adrenal glands: Unremarkable. No mass.  Kidneys and ureters: Small brightly T2 hyperintense lesions in the kidneys bilaterally, representing probable cysts. One of the right renalcysts measures 1.7 cm (image 14 series 3) appears to have a small T2 hypointense component. No hydronephrosis.  Stomach and bowel: Visualized stomach and intestines are unremarkable.  Intraperitoneal space: No free fluid.  Arteries: No abdominal aortic aneurysm.  Bones/joints:  Unremarkable.  Soft tissues: Mild body wall edema.    IMPRESSION:  Mass in the head of the pancreas is most concerning for adenocarcinoma. Endoscopic ultrasound may be pursued for further evaluation. Its relationship with its adjacent vascular structures is difficult to evaluate without IV contrast. Dilated common bile duct and main pancreatic duct, likely due to obstruction by the pancreatic head mass.    Cholelithiasis with thickened gallbladder wall may represent acute cholecystitis. If clinically indicated, HIDA scan may be pursued for further evaluation.    1.7 cm right renal cyst as described above may be complex. Abdominal MR without and with IV contrast may be pursued to rule out malignant renal neoplasm.    < end of copied text >    Goals of Care Discussion with Family/Proxy/Other   - see note from/family meeting set up for...    CRITICAL CARE TIME SPENT: 35 minutes MERARY MEYER    SCU progress note    INTERVAL HPI/OVERNIGHT EVENTS: *** No new overnight events     DNR [X ]   DNI  [X  ]  FULL CODE [  ] Mews Suspended    Covid - 19 PCR:  Negative 7/13    [facility] 4Ms    What Matters Most: see [facility]  Age appropriate Medications/Screen for High Risk Medication: Yes  Mentation: see CAM below  Mobility: defer to physical exam    The Confusion Assessment Method (CAM) Diagnostic Algorithm     1: Acute Onset or Fluctuating Course  - Is there evidence of an acute change in mental status from the patient’s baseline? Did the (abnormal) behavior  fluctuate during the day, that is, tend to come and go, or increase and decrease in severity?  [ ] YES [x ] NO     2: Inattention  - Did the patient have difficulty focusing attention, being easily distractible, or having difficulty keeping track of what was being said?  [ ] YES [ x] NO     3: Disorganized thinking  -Was the patient’s thinking disorganized or incoherent, such as rambling or irrelevant conversation, unclear or illogical flow of ideas, or unpredictable switching from subject to subject?  [ ] YES [x ] NO    4: Altered Level of consciousness?  [ ] YES [x ] NO    The diagnosis of delirium by CAM requires the presence of features 1 and 2 and either 3 or 4.    PRESSORS: [ ] YES [x ] NO    Cardiovascular:  Heart Failure  Acute   Acute on Chronic  Chronic       amLODIPine   Tablet 10 milliGRAM(s) Oral daily  hydrALAZINE 25 milliGRAM(s) Oral four times a day  metoprolol tartrate 50 milliGRAM(s) Oral two times a day    Pulmonary:  ALBUTerol    90 MICROgram(s) HFA Inhaler 1 Puff(s) Inhalation every 4 hours  ALBUTerol    90 MICROgram(s) HFA Inhaler 2 Puff(s) Inhalation every 6 hours PRN    Hematalogic:    Other:  acetaminophen   Tablet .. 650 milliGRAM(s) Oral every 6 hours PRN  pantoprazole  Injectable 40 milliGRAM(s) IV Push at bedtime  predniSONE   Tablet 40 milliGRAM(s) Oral daily  senna 2 Tablet(s) Oral at bedtime  sodium chloride 0.9% lock flush 10 milliLiter(s) IV Push every 1 hour PRN    acetaminophen   Tablet .. 650 milliGRAM(s) Oral every 6 hours PRN  ALBUTerol    90 MICROgram(s) HFA Inhaler 1 Puff(s) Inhalation every 4 hours  ALBUTerol    90 MICROgram(s) HFA Inhaler 2 Puff(s) Inhalation every 6 hours PRN  amLODIPine   Tablet 10 milliGRAM(s) Oral daily  hydrALAZINE 25 milliGRAM(s) Oral four times a day  metoprolol tartrate 50 milliGRAM(s) Oral two times a day  pantoprazole  Injectable 40 milliGRAM(s) IV Push at bedtime  predniSONE   Tablet 40 milliGRAM(s) Oral daily  senna 2 Tablet(s) Oral at bedtime  sodium chloride 0.9% lock flush 10 milliLiter(s) IV Push every 1 hour PRN    Drug Dosing Weight  Height (cm): 170.2 (28 Jun 2021 10:15)  Weight (kg): 66.9 (28 Jun 2021 16:29)  BMI (kg/m2): 23.1 (28 Jun 2021 16:29)  BSA (m2): 1.78 (28 Jun 2021 16:29)    CENTRAL LINE: [ ] YES [ x] NO  LOCATION:   DATE INSERTED:  REMOVE: [ ] YES [ ] NO  EXPLAIN:    BIANCHI: [ x] YES [ ] NO    DATE INSERTED:  REMOVE:  [ ] YES [ x] NO  EXPLAIN:    PAST MEDICAL & SURGICAL HISTORY:  Myocardial infarct, old    Hypercholesterolemia    Peripheral vascular disease    COPD (chronic obstructive pulmonary disease)    Bipolar 1 disorder    HTN (hypertension)    Bedbound    S/P CABG x 1    Above knee amputation of right lower extremity                      PHYSICAL EXAM:    GENERAL: NAD, well-groomed, well-developed  HEAD:  Atraumatic, Normocephalic  EYES:  PERRLA, conjunctiva and sclera clear  ENMT:  Moist mucous membranes, No lesions  NECK: Supple, No JVD  NERVOUS SYSTEM:  Unresponsive to verbal commands  CHEST/LUNG: Clear, diminished  HEART: Regular rate and rhythm; No murmurs, rubs, or gallops  ABDOMEN: Soft, Nontender, Nondistended; Bowel sounds present; R cholecystectomy drain in place  EXTREMITIES: 2+ Peripheral Pulses, R AKA  LYMPH: No lymphadenopathy noted  SKIN: Stage 1 coccyx      LABS:                    [  ]  DVT Prophylaxis  [  ]  Nutrition, Brand, Rate         Goal Rate        Abnormal Nutritional Status -  Malnutrition   Cachexia      Morbid Obesity BMI >/=40    RADIOLOGY & ADDITIONAL STUDIES REVIEWED:     < from: CT Head No Cont (07.08.21 @ 19:30) >  IMPRESSION:    No evidence of acute intracranial abnormality.  No evidence of hemorrhage.    < end of copied text >    < from: CT Chest No Cont (06.28.21 @ 11:59) >  IMPRESSION:  Limited by lack of any exogenousoral or intravenous contrast  Patchy bibasilar dependent consolidation/atelectasis. Correlate clinically for infection.  Cholelithiasis, pericholecystic fluid concerning for cholecystitis. Correlate with ultrasound and/or HIDA scan.  Significant biliary dilatation, possible mass pancreatic head. Correlate with MRCP/abdominal MR with pancreatic protocol    < end of copied text >    < from: MR MRCP No Cont (07.06.21 @ 20:34) >  FINDINGS: The examination limited by respiratory motion artifact and lack of IV contrast.    Visualized lower chest: Mild bilateral pleural effusions. Consolidation/atelectasis of the adjacent pulmonary parenchyma.    Liver: No mass. The liver is diffusely dark on T2-weighted images. In addition, there is signal attenuation on in-phase T1-weighted gradient echo images. Findings are suggestive of hemachromatosis or hemosiderosis.  Gallbladder and bile ducts: A few small gallstones. The gallbladder wall is thickened measuring up to 5 mm. Mild biliary ductal dilatation, measuring 9 mm. No evidence for choledocholithiasis.  Pancreas: Mass in the head of the pancreas measuring 3.0 x 2.2 cm. The mass is best seen on series 4, image 17, and 18. The remaining pancreas is atrophic. The main pancreatic duct is dilated upstream to the pancreatic mass.  Spleen: Unremarkable. No splenomegaly.  Adrenal glands: Unremarkable. No mass.  Kidneys and ureters: Small brightly T2 hyperintense lesions in the kidneys bilaterally, representing probable cysts. One of the right renalcysts measures 1.7 cm (image 14 series 3) appears to have a small T2 hypointense component. No hydronephrosis.  Stomach and bowel: Visualized stomach and intestines are unremarkable.  Intraperitoneal space: No free fluid.  Arteries: No abdominal aortic aneurysm.  Bones/joints:  Unremarkable.  Soft tissues: Mild body wall edema.    IMPRESSION:  Mass in the head of the pancreas is most concerning for adenocarcinoma. Endoscopic ultrasound may be pursued for further evaluation. Its relationship with its adjacent vascular structures is difficult to evaluate without IV contrast. Dilated common bile duct and main pancreatic duct, likely due to obstruction by the pancreatic head mass.    Cholelithiasis with thickened gallbladder wall may represent acute cholecystitis. If clinically indicated, HIDA scan may be pursued for further evaluation.    1.7 cm right renal cyst as described above may be complex. Abdominal MR without and with IV contrast may be pursued to rule out malignant renal neoplasm.    < end of copied text >    Goals of Care Discussion with Family/Proxy/Other   - see note from/family meeting set up for...    CRITICAL CARE TIME SPENT: 35 minutes

## 2021-07-14 NOTE — PROGRESS NOTE ADULT - PROBLEM SELECTOR PLAN 10
Pleural Effusion: Left greater than right. CXR and CT noted. S/P lasix  VANESSA:  Likely secondary to ATN from septic shock.  Patient now comfort care as per family wishes.  UTI: Continue antibiotics.  DVT and GI prophylaxis.  DNR/DNI MOLST in chart  MEWS suspended.  Comfort Care only.  Overall prognosis is extremely poor.  HCP requesting hospice.

## 2021-07-14 NOTE — PROGRESS NOTE ADULT - PROBLEM SELECTOR PLAN 3
CT as above  choley tube in place   MRCP report above  Metastatic workup .   No further work up as per HCP

## 2021-07-14 NOTE — PROGRESS NOTE ADULT - ASSESSMENT
seen and examined awake but confused cant answer questions  trying to focus eyes   trying to hold hands    vsstable afebrile physical unchaged   lung, abd cns onchanged  knee amputation  dnr dni

## 2021-07-14 NOTE — PROGRESS NOTE ADULT - SUBJECTIVE AND OBJECTIVE BOX
HPI:  76 year old female from [facility] with PMHx of PVD, right AKA (S/P fem pop bypass), PAF, COPD, bipolar disorder, hypertension and PSHx of S/P CABG x1 brought into the ED via EMS from [facility] for unresponsiveness and hypotension. Per NH supervisor Mr. Gaspar () Pt was found unresponsive today morning around 8 am, pt was fine last night, pt did not have fever, difficulty breathing, pt was only receptive to painful stimuli and on evaluation pt's blood pressure was low per papers Pt's bp was 62/38. Pt was sent to the hospital for further evaluation. Baseline mental status alert, oriented and follows instruction, non ambulatory at baseline.   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 Pt was given a dose of vancomycin and rocephine. Pt's blood pressure improve to 97/58, hr 109 bpm, ECG showing sinus tachy, RBBB, LAFB, Bifacicular block.    (28 Jun 2021 14:58)      Patient is a 76y old  Female who presents with a chief complaint of Sepsis (13 Jul 2021 08:06)      INTERVAL HPI/OVERNIGHT EVENTS:  T(C): 37.4 (07-14-21 @ 14:36), Max: 37.4 (07-14-21 @ 14:36)  HR: 86 (07-14-21 @ 14:36) (77 - 86)  BP: 133/109 (07-14-21 @ 14:36) (133/109 - 172/76)  RR: 18 (07-14-21 @ 14:36) (18 - 30)  SpO2: 98% (07-14-21 @ 14:36) (98% - 99%)  Wt(kg): --  I&O's Summary      REVIEW OF SYSTEMS: denies fever, chills, SOB, palpitations, chest pain, abdominal pain, nausea, vomitting, diarrhea, constipation, dizziness    MEDICATIONS  (STANDING):  ALBUTerol    90 MICROgram(s) HFA Inhaler 1 Puff(s) Inhalation every 4 hours  amLODIPine   Tablet 10 milliGRAM(s) Oral daily  hydrALAZINE 25 milliGRAM(s) Oral four times a day  metoprolol tartrate 50 milliGRAM(s) Oral two times a day  pantoprazole  Injectable 40 milliGRAM(s) IV Push at bedtime  predniSONE   Tablet 40 milliGRAM(s) Oral daily  senna 2 Tablet(s) Oral at bedtime    MEDICATIONS  (PRN):  acetaminophen   Tablet .. 650 milliGRAM(s) Oral every 6 hours PRN Temp greater or equal to 38C (100.4F), Mild Pain (1 - 3)  ALBUTerol    90 MICROgram(s) HFA Inhaler 2 Puff(s) Inhalation every 6 hours PRN Wheezing  sodium chloride 0.9% lock flush 10 milliLiter(s) IV Push every 1 hour PRN Pre/post blood products, medications, blood draw, and to maintain line patency      PHYSICAL EXAM:  GENERAL: NAD, well-groomed, well-developed  HEAD:  Atraumatic, Normocephalic  EYES: EOMI, PERRLA, conjunctiva and sclera clear  ENMT: No tonsillar erythema, exudates, or enlargement; Moist mucous membranes, Good dentition, No lesions  NECK: Supple, No JVD, Normal thyroid  NERVOUS SYSTEM:  Alert & Oriented X3, Good concentration; Motor Strength 5/5 B/L upper and lower extremities; DTRs 2+ intact and symmetric  CHEST/LUNG: Clear to percussion bilaterally; No rales, rhonchi, wheezing, or rubs  HEART: Regular rate and rhythm; No murmurs, rubs, or gallops  ABDOMEN: Soft, Nontender, Nondistended; Bowel sounds present  EXTREMITIES:  2+ Peripheral Pulses, No clubbing, cyanosis, or edema  LYMPH: No lymphadenopathy noted  SKIN: No rashes or lesions  LABS:              CAPILLARY BLOOD GLUCOSE      POCT Blood Glucose.: 123 mg/dL (13 Jul 2021 21:52)  POCT Blood Glucose.: 274 mg/dL (13 Jul 2021 17:12)

## 2021-07-14 NOTE — PROGRESS NOTE ADULT - PROBLEM SELECTOR PLAN 9
MEWS suspended.  No further lab work.  No IVF. Continue antibiotics.  See [facility] 7/9  HCP requesting Hospice. Does not want patient to return to [facility]. CM and SW aware.

## 2021-07-14 NOTE — PROGRESS NOTE ADULT - PROBLEM SELECTOR PLAN 2
CT with acute cholecystitis with common duct up to 1.7 cm mild intrahepatic biliary dilatation. The distal common duct obstructing stone and lesion  S/P Cholecystectomy drain, placed by IR, no growth on culture  BCx negative  Completed course of antibiotics  MRCP- showed pancreatic mass   GI Dr Pinon following.   Family wants no further work up.   Per IR Sharon drain to remain a min of 4 wks (placed 6/28)  Per Surg study can be done as outpatient

## 2021-07-15 NOTE — PROGRESS NOTE ADULT - SUBJECTIVE AND OBJECTIVE BOX
Time of Visit:  Patient seen and examined.     MEDICATIONS  (STANDING):  ALBUTerol    90 MICROgram(s) HFA Inhaler 1 Puff(s) Inhalation every 4 hours  amLODIPine   Tablet 10 milliGRAM(s) Oral daily  hydrALAZINE 25 milliGRAM(s) Oral four times a day  metoprolol tartrate 50 milliGRAM(s) Oral two times a day  pantoprazole  Injectable 40 milliGRAM(s) IV Push at bedtime  predniSONE   Tablet 40 milliGRAM(s) Oral daily  senna 2 Tablet(s) Oral at bedtime      MEDICATIONS  (PRN):  acetaminophen   Tablet .. 650 milliGRAM(s) Oral every 6 hours PRN Temp greater or equal to 38C (100.4F), Mild Pain (1 - 3)  ALBUTerol    90 MICROgram(s) HFA Inhaler 2 Puff(s) Inhalation every 6 hours PRN Wheezing  sodium chloride 0.9% lock flush 10 milliLiter(s) IV Push every 1 hour PRN Pre/post blood products, medications, blood draw, and to maintain line patency       Medications up to date at time of exam.    ROS; No fever, chills, cough, congestion on exam.   PHYSICAL EXAMINATION:    Vital Signs Last 24 Hrs  T(C): 36.8 (15 Jul 2021 05:28), Max: 37.7 (14 Jul 2021 20:57)  T(F): 98.3 (15 Jul 2021 05:28), Max: 99.9 (14 Jul 2021 20:57)  HR: 98 (15 Jul 2021 05:28) (82 - 98)  BP: 154/56 (15 Jul 2021 05:28) (133/109 - 155/57)  BP(mean): --  RR: 18 (15 Jul 2021 05:28) (17 - 18)  SpO2: 98% (15 Jul 2021 05:28) (95% - 98%)   (if applicable)    General: Non verbal on exam  . Total care.  No acute distress.     HEENT: Head is normocephalic and atraumatic. No nasal tenderness . Has eye tracking . Moist mucosa .     NECK: Supple, no palpable adenopathy.    LUNGS: Decreased breath sounds , non labored. No use of accessory muscle.     HEART: S1 S2 Regular rate and no click/rub .    ABDOMEN: Soft  and nondistended. Active bowel sounds.     NEUROLOGIC: Forgetful  .  Non verbal on exam.     SKIN: Warm and moist. Non diaphoretic.     LABS:      MICROBIOLOGY: (if applicable)    RADIOLOGY & ADDITIONAL STUDIES:  EKG:   CXR:  ECHO:    IMPRESSION: 76y Female PAST MEDICAL & SURGICAL HISTORY:  Myocardial infarct, old    Hypercholesterolemia    Peripheral vascular disease    COPD (chronic obstructive pulmonary disease)    Bipolar 1 disorder    HTN (hypertension)    Bedbound    S/P CABG x 1    Above knee amputation of right lower extremity    mpression; 75 Y/O Female from [facility] presented to ED with unresponsiveness and Hypotension. Was admitted to ICU for Septic Shock and received  vasopressor. Source of infection likely due to Acute Cholecystitis as found on CT chest with bilateral infiltrates/ consolidations. Patient underwent on 06-29-21 IR guided Cholecystectomy with drainage Bile. Patient gradually improved Hypotension . 06-28-21 Negative for Covid 19 PCR and Negative Blood Cx x 2.  07-03-21 CXR with Bilateral Pleural Effusions , Left > Rt basilar airspace consolidation and patient received Lasix .  Has COPD and on Oxygen dependent.       Suggestion:  O2 saturation 94% room air with O2 supplement 2L NC. Continue Oxygen supplementation 2L NC to keep O2 saturation >90%.   Continue Albuterol 1 Puff but Q 6 Hours.  Discontinue PRN Albuterol 2 puffs Q 6 Hours.   Pulmonary oral hygiene care especially every after oral inhaler used.   Continue Prednisone 40 mg oral Daily  .   Aspiration precautions especially during meal times.   DVT/ GI prophylactic.     Time of Visit:  Patient seen and examined.     MEDICATIONS  (STANDING):  ALBUTerol    90 MICROgram(s) HFA Inhaler 1 Puff(s) Inhalation every 4 hours  amLODIPine   Tablet 10 milliGRAM(s) Oral daily  hydrALAZINE 25 milliGRAM(s) Oral four times a day  metoprolol tartrate 50 milliGRAM(s) Oral two times a day  pantoprazole  Injectable 40 milliGRAM(s) IV Push at bedtime  predniSONE   Tablet 40 milliGRAM(s) Oral daily  senna 2 Tablet(s) Oral at bedtime      MEDICATIONS  (PRN):  acetaminophen   Tablet .. 650 milliGRAM(s) Oral every 6 hours PRN Temp greater or equal to 38C (100.4F), Mild Pain (1 - 3)  ALBUTerol    90 MICROgram(s) HFA Inhaler 2 Puff(s) Inhalation every 6 hours PRN Wheezing  sodium chloride 0.9% lock flush 10 milliLiter(s) IV Push every 1 hour PRN Pre/post blood products, medications, blood draw, and to maintain line patency       Medications up to date at time of exam.    ROS; No fever, chills, cough, congestion on exam.   PHYSICAL EXAMINATION:    Vital Signs Last 24 Hrs  T(C): 36.8 (15 Jul 2021 05:28), Max: 37.7 (14 Jul 2021 20:57)  T(F): 98.3 (15 Jul 2021 05:28), Max: 99.9 (14 Jul 2021 20:57)  HR: 98 (15 Jul 2021 05:28) (82 - 98)  BP: 154/56 (15 Jul 2021 05:28) (133/109 - 155/57)  BP(mean): --  RR: 18 (15 Jul 2021 05:28) (17 - 18)  SpO2: 98% (15 Jul 2021 05:28) (95% - 98%)   (if applicable)    General: Non verbal on exam  . Total care.  No acute distress.     HEENT: Head is normocephalic and atraumatic. No nasal tenderness . Has eye tracking . Moist mucosa .     NECK: Supple, no palpable adenopathy.    LUNGS: Decreased breath sounds , non labored. No use of accessory muscle.     HEART: S1 S2 Regular rate and no click/rub .    ABDOMEN: Soft  and nondistended. Active bowel sounds.     NEUROLOGIC: Forgetful  .  Non verbal on exam.     SKIN: Warm and moist. Non diaphoretic.     LABS:      MICROBIOLOGY: (if applicable)    RADIOLOGY & ADDITIONAL STUDIES:  EKG:   CXR:  ECHO:    IMPRESSION: 76y Female PAST MEDICAL & SURGICAL HISTORY:  Myocardial infarct, old    Hypercholesterolemia    Peripheral vascular disease    COPD (chronic obstructive pulmonary disease)    Bipolar 1 disorder    HTN (hypertension)    Bedbound    S/P CABG x 1    Above knee amputation of right lower extremity    mpression; 75 Y/O Female from [facility] presented to ED with unresponsiveness and Hypotension. Was admitted to ICU for Septic Shock and received  vasopressor. Source of infection likely due to Acute Cholecystitis as found on CT chest with bilateral infiltrates/ consolidations. Patient underwent on 06-29-21 IR guided Cholecystectomy with drainage Bile. Patient gradually improved Hypotension . 06-28-21 Negative for Covid 19 PCR and Negative Blood Cx x 2.  07-03-21 CXR with Bilateral Pleural Effusions , Left > Rt basilar airspace consolidation and patient received Lasix .  Has COPD and on Oxygen dependent.       Suggestion:  O2 saturation 94% room air with O2 supplement 2L NC. Continue Oxygen supplementation 2L NC to keep O2 saturation >90%.   Continue Albuterol 1 Puff but Q 6 Hours.  Discontinue PRN Albuterol 2 puffs Q 6 Hours.   Pulmonary oral hygiene care especially every after oral inhaler used.   Continue Prednisone 40 mg oral Daily  .   Monitor hector-tube drain   Aspiration precautions especially during meal times.   DVT/ GI prophylactic.    For  hosp/ palliative placement     Agree with above assessment and plan as transcribed.     Agree with above assessment and plan as transcribed.

## 2021-07-15 NOTE — CHART NOTE - NSCHARTNOTEFT_GEN_A_CORE
Called Silva Marquez.  Provided an update and answered all questions.  Encouraged her to call the floor to speak with patient if she is unable to visit.

## 2021-07-15 NOTE — PROGRESS NOTE ADULT - SUBJECTIVE AND OBJECTIVE BOX
MERARY MEYER    SCU progress note    INTERVAL HPI/OVERNIGHT EVENTS: *** No new overnight events     DNR [ X ]   DNI  [ X ]  FULL CODE [  ]  MEWS SUSPENDED    Covid - 19 PCR:  Negative 7/13    The 4Ms    What Matters Most: see [facility]  Age appropriate Medications/Screen for High Risk Medication: Yes  Mentation: see CAM below  Mobility: defer to physical exam    The Confusion Assessment Method (CAM) Diagnostic Algorithm     1: Acute Onset or Fluctuating Course  - Is there evidence of an acute change in mental status from the patient’s baseline? Did the (abnormal) behavior  fluctuate during the day, that is, tend to come and go, or increase and decrease in severity?  [ ] YES [ X ] NO     2: Inattention  - Did the patient have difficulty focusing attention, being easily distractible, or having difficulty keeping track of what was being said?  [ ] YES [ X ] NO     3: Disorganized thinking  -Was the patient’s thinking disorganized or incoherent, such as rambling or irrelevant conversation, unclear or illogical flow of ideas, or unpredictable switching from subject to subject?  [ ] YES [ X ] NO    4: Altered Level of consciousness?  [ ] YES [ X ] NO    The diagnosis of delirium by CAM requires the presence of features 1 and 2 and either 3 or 4.    PRESSORS: [ ] YES [ X ] NO    Cardiovascular:  Heart Failure  Acute   Acute on Chronic  Chronic       amLODIPine   Tablet 10 milliGRAM(s) Oral daily  hydrALAZINE 25 milliGRAM(s) Oral four times a day  metoprolol tartrate 50 milliGRAM(s) Oral two times a day    Pulmonary:  ALBUTerol    90 MICROgram(s) HFA Inhaler 1 Puff(s) Inhalation every 4 hours  ALBUTerol    90 MICROgram(s) HFA Inhaler 2 Puff(s) Inhalation every 6 hours PRN    Hematalogic:    Other:  acetaminophen   Tablet .. 650 milliGRAM(s) Oral every 6 hours PRN  pantoprazole  Injectable 40 milliGRAM(s) IV Push at bedtime  predniSONE   Tablet 40 milliGRAM(s) Oral daily  senna 2 Tablet(s) Oral at bedtime  sodium chloride 0.9% lock flush 10 milliLiter(s) IV Push every 1 hour PRN    acetaminophen   Tablet .. 650 milliGRAM(s) Oral every 6 hours PRN  ALBUTerol    90 MICROgram(s) HFA Inhaler 1 Puff(s) Inhalation every 4 hours  ALBUTerol    90 MICROgram(s) HFA Inhaler 2 Puff(s) Inhalation every 6 hours PRN  amLODIPine   Tablet 10 milliGRAM(s) Oral daily  hydrALAZINE 25 milliGRAM(s) Oral four times a day  metoprolol tartrate 50 milliGRAM(s) Oral two times a day  pantoprazole  Injectable 40 milliGRAM(s) IV Push at bedtime  predniSONE   Tablet 40 milliGRAM(s) Oral daily  senna 2 Tablet(s) Oral at bedtime  sodium chloride 0.9% lock flush 10 milliLiter(s) IV Push every 1 hour PRN    Drug Dosing Weight  Height (cm): 170.2 (28 Jun 2021 10:15)  Weight (kg): 66.9 (28 Jun 2021 16:29)  BMI (kg/m2): 23.1 (28 Jun 2021 16:29)  BSA (m2): 1.78 (28 Jun 2021 16:29)    CENTRAL LINE: [ ] YES [ X ] NO  LOCATION:   DATE INSERTED:  REMOVE: [ ] YES [ ] NO  EXPLAIN:    TASH: [ ] YES [ X ] NO    DATE INSERTED:  REMOVE:  [ ] YES [ ] NO  EXPLAIN:    PAST MEDICAL & SURGICAL HISTORY:  Myocardial infarct, old    Hypercholesterolemia    Peripheral vascular disease    COPD (chronic obstructive pulmonary disease)    Bipolar 1 disorder    HTN (hypertension)    Bedbound    S/P CABG x 1    Above knee amputation of right lower extremity                07-14 @ 07:01  -  07-15 @ 07:00  --------------------------------------------------------  IN: 0 mL / OUT: 45 mL / NET: -45 mL            PHYSICAL EXAM:    GENERAL: NAD, well-groomed, well-developed  HEAD:  Atraumatic, Normocephalic  EYES:  PERRLA, conjunctiva and sclera clear  ENMT:  Moist mucous membranes, No lesions  NECK: Supple, No JVD  NERVOUS SYSTEM:  A&Ox1; Does not follow commands  CHEST/LUNG: Clear, diminished  HEART: Regular rate and rhythm; No murmurs, rubs, or gallops  ABDOMEN: Soft, Nontender, Nondistended; Bowel sounds present; R cholecystectomy drain in place  EXTREMITIES: 2+ Peripheral Pulses, R AKA  LYMPH: No lymphadenopathy noted  SKIN: Stage 1 coccyx    LABS:                    [  ]  DVT Prophylaxis  [  ]  Nutrition, Brand, Rate         Goal Rate        Abnormal Nutritional Status -  Malnutrition   Cachexia      Morbid Obesity BMI >/=40    RADIOLOGY & ADDITIONAL STUDIES:  ***    Goals of Care Discussion with Family/Proxy/Other   - see note from/family meeting set up for...

## 2021-07-15 NOTE — PROGRESS NOTE ADULT - PROBLEM SELECTOR PLAN 9
MEWS suspended.  No further lab work.  No IVF. Continue antibiotics.  See [facility] 7/9  HCP requesting Hospice. Does not want patient to return to [facility]. CM and SW aware.

## 2021-07-15 NOTE — PROGRESS NOTE ADULT - SUBJECTIVE AND OBJECTIVE BOX
HPI:  76 year old female from [facility] with PMHx of PVD, right AKA (S/P fem pop bypass), PAF, COPD, bipolar disorder, hypertension and PSHx of S/P CABG x1 brought into the ED via EMS from [facility] for unresponsiveness and hypotension. Per NH supervisor Mr. Gaspar () Pt was found unresponsive today morning around 8 am, pt was fine last night, pt did not have fever, difficulty breathing, pt was only receptive to painful stimuli and on evaluation pt's blood pressure was low per papers Pt's bp was 62/38. Pt was sent to the hospital for further evaluation. Baseline mental status alert, oriented and follows instruction, non ambulatory at baseline.   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 Pt was given a dose of vancomycin and rocephine. Pt's blood pressure improve to 97/58, hr 109 bpm, ECG showing sinus tachy, RBBB, LAFB, Bifacicular block.    (28 Jun 2021 14:58)      Patient is a 76y old  Female who presents with a chief complaint of Sepsis (13 Jul 2021 08:06)      INTERVAL HPI/OVERNIGHT EVENTS:  T(C): 36.8 (07-15-21 @ 05:28), Max: 37.7 (07-14-21 @ 20:57)  HR: 98 (07-15-21 @ 05:28) (82 - 98)  BP: 154/56 (07-15-21 @ 05:28) (133/109 - 155/57)  RR: 18 (07-15-21 @ 05:28) (17 - 18)  SpO2: 98% (07-15-21 @ 05:28) (95% - 98%)  Wt(kg): --  I&O's Summary    14 Jul 2021 07:01  -  15 Jul 2021 07:00  --------------------------------------------------------  IN: 0 mL / OUT: 45 mL / NET: -45 mL        REVIEW OF SYSTEMS: denies fever, chills, SOB, palpitations, chest pain, abdominal pain, nausea, vomitting, diarrhea, constipation, dizziness    MEDICATIONS  (STANDING):  ALBUTerol    90 MICROgram(s) HFA Inhaler 1 Puff(s) Inhalation every 4 hours  amLODIPine   Tablet 10 milliGRAM(s) Oral daily  hydrALAZINE 25 milliGRAM(s) Oral four times a day  metoprolol tartrate 50 milliGRAM(s) Oral two times a day  pantoprazole  Injectable 40 milliGRAM(s) IV Push at bedtime  predniSONE   Tablet 40 milliGRAM(s) Oral daily  senna 2 Tablet(s) Oral at bedtime    MEDICATIONS  (PRN):  acetaminophen   Tablet .. 650 milliGRAM(s) Oral every 6 hours PRN Temp greater or equal to 38C (100.4F), Mild Pain (1 - 3)  ALBUTerol    90 MICROgram(s) HFA Inhaler 2 Puff(s) Inhalation every 6 hours PRN Wheezing  sodium chloride 0.9% lock flush 10 milliLiter(s) IV Push every 1 hour PRN Pre/post blood products, medications, blood draw, and to maintain line patency      PHYSICAL EXAM:  GENERAL: NAD, well-groomed, well-developed  HEAD:  Atraumatic, Normocephalic  EYES: EOMI, PERRLA, conjunctiva and sclera clear  ENMT: No tonsillar erythema, exudates, or enlargement; Moist mucous membranes, Good dentition, No lesions  NECK: Supple, No JVD, Normal thyroid  NERVOUS SYSTEM:  Alert & Oriented X3, Good concentration; Motor Strength 5/5 B/L upper and lower extremities; DTRs 2+ intact and symmetric  CHEST/LUNG: Clear to percussion bilaterally; No rales, rhonchi, wheezing, or rubs  HEART: Regular rate and rhythm; No murmurs, rubs, or gallops  ABDOMEN: Soft, Nontender, Nondistended; Bowel sounds present  EXTREMITIES:  2+ Peripheral Pulses, No clubbing, cyanosis, or edema  LYMPH: No lymphadenopathy noted  SKIN: No rashes or lesions  LABS:              CAPILLARY BLOOD GLUCOSE

## 2021-07-15 NOTE — PROGRESS NOTE ADULT - PROBLEM SELECTOR PLAN 10
Pleural Effusion: Left greater than right. CXR and CT noted. S/P lasix  VANESSA:  Likely secondary to ATN from septic shock.  Patient now comfort care as per family wishes.  UTI: Completed course ABX  DVT and GI prophylaxis.  DNR/DNI MOLST in chart  MEWS suspended.  Comfort Care only.  Overall prognosis is extremely poor.  HCP requesting hospice.

## 2021-07-15 NOTE — PROGRESS NOTE ADULT - PROBLEM SELECTOR PLAN 2
CT with acute cholecystitis with common duct up to 1.7 cm mild intrahepatic biliary dilatation. The distal common duct obstructing stone and lesion  S/P Cholecystectomy drain, placed by IR, no growth on culture  BCx negative  Completed course of antibiotics  MRCP- showed pancreatic mass   GI Dr Pinon following.   Family wants no further work up.   Per IR Sharon drain to remain a min of 4 wks (placed 6/28)  Per Surg study can be done as outpatient CT with acute cholecystitis with common duct up to 1.7 cm mild intrahepatic biliary dilatation. The distal common duct obstructing stone and lesion  S/P Cholecystostomy drain, placed by IR, no growth on culture  BCx negative  Completed course of antibiotics  MRCP- showed pancreatic mass   GI Dr Pinon following.   Family wants no further work up.   Per IR Cholecystostomy drain to remain a min of 4 wks (placed 6/28)  Per Surg study can be done as outpatient

## 2021-07-15 NOTE — PROGRESS NOTE ADULT - ASSESSMENT
76 year old female from [facility] with PMHx of PVD, right AKA (S/P fem pop bypass), PAF, COPD, bipolar disorder, hypertension and PSHx of S/P CABG x1 brought into the ED via EMS from [facility] [place] for unresponsiveness and hypotension. Per NH supervisor Mr. Gaspar () Pt was found unresponsive today morning around 8 am, pt was fine last night, pt did not have fever, difficulty breathing, pt was only receptive to painful stimuli and on evaluation pt's blood pressure was low per papers Pt's bp was 62/38. Pt was sent to the hospital for further evaluation. Baseline mental status alert, oriented and follows instruction, non ambulatory at baseline.     ED course: Alicia was placed and pt was given 3L ivf s/p urine out put of 40cc, u/a positive for infection, CT chest showing b/l patchy infiltrates/ consolidations, concern for cholecystitis, with dilated bile duct of 1.7 cm, and concern of pancreatic head mass. Pt was given a dose of vancomycin and rocephin. Pt's blood pressure improve to 97/58, hr 109 bpm, ECG showing sinus tachy, RBBB, LAFB, Bifacicular block.     Patient admitted to the ICU for septic shock requiring vasopressors. Source of infection likely acute cholecystitis as demonstrated on CT abdomen vs UTI. Patient was started on levophed, noted with severe acidosis requiring bicarb drip and VANESSA, likely pre-renal. Patient was started on cefepime and flagyl for sepsis 2/2 cholecystitis. Lactate acidosis was started on bicarb gtt. VANESSA likely pre-renal obtained multiple boluses with low urine output. Patient gradually improved with resolution of hypotension, was able to taper off pressors, bicarb improved was able to dc bicarb gtt. Urine output gradually increased. on 6/29, patient underwent IR guided cholecystostomy, with drainage of bile. Evaluated by GI after, recommended MRCP. Unable to obtain MRCP as of 6/30 due to altered mental status suspected to be due to gabapentin toxicity in the setting of VANESSA. Unstable for MRCP, will continue monitoring mental status. Mental status improved 7/1-7/2, pt aaox2 to self and location. Seen by speech and swallow, recommends mechanical soft thin liquid diet.     7/2 pt downgrade to SCU, failed TOV, alicia reinserted   7/5. Pending  MRCP ordered. Hypernatremia worsening likely due to poor po intake, started on D5W per nephro recs  7/8- MRCP report noted with mass in the head of the pancreas most concerning for adenocarcinoma.  GI following. B/l lungs fields w/ rales on exam and LLE w/ 2+ edema.  BP remained 160- 170- hydralazine increased to QID, will give one dose of Lasix 40mg for effusion. Will request for sx and GI to comment on MRCP findings.   7/9 Remains hypernatremic, Na+ 149. Continue D5W at 60ml/hr , daily Lasix 20mg IV. Surgery recs metastatic w/u with CT abd/pelvis with IV contrast. D/w Nephro Dr. Noriega. Monitor SCr , and consider CT with IV contrast on Monday if SCr improved (goal <1.5).   7/9 Family has decided on comfort care. No more labs.  7/11- Resting comfortable w/o visible signs of respiration distress or pain. Will continue to monitor. \  7/15 - Pt more alert and responsive today

## 2021-07-16 NOTE — PROVIDER CONTACT NOTE (CRITICAL VALUE NOTIFICATION) - ACTION/TREATMENT ORDERED:
continue with current treatment plan
As per NP JULI, potassium oral 40mg  a and riders x 3 given will continue to monitor.

## 2021-07-16 NOTE — CHART NOTE - NSCHARTNOTESELECT_GEN_ALL_CORE
Event Note
Event Note
Family update note/Event Note
Family update:/Event Note
Nutrition Services
Surgical Oncology/Event Note
family update note/Event Note
Event Note
Event Note
Family Update Note/Event Note
Family Update Note/Event Note
Family Update/Event Note
Family update:/Event Note
Nutrition Services
Transfer Note
Update Note/Event Note
dc IVF, no further labs, continue comfort feeds/Event Note
family update note/Event Note

## 2021-07-16 NOTE — PROGRESS NOTE ADULT - SUBJECTIVE AND OBJECTIVE BOX
MERARY MEYER    SCU progress note    INTERVAL HPI/OVERNIGHT EVENTS: no new complaints, Pt not talking today, stares with eyes open when questions asked.     DNR [ ]   DNI  [  ] DNR/DNI    Covid - 19 PCR: negative 7/13    The 4Ms    What Matters Most: see [facility]  Age appropriate Medications/Screen for High Risk Medication: Yes  Mentation: see CAM below  Mobility: defer to physical exam    The Confusion Assessment Method (CAM) Diagnostic Algorithm     1: Acute Onset or Fluctuating Course  - Is there evidence of an acute change in mental status from the patient’s baseline? Did the (abnormal) behavior  fluctuate during the day, that is, tend to come and go, or increase and decrease in severity?  [ ] YES [x ] NO     2: Inattention  - Did the patient have difficulty focusing attention, being easily distractible, or having difficulty keeping track of what was being said?  [ ] YES [x ] NO     3: Disorganized thinking  -Was the patient’s thinking disorganized or incoherent, such as rambling or irrelevant conversation, unclear or illogical flow of ideas, or unpredictable switching from subject to subject?  [ ] YES [x ] NO    4: Altered Level of consciousness?  [ ] YES [x ] NO    The diagnosis of delirium by CAM requires the presence of features 1 and 2 and either 3 or 4.    PRESSORS: [ ] YES [x ] NO    Cardiovascular:  Heart Failure  Acute   Acute on Chronic  Chronic       amLODIPine   Tablet 10 milliGRAM(s) Oral daily  hydrALAZINE 25 milliGRAM(s) Oral four times a day  metoprolol tartrate 50 milliGRAM(s) Oral two times a day    Pulmonary:  ALBUTerol    90 MICROgram(s) HFA Inhaler 1 Puff(s) Inhalation every 4 hours  ALBUTerol    90 MICROgram(s) HFA Inhaler 2 Puff(s) Inhalation every 6 hours PRN    Hematalogic:    Other:  acetaminophen   Tablet .. 650 milliGRAM(s) Oral every 6 hours PRN  pantoprazole  Injectable 40 milliGRAM(s) IV Push at bedtime  potassium chloride   Powder 40 milliEquivalent(s) Oral once  potassium chloride  10 mEq/50 mL IVPB 10 milliEquivalent(s) IV Intermittent every 1 hour  predniSONE   Tablet 40 milliGRAM(s) Oral daily  senna 2 Tablet(s) Oral at bedtime  sodium chloride 0.9% lock flush 10 milliLiter(s) IV Push every 1 hour PRN    acetaminophen   Tablet .. 650 milliGRAM(s) Oral every 6 hours PRN  ALBUTerol    90 MICROgram(s) HFA Inhaler 1 Puff(s) Inhalation every 4 hours  ALBUTerol    90 MICROgram(s) HFA Inhaler 2 Puff(s) Inhalation every 6 hours PRN  amLODIPine   Tablet 10 milliGRAM(s) Oral daily  hydrALAZINE 25 milliGRAM(s) Oral four times a day  metoprolol tartrate 50 milliGRAM(s) Oral two times a day  pantoprazole  Injectable 40 milliGRAM(s) IV Push at bedtime  potassium chloride   Powder 40 milliEquivalent(s) Oral once  potassium chloride  10 mEq/50 mL IVPB 10 milliEquivalent(s) IV Intermittent every 1 hour  predniSONE   Tablet 40 milliGRAM(s) Oral daily  senna 2 Tablet(s) Oral at bedtime  sodium chloride 0.9% lock flush 10 milliLiter(s) IV Push every 1 hour PRN    Drug Dosing Weight  Height (cm): 170.2 (28 Jun 2021 10:15)  Weight (kg): 66.9 (28 Jun 2021 16:29)  BMI (kg/m2): 23.1 (28 Jun 2021 16:29)  BSA (m2): 1.78 (28 Jun 2021 16:29)    CENTRAL LINE: [ ] YES [ ] NO  LOCATION:   DATE INSERTED:  REMOVE: [ ] YES [ ] NO  EXPLAIN:    TASH: [ ] YES [ ] NO    DATE INSERTED:  REMOVE:  [ ] YES [ ] NO  EXPLAIN:    PAST MEDICAL & SURGICAL HISTORY:  Myocardial infarct, old    Hypercholesterolemia    Peripheral vascular disease    COPD (chronic obstructive pulmonary disease)    Bipolar 1 disorder    HTN (hypertension)    Bedbound    S/P CABG x 1    Above knee amputation of right lower extremity      07-15 @ 07:01  -  07-16 @ 07:00  --------------------------------------------------------  IN: 0 mL / OUT: 8 mL / NET: -8 mL    PHYSICAL EXAM:    GENERAL: NAD, mild jaundice  HEAD:  Atraumatic, Normocephalic  EYES: EOMI, PERRLA, conjunctiva and sclera clear  ENMT: No tonsillar erythema, exudates, or enlargement; Moist mucous membranes, Good dentition, No lesions  NECK: Supple, No JVD, Normal thyroid  NERVOUS SYSTEM:  Alert & Oriented to self only  CHEST/LUNG: Clear to percussion bilaterally; No rales, rhonchi, wheezing, or rubs  HEART: Regular rate and rhythm; No murmurs, rubs, or gallops  ABDOMEN: Soft, Nontender, Nondistended; Bowel sounds present  EXTREMITIES:  2+ Peripheral Pulses, No clubbing, cyanosis, or edema, L aka sump CDI. LLE with brace intact  LYMPH: No lymphadenopathy noted  SKIN: No rashes or lesions      LABS:  CBC Full  -  ( 16 Jul 2021 07:18 )  WBC Count : 14.07 K/uL  RBC Count : 3.43 M/uL  Hemoglobin : 10.5 g/dL  Hematocrit : 32.0 %  Platelet Count - Automated : 172 K/uL  Mean Cell Volume : 93.3 fl  Mean Cell Hemoglobin : 30.6 pg  Mean Cell Hemoglobin Concentration : 32.8 gm/dL  Auto Neutrophil # : 12.36 K/uL  Auto Lymphocyte # : 0.73 K/uL  Auto Monocyte # : 0.80 K/uL  Auto Eosinophil # : 0.01 K/uL  Auto Basophil # : 0.01 K/uL  Auto Neutrophil % : 87.8 %  Auto Lymphocyte % : 5.2 %  Auto Monocyte % : 5.7 %  Auto Eosinophil % : 0.1 %  Auto Basophil % : 0.1 %    07-16    168<HH>  |  135<H>  |  65<H>  ----------------------------<  285<H>  2.4<LL>   |  21<L>  |  2.01<H>    Ca    8.4      16 Jul 2021 07:18    TPro  5.5<L>  /  Alb  2.2<L>  /  TBili  9.0<H>  /  DBili  x   /  AST  30  /  ALT  32  /  AlkPhos  88  07-16    [ x ]  DVT Prophylaxis  [  ]  Nutrition, Brand, takes PO         Malnutrition       RADIOLOGY & ADDITIONAL STUDIES:    < from: CT Head No Cont (07.08.21 @ 19:30) >  EXAM:  CT BRAIN                            PROCEDURE DATE:  07/08/2021          INTERPRETATION:  Exam Date: 7/8/2021 7:30 PM    CT head without IV contrast    CLINICAL INFORMATION: r/o cva    TECHNIQUE: Contiguous axial sections were obtained through the head.   Coronal and sagittal reformats were obtained.    COMPARISON: CT head 6/28/2021    FINDINGS:  The ventricles and sulci are prominent, compatible with age-related generalized cerebral volume loss.   There is no CT evidence for acute cerebral cortical infarct. There is no evidence of hemorrhage. There is periventricular and subcortical white matter hypoattenuation,  most compatible with chronic microvascular ischemic changes.   No mass effect is found in the brain.  There is no midlineshift or herniation pattern.      The visualized portions of the paranasal sinuses and mastoid air cells are clear.    IMPRESSION:    No evidence of acute intracranial abnormality.  No evidence of hemorrhage.    Chronic changes as above.          ---End of Report ---            PHOENIX NORRIS MD; Attending Radiologist  This document has been electronically signed. Jul 8 2021  8:38PM    < end of copied text >  < from: MR MRCP No Cont (07.06.21 @ 20:34) >  EXAM:  MR MRCP                            PROCEDURE DATE:  07/06/2021          INTERPRETATION:  PROCEDURE INFORMATION:  Exam: MR Abdomen Without Contrast  Exam date and time: 7/6/2021 8:08 PM  Age: 76 years old  Clinical indication: Possible pancreatic mass on abdominal CT dated 6/28/2021    TECHNIQUE:  Imaging protocol: MR of the abdomen without contrast, including MRCP images.    COMPARISON:  Abdominal CT dated 6/28/2021    FINDINGS: The examination limited by respiratory motion artifact and lack of IV contrast.    Visualized lower chest: Mild bilateral pleural effusions. Consolidation/atelectasis of the adjacent pulmonary parenchyma.    Liver: No mass. The liver is diffusely dark on T2-weighted images. In addition, there is signal attenuation on in-phase T1-weighted gradient echo images. Findings are suggestive of hemachromatosis or hemosiderosis.  Gallbladder and bile ducts: A few small gallstones. The gallbladder wall is thickened measuring up to 5 mm. Mild biliary ductal dilatation, measuring 9 mm. No evidence for choledocholithiasis.  Pancreas: Mass in the head of the pancreas measuring 3.0 x 2.2 cm. The mass is best seen on series 4, image 17, and 18. The remaining pancreas is atrophic. The main pancreatic duct is dilated upstream to the pancreatic mass.  Spleen: Unremarkable. No splenomegaly.  Adrenal glands: Unremarkable. No mass.  Kidneys and ureters: Small brightly T2 hyperintense lesions in the kidneys bilaterally, representing probable cysts. One of the right renalcysts measures 1.7 cm (image 14 series 3) appears to have a small T2 hypointense component. No hydronephrosis.  Stomach and bowel: Visualized stomach and intestines are unremarkable.  Intraperitoneal space: No free fluid.  Arteries: No abdominal aortic aneurysm.  Bones/joints:  Unremarkable.  Soft tissues: Mild body wall edema.    IMPRESSION:  Mass in the head of the pancreas is most concerning for adenocarcinoma. Endoscopic ultrasound may be pursued for further evaluation. Its relationship with its adjacent vascular structures is difficult to evaluate without IV contrast. Dilated common bile duct and main pancreatic duct, likely due to obstruction by the pancreatic head mass.    Cholelithiasis with thickened gallbladder wall may represent acute cholecystitis. If clinically indicated, HIDA scan may be pursued for further evaluation.    1.7 cm right renal cyst as described above may be complex. Abdominal MR without and with IV contrast may be pursued to rule out malignant renal neoplasm.    Other findings as above.    A preliminary report was provided by [employer].    --- End of Report ---        FERNANDEZ ESCAMILLA MD; Attending Radiologist  This document has been electronically signed. Jul 7 2021  4:47PM    < end of copied text >    Goals of Care Discussion with Family/Proxy/Other   - see note from/family meeting set up for 7/9

## 2021-07-16 NOTE — PROGRESS NOTE ADULT - PROBLEM SELECTOR PLAN 9
MEWS suspended.  No further lab work.  No IVF. Continue antibiotics.  See [facility] 7/9  HCP requesting Hospice. Does not want patient to return to [facility]. CM and SW aware.

## 2021-07-16 NOTE — PROGRESS NOTE ADULT - PROBLEM SELECTOR PLAN 1
Likely secondary from Infection vs Cholecystitis vs electrolyte imbalance. resolved,( pt at her baseline)  Head CT shows no acute neurologic pathology.  MRCP w/ pancreatic mass concerning for adenocarcinoma  Metastatic workup with CT abd/pelvis with IV contrast-( family want no further metastatic work up)  Amylase and lipase elevated.  Ca 19-9 [id number]  GI following  Surgery input appreciated  Family has elected for comfort care. MEWS suspended.

## 2021-07-16 NOTE — PROGRESS NOTE ADULT - ASSESSMENT
seen and examined  vsstable afebrile  physical done  lithargic    today not following commands with eyes  labs by mistake  severe hypernatremia  severe hypokalemia   wbc 14    pt is comfort care  pt is under AC   as per ICU  no tx and no furthur blood work

## 2021-07-16 NOTE — PROGRESS NOTE ADULT - PROBLEM SELECTOR PLAN 4
Continue oxygen support. not in excerebration  Continue bronchodilator. Use spacer with inhalers.  May need to add budesonide.  Continue steroids  CT and CXR noted above.  Monitor oxygenation.

## 2021-07-16 NOTE — PROGRESS NOTE ADULT - ASSESSMENT
76 year old female from [facility] with PMHx of PVD, right AKA (S/P fem pop bypass), PAF, COPD, bipolar disorder, hypertension and PSHx of S/P CABG x1 brought into the ED via EMS from [facility] for unresponsiveness and hypotension. Per NH supervisor Mr. Gaspar () Pt was found unresponsive today morning around 8 am, pt was fine last night, pt did not have fever, difficulty breathing, pt was only receptive to painful stimuli and on evaluation pt's blood pressure was low per papers Pt's bp was 62/38. Pt was sent to the hospital for further evaluation. Baseline mental status alert, oriented and follows instruction, non ambulatory at baseline.     ED course: Alicia was placed and pt was given 3L ivf s/p urine out put of 40cc, u/a positive for infection, CT chest showing b/l patchy infiltrates/ consolidations, concern for cholecystitis, with dilated bile duct of 1.7 cm, and concern of pancreatic head mass. Pt was given a dose of vancomycin and rocephin. Pt's blood pressure improve to 97/58, hr 109 bpm, ECG showing sinus tachy, RBBB, LAFB, Bifacicular block.     Patient admitted to the ICU for septic shock requiring vasopressors. Source of infection likely acute cholecystitis as demonstrated on CT abdomen vs UTI. Patient was started on levophed, noted with severe acidosis requiring bicarb drip and VANESSA, likely pre-renal. Patient was started on cefepime and flagyl for sepsis 2/2 cholecystitis. Lactate acidosis was started on bicarb gtt. VANESSA likely pre-renal obtained multiple boluses with low urine output. Patient gradually improved with resolution of hypotension, was able to taper off pressors, bicarb improved was able to dc bicarb gtt. Urine output gradually increased. on 6/29, patient underwent IR guided cholecystostomy, with drainage of bile. Evaluated by GI after, recommended MRCP. Unable to obtain MRCP as of 6/30 due to altered mental status suspected to be due to gabapentin toxicity in the setting of VANESSA. Unstable for MRCP, will continue monitoring mental status. Mental status improved 7/1-7/2, pt aaox2 to self and location. Seen by speech and swallow, recommends mechanical soft thin liquid diet.     7/2 pt downgrade to SCU, failed TOV, alicia reinserted   7/5. Pending  MRCP ordered. Hypernatremia worsening likely due to poor po intake, started on D5W per nephro recs  7/8- MRCP report noted with mass in the head of the pancreas most concerning for adenocarcinoma.  GI following. B/l lungs fields w/ rales on exam and LLE w/ 2+ edema.  BP remained 160- 170- hydralazine increased to QID, will give one dose of Lasix 40mg for effusion. Will request for sx and GI to comment on MRCP findings.   7/9 Remains hypernatremic, Na+ 149. Continue D5W at 60ml/hr , daily Lasix 20mg IV. Surgery recs metastatic w/u with CT abd/pelvis with IV contrast. D/w Nephro Dr. Noriega. Monitor SCr , and consider CT with IV contrast on Monday if SCr improved (goal <1.5).   7/9 Family has decided on comfort care. No more labs.

## 2021-07-16 NOTE — PROGRESS NOTE ADULT - PROBLEM SELECTOR PLAN 2
CT with acute cholecystitis with common duct up to 1.7 cm mild intrahepatic biliary dilatation. The distal common duct obstructing stone and lesion  S/P Cholecystostomy drain, placed by IR, no growth on culture  BCx negative  Completed course of antibiotics  MRCP- showed pancreatic mass - GI Dr Pinon following.   Family wants no further work up.   Per IR Cholecystostomy drain to remain a min of 4 wks (placed 6/28, likely until 7/26)  Per Surg study can be done as outpatient

## 2021-07-16 NOTE — PROGRESS NOTE ADULT - SUBJECTIVE AND OBJECTIVE BOX
HPI:  76 year old female from [facility] with PMHx of PVD, right AKA (S/P fem pop bypass), PAF, COPD, bipolar disorder, hypertension and PSHx of S/P CABG x1 brought into the ED via EMS from [facility] for unresponsiveness and hypotension. Per NH supervisor Mr. Gaspar () Pt was found unresponsive today morning around 8 am, pt was fine last night, pt did not have fever, difficulty breathing, pt was only receptive to painful stimuli and on evaluation pt's blood pressure was low per papers Pt's bp was 62/38. Pt was sent to the hospital for further evaluation. Baseline mental status alert, oriented and follows instruction, non ambulatory at baseline.   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 Pt was given a dose of vancomycin and rocephine. Pt's blood pressure improve to 97/58, hr 109 bpm, ECG showing sinus tachy, RBBB, LAFB, Bifacicular block.    (28 Jun 2021 14:58)      Patient is a 76y old  Female who presents with a chief complaint of Sepsis (15 Jul 2021 11:42)      INTERVAL HPI/OVERNIGHT EVENTS:  T(C): 36.8 (07-16-21 @ 13:27), Max: 38.3 (07-15-21 @ 20:59)  HR: 79 (07-16-21 @ 13:27) (78 - 102)  BP: 150/64 (07-16-21 @ 13:27) (140/69 - 170/58)  RR: 19 (07-16-21 @ 13:27) (16 - 20)  SpO2: 100% (07-16-21 @ 13:27) (95% - 100%)  Wt(kg): --  I&O's Summary    15 Jul 2021 07:01  -  16 Jul 2021 07:00  --------------------------------------------------------  IN: 0 mL / OUT: 8 mL / NET: -8 mL        REVIEW OF SYSTEMS: denies fever, chills, SOB, palpitations, chest pain, abdominal pain, nausea, vomitting, diarrhea, constipation, dizziness    MEDICATIONS  (STANDING):  ALBUTerol    90 MICROgram(s) HFA Inhaler 1 Puff(s) Inhalation every 4 hours  amLODIPine   Tablet 10 milliGRAM(s) Oral daily  hydrALAZINE 25 milliGRAM(s) Oral four times a day  metoprolol tartrate 50 milliGRAM(s) Oral two times a day  pantoprazole  Injectable 40 milliGRAM(s) IV Push at bedtime  predniSONE   Tablet 40 milliGRAM(s) Oral daily  senna 2 Tablet(s) Oral at bedtime    MEDICATIONS  (PRN):  acetaminophen   Tablet .. 650 milliGRAM(s) Oral every 6 hours PRN Temp greater or equal to 38C (100.4F), Mild Pain (1 - 3)  ALBUTerol    90 MICROgram(s) HFA Inhaler 2 Puff(s) Inhalation every 6 hours PRN Wheezing  sodium chloride 0.9% lock flush 10 milliLiter(s) IV Push every 1 hour PRN Pre/post blood products, medications, blood draw, and to maintain line patency      PHYSICAL EXAM:  GENERAL: NAD, well-groomed, well-developed  HEAD:  Atraumatic, Normocephalic  EYES: EOMI, PERRLA, conjunctiva and sclera clear  ENMT: No tonsillar erythema, exudates, or enlargement; Moist mucous membranes, Good dentition, No lesions  NECK: Supple, No JVD, Normal thyroid  NERVOUS SYSTEM:  Alert & Oriented X3, Good concentration; Motor Strength 5/5 B/L upper and lower extremities; DTRs 2+ intact and symmetric  CHEST/LUNG: Clear to percussion bilaterally; No rales, rhonchi, wheezing, or rubs  HEART: Regular rate and rhythm; No murmurs, rubs, or gallops  ABDOMEN: Soft, Nontender, Nondistended; Bowel sounds present  EXTREMITIES:  2+ Peripheral Pulses, No clubbing, cyanosis, or edema  LYMPH: No lymphadenopathy noted  SKIN: No rashes or lesions  LABS:                        10.5   14.07 )-----------( 172      ( 16 Jul 2021 07:18 )             32.0     07-16    168<HH>  |  135<H>  |  65<H>  ----------------------------<  285<H>  2.4<LL>   |  21<L>  |  2.01<H>    Ca    8.4      16 Jul 2021 07:18    TPro  5.5<L>  /  Alb  2.2<L>  /  TBili  9.0<H>  /  DBili  x   /  AST  30  /  ALT  32  /  AlkPhos  88  07-16        CAPILLARY BLOOD GLUCOSE

## 2021-07-16 NOTE — PROGRESS NOTE ADULT - PROBLEM SELECTOR PLAN 10
Pleural Effusion: Left greater than right. CXR and CT noted. S/P lasix  VANESSA:  Likely secondary to ATN from septic shock.  Patient now comfort care as per family wishes.  UTI: Completed course ABX  DVT and GI prophylaxis.  DNR/DNI MOLST in chart  MEWS suspended.  Comfort Care only.  Overall prognosis is extremely poor.  HCP requesting hospice. Patient now comfort care as per family wishes.  No IVF, No further labs   DNR/DNI MOLST in chart  MEWS suspended.  Comfort Care only.  Pt is for hospice, accepted at [facility]. pending bed

## 2021-07-17 NOTE — PROGRESS NOTE ADULT - PROBLEM SELECTOR PLAN 5
Ventricular rate controlled.  Continue apixaban and metoprolol. Ventricular rate controlled.  Continue metoprolol.

## 2021-07-17 NOTE — PROGRESS NOTE ADULT - SUBJECTIVE AND OBJECTIVE BOX
HPI:  76 year old female from [facility] with PMHx of PVD, right AKA (S/P fem pop bypass), PAF, COPD, bipolar disorder, hypertension and PSHx of S/P CABG x1 brought into the ED via EMS from [facility] for unresponsiveness and hypotension. Per NH supervisor Mr. Gaspar () Pt was found unresponsive today morning around 8 am, pt was fine last night, pt did not have fever, difficulty breathing, pt was only receptive to painful stimuli and on evaluation pt's blood pressure was low per papers Pt's bp was 62/38. Pt was sent to the hospital for further evaluation. Baseline mental status alert, oriented and follows instruction, non ambulatory at baseline.   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 Pt was given a dose of vancomycin and rocephine. Pt's blood pressure improve to 97/58, hr 109 bpm, ECG showing sinus tachy, RBBB, LAFB, Bifacicular block.    (28 Jun 2021 14:58)      Patient is a 76y old  Female who presents with a chief complaint of Septic Shock (17 Jul 2021 08:57)      INTERVAL HPI/OVERNIGHT EVENTS:  T(C): 37.2 (07-17-21 @ 14:40), Max: 37.4 (07-16-21 @ 20:41)  HR: 82 (07-17-21 @ 14:40) (71 - 82)  BP: 162/74 (07-17-21 @ 14:40) (139/59 - 162/74)  RR: 19 (07-17-21 @ 14:40) (18 - 19)  SpO2: 95% (07-17-21 @ 14:40) (95% - 98%)  Wt(kg): --  I&O's Summary    16 Jul 2021 07:01  -  17 Jul 2021 07:00  --------------------------------------------------------  IN: 0 mL / OUT: 305 mL / NET: -305 mL        REVIEW OF SYSTEMS: denies fever, chills, SOB, palpitations, chest pain, abdominal pain, nausea, vomitting, diarrhea, constipation, dizziness    MEDICATIONS  (STANDING):  ALBUTerol    90 MICROgram(s) HFA Inhaler 1 Puff(s) Inhalation every 4 hours  amLODIPine   Tablet 10 milliGRAM(s) Oral daily  hydrALAZINE 25 milliGRAM(s) Oral four times a day  metoprolol tartrate 50 milliGRAM(s) Oral two times a day  pantoprazole  Injectable 40 milliGRAM(s) IV Push at bedtime  predniSONE   Tablet 40 milliGRAM(s) Oral daily  senna 2 Tablet(s) Oral at bedtime    MEDICATIONS  (PRN):  acetaminophen   Tablet .. 650 milliGRAM(s) Oral every 6 hours PRN Temp greater or equal to 38C (100.4F), Mild Pain (1 - 3)  ALBUTerol    90 MICROgram(s) HFA Inhaler 2 Puff(s) Inhalation every 6 hours PRN Wheezing  sodium chloride 0.9% lock flush 10 milliLiter(s) IV Push every 1 hour PRN Pre/post blood products, medications, blood draw, and to maintain line patency      PHYSICAL EXAM:  GENERAL: NAD, well-groomed, well-developed  HEAD:  Atraumatic, Normocephalic  EYES: EOMI, PERRLA, conjunctiva and sclera clear  ENMT: No tonsillar erythema, exudates, or enlargement; Moist mucous membranes, Good dentition, No lesions  NECK: Supple, No JVD, Normal thyroid  NERVOUS SYSTEM:  Alert & Oriented X3, Good concentration; Motor Strength 5/5 B/L upper and lower extremities; DTRs 2+ intact and symmetric  CHEST/LUNG: Clear to percussion bilaterally; No rales, rhonchi, wheezing, or rubs  HEART: Regular rate and rhythm; No murmurs, rubs, or gallops  ABDOMEN: Soft, Nontender, Nondistended; Bowel sounds present  EXTREMITIES:  2+ Peripheral Pulses, No clubbing, cyanosis, or edema  LYMPH: No lymphadenopathy noted  SKIN: No rashes or lesions  LABS:                        10.5   14.07 )-----------( 172      ( 16 Jul 2021 07:18 )             32.0     07-16    168<HH>  |  135<H>  |  65<H>  ----------------------------<  285<H>  2.4<LL>   |  21<L>  |  2.01<H>    Ca    8.4      16 Jul 2021 07:18    TPro  5.5<L>  /  Alb  2.2<L>  /  TBili  9.0<H>  /  DBili  x   /  AST  30  /  ALT  32  /  AlkPhos  88  07-16        CAPILLARY BLOOD GLUCOSE

## 2021-07-17 NOTE — PROGRESS NOTE ADULT - SUBJECTIVE AND OBJECTIVE BOX
MERARY MEYER    SCU progress note    INTERVAL HPI/OVERNIGHT EVENTS: No acute events overnight.    DNR [ ]   DNI  [  ] DNR/DNI    Covid - 19 PCR: negative 7/13    The 4Ms    What Matters Most: see [facility]  Age appropriate Medications/Screen for High Risk Medication: Yes  Mentation: see CAM below  Mobility: defer to physical exam    The Confusion Assessment Method (CAM) Diagnostic Algorithm     1: Acute Onset or Fluctuating Course  - Is there evidence of an acute change in mental status from the patient’s baseline? Did the (abnormal) behavior  fluctuate during the day, that is, tend to come and go, or increase and decrease in severity?  [ ] YES [x ] NO     2: Inattention  - Did the patient have difficulty focusing attention, being easily distractible, or having difficulty keeping track of what was being said?  [ ] YES [x ] NO     3: Disorganized thinking  -Was the patient’s thinking disorganized or incoherent, such as rambling or irrelevant conversation, unclear or illogical flow of ideas, or unpredictable switching from subject to subject?  [ ] YES [x ] NO    4: Altered Level of consciousness?  [ ] YES [x ] NO    The diagnosis of delirium by CAM requires the presence of features 1 and 2 and either 3 or 4.    PRESSORS: [ ] YES [x ] NO    Cardiovascular:  Heart Failure  Acute   Acute on Chronic  Chronic       amLODIPine   Tablet 10 milliGRAM(s) Oral daily  hydrALAZINE 25 milliGRAM(s) Oral four times a day  metoprolol tartrate 50 milliGRAM(s) Oral two times a day    Pulmonary:  ALBUTerol    90 MICROgram(s) HFA Inhaler 1 Puff(s) Inhalation every 4 hours  ALBUTerol    90 MICROgram(s) HFA Inhaler 2 Puff(s) Inhalation every 6 hours PRN    Hematalogic:    Other:  acetaminophen   Tablet .. 650 milliGRAM(s) Oral every 6 hours PRN  pantoprazole  Injectable 40 milliGRAM(s) IV Push at bedtime  potassium chloride   Powder 40 milliEquivalent(s) Oral once  potassium chloride  10 mEq/50 mL IVPB 10 milliEquivalent(s) IV Intermittent every 1 hour  predniSONE   Tablet 40 milliGRAM(s) Oral daily  senna 2 Tablet(s) Oral at bedtime  sodium chloride 0.9% lock flush 10 milliLiter(s) IV Push every 1 hour PRN    acetaminophen   Tablet .. 650 milliGRAM(s) Oral every 6 hours PRN  ALBUTerol    90 MICROgram(s) HFA Inhaler 1 Puff(s) Inhalation every 4 hours  ALBUTerol    90 MICROgram(s) HFA Inhaler 2 Puff(s) Inhalation every 6 hours PRN  amLODIPine   Tablet 10 milliGRAM(s) Oral daily  hydrALAZINE 25 milliGRAM(s) Oral four times a day  metoprolol tartrate 50 milliGRAM(s) Oral two times a day  pantoprazole  Injectable 40 milliGRAM(s) IV Push at bedtime  potassium chloride   Powder 40 milliEquivalent(s) Oral once  potassium chloride  10 mEq/50 mL IVPB 10 milliEquivalent(s) IV Intermittent every 1 hour  predniSONE   Tablet 40 milliGRAM(s) Oral daily  senna 2 Tablet(s) Oral at bedtime  sodium chloride 0.9% lock flush 10 milliLiter(s) IV Push every 1 hour PRN    Drug Dosing Weight  Height (cm): 170.2 (28 Jun 2021 10:15)  Weight (kg): 66.9 (28 Jun 2021 16:29)  BMI (kg/m2): 23.1 (28 Jun 2021 16:29)  BSA (m2): 1.78 (28 Jun 2021 16:29)    CENTRAL LINE: [ ] YES [ ] NO  LOCATION:   DATE INSERTED:  REMOVE: [ ] YES [ ] NO  EXPLAIN:    TASH: [ ] YES [ ] NO    DATE INSERTED:  REMOVE:  [ ] YES [ ] NO  EXPLAIN:    PAST MEDICAL & SURGICAL HISTORY:  Myocardial infarct, old    Hypercholesterolemia    Peripheral vascular disease    COPD (chronic obstructive pulmonary disease)    Bipolar 1 disorder    HTN (hypertension)    Bedbound    S/P CABG x 1    Above knee amputation of right lower extremity      07-15 @ 07:01  -  07-16 @ 07:00  --------------------------------------------------------  IN: 0 mL / OUT: 8 mL / NET: -8 mL    PHYSICAL EXAM:    GENERAL: NAD, mild jaundice  HEAD:  Atraumatic, Normocephalic  EYES: EOMI, PERRLA, conjunctiva and sclera clear  ENMT: No tonsillar erythema, exudates, or enlargement; Moist mucous membranes, Good dentition, No lesions  NECK: Supple, No JVD, Normal thyroid  NERVOUS SYSTEM:  Alert & Oriented to self only  CHEST/LUNG: Clear to percussion bilaterally; No rales, rhonchi, wheezing, or rubs  HEART: Regular rate and rhythm; No murmurs, rubs, or gallops  ABDOMEN: Soft, Nontender, Nondistended; Bowel sounds present  EXTREMITIES:  2+ Peripheral Pulses, No clubbing, cyanosis, or edema, L aka sump CDI. LLE with brace intact  LYMPH: No lymphadenopathy noted  SKIN: No rashes or lesions      LABS:  CBC Full  -  ( 16 Jul 2021 07:18 )  WBC Count : 14.07 K/uL  RBC Count : 3.43 M/uL  Hemoglobin : 10.5 g/dL  Hematocrit : 32.0 %  Platelet Count - Automated : 172 K/uL  Mean Cell Volume : 93.3 fl  Mean Cell Hemoglobin : 30.6 pg  Mean Cell Hemoglobin Concentration : 32.8 gm/dL  Auto Neutrophil # : 12.36 K/uL  Auto Lymphocyte # : 0.73 K/uL  Auto Monocyte # : 0.80 K/uL  Auto Eosinophil # : 0.01 K/uL  Auto Basophil # : 0.01 K/uL  Auto Neutrophil % : 87.8 %  Auto Lymphocyte % : 5.2 %  Auto Monocyte % : 5.7 %  Auto Eosinophil % : 0.1 %  Auto Basophil % : 0.1 %    07-16    168<HH>  |  135<H>  |  65<H>  ----------------------------<  285<H>  2.4<LL>   |  21<L>  |  2.01<H>    Ca    8.4      16 Jul 2021 07:18    TPro  5.5<L>  /  Alb  2.2<L>  /  TBili  9.0<H>  /  DBili  x   /  AST  30  /  ALT  32  /  AlkPhos  88  07-16    [ x ]  DVT Prophylaxis  [  ]  Nutrition, Brand, takes PO         Malnutrition       RADIOLOGY & ADDITIONAL STUDIES:    < from: CT Head No Cont (07.08.21 @ 19:30) >  EXAM:  CT BRAIN                            PROCEDURE DATE:  07/08/2021          INTERPRETATION:  Exam Date: 7/8/2021 7:30 PM    CT head without IV contrast    CLINICAL INFORMATION: r/o cva    TECHNIQUE: Contiguous axial sections were obtained through the head.   Coronal and sagittal reformats were obtained.    COMPARISON: CT head 6/28/2021    FINDINGS:  The ventricles and sulci are prominent, compatible with age-related generalized cerebral volume loss.   There is no CT evidence for acute cerebral cortical infarct. There is no evidence of hemorrhage. There is periventricular and subcortical white matter hypoattenuation,  most compatible with chronic microvascular ischemic changes.   No mass effect is found in the brain.  There is no midlineshift or herniation pattern.      The visualized portions of the paranasal sinuses and mastoid air cells are clear.    IMPRESSION:    No evidence of acute intracranial abnormality.  No evidence of hemorrhage.    Chronic changes as above.          ---End of Report ---            PHOENIX NORRIS MD; Attending Radiologist  This document has been electronically signed. Jul 8 2021  8:38PM    < end of copied text >  < from: MR MRCP No Cont (07.06.21 @ 20:34) >  EXAM:  MR MRCP                            PROCEDURE DATE:  07/06/2021          INTERPRETATION:  PROCEDURE INFORMATION:  Exam: MR Abdomen Without Contrast  Exam date and time: 7/6/2021 8:08 PM  Age: 76 years old  Clinical indication: Possible pancreatic mass on abdominal CT dated 6/28/2021    TECHNIQUE:  Imaging protocol: MR of the abdomen without contrast, including MRCP images.    COMPARISON:  Abdominal CT dated 6/28/2021    FINDINGS: The examination limited by respiratory motion artifact and lack of IV contrast.    Visualized lower chest: Mild bilateral pleural effusions. Consolidation/atelectasis of the adjacent pulmonary parenchyma.    Liver: No mass. The liver is diffusely dark on T2-weighted images. In addition, there is signal attenuation on in-phase T1-weighted gradient echo images. Findings are suggestive of hemachromatosis or hemosiderosis.  Gallbladder and bile ducts: A few small gallstones. The gallbladder wall is thickened measuring up to 5 mm. Mild biliary ductal dilatation, measuring 9 mm. No evidence for choledocholithiasis.  Pancreas: Mass in the head of the pancreas measuring 3.0 x 2.2 cm. The mass is best seen on series 4, image 17, and 18. The remaining pancreas is atrophic. The main pancreatic duct is dilated upstream to the pancreatic mass.  Spleen: Unremarkable. No splenomegaly.  Adrenal glands: Unremarkable. No mass.  Kidneys and ureters: Small brightly T2 hyperintense lesions in the kidneys bilaterally, representing probable cysts. One of the right renalcysts measures 1.7 cm (image 14 series 3) appears to have a small T2 hypointense component. No hydronephrosis.  Stomach and bowel: Visualized stomach and intestines are unremarkable.  Intraperitoneal space: No free fluid.  Arteries: No abdominal aortic aneurysm.  Bones/joints:  Unremarkable.  Soft tissues: Mild body wall edema.    IMPRESSION:  Mass in the head of the pancreas is most concerning for adenocarcinoma. Endoscopic ultrasound may be pursued for further evaluation. Its relationship with its adjacent vascular structures is difficult to evaluate without IV contrast. Dilated common bile duct and main pancreatic duct, likely due to obstruction by the pancreatic head mass.    Cholelithiasis with thickened gallbladder wall may represent acute cholecystitis. If clinically indicated, HIDA scan may be pursued for further evaluation.    1.7 cm right renal cyst as described above may be complex. Abdominal MR without and with IV contrast may be pursued to rule out malignant renal neoplasm.    Other findings as above.    A preliminary report was provided by [facility].    --- End of Report ---        FERNANDEZ ESCAMILLA MD; Attending Radiologist  This document has been electronically signed. Jul 7 2021  4:47PM    < end of copied text >    Goals of Care Discussion with Family/Proxy/Other   - see note from/family meeting set up for 7/9     MERARY MEYER    SCU progress note    INTERVAL HPI/OVERNIGHT EVENTS: No acute events overnight.    DNR [ x]   DNI  [ x ] Abx, IV Fluids, Comfort, No labs, MEWS suspended    COVID-19 PCR: NotDetec (13 Jul 2021 06:55)  COVID-19 PCR: NotDetec (28 Jun 2021 13:50)  COVID-19 PCR: NotDetec (21 Apr 2021 11:27)  COVID-19 PCR: NotDetec (18 Apr 2021 14:53)      The 4Ms    What Matters Most: see [facility]  Age appropriate Medications/Screen for High Risk Medication: Yes  Mentation: see CAM below  Mobility: defer to physical exam    The Confusion Assessment Method (CAM) Diagnostic Algorithm     1: Acute Onset or Fluctuating Course  - Is there evidence of an acute change in mental status from the patient’s baseline? Did the (abnormal) behavior  fluctuate during the day, that is, tend to come and go, or increase and decrease in severity?  [ ] YES [x ] NO     2: Inattention  - Did the patient have difficulty focusing attention, being easily distractible, or having difficulty keeping track of what was being said?  [ ] YES [ ] NO [x]Unable to assess     3: Disorganized thinking  -Was the patient’s thinking disorganized or incoherent, such as rambling or irrelevant conversation, unclear or illogical flow of ideas, or unpredictable switching from subject to subject?  [ ] YES [ ] NO [x]Unable to assess      4: Altered Level of consciousness?  [ ] YES [x ] NO    The diagnosis of delirium by CAM requires the presence of features 1 and 2 and either 3 or 4.    MEDICATIONS  (STANDING):  ALBUTerol    90 MICROgram(s) HFA Inhaler 1 Puff(s) Inhalation every 4 hours  amLODIPine   Tablet 10 milliGRAM(s) Oral daily  hydrALAZINE 25 milliGRAM(s) Oral four times a day  metoprolol tartrate 50 milliGRAM(s) Oral two times a day  pantoprazole  Injectable 40 milliGRAM(s) IV Push at bedtime  predniSONE   Tablet 40 milliGRAM(s) Oral daily  senna 2 Tablet(s) Oral at bedtime    MEDICATIONS  (PRN):  acetaminophen   Tablet .. 650 milliGRAM(s) Oral every 6 hours PRN Temp greater or equal to 38C (100.4F), Mild Pain (1 - 3)  ALBUTerol    90 MICROgram(s) HFA Inhaler 2 Puff(s) Inhalation every 6 hours PRN Wheezing  sodium chloride 0.9% lock flush 10 milliLiter(s) IV Push every 1 hour PRN Pre/post blood products, medications, blood draw, and to maintain line patency    Drug Dosing Weight  Height (cm): 170.2 (28 Jun 2021 10:15)  Weight (kg): 66.9 (28 Jun 2021 16:29)  BMI (kg/m2): 23.1 (28 Jun 2021 16:29)  BSA (m2): 1.78 (28 Jun 2021 16:29)    CENTRAL LINE: [ ] YES [ x] NO  LOCATION:   DATE INSERTED:  REMOVE: [ ] YES [ ] NO  EXPLAIN:    TASH: [ ] YES [x ] NO    DATE INSERTED:  REMOVE:  [ ] YES [ ] NO  EXPLAIN:    PAST MEDICAL & SURGICAL HISTORY:  Myocardial infarct, old    Hypercholesterolemia    Peripheral vascular disease    COPD (chronic obstructive pulmonary disease)    Bipolar 1 disorder    HTN (hypertension)    Bedbound    S/P CABG x 1    Above knee amputation of right lower extremity      07-15 @ 07:01  -  07-16 @ 07:00  --------------------------------------------------------  IN: 0 mL / OUT: 8 mL / NET: -8 mL    I&O's Detail    16 Jul 2021 07:01  -  17 Jul 2021 07:00  --------------------------------------------------------  IN:  Total IN: 0 mL    OUT:    Drain (mL): 5 mL    Indwelling Catheter - Stomal (mL): 300 mL  Total OUT: 305 mL    Total NET: -305 mL        PHYSICAL EXAM:    GENERAL: NAD, laying in bed, does not respond to verbal stimuli;  HEAD:  Atraumatic, Normocephalic  EYES: EOMI, PERRLA, conjunctiva and sclera clear  ENMT: No tonsillar erythema, exudates, or enlargement; Moist mucous membranes, Good dentition, No lesions  NECK: Supple, No JVD, Normal thyroid  NERVOUS SYSTEM:  Alert, opens eyes, does not follow commands  CHEST/LUNG: Clear to percussion bilaterally; No rales, rhonchi, wheezing, or rubs  HEART: Regular rate and rhythm; No murmurs, rubs, or gallops  ABDOMEN: Soft, Nontender, Nondistended; Bowel sounds present; drain present on abdomen;   EXTREMITIES:  2+ Peripheral Pulses, No clubbing, cyanosis, or edema, L aka sump CDI. LLE with brace intact  LYMPH: No lymphadenopathy noted  SKIN: No rashes or lesions      LABS:  No new labs; Comfort care measures,  Recent Labs  CBC Full  -  ( 16 Jul 2021 07:18 )  WBC Count : 14.07 K/uL  RBC Count : 3.43 M/uL  Hemoglobin : 10.5 g/dL  Hematocrit : 32.0 %  Platelet Count - Automated : 172 K/uL  Mean Cell Volume : 93.3 fl  Mean Cell Hemoglobin : 30.6 pg  Mean Cell Hemoglobin Concentration : 32.8 gm/dL  Auto Neutrophil # : 12.36 K/uL  Auto Lymphocyte # : 0.73 K/uL  Auto Monocyte # : 0.80 K/uL  Auto Eosinophil # : 0.01 K/uL  Auto Basophil # : 0.01 K/uL  Auto Neutrophil % : 87.8 %  Auto Lymphocyte % : 5.2 %  Auto Monocyte % : 5.7 %  Auto Eosinophil % : 0.1 %  Auto Basophil % : 0.1 %      07-16    168<HH>  |  135<H>  |  65<H>  ----------------------------<  285<H>  2.4<LL>   |  21<L>  |  2.01<H>    Ca    8.4      16 Jul 2021 07:18    TPro  5.5<L>  /  Alb  2.2<L>  /  TBili  9.0<H>  /  DBili  x   /  AST  30  /  ALT  32  /  AlkPhos  88  07-16    [ x ]  DVT Prophylaxis : Mechanical boots  [ x ]  Nutrition: Diet, Dysphagia 2 Mechanical Soft-Thin Liquids:   Consistent Carbohydrate No Snacks  Supplement Feeding Modality:  Oral  Nepro Cans or Servings Per Day:  1       Frequency:  Three Times a day (07-09-21 @ 09:25) [Active]  Diet, Pureed:   Consistent Carbohydrate Evening Snacks  Supplement Feeding Modality:  Oral  Glucerna Shake Cans or Servings Per Day:  1       Frequency:  Three Times a day (07-08-21 @ 12:53) [Pending Verification By Attending]      RADIOLOGY & ADDITIONAL STUDIES:  Reviewed All;     < from: CT Head No Cont (07.08.21 @ 19:30) >  EXAM:  CT BRAIN                        PROCEDURE DATE:  07/08/2021    INTERPRETATION:  Exam Date: 7/8/2021 7:30 PM  CT head without IV contrast  CLINICAL INFORMATION: r/o cva  TECHNIQUE: Contiguous axial sections were obtained through the head.   Coronal and sagittal reformats were obtained.  COMPARISON: CT head 6/28/2021  FINDINGS:  The ventricles and sulci are prominent, compatible with age-related generalized cerebral volume loss.   There is no CT evidence for acute cerebral cortical infarct. There is no evidence of hemorrhage. There is periventricular and subcortical white matter hypoattenuation,  most compatible with chronic microvascular ischemic changes.   No mass effect is found in the brain.  There is no midlineshift or herniation pattern.  The visualized portions of the paranasal sinuses and mastoid air cells are clear.  IMPRESSION:  No evidence of acute intracranial abnormality.  No evidence of hemorrhage.  Chronic changes as above.  ---End of Report ---  PHOENIX NORRIS MD; Attending Radiologist  This document has been electronically signed. Jul 8 2021  8:38PM  < end of copied text >    < from: MR MRCP No Cont (07.06.21 @ 20:34) >  EXAM:  MR MRCP                        PROCEDURE DATE:  07/06/2021    INTERPRETATION:  PROCEDURE INFORMATION:  Exam: MR Abdomen Without Contrast  Exam date and time: 7/6/2021 8:08 PM  Age: 76 years old  Clinical indication: Possible pancreatic mass on abdominal CT dated 6/28/2021  TECHNIQUE:  Imaging protocol: MR of the abdomen without contrast, including MRCP images.  COMPARISON:  Abdominal CT dated 6/28/2021  FINDINGS: The examination limited by respiratory motion artifact and lack of IV contrast  Visualized lower chest: Mild bilateral pleural effusions. Consolidation/atelectasis of the adjacent pulmonary parenchyma.  Liver: No mass. The liver is diffusely dark on T2-weighted images. In addition, there is signal attenuation on in-phase T1-weighted gradient echo images. Findings are suggestive of hemachromatosis or hemosiderosis.  Gallbladder and bile ducts: A few small gallstones. The gallbladder wall is thickened measuring up to 5 mm. Mild biliary ductal dilatation, measuring 9 mm. No evidence for choledocholithiasis.  Pancreas: Mass in the head of the pancreas measuring 3.0 x 2.2 cm. The mass is best seen on series 4, image 17, and 18. The remaining pancreas is atrophic. The main pancreatic duct is dilated upstream to the pancreatic mass.  Spleen: Unremarkable. No splenomegaly.  Adrenal glands: Unremarkable. No mass.  Kidneys and ureters: Small brightly T2 hyperintense lesions in the kidneys bilaterally, representing probable cysts. One of the right renalcysts measures 1.7 cm (image 14 series 3) appears to have a small T2 hypointense component. No hydronephrosis.  Stomach and bowel: Visualized stomach and intestines are unremarkable.  Intraperitoneal space: No free fluid.  Arteries: No abdominal aortic aneurysm.  Bones/joints:  Unremarkable.  Soft tissues: Mild body wall edema.  IMPRESSION:  Mass in the head of the pancreas is most concerning for adenocarcinoma. Endoscopic ultrasound may be pursued for further evaluation. Its relationship with its adjacent vascular structures is difficult to evaluate without IV contrast. Dilated common bile duct and main pancreatic duct, likely due to obstruction by the pancreatic head mass.  Cholelithiasis with thickened gallbladder wall may represent acute cholecystitis. If clinically indicated, HIDA scan may be pursued for further evaluation.  1.7 cm right renal cyst as described above may be complex. Abdominal MR without and with IV contrast may be pursued to rule out malignant renal neoplasm.  Other findings as above.  A preliminary report was provided by [facility].  --- End of Report ---  FERNANDEZ ESCAMILLA MD; Attending Radiologist  This document has been electronically signed. Jul 7 2021  4:47PM  < end of copied text >    Goals of Care Discussion with Family/Proxy/Other  7/9/21 See excerpt below:  Goals of Care:   Conversation Discussion:  · Conversation	Diagnosis; Prognosis; Treatment Options; Chaplaincy Referral; Hospice Referral  · Conversation Details	Spoke with the pt's niece Silva who identified herself as her HCP.  Discussed the pt's current clinical condition and further goals of care. She expressed being overwhelmed and feeling very sad about her aunt's declining health.  She stated her aunt was very clear as to what her wishes were for EOL care.  She wants to keep her aunt comfortable, no medical intervention to preserve her life.     Educated her about hospice philosophy and locations of care.  She stated she would like her aunt to go to LTC with hospice.  She does not want her aunt to [facility] to [facility].  SW referral made.  MOLST in chart; DNR/DNI no feeding tube.  No further labs, no IVF, comfort feeds as tolerated, and continue abx.    Chaplaincy offered and accepted.    All questions answered.  Support provided.   Plan discussed with primary team.

## 2021-07-17 NOTE — PROGRESS NOTE ADULT - PROBLEM SELECTOR PLAN 10
Patient now comfort care as per family wishes.  No IVF, No further labs   DNR/DNI MOLST in chart  MEWS suspended.  Comfort Care only.  Pt is for hospice, accepted at [facility]. pending bed

## 2021-07-17 NOTE — PROGRESS NOTE ADULT - ASSESSMENT
seen and examined vsstable afebrile  eyes opn  not in any distress.  aphasic as before  not focusing eyes    no new labs as pt is comfort care  poor prognosis

## 2021-07-17 NOTE — PROGRESS NOTE ADULT - PROBLEM SELECTOR PLAN 9
MEWS suspended.  No further lab work.  No IVF. Continue antibiotics.  See [facility] 7/9  HCP requesting Hospice. Does not want patient to return to [facility]. CM and SW aware.

## 2021-07-17 NOTE — PROGRESS NOTE ADULT - ASSESSMENT
76 year old female from [facility] with PMHx of PVD, right AKA (S/P fem pop bypass), PAF, COPD, bipolar disorder, hypertension and PSHx of S/P CABG x1 brought into the ED via EMS from [facility] for unresponsiveness and hypotension. Per NH supervisor Mr. Gaspar () Pt was found unresponsive today morning around 8 am, pt was fine last night, pt did not have fever, difficulty breathing, pt was only receptive to painful stimuli and on evaluation pt's blood pressure was low per papers Pt's bp was 62/38. Pt was sent to the hospital for further evaluation. Baseline mental status alert, oriented and follows instruction, non ambulatory at baseline.     ED course: Alicia was placed and pt was given 3L ivf s/p urine out put of 40cc, u/a positive for infection, CT chest showing b/l patchy infiltrates/ consolidations, concern for cholecystitis, with dilated bile duct of 1.7 cm, and concern of pancreatic head mass. Pt was given a dose of vancomycin and rocephin. Pt's blood pressure improve to 97/58, hr 109 bpm, ECG showing sinus tachy, RBBB, LAFB, Bifacicular block.     Patient admitted to the ICU for septic shock requiring vasopressors. Source of infection likely acute cholecystitis as demonstrated on CT abdomen vs UTI. Patient was started on levophed, noted with severe acidosis requiring bicarb drip and VANESSA, likely pre-renal. Patient was started on cefepime and flagyl for sepsis 2/2 cholecystitis. Lactate acidosis was started on bicarb gtt. VANESSA likely pre-renal obtained multiple boluses with low urine output. Patient gradually improved with resolution of hypotension, was able to taper off pressors, bicarb improved was able to dc bicarb gtt. Urine output gradually increased. on 6/29, patient underwent IR guided cholecystostomy, with drainage of bile. Evaluated by GI after, recommended MRCP. Unable to obtain MRCP as of 6/30 due to altered mental status suspected to be due to gabapentin toxicity in the setting of VANESSA. Unstable for MRCP, will continue monitoring mental status. Mental status improved 7/1-7/2, pt aaox2 to self and location. Seen by speech and swallow, recommends mechanical soft thin liquid diet.     7/2 pt downgrade to SCU, failed TOV, alicia reinserted   7/5. Pending  MRCP ordered. Hypernatremia worsening likely due to poor po intake, started on D5W per nephro recs  7/8- MRCP report noted with mass in the head of the pancreas most concerning for adenocarcinoma.  GI following. B/l lungs fields w/ rales on exam and LLE w/ 2+ edema.  BP remained 160- 170- hydralazine increased to QID, will give one dose of Lasix 40mg for effusion. Will request for sx and GI to comment on MRCP findings.   7/9 Remains hypernatremic, Na+ 149. Continue D5W at 60ml/hr , daily Lasix 20mg IV. Surgery recs metastatic w/u with CT abd/pelvis with IV contrast. D/w Nephro Dr. Noriega. Monitor SCr , and consider CT with IV contrast on Monday if SCr improved (goal <1.5).   7/9 Family has decided on comfort care. No more labs.

## 2021-07-17 NOTE — PROGRESS NOTE ADULT - ATTENDING SUPERVISION STATEMENT
ACP
Resident
Resident/Fellow
ACP

## 2021-07-17 NOTE — PROGRESS NOTE ADULT - PROBLEM SELECTOR PLAN 4
Continue oxygen support. not in excerebration  Continue bronchodilator. Use spacer with inhalers.  May need to add budesonide.  Continue steroids  CT and CXR noted above.  Monitor oxygenation. Continue oxygen support. not in excerebration, currently on 2L NC  Continue bronchodilator. Use spacer with inhalers.    Continue steroids  CT and CXR noted above.  Monitor oxygenation.

## 2021-07-18 NOTE — PROGRESS NOTE ADULT - SUBJECTIVE AND OBJECTIVE BOX
MERARY MEYER    SCU progress note    INTERVAL HPI/OVERNIGHT EVENTS: No acute events overnight.    DNR [ x]   DNI  [ x ] Abx, IV Fluids, Comfort, No labs, MEWS suspended    COVID-19 PCR: NotDetec (13 Jul 2021 06:55)  COVID-19 PCR: NotDetec (28 Jun 2021 13:50)  COVID-19 PCR: NotDetec (21 Apr 2021 11:27)  COVID-19 PCR: NotDetec (18 Apr 2021 14:53)      The 4Ms    What Matters Most: see GOC  Age appropriate Medications/Screen for High Risk Medication: Yes  Mentation: see CAM below  Mobility: defer to physical exam    The Confusion Assessment Method (CAM) Diagnostic Algorithm     1: Acute Onset or Fluctuating Course  - Is there evidence of an acute change in mental status from the patient’s baseline? Did the (abnormal) behavior  fluctuate during the day, that is, tend to come and go, or increase and decrease in severity?  [ ] YES [x ] NO     2: Inattention  - Did the patient have difficulty focusing attention, being easily distractible, or having difficulty keeping track of what was being said?  [ ] YES [ ] NO [x]Unable to assess     3: Disorganized thinking  -Was the patient’s thinking disorganized or incoherent, such as rambling or irrelevant conversation, unclear or illogical flow of ideas, or unpredictable switching from subject to subject?  [ ] YES [ ] NO [x]Unable to assess      4: Altered Level of consciousness?  [ ] YES [x ] NO    The diagnosis of delirium by CAM requires the presence of features 1 and 2 and either 3 or 4.    MEDICATIONS  (STANDING):  ALBUTerol    90 MICROgram(s) HFA Inhaler 1 Puff(s) Inhalation every 4 hours  amLODIPine   Tablet 10 milliGRAM(s) Oral daily  hydrALAZINE 25 milliGRAM(s) Oral four times a day  metoprolol tartrate 50 milliGRAM(s) Oral two times a day  pantoprazole  Injectable 40 milliGRAM(s) IV Push at bedtime  predniSONE   Tablet 40 milliGRAM(s) Oral daily  senna 2 Tablet(s) Oral at bedtime    MEDICATIONS  (PRN):  acetaminophen   Tablet .. 650 milliGRAM(s) Oral every 6 hours PRN Temp greater or equal to 38C (100.4F), Mild Pain (1 - 3)  ALBUTerol    90 MICROgram(s) HFA Inhaler 2 Puff(s) Inhalation every 6 hours PRN Wheezing  sodium chloride 0.9% lock flush 10 milliLiter(s) IV Push every 1 hour PRN Pre/post blood products, medications, blood draw, and to maintain line patency    Drug Dosing Weight  Height (cm): 170.2 (28 Jun 2021 10:15)  Weight (kg): 66.9 (28 Jun 2021 16:29)  BMI (kg/m2): 23.1 (28 Jun 2021 16:29)  BSA (m2): 1.78 (28 Jun 2021 16:29)    ICU Vital Signs Last 24 Hrs  T(C): 36.8 (18 Jul 2021 05:00), Max: 37.2 (17 Jul 2021 14:40)  T(F): 98.3 (18 Jul 2021 05:00), Max: 99 (17 Jul 2021 14:40)  HR: 79 (18 Jul 2021 05:00) (71 - 82)  BP: 150/62 (18 Jul 2021 05:00) (150/62 - 162/74)  BP(mean): --  ABP: --  ABP(mean): --  RR: 18 (18 Jul 2021 05:00) (18 - 20)  SpO2: 96% (18 Jul 2021 05:00) (95% - 98%)      CENTRAL LINE: [ ] YES [ x] NO  LOCATION:   DATE INSERTED:  REMOVE: [ ] YES [ ] NO  EXPLAIN:    TASH: [ ] YES [x ] NO    DATE INSERTED:  REMOVE:  [ ] YES [ ] NO  EXPLAIN:    PAST MEDICAL & SURGICAL HISTORY:  Myocardial infarct, old    Hypercholesterolemia    Peripheral vascular disease    COPD (chronic obstructive pulmonary disease)    Bipolar 1 disorder    HTN (hypertension)    Bedbound    S/P CABG x 1    Above knee amputation of right lower extremity      07-15 @ 07:01  -  07-16 @ 07:00  --------------------------------------------------------  IN: 0 mL / OUT: 8 mL / NET: -8 mL    I&O's Detail    16 Jul 2021 07:01  -  17 Jul 2021 07:00  --------------------------------------------------------  IN:  Total IN: 0 mL    OUT:    Drain (mL): 5 mL    Indwelling Catheter - Stomal (mL): 300 mL  Total OUT: 305 mL    Total NET: -305 mL      PHYSICAL EXAM:    GENERAL: NAD, laying in bed, does not respond to verbal stimuli;  HEAD:  Atraumatic, Normocephalic  EYES: EOMI, PERRLA, conjunctiva and sclera clear  ENMT: No tonsillar erythema, exudates, or enlargement; Moist mucous membranes, Good dentition, No lesions  NECK: Supple, No JVD, Normal thyroid  NERVOUS SYSTEM:  Alert, opens eyes, does not follow commands  CHEST/LUNG: Clear to percussion bilaterally; No rales, rhonchi, wheezing, or rubs  HEART: Regular rate and rhythm; No murmurs, rubs, or gallops  ABDOMEN: Soft, Nontender, Nondistended; Bowel sounds present; drain present on abdomen;   EXTREMITIES:  2+ Peripheral Pulses, No clubbing, cyanosis, or edema, L aka sump CDI. LLE with brace intact  LYMPH: No lymphadenopathy noted  SKIN: No rashes or lesions      LABS:  No new labs; Comfort care measures,  Recent Labs  CBC Full  -  ( 16 Jul 2021 07:18 )  WBC Count : 14.07 K/uL  RBC Count : 3.43 M/uL  Hemoglobin : 10.5 g/dL  Hematocrit : 32.0 %  Platelet Count - Automated : 172 K/uL  Mean Cell Volume : 93.3 fl  Mean Cell Hemoglobin : 30.6 pg  Mean Cell Hemoglobin Concentration : 32.8 gm/dL  Auto Neutrophil # : 12.36 K/uL  Auto Lymphocyte # : 0.73 K/uL  Auto Monocyte # : 0.80 K/uL  Auto Eosinophil # : 0.01 K/uL  Auto Basophil # : 0.01 K/uL  Auto Neutrophil % : 87.8 %  Auto Lymphocyte % : 5.2 %  Auto Monocyte % : 5.7 %  Auto Eosinophil % : 0.1 %  Auto Basophil % : 0.1 %      07-16    168<HH>  |  135<H>  |  65<H>  ----------------------------<  285<H>  2.4<LL>   |  21<L>  |  2.01<H>    Ca    8.4      16 Jul 2021 07:18    TPro  5.5<L>  /  Alb  2.2<L>  /  TBili  9.0<H>  /  DBili  x   /  AST  30  /  ALT  32  /  AlkPhos  88  07-16    [ x ]  DVT Prophylaxis : Mechanical boots  [ x ]  Nutrition: Diet, Dysphagia 2 Mechanical Soft-Thin Liquids:   Consistent Carbohydrate No Snacks  Supplement Feeding Modality:  Oral  Nepro Cans or Servings Per Day:  1       Frequency:  Three Times a day (07-09-21 @ 09:25) [Active]  Diet, Pureed:   Consistent Carbohydrate Evening Snacks  Supplement Feeding Modality:  Oral  Glucerna Shake Cans or Servings Per Day:  1       Frequency:  Three Times a day (07-08-21 @ 12:53) [Pending Verification By Attending]      RADIOLOGY & ADDITIONAL STUDIES:  Reviewed All;     < from: CT Head No Cont (07.08.21 @ 19:30) >  EXAM:  CT BRAIN                        PROCEDURE DATE:  07/08/2021    INTERPRETATION:  Exam Date: 7/8/2021 7:30 PM  CT head without IV contrast  CLINICAL INFORMATION: r/o cva  TECHNIQUE: Contiguous axial sections were obtained through the head.   Coronal and sagittal reformats were obtained.  COMPARISON: CT head 6/28/2021  FINDINGS:  The ventricles and sulci are prominent, compatible with age-related generalized cerebral volume loss.   There is no CT evidence for acute cerebral cortical infarct. There is no evidence of hemorrhage. There is periventricular and subcortical white matter hypoattenuation,  most compatible with chronic microvascular ischemic changes.   No mass effect is found in the brain.  There is no midlineshift or herniation pattern.  The visualized portions of the paranasal sinuses and mastoid air cells are clear.  IMPRESSION:  No evidence of acute intracranial abnormality.  No evidence of hemorrhage.  Chronic changes as above.  ---End of Report ---  PHOENIX NORRIS MD; Attending Radiologist  This document has been electronically signed. Jul 8 2021  8:38PM  < end of copied text >    < from: MR MRCP No Cont (07.06.21 @ 20:34) >  EXAM:  MR MRCP                        PROCEDURE DATE:  07/06/2021    INTERPRETATION:  PROCEDURE INFORMATION:  Exam: MR Abdomen Without Contrast  Exam date and time: 7/6/2021 8:08 PM  Age: 76 years old  Clinical indication: Possible pancreatic mass on abdominal CT dated 6/28/2021  TECHNIQUE:  Imaging protocol: MR of the abdomen without contrast, including MRCP images.  COMPARISON:  Abdominal CT dated 6/28/2021  FINDINGS: The examination limited by respiratory motion artifact and lack of IV contrast  Visualized lower chest: Mild bilateral pleural effusions. Consolidation/atelectasis of the adjacent pulmonary parenchyma.  Liver: No mass. The liver is diffusely dark on T2-weighted images. In addition, there is signal attenuation on in-phase T1-weighted gradient echo images. Findings are suggestive of hemachromatosis or hemosiderosis.  Gallbladder and bile ducts: A few small gallstones. The gallbladder wall is thickened measuring up to 5 mm. Mild biliary ductal dilatation, measuring 9 mm. No evidence for choledocholithiasis.  Pancreas: Mass in the head of the pancreas measuring 3.0 x 2.2 cm. The mass is best seen on series 4, image 17, and 18. The remaining pancreas is atrophic. The main pancreatic duct is dilated upstream to the pancreatic mass.  Spleen: Unremarkable. No splenomegaly.  Adrenal glands: Unremarkable. No mass.  Kidneys and ureters: Small brightly T2 hyperintense lesions in the kidneys bilaterally, representing probable cysts. One of the right renalcysts measures 1.7 cm (image 14 series 3) appears to have a small T2 hypointense component. No hydronephrosis.  Stomach and bowel: Visualized stomach and intestines are unremarkable.  Intraperitoneal space: No free fluid.  Arteries: No abdominal aortic aneurysm.  Bones/joints:  Unremarkable.  Soft tissues: Mild body wall edema.  IMPRESSION:  Mass in the head of the pancreas is most concerning for adenocarcinoma. Endoscopic ultrasound may be pursued for further evaluation. Its relationship with its adjacent vascular structures is difficult to evaluate without IV contrast. Dilated common bile duct and main pancreatic duct, likely due to obstruction by the pancreatic head mass.  Cholelithiasis with thickened gallbladder wall may represent acute cholecystitis. If clinically indicated, HIDA scan may be pursued for further evaluation.  1.7 cm right renal cyst as described above may be complex. Abdominal MR without and with IV contrast may be pursued to rule out malignant renal neoplasm.  Other findings as above.  A preliminary report was provided by [employer].  --- End of Report ---  FERNANDEZ ESCAMILLA MD; Attending Radiologist  This document has been electronically signed. Jul 7 2021  4:47PM  < end of copied text >    Goals of Care Discussion with Family/Proxy/Other  7/9/21 See excerpt below:  Goals of Care:   Conversation Discussion:  · Conversation	Diagnosis; Prognosis; Treatment Options; Chaplaincy Referral; Hospice Referral  · Conversation Details	Spoke with the pt's niece Silva who identified herself as her HCP.  Discussed the pt's current clinical condition and further goals of care. She expressed being overwhelmed and feeling very sad about her aunt's declining health.  She stated her aunt was very clear as to what her wishes were for EOL care.  She wants to keep her aunt comfortable, no medical intervention to preserve her life.     Educated her about hospice philosophy and locations of care.  She stated she would like her aunt to go to LTC with hospice.  She does not want her aunt to return to [facility].  SW referral made.  MOLST in chart; DNR/DNI no feeding tube.  No further labs, no IVF, comfort feeds as tolerated, and continue abx.    Chaplaincy offered and accepted.    All questions answered.  Support provided.   Plan discussed with primary team.

## 2021-07-18 NOTE — PROGRESS NOTE ADULT - REASON FOR ADMISSION
AMS
SEPSIS
Sepsis
Sepsis . Has COPD
Septic Shock
SEPSIS
Septic Shock
hypotension and unresponsivness
Sepsis
Sepsis
AMS
sepsis
Sepsis
Septic Shock
AMS
Sepsis

## 2021-07-18 NOTE — PROGRESS NOTE ADULT - PROBLEM SELECTOR PROBLEM 3
Pancreatic mass
Pleural effusion
Pancreatic mass
Pancreatic mass
Acute cholecystitis
Pancreatic mass
Pancreatic mass
UTI (urinary tract infection)
Pancreatic mass
Pancreatic mass
Acute cholecystitis
Pancreatic mass

## 2021-07-18 NOTE — PROGRESS NOTE ADULT - PROBLEM SELECTOR PLAN 10
Patient now comfort care as per family wishes.  No IVF, No further labs   DNR/DNI MOLST in chart  MEWS suspended.  Comfort Care only.  Pt is for hospice, accepted at [facility]. pending bed

## 2021-07-18 NOTE — PROGRESS NOTE ADULT - PROBLEM SELECTOR PLAN 9
MEWS suspended.  No further lab work.  No IVF. Continue antibiotics.  See [facility] 7/9  HCP requesting Hospice. Does not want patient to return to [facility]. CM and SW aware.

## 2021-07-18 NOTE — PROGRESS NOTE ADULT - PROBLEM SELECTOR PROBLEM 5
PAF (paroxysmal atrial fibrillation)
PAF (paroxysmal atrial fibrillation)
COPD (chronic obstructive pulmonary disease)
HTN (hypertension)
PAF (paroxysmal atrial fibrillation)
HTN (hypertension)
HTN (hypertension)
PAF (paroxysmal atrial fibrillation)
PAF (paroxysmal atrial fibrillation)
COPD (chronic obstructive pulmonary disease)
PAF (paroxysmal atrial fibrillation)
COPD (chronic obstructive pulmonary disease)
HTN (hypertension)
HTN (hypertension)

## 2021-07-18 NOTE — PROGRESS NOTE ADULT - PROBLEM SELECTOR PROBLEM 7
Pleural effusion
Pancreatic mass
Severe protein-calorie malnutrition
Pleural effusion
Pleural effusion
PAF (paroxysmal atrial fibrillation)
Severe protein-calorie malnutrition
Pancreatic mass
Pancreatic mass
Functional quadriplegia
Pleural effusion
Severe protein-calorie malnutrition
Severe protein-calorie malnutrition
Pleural effusion

## 2021-07-18 NOTE — PROGRESS NOTE ADULT - PROBLEM SELECTOR PROBLEM 9
End of life care
VANESSA (acute kidney injury)
End of life care
Hypernatremia
Hypernatremia
End of life care
Hypernatremia
End of life care
Prophylactic measure
VANESSA (acute kidney injury)
End of life care
Hypernatremia

## 2021-07-18 NOTE — PROGRESS NOTE ADULT - COVID-19 NEGATIVE LAB RESULT
COVID-19 ruled out

## 2021-07-18 NOTE — PROGRESS NOTE ADULT - PROBLEM SELECTOR PROBLEM 6
HTN (hypertension)
HTN (hypertension)
COPD (chronic obstructive pulmonary disease)
COPD (chronic obstructive pulmonary disease)
Pancreatic mass
HTN (hypertension)
PAF (paroxysmal atrial fibrillation)
COPD (chronic obstructive pulmonary disease)
HTN (hypertension)
COPD (chronic obstructive pulmonary disease)
COPD (chronic obstructive pulmonary disease)
HTN (hypertension)
HTN (hypertension)
COPD (chronic obstructive pulmonary disease)

## 2021-07-18 NOTE — PROGRESS NOTE ADULT - PROVIDER SPECIALTY LIST ADULT
Critical Care
Gastroenterology
Internal Medicine
Nephrology
Surgery
Critical Care
Gastroenterology
Internal Medicine
MICU
Nephrology
Nephrology
Pulmonology
Critical Care
Gastroenterology
Gastroenterology
Internal Medicine
Nephrology
Pulmonology
Pulmonology
Internal Medicine
Nephrology
Pulmonology
Pulmonology
Critical Care
Internal Medicine
Internal Medicine
Critical Care

## 2021-07-18 NOTE — PROGRESS NOTE ADULT - PROBLEM SELECTOR PROBLEM 4
UTI (urinary tract infection)
PAF (paroxysmal atrial fibrillation)
UTI (urinary tract infection)
COPD (chronic obstructive pulmonary disease)
HTN (hypertension)
PAF (paroxysmal atrial fibrillation)
COPD (chronic obstructive pulmonary disease)
PAF (paroxysmal atrial fibrillation)
PAF (paroxysmal atrial fibrillation)
COPD (chronic obstructive pulmonary disease)
PAF (paroxysmal atrial fibrillation)
PAF (paroxysmal atrial fibrillation)

## 2021-07-18 NOTE — PROGRESS NOTE ADULT - NUTRITIONAL ASSESSMENT
Severe Protein Calorie malnutrition  Protein 6.4    Albumin 1.9

## 2021-07-18 NOTE — PROGRESS NOTE ADULT - PROBLEM SELECTOR PROBLEM 1
Acute encephalopathy

## 2021-07-18 NOTE — PROGRESS NOTE ADULT - PROBLEM SELECTOR PROBLEM 10
Advance care planning

## 2021-07-18 NOTE — PROGRESS NOTE ADULT - PROBLEM SELECTOR PROBLEM 2
Acute cholecystitis
Pleural effusion
Acute cholecystitis
Pleural effusion
Acute cholecystitis

## 2021-07-18 NOTE — DISCHARGE NOTE FOR THE EXPIRED PATIENT - NS PRELIMINARY CAUSE OF DEATH_RESTRICTED
Cancer/Cardiopulmonary Arrest/Diabetes/Failure to Thrive/Multi-organ Dysfunction Syndrome/Respiratory Failure/Sepsis

## 2021-07-18 NOTE — DISCHARGE NOTE FOR THE EXPIRED PATIENT - HOSPITAL COURSE
76 year old female from [facility] with PMHx of PVD, right AKA (S/P fem pop bypass), PAF, COPD, bipolar disorder, hypertension and PSHx of S/P CABG x1 brought into the ED via EMS from [facility] for unresponsiveness and hypotension. Per NH supervisor Mr. Gaspar () Pt was found unresponsive today morning around 8 am, pt was fine last night, pt did not have fever, difficulty breathing, pt was only receptive to painful stimuli and on evaluation pt's blood pressure was low per papers Pt's bp was 62/38. Pt was sent to the hospital for further evaluation. Baseline mental status alert, oriented and follows instruction, non ambulatory at baseline.     ED course: Alicia was placed and pt was given 3L ivf s/p urine out put of 40cc, u/a positive for infection, CT chest showing b/l patchy infiltrates/ consolidations, concern for cholecystitis, with dilated bile duct of 1.7 cm, and concern of pancreatic head mass. Pt was given a dose of vancomycin and rocephin. Pt's blood pressure improve to 97/58, hr 109 bpm, ECG showing sinus tachy, RBBB, LAFB, Bifacicular block.     Patient admitted to the ICU for septic shock requiring vasopressors. Source of infection likely acute cholecystitis as demonstrated on CT abdomen vs UTI. Patient was started on levophed, noted with severe acidosis requiring bicarb drip and VANESSA, likely pre-renal. Patient was started on cefepime and flagyl for sepsis 2/2 cholecystitis. Lactate acidosis was started on bicarb gtt. VANESSA likely pre-renal obtained multiple boluses with low urine output. Patient gradually improved with resolution of hypotension, was able to taper off pressors, bicarb improved was able to dc bicarb gtt. Urine output gradually increased. on 6/29, patient underwent IR guided cholecystostomy, with drainage of bile. Evaluated by GI after, recommended MRCP. Unable to obtain MRCP as of 6/30 due to altered mental status suspected to be due to gabapentin toxicity in the setting of VANESSA. Unstable for MRCP, will continue monitoring mental status. Mental status improved 7/1-7/2, pt aaox2 to self and location. Seen by speech and swallow, recommends mechanical soft thin liquid diet.     7/2 pt downgrade to SCU, failed TOV, alicia reinserted   7/5. Pending  MRCP ordered. Hypernatremia worsening likely due to poor po intake, started on D5W per nephro recs  7/8- MRCP report noted with mass in the head of the pancreas most concerning for adenocarcinoma.  GI following. B/l lungs fields w/ rales on exam and LLE w/ 2+ edema.  BP remained 160- 170- hydralazine increased to QID, will give one dose of Lasix 40mg for effusion. Will request for sx and GI to comment on MRCP findings.   7/9 Remains hypernatremic, Na+ 149. Continue D5W at 60ml/hr , daily Lasix 20mg IV. Surgery recs metastatic w/u with CT abd/pelvis with IV contrast. D/w Nephro Dr. Noriega. Monitor SCr , and consider CT with IV contrast on Monday if SCr improved (goal <1.5).   7/9 Family has decided on comfort care. No more labs.    Was called by RN to evaluate patient who was found to be unresponsive. Patient was seen and examined. No spontaneous movements were present and patient did not respond to verbal or physical stimuli. On exam, pupils were fixed and dilated, peripheral and carotid pulses were absent, pt apneic without chest rise or lung sounds in bilateral lung fields and no heart sounds were detected on auscultation over entire precordium. Patient pronounced dead at 13:05pm on July 18, 2021 confirmed and witnessed by RN at bedside.   Family/next of kin were informed. Patient was DNR/DNI Comfort Care, pending home hospice placement.

## 2021-07-18 NOTE — PROGRESS NOTE ADULT - PROBLEM SELECTOR PROBLEM 8
Functional quadriplegia
Functional quadriplegia
PAF (paroxysmal atrial fibrillation)
Functional quadriplegia
Functional quadriplegia
VANESSA (acute kidney injury)
Severe protein-calorie malnutrition
VANESSA (acute kidney injury)
VANESSA (acute kidney injury)
Functional quadriplegia
VANESSA (acute kidney injury)
PAF (paroxysmal atrial fibrillation)
VANESSA (acute kidney injury)
Functional quadriplegia

## 2021-07-18 NOTE — PROGRESS NOTE ADULT - ASSESSMENT
76 year old female from [facility] with PMHx of PVD, right AKA (S/P fem pop bypass), PAF, COPD, bipolar disorder, hypertension and PSHx of S/P CABG x1 brought into the ED via EMS from [facility] for unresponsiveness and hypotension. Per NH supervisor Mr. Gaspar () Pt was found unresponsive today morning around 8 am, pt was fine last night, pt did not have fever, difficulty breathing, pt was only receptive to painful stimuli and on evaluation pt's blood pressure was low per papers Pt's bp was 62/38. Pt was sent to the hospital for further evaluation. Baseline mental status alert, oriented and follows instruction, non ambulatory at baseline.     ED course: Alicia was placed and pt was given 3L ivf s/p urine out put of 40cc, u/a positive for infection, CT chest showing b/l patchy infiltrates/ consolidations, concern for cholecystitis, with dilated bile duct of 1.7 cm, and concern of pancreatic head mass. Pt was given a dose of vancomycin and rocephin. Pt's blood pressure improve to 97/58, hr 109 bpm, ECG showing sinus tachy, RBBB, LAFB, Bifacicular block.     Patient admitted to the ICU for septic shock requiring vasopressors. Source of infection likely acute cholecystitis as demonstrated on CT abdomen vs UTI. Patient was started on levophed, noted with severe acidosis requiring bicarb drip and VANESSA, likely pre-renal. Patient was started on cefepime and flagyl for sepsis 2/2 cholecystitis. Lactate acidosis was started on bicarb gtt. VANESSA likely pre-renal obtained multiple boluses with low urine output. Patient gradually improved with resolution of hypotension, was able to taper off pressors, bicarb improved was able to dc bicarb gtt. Urine output gradually increased. on 6/29, patient underwent IR guided cholecystostomy, with drainage of bile. Evaluated by GI after, recommended MRCP. Unable to obtain MRCP as of 6/30 due to altered mental status suspected to be due to gabapentin toxicity in the setting of VANESSA. Unstable for MRCP, will continue monitoring mental status. Mental status improved 7/1-7/2, pt aaox2 to self and location. Seen by speech and swallow, recommends mechanical soft thin liquid diet.     7/2 pt downgrade to SCU, failed TOV, alicia reinserted   7/5. Pending  MRCP ordered. Hypernatremia worsening likely due to poor po intake, started on D5W per nephro recs  7/8- MRCP report noted with mass in the head of the pancreas most concerning for adenocarcinoma.  GI following. B/l lungs fields w/ rales on exam and LLE w/ 2+ edema.  BP remained 160- 170- hydralazine increased to QID, will give one dose of Lasix 40mg for effusion. Will request for sx and GI to comment on MRCP findings.   7/9 Remains hypernatremic, Na+ 149. Continue D5W at 60ml/hr , daily Lasix 20mg IV. Surgery recs metastatic w/u with CT abd/pelvis with IV contrast. D/w Nephro Dr. Noriega. Monitor SCr , and consider CT with IV contrast on Monday if SCr improved (goal <1.5).   7/9 Family has decided on comfort care. No more labs.

## 2021-07-18 NOTE — PROGRESS NOTE ADULT - PROBLEM SELECTOR PLAN 4
Continue oxygen support. not in excerebration, currently on 2L NC  Continue bronchodilator. Use spacer with inhalers.    Continue steroids  CT and CXR noted above.  Monitor oxygenation.

## 2021-07-18 NOTE — PROGRESS NOTE ADULT - NSICDXPILOT_GEN_ALL_CORE
[place]

## 2021-07-18 NOTE — PROGRESS NOTE ADULT - PROBLEM SELECTOR PLAN 8
Likely secondary to ATN from septic shock.  Slowly improving.  Continue gentle hydration with D5W  Monitor CMP and Phos levels daily.  Dr Noriega following.
Passive ROM exercises as tolerated  Turn and position as needed
Likely secondary to ATN from septic shock.  Slowly improving.  Continue gentle hydration with D5W  Dr Noriega following.   NO FURTHER LAB WORK AS PER HCP
Encourage oral intake as tolerated   Strict aspiration precautions.  Keep head of bed elevated.
Likely secondary to ATN from septic shock.  Slowly improving.  Continue gentle hydration with D5W  Dr Noriega following.   NO FURTHER LAB WORK AS PER HCP
Passive ROM exercises as tolerated  Turn and position every 2 hours.
- tachy likely due to having home BB on held and COPD exer  - restarted on BB today  - C/w Eliquis
Passive ROM exercises as tolerated  Turn and position as needed
- tachy likely due to having home BB on held and COPD exer  - C/w BB today  - C/w Eliquis
Passive ROM exercises as tolerated  Turn and position every 2 hours.
2/2 to established ATN  form septic shock, clinically improved  - S/p alicia reinsertion on 7/2, failed TOV  - encourage oral free water intake   - s/p bicarb gtt, stable now  - no acute need for renal replacement therapy- per nephro  - trend BMP  - Nephro Dr Noriega
Likely secondary to ATN from septic shock.  Slowly improving.  Continue gentle hydration with D5W  Monitor CMP and Phos levels daily.  Dr Noriega following.
Passive ROM exercises as tolerated  Turn and position as needed
Passive ROM exercises as tolerated  Turn and position every 2 hours.

## 2021-10-18 NOTE — ED ADULT NURSE NOTE - PRIMARY CARE PROVIDER
Shirin Keystone Flap Text: The defect edges were debeveled with a #15 scalpel blade.  Given the location of the defect, shape of the defect a keystone flap was deemed most appropriate.  Using a sterile surgical marker, an appropriate keystone flap was drawn incorporating the defect, outlining the appropriate donor tissue and placing the expected incisions within the relaxed skin tension lines where possible. The area thus outlined was incised deep to adipose tissue with a #15 scalpel blade.  The skin margins were undermined to an appropriate distance in all directions around the primary defect and laterally outward around the flap utilizing iris scissors.

## 2022-01-24 NOTE — ED PROVIDER NOTE - NSCAREINITIATED _GEN_ER
Julieta Pan(Attending) Detail Level: Detailed Additional Notes: patient unable to provide complete med-list at present Quality 130: Documentation Of Current Medications In The Medical Record: Documentation of a medical reason(s) for not documenting, updating, or reviewing the patientâs current medications list (e.g., patient is in an urgent or emergent medical situation)

## 2022-06-16 NOTE — ED ADULT TRIAGE NOTE - ESI TRIAGE ACUITY LEVEL, MLM
Nutrition Care Plan    Nutrition Diagnosis:   Increased nutrient needs related to wound healing secondary to s/p open heart surgery (5/23) and s/p I&D right knee with VAC (removed 6/06) as evidenced by estimated nutrient needs.     Intervention:  General/healthful diet: Continue moderate consistent CHO diet  -- Encouraged intakes at all meals with high protein foods. Reviewed sources.    Commercial beverage: Continue the strawberry standard oral supplement BID per patient request (350 kcal, 20 gm protein per serving).  -- Staff to encourage intake of supplement   -- Clarified when supplements are being sent and if the meal is skipped, the supplement will not send.     Purpose of the nutrition education: Discussed nutrition for wound healing with patient on 5/20/22. See RD note for details.     Monitoring and Evaluation:  Amount of food:  Goal is for patient to tolerate and consume 75% of meals and oral supplement. Met: % intakes charted, consuming nutrition supplements well. Patient reports only getting 1 Ensure/day recently.      2

## 2022-06-23 NOTE — PHYSICAL THERAPY INITIAL EVALUATION ADULT - NS ASR WT BEARING DETAIL RLE
New Patient Abstract    Reason for Referral: Left Breast Mass    Referring Provider:  Tyson Price MD    Primary Care Provider: Tyson Price MD    Family History of Cancer/Hematologic Disorders: None noted    Presenting Symptoms: Abnormal routine screening mammogram     Narrative with recent with Results/Procedures/Biopsies and Dates completed:   Ms. Luis Patel is a 57-year-old  female who reports to have never used tobacco products or drug substances. She reports use of alcohol as 2 drinks per week. Her medical history reports as kidney cyst, HTN and kidney stone. Her surgical history reports as appendectomy, lymph node biopsy, breast biopsy and wisdom teeth extraction. On 6/2/22, Ms. Alcocer presented for her routine screening mammogram. The imaging reported left breast mass and calcifications. On 6/14/22 underwent a left breast diagnostic mammogram and ultrasound with core needle biopsies of the mass and lymph node. The imaging reported a 5.0 x 3.2 x 4.5 cm spiculated mass with dysmorphic left axillary lymph node. The left breast pathology reported infiltrating ductal carcinoma with lobular features, low grade (well differentiated). Definite in situ component and lymphovascular invasion were not identified. The left axilla pathology reported macro metastatic carcinoma similar to a involving lymph node. While definite extracapsular extension was not identified in this material it could be present in unsampled lymph node. The IHC staining reported ER (93%) as positive with MN (0%) and HER-2 (0) as negative.  On 6/16/22 she had a bilateral breast MRI that reported the known left breast cancer and it measured up to 3.7 cm, left axillary metastatic lymphadenopathy however no suspicious findings in the right breast. On 6/22/22 she had a PET/CT scan that reported hypermetabolic soft tissue left breast mass with prominent FDG avid left axillary lymph node, hypermetabolic osseous metastatic disease in the sacrum and T10 vertebral body, and patchy areas of mild uptake throughout the cervical and paraspinal soft tissues. A referral was placed to medical oncology for next steps in plan of care for her newly diagnosed metastatic breast cancer. She has a surgical consult appointment with Dr Taco Combs on 7/7/22.    6/2/2022 Bilateral Digital Screening Mammogram  IMPRESSION   Left mass and calcifications. Further evaluation is recommended with   ML and compression magnification views, then ultrasound to follow. 6/14/2022 Bilateral Left Breast Diagnostic Mammogram and ultrasound  FINDING: Confirmed is the spiculated mass at approximately 10:00 position left   breast posterior depth. The mass measures approximately 4 cm in size. There are   pleomorphic calcifications extending anteriorly from the mass towards the   nipple. Including the mass and calcifications, the abnormality measures   approximately 7 cm in size. A left breast ultrasound is recommended. Left breast ultrasound: There is a hypoechoic mass with ill-defined angular   margins at the 10:00 position left breast 12 cm from nipple. This is estimated   to measure approximately 5.0 x 3.2 x 4.5 cm. There is posterior acoustic   shadowing. Evaluation of left axilla reveals a dysmorphic lymph node measuring   2.6 cm in maximal dimension. Impression       IMPRESSION:   1. Spiculated left breast mass with associated microcalcifications measuring at   least 7 cm in total size at the 10:00 position. Tissue sampling is recommended. 2. Dysmorphic left axillary lymph node. Tissue sampling is recommended. 6/14/22 Surgical Pathology  DIAGNOSIS        A:  \"LEFT BREAST, 10:00 POSITION, 12 CM FROM NIPPLE, CORE BIOPSY\":        INFILTRATING DUCTAL CARCINOMA WITH LOBULAR FEATURES, LOW GRADE (WELL   DIFFERENTIATED).  DEFINITE IN SITU COMPONENT AND LYMPHOVASCULAR INVASION   ARE NOT IDENTIFIED   B:  \"LEFT AXILLA, CORE BIOPSY\":        MACRO METASTATIC CARCINOMA SIMILAR TO A INVOLVING LYMPH NODE. WHILE   DEFINITE EXTRACAPSULAR EXTENSION IS NOT IDENTIFIED IN THIS MATERIAL, IT   COULD BE PRESENT IN UNSAMPLED LYMPH NODE   Procedures/Addenda                     STF- ER/UT/[id number] BY IHC                          Interpretation                       [facility] IHC Quantitative Breast Panel     TEST NAME:                                         RESULTS:               INTERNAL CONTROLS:     ESTROGEN RECEPTOR:                     Positive (93%)               Absent   PROGESTERONE RECEPTOR:          Negative (0.0%)            Absent   HER-2/ARYA:                                          Negative (0)               Percentage of Cells with Uniform                                                                                                             Intense Complete Membrane                                                                                                             Stainin%  22 Bilateral Breast MRI  FINDINGS: The breasts demonstrate moderate glandularity and mild background   enhancement. Left 10:00 irregular heterogeneously enhancing malignant mass measures up to 3.3   x 2.6 x 3.0 cm. Extending anteriorly from the mass is about 1.2 cm of clumped   and reticular nonmass enhancement. Overall the maximal dimension of this   malignancy is 3.7 cm. Mild overlying skin thickening and edema are nonspecific   and could be reactive to the biopsy unless there is clinical suspicion for   malignancy involvement. There is no other evidence of suspicious enhancing mass, and no dominant or   unique nonmass enhancement, to suggest additional malignancy in either breast.       Left axilla demonstrates 4 asymmetrically enlarged and dysmorphic lymph nodes,   one of which underwent prior biopsy demonstrating metastasis. The largest   measures up to 2.0 x 1.2 cm. There is no evidence of right axillary   lymphadenopathy or internal mammary lymphadenopathy.        Elsewhere, limited visualization of the partially included thorax and upper   abdomen shows no acute abnormality. Impression       1. Left 10:00 breast cancer measures up to 3.7 cm.   2. Left axillary metastatic lymphadenopathy. 3. No suspicious right breast finding.     6/22/22 PET CT Scan  FINDINGS:       HEAD/NECK:   Symmetric uptake within the imaged brain parenchyma. Scattered areas of mild uptake within the occipital and cervical soft tissues   without convincing CT correlate with additional areas of uptake, and along the   bilateral paraspinals soft tissues. No discrete enlarged/hypermetabolic cervical   lymph nodes. CHEST:    FDG uptake with a known left breast malignancy with SUV of 9.0. Prominent FDG   avid left axillary lymph nodes largest measuring 1.3 x 2.6 cm (SUV max 5.8,   image number 66). Scattered areas of FDG uptake without corresponding CT correlate throughout the   superior lateral and posterior medial chest wall. No enlarged or hypermetabolic mediastinal or hilar adenopathy. No suspicious pulmonary nodules or focal parenchymal FDG uptake. ABDOMEN/PELVIS:   No enlarged or hypermetabolic adenopathy. No focal uptake within the visceral organs. There is a 1.8 cm left hyperdense exophytic renal cyst without associated FDG   uptake compatible with a hemorrhagic/proteinaceous cyst with additional punctate   upper pole hyperdense cyst and indeterminate photopenic hypodense cyst.       Physiologic uptake within the gastrointestinal tract with normal excretion of   radiotracer within the renal collecting system and urinary bladder. MUSCULOSKELETAL:   Hypermetabolic lytic lesion involving the T10 vertebral body (SUV max 14.0,   image number 103) with sclerotic hypermetabolic lesion centered within the   sacrum (SUV max 13.0, image number 189). No abnormal focal uptake within the soft tissues. Impression   1.   Hypermetabolic soft tissue left breast mass with prominent FDG avid left   axillary lymph nodes. 2.  Hypermetabolic osseous metastatic disease involving the sacrum and T10   vertebral body. 3.  Patchy areas of mild uptake throughout the cervical and paraspinal soft   tissues, as well as, along the superior lateral and posterior medial chest wall   favoring brown fat and less likely metastatic disease.          Notes from Referring Provider: n/a    Other Pertinent Information: n/a    Presented at Tumor Board: Pending presentation on 6/30/22 nonweight-bearing

## 2022-08-16 NOTE — BRIEF OPERATIVE NOTE - ANTIBIOTIC PROTOCOL
Followed protocol
Attending Attestation (For Attendings USE Only)...

## 2023-02-28 NOTE — PHYSICAL THERAPY INITIAL EVALUATION ADULT - ADDITIONAL COMMENTS
former smoker in ~1990s
As per patient , she is minimal ambulatory and home bound. She uses quad cane. She orders out food. And friend helps as needed
Per IE: As per patient , she is minimal ambulatory and home bound. She uses quad cane. She orders out food. And friend helps as needed
Per IE: As per patient , she is minimal ambulatory and home bound. She uses quad cane. She orders out food. And friend helps as needed

## 2023-03-13 NOTE — CHART NOTE - NSCHARTNOTESELECT_GEN_ALL_CORE
For GERD:    Begin taking Pantoprazole 40 mg once a day 30-60 minutes prior to breakfast    Can continue sucralfate 1 g as needed     For constipation:    We recommend starting MiraLAX. This is available over-the-counter and generic brand is fine.    MiraLAX works best when taken on a regular basis (daily or every other day) to help promote more regular bowel movements.    Typically patients start with 1/2 -1 capful mixed in 8 ounces of noncarbonated liquid and take once or twice daily.    Maximum is 2 full capfuls daily.  Adjust dose based on your response.        We recommend starting a daily fiber supplement such as FiberCon - once tablet daily     These can be purchased over-the-counter, generic brand is fine.  Fiber supplements can take 12 to 72 hours to start working. Make sure to drink 6 to 12 ounces of water or noncarbonated beverage with fiber supplement.     Recommended starting with half of product listed daily dose for 7 days to help reduce risk of abdominal bloating/gas and allow your body to acclimate to fiber diet intake.  If you do not experience any of these adverse effects may increase to daily dose listed on product.                     Event Note

## 2023-10-24 NOTE — ED PROVIDER NOTE - NS ED MD DISPO ADMITTING SERVICE
Quality 110: Preventive Care And Screening: Influenza Immunization: Influenza Immunization not Administered because Patient Refused. Quality 226: Preventive Care And Screening: Tobacco Use: Screening And Cessation Intervention: Patient screened for tobacco use and is an ex/non-smoker Quality 130: Documentation Of Current Medications In The Medical Record: Current Medications Documented Quality 431: Preventive Care And Screening: Unhealthy Alcohol Use - Screening: Patient not identified as an unhealthy alcohol user when screened for unhealthy alcohol use using a systematic screening method Detail Level: Generalized FMED

## 2023-10-24 NOTE — PROGRESS NOTE ADULT - ATTENDING COMMENTS
20   to  30  #  of  pressure   knee -family and pat refused metastatic work up  -waiting for home hospice

## 2024-03-05 NOTE — ED PROVIDER NOTE - NUMBER OF YRS
----- Message from Yohana Rao sent at 3/4/2024  4:47 PM EST -----  Subject: Refill Request    QUESTIONS  Name of Medication? Other - Trazadone  Patient-reported dosage and instructions? 400 mg   How many days do you have left? 0  Preferred Pharmacy? [id number]  Pharmacy phone number (if available)? [phone]  ---------------------------------------------------------------------------  --------------  CALL BACK INFO  What is the best way for the office to contact you? OK to leave message on   voicemail  Preferred Call Back Phone Number? [phone]  ---------------------------------------------------------------------------  --------------  SCRIPT ANSWERS  Relationship to Patient? Self   52

## 2025-03-05 NOTE — PROGRESS NOTE ADULT - SUBJECTIVE AND OBJECTIVE BOX
Some irregular beats while checking BP but in SR. Last episode of a fib around time of surgery     Patient is a 75y old  Female who presents with a chief complaint of dry gangrene. S/p angiogram 1/17  s/p open bypass iliac-pop RLE 1/23  S/p TMA on 1/29    Today  Patient was seen and examined at bedside   Still has pain in left foot  Left wrist drop, denies any     REVIEW OF SYSTEMS: denies fever, chills, SOB, palpitations, chest pain, abdominal pain, nausea, vomitting, diarrhea, constipation, dizziness    MEDICATIONS  (STANDING):  aspirin  chewable 81 milliGRAM(s) Oral daily  cefepime   IVPB 2000 milliGRAM(s) IV Intermittent every 12 hours  gabapentin 100 milliGRAM(s) Oral every 12 hours  heparin  Infusion. 800 Unit(s)/Hr (8 mL/Hr) IV Continuous <Continuous>  metoprolol tartrate 25 milliGRAM(s) Oral two times a day  metroNIDAZOLE  IVPB 500 milliGRAM(s) IV Intermittent every 8 hours  povidone iodine 10% Solution 1 Application(s) Topical daily  risperiDONE   Tablet 0.5 milliGRAM(s) Oral two times a day  senna 2 Tablet(s) Oral at bedtime    MEDICATIONS  (PRN):  acetaminophen   Tablet .. 650 milliGRAM(s) Oral every 6 hours PRN Mild Pain (1 - 3)  haloperidol    Injectable 1 milliGRAM(s) IV Push every 4 hours PRN agitation  oxycodone    5 mG/acetaminophen 325 mG 1 Tablet(s) Oral every 4 hours PRN Moderate Pain (4 - 6)  polyethylene glycol 3350 17 Gram(s) Oral daily PRN Constipation  traMADol 25 milliGRAM(s) Oral every 6 hours PRN Severe Pain (7 - 10)      PHYSICAL EXAM:  GENERAL: NAD  NERVOUS SYSTEM:  Alert & Oriented X1, muscle strength 5/5 in BL LE, right UE 5/5 strength, 4/5 in left forearm, weakness of small muscles of hand; left deltoid and trapezius has 5/5 strength  CHEST/LUNG: Clear to auscultation bilaterally; No rales, rhonchi, wheezing, or rubs  HEART: Regular rate and rhythm; No murmurs, rubs, or gallops  ABDOMEN: Soft, Nontender, Nondistended; Bowel sounds present  EXTREMITIES:  RLE bandage noted s/p TMA, small area of erythema in right knee ( necrosis?), Left 3rd toe gangrene    LABS:                        10.1   13.17 )-----------( 309      ( 30 Jan 2020 06:44 )             31.3     01-29    138  |  101  |  21<H>  ----------------------------<  122<H>  4.1   |  31  |  0.77    Ca    8.9      29 Jan 2020 07:49  Phos  2.6     01-29  Mg     1.7     01-29      PTT - ( 30 Jan 2020 06:44 )  PTT:60.6 sec    CAPILLARY BLOOD GLUCOSE
